# Patient Record
Sex: MALE | Race: OTHER | HISPANIC OR LATINO | URBAN - METROPOLITAN AREA
[De-identification: names, ages, dates, MRNs, and addresses within clinical notes are randomized per-mention and may not be internally consistent; named-entity substitution may affect disease eponyms.]

---

## 2018-07-13 ENCOUNTER — INPATIENT (INPATIENT)
Facility: HOSPITAL | Age: 43
LOS: 5 days | Discharge: HOME | End: 2018-07-19
Attending: INTERNAL MEDICINE | Admitting: INTERNAL MEDICINE
Payer: MEDICAID

## 2018-07-13 VITALS
SYSTOLIC BLOOD PRESSURE: 120 MMHG | TEMPERATURE: 98 F | DIASTOLIC BLOOD PRESSURE: 63 MMHG | HEART RATE: 97 BPM | OXYGEN SATURATION: 100 % | RESPIRATION RATE: 18 BRPM

## 2018-07-13 LAB
ALBUMIN SERPL ELPH-MCNC: 3.6 G/DL — SIGNIFICANT CHANGE UP (ref 3.5–5.2)
ALP SERPL-CCNC: 110 U/L — SIGNIFICANT CHANGE UP (ref 30–115)
ALT FLD-CCNC: 18 U/L — SIGNIFICANT CHANGE UP (ref 0–41)
AMMONIA BLD-MCNC: 100 UMOL/L — HIGH (ref 11–55)
ANION GAP SERPL CALC-SCNC: 16 MMOL/L — HIGH (ref 7–14)
APAP SERPL-MCNC: <5 UG/ML — LOW (ref 10–30)
APPEARANCE UR: CLEAR — SIGNIFICANT CHANGE UP
APTT BLD: 52.2 SEC — HIGH (ref 27–39.2)
AST SERPL-CCNC: 96 U/L — HIGH (ref 0–41)
BASE EXCESS BLDV CALC-SCNC: 0.5 MMOL/L — SIGNIFICANT CHANGE UP (ref -2–2)
BASOPHILS # BLD AUTO: 0.09 K/UL — SIGNIFICANT CHANGE UP (ref 0–0.2)
BASOPHILS NFR BLD AUTO: 1 % — SIGNIFICANT CHANGE UP (ref 0–1)
BILIRUB SERPL-MCNC: 1.6 MG/DL — HIGH (ref 0.2–1.2)
BILIRUB UR-MCNC: NEGATIVE — SIGNIFICANT CHANGE UP
BLD GP AB SCN SERPL QL: SIGNIFICANT CHANGE UP
BUN SERPL-MCNC: 3 MG/DL — LOW (ref 10–20)
CA-I SERPL-SCNC: 1.11 MMOL/L — LOW (ref 1.12–1.3)
CALCIUM SERPL-MCNC: 8.5 MG/DL — SIGNIFICANT CHANGE UP (ref 8.5–10.1)
CHLORIDE SERPL-SCNC: 100 MMOL/L — SIGNIFICANT CHANGE UP (ref 98–110)
CK MB CFR SERPL CALC: <1 NG/ML — SIGNIFICANT CHANGE UP (ref 0.6–6.3)
CO2 SERPL-SCNC: 24 MMOL/L — SIGNIFICANT CHANGE UP (ref 17–32)
COLOR SPEC: YELLOW — SIGNIFICANT CHANGE UP
CREAT SERPL-MCNC: 0.5 MG/DL — LOW (ref 0.7–1.5)
DIFF PNL FLD: NEGATIVE — SIGNIFICANT CHANGE UP
EOSINOPHIL # BLD AUTO: 0.08 K/UL — SIGNIFICANT CHANGE UP (ref 0–0.7)
EOSINOPHIL NFR BLD AUTO: 0.9 % — SIGNIFICANT CHANGE UP (ref 0–8)
ETHANOL SERPL-MCNC: 333 MG/DL — HIGH
GAS PNL BLDV: 144 MMOL/L — SIGNIFICANT CHANGE UP (ref 136–145)
GAS PNL BLDV: SIGNIFICANT CHANGE UP
GLUCOSE SERPL-MCNC: 112 MG/DL — HIGH (ref 70–99)
GLUCOSE UR QL: NEGATIVE MG/DL — SIGNIFICANT CHANGE UP
HCO3 BLDV-SCNC: 25 MMOL/L — SIGNIFICANT CHANGE UP (ref 22–29)
HCT VFR BLD CALC: 27.3 % — LOW (ref 42–52)
HCT VFR BLDA CALC: 25.4 % — LOW (ref 34–44)
HGB BLD CALC-MCNC: 8.3 G/DL — LOW (ref 14–18)
HGB BLD-MCNC: 8.6 G/DL — LOW (ref 14–18)
IMM GRANULOCYTES NFR BLD AUTO: 0.3 % — SIGNIFICANT CHANGE UP (ref 0.1–0.3)
INR BLD: 1.63 RATIO — HIGH (ref 0.65–1.3)
KETONES UR-MCNC: NEGATIVE — SIGNIFICANT CHANGE UP
LACTATE BLDV-MCNC: 1.8 MMOL/L — HIGH (ref 0.5–1.6)
LACTATE SERPL-SCNC: 1.7 MMOL/L — SIGNIFICANT CHANGE UP (ref 0.5–2.2)
LEUKOCYTE ESTERASE UR-ACNC: NEGATIVE — SIGNIFICANT CHANGE UP
LIDOCAIN IGE QN: 121 U/L — HIGH (ref 7–60)
LYMPHOCYTES # BLD AUTO: 1.96 K/UL — SIGNIFICANT CHANGE UP (ref 1.2–3.4)
LYMPHOCYTES # BLD AUTO: 22.1 % — SIGNIFICANT CHANGE UP (ref 20.5–51.1)
MAGNESIUM SERPL-MCNC: 2.1 MG/DL — SIGNIFICANT CHANGE UP (ref 1.8–2.4)
MCHC RBC-ENTMCNC: 25.7 PG — LOW (ref 27–31)
MCHC RBC-ENTMCNC: 31.5 G/DL — LOW (ref 32–37)
MCV RBC AUTO: 81.5 FL — SIGNIFICANT CHANGE UP (ref 80–94)
MONOCYTES # BLD AUTO: 0.91 K/UL — HIGH (ref 0.1–0.6)
MONOCYTES NFR BLD AUTO: 10.3 % — HIGH (ref 1.7–9.3)
NEUTROPHILS # BLD AUTO: 5.79 K/UL — SIGNIFICANT CHANGE UP (ref 1.4–6.5)
NEUTROPHILS NFR BLD AUTO: 65.4 % — SIGNIFICANT CHANGE UP (ref 42.2–75.2)
NITRITE UR-MCNC: NEGATIVE — SIGNIFICANT CHANGE UP
NRBC # BLD: 0 /100 WBCS — SIGNIFICANT CHANGE UP (ref 0–0)
PCO2 BLDV: 37 MMHG — LOW (ref 41–51)
PH BLDV: 7.43 — SIGNIFICANT CHANGE UP (ref 7.26–7.43)
PH UR: 7 — SIGNIFICANT CHANGE UP (ref 5–8)
PLATELET # BLD AUTO: 80 K/UL — LOW (ref 130–400)
PO2 BLDV: 65 MMHG — HIGH (ref 20–40)
POTASSIUM BLDV-SCNC: 3.5 MMOL/L — SIGNIFICANT CHANGE UP (ref 3.3–5.6)
POTASSIUM SERPL-MCNC: 3.6 MMOL/L — SIGNIFICANT CHANGE UP (ref 3.5–5)
POTASSIUM SERPL-SCNC: 3.6 MMOL/L — SIGNIFICANT CHANGE UP (ref 3.5–5)
PROT SERPL-MCNC: 9.2 G/DL — HIGH (ref 6–8)
PROT UR-MCNC: NEGATIVE MG/DL — SIGNIFICANT CHANGE UP
PROTHROM AB SERPL-ACNC: 17.5 SEC — HIGH (ref 9.95–12.87)
RBC # BLD: 3.35 M/UL — LOW (ref 4.7–6.1)
RBC # FLD: 16.4 % — HIGH (ref 11.5–14.5)
SALICYLATES SERPL-MCNC: <0.3 MG/DL — LOW (ref 4–30)
SAO2 % BLDV: 91 % — SIGNIFICANT CHANGE UP
SODIUM SERPL-SCNC: 140 MMOL/L — SIGNIFICANT CHANGE UP (ref 135–146)
SP GR SPEC: 1.01 — SIGNIFICANT CHANGE UP (ref 1.01–1.03)
TROPONIN T SERPL-MCNC: <0.01 NG/ML — SIGNIFICANT CHANGE UP
TYPE + AB SCN PNL BLD: SIGNIFICANT CHANGE UP
UROBILINOGEN FLD QL: 1 MG/DL (ref 0.2–0.2)
WBC # BLD: 8.86 K/UL — SIGNIFICANT CHANGE UP (ref 4.8–10.8)
WBC # FLD AUTO: 8.86 K/UL — SIGNIFICANT CHANGE UP (ref 4.8–10.8)

## 2018-07-13 RX ORDER — FOLIC ACID 0.8 MG
1 TABLET ORAL DAILY
Refills: 0 | Status: DISCONTINUED | OUTPATIENT
Start: 2018-07-13 | End: 2018-07-14

## 2018-07-13 RX ORDER — THIAMINE MONONITRATE (VIT B1) 100 MG
100 TABLET ORAL ONCE
Refills: 0 | Status: COMPLETED | OUTPATIENT
Start: 2018-07-13 | End: 2018-07-13

## 2018-07-13 RX ORDER — PHYTONADIONE (VIT K1) 5 MG
5 TABLET ORAL ONCE
Refills: 0 | Status: COMPLETED | OUTPATIENT
Start: 2018-07-13 | End: 2018-07-13

## 2018-07-13 RX ORDER — ASCORBIC ACID 60 MG
500 TABLET,CHEWABLE ORAL DAILY
Refills: 0 | Status: DISCONTINUED | OUTPATIENT
Start: 2018-07-13 | End: 2018-07-14

## 2018-07-13 RX ORDER — LACTULOSE 10 G/15ML
20 SOLUTION ORAL THREE TIMES A DAY
Refills: 0 | Status: DISCONTINUED | OUTPATIENT
Start: 2018-07-13 | End: 2018-07-14

## 2018-07-13 RX ORDER — SODIUM CHLORIDE 9 MG/ML
1000 INJECTION, SOLUTION INTRAVENOUS
Refills: 0 | Status: DISCONTINUED | OUTPATIENT
Start: 2018-07-13 | End: 2018-07-15

## 2018-07-13 RX ORDER — SODIUM CHLORIDE 9 MG/ML
1000 INJECTION, SOLUTION INTRAVENOUS ONCE
Refills: 0 | Status: COMPLETED | OUTPATIENT
Start: 2018-07-13 | End: 2018-07-13

## 2018-07-13 RX ORDER — THIAMINE MONONITRATE (VIT B1) 100 MG
100 TABLET ORAL DAILY
Refills: 0 | Status: DISCONTINUED | OUTPATIENT
Start: 2018-07-13 | End: 2018-07-19

## 2018-07-13 RX ADMIN — Medication 100 MILLIGRAM(S): at 18:01

## 2018-07-13 RX ADMIN — SODIUM CHLORIDE 1000 MILLILITER(S): 9 INJECTION, SOLUTION INTRAVENOUS at 17:09

## 2018-07-13 RX ADMIN — Medication 2 MILLIGRAM(S): at 22:37

## 2018-07-13 RX ADMIN — SODIUM CHLORIDE 75 MILLILITER(S): 9 INJECTION, SOLUTION INTRAVENOUS at 23:02

## 2018-07-13 NOTE — H&P ADULT - NSHPSOCIALHISTORY_GEN_ALL_CORE
Social History:    Occupation:   Lives with: ( X ) alone  (  ) children   (  ) spouse   (  ) parents  (  ) other    Substance Use (street drugs): ( X  ) never used  (  ) other:  Tobacco Usage:  (  X ) never smoked   (   ) former smoker   (   ) current smoker  (     ) pack year  (        ) last cigarette date  Alcohol Usage: as in HPI

## 2018-07-13 NOTE — H&P ADULT - FAMILY HISTORY
Father  Still living? Unknown  Family history of stroke, Age at diagnosis: 61-70     Mother  Still living? No  Family history of stroke, Age at diagnosis: 41-50

## 2018-07-13 NOTE — ED PROVIDER NOTE - OBJECTIVE STATEMENT
42 yo M pmh of alcohol abuse presents with few month history of blood in his urine, stool and coughing out blood as well as bleeding gums. Also reports intermittent dizziness, chest pain and shortness of breath. No syncopal episodes. Has not seen a doctor for the bleeding. Drinks 6 40 ounce beers a day, last drink was 15 minutes prior to arrival. no fall or head trauma.

## 2018-07-13 NOTE — ED PROVIDER NOTE - PHYSICAL EXAMINATION
CONSTITUTIONAL: Well-developed; well-nourished; in no acute distress.   SKIN: warm, dry  HEAD: Normocephalic; atraumatic.  EYES: PERRL, EOMI,  + juandice. pink conjunctiva  ENT: No nasal discharge; airway clear.  NECK: Supple; non tender.  CARD: S1, S2 normal;Regular rate and rhythm.   RESP: No wheezes, rales or rhonchi.  ABD: soft non distended, no rebound or guarding. no gross blood on rectal exam, no hemmroids, no fissures.   EXT: Normal ROM.    LYMPH: No acute cervical adenopathy.  NEURO: Alert, oriented, grossly unremarkable

## 2018-07-13 NOTE — ED ADULT NURSE NOTE - OBJECTIVE STATEMENT
As per cousin, pt c/o bloody stools x3 months. Pt with hx of ETOH abuse. Pt c/o generalized abdominal pain. Pt also c/o hematuria

## 2018-07-13 NOTE — H&P ADULT - HISTORY OF PRESENT ILLNESS
42 yo M w/ h/o alcohol abuse since 19 years of age, drinks 40 ounces beer about 3-5 times per day--> last drink at 12 noon today; no PCP check up for long time, no other known medical condition; presented from home with above chief complaints.    Pt c/o noticing hematuria, spontaneous gum bleeding, epistaxis, hematochezia that has been happening for about 2-3 months. Hematochezia is maroon colored blood that happens about 2 times a week, last episode today, the amount has been increasing recently. He didn't see any doctor for these complaints ever. For last few weeks he also has been having weakness, decreased po intake weight loss, intermittent SOB/chest pain.     ROS positive for increasing sleepiness at work, nausea, vomiting 1 episode today am-non-bloody, bilious, feeling intermittent fever  ROS negative for hemetemesis, constipation, abdominal distension, lower extremity swelling, syncope, palpitations    Patient never had endoscopy/colonoscopy, denies IV drug abuse/family h/o cancer; father was alcoholic too    In ED, VS stable on presentation. Found to have cirrhosis on CT A/P 42 yo M w/ h/o alcohol abuse since 19 years of age, drinks 40 ounces beer about 3-5 times per day--> last drink at 12 noon today; no PCP check up for long time, no other known medical condition; presented from home with above chief complaints.    Pt c/o noticing hematuria, spontaneous gum bleeding, epistaxis, hematochezia that has been happening for about 2-3 months. Hematochezia is maroon colored blood that happens about 2 times a week, last episode today, the amount has been increasing recently. He didn't see any doctor for these complaints ever. For last few weeks he also has been having weakness, decreased po intake weight loss, intermittent SOB/chest pain.     ROS positive for increasing sleepiness at work, nausea, vomiting 1 episode today am-non-bloody, bilious, feeling intermittent fever  ROS negative for hemetemesis, constipation, abdominal distension, lower extremity swelling, syncope, palpitations    Patient never had endoscopy/colonoscopy, denies IV drug abuse/family h/o cancer; father was alcoholic too    Attd: Pt drank yesterday. Gets shakes in am, never DTs or LOC or Sz. Did alcohol rehab for 28 days 2 yrs ago. Pt has neuropathy signs in hands and legs. No encephalopathy. Cooperative and pleasant and oriented.

## 2018-07-13 NOTE — ED PROVIDER NOTE - PROGRESS NOTE DETAILS
I personally evaluated the patient. I reviewed the Resident’s or Physician Assistant’s note (as assigned above), and agree with the findings and plan except as documented in my note.   44 Y/O M CHRONIC ALCOHOLISM (6 40 OUNCE BEERS DAILY), BROUGHT TO ED BY FAMILY WITH 3 MONTHS OF ABNORMAL BLEEDING. PT REPORTS 3 MONTHS OF INTERMITTENT HEMATURIA, BPR, EPISTAXIS, HEMOPTYSIS AND BLEEDING GUMS. PT WITH 2-3 WEEKS OF WEAKNESS, DIZZINESS, PALPITATIONS AND WORSENING FATIGUE. PT WITH H/O WITHDRAWAL, NO H/O WITHDRAWAL SEIZURES. LAST DRINK 1 HOUR PRIOR TO ARRIVAL IN THE ED. + ABD PAIN X SEVERAL MONTHS. ABD PAIN IS INTERMITTENT AND DIFFUSE. + INTERMITTENT VOMITING. NO FEVER, CHILLS. NO CP, SOB. VITALS NOTED. ALERT OX3 +AOB. OP NORMAL MMM. LUNGS CLEAR B/L. ABD- SOFT DISTENDED. + DIFFUSE TENDERNESS. NO REBOUND OR GUARDING. NO HSM. + ECCHYMOSES ABD WALL. NO PETECHIAE. NEURO EXAM NONFOCAL. PT SIGNED OUT TO DR. PATTON, FOLLOW UP CT SCAN, REASSESS AND DISPO. The patient was endorsed to me.  He is resting comfortable on a stretcher, listening to a soccer game.  CT scan was ordered and pending,  Anticipate admission when CT done.

## 2018-07-13 NOTE — H&P ADULT - NSHPPHYSICALEXAM_GEN_ALL_CORE
GENERAL: patient looks restless, increased activity  HEAD:  Atraumatic, Normocephalic  EYES: EOMI, PERRLA, conjunctiva and sclera clear  NECK: Supple  CHEST/LUNG: Clear to auscultation bilaterally; No wheeze  HEART: Regular rate and rhythm; No murmurs, rubs, or gallops  Chest: spider angioma present at some spots  ABDOMEN: Soft, tender in RUQ, LLQ, RLQ, Nondistended; Bowel sounds present  EXTREMITIES:  Flapping tremor absent; 2+ Peripheral Pulses, No clubbing, cyanosis, or edema  PSYCH: AAOx3  NEUROLOGY: non-focal  SKIN: No rashes or lesions

## 2018-07-13 NOTE — H&P ADULT - NSHPLABSRESULTS_GEN_ALL_CORE
8.6    8.86  )-----------( 80       ( 2018 15:15 )             27.3           140  |  100  |  3<L>  ----------------------------<  112<H>  3.6   |  24  |  0.5<L>    Ca    8.5      2018 15:15  Mg     2.1         TPro  9.2<H>  /  Alb  3.6  /  TBili  1.6<H>  /  DBili  x   /  AST  96<H>  /  ALT  18  /  AlkPhos  110                Urinalysis Basic - ( 2018 16:54 )    Color: Yellow / Appearance: Clear / S.010 / pH: x  Gluc: x / Ketone: Negative  / Bili: Negative / Urobili: 1.0 mg/dL   Blood: x / Protein: Negative mg/dL / Nitrite: Negative   Leuk Esterase: Negative / RBC: x / WBC x   Sq Epi: x / Non Sq Epi: x / Bacteria: x        PT/INR - ( 2018 15:15 )   PT: 17.50 sec;   INR: 1.63 ratio         PTT - ( 2018 15:15 )  PTT:52.2 sec    Lactate Trend   @ 15:15 Lactate:1.7       CARDIAC MARKERS ( 2018 16:00 )  x     / <0.01 ng/mL / x     / x     / <1.0 ng/mL  CARDIAC MARKERS ( 2018 15:15 )  x     / x     / 153 U/L / x     / x        < from: CT Abdomen and Pelvis w/ IV Cont (18 @ 18:38) >    1.  No acute intra-abdominal pathology.  2.  Cirrhotic liver containing multiple subcentimeter hypodensities too   small characterize. Nonemergent MRI of the abdomen is recommended for   further evaluation.  3.  Mild splenomegaly.    < end of copied text > 8.6    8.86  )-----------( 80       ( 2018 15:15 )             27.3     Hemoglobin: 8.3 g/dL ( @ 07:47)  Hemoglobin: 8.7 g/dL ( @ 00:15)  Hemoglobin: 8.6 g/dL ( @ 15:15)        140  |  100  |  3<L>  ----------------------------<  112<H>  3.6   |  24  |  0.5<L>    Ca    8.5      2018 15:15  Mg     2.1         TPro  9.2<H>  /  Alb  3.6  /  TBili  1.6<H>  /  DBili  x   /  AST  96<H>  /  ALT  18  /  AlkPhos  110                Urinalysis Basic - ( 2018 16:54 )    Color: Yellow / Appearance: Clear / S.010 / pH: x  Gluc: x / Ketone: Negative  / Bili: Negative / Urobili: 1.0 mg/dL   Blood: x / Protein: Negative mg/dL / Nitrite: Negative   Leuk Esterase: Negative / RBC: x / WBC x   Sq Epi: x / Non Sq Epi: x / Bacteria: x        PT/INR - ( 2018 15:15 )   PT: 17.50 sec;   INR: 1.63 ratio    PTT - ( 2018 15:15 )  PTT:52.2 sec    Lactate Trend   @ 15:15 Lactate:1.7       CARDIAC MARKERS ( 2018 16:00 )  x     / <0.01 ng/mL / x     / x     / <1.0 ng/mL  CARDIAC MARKERS ( 2018 15:15 )  x     / x     / 153 U/L / x     / x        < from: CT Abdomen and Pelvis w/ IV Cont (18 @ 18:38) >    1.  No acute intra-abdominal pathology.  2.  Cirrhotic liver containing multiple subcentimeter hypodensities too   small characterize. Nonemergent MRI of the abdomen is recommended for   further evaluation.  3.  Mild splenomegaly.    < end of copied text >

## 2018-07-13 NOTE — H&P ADULT - REASON FOR ADMISSION
rectal bleeding, gum bleeding, epistaxis, hematuria for 2-3 months with weakness, intermittent SOB/chest pain, decreased po intake

## 2018-07-13 NOTE — ED PROVIDER NOTE - CARE PLAN
Principal Discharge DX:	Alcoholic intoxication with complication  Secondary Diagnosis:	Anemia, unspecified type  Secondary Diagnosis:	Thrombocytopenia

## 2018-07-13 NOTE — H&P ADULT - ASSESSMENT
42 yo M w/ h/o alcohol abuse since 19 years of age, drinks 40 ounces beer about 3-5 times per day--> last drink at 12 noon today; no PCP check up for long time, no other known medical condition; presented from home hematochezia, episodic gum bleeding, epistaxis, hematuria for 2-3 months with weakness, intermittent SOB/chest pain, decreased po intake for few weeks.     In ED, VS stable on presentation  CIWA about 20 on my assessment  labs and imaging significant for U/A- no hematuria, HB-8.6, no baseline available, platelet-80, INR-1.6. Rodriguez-1.6, AST-96. lipase-121, CT A/P showed- cirrhosis  rectal exam by ED team- no gross blood +    A & P:    # Microcytic anemia of unknown duration likely 2/2 chronic blood loss from epistaxis, gum bleeding, hematuria, hematochezia  - patient has occassional SOB/chest pain at night time but no active symptoms for anemia at this point--> so will hold off on transfusion  - keep active type and screen, monitor CBC qshift  - transfuse if Hb drops further  - check iron studies    # epistaxis, gum bleeding hematuria maybe 2/2 thrombocytopenia/elevated INR 2/2 cirrhosis vs vitamin C deficiency 2/2 malnutrition from chronic alcohol use  - start oral vitamin c supplementation  - will try oral vitamin K supplementation to reverse INR  - monitor INR and platelet  - monitor CBC as above    # hematochezia likely related to hemorrhoids from portal hypertension/rectal varices  - unable to do rectal exam as pt is in hallway  - GI c/s   - monitor CBC as above    # Newly diagnosed Cirrhosis 2/2 alcohol use  - patient counselled in depth about quitting alcohol use  - check hepatitis B,C, AFP  - GI c/s for possible endoscopy  - start lactulose for elevated ammonia--> titrate to 2-3 bowel movement per day  - no e/o ascites or hepatic encephalopathy    # multiple small nodularities on liver seen on CT A/P  - will get MRI liver protocol to evaluate further--> no contraindication    # Alcohol withdrawal symptoms  - CIWA score right now is about 20  - start ativan protocol  - start IVF with thiamine, glucose and folate    # DVT Px- SCD in light of bleeding     GI Px- no indication     Diet- low fat diet, NPO after midnight for possible endoscopy tomorrow     Disposition plan- patient might agree for alcohol detox, have further discussion prior to discharge 42 yo M w/ h/o alcohol abuse since 19 years of age, drinks 40 ounces beer about 3-5 times per day--> last drink at 12 noon today; no PCP check up for long time, no other known medical condition; presented from home hematochezia, episodic gum bleeding, epistaxis, hematuria for 2-3 months with weakness, intermittent SOB/chest pain, decreased po intake for few weeks.     In ED, VS stable on presentation  CIWA about 20 on my assessment  labs and imaging significant for U/A- no hematuria, HB-8.6, no baseline available, platelet-80, INR-1.6. Rodriguez-1.6, AST-96. lipase-121, CT A/P showed- cirrhosis  rectal exam by ED team- no gross blood +    A & P:    # Microcytic anemia of unknown duration likely 2/2 chronic blood loss from epistaxis, gum bleeding, hematuria, hematochezia  - patient has occassional SOB/chest pain at night time but no active symptoms for anemia at this point--> so will hold off on transfusion  - keep active type and screen, monitor CBC qshift  - transfuse if Hb drops further  - check iron studies    # epistaxis, gum bleeding hematuria maybe 2/2 thrombocytopenia/elevated INR 2/2 cirrhosis vs vitamin C deficiency 2/2 malnutrition from chronic alcohol use  - start oral vitamin c supplementation  - will try oral vitamin K supplementation to reverse INR  - monitor INR and platelet  - monitor CBC as above    # hematochezia likely related to hemorrhoids from portal hypertension/rectal varices  - unable to do rectal exam as pt is in hallway  - GI c/s   - monitor CBC as above    # Newly diagnosed Cirrhosis 2/2 alcohol use  - patient counselled in depth about quitting alcohol use  - check hepatitis B,C, AFP  - GI c/s for possible endoscopy  - start lactulose for elevated ammonia--> titrate to 2-3 bowel movement per day  - no e/o ascites or hepatic encephalopathy    # multiple small nodularities on liver seen on CT A/P  - will get MRI liver protocol to evaluate further--> no contraindication    # Alcohol withdrawal symptoms  - CIWA score right now is about 20  - start ativan protocol  - start IVF- D5+1/2 ns with PO thiamine and folate  - monitor phosphorus, mg, calcium, potassium    # DVT Px- SCD in light of bleeding     GI Px- no indication     Diet- low fat diet, NPO after midnight for possible endoscopy tomorrow     Disposition plan- patient might agree for alcohol detox, have further discussion prior to discharge 42 yo M w/ h/o alcohol abuse since 19 years of age, drinks 40 ounces beer about 3-5 times per day--> last drink at 12 noon today; no PCP check up for long time, no other known medical condition; presented from home hematochezia, episodic gum bleeding, epistaxis, hematuria for 2-3 months with weakness, intermittent SOB/chest pain, decreased po intake for few weeks.     In ED, VS stable on presentation  CIWA about 20 on my assessment  labs and imaging significant for U/A- no hematuria, HB-8.6, no baseline available, platelet-80, INR-1.6. Rodriguez-1.6, AST-96. lipase-121, CT A/P showed- cirrhosis  rectal exam by ED team- no gross blood +    A & P:    # Microcytic anemia of unknown duration likely 2/2 chronic blood loss from epistaxis, gum bleeding, hematuria, hematochezia  - patient has occassional SOB/chest pain at night time but no active symptoms for anemia at this point--> so will hold off on transfusion  - keep active type and screen, monitor CBC qshift  - transfuse if Hb drops further  - check iron studies    # epistaxis, gum bleeding hematuria maybe 2/2 thrombocytopenia/elevated INR 2/2 cirrhosis vs vitamin C deficiency 2/2 malnutrition from chronic alcohol use  - start oral vitamin c supplementation  - will try oral vitamin K supplementation to reverse INR  - monitor INR and platelet  - monitor CBC as above    # hematochezia likely related to hemorrhoids from portal hypertension/rectal varices  - rectal exam by ED staff-no e/o gross blood  - GI c/s   - monitor CBC as above    # Newly diagnosed Cirrhosis 2/2 alcohol use  - MELD score-12  - patient counselled in depth about quitting alcohol use  - check hepatitis B,C, AFP  - GI c/s for possible endoscopy  - start lactulose for elevated ammonia--> titrate to 2-3 bowel movement per day  - no e/o ascites or hepatic encephalopathy    # multiple small nodularities on liver seen on CT A/P  - will get MRI liver protocol to evaluate further--> no contraindication    # Alcohol withdrawal symptoms  - CIWA score right now is about 20  - start ativan protocol--> pt has cirrhosis so monitor closely for side effects   - start IVF- D5+1/2 ns with PO thiamine and folate  - monitor phosphorus, mg, calcium, potassium    # DVT Px- SCD in light of bleeding     GI Px- no indication     Diet- low fat diet, NPO after midnight for possible endoscopy tomorrow     Disposition plan- patient might agree for alcohol detox, have further discussion prior to discharge 42 yo M w/ h/o alcohol abuse since 19 years of age, drinks 40 ounces beer about 3-5 times per day--> last drink at 12 noon today; no PCP check up for long time, no other known medical condition; presented from home hematochezia, episodic gum bleeding, epistaxis, hematuria for 2-3 months with weakness, intermittent SOB/chest pain, decreased po intake for few weeks.     In ED, VS stable on presentation  CIWA about 20 on my assessment  labs and imaging significant for U/A- no hematuria, HB-8.6, no baseline available, platelet-80, INR-1.6. Rodriguez-1.6, AST-96. lipase-121, CT A/P showed- cirrhosis  rectal exam by ED team- no gross blood    A & P:    # Microcytic anemia of unknown duration likely 2/2 chronic blood loss from epistaxis, gum bleeding, hematuria, hematochezia  T&S  keep hgb > 7  check iron studies, b12, fol, TSH, vit d 25-OH  daily cbc    # epistaxis, gum bleeding hematuria maybe 2/2 thrombocytopenia/elevated INR 2/2 cirrhosis vs vitamin deficiency 2/2 malnutrition from chronic alcohol use  MVI q24  oral vitamin K supplementation to reverse INR 2.5mg q24 x3  - monitor INR and platelet daily    # hematochezia likely related to hemorrhoids from portal hypertension/rectal varices  - rectal exam by ED staff-no gross blood  - GI c/s noted - scopes next wk  ok to feed for now    # Newly diagnosed Cirrhosis 2/2 alcohol use  - MELD score-12  - patient counselled in depth about quitting alcohol use  - check hepatitis A, B, C, AFP  - start lactulose for elevated ammonia--> titrate to 2 bowel movement per day Lactulose 10gm q12 for now  - no ascites or hepatic encephalopathy    # multiple small nodularities on liver seen on CT A/P  do MRI as outpt    # Alcohol withdrawal symptoms  - CIWA score right now is about 20  can do ativan 1mg po q4 prn  - start IVF - D5+1/2 ns 75/hr  oral vit b1  - monitor phosphorus, mg, calcium, potassium    # DVT Px- SCD in light of bleeding       # GI Px- protonix 40mg q24

## 2018-07-13 NOTE — ED ADULT TRIAGE NOTE - CHIEF COMPLAINT QUOTE
Pt states and family member confirms pt has been having bloody stools, bloody vomit, bloody noses & hematuria for "a while", last time was this morning, hx ETOH. Feeling weak

## 2018-07-13 NOTE — ED PROVIDER NOTE - NS ED ROS FT
Eyes:  No visual changes, eye pain or discharge.  ENMT:   no sore throat or runny nose, no difficulty swallowing. occasional bleeding gums.   Cardiac: + intermittent chest pain and palpitations, none currently.   Respiratory:  No cough or respiratory distress. + occasional hemoptysis over last few months.   GI:  No nausea, vomiting, diarrhea or abdominal pain. + occasional rectal bleeding. previous rectal abscess that was operating on.   :  No dysuria, frequency or burning. + hematuria.   MS:  No joint pain or back pain.  Neuro:  No headache or weakness. + dizziness.   Skin:  No skin rash.   Endocrine: No history of thyroid disease or diabetes.

## 2018-07-14 LAB
ALBUMIN SERPL ELPH-MCNC: 3.3 G/DL — LOW (ref 3.5–5.2)
ALP SERPL-CCNC: 101 U/L — SIGNIFICANT CHANGE UP (ref 30–115)
ALT FLD-CCNC: 17 U/L — SIGNIFICANT CHANGE UP (ref 0–41)
ANION GAP SERPL CALC-SCNC: 14 MMOL/L — SIGNIFICANT CHANGE UP (ref 7–14)
AST SERPL-CCNC: 91 U/L — HIGH (ref 0–41)
BILIRUB DIRECT SERPL-MCNC: 0.6 MG/DL — HIGH (ref 0–0.2)
BILIRUB INDIRECT FLD-MCNC: 1 MG/DL — SIGNIFICANT CHANGE UP (ref 0.2–1.2)
BILIRUB SERPL-MCNC: 1.6 MG/DL — HIGH (ref 0.2–1.2)
BUN SERPL-MCNC: 4 MG/DL — LOW (ref 10–20)
CALCIUM SERPL-MCNC: 8.3 MG/DL — LOW (ref 8.5–10.1)
CHLORIDE SERPL-SCNC: 103 MMOL/L — SIGNIFICANT CHANGE UP (ref 98–110)
CK MB CFR SERPL CALC: <1 NG/ML — SIGNIFICANT CHANGE UP (ref 0.6–6.3)
CK SERPL-CCNC: 134 U/L — SIGNIFICANT CHANGE UP (ref 0–225)
CO2 SERPL-SCNC: 24 MMOL/L — SIGNIFICANT CHANGE UP (ref 17–32)
CREAT SERPL-MCNC: 0.5 MG/DL — LOW (ref 0.7–1.5)
GLUCOSE SERPL-MCNC: 134 MG/DL — HIGH (ref 70–99)
HBV CORE AB SER-ACNC: SIGNIFICANT CHANGE UP
HBV SURFACE AB SER-ACNC: SIGNIFICANT CHANGE UP
HBV SURFACE AG SER-ACNC: SIGNIFICANT CHANGE UP
HCT VFR BLD CALC: 25.9 % — LOW (ref 42–52)
HCT VFR BLD CALC: 27.3 % — LOW (ref 42–52)
HCV AB S/CO SERPL IA: 0.17 S/CO — SIGNIFICANT CHANGE UP
HCV AB SERPL-IMP: SIGNIFICANT CHANGE UP
HGB BLD-MCNC: 8.3 G/DL — LOW (ref 14–18)
HGB BLD-MCNC: 8.7 G/DL — LOW (ref 14–18)
HIV 1+2 AB+HIV1 P24 AG SERPL QL IA: SIGNIFICANT CHANGE UP
INR BLD: 1.75 RATIO — HIGH (ref 0.65–1.3)
IRON SATN MFR SERPL: 32 UG/DL — LOW (ref 35–150)
MAGNESIUM SERPL-MCNC: 1.7 MG/DL — LOW (ref 1.8–2.4)
MCHC RBC-ENTMCNC: 26.2 PG — LOW (ref 27–31)
MCHC RBC-ENTMCNC: 26.2 PG — LOW (ref 27–31)
MCHC RBC-ENTMCNC: 31.9 G/DL — LOW (ref 32–37)
MCHC RBC-ENTMCNC: 32 G/DL — SIGNIFICANT CHANGE UP (ref 32–37)
MCV RBC AUTO: 81.7 FL — SIGNIFICANT CHANGE UP (ref 80–94)
MCV RBC AUTO: 82.2 FL — SIGNIFICANT CHANGE UP (ref 80–94)
NRBC # BLD: 0 /100 WBCS — SIGNIFICANT CHANGE UP (ref 0–0)
NRBC # BLD: 0 /100 WBCS — SIGNIFICANT CHANGE UP (ref 0–0)
PHOSPHATE SERPL-MCNC: 3 MG/DL — SIGNIFICANT CHANGE UP (ref 2.1–4.9)
PLATELET # BLD AUTO: 75 K/UL — LOW (ref 130–400)
PLATELET # BLD AUTO: 77 K/UL — LOW (ref 130–400)
POTASSIUM SERPL-MCNC: 3.5 MMOL/L — SIGNIFICANT CHANGE UP (ref 3.5–5)
POTASSIUM SERPL-SCNC: 3.5 MMOL/L — SIGNIFICANT CHANGE UP (ref 3.5–5)
PROT SERPL-MCNC: 8.7 G/DL — HIGH (ref 6–8)
PROTHROM AB SERPL-ACNC: 18.8 SEC — HIGH (ref 9.95–12.87)
RBC # BLD: 3.17 M/UL — LOW (ref 4.7–6.1)
RBC # BLD: 3.32 M/UL — LOW (ref 4.7–6.1)
RBC # FLD: 16.3 % — HIGH (ref 11.5–14.5)
RBC # FLD: 16.5 % — HIGH (ref 11.5–14.5)
SODIUM SERPL-SCNC: 141 MMOL/L — SIGNIFICANT CHANGE UP (ref 135–146)
TRANSFERRIN SERPL-MCNC: 279 MG/DL — SIGNIFICANT CHANGE UP (ref 200–360)
TROPONIN T SERPL-MCNC: <0.01 NG/ML — SIGNIFICANT CHANGE UP
WBC # BLD: 6.6 K/UL — SIGNIFICANT CHANGE UP (ref 4.8–10.8)
WBC # BLD: 6.98 K/UL — SIGNIFICANT CHANGE UP (ref 4.8–10.8)
WBC # FLD AUTO: 6.6 K/UL — SIGNIFICANT CHANGE UP (ref 4.8–10.8)
WBC # FLD AUTO: 6.98 K/UL — SIGNIFICANT CHANGE UP (ref 4.8–10.8)

## 2018-07-14 RX ORDER — LACTULOSE 10 G/15ML
10 SOLUTION ORAL
Refills: 0 | Status: DISCONTINUED | OUTPATIENT
Start: 2018-07-14 | End: 2018-07-19

## 2018-07-14 RX ORDER — MAGNESIUM SULFATE 500 MG/ML
2 VIAL (ML) INJECTION ONCE
Refills: 0 | Status: COMPLETED | OUTPATIENT
Start: 2018-07-14 | End: 2018-07-14

## 2018-07-14 RX ORDER — PHYTONADIONE (VIT K1) 5 MG
2.5 TABLET ORAL DAILY
Refills: 0 | Status: DISCONTINUED | OUTPATIENT
Start: 2018-07-14 | End: 2018-07-15

## 2018-07-14 RX ADMIN — Medication 1 TABLET(S): at 10:46

## 2018-07-14 RX ADMIN — Medication 2.5 MILLIGRAM(S): at 13:55

## 2018-07-14 RX ADMIN — Medication 50 GRAM(S): at 15:27

## 2018-07-14 RX ADMIN — Medication 1 MILLIGRAM(S): at 00:09

## 2018-07-14 RX ADMIN — LACTULOSE 20 GRAM(S): 10 SOLUTION ORAL at 07:05

## 2018-07-14 RX ADMIN — Medication 2 MILLIGRAM(S): at 09:58

## 2018-07-14 RX ADMIN — Medication 100 MILLIGRAM(S): at 00:10

## 2018-07-14 RX ADMIN — Medication 500 MILLIGRAM(S): at 11:20

## 2018-07-14 RX ADMIN — Medication 5 MILLIGRAM(S): at 00:10

## 2018-07-14 RX ADMIN — Medication 500 MILLIGRAM(S): at 00:09

## 2018-07-14 RX ADMIN — LACTULOSE 10 GRAM(S): 10 SOLUTION ORAL at 17:23

## 2018-07-14 RX ADMIN — Medication 1 MILLIGRAM(S): at 11:15

## 2018-07-14 RX ADMIN — Medication 1 MILLIGRAM(S): at 22:44

## 2018-07-14 RX ADMIN — LACTULOSE 20 GRAM(S): 10 SOLUTION ORAL at 00:11

## 2018-07-14 RX ADMIN — Medication 2 MILLIGRAM(S): at 02:56

## 2018-07-14 RX ADMIN — Medication 100 MILLIGRAM(S): at 11:20

## 2018-07-14 RX ADMIN — Medication 1 MILLIGRAM(S): at 11:20

## 2018-07-14 RX ADMIN — Medication 2 MILLIGRAM(S): at 07:04

## 2018-07-14 NOTE — CONSULT NOTE ADULT - ASSESSMENT
44 yo  male with:    1)EtOh excessive use  2)Normocytic anemia (likely multifactorial: Macro b12/folate def & ALBERTO)  3)Hematochezia (likely of LGIB etiology (hemorrhoids, IBD, rectal varices (unlikely), SRUS) cannot rule out non massive, non variceal- UGIB (Etoh gastropathy, PUD (H pylori), GAVE etc...)  4)ETOH excessive use  5)Likely (imaging) liver cirrhosis (compensated) with evidence of early pHTN (+) ?splenomegaly without ascites, evidence of variceal disease.  6)Thrombocytopenia doubt of pHTN (mild spleenomegaly?) more likely of EtOH bone suppression.  7)Hypodense liver lesions.  8)Elevated INR: Liver injury (no evidence of failure) vs Vit K def (more likely)  9)Transaminitis: Of ETOH excessive use +/- reversed ratio of profound cirrhosis (doubt the later)  10)MELD=12 MDF=28 no evidence of ETOH hepatitis    rec:  1)Anemia work up (iron indices, vit b12, folate, hemolysis panel, etc..)  2)Monitor CBC and transfuse to Hgb>7-8  3)monitor iP, Mg.  4)CIWA protocol or benzo prn  5)folate/mg/thiamine/MVI  6)Consider a trial of PO Vit k (10mg q8), daily INR and CMP.  7)If at no risk for DT/withdrawal Suggest EGD colon next week.  8)MRI of the liver (could be obtained OP)  9)Agree with viral Hep panel and vaccinate against a & b if non immune.  10)EtOH abstinence (discussed with the patient) 44 yo  male with:    1)EtOh excessive use  2)Normocytic anemia (likely multifactorial: Macro b12/folate def & ALBERTO)  3)Hematochezia (likely of LGIB etiology (hemorrhoids, IBD, rectal varices (unlikely), SRUS) cannot rule out non massive, non variceal- UGIB (Etoh gastropathy, PUD (H pylori), GAVE etc...)  4)ETOH excessive use  5)Likely (imaging) liver cirrhosis (compensated) with evidence of early pHTN (+) ?splenomegaly without ascites, evidence of variceal disease.  6)Thrombocytopenia doubt of pHTN (mild spleenomegaly?) more likely of EtOH bone suppression.  7)Hypodense liver lesions.  8)Elevated INR: Liver injury (no evidence of failure) vs Vit K def (more likely)  9)Transaminitis: Of ETOH excessive use +/- reversed ratio of profound cirrhosis (doubt the later)  10)MELD=12 MDF=28 no evidence of ETOH hepatitis    rec:  1)Anemia work up (iron indices, vit b12, folate, hemolysis panel, etc..)  2)Monitor CBC and transfuse to Hgb>7-8  3)monitor iP, Mg.  4)CIWA protocol or benzo prn  5)folate/mg/thiamine/MVI  6)Check stool for C diff  7)Consider a trial of PO Vit k (10mg q8), daily INR and CMP.  8)If at no risk for DT/withdrawal Suggest EGD colon next week.  9)MRI of the liver (could be obtained OP)  10)Agree with viral Hep panel and vaccinate against a & b if non immune.  11)EtOH abstinence (discussed with the patient) 42 yo  male with:    1)EtOh excessive use  2)Normocytic anemia (likely multifactorial: Macro b12/folate def & ALBERTO)  3)Hematochezia (likely of LGIB etiology (hemorrhoids, IBD, rectal varices (unlikely), SRUS) cannot rule out non massive, non variceal- UGIB (Etoh gastropathy, PUD (H pylori), GAVE etc...)  4)ETOH excessive use  5)Liver cirrhosis (compensated) with evidence of early pHTN (+) ?splenomegaly without ascites or evidence of variceal disease on imaging.  6)Thrombocytopenia/hypersplenism with superimposed EtOH induced marrow suppression.  7)Hypodense liver lesions, could be evaluated by an outpt MRI   8)Elevated INR: altered synthetic function in cirrhosis (no evidence of failure) +/- Vit K def: please replete with vit K and repeat INR.  9)Transaminitis: eversed ratio in cirrhosis +/_  ETOH excessive use  10)MELD=12 MDF=28 no evidence of ETOH hepatitis    rec:  1)Anemia work up (iron indices, vit b12, folate, hemolysis panel, etc..)  2)Monitor CBC and transfuse to Hgb>7-8  3)monitor iP, Mg.  4)CIWA protocol or benzo prn  5)folate/mg/thiamine/MVI  6)Check stool for C diff  7)Consider a trial of PO Vit k (10mg q8), daily INR and CMP.  8)If at no risk for DT/withdrawal   will plan for EGD/ colono next week.  9)MRI of the liver (could be obtained OP)  10)Agree with viral Hep panel and vaccinate against a & b if non immune, as well as obtain all CLD workup to r/o other underlying etiologies besides alcohol.  11)EtOH abstinence (discussed with the patient)    Will f/up carlos enrique

## 2018-07-14 NOTE — PROGRESS NOTE ADULT - ASSESSMENT
44 yo M w/ h/o alcohol abuse since 19 years of age, drinks 40 ounces beer about 3-5 times per day--> last drink at 12 noon today; no PCP check up for long time, no other known medical condition; presented from home hematochezia, episodic gum bleeding, epistaxis, hematuria for 2-3 months with weakness, intermittent SOB/chest pain, decreased po intake for few weeks.     In ED, VS stable on presentation  CIWA about 20  labs and imaging significant for U/A- no hematuria, HB-8.6, no baseline available, platelet-80, INR-1.6. Rodriguez-1.6, AST-96. lipase-121, CT A/P showed- cirrhosis  rectal exam by ED team- no gross blood    A & P:    # Microcytic anemia of unknown duration likely 2/2 chronic blood loss from epistaxis, gum bleeding, hematuria, hematochezia  T&S  keep hgb > 7  check iron studies, b12, fol, TSH, vit d 25-OH  daily cbc    # epistaxis, gum bleeding hematuria maybe 2/2 thrombocytopenia/elevated INR 2/2 cirrhosis vs vitamin deficiency 2/2 malnutrition from chronic alcohol use  MVI q24  oral vitamin K supplementation to reverse INR 2.5mg q24 x3  - monitor INR and platelet daily    # hematochezia likely related to hemorrhoids from portal hypertension/rectal varices  - rectal exam by ED staff-no gross blood  - GI c/s noted - scopes next wk  ok to feed for now    # Newly diagnosed Cirrhosis 2/2 alcohol use  - MELD score-12  - patient counselled in depth about quitting alcohol use  - check hepatitis A, B, C, AFP  - start lactulose for elevated ammonia--> titrate to 2 bowel movement per day Lactulose 10gm q12 for now  - no ascites or hepatic encephalopathy    # multiple small nodularities on liver seen on CT A/P  do MRI as outpt    # Alcohol withdrawal symptoms  - CIWA score right now is about 20  can do ativan 1mg po q4 prn  - start IVF - D5+1/2 ns 75/hr  oral vit b1  - monitor phosphorus, mg, calcium, potassium    # DVT Px- SCD in light of bleeding       # GI Px- protonix 40mg q24

## 2018-07-14 NOTE — PROGRESS NOTE ADULT - SUBJECTIVE AND OBJECTIVE BOX
SUBJECTIVE:    Patient is a 43y old Male who presents with a chief complaint of rectal bleeding, gum bleeding, epistaxis, hematuria for 2-3 months with weakness, intermittent SOB/chest pain, decreased po intake (2018 21:33)    Currently admitted to medicine with the primary diagnosis of Alcoholic intoxication with complication     Today is hospital day 1d. This morning he is resting comfortably in bed. Denies abd pain, nausea/vomiting blood. Last diarrhea 12 pm with no blood. Report urine orange. Denies tremors and bleeding. No overnight events.     PAST MEDICAL & SURGICAL HISTORY  ETOH abuse  No significant past surgical history    SOCIAL HISTORY:  Negative for smoking/alcohol/drug use.     ALLERGIES:  No Known Allergies    MEDICATIONS:  STANDING MEDICATIONS  dextrose 5% + sodium chloride 0.45%. 1000 milliLiter(s) IV Continuous <Continuous>  lactulose Syrup 10 Gram(s) Oral two times a day  multivitamin 1 Tablet(s) Oral daily  phytonadione   Solution 2.5 milliGRAM(s) Oral daily  thiamine 100 milliGRAM(s) Oral daily    PRN MEDICATIONS  LORazepam     Tablet 1 milliGRAM(s) Oral every 4 hours PRN    VITALS:   T(F): 97.8  HR: 106  BP: 127/70  RR: 18  SpO2: 98%    LABS:                        8.3    6.60  )-----------( 75       ( 2018 07:47 )             25.9     -14    141  |  103  |  4<L>  ----------------------------<  134<H>  3.5   |  24  |  0.5<L>    Ca    8.3<L>      2018 07:47  Phos  3.0     -14  Mg     1.7     -14    TPro  8.7<H>  /  Alb  3.3<L>  /  TBili  1.6<H>  /  DBili  0.6<H>  /  AST  91<H>  /  ALT  17  /  AlkPhos  101  07-14    PT/INR - ( 2018 07:47 )   PT: 18.80 sec;   INR: 1.75 ratio         PTT - ( 2018 15:15 )  PTT:52.2 sec  Urinalysis Basic - ( 2018 16:54 )    Color: Yellow / Appearance: Clear / S.010 / pH: x  Gluc: x / Ketone: Negative  / Bili: Negative / Urobili: 1.0 mg/dL   Blood: x / Protein: Negative mg/dL / Nitrite: Negative   Leuk Esterase: Negative / RBC: x / WBC x   Sq Epi: x / Non Sq Epi: x / Bacteria: x        Creatine Kinase, Serum: 134 U/L (18 @ 00:15)  Troponin T, Serum: <0.01 ng/mL (18 @ 00:15)  Troponin T, Serum: <0.01 ng/mL (18 @ 16:00)      CARDIAC MARKERS ( 2018 00:15 )  x     / <0.01 ng/mL / 134 U/L / x     / <1.0 ng/mL  CARDIAC MARKERS ( 2018 16:00 )  x     / <0.01 ng/mL / x     / x     / <1.0 ng/mL  CARDIAC MARKERS ( 2018 15:15 )  x     / x     / 153 U/L / x     / x          RADIOLOGY:    PHYSICAL EXAM:  GENERAL: NAD  	HEAD:  Atraumatic, Normocephalic  	EYES: EOMI, PERRLA, conjunctiva and sclera clear  	NECK: Supple  	CHEST/LUNG: Clear to auscultation bilaterally; No wheeze  	HEART: Regular rate and rhythm; No murmurs, rubs, or gallops  	Chest: spider angioma present at some spots  	ABDOMEN: Soft, mild tender in RUQ, LLQ, RLQ, Nondistended; Bowel sounds present  	EXTREMITIES:  Flapping tremor absent; 2+ Peripheral Pulses, No clubbing, cyanosis, or edema  	PSYCH: AAOx3  	NEUROLOGY: non-focal  SKIN: No rashes or lesions

## 2018-07-14 NOTE — CONSULT NOTE ADULT - SUBJECTIVE AND OBJECTIVE BOX
Gastroenterology Consultation    43y year old  Male with bright red blood per rectum.    Patient is a 43y old  Male who presents with a chief complaint of rectal bleeding, gum bleeding, epistaxis, hematuria for 2-3 months with weakness, intermittent SOB/chest pain, decreased po intake (13 Jul 2018 21:33)    HPI:  42 yo M w/ h/o alcohol abuse since 19 years of age, drinks 40 ounces beer about 3-5 times per day--> last drink at 12 noon today; no PCP check up for long time, no other known medical condition; presented from home with above chief complaints.    Pt c/o noticing hematuria, spontaneous gum bleeding, epistaxis, hematochezia that has been happening for about 2-3 months. Hematochezia is maroon colored blood that happens about 2 times a week, last episode today, the amount has been increasing recently. He didn't see any doctor for these complaints ever. For last few weeks he also has been having weakness, decreased po intake weight loss, intermittent SOB/chest pain.     ROS positive for increasing sleepiness at work, nausea, vomiting 1 episode today am-non-bloody, bilious, feeling intermittent fever  ROS negative for hemetemesis, constipation, abdominal distension, lower extremity swelling, syncope, palpitations    Patient never had endoscopy/colonoscopy, denies IV drug abuse/family h/o cancer; father was alcoholic too    In ED, VS stable on presentation. Found to have cirrhosis on CT A/P (13 Jul 2018 21:33)      GI Hx:     Previous colonoscopy(ies): None	on file  Previous EGD(s): None on file    Review of Systems: noncontributory other than listed in Admission H & P  Family Hx:  Social Hx:    PAST MEDICAL & SURGICAL HISTORY:  ETOH abuse  No significant past surgical history          Allergies: No Known Allergies    Medications:   ascorbic acid 500 milliGRAM(s) Oral daily  dextrose 5% + sodium chloride 0.45%. 1000 milliLiter(s) IV Continuous <Continuous>  folic acid 1 milliGRAM(s) Oral daily  lactulose Syrup 20 Gram(s) Oral three times a day  LORazepam     Tablet   Oral   LORazepam     Tablet 2 milliGRAM(s) Oral every 4 hours  LORazepam     Tablet 1.5 milliGRAM(s) Oral every 4 hours  thiamine 100 milliGRAM(s) Oral daily        Physical Examination:  T(C): 36.6 (07-14-18 @ 08:03), Max: 36.8 (07-13-18 @ 14:43)  HR: 112 (07-14-18 @ 08:03) (91 - 112)  BP: 139/76 (07-14-18 @ 08:03) (112/72 - 139/76)  RR: 18 (07-14-18 @ 08:03) (16 - 18)  SpO2: 98% (07-14-18 @ 08:03) (95% - 100%)    GA: NAD  HEENT: PERRLA  Heart: Regular  Lungs: CTA  Abdomen:  JANESSA:    Data:                        8.7    6.98  )-----------( 77       ( 14 Jul 2018 00:15 )             27.3     MCV 82.2 (07-14-18)  81.5 (07-13-18)    RDW 16.5 (07-14-18)  16.4 (07-13-18)    8.7  07-14-18 @ 00:15  8.6  07-13-18 @ 15:15      07-13    140  |  100  |  3<L>  ----------------------------<  112<H>  3.6   |  24  |  0.5<L>    Ca    8.5      13 Jul 2018 15:15  Mg     2.1     07-13    TPro  9.2<H>  /  Alb  3.6  /  TBili  1.6<H>  /  DBili  x   /  AST  96<H>  /  ALT  18  /  AlkPhos  110  07-13    Liver panel trend:  TBili 1.6   /   AST 96   /   ALT 18   /   AlkP 110   /   Tptn 9.2   /   Alb 3.6    /   DBili --      07-13    PT/INR - ( 13 Jul 2018 15:15 )   PT: 17.50 sec;   INR: 1.63 ratio    PTT - ( 13 Jul 2018 15:15 )  PTT:52.2 sec      < from: CT Abdomen and Pelvis w/ IV Cont (07.13.18 @ 18:38) >  1.  No acute intra-abdominal pathology.  2.  Cirrhotic liver containing multiple subcentimeter hypodensities too   small characterize. Nonemergent MRI of the abdomen is recommended for   further evaluation.  3.  Mild splenomegaly.    < end of copied text > Gastroenterology Consultation    43y year old  Male with bright red blood per rectum.    Patient is a 43y old  Male who presents with a chief complaint of rectal bleeding, gum bleeding, epistaxis, hematuria for 2-3 months with weakness, intermittent SOB/chest pain, decreased po intake (13 Jul 2018 21:33)    HPI:  42 yo M w/ h/o alcohol abuse since 19 years of age, drinks 40 ounces beer about 3-5 times per day--> last drink at 12 noon today; no PCP check up for long time, no other known medical condition; presented from home with above chief complaints.    Pt c/o noticing hematuria, spontaneous gum bleeding, epistaxis, hematochezia that has been happening for about 2-3 months. Hematochezia is maroon colored blood that happens about 2 times a week, last episode today, the amount has been increasing recently. He didn't see any doctor for these complaints ever. For last few weeks he also has been having weakness, decreased po intake weight loss, intermittent SOB/chest pain.     ROS positive for increasing sleepiness at work, nausea, vomiting 1 episode today am-non-bloody, bilious, feeling intermittent fever  ROS negative for hemetemesis, constipation, abdominal distension, lower extremity swelling, syncope, palpitations    Patient never had endoscopy/colonoscopy, denies IV drug abuse/family h/o cancer; father was alcoholic too    In ED, VS stable on presentation. Found to have cirrhosis on CT A/P (13 Jul 2018 21:33)      GI Hx: 42 yo  man presented to the ED for a diffuse pain SP ETOH consumption. patient had diffuse abdominal pain mostly pronounced on the LLQ with parasethias in the hands and feet, shaking which started in friday. he consumes up to 120oz/day daily. his last drink was on thursday.  Patient's medical hx is significant for a L buttock perianeal absces SP I&D at [facility] about 1 year ago.  Endorses ?rectal bleeding but attributes it to the abscess site as his stool is for the most part brown and well formed, with occasional "black" and "loose" BMs. usually goes once a day (bristol3-4) but currently he is having diarrhea described as 2 episodes of loose non bloody since this am.  Denies vomiting/hematemesis.  Reports 40 lbs weight loss over the last year (remains obese).  Denies any liver disease.  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     Previous colonoscopy(ies): None	on file  Previous EGD(s): None on file    Review of Systems: noncontributory other than listed in Admission H & P  Social Hx: See HPI    PAST MEDICAL & SURGICAL HISTORY:  ETOH abuse  No significant past surgical history    Allergies: No Known Allergies    Medications:   ascorbic acid 500 milliGRAM(s) Oral daily  dextrose 5% + sodium chloride 0.45%. 1000 milliLiter(s) IV Continuous <Continuous>  folic acid 1 milliGRAM(s) Oral daily  lactulose Syrup 20 Gram(s) Oral three times a day  LORazepam     Tablet   Oral   LORazepam     Tablet 2 milliGRAM(s) Oral every 4 hours  LORazepam     Tablet 1.5 milliGRAM(s) Oral every 4 hours  thiamine 100 milliGRAM(s) Oral daily        Physical Examination:  T(C): 36.6 (07-14-18 @ 08:03), Max: 36.8 (07-13-18 @ 14:43)  HR: 112 (07-14-18 @ 08:03) (91 - 112)  BP: 139/76 (07-14-18 @ 08:03) (112/72 - 139/76)  RR: 18 (07-14-18 @ 08:03) (16 - 18)  SpO2: 98% (07-14-18 @ 08:03) (95% - 100%)    GA: NAD  HEENT: PERRLA  Heart: Regular  Lungs: CTA  Abdomen:  JANESSA: Empty vault but there is evidence of blood- perirectal abscess on the L- pustular not draining.     Data:                        8.7    6.98  )-----------( 77       ( 14 Jul 2018 00:15 )             27.3     MCV 82.2 (07-14-18)  81.5 (07-13-18)    RDW 16.5 (07-14-18)  16.4 (07-13-18)    8.7  07-14-18 @ 00:15  8.6  07-13-18 @ 15:15      07-13    140  |  100  |  3<L>  ----------------------------<  112<H>  3.6   |  24  |  0.5<L>    Ca    8.5      13 Jul 2018 15:15  Mg     2.1     07-13    TPro  9.2<H>  /  Alb  3.6  /  TBili  1.6<H>  /  DBili  x   /  AST  96<H>  /  ALT  18  /  AlkPhos  110  07-13    Liver panel trend:  TBili 1.6   /   AST 96   /   ALT 18   /   AlkP 110   /   Tptn 9.2   /   Alb 3.6    /   DBili --      07-13    PT/INR - ( 13 Jul 2018 15:15 )   PT: 17.50 sec;   INR: 1.63 ratio    PTT - ( 13 Jul 2018 15:15 )  PTT:52.2 sec      < from: CT Abdomen and Pelvis w/ IV Cont (07.13.18 @ 18:38) >  1.  No acute intra-abdominal pathology.  2.  Cirrhotic liver containing multiple subcentimeter hypodensities too   small characterize. Nonemergent MRI of the abdomen is recommended for   further evaluation.  3.  Mild splenomegaly.    < end of copied text >

## 2018-07-15 LAB
ALBUMIN SERPL ELPH-MCNC: 3.2 G/DL — LOW (ref 3.5–5.2)
ALP SERPL-CCNC: 98 U/L — SIGNIFICANT CHANGE UP (ref 30–115)
ALT FLD-CCNC: 16 U/L — SIGNIFICANT CHANGE UP (ref 0–41)
ANION GAP SERPL CALC-SCNC: 17 MMOL/L — HIGH (ref 7–14)
APTT BLD: 49.4 SEC — HIGH (ref 27–39.2)
AST SERPL-CCNC: 88 U/L — HIGH (ref 0–41)
BILIRUB SERPL-MCNC: 3 MG/DL — HIGH (ref 0.2–1.2)
BUN SERPL-MCNC: 6 MG/DL — LOW (ref 10–20)
CALCIUM SERPL-MCNC: 8.4 MG/DL — LOW (ref 8.5–10.1)
CHLORIDE SERPL-SCNC: 98 MMOL/L — SIGNIFICANT CHANGE UP (ref 98–110)
CO2 SERPL-SCNC: 21 MMOL/L — SIGNIFICANT CHANGE UP (ref 17–32)
CREAT SERPL-MCNC: 0.6 MG/DL — LOW (ref 0.7–1.5)
FERRITIN SERPL-MCNC: 23 NG/ML — LOW (ref 30–400)
GLUCOSE SERPL-MCNC: 137 MG/DL — HIGH (ref 70–99)
HCT VFR BLD CALC: 27.3 % — LOW (ref 42–52)
HGB BLD-MCNC: 8.7 G/DL — LOW (ref 14–18)
INR BLD: 1.84 RATIO — HIGH (ref 0.65–1.3)
MAGNESIUM SERPL-MCNC: 1.9 MG/DL — SIGNIFICANT CHANGE UP (ref 1.8–2.4)
MCHC RBC-ENTMCNC: 26.2 PG — LOW (ref 27–31)
MCHC RBC-ENTMCNC: 31.9 G/DL — LOW (ref 32–37)
MCV RBC AUTO: 82.2 FL — SIGNIFICANT CHANGE UP (ref 80–94)
NRBC # BLD: 0 /100 WBCS — SIGNIFICANT CHANGE UP (ref 0–0)
PHOSPHATE SERPL-MCNC: 3.2 MG/DL — SIGNIFICANT CHANGE UP (ref 2.1–4.9)
PLATELET # BLD AUTO: 82 K/UL — LOW (ref 130–400)
POTASSIUM SERPL-MCNC: 3.4 MMOL/L — LOW (ref 3.5–5)
POTASSIUM SERPL-SCNC: 3.4 MMOL/L — LOW (ref 3.5–5)
PROT SERPL-MCNC: 9 G/DL — HIGH (ref 6–8)
PROTHROM AB SERPL-ACNC: 19.8 SEC — HIGH (ref 9.95–12.87)
RBC # BLD: 3.32 M/UL — LOW (ref 4.7–6.1)
RBC # FLD: 16.3 % — HIGH (ref 11.5–14.5)
SODIUM SERPL-SCNC: 136 MMOL/L — SIGNIFICANT CHANGE UP (ref 135–146)
WBC # BLD: 8.4 K/UL — SIGNIFICANT CHANGE UP (ref 4.8–10.8)
WBC # FLD AUTO: 8.4 K/UL — SIGNIFICANT CHANGE UP (ref 4.8–10.8)

## 2018-07-15 RX ORDER — PHYTONADIONE (VIT K1) 5 MG
5 TABLET ORAL DAILY
Refills: 0 | Status: DISCONTINUED | OUTPATIENT
Start: 2018-07-15 | End: 2018-07-16

## 2018-07-15 RX ADMIN — Medication 5 MILLIGRAM(S): at 11:41

## 2018-07-15 RX ADMIN — Medication 1 MILLIGRAM(S): at 10:58

## 2018-07-15 RX ADMIN — LACTULOSE 10 GRAM(S): 10 SOLUTION ORAL at 17:08

## 2018-07-15 RX ADMIN — LACTULOSE 10 GRAM(S): 10 SOLUTION ORAL at 05:38

## 2018-07-15 RX ADMIN — Medication 1 TABLET(S): at 11:41

## 2018-07-15 RX ADMIN — Medication 100 MILLIGRAM(S): at 11:41

## 2018-07-15 NOTE — PROGRESS NOTE ADULT - ASSESSMENT
# Microcytic anemia of unknown duration likely 2/2 chronic blood loss from epistaxis, gum bleeding, hematuria, hematochezia  T&S  keep hgb > 7; so far stable  check iron studies, b12, fol, TSH, vit d 25-OH  daily cbc    # epistaxis, gum bleeding, hematuria maybe 2/2 thrombocytopenia/elevated INR 2/2 cirrhosis vs vitamin deficiency 2/2 malnutrition from chronic alcohol use  MVI q24  oral vitamin K supplementation to reverse INR 5mg q24 x3  - monitor INR and platelet daily  hematuria is insignif so no uro consult  f/u w/ GI for plan    # hematochezia likely related to hemorrhoids from portal hypertension/rectal varices  - rectal exam by ED staff-no gross blood  - GI c/s noted - f/u for plan  ok to feed for now    # Newly diagnosed Cirrhosis 2/2 alcohol use   patient counselled about quitting alcohol use  - check hepatitis A  Hep B, C are neg  check AFP  - start lactulose for elevated ammonia--> titrate to 2 bowel movement per day Lactulose 10gm q12 for now  - no ascites or hepatic encephalopathy    # multiple small nodularities on liver seen on CT A/P  do MRI as outpt    # Alcohol withdrawal symptoms; Etoh was 333 on admit  can do ativan 1mg po q4 prn  oral vit b1  - monitor phosphorus, mg, calcium, potassium    # DVT Px- SCD in light of bleeding       # GI Px- protonix 40mg q24

## 2018-07-15 NOTE — PROGRESS NOTE ADULT - SUBJECTIVE AND OBJECTIVE BOX
DESIRE SWIFT  43y  Male  ***My note supercedes ALL resident notes that I sign.  My corrections for their notes are in my note.***    I can be reached directly on [id number]. My office number is [phone]. My personal cell number is [phone].    INTERVAL EVENTS: Pt doing better, he says. He reported hematuria, but none in urinal now. Pt is oriented. Rn says she saw no hematuria today and urine was slightly pink yest. Says w/drawal not bothering him.    T(F): 99.1 (07-15-18 @ 08:34), Max: 100 (18 @ 20:46)  HR: 103 (07-15-18 @ 08:34) (96 - 106)  BP: 128/70 (07-15-18 @ 08:34) (127/70 - 155/80)  RR: 18 (07-15-18 @ 08:34) (18 - 18)  SpO2: 98% (07-15-18 @ 00:17) (96% - 98%)    Physical Exam:  GENERAL: patient looks restless, increased activity, but he feels calm  EYES: EOMI, PERRLA, conjunctiva and sclera clear  NECK: Supple  CHEST/LUNG: Clear to auscultation bilaterally; No wheeze  HEART: Regular rhythm; mildly tachy (HR 98) No murmurs, rubs, or gallops  ABDOMEN: Soft, nontender, Nondistended; Bowel sounds present  EXTREMITIES:  Flapping tremor absent; No clubbing, cyanosis, or edema  NEUROLOGY: non-focal SKIN: No rashes or lesions 	    LABS:                        8.7     (    82.2   8.40  )-----------( ---------      82       ( 15 Jul 2018 09:33 )             27.3    (    16.3     Hemoglobin: 8.7 g/dL (07-15 @ 09:33)  Hemoglobin: 8.3 g/dL ( @ 07:47)  Hemoglobin: 8.7 g/dL ( @ 00:15)  Hemoglobin: 8.6 g/dL ( @ 15:15)    PT/INR - ( 2018 07:47 )   PT: 18.80 sec;   INR: 1.75 ratio    PTT - ( 2018 15:15 )  PTT:52.2 sec    CARDIAC MARKERS ( 2018 00:15 )  x     / <0.01 ng/mL / 134 U/L / x     / <1.0 ng/mL  CARDIAC MARKERS ( 2018 16:00 )  x     / <0.01 ng/mL / x     / x     / <1.0 ng/mL  CARDIAC MARKERS ( 2018 15:15 )  x     / x     / 153 U/L / x     / x        Urinalysis Basic - ( 2018 16:54 )    Color: Yellow / Appearance: Clear / S.010 / pH: x  Gluc: x / Ketone: Negative  / Bili: Negative / Urobili: 1.0 mg/dL   Blood: x / Protein: Negative mg/dL / Nitrite: Negative   Leuk Esterase: Negative / RBC: x / WBC x   Sq Epi: x / Non Sq Epi: x / Bacteria: x    RADIOLOGY & ADDITIONAL TESTS:      MEDICATIONS:    lactulose Syrup 10 Gram(s) Oral two times a day  LORazepam     Tablet 1 milliGRAM(s) Oral every 4 hours PRN  multivitamin 1 Tablet(s) Oral daily  phytonadione   Solution 5 milliGRAM(s) Oral daily  thiamine 100 milliGRAM(s) Oral daily

## 2018-07-16 LAB
AFP-TM SERPL-MCNC: 6 NG/ML — SIGNIFICANT CHANGE UP
ALBUMIN SERPL ELPH-MCNC: 3.2 G/DL — LOW (ref 3.5–5.2)
ALP SERPL-CCNC: 94 U/L — SIGNIFICANT CHANGE UP (ref 30–115)
ALT FLD-CCNC: 15 U/L — SIGNIFICANT CHANGE UP (ref 0–41)
ANION GAP SERPL CALC-SCNC: 16 MMOL/L — HIGH (ref 7–14)
APTT BLD: 49.2 SEC — HIGH (ref 27–39.2)
AST SERPL-CCNC: 83 U/L — HIGH (ref 0–41)
BILIRUB SERPL-MCNC: 2.5 MG/DL — HIGH (ref 0.2–1.2)
BUN SERPL-MCNC: 8 MG/DL — LOW (ref 10–20)
CALCIUM SERPL-MCNC: 8.7 MG/DL — SIGNIFICANT CHANGE UP (ref 8.5–10.1)
CHLORIDE SERPL-SCNC: 103 MMOL/L — SIGNIFICANT CHANGE UP (ref 98–110)
CO2 SERPL-SCNC: 21 MMOL/L — SIGNIFICANT CHANGE UP (ref 17–32)
CREAT SERPL-MCNC: 0.6 MG/DL — LOW (ref 0.7–1.5)
FOLATE SERPL-MCNC: 11.6 NG/ML — SIGNIFICANT CHANGE UP
GLUCOSE SERPL-MCNC: 98 MG/DL — SIGNIFICANT CHANGE UP (ref 70–99)
HAV IGG SER QL IA: REACTIVE
HAV IGM SER-ACNC: SIGNIFICANT CHANGE UP
HCT VFR BLD CALC: 29.4 % — LOW (ref 42–52)
HGB BLD-MCNC: 9.3 G/DL — LOW (ref 14–18)
INR BLD: 1.89 RATIO — HIGH (ref 0.65–1.3)
MAGNESIUM SERPL-MCNC: 1.8 MG/DL — SIGNIFICANT CHANGE UP (ref 1.8–2.4)
MCHC RBC-ENTMCNC: 26.1 PG — LOW (ref 27–31)
MCHC RBC-ENTMCNC: 31.6 G/DL — LOW (ref 32–37)
MCV RBC AUTO: 82.6 FL — SIGNIFICANT CHANGE UP (ref 80–94)
NRBC # BLD: 0 /100 WBCS — SIGNIFICANT CHANGE UP (ref 0–0)
PHOSPHATE SERPL-MCNC: 3.8 MG/DL — SIGNIFICANT CHANGE UP (ref 2.1–4.9)
PLATELET # BLD AUTO: 95 K/UL — LOW (ref 130–400)
POTASSIUM SERPL-MCNC: 3.7 MMOL/L — SIGNIFICANT CHANGE UP (ref 3.5–5)
POTASSIUM SERPL-SCNC: 3.7 MMOL/L — SIGNIFICANT CHANGE UP (ref 3.5–5)
PROT SERPL-MCNC: 8.8 G/DL — HIGH (ref 6–8)
PROTHROM AB SERPL-ACNC: 20.3 SEC — HIGH (ref 9.95–12.87)
RBC # BLD: 3.56 M/UL — LOW (ref 4.7–6.1)
RBC # FLD: 16.2 % — HIGH (ref 11.5–14.5)
SODIUM SERPL-SCNC: 140 MMOL/L — SIGNIFICANT CHANGE UP (ref 135–146)
TSH SERPL-MCNC: 5.44 UIU/ML — HIGH (ref 0.27–4.2)
VIT B12 SERPL-MCNC: 1410 PG/ML — HIGH (ref 232–1245)
WBC # BLD: 9.12 K/UL — SIGNIFICANT CHANGE UP (ref 4.8–10.8)
WBC # FLD AUTO: 9.12 K/UL — SIGNIFICANT CHANGE UP (ref 4.8–10.8)

## 2018-07-16 PROCEDURE — 99222 1ST HOSP IP/OBS MODERATE 55: CPT

## 2018-07-16 RX ORDER — FERROUS SULFATE 325(65) MG
325 TABLET ORAL DAILY
Refills: 0 | Status: DISCONTINUED | OUTPATIENT
Start: 2018-07-16 | End: 2018-07-16

## 2018-07-16 RX ORDER — PHYTONADIONE (VIT K1) 5 MG
10 TABLET ORAL EVERY 8 HOURS
Refills: 0 | Status: DISCONTINUED | OUTPATIENT
Start: 2018-07-16 | End: 2018-07-16

## 2018-07-16 RX ORDER — PHYTONADIONE (VIT K1) 5 MG
5 TABLET ORAL DAILY
Refills: 0 | Status: COMPLETED | OUTPATIENT
Start: 2018-07-16 | End: 2018-07-18

## 2018-07-16 RX ORDER — DOCUSATE SODIUM 100 MG
100 CAPSULE ORAL
Refills: 0 | Status: DISCONTINUED | OUTPATIENT
Start: 2018-07-16 | End: 2018-07-19

## 2018-07-16 RX ORDER — FOLIC ACID 0.8 MG
1 TABLET ORAL DAILY
Refills: 0 | Status: DISCONTINUED | OUTPATIENT
Start: 2018-07-16 | End: 2018-07-19

## 2018-07-16 RX ORDER — FERROUS SULFATE 325(65) MG
325 TABLET ORAL
Refills: 0 | Status: DISCONTINUED | OUTPATIENT
Start: 2018-07-16 | End: 2018-07-19

## 2018-07-16 RX ORDER — PANTOPRAZOLE SODIUM 20 MG/1
40 TABLET, DELAYED RELEASE ORAL
Refills: 0 | Status: DISCONTINUED | OUTPATIENT
Start: 2018-07-16 | End: 2018-07-19

## 2018-07-16 RX ADMIN — LACTULOSE 10 GRAM(S): 10 SOLUTION ORAL at 06:10

## 2018-07-16 RX ADMIN — Medication 10 MILLIGRAM(S): at 15:36

## 2018-07-16 RX ADMIN — Medication 100 MILLIGRAM(S): at 12:17

## 2018-07-16 RX ADMIN — Medication 1 TABLET(S): at 12:17

## 2018-07-16 RX ADMIN — Medication 325 MILLIGRAM(S): at 12:18

## 2018-07-16 RX ADMIN — Medication 325 MILLIGRAM(S): at 18:03

## 2018-07-16 RX ADMIN — Medication 1 MILLIGRAM(S): at 12:17

## 2018-07-16 NOTE — PROGRESS NOTE ADULT - ASSESSMENT
44 yo  man presented to the ED for a diffuse pain SP ETOH consumption. patient had diffuse abdominal pain mostly pronounced on the LLQ with parasethias in the hands and feet, shaking which started in friday. he consumes up to 120oz/day daily. his last drink was on thursday.  Patient's medical hx is significant for a L buttock perianeal absces SP I&D at [facility] about 1 year ago.  Patient denies any rectal bleed diarrhea vomiting or abdomina; pain today  Patient never had a CLD workup and he is now newly diagnosed with cirrhosis most likely secondary to alcohol but other causes should be ruled out  MELD score of 18 and DF 39 (according to yesterday  labs)  Patient doesnt have any signs of alcoholic hepatitis (No wbc, no fever, no abdominal pain)  Patient needs vaccine to hep B  hep a vaccine if not immune  Follow CLD workup  CBC stable and no evidence of active bleed  Follow Hb levels  Monitor electrolytes  CIWA protocol or benzo prn  folate/mg/thiamine/MVI  Consider a trial of PO Vit k (10mg q8), daily INR and CMP.  will plan for EGD/ colono this week if patient is not withdrawing   MRI of the liver still pending  EtOH abstinence (discussed with the patient) 42 yo  man presented to the ED for a diffuse pain SP ETOH consumption. patient had diffuse abdominal pain mostly pronounced on the LLQ with parasethias in the hands and feet, shaking which started in friday. he consumes up to 120oz/day daily. his last drink was on thursday.  Patient's medical hx is significant for a L buttock perianeal absces SP I&D at [facility] about 1 year ago.  Patient denies any rectal bleed diarrhea vomiting or abdomina; pain today  Patient never had a CLD workup and he is now newly diagnosed with cirrhosis most likely secondary to alcohol but other causes should be ruled out  MELD score of 18 and DF 39 (according to yesterday  labs)  Patient doesnt have any signs of alcoholic hepatitis (No wbc, no fever, no abdominal pain)  Patient needs vaccine to hep B  hep a vaccine if not immune  Follow CLD workup  CBC stable and no evidence of active bleed  Follow Hb levels  Monitor electrolytes  Please make sure to reverse INR by giving vitamin k   PLEASE FOLLOW INR DALAL AND REVERSE IT  Clear liquid diet on wednesday and NPO after midnight on wednesday  will plan EGD and colonoscopy on thursday  MRI liver with IV contrast and MRCP   EtOH abstinence (discussed with the patient  Will follow

## 2018-07-16 NOTE — PROGRESS NOTE ADULT - SUBJECTIVE AND OBJECTIVE BOX
DESIRE SWIFT  43y  Male  ***My note supercedes ALL resident notes that I sign.  My corrections for their notes are in my note.***    I can be reached directly on [employer] [id number]. My office number is [phone]. My personal cell number is [phone].    INTERVAL EVENTS: Pt doing much better. Much more calm and relaxed. Pt going to stop drinking. Pt willing to proceed w/ any testing, just wants to get better. Still w/ hematuria - urine was red, but no clots.    T(F): 98.9 (07-16-18 @ 14:52), Max: 99.5 (07-15-18 @ 17:34)  HR: 94 (07-16-18 @ 14:52) (94 - 101)  BP: 112/62 (07-16-18 @ 14:52) (112/62 - 133/75)  RR: 18 (07-16-18 @ 14:52) (18 - 18)  SpO2: 97% (07-15-18 @ 19:50) (97% - 97%)    Physical Exam:  GENERAL: patient looks restless, increased activity, but he feels calm  EYES: EOMI, PERRLA, conjunctiva and sclera clear  NECK: Supple  CHEST/LUNG: Clear to auscultation bilaterally; No wheeze  HEART: Regular rhythm; mildly tachy (HR 98) No murmurs, rubs, or gallops  ABDOMEN: Soft, nontender, Nondistended; Bowel sounds present  EXTREMITIES:  Flapping tremor absent; No clubbing, cyanosis, or edema  NEUROLOGY: non-focal SKIN: No rashes or lesions 	    LABS:                        9.3     (    82.6   9.12  )-----------( ---------      95       ( 16 Jul 2018 06:05 )             29.4    (    16.2     Hemoglobin: 9.3 g/dL (07-16 @ 06:05)  Hemoglobin: 8.7 g/dL (07-15 @ 09:33)  Hemoglobin: 8.3 g/dL (07-14 @ 07:47)  Hemoglobin: 8.7 g/dL (07-14 @ 00:15)  Hemoglobin: 8.6 g/dL (07-13 @ 15:15)    140   (   103   (   98      07-16-18 @ 06:05  ----------------------               3.7   (   21   (   8                             -----                        0.6  Ca  8.7   Mg  1.8    P   3.8     LFT  8.8  (  2.5  (  83       07-16-18 @ 06:05  -------------------------  3.2  (  94  (  15    PT/INR - ( 16 Jul 2018 06:05 )   PT: 20.30 sec;   INR: 1.89 ratio    PTT - ( 16 Jul 2018 06:05 )  PTT:49.2 sec    RADIOLOGY & ADDITIONAL TESTS:      MEDICATIONS:    docusate sodium 100 milliGRAM(s) Oral two times a day  ferrous    sulfate 325 milliGRAM(s) Oral two times a day  folic acid 1 milliGRAM(s) Oral daily  lactulose Syrup 10 Gram(s) Oral two times a day  LORazepam     Tablet 1 milliGRAM(s) Oral every 4 hours PRN  multivitamin 1 Tablet(s) Oral daily  phytonadione   Solution 10 milliGRAM(s) Oral every 8 hours  thiamine 100 milliGRAM(s) Oral daily DESIRE SWIFT  43y  Male  ***My note supercedes ALL resident notes that I sign.  My corrections for their notes are in my note.***    I can be reached directly on [email or web address] [phone]. My office number is [phone]. My personal cell number is [phone].    INTERVAL EVENTS: Pt doing much better. Much more calm and relaxed. Pt going to stop drinking. Pt willing to proceed w/ any testing, just wants to get better. Still w/ hematuria - urine was red, but no clots.    T(F): 98.9 (07-16-18 @ 14:52), Max: 99.5 (07-15-18 @ 17:34)  HR: 94 (07-16-18 @ 14:52) (94 - 101)  BP: 112/62 (07-16-18 @ 14:52) (112/62 - 133/75)  RR: 18 (07-16-18 @ 14:52) (18 - 18)  SpO2: 97% (07-15-18 @ 19:50) (97% - 97%)    Physical Exam:  GENERAL: patient looks restless, increased activity, but he feels calm  EYES: EOMI, PERRLA, conjunctiva and sclera clear  NECK: Supple  CHEST/LUNG: Clear to auscultation bilaterally; No wheeze  HEART: Regular rhythm; mildly tachy (HR 98) No murmurs, rubs, or gallops  ABDOMEN: Soft, nontender, Nondistended; Bowel sounds present  EXTREMITIES:  Flapping tremor absent; No clubbing, cyanosis, or edema  NEUROLOGY: non-focal SKIN: No rashes or lesions 	    LABS:                        9.3     (    82.6   9.12  )-----------( ---------      95       ( 16 Jul 2018 06:05 )             29.4    (    16.2     Hemoglobin: 9.3 g/dL (07-16 @ 06:05)  Hemoglobin: 8.7 g/dL (07-15 @ 09:33)  Hemoglobin: 8.3 g/dL (07-14 @ 07:47)  Hemoglobin: 8.7 g/dL (07-14 @ 00:15)  Hemoglobin: 8.6 g/dL (07-13 @ 15:15)    140   (   103   (   98      07-16-18 @ 06:05  ----------------------               3.7   (   21   (   8                             -----                        0.6  Ca  8.7   Mg  1.8    P   3.8     LFT  8.8  (  2.5  (  83       07-16-18 @ 06:05  -------------------------  3.2  (  94  (  15    T aneudy 2.5   AP 94  AST 83  ALT 15  07-16-18 @ 06:05  T aneudy 3.0   AP 98  AST 88  ALT 16  07-15-18 @ 09:33  T aneudy 1.6     AST 91  ALT 17  07-14-18 @ 07:47  T aneudy 1.6     AST 96  ALT 18  07-13-18 @ 15:15    PT/INR - ( 16 Jul 2018 06:05 )   PT: 20.30 sec;   INR: 1.89 ratio    PTT - ( 16 Jul 2018 06:05 )  PTT:49.2 sec    RADIOLOGY & ADDITIONAL TESTS:      MEDICATIONS:    docusate sodium 100 milliGRAM(s) Oral two times a day  ferrous    sulfate 325 milliGRAM(s) Oral two times a day  folic acid 1 milliGRAM(s) Oral daily  lactulose Syrup 10 Gram(s) Oral two times a day  LORazepam     Tablet 1 milliGRAM(s) Oral every 4 hours PRN  multivitamin 1 Tablet(s) Oral daily  phytonadione   Solution 10 milliGRAM(s) Oral every 8 hours  thiamine 100 milliGRAM(s) Oral daily

## 2018-07-16 NOTE — PROGRESS NOTE ADULT - ASSESSMENT
DESIRE SWIFT 43y Male  MRN#: [id number]   CODE STATUS: FULL    SUBJECTIVE  Patient is a 43y old Male who presents with a chief complaint of rectal bleeding, gum bleeding, epistaxis, hematuria for 2-3 months with weakness, intermittent SOB/chest pain, decreased po intake (13 Jul 2018 21:33)    Currently admitted to medicine with the primary diagnosis of Alcoholic intoxication with complication     Hospital course has been complicated by   Today is hospital day 3d, and this morning he is laying in bed comfortably and reports no overnight events.     Present Today:           Joseph Catheter ()No/ ()Yes?   Indication:             Central Line ()No/ ()Yes?   Indication:          IV Fluids ()No/ ()Yes? Type:  Rate:  Indication:    OBJECTIVE  PAST MEDICAL & SURGICAL HISTORY  ETOH abuse  No significant past surgical history    ALLERGIES:  No Known Allergies    MEDICATIONS:  STANDING MEDICATIONS  docusate sodium 100 milliGRAM(s) Oral two times a day  ferrous    sulfate 325 milliGRAM(s) Oral two times a day  folic acid 1 milliGRAM(s) Oral daily  lactulose Syrup 10 Gram(s) Oral two times a day  multivitamin 1 Tablet(s) Oral daily  phytonadione   Solution 10 milliGRAM(s) Oral every 8 hours  thiamine 100 milliGRAM(s) Oral daily    PRN MEDICATIONS  LORazepam     Tablet 1 milliGRAM(s) Oral every 4 hours PRN    VITAL SIGNS: Last 24 Hours  T(C): 37.2 (16 Jul 2018 14:52), Max: 37.2 (16 Jul 2018 14:52)  T(F): 98.9 (16 Jul 2018 14:52), Max: 98.9 (16 Jul 2018 14:52)  HR: 94 (16 Jul 2018 14:52) (94 - 95)  BP: 112/62 (16 Jul 2018 14:52) (112/62 - 124/78)  BP(mean): --  RR: 18 (16 Jul 2018 14:52) (18 - 18)  SpO2: --    LABS:                        9.3    9.12  )-----------( 95       ( 16 Jul 2018 06:05 )             29.4     07-16    140  |  103  |  8<L>  ----------------------------<  98  3.7   |  21  |  0.6<L>    Ca    8.7      16 Jul 2018 06:05  Phos  3.8     07-16  Mg     1.8     07-16    TPro  8.8<H>  /  Alb  3.2<L>  /  TBili  2.5<H>  /  DBili  x   /  AST  83<H>  /  ALT  15  /  AlkPhos  94  07-16    PT/INR - ( 16 Jul 2018 06:05 )   PT: 20.30 sec;   INR: 1.89 ratio         PTT - ( 16 Jul 2018 06:05 )  PTT:49.2 sec    RADIOLOGY:    PHYSICAL EXAM:    GENERAL: NAD, well-developed, AAOx3  HEENT:  Atraumatic, Normocephalic. EOMI, PERRLA, conjunctiva and sclera clear, No JVD  PULMONARY: Clear to auscultation bilaterally; No wheeze  CARDIOVASCULAR: Regular rate and rhythm; No murmurs, rubs, or gallops  GASTROINTESTINAL: Soft, Nontender, Nondistended; Bowel sounds present  MUSCULOSKELETAL:  2+ Peripheral Pulses, No clubbing, cyanosis, or edema  NEUROLOGY: non-focal  SKIN: No rashes or lesions    ADMISSION SUMMARY  HPI: 42 yo M w/ h/o alcohol abuse since 19 years of age, drinks 40 ounces beer about 3-5 times per day--> last drink at 12 noon today; no PCP check up for long time, no other known medical condition; presented from home with above chief complaints.    Pt c/o noticing hematuria, spontaneous gum bleeding, epistaxis, hematochezia that has been happening for about 2-3 months. Hematochezia is maroon colored blood that happens about 2 times a week, last episode today, the amount has been increasing recently. He didn't see any doctor for these complaints ever. For last few weeks he also has been having weakness, decreased po intake weight loss, intermittent SOB/chest pain.     ROS positive for increasing sleepiness at work, nausea, vomiting 1 episode today am-non-bloody, bilious, feeling intermittent fever  ROS negative for hemetemesis, constipation, abdominal distension, lower extremity swelling, syncope, palpitations    Patient never had endoscopy/colonoscopy, denies IV drug abuse/family h/o cancer; father was alcoholic too    Attd: Pt drank yesterday. Gets shakes in am, never DTs or LOC or Sz. Did alcohol rehab for 28 days 2 yrs ago. Pt has neuropathy signs in hands and legs. No encephalopathy. Cooperative and pleasant and oriented. (13 Jul 2018 21:33)      ASSESSMENT & PLAN    # Microcytic anemia of unknown duration likely 2/2 chronic blood loss from epistaxis, gum bleeding, hematuria, hematochezia vs iron deficiency per iron studies.    -T&S completed 07/16  -hgb stable-  > 7  -cont feso4 325 q12, mvi q24 and fol 1mg q24  - b12 Vitamin B12, Serum: 1410, fol 11.6, TSH -5.44 , vit d 25-OH pending  -f/u daily cbc    #epistaxis, gum bleeding, hematuria possibly 2/2 thrombocytopenia/elevated INR 2/2 cirrhosis vs vitamin deficiency 2/2 malnutrition from chronic alcohol use  -epistaxis: resolved  -follow up out patient dentist for gum bleeding  -f/u with uro for hematuria  -f/u inpt renal/bladder u/s  -inpt uro-send urine for cytology  -oupt uro f/u for cystoscopy and CT urogram  -f/u GI for GI bleeding  -clr liquids Wed; bowel prep Wed; NPO p MN Wed night  -EGD and C-scope planned for Thurs    # prolonged INR possibly 2/2 to chronic liver dz,  -vitamin K supplementation to try to reverse INR 5mg q24 x3-s/p   - monitor INR and platelet daily    # multiple small nodularities on liver seen on CT A/P  MRI w/ gado and MRCP ordered to be done inpt  AFP is 6    # Newly diagnosed Cirrhosis 2/2 alcohol use; poss touch of alcoholic hepatitis w/ bump in bili; LFTs stable (no steroids needed)  patient counselled about quitting alcohol use  check hepatitis A IGG Ab  Hep B, C are neg  will need outpt vaccination for at least Hep B and poss Hep A  start lactulose for elevated ammonia--> titrate to 2 bowel movement per day Lactulose 10gm q12 for now    # Alcohol withdrawal much better; Etoh was 333 on admit  can d/c ativan prn    # DVT Px- SCD in light of bleeding       # GI Px- protonix 40mg q24    Dispo: prob d/c after scopes on thurs or fri          Present today:  ( ) Congestive Heart Failure, Yes? ( )Acute / ( )Acute on Chronic / ( )Chronic  :  ( )Systolic / ( )Diastolic               Plan:  ( ) Complicated Pneumonia, Type?  ( )Parapneumonic effusion / ( )Abscess / ( ) Multilobar / ( )Other               Plan:  ( ) Morbid Obesity, Yes? BMI:               Plan:  ( ) Functional Quadriplegia               Plan:  ( ) Encephalopathy               Plan:    ( ) Discussion with patient and/or family regarding goals of care  ( ) Discussed Case and Plan with Medical Attending, Name:        # Planned Disposition: ________ DESIRE SWIFT 43y Male  MRN#: [id number]   CODE STATUS: FULL    SUBJECTIVE  Patient is a 43y old Male who presents with a chief complaint of rectal bleeding, gum bleeding, epistaxis, hematuria for 2-3 months with weakness, intermittent SOB/chest pain, decreased po intake (13 Jul 2018 21:33)    Currently admitted to medicine with the primary diagnosis of Alcoholic intoxication with complication, bleeding.     Hospital course has been uncomplicated.   Today is hospital day 3d, and this morning he is laying in bed comfortably and reports no overnight events.  He feels much better and states that he does not feel like he has symptoms of withdrawal at this time.  He states that he recently urinated and that it looks less red than it did previously.     Present Today:           Joseph Catheter (x)No/ ()Yes?   Indication:             Central Line (x)No/ ()Yes?   Indication:          IV Fluids (x)No/ ()Yes? Type:  Rate:  Indication:    OBJECTIVE  PAST MEDICAL & SURGICAL HISTORY  ETOH abuse  No significant past surgical history    ALLERGIES:  No Known Allergies    MEDICATIONS:  STANDING MEDICATIONS  docusate sodium 100 milliGRAM(s) Oral two times a day  ferrous    sulfate 325 milliGRAM(s) Oral two times a day  folic acid 1 milliGRAM(s) Oral daily  lactulose Syrup 10 Gram(s) Oral two times a day  multivitamin 1 Tablet(s) Oral daily  phytonadione   Solution 10 milliGRAM(s) Oral every 8 hours  thiamine 100 milliGRAM(s) Oral daily    PRN MEDICATIONS  LORazepam     Tablet 1 milliGRAM(s) Oral every 4 hours PRN    VITAL SIGNS: Last 24 Hours  T(C): 37.2 (16 Jul 2018 14:52), Max: 37.2 (16 Jul 2018 14:52)  T(F): 98.9 (16 Jul 2018 14:52), Max: 98.9 (16 Jul 2018 14:52)  HR: 94 (16 Jul 2018 14:52) (94 - 95)  BP: 112/62 (16 Jul 2018 14:52) (112/62 - 124/78)  BP(mean): --  RR: 18 (16 Jul 2018 14:52) (18 - 18)  SpO2: --    LABS:                        9.3    9.12  )-----------( 95       ( 16 Jul 2018 06:05 )             29.4     07-16    140  |  103  |  8<L>  ----------------------------<  98  3.7   |  21  |  0.6<L>    Ca    8.7      16 Jul 2018 06:05  Phos  3.8     07-16  Mg     1.8     07-16    TPro  8.8<H>  /  Alb  3.2<L>  /  TBili  2.5<H>  /  DBili  x   /  AST  83<H>  /  ALT  15  /  AlkPhos  94  07-16    PT/INR - ( 16 Jul 2018 06:05 )   PT: 20.30 sec;   INR: 1.89 ratio         PTT - ( 16 Jul 2018 06:05 )  PTT:49.2 sec    RADIOLOGY:  No new imaging.   PHYSICAL EXAM:    GENERAL: NAD, well-developed, AAOx3  HEENT:  Atraumatic, Normocephalic. EOMI, PERRLA, conjunctiva and sclera clear, No JVD  PULMONARY: Clear to auscultation bilaterally; No wheeze  CARDIOVASCULAR: Regular rate and rhythm; No murmurs, rubs, or gallops  GASTROINTESTINAL: Soft, Nontender, Nondistended; Bowel sounds present  MUSCULOSKELETAL:  2+ Peripheral Pulses, No clubbing, cyanosis, or edema  NEUROLOGY: non-focal, no asterixes or tremor.   SKIN: No rashes or lesions    ADMISSION SUMMARY  HPI: 42 yo M w/ h/o alcohol abuse since 19 years of age, drinks 40 ounces beer about 3-5 times per day--> last drink at 12 noon today; no PCP check up for long time, no other known medical condition; presented from home with above chief complaints.    Pt c/o noticing hematuria, spontaneous gum bleeding, epistaxis, hematochezia that has been happening for about 2-3 months. Hematochezia is maroon colored blood that happens about 2 times a week, last episode today, the amount has been increasing recently. He didn't see any doctor for these complaints ever. For last few weeks he also has been having weakness, decreased po intake weight loss, intermittent SOB/chest pain.     ROS positive for increasing sleepiness at work, nausea, vomiting 1 episode today am-non-bloody, bilious, feeling intermittent fever  ROS negative for hemetemesis, constipation, abdominal distension, lower extremity swelling, syncope, palpitations    Patient never had endoscopy/colonoscopy, denies IV drug abuse/family h/o cancer; father was alcoholic too    Attd: Pt drank yesterday. Gets shakes in am, never DTs or LOC or Sz. Did alcohol rehab for 28 days 2 yrs ago. Pt has neuropathy signs in hands and legs. No encephalopathy. Cooperative and pleasant and oriented. (13 Jul 2018 21:33)      ASSESSMENT & PLAN    # Microcytic anemia of unknown duration likely 2/2 chronic blood loss from epistaxis, gum bleeding, hematuria, hematochezia vs iron deficiency per iron studies.    -T&S completed 07/16  -hgb stable-  > 9.3 today  -cont feso4 325 q12, mvi q24 and fol 1mg q24  - b12 Vitamin B12, Serum: 1410, fol 11.6, TSH -5.44 , vit d 25-OH pending  -f/u daily cbc    #epistaxis, gum bleeding, hematuria possibly 2/2 thrombocytopenia/elevated INR 2/2 cirrhosis vs vitamin deficiency 2/2 malnutrition from chronic alcohol use  -epistaxis: resolved  -follow up out patient dentist for gum bleeding  -f/u with uro for hematuria  -f/u inpt renal/bladder u/s  -inpt uro-send urine for cytology  -oupt uro f/u for cystoscopy and CT urogram  -f/u GI for GI bleeding  -clr liquids Wed; bowel prep Wed; NPO p MN Wed night  -EGD and C-scope planned for Thurs    # prolonged INR possibly 2/2 to chronic liver dz,  -vitamin K supplementation to try to reverse INR 5mg q24 x3-s/p   - monitor INR and platelet daily    # multiple small nodularities on liver seen on CT A/P  -MRI w/ gado and MRCP ordered to be done inpt  -AFP is 6    # Newly diagnosed Cirrhosis 2/2 alcohol use; poss touch of alcoholic hepatitis w/ bump in bili; LFTs stable (no steroids needed)  -patient counselled about quitting alcohol use by attending physican  -Hepatitis A IGG Ab-positive  -Hep B, C are neg  -will need outpt vaccination for at least Hep B   -start lactulose for elevated ammonia--> titrate to 2 bowel movement per day Lactulose 10gm q12 for now -held today as patient had > two bowel movements    # Alcohol withdrawal much better; Etoh was 333 on admit  - d/c'd ativan prn    # DVT Px- SCDs       # GI Px- protonix 40mg q24    Dispo: D/C following EGD and Colonoscopy      Present today:  ( ) Congestive Heart Failure, Yes? ( )Acute / ( )Acute on Chronic / ( )Chronic  :  ( )Systolic / ( )Diastolic               Plan:  ( ) Complicated Pneumonia, Type?  ( )Parapneumonic effusion / ( )Abscess / ( ) Multilobar / ( )Other               Plan:  ( ) Morbid Obesity, Yes? BMI:               Plan:  ( ) Functional Quadriplegia               Plan:  ( ) Encephalopathy               Plan:    ( ) Discussion with patient and/or family regarding goals of care  ( ) Discussed Case and Plan with Medical Attending, Name:        # Planned Disposition: Home

## 2018-07-16 NOTE — CONSULT NOTE ADULT - ASSESSMENT
43y old Male c/o intermittent hematuria x1 year likely 2* chronic ETOH abuse/cirrhosis, elevated INR and thrombocytopenia -- r/o  pathology.     PLAN  1.	Send urine cytology, urine cx  2.	Likely will need cystoscopy and CT urogram as outpatient

## 2018-07-16 NOTE — PROGRESS NOTE ADULT - ASSESSMENT
# Microcytic anemia of unknown duration likely 2/2 chronic blood loss from epistaxis, gum bleeding, hematuria, hematochezia  T&S  keep hgb > 7; so far stable  iron studies show iron def: on feso4 325 q12, mvi q24 and fol 1mg q24  check b12, fol, TSH, vit d 25-OH  daily cbc    # epistaxis, gum bleeding, hematuria maybe 2/2 thrombocytopenia/elevated INR 2/2 cirrhosis vs vitamin deficiency 2/2 malnutrition from chronic alcohol use  epistaxis: resolved  gum bleeding: outpt dental eval  hematuria:  -inpt renal/bladder u/s  -inpt send urine for cytology  -oupt uro f/u for cystoscopy and CT urogram  GI bleeding:  -clr liquids Wed; bowel prep Wed; NPO p MN Wed night  -EGD and C-scope Thurs    # prolonged INR - prob from chr liver dz, might NOT be reversible  oral vitamin K supplementation to try to reverse INR 5mg q24 x3  - monitor INR and platelet daily    # multiple small nodularities on liver seen on CT A/P  MRI w/ gado and MRCP ordered to be done inpt  AFP is 6    # Newly diagnosed Cirrhosis 2/2 alcohol use  patient counselled about quitting alcohol use  check hepatitis A IGG Ab  Hep B, C are neg  will need outpt vaccination for at least Hep B and poss Hep A  start lactulose for elevated ammonia--> titrate to 2 bowel movement per day Lactulose 10gm q12 for now    # Alcohol withdrawal symptoms; Etoh was 333 on admit  can do ativan 1mg po q4 prn  oral vit b1  monitor phosphorus, mg, calcium, potassium    # DVT Px- SCD in light of bleeding       # GI Px- protonix 40mg q24 # Microcytic anemia of unknown duration likely 2/2 chronic blood loss from epistaxis, gum bleeding, hematuria, hematochezia  T&S  keep hgb > 7; so far stable  iron studies show iron def: on feso4 325 q12, mvi q24 and fol 1mg q24  check b12, fol, TSH, vit d 25-OH  daily cbc    # epistaxis, gum bleeding, hematuria maybe 2/2 thrombocytopenia/elevated INR 2/2 cirrhosis vs vitamin deficiency 2/2 malnutrition from chronic alcohol use  epistaxis: resolved  gum bleeding: outpt dental eval  hematuria: uro eval  -inpt renal/bladder u/s  -inpt send urine for cytology  -oupt uro f/u for cystoscopy and CT urogram  GI bleeding: Gi eval  -clr liquids Wed; bowel prep Wed; NPO p MN Wed night  -EGD and C-scope Thurs    # prolonged INR - prob from chr liver dz, might NOT be reversible  oral vitamin K supplementation to try to reverse INR 5mg q24 x3  - monitor INR and platelet daily    # multiple small nodularities on liver seen on CT A/P  MRI w/ gado and MRCP ordered to be done inpt  AFP is 6    # Newly diagnosed Cirrhosis 2/2 alcohol use; poss touch of alcoholic hepatitis w/ bump in bili; LFTs stable (no steroids needed)  patient counselled about quitting alcohol use  check hepatitis A IGG Ab  Hep B, C are neg  will need outpt vaccination for at least Hep B and poss Hep A  start lactulose for elevated ammonia--> titrate to 2 bowel movement per day Lactulose 10gm q12 for now    # Alcohol withdrawal much better; Etoh was 333 on admit  can d/c ativan prn    # DVT Px- SCD in light of bleeding       # GI Px- protonix 40mg q24    Dispo: prob d/c after scopes on thurs or fri

## 2018-07-16 NOTE — PROGRESS NOTE ADULT - SUBJECTIVE AND OBJECTIVE BOX
GI HPI Today:  Patient feels ok today. No complaints, he denies any abdominal pain, vomiting, diarrhea, hematochezia or melena.  Last drink was 3 days ago  Last RBPR episode was a week ago  Patient denies any hx of liver disease       Hospital course:  42 yo M w/ h/o alcohol abuse since 19 years of age, drinks 40 ounces beer about 3-5 times per day--> last drink at 12 noon today; no PCP check up for long time, no other known medical condition; presented from home with above chief complaints.    Pt c/o noticing hematuria, spontaneous gum bleeding, epistaxis, hematochezia that has been happening for about 2-3 months. Hematochezia is maroon colored blood that happens about 2 times a week, last episode today, the amount has been increasing recently. He didn't see any doctor for these complaints ever. For last few weeks he also has been having weakness, decreased po intake weight loss, intermittent SOB/chest pain.     ROS positive for increasing sleepiness at work, nausea, vomiting 1 episode today am-non-bloody, bilious, feeling intermittent fever  ROS negative for hemetemesis, constipation, abdominal distension, lower extremity swelling, syncope, palpitations    Patient never had endoscopy/colonoscopy, denies IV drug abuse/family h/o cancer; father was alcoholic too    Attd: Pt drank yesterday. Gets shakes in am, never DTs or LOC or Sz. Did alcohol rehab for 28 days 2 yrs ago. Pt has neuropathy signs in hands and legs. No encephalopathy. Cooperative and pleasant and oriented. (13 Jul 2018 21:33)      PAST MEDICAL & SURGICAL HISTORY  ETOH abuse  No significant past surgical history      ALLERGIES:  No Known Allergies      MEDICATIONS:  MEDICATIONS  (STANDING):  lactulose Syrup 10 Gram(s) Oral two times a day  multivitamin 1 Tablet(s) Oral daily  phytonadione   Solution 5 milliGRAM(s) Oral daily  thiamine 100 milliGRAM(s) Oral daily    MEDICATIONS  (PRN):  LORazepam     Tablet 1 milliGRAM(s) Oral every 4 hours PRN withdrawal symptoms      REVIEW OF SYSTEMS:    CONSTITUTIONAL: No weakness, fatigue, malaise, fevers or chills, no weight change, appetite change  Throat: No Dysphagia, No Odynophagia  RESPIRATORY: No SOB  CARDIOVASCULAR: No chest pain   Muscloskeletal: no joints pain  NEUROLOGICAL: No syncope or diziness  SKIN: No pruritis  Heme/Lymph: no LN enlargement         VITALS:   T(F): 97.9 (07-16 @ 04:35), Max: 100 (07-14 @ 20:46)  HR: 95 (07-16 @ 04:35) (91 - 112)  BP: 124/78 (07-16 @ 04:35) (112/72 - 155/80)  BP(mean): --  RR: 18 (07-16 @ 04:35) (16 - 18)  SpO2: 97% (07-15 @ 19:50) (95% - 100%)        PHYSICAL EXAM:  ANO*3  EYES: No scleral icterus   LUNG: Clear to auscultation bilaterally; No rales, rhonchi, wheezing, or rubs  HEART: RRR; No murmurs  ABDOMEN: Soft, +BS, no Abdominal Tenderness, No guarding, No Ge Sign   Rectal Exam: not performed     Blood Work :                        9.3    9.12  )-----------( 95       ( 16 Jul 2018 06:05 )             29.4     PT/INR - ( 15 Jul 2018 09:33 )  INR: 1.84          PTT - ( 15 Jul 2018 09:33 )  PTT:49.4<H>  07-15    136  |  98  |  6<L>  ----------------------------<  137<H>  3.4<L>   |  21  |  0.6<L>    Ca    8.4<L>      15 Jul 2018 09:33  Phos  3.2     07-15  Mg     1.9     07-15      CBC -  ( 16 Jul 2018 06:05 )  Hemoglobin : 9.3    CBC -  ( 15 Jul 2018 09:33 )  Hemoglobin : 8.7    CBC -  ( 14 Jul 2018 07:47 )  Hemoglobin : 8.3    CBC -  ( 14 Jul 2018 00:15 )  Hemoglobin : 8.7  < from: CT Abdomen and Pelvis w/ IV Cont (07.13.18 @ 18:38) >  HEPATOBILIARY: Peripheral nodularity of the liver contour, compatible   with cirrhosis. Heterogeneous attenuation of the liver parenchyma with   scattered subcentimeter hypodensities too small to characterize.   Cholelithiasis.    SPLEEN: Mildly enlarged.    PANCREAS: Unremarkable.    ADRENAL GLANDS: Unremarkable.    KIDNEYS: Symmetric pattern of renal enhancement. No hydronephrosis   bilaterally.    ABDOMINOPELVIC NODES: No lymphadenopathy.    PELVIC ORGANS: Unremarkable.    PERITONEUM/MESENTERY/BOWEL: No bowel obstruction. No ascites or   pneumoperitoneum. Colonic diverticulosis without diverticulitis. Normal   appendix.    BONES/SOFT TISSUES: Mild degenerative changes of the spine. Gynecomastia.      IMPRESSION:  1.  No acute intra-abdominal pathology.  2.  Cirrhotic liver containing multiple subcentimeter hypodensities too   small characterize. Nonemergent MRI of the abdomen is recommended for   further evaluation.  3.  Mild splenomegaly.      < end of copied text >    CBC -  ( 13 Jul 2018 15:15 )  Hemoglobin : 8.6      LIVER FUNCTIONS - ( 15 Jul 2018 09:33 )  Alb: 3.2 [3.5 - 5.2] / Pro: 9.0 [6.0 - 8.0] / ALK PHOS: 98 [30 - 115] / ALT: 16 [0 - 41] / AST: 88 [0 - 41] / GGT: x     LIVER FUNCTIONS - ( 14 Jul 2018 07:47 )  Alb: 3.3 [3.5 - 5.2] / Pro: 8.7 [6.0 - 8.0] / ALK PHOS: 101 [30 - 115] / ALT: 17 [0 - 41] / AST: 91 [0 - 41] / GGT: x     LIVER FUNCTIONS - ( 13 Jul 2018 15:15 )  Alb: 3.6 [3.5 - 5.2] / Pro: 9.2 [6.0 - 8.0] / ALK PHOS: 110 [30 - 115] / ALT: 18 [0 - 41] / AST: 96 [0 - 41] / GGT: x         RADIOLOGY:

## 2018-07-16 NOTE — CONSULT NOTE ADULT - SUBJECTIVE AND OBJECTIVE BOX
43y old Male with newly diagnosed cirrhosis 2/2 alcohol abuse states that he has had intermittent hematuria x1 year. Pt states that in AM, his urine is usually bright red in color, never passes clots. Denies abd pain, flank pain, urgency, frequency, dysuria, incomplete bladder emptying, nocturia.. , spontaneous gum bleeding, epistaxis, hematochezia for last 1 year. Denies tobacco use. Of note, pt says he frequently has epistaxis, bleeding from gums and bloody stools. Denies ASA or AC. PT/PTT/INR, ammonia, AST elevated in ED with platelets 90.    PAST MEDICAL & SURGICAL HISTORY:  ETOH abuse  No significant past surgical history    MEDS: docusate sodium 100 milliGRAM(s)  ferrous    sulfate 325 milliGRAM(s)  folic acid 1 milliGRAM(s)  lactulose Syrup 10 Gram(s)  LORazepam     Tablet 1 milliGRAM(s) PRN  multivitamin 1 Tablet(s)  phytonadione   Solution 10 milliGRAM(s)  thiamine 100 milliGRAM(s)    MEDICATIONS  (PRN):  LORazepam     Tablet 1 milliGRAM(s) Oral every 4 hours PRN withdrawal symptoms    ALLERGIES: No Known Allergies    VS: T(F): 97.9, Max: 99.5 (07-15 @ 14:54)  HR: 95 (95 - 101)  BP: 124/78 (116/57 - 135/71)  RR: 18  SpO2: 97% (97% - 98%)  GEN: Alert, awake, NAD  ABD/: soft, NT/ND, non palpable bladder, no suprapubic TTP, testes desc x2 no masses or tenderness, uncirc Male, foreskin easily retracts, no bleeding or discharge from meatus    LABS/IMAGIN.3    9.12  )-----------( 95       ( 2018 06:05 )             29.4     07-16    140  |  103  |  8<L>  ----------------------------<  98  3.7   |  21  |  0.6<L>    Ca    8.7      2018 06:05  Phos  3.8     07-16  Mg     1.8     07-16    TPro  8.8<H>  /  Alb  3.2<L>  /  TBili  2.5<H>  /  DBili  x   /  AST  83<H>  /  ALT  15  /  AlkPhos  94  07-16    PT/INR - ( 2018 06:05 )   PT: 20.30 sec;   INR: 1.89 ratio  PTT - ( 2018 06:05 )  PTT:49.2 sec    Prothrombin Time and INR, Plasma in AM (18 @ 15:15)    Prothrombin Time, Plasma: 17.50 sec    INR: 1.63           NO ANTICOAGULANT,NORMAL            0.65 -  1.30    Urinalysis (18 @ 16:54)    pH Urine: 7.0    Glucose Qualitative, Urine: Negative mg/dL    Blood, Urine: Negative    Color: Yellow    Urine Appearance: Clear    Bilirubin: Negative    Ketone - Urine: Negative    Specific Gravity: 1.010    Protein, Urine: Negative mg/dL    Urobilinogen: 1.0 mg/dL    Nitrite: Negative    Leukocyte Esterase Concentration: Negative    < from: CT Abdomen and Pelvis w/ IV Cont (18 @ 18:38) >  FINDINGS:  HEPATOBILIARY: Peripheral nodularity of the liver contour, compatible   with cirrhosis. Heterogeneous attenuation of the liver parenchyma with   scattered subcentimeter hypodensities too small to characterize.   Cholelithiasis.  KIDNEYS: Symmetric pattern of renal enhancement. No hydronephrosis   bilaterally.  ABDOMINOPELVIC NODES: No lymphadenopathy.  PELVIC ORGANS: Unremarkable.  PERITONEUM/MESENTERY/BOWEL: No bowel obstruction. No ascites or   pneumoperitoneum. Colonic diverticulosis without diverticulitis. Normal   appendix.  BONES/SOFT TISSUES: Mild degenerative changes of the spine. Gynecomastia.  IMPRESSION:  1.  No acute intra-abdominal pathology.  2.  Cirrhotic liver containing multiple subcentimeter hypodensities too   small characterize. Nonemergent MRI of the abdomen is recommended for   further evaluation.  3.  Mild splenomegaly. 43y old Male with newly diagnosed cirrhosis 2/2 alcohol abuse states that he has had intermittent hematuria x1 year. Pt states that in AM, his urine is usually bright red in color, never passes clots. Denies abd pain, flank pain, urgency, frequency, dysuria, incomplete bladder emptying. Denies tobacco use. Of note, pt says he frequently has epistaxis, bleeding from gums and bloody stools. Denies ASA or AC. PT/PTT/INR, ammonia, AST elevated in ED with platelets 90.    PAST MEDICAL & SURGICAL HISTORY:  ETOH abuse  No significant past surgical history    MEDS: docusate sodium 100 milliGRAM(s)  ferrous    sulfate 325 milliGRAM(s)  folic acid 1 milliGRAM(s)  lactulose Syrup 10 Gram(s)  LORazepam     Tablet 1 milliGRAM(s) PRN  multivitamin 1 Tablet(s)  phytonadione   Solution 10 milliGRAM(s)  thiamine 100 milliGRAM(s)    MEDICATIONS  (PRN):  LORazepam     Tablet 1 milliGRAM(s) Oral every 4 hours PRN withdrawal symptoms    ALLERGIES: No Known Allergies    VS: T(F): 97.9, Max: 99.5 (07-15 @ 14:54)  HR: 95 (95 - 101)  BP: 124/78 (116/57 - 135/71)  RR: 18  SpO2: 97% (97% - 98%)  GEN: Alert, awake, NAD  ABD/: soft, NT/ND, non palpable bladder, no suprapubic TTP, testes desc x2 no masses or tenderness, uncirc Male, foreskin easily retracts, no bleeding or discharge from meatus    LABS/IMAGIN.3    9.12  )-----------( 95       ( 2018 06:05 )             29.4     07-16    140  |  103  |  8<L>  ----------------------------<  98  3.7   |  21  |  0.6<L>    Ca    8.7      2018 06:05  Phos  3.8     07-16  Mg     1.8     -16    TPro  8.8<H>  /  Alb  3.2<L>  /  TBili  2.5<H>  /  DBili  x   /  AST  83<H>  /  ALT  15  /  AlkPhos  94  -16    PT/INR - ( 2018 06:05 )   PT: 20.30 sec;   INR: 1.89 ratio  PTT - ( 2018 06:05 )  PTT:49.2 sec    Prothrombin Time and INR, Plasma in AM (18 @ 15:15)    Prothrombin Time, Plasma: 17.50 sec    INR: 1.63           NO ANTICOAGULANT,NORMAL            0.65 -  1.30    Urinalysis (18 @ 16:54)    pH Urine: 7.0    Glucose Qualitative, Urine: Negative mg/dL    Blood, Urine: Negative    Color: Yellow    Urine Appearance: Clear    Bilirubin: Negative    Ketone - Urine: Negative    Specific Gravity: 1.010    Protein, Urine: Negative mg/dL    Urobilinogen: 1.0 mg/dL    Nitrite: Negative    Leukocyte Esterase Concentration: Negative    < from: CT Abdomen and Pelvis w/ IV Cont (18 @ 18:38) >  FINDINGS:  HEPATOBILIARY: Peripheral nodularity of the liver contour, compatible   with cirrhosis. Heterogeneous attenuation of the liver parenchyma with   scattered subcentimeter hypodensities too small to characterize.   Cholelithiasis.  KIDNEYS: Symmetric pattern of renal enhancement. No hydronephrosis   bilaterally.  ABDOMINOPELVIC NODES: No lymphadenopathy.  PELVIC ORGANS: Unremarkable.  PERITONEUM/MESENTERY/BOWEL: No bowel obstruction. No ascites or   pneumoperitoneum. Colonic diverticulosis without diverticulitis. Normal   appendix.  BONES/SOFT TISSUES: Mild degenerative changes of the spine. Gynecomastia.  IMPRESSION:  1.  No acute intra-abdominal pathology.  2.  Cirrhotic liver containing multiple subcentimeter hypodensities too   small characterize. Nonemergent MRI of the abdomen is recommended for   further evaluation.  3.  Mild splenomegaly.

## 2018-07-17 ENCOUNTER — RESULT REVIEW (OUTPATIENT)
Age: 43
End: 2018-07-17

## 2018-07-17 LAB
ANION GAP SERPL CALC-SCNC: 16 MMOL/L — HIGH (ref 7–14)
APPEARANCE UR: CLEAR — SIGNIFICANT CHANGE UP
BILIRUB UR-MCNC: ABNORMAL
BUN SERPL-MCNC: 13 MG/DL — SIGNIFICANT CHANGE UP (ref 10–20)
CALCIUM SERPL-MCNC: 9 MG/DL — SIGNIFICANT CHANGE UP (ref 8.5–10.1)
CHLORIDE SERPL-SCNC: 99 MMOL/L — SIGNIFICANT CHANGE UP (ref 98–110)
CO2 SERPL-SCNC: 20 MMOL/L — SIGNIFICANT CHANGE UP (ref 17–32)
COLOR SPEC: ABNORMAL
CREAT SERPL-MCNC: 0.6 MG/DL — LOW (ref 0.7–1.5)
DIFF PNL FLD: NEGATIVE — SIGNIFICANT CHANGE UP
GLUCOSE SERPL-MCNC: 122 MG/DL — HIGH (ref 70–99)
GLUCOSE UR QL: NEGATIVE — SIGNIFICANT CHANGE UP
HCT VFR BLD CALC: 29.6 % — LOW (ref 42–52)
HGB BLD-MCNC: 9.1 G/DL — LOW (ref 14–18)
INR BLD: 1.9 RATIO — HIGH (ref 0.65–1.3)
KETONES UR-MCNC: ABNORMAL
LEUKOCYTE ESTERASE UR-ACNC: ABNORMAL
MCHC RBC-ENTMCNC: 25.9 PG — LOW (ref 27–31)
MCHC RBC-ENTMCNC: 30.7 G/DL — LOW (ref 32–37)
MCV RBC AUTO: 84.3 FL — SIGNIFICANT CHANGE UP (ref 80–94)
NITRITE UR-MCNC: NEGATIVE — SIGNIFICANT CHANGE UP
NRBC # BLD: 0 /100 WBCS — SIGNIFICANT CHANGE UP (ref 0–0)
PH UR: 6.5 — SIGNIFICANT CHANGE UP (ref 5–8)
PLATELET # BLD AUTO: 115 K/UL — LOW (ref 130–400)
POTASSIUM SERPL-MCNC: 3.9 MMOL/L — SIGNIFICANT CHANGE UP (ref 3.5–5)
POTASSIUM SERPL-SCNC: 3.9 MMOL/L — SIGNIFICANT CHANGE UP (ref 3.5–5)
PROT UR-MCNC: 30
PROTHROM AB SERPL-ACNC: 20.4 SEC — HIGH (ref 9.95–12.87)
RBC # BLD: 3.51 M/UL — LOW (ref 4.7–6.1)
RBC # FLD: 17.2 % — HIGH (ref 11.5–14.5)
SODIUM SERPL-SCNC: 135 MMOL/L — SIGNIFICANT CHANGE UP (ref 135–146)
SP GR SPEC: >=1.03 — SIGNIFICANT CHANGE UP (ref 1.01–1.03)
UROBILINOGEN FLD QL: 1 (ref 0.2–0.2)
WBC # BLD: 9.03 K/UL — SIGNIFICANT CHANGE UP (ref 4.8–10.8)
WBC # FLD AUTO: 9.03 K/UL — SIGNIFICANT CHANGE UP (ref 4.8–10.8)
WBC UR QL: SIGNIFICANT CHANGE UP /HPF

## 2018-07-17 RX ORDER — PHYTONADIONE (VIT K1) 5 MG
10 TABLET ORAL ONCE
Refills: 0 | Status: COMPLETED | OUTPATIENT
Start: 2018-07-17 | End: 2018-07-17

## 2018-07-17 RX ADMIN — Medication 100 MILLIGRAM(S): at 12:48

## 2018-07-17 RX ADMIN — Medication 102 MILLIGRAM(S): at 16:39

## 2018-07-17 RX ADMIN — Medication 100 MILLIGRAM(S): at 17:33

## 2018-07-17 RX ADMIN — Medication 1 MILLIGRAM(S): at 12:46

## 2018-07-17 RX ADMIN — Medication 5 MILLIGRAM(S): at 12:47

## 2018-07-17 RX ADMIN — Medication 100 MILLIGRAM(S): at 06:28

## 2018-07-17 RX ADMIN — Medication 1 TABLET(S): at 12:46

## 2018-07-17 RX ADMIN — PANTOPRAZOLE SODIUM 40 MILLIGRAM(S): 20 TABLET, DELAYED RELEASE ORAL at 06:28

## 2018-07-17 RX ADMIN — Medication 325 MILLIGRAM(S): at 17:34

## 2018-07-17 RX ADMIN — LACTULOSE 10 GRAM(S): 10 SOLUTION ORAL at 06:28

## 2018-07-17 RX ADMIN — LACTULOSE 10 GRAM(S): 10 SOLUTION ORAL at 17:34

## 2018-07-17 RX ADMIN — Medication 325 MILLIGRAM(S): at 06:28

## 2018-07-17 NOTE — PROGRESS NOTE ADULT - ASSESSMENT
DESIRE SWIFT 43y Male  MRN#: [id number]   CODE STATUS:     SUBJECTIVE  Patient is a 43y old Male who presents with a chief complaint of rectal bleeding, gum bleeding, epistaxis, hematuria for 2-3 months with weakness, intermittent SOB/chest pain, decreased po intake (13 Jul 2018 21:33)    Currently admitted to medicine with the primary diagnosis of Alcoholic intoxication with complication, bleeding.     Hospital course has been uncomplicated.     Today is hospital day 4d, and this morning he is laying in bed comfortably and reports no overnight events.  He feels much better and states that he does not feel like he has any symptoms of withdrawal at this time.  He states that he recently urinated and that it looks less red than it did previously.  He was instructed about his bowel prep and EGD/Colonscopy for Thursday.       Present Today:           Joseph Catheter (x)No/ ()Yes?   Indication:             Central Line (x)No/ ()Yes?   Indication:          IV Fluids (x)No/ ()Yes? Type:  Rate:  Indication:    OBJECTIVE  PAST MEDICAL & SURGICAL HISTORY  ETOH abuse  No significant past surgical history    ALLERGIES:  No Known Allergies    MEDICATIONS:  STANDING MEDICATIONS  docusate sodium 100 milliGRAM(s) Oral two times a day  ferrous    sulfate 325 milliGRAM(s) Oral two times a day  folic acid 1 milliGRAM(s) Oral daily  lactulose Syrup 10 Gram(s) Oral two times a day  multivitamin 1 Tablet(s) Oral daily  pantoprazole    Tablet 40 milliGRAM(s) Oral before breakfast  phytonadione   Solution 5 milliGRAM(s) Oral daily  thiamine 100 milliGRAM(s) Oral daily    PRN MEDICATIONS      VITAL SIGNS: Last 24 Hours  T(C): 37.1 (17 Jul 2018 05:02), Max: 37.6 (16 Jul 2018 21:37)  T(F): 98.8 (17 Jul 2018 05:02), Max: 99.6 (16 Jul 2018 21:37)  HR: 82 (17 Jul 2018 05:02) (82 - 101)  BP: 103/58 (17 Jul 2018 05:02) (103/58 - 125/61)  BP(mean): --  RR: 17 (17 Jul 2018 05:02) (17 - 18)  SpO2: 95% (17 Jul 2018 08:16) (95% - 95%)    LABS:                        9.3    9.12  )-----------( 95       ( 16 Jul 2018 06:05 )             29.4     07-16    140  |  103  |  8<L>  ----------------------------<  98  3.7   |  21  |  0.6<L>    Ca    8.7      16 Jul 2018 06:05  Phos  3.8     07-16  Mg     1.8     07-16    TPro  8.8<H>  /  Alb  3.2<L>  /  TBili  2.5<H>  /  DBili  x   /  AST  83<H>  /  ALT  15  /  AlkPhos  94  07-16    PT/INR - ( 17 Jul 2018 08:27 )   PT: 20.40 sec;   INR: 1.90 ratio         PTT - ( 16 Jul 2018 06:05 )  PTT:49.2 sec  Urinalysis Basic - ( 17 Jul 2018 07:55 )    Color: Orange / Appearance: Clear / SG: >=1.030 / pH: x  Gluc: x / Ketone: Trace  / Bili: Small / Urobili: 1.0   Blood: x / Protein: 30 / Nitrite: Negative   Leuk Esterase: Small / RBC: x / WBC 1-2 /HPF   Sq Epi: x / Non Sq Epi: x / Bacteria: x    RADIOLOGY:  No new imaging    PHYSICAL EXAM:    GENERAL: NAD, well-developed, AAOx3  HEENT:  Atraumatic, Normocephalic. EOMI, PERRLA, conjunctiva and sclera clear, No JVD  PULMONARY: Clear to auscultation bilaterally; No wheeze  CARDIOVASCULAR: Regular rate and rhythm; No murmurs, rubs, or gallops  GASTROINTESTINAL: Soft, Nontender, Nondistended; Bowel sounds present  MUSCULOSKELETAL:  2+ Peripheral Pulses, No clubbing, cyanosis, or edema  NEUROLOGY: non-focal  SKIN: No rashes or lesions    ADMISSION SUMMARY  42 yo M w/ h/o alcohol abuse since 19 years of age, drinks 40 ounces beer about 3-5 times per day--> last drink at 12 noon today; no PCP check up for long time, no other known medical condition; presented from home with above chief complaints.    Pt c/o noticing hematuria, spontaneous gum bleeding, epistaxis, hematochezia that has been happening for about 2-3 months. Hematochezia is maroon colored blood that happens about 2 times a week, last episode today, the amount has been increasing recently. He didn't see any doctor for these complaints ever. For last few weeks he also has been having weakness, decreased po intake weight loss, intermittent SOB/chest pain.     ROS positive for increasing sleepiness at work, nausea, vomiting 1 episode today am-non-bloody, bilious, feeling intermittent fever  ROS negative for hematemesis constipation, abdominal distension, lower extremity swelling, syncope, palpitations    Patient never had endoscopy/colonoscopy, denies IV drug abuse/family h/o cancer; father was alcoholic too    Attd: Pt drank yesterday. Gets shakes in am, never DTs or LOC or Sz. Did alcohol rehab for 28 days 2 yrs ago. Pt has neuropathy signs in hands and legs. No encephalopathy. Cooperative and pleasant and oriented. (13 Jul 2018 21:33)      ASSESSMENT & PLAN    42 yo M w/ h/o alcohol abuse Pt c/o noticing hematuria, spontaneous gum bleeding, epistaxis, hematochezia that has been happening for about 2-3 months.    # Microcytic anemia of unknown duration likely 2/2 chronic blood loss from epistaxis, gum bleeding, hematuria, hematochezia vs iron deficiency per iron studies.    -T&S completed 07/16  -hgb stable-  > 9.12 today  -cont feso4 325 q12, mvi q24 and fol 1mg q24  - b12 Vitamin B12, Serum: 1410, fol 11.6, TSH -5.44 , vit d 25-OH pending  -f/u daily cbc    #epistaxis, gum bleeding, hematuria possibly 2/2 thrombocytopenia/elevated INR 2/2 cirrhosis vs vitamin deficiency 2/2 malnutrition from chronic alcohol use  -epistaxis: resolved  -follow up out patient dentist for gum bleeding  -f/u with uro for hematuria  -f/u inpt renal/bladder u/s  -inpt uro-send urine for cytology  -oupt uro f/u for cystoscopy and CT urogram  -f/u GI for GI bleeding  -clr liquids Wed; bowel prep Wed; NPO p MN Wed night  -EGD and C-scope planned for Thurs    # prolonged INR possibly 2/2 to chronic liver dz,  -vitamin K supplementation to try to reverse INR 5mg q24 x3-s/p   - monitor INR and platelet daily-1.9 today    # multiple small nodularities on liver seen on CT A/P  -f/u MRI w/ gado and MRCP    -AFP is 6    # Newly diagnosed Cirrhosis 2/2 alcohol use; poss touch of alcoholic hepatitis w/ bump in bili; LFTs stable (no steroids needed)  -patient counselled about quitting alcohol use by attending physician  -Hepatitis A IGG Ab-positive  -Hep B, C are neg  -will need outpt vaccination for at least Hep B   -lactulose for elevated ammonia--> titrate to 2 bowel movement per day Lactulose 10gm q12 for now -one BM today so far  # Alcohol withdrawal much better; Etoh was 333 on admit  - d/c'd ativan prn yesterday    # DVT Px- SCDs       # GI Px- protonix 40mg q24    Dispo: D/C following EGD and Colonoscopy    Present today:  ( ) Congestive Heart Failure, Yes? ( )Acute / ( )Acute on Chronic / ( )Chronic  :  ( )Systolic / ( )Diastolic               Plan:  ( ) Complicated Pneumonia, Type?  ( )Parapneumonic effusion / ( )Abscess / ( ) Multilobar / ( )Other               Plan:  ( ) Morbid Obesity, Yes? BMI:               Plan:  ( ) Functional Quadriplegia               Plan:  ( ) Encephalopathy               Plan:    ( ) Discussion with patient and/or family regarding goals of care  ( ) Discussed Case and Plan with Medical Attending, Name:        # Planned Disposition: ________ DESIRE SWIFT 43y Male  MRN#: [id number]   CODE STATUS:  FULL    SUBJECTIVE  Patient is a 43y old Male who presents with a chief complaint of rectal bleeding, gum bleeding, epistaxis, hematuria for 2-3 months with weakness, intermittent SOB/chest pain, decreased po intake (13 Jul 2018 21:33)    Currently admitted to medicine with the primary diagnosis of Alcoholic intoxication with complication, bleeding.     Hospital course has been uncomplicated.     Today is hospital day 4d, and this morning he is laying in bed comfortably and reports no overnight events.  He feels much better and states that he does not feel like he has any symptoms of withdrawal at this time.  He states that he recently urinated and that it looks less red than it did previously.  He was instructed about his bowel prep and EGD/Colonscopy for Thursday.       Present Today:           Joseph Catheter (x)No/ ()Yes?   Indication:             Central Line (x)No/ ()Yes?   Indication:          IV Fluids (x)No/ ()Yes? Type:  Rate:  Indication:    OBJECTIVE  PAST MEDICAL & SURGICAL HISTORY  ETOH abuse  No significant past surgical history    ALLERGIES:  No Known Allergies    MEDICATIONS:  STANDING MEDICATIONS  docusate sodium 100 milliGRAM(s) Oral two times a day  ferrous    sulfate 325 milliGRAM(s) Oral two times a day  folic acid 1 milliGRAM(s) Oral daily  lactulose Syrup 10 Gram(s) Oral two times a day  multivitamin 1 Tablet(s) Oral daily  pantoprazole    Tablet 40 milliGRAM(s) Oral before breakfast  phytonadione   Solution 5 milliGRAM(s) Oral daily  thiamine 100 milliGRAM(s) Oral daily    PRN MEDICATIONS    VITAL SIGNS: Last 24 Hours  T(C): 37.1 (17 Jul 2018 05:02), Max: 37.6 (16 Jul 2018 21:37)  T(F): 98.8 (17 Jul 2018 05:02), Max: 99.6 (16 Jul 2018 21:37)  HR: 82 (17 Jul 2018 05:02) (82 - 101)  BP: 103/58 (17 Jul 2018 05:02) (103/58 - 125/61)  BP(mean): --  RR: 17 (17 Jul 2018 05:02) (17 - 18)  SpO2: 95% (17 Jul 2018 08:16) (95% - 95%)    LABS:                        9.3    9.12  )-----------( 95       ( 16 Jul 2018 06:05 )             29.4     07-16    140  |  103  |  8<L>  ----------------------------<  98  3.7   |  21  |  0.6<L>    Ca    8.7      16 Jul 2018 06:05  Phos  3.8     07-16  Mg     1.8     07-16    TPro  8.8<H>  /  Alb  3.2<L>  /  TBili  2.5<H>  /  DBili  x   /  AST  83<H>  /  ALT  15  /  AlkPhos  94  07-16    PT/INR - ( 17 Jul 2018 08:27 )   PT: 20.40 sec;   INR: 1.90 ratio         PTT - ( 16 Jul 2018 06:05 )  PTT:49.2 sec  Urinalysis Basic - ( 17 Jul 2018 07:55 )    Color: Orange / Appearance: Clear / SG: >=1.030 / pH: x  Gluc: x / Ketone: Trace  / Bili: Small / Urobili: 1.0   Blood: x / Protein: 30 / Nitrite: Negative   Leuk Esterase: Small / RBC: x / WBC 1-2 /HPF   Sq Epi: x / Non Sq Epi: x / Bacteria: x    RADIOLOGY:    MR ABDOMEN IC      07/16/2018      IMPRESSION:  1.  Hepatomegaly and nodular hepatic contour suspicious for cirrhosis.     2.  Subcentimeter right hepatic lesion most likely representing   dysplastic nodule. However, evaluation is limited due to motion   degradation of several sequences. A repeat outpatient MRI can be obtained   as clinically warranted.    3.  Gallbladder sludge and cholelithiasis.    PHYSICAL EXAM:    GENERAL: NAD, well-developed, AAOx3  HEENT:  Atraumatic, Normocephalic. EOMI, PERRLA, conjunctiva and sclera clear, No JVD  PULMONARY: Clear to auscultation bilaterally; No wheeze  CARDIOVASCULAR: Regular rate and rhythm; No murmurs, rubs, or gallops  GASTROINTESTINAL: Soft, Nontender, Nondistended; Bowel sounds present  MUSCULOSKELETAL:  2+ Peripheral Pulses, No clubbing, cyanosis, or edema  NEUROLOGY: non-focal  SKIN: No rashes or lesions    ADMISSION SUMMARY  42 yo M w/ h/o alcohol abuse since 19 years of age, drinks 40 ounces beer about 3-5 times per day--> last drink at 12 noon today; no PCP check up for long time, no other known medical condition; presented from home with above chief complaints.    Pt c/o noticing hematuria, spontaneous gum bleeding, epistaxis, hematochezia that has been happening for about 2-3 months. Hematochezia is maroon colored blood that happens about 2 times a week, last episode today, the amount has been increasing recently. He didn't see any doctor for these complaints ever. For last few weeks he also has been having weakness, decreased po intake weight loss, intermittent SOB/chest pain.     ROS positive for increasing sleepiness at work, nausea, vomiting 1 episode today am-non-bloody, bilious, feeling intermittent fever  ROS negative for hematemesis constipation, abdominal distension, lower extremity swelling, syncope, palpitations    Patient never had endoscopy/colonoscopy, denies IV drug abuse/family h/o cancer; father was alcoholic too    Attd: Pt drank yesterday. Gets shakes in am, never DTs or LOC or Sz. Did alcohol rehab for 28 days 2 yrs ago. Pt has neuropathy signs in hands and legs. No encephalopathy. Cooperative and pleasant and oriented. (13 Jul 2018 21:33)      ASSESSMENT & PLAN    42 yo M w/ h/o alcohol abuse Pt c/o noticing hematuria, spontaneous gum bleeding, epistaxis, hematochezia that has been happening for about 2-3 months.    # Microcytic anemia of unknown duration likely 2/2 chronic blood loss from epistaxis, gum bleeding, hematuria, hematochezia vs iron deficiency per iron studies.    -T&S completed 07/16  -hgb stable-  > 9.12 today  -cont feso4 325 q12, mvi q24 and fol 1mg q24  - b12 Vitamin B12, Serum: 1410, fol 11.6, TSH -5.44 , vit d 25-OH pending  -f/u daily cbc    #epistaxis, gum bleeding, hematuria possibly 2/2 thrombocytopenia/elevated INR 2/2 cirrhosis vs vitamin deficiency 2/2 malnutrition from chronic alcohol use  -epistaxis: resolved  -follow up out patient dentist for gum bleeding  -f/u with uro for hematuria  -f/u inpt renal/bladder u/s  -inpt uro-send urine for cytology  -oupt uro f/u for cystoscopy and CT urogram  -f/u GI for GI bleeding   -clr liquids Wed; bowel prep Wed; NPO p MN Wed night  -EGD and C-scope planned for Thurs    #prolonged INR possibly 2/2 to chronic liver dz,  -vitamin K supplementation to try to reverse INR 5mg q24 x3-s/p   - monitor INR and platelet daily-1.9 today    # multiple small nodularities on liver seen on CT A/P  -MRI w/ gado and MRCP as above  -AFP is 6    # Newly diagnosed Cirrhosis 2/2 alcohol use; poss touch of alcoholic hepatitis w/ bump in bili; LFTs stable (no steroids needed)  -patient counselled about alcohol cessation  -Hepatitis A IGG Ab-positive  -Hep B, C are neg  -will need outpt vaccination for at least Hep B   -lactulose for elevated ammonia--> titrate to 2 bowel movement per day Lactulose 10gm q12 for now -one BM today so far  # Alcohol withdrawal much better; Etoh was 333 on admit  - d/c'd ativan prn yesterday    # DVT Px- SCDs       # GI Px- protonix 40mg q24    Dispo: D/C following EGD and Colonoscopy on Thursday    Present today:  ( ) Congestive Heart Failure, Yes? ( )Acute / ( )Acute on Chronic / ( )Chronic  :  ( )Systolic / ( )Diastolic               Plan:  ( ) Complicated Pneumonia, Type?  ( )Parapneumonic effusion / ( )Abscess / ( ) Multilobar / ( )Other               Plan:  ( ) Morbid Obesity, Yes? BMI:               Plan:  ( ) Functional Quadriplegia               Plan:  ( ) Encephalopathy               Plan:    (x ) Discussion with patient and/or family regarding goals of care  (x ) Discussed Case and Plan with Medical Attending, Name:  Dr. Patrick      # Planned Disposition: Home

## 2018-07-17 NOTE — PROGRESS NOTE ADULT - SUBJECTIVE AND OBJECTIVE BOX
JENIFFERERICGE  43y  Male  ***My note supercedes ALL resident notes that I sign.  My corrections for their notes are in my note.***    I can be reached directly on [employer] [id number]. My office number is [phone]. My personal cell number is [phone].    INTERVAL EVENTS: Pt looks fine. No complaints. Minor hematuria. No GI bleed.    T(F): 98.8 (07-17-18 @ 05:02), Max: 99.6 (07-16-18 @ 21:37)  HR: 82 (07-17-18 @ 05:02) (82 - 101)  BP: 103/58 (07-17-18 @ 05:02) (103/58 - 125/61)  RR: 17 (07-17-18 @ 05:02) (17 - 18)  SpO2: 95% (07-17-18 @ 08:16) (95% - 95%)    Physical Exam:  GENERAL: patient looks fine, very calm  EYES: EOMI, PERRLA, conjunctiva and sclera clear  NECK: Supple  CHEST/LUNG: Clear to auscultation bilaterally; No wheeze  HEART: Regular rhythm; No murmurs  ABDOMEN: Soft, nontender, Nondistended; Bowel sounds present  EXTREMITIES: No clubbing, cyanosis, or edema  NEUROLOGY: non-focal	    LABS:                        9.3     (    82.6   9.12  )-----------( ---------      95       ( 16 Jul 2018 06:05 )             29.4    (    16.2     PT/INR - ( 17 Jul 2018 08:27 )   PT: 20.40 sec;   INR: 1.90 ratio    PTT - ( 16 Jul 2018 06:05 )  PTT:49.2 sec    Urinalysis Basic - ( 17 Jul 2018 07:55 )    Color: Orange / Appearance: Clear / SG: >=1.030 / pH: x  Gluc: x / Ketone: Trace  / Bili: Small / Urobili: 1.0   Blood: x / Protein: 30 / Nitrite: Negative   Leuk Esterase: Small / RBC: x / WBC 1-2 /HPF   Sq Epi: x / Non Sq Epi: x / Bacteria: x    RADIOLOGY & ADDITIONAL TESTS:  < from: MR Abdomen w/ IV Cont (07.16.18 @ 21:49) >  IMPRESSION:  1.  Hepatomegaly and nodular hepatic contour suspicious for cirrhosis.     2.  Subcentimeter right hepatic lesion most likely representing   dysplastic nodule. However, evaluation is limited due to motion   degradation of several sequences. A repeat outpatient MRI can be obtained   as clinically warranted.    3.  Gallbladder sludge and cholelithiasis.    < end of copied text >      MEDICATIONS:    docusate sodium 100 milliGRAM(s) Oral two times a day  ferrous    sulfate 325 milliGRAM(s) Oral two times a day  folic acid 1 milliGRAM(s) Oral daily  lactulose Syrup 10 Gram(s) Oral two times a day  multivitamin 1 Tablet(s) Oral daily  pantoprazole    Tablet 40 milliGRAM(s) Oral before breakfast  phytonadione   Solution 5 milliGRAM(s) Oral daily  thiamine 100 milliGRAM(s) Oral daily

## 2018-07-17 NOTE — PROGRESS NOTE ADULT - SUBJECTIVE AND OBJECTIVE BOX
GI HPI Today:  Patient feels good today. Denies any rectal bleed, melena, hematemesis, abdominal pain or fever      Hospital course:  44 yo M w/ h/o alcohol abuse since 19 years of age, drinks 40 ounces beer about 3-5 times per day--> last drink at 12 noon today; no PCP check up for long time, no other known medical condition; presented from home with above chief complaints.    Pt c/o noticing hematuria, spontaneous gum bleeding, epistaxis, hematochezia that has been happening for about 2-3 months. Hematochezia is maroon colored blood that happens about 2 times a week, last episode today, the amount has been increasing recently. He didn't see any doctor for these complaints ever. For last few weeks he also has been having weakness, decreased po intake weight loss, intermittent SOB/chest pain.     ROS positive for increasing sleepiness at work, nausea, vomiting 1 episode today am-non-bloody, bilious, feeling intermittent fever  ROS negative for hemetemesis, constipation, abdominal distension, lower extremity swelling, syncope, palpitations    Patient never had endoscopy/colonoscopy, denies IV drug abuse/family h/o cancer; father was alcoholic too    Attd: Pt drank yesterday. Gets shakes in am, never DTs or LOC or Sz. Did alcohol rehab for 28 days 2 yrs ago. Pt has neuropathy signs in hands and legs. No encephalopathy. Cooperative and pleasant and oriented. (13 Jul 2018 21:33)      PAST MEDICAL & SURGICAL HISTORY  ETOH abuse  No significant past surgical history      ALLERGIES:  No Known Allergies      MEDICATIONS:  MEDICATIONS  (STANDING):  docusate sodium 100 milliGRAM(s) Oral two times a day  ferrous    sulfate 325 milliGRAM(s) Oral two times a day  folic acid 1 milliGRAM(s) Oral daily  lactulose Syrup 10 Gram(s) Oral two times a day  multivitamin 1 Tablet(s) Oral daily  pantoprazole    Tablet 40 milliGRAM(s) Oral before breakfast  phytonadione   Solution 5 milliGRAM(s) Oral daily  thiamine 100 milliGRAM(s) Oral daily    MEDICATIONS  (PRN):      REVIEW OF SYSTEMS:    CONSTITUTIONAL: No weakness, fatigue, malaise, fevers or chills,   Throat: No Dysphagia, No Odynophagia  RESPIRATORY: No SOB  CARDIOVASCULAR: No chest pain   Muscloskeletal: no joints pain  NEUROLOGICAL: No syncope or diziness  SKIN: No pruritis  Heme/Lymph: no LN enlargement         VITALS:   T(F): 98.8 (07-17 @ 05:02), Max: 100 (07-14 @ 20:46)  HR: 82 (07-17 @ 05:02) (82 - 112)  BP: 103/58 (07-17 @ 05:02) (103/58 - 155/80)  BP(mean): --  RR: 17 (07-17 @ 05:02) (17 - 18)  SpO2: 97% (07-15 @ 19:50) (96% - 98%)        PHYSICAL EXAM:  EYES: No scleral icterus   LUNG: Clear to auscultation bilaterally; No rales, rhonchi, wheezing, or rubs  HEART: RRR; No murmurs  ABDOMEN: Soft, +BS, no Abdominal Tenderness, No guarding, No Ge Sign   Rectal Exam: not performed     Blood Work :                        9.3    9.12  )-----------( 95       ( 16 Jul 2018 06:05 )             29.4     PT/INR - ( 16 Jul 2018 06:05 )  INR: 1.89          PTT - ( 16 Jul 2018 06:05 )  PTT:49.2<H>  07-16    140  |  103  |  8<L>  ----------------------------<  98  3.7   |  21  |  0.6<L>    Ca    8.7      16 Jul 2018 06:05  Phos  3.8     07-16  Mg     1.8     07-16      CBC -  ( 16 Jul 2018 06:05 )  Hemoglobin : 9.3    CBC -  ( 15 Jul 2018 09:33 )  Hemoglobin : 8.7    CBC -  ( 14 Jul 2018 07:47 )  Hemoglobin : 8.3    CBC -  ( 14 Jul 2018 00:15 )  Hemoglobin : 8.7    CBC -  ( 13 Jul 2018 15:15 )  Hemoglobin : 8.6      LIVER FUNCTIONS - ( 16 Jul 2018 06:05 )  Alb: 3.2 [3.5 - 5.2] / Pro: 8.8 [6.0 - 8.0] / ALK PHOS: 94 [30 - 115] / ALT: 15 [0 - 41] / AST: 83 [0 - 41] / GGT: x     LIVER FUNCTIONS - ( 15 Jul 2018 09:33 )  Alb: 3.2 [3.5 - 5.2] / Pro: 9.0 [6.0 - 8.0] / ALK PHOS: 98 [30 - 115] / ALT: 16 [0 - 41] / AST: 88 [0 - 41] / GGT: x     LIVER FUNCTIONS - ( 14 Jul 2018 07:47 )  Alb: 3.3 [3.5 - 5.2] / Pro: 8.7 [6.0 - 8.0] / ALK PHOS: 101 [30 - 115] / ALT: 17 [0 - 41] / AST: 91 [0 - 41] / GGT: x     LIVER FUNCTIONS - ( 13 Jul 2018 15:15 )  Alb: 3.6 [3.5 - 5.2] / Pro: 9.2 [6.0 - 8.0] / ALK PHOS: 110 [30 - 115] / ALT: 18 [0 - 41] / AST: 96 [0 - 41] / GGT: x         RADIOLOGY:    MRI liver/ MRCP  pending

## 2018-07-17 NOTE — PROGRESS NOTE ADULT - ASSESSMENT
# Microcytic anemia of unknown duration likely 2/2 chronic blood loss from epistaxis, gum bleeding, hematuria, hematochezia  T&S  keep hgb > 7; so far stable  iron studies show iron def: on feso4 325 q12, mvi q24 and fol 1mg q24  b12, fol - nl  TSH 5.4 - would repeat after 6 weeks to allow diet to improve, no tx for now  vit d 25-OH - f/u    # epistaxis, gum bleeding, hematuria maybe 2/2 thrombocytopenia/elevated INR 2/2 cirrhosis vs vitamin deficiency 2/2 malnutrition from chronic alcohol use  epistaxis: resolved  gum bleeding: outpt dental eval  hematuria: uro eval  surprisingly, u/a, again, neg for blood  -inpt renal/bladder u/s - await results  -inpt send urine for cytology - done  -oupt uro f/u for cystoscopy and CT urogram  GI bleeding: Gi eval  -clr liquids Wed; bowel prep Wed; NPO p MN Wed night  -EGD and C-scope Thurs    # prolonged INR - prob from chr liver dz, might NOT be reversible  oral vitamin K supplementation to try to reverse INR 5mg q24 x3  monitor INR daily    # multiple small nodularities on liver seen on CT A/P  MRI w/ gado noted - prob dysplastic nodule from cirrhosis  AFP is 6  no further inpt w/u for this; outpt f/u w/ Gi clinic  would recheck U/S and AFP in 6mo    # Newly diagnosed Cirrhosis 2/2 alcohol use; poss touch of alcoholic hepatitis w/ bump in bili; LFTs stable (no steroids needed)  hepatitis A IGG Ab +  Hep B, C are neg  will need outpt vaccination for Hep B   cont lactulose for elevated ammonia--> titrate to 2 bowel movement per day Lactulose 10gm q12 for now    # Alcohol withdrawal much better; Etoh was 333 on admit  off ativan    # DVT ppx - SCD in light of bleeding       # GI Px- protonix 40mg q24    Dispo: prob d/c after scopes on thurs or fri # Microcytic anemia of unknown duration likely 2/2 chronic blood loss from epistaxis, gum bleeding, hematuria, hematochezia  T&S  keep hgb > 7; so far stable  iron studies show iron def: on feso4 325 q12, mvi q24 and fol 1mg q24  b12, fol - nl  TSH 5.4 - would repeat after 6 weeks to allow diet to improve, no tx for now  vit d 25-OH - f/u    # epistaxis, gum bleeding, hematuria maybe 2/2 thrombocytopenia/elevated INR 2/2 cirrhosis vs vitamin deficiency 2/2 malnutrition from chronic alcohol use  epistaxis: resolved  gum bleeding: outpt dental eval  hematuria: uro eval  surprisingly, u/a, again, neg for blood; also, when I saw 1 urine today, there was no gross blood at all, it was darker yellow  -inpt renal/bladder u/s - await results  -inpt send urine for cytology - done  -oupt uro f/u for cystoscopy and CT urogram  GI bleeding: Gi eval  -clr liquids Wed; bowel prep Wed; NPO p MN Wed night  -EGD and C-scope Thurs    # prolonged INR - prob from chr liver dz, might NOT be reversible  oral vitamin K supplementation to try to reverse INR 5mg q24 x3  monitor INR daily    # multiple small nodularities on liver seen on CT A/P  MRI w/ gado noted - prob dysplastic nodule from cirrhosis  AFP is 6  no further inpt w/u for this; outpt f/u w/ Gi clinic  would recheck U/S and AFP in 6mo    # Newly diagnosed Cirrhosis 2/2 alcohol use; poss touch of alcoholic hepatitis w/ bump in bili; LFTs stable (no steroids needed)  hepatitis A IGG Ab +  Hep B, C are neg  will need outpt vaccination for Hep B   cont lactulose for elevated ammonia--> titrate to 2 bowel movement per day Lactulose 10gm q12 for now    # Alcohol withdrawal much better; Etoh was 333 on admit  off ativan    # DVT ppx - SCD in light of bleeding       # GI Px- protonix 40mg q24    Dispo: prob d/c after scopes on thurs or fri

## 2018-07-17 NOTE — PROGRESS NOTE ADULT - ASSESSMENT
42 yo  man presented to the ED for a diffuse pain SP ETOH consumption. patient had diffuse abdominal pain mostly pronounced on the LLQ with parasethias in the hands and feet, shaking which started in friday. he consumes up to 120oz/day daily. his last drink was on thursday.  Patient's medical hx is significant for a L buttock perianeal absces SP I&D at [facility] about 1 year ago.  Patient denies any rectal bleed diarrhea vomiting or abdomina; pain today  Patient never had a CLD workup and he is now newly diagnosed with cirrhosis most likely secondary to alcohol but other causes should be ruled out  Patient doesnt have any signs of alcoholic hepatitis (No wbc, no fever, no abdominal pain)  Patient needs vaccine to hep B  hep a immune  Follow CLD workup  CBC stable and no evidence of active bleed  Follow Hb levels  Monitor electrolytes  Please make sure to reverse INR by giving vitamin k   patient received vitamin K so PLEASE FOLLOW INR DALAL   Clear liquid diet on wednesday and NPO after midnight on wednesday  will plan EGD and colonoscopy on thursday  MRI liver with IV contrast and MRCP pending   Will follow

## 2018-07-18 LAB
24R-OH-CALCIDIOL SERPL-MCNC: 8 NG/ML — LOW (ref 30–80)
ANION GAP SERPL CALC-SCNC: 19 MMOL/L — HIGH (ref 7–14)
APTT BLD: 49.3 SEC — HIGH (ref 27–39.2)
BUN SERPL-MCNC: 13 MG/DL — SIGNIFICANT CHANGE UP (ref 10–20)
CALCIUM SERPL-MCNC: 8.8 MG/DL — SIGNIFICANT CHANGE UP (ref 8.5–10.1)
CHLORIDE SERPL-SCNC: 101 MMOL/L — SIGNIFICANT CHANGE UP (ref 98–110)
CO2 SERPL-SCNC: 17 MMOL/L — SIGNIFICANT CHANGE UP (ref 17–32)
CREAT SERPL-MCNC: 0.7 MG/DL — SIGNIFICANT CHANGE UP (ref 0.7–1.5)
CULTURE RESULTS: NO GROWTH — SIGNIFICANT CHANGE UP
GLUCOSE SERPL-MCNC: 110 MG/DL — HIGH (ref 70–99)
HCT VFR BLD CALC: 31.7 % — LOW (ref 42–52)
HGB BLD-MCNC: 9.9 G/DL — LOW (ref 14–18)
INR BLD: 1.58 RATIO — HIGH (ref 0.65–1.3)
IRON SATN MFR SERPL: 26 % — SIGNIFICANT CHANGE UP (ref 15–50)
IRON SATN MFR SERPL: 80 UG/DL — SIGNIFICANT CHANGE UP (ref 35–150)
MCHC RBC-ENTMCNC: 26.3 PG — LOW (ref 27–31)
MCHC RBC-ENTMCNC: 31.2 G/DL — LOW (ref 32–37)
MCV RBC AUTO: 84.1 FL — SIGNIFICANT CHANGE UP (ref 80–94)
NON-GYNECOLOGICAL CYTOLOGY STUDY: SIGNIFICANT CHANGE UP
NRBC # BLD: 0 /100 WBCS — SIGNIFICANT CHANGE UP (ref 0–0)
PLATELET # BLD AUTO: 151 K/UL — SIGNIFICANT CHANGE UP (ref 130–400)
POTASSIUM SERPL-MCNC: 3.8 MMOL/L — SIGNIFICANT CHANGE UP (ref 3.5–5)
POTASSIUM SERPL-SCNC: 3.8 MMOL/L — SIGNIFICANT CHANGE UP (ref 3.5–5)
PROTHROM AB SERPL-ACNC: 17 SEC — HIGH (ref 9.95–12.87)
RBC # BLD: 3.77 M/UL — LOW (ref 4.7–6.1)
RBC # FLD: 17.5 % — HIGH (ref 11.5–14.5)
SODIUM SERPL-SCNC: 137 MMOL/L — SIGNIFICANT CHANGE UP (ref 135–146)
SPECIMEN SOURCE: SIGNIFICANT CHANGE UP
TIBC SERPL-MCNC: 303 UG/DL — SIGNIFICANT CHANGE UP (ref 220–430)
TRANSFERRIN SERPL-MCNC: 247 MG/DL — SIGNIFICANT CHANGE UP (ref 200–360)
UIBC SERPL-MCNC: 223 UG/DL — SIGNIFICANT CHANGE UP (ref 110–370)
WBC # BLD: 10.72 K/UL — SIGNIFICANT CHANGE UP (ref 4.8–10.8)
WBC # FLD AUTO: 10.72 K/UL — SIGNIFICANT CHANGE UP (ref 4.8–10.8)

## 2018-07-18 RX ORDER — SOD SULF/SODIUM/NAHCO3/KCL/PEG
4000 SOLUTION, RECONSTITUTED, ORAL ORAL ONCE
Refills: 0 | Status: COMPLETED | OUTPATIENT
Start: 2018-07-18 | End: 2018-07-18

## 2018-07-18 RX ADMIN — Medication 100 MILLIGRAM(S): at 12:00

## 2018-07-18 RX ADMIN — PANTOPRAZOLE SODIUM 40 MILLIGRAM(S): 20 TABLET, DELAYED RELEASE ORAL at 06:17

## 2018-07-18 RX ADMIN — Medication 5 MILLIGRAM(S): at 15:15

## 2018-07-18 RX ADMIN — Medication 4000 MILLILITER(S): at 15:15

## 2018-07-18 RX ADMIN — Medication 100 MILLIGRAM(S): at 06:17

## 2018-07-18 RX ADMIN — Medication 20 MILLIGRAM(S): at 22:54

## 2018-07-18 RX ADMIN — Medication 325 MILLIGRAM(S): at 06:17

## 2018-07-18 RX ADMIN — Medication 325 MILLIGRAM(S): at 17:41

## 2018-07-18 RX ADMIN — Medication 1 MILLIGRAM(S): at 12:00

## 2018-07-18 RX ADMIN — LACTULOSE 10 GRAM(S): 10 SOLUTION ORAL at 06:17

## 2018-07-18 RX ADMIN — Medication 1 TABLET(S): at 12:00

## 2018-07-18 NOTE — PROGRESS NOTE ADULT - SUBJECTIVE AND OBJECTIVE BOX
HOSPITALIST ATTENDING NOTE    DESIRE SWIFT  43y Male  [id number]    INTERVAL HPI/OVERNIGHT EVENTS:no abd pain, fever, bleeding resolved - on clear liquid diet    T(C): 37.6 (07-18-18 @ 12:54), Max: 37.6 (07-18-18 @ 12:54)  HR: 98 (07-18-18 @ 12:54) (88 - 98)  BP: 127/75 (07-18-18 @ 12:54) (106/59 - 136/78)  RR: 18 (07-18-18 @ 12:54) (18 - 18)  SpO2: 96% (07-18-18 @ 08:40) (96% - 96%)  Wt(kg): --    07-17-18 @ 07:01  -  07-18-18 @ 07:00  --------------------------------------------------------  IN: 400 mL / OUT: 560 mL / NET: -160 mL        PHYSICAL EXAM:  GENERAL: NAD  HEAD:  Atraumatic, Normocephalic  EYES: EOMI, PERRLA, conjunctiva and sclera clear  ENMT: No tonsillar erythema, exudates, or enlargement  NECK: Supple, No JVD, Normal thyroid  NERVOUS SYSTEM:  Alert & Oriented X3, Good concentration; Non-focal- Motor Strength 5/5 B/L upper and lower extremities;  CHEST/LUNG: Clear to ascultation bilaterally; No rales, rhonchi, wheezing,   HEART: Regular rate and rhythm; No murmurs, rubs, or gallops  ABDOMEN: Soft, Nontender, Nondistended; Bowel sounds present  EXTREMITIES: No clubbing, cyanosis, or edema  LYMPH: No lymphadenopathy noted  SKIN: No rashes or lesions  PSYCH: No suicidal/homicial ideation/hallucinations    Consultant(s) Notes Reviewed:  [x ] YES  [ ] NO  Care Discussed with Consultants/Other Providers/ Housestaff [ x] YES  [ ] NO    LABS:                        9.9    10.72 )-----------( 151      ( 18 Jul 2018 08:39 )             31.7     07-18    137  |  101  |  13  ----------------------------<  110<H>  3.8   |  17  |  0.7    Ca    8.8      18 Jul 2018 08:39            RADIOLOGY & ADDITIONAL TESTS:    Imaging or report Personally Reviewed:  [ ] YES  [ ] NO    Case discussed with resident    Care discussed with pt/family      HEALTH ISSUES - PROBLEM Dx:

## 2018-07-18 NOTE — PROGRESS NOTE ADULT - ASSESSMENT
DESIRE SWIFT 43y Male  MRN#: [id number]   CODE STATUS: FULL    SUBJECTIVE  Patient is a 43y old Male who presents with a chief complaint of rectal bleeding, gum bleeding, epistaxis, hematuria for 2-3 months with weakness, intermittent SOB/chest pain, decreased po intake (13 Jul 2018 21:33).     Currently admitted to medicine with the primary diagnosis of Alcoholic intoxication with complication     Hospital course has been uncomplicated.    Today is hospital day 5d, and this morning he is laying in bed comfortably and reports no overnight events.   He denies any withdrawal symptoms and feels ready to go for rehabilitation.      Present Today:           Joseph Catheter (x)No/ ()Yes?   Indication:             Central Line (x)No/ ()Yes?   Indication:          IV Fluids (x)No/ ()Yes? Type:  Rate:  Indication:    OBJECTIVE  PAST MEDICAL & SURGICAL HISTORY  ETOH abuse  No significant past surgical history    ALLERGIES:  No Known Allergies    MEDICATIONS:  STANDING MEDICATIONS  bisacodyl 20 milliGRAM(s) Oral at bedtime  docusate sodium 100 milliGRAM(s) Oral two times a day  ferrous    sulfate 325 milliGRAM(s) Oral two times a day  folic acid 1 milliGRAM(s) Oral daily  lactulose Syrup 10 Gram(s) Oral two times a day  multivitamin 1 Tablet(s) Oral daily  pantoprazole    Tablet 40 milliGRAM(s) Oral before breakfast  thiamine 100 milliGRAM(s) Oral daily    PRN MEDICATIONS    VITAL SIGNS: Last 24 Hours  T(C): 37.6 (18 Jul 2018 12:54), Max: 37.6 (18 Jul 2018 12:54)  T(F): 99.6 (18 Jul 2018 12:54), Max: 99.6 (18 Jul 2018 12:54)  HR: 98 (18 Jul 2018 12:54) (88 - 98)  BP: 127/75 (18 Jul 2018 12:54) (106/59 - 136/78)  BP(mean): --  RR: 18 (18 Jul 2018 12:54) (18 - 18)  SpO2: 96% (18 Jul 2018 08:40) (96% - 96%)    LABS:                        9.9    10.72 )-----------( 151      ( 18 Jul 2018 08:39 )             31.7     07-18    137  |  101  |  13  ----------------------------<  110<H>  3.8   |  17  |  0.7    Ca    8.8      18 Jul 2018 08:39      PT/INR - ( 18 Jul 2018 08:39 )   PT: 17.00 sec;   INR: 1.58 ratio         PTT - ( 18 Jul 2018 08:39 )  PTT:49.3 sec  Urinalysis Basic - ( 17 Jul 2018 07:55 )    Color: Orange / Appearance: Clear / SG: >=1.030 / pH: x  Gluc: x / Ketone: Trace  / Bili: Small / Urobili: 1.0   Blood: x / Protein: 30 / Nitrite: Negative   Leuk Esterase: Small / RBC: x / WBC 1-2 /HPF   Sq Epi: x / Non Sq Epi: x / Bacteria: x    RADIOLOGY:    No new imaging.    PHYSICAL EXAM:    GENERAL: NAD, well-developed, AAOx3  HEENT:  Atraumatic, Normocephalic. EOMI, PERRLA, conjunctiva and sclera clear, No JVD  PULMONARY: Clear to auscultation bilaterally; No wheeze  CARDIOVASCULAR: Regular rate and rhythm; No murmurs, rubs, or gallops  GASTROINTESTINAL: Soft, Nontender, Nondistended; Bowel sounds present  MUSCULOSKELETAL:  2+ Peripheral Pulses, No clubbing, cyanosis, or edema  NEUROLOGY: non-focal  SKIN: No rashes or lesions    ADMISSION SUMMARY  HPI:  44 yo M w/ h/o alcohol abuse since 19 years of age, drinks 40 ounces beer about 3-5 times per day--> last drink at 12 noon today; no PCP check up for long time, no other known medical condition; presented from home with above chief complaints.    Pt c/o noticing hematuria, spontaneous gum bleeding, epistaxis, hematochezia that has been happening for about 2-3 months. Hematochezia is maroon colored blood that happens about 2 times a week, last episode today, the amount has been increasing recently. He didn't see any doctor for these complaints ever. For last few weeks he also has been having weakness, decreased po intake weight loss, intermittent SOB/chest pain.     ROS positive for increasing sleepiness at work, nausea, vomiting 1 episode today am-non-bloody, bilious, feeling intermittent fever  ROS negative for hemetemesis, constipation, abdominal distension, lower extremity swelling, syncope, palpitations    Patient never had endoscopy/colonoscopy, denies IV drug abuse/family h/o cancer; father was alcoholic too    Attd: Pt drank yesterday. Gets shakes in am, never DTs or LOC or Sz. Did alcohol rehab for 28 days 2 yrs ago. Pt has neuropathy signs in hands and legs. No encephalopathy. Cooperative and pleasant and oriented. (13 Jul 2018 21:33)      ASSESSMENT & PLAN    44 yo M w/ h/o alcohol abuse Pt c/o noticing hematuria, spontaneous gum bleeding, epistaxis, hematochezia that has been happening for about 2-3 months.    # Microcytic anemia of unknown duration likely 2/2 chronic blood loss from epistaxis, gum bleeding, hematuria, hematochezia vs iron deficiency per iron studies.    -T&S ordered  -hgb stable-  > 9.9 today  -cont feso4 325 q12, mvi q24 and fol 1mg q24  - b12 Vitamin B12, Serum: 1410, fol 11.6, TSH -5.44 , vit d 25-OH pending  -f/u daily cbc    #epistaxis, gum bleeding, hematuria possibly 2/2 thrombocytopenia/elevated INR 2/2 cirrhosis vs vitamin deficiency 2/2 malnutrition from chronic alcohol use  -epistaxis: resolved  -follow up out patient dentist for gum bleeding  -f/u with uro for hematuria  -f/u inpt renal/bladder u/s  -inpt uro-send urine for cytology  -oupt uro f/u for cystoscopy and CT urogram  -f/u GI for GI bleeding   -clr liquids today; bowel prep; NPO p MN tonight  -EGD and C-scope planned for tomorrow     #prolonged INR possibly 2/2 to chronic liver dz,  -vitamin K supplementation to try to reverse INR 5mg q24 x3-s/p   - monitor INR and platelet daily-1.5 today    # multiple small nodularities on liver seen on CT A/P  -MRI w/ gado noted - prob dysplastic nodule from cirrhosis  -AFP is 6  -no further inpt w/u for this; outpt f/u w/ Gi clinic  -would recheck U/S and AFP in 6mo for surveillance     # Newly diagnosed Cirrhosis 2/2 alcohol use; possibly alcoholic hepatitis  -patient counselled about alcohol cessation  -Hepatitis A IGG Ab-positive  -Hep B, C are neg  -will need outpt vaccination for at least Hep B   -lactulose for elevated ammonia--> titrate to 2 bowel movement per day Lactulose 10gm q12 for now -one BM today so far  # Alcohol withdrawal much better; Etoh was 333 on admit  -d/c'd Ativan    # DVT Px- SCDs       # GI Px- protonix 40mg q24    Dispo: D/C following EGD and Colonoscopy on Thursday    Present today:  ( ) Congestive Heart Failure, Yes? ( )Acute / ( )Acute on Chronic / ( )Chronic  :  ( )Systolic / ( )Diastolic               Plan:  ( ) Complicated Pneumonia, Type?  ( )Parapneumonic effusion / ( )Abscess / ( ) Multilobar / ( )Other               Plan:  ( ) Morbid Obesity, Yes? BMI:               Plan:  ( ) Functional Quadriplegia               Plan:  ( ) Encephalopathy               Plan:    ( ) Discussion with patient and/or family regarding goals of care  ( ) Discussed Case and Plan with Medical Attending, Name:      # Planned Disposition:  Home

## 2018-07-18 NOTE — PROGRESS NOTE ADULT - ASSESSMENT
# Microcytic anemia of unknown duration likely 2/2 chronic blood loss from epistaxis, gum bleeding, hematuria, hematochezia  T&S  hgb>9 stable  iron studies show iron def: on feso4 325 q12, mvi q24 and fol 1mg q24  vit d 25-OH - f/u    # epistaxis, gum bleeding, hematuria maybe 2/2 thrombocytopenia/elevated INR 2/2 cirrhosis vs vitamin deficiency 2/2 malnutrition from chronic alcohol use  thrombocytopenia resolved   epistaxis: resolved  gum bleeding: outpt dental eval resolved   hematuria: uro eval  -oupt uro f/u for cystoscopy and CT urogram  GI bleeding: Gi eval  -clr liquids Wed; bowel prep today; NPO p MN tonight  -EGD and C-scope tomorrow     # prolonged INR - prob from chr liver dz, might NOT be reversible  oral vitamin K supplementation to try to reverse INR 5mg q24 x3  monitor INR daily 1.5 today - acceptable for procedure     # multiple small nodularities on liver seen on CT A/P  MRI w/ gado noted - prob dysplastic nodule from cirrhosis  AFP is 6  no further inpt w/u for this; outpt f/u w/ Gi clinic  would recheck U/S and AFP in 6mo for surveillance     # Newly diagnosed Cirrhosis 2/2 alcohol use; poss touch of alcoholic hepatitis w/ bump in bili; LFTs stable (no steroids needed)  hepatitis A IGG Ab +  Hep B, C are neg  will need outpt vaccination for Hep B   cont lactulose for elevated ammonia--> titrate to 2 bowel movement per day Lactulose 10gm q12 for now    # Alcohol withdrawal much better; Etoh was 333 on admit  off ativan  pt interested in outpt etoh rehab     # DVT ppx - SCD in light of bleeding       # GI Px- protonix 40mg q24    Dispo: prob d/c after scopes on thurs or fri

## 2018-07-19 ENCOUNTER — RESULT REVIEW (OUTPATIENT)
Age: 43
End: 2018-07-19

## 2018-07-19 ENCOUNTER — TRANSCRIPTION ENCOUNTER (OUTPATIENT)
Age: 43
End: 2018-07-19

## 2018-07-19 VITALS
DIASTOLIC BLOOD PRESSURE: 69 MMHG | RESPIRATION RATE: 18 BRPM | SYSTOLIC BLOOD PRESSURE: 120 MMHG | TEMPERATURE: 99 F | HEART RATE: 74 BPM

## 2018-07-19 LAB
A1AT SERPL-MCNC: 180 MG/DL — SIGNIFICANT CHANGE UP (ref 90–200)
ANION GAP SERPL CALC-SCNC: 17 MMOL/L — HIGH (ref 7–14)
APTT BLD: 49.5 SEC — HIGH (ref 27–39.2)
APTT BLD: 49.7 SEC — HIGH (ref 27–39.2)
BLD GP AB SCN SERPL QL: SIGNIFICANT CHANGE UP
BUN SERPL-MCNC: 10 MG/DL — SIGNIFICANT CHANGE UP (ref 10–20)
CALCIUM SERPL-MCNC: 8.6 MG/DL — SIGNIFICANT CHANGE UP (ref 8.5–10.1)
CERULOPLASMIN SERPL-MCNC: 26 MG/DL — SIGNIFICANT CHANGE UP (ref 20–60)
CHLORIDE SERPL-SCNC: 97 MMOL/L — LOW (ref 98–110)
CO2 SERPL-SCNC: 21 MMOL/L — SIGNIFICANT CHANGE UP (ref 17–32)
CREAT SERPL-MCNC: 0.6 MG/DL — LOW (ref 0.7–1.5)
FERRITIN SERPL-MCNC: 36 NG/ML — SIGNIFICANT CHANGE UP (ref 30–400)
GLUCOSE SERPL-MCNC: 82 MG/DL — SIGNIFICANT CHANGE UP (ref 70–99)
HCT VFR BLD CALC: 29.5 % — LOW (ref 42–52)
HGB BLD-MCNC: 9.2 G/DL — LOW (ref 14–18)
INR BLD: 1.82 RATIO — HIGH (ref 0.65–1.3)
INR BLD: 1.82 RATIO — HIGH (ref 0.65–1.3)
MCHC RBC-ENTMCNC: 26.2 PG — LOW (ref 27–31)
MCHC RBC-ENTMCNC: 31.2 G/DL — LOW (ref 32–37)
MCV RBC AUTO: 84 FL — SIGNIFICANT CHANGE UP (ref 80–94)
MITOCHONDRIA AB SER-ACNC: SIGNIFICANT CHANGE UP
NRBC # BLD: 0 /100 WBCS — SIGNIFICANT CHANGE UP (ref 0–0)
PLATELET # BLD AUTO: 116 K/UL — LOW (ref 130–400)
POTASSIUM SERPL-MCNC: 3.1 MMOL/L — LOW (ref 3.5–5)
POTASSIUM SERPL-SCNC: 3.1 MMOL/L — LOW (ref 3.5–5)
PROTHROM AB SERPL-ACNC: 19.5 SEC — HIGH (ref 9.95–12.87)
PROTHROM AB SERPL-ACNC: 19.5 SEC — HIGH (ref 9.95–12.87)
RBC # BLD: 3.51 M/UL — LOW (ref 4.7–6.1)
RBC # FLD: 17.7 % — HIGH (ref 11.5–14.5)
SMOOTH MUSCLE AB SER-ACNC: SIGNIFICANT CHANGE UP
SODIUM SERPL-SCNC: 135 MMOL/L — SIGNIFICANT CHANGE UP (ref 135–146)
TYPE + AB SCN PNL BLD: SIGNIFICANT CHANGE UP
WBC # BLD: 8.3 K/UL — SIGNIFICANT CHANGE UP (ref 4.8–10.8)
WBC # FLD AUTO: 8.3 K/UL — SIGNIFICANT CHANGE UP (ref 4.8–10.8)

## 2018-07-19 RX ORDER — PANTOPRAZOLE SODIUM 20 MG/1
1 TABLET, DELAYED RELEASE ORAL
Qty: 30 | Refills: 0
Start: 2018-07-19 | End: 2018-08-17

## 2018-07-19 RX ORDER — FERROUS SULFATE 325(65) MG
1 TABLET ORAL
Qty: 30 | Refills: 0
Start: 2018-07-19 | End: 2018-08-17

## 2018-07-19 RX ORDER — THIAMINE MONONITRATE (VIT B1) 100 MG
1 TABLET ORAL
Qty: 0 | Refills: 0 | DISCHARGE
Start: 2018-07-19

## 2018-07-19 RX ORDER — CHOLECALCIFEROL (VITAMIN D3) 125 MCG
1 CAPSULE ORAL
Qty: 6 | Refills: 0
Start: 2018-07-19 | End: 2018-10-16

## 2018-07-19 RX ORDER — FOLIC ACID 0.8 MG
1 TABLET ORAL
Qty: 0 | Refills: 0 | DISCHARGE
Start: 2018-07-19

## 2018-07-19 RX ADMIN — Medication 1 TABLET(S): at 14:41

## 2018-07-19 RX ADMIN — Medication 325 MILLIGRAM(S): at 17:16

## 2018-07-19 RX ADMIN — Medication 100 MILLIGRAM(S): at 17:16

## 2018-07-19 RX ADMIN — Medication 100 MILLIGRAM(S): at 14:41

## 2018-07-19 RX ADMIN — Medication 1 MILLIGRAM(S): at 14:41

## 2018-07-19 NOTE — PROGRESS NOTE ADULT - PROVIDER SPECIALTY LIST ADULT
Gastroenterology
Gastroenterology
Hospitalist
Internal Medicine

## 2018-07-19 NOTE — PROGRESS NOTE ADULT - SUBJECTIVE AND OBJECTIVE BOX
JENIFFERERICGE  43y  Male  ***My note supercedes ALL resident notes that I sign.  My corrections for their notes are in my note.***    I can be reached directly on [address] [id number]. My office number is [phone]. My personal cell number is [phone].    INTERVAL EVENTS: Pt s/p scopes. Pt feels a little dizzy after anesthesia, but otherwise ok. Pt would like to go home this evening.    T(F): 98.7 (07-19-18 @ 15:04), Max: 99.3 (07-18-18 @ 21:33)  HR: 74 (07-19-18 @ 15:04) (70 - 88)  BP: 120/69 (07-19-18 @ 15:04) (80/44 - 144/77)  RR: 18 (07-19-18 @ 15:04) (17 - 22)  SpO2: 97% (07-19-18 @ 14:02) (96% - 98%)    Physical Exam:  GENERAL: patient looks fine, very calm  EYES: EOMI, PERRLA, conjunctiva and sclera clear  NECK: Supple  CHEST/LUNG: Clear to auscultation bilaterally; No wheeze  HEART: Regular rhythm; No murmurs  ABDOMEN: Soft, nontender, Nondistended; Bowel sounds present Buttocks: on left buttock near anus (about 1.5 cm from it) there looks to be an old pimple that is healing; there is a white tissue seen in it, however, no expressible pus or blood; there is no pain, no fecal material either. the entire lesion is about 0.5 cm in diamete   EXTREMITIES: No clubbing, cyanosis, or edema  NEUROLOGY: non-focal	    LABS:                        9.2     (    84.0   8.30  )-----------( ---------      116      ( 19 Jul 2018 07:29 )             29.5    (    17.7     135   (   97   (   82      07-19-18 @ 07:29  ----------------------               3.1   (   21   (   10                             -----                        0.6  Ca  8.6   Mg  --    P   --     PT/INR - ( 19 Jul 2018 07:29 )   PT: 19.50 sec;   INR: 1.82 ratio    PTT - ( 19 Jul 2018 07:29 )  PTT:49.7 sec    T aneudy 2.5   AP 94  AST 83  ALT 15  07-16-18 @ 06:05  T aneudy 3.0   AP 98  AST 88  ALT 16  07-15-18 @ 09:33    RADIOLOGY & ADDITIONAL TESTS:  EGD - no varices, no ulcers, + mild and non-bleeding gastritis and esophagitis    C-Scope was neg and no fistula seen in the rectum    MEDICATIONS:    docusate sodium 100 milliGRAM(s) Oral two times a day  ferrous    sulfate 325 milliGRAM(s) Oral two times a day  folic acid 1 milliGRAM(s) Oral daily  lactulose Syrup 10 Gram(s) Oral two times a day  multivitamin 1 Tablet(s) Oral daily  pantoprazole    Tablet 40 milliGRAM(s) Oral before breakfast  thiamine 100 milliGRAM(s) Oral daily

## 2018-07-19 NOTE — DISCHARGE NOTE ADULT - PROVIDER TOKENS
TOKINOCENCIO:'[id number]',TOKINOCENCIO:'[id number]' TOKEN:'[id number]',TOKEN:'[id number]',TOKEN:'[id number]'

## 2018-07-19 NOTE — PROGRESS NOTE ADULT - ASSESSMENT
# Microcytic anemia of unknown duration likely 2/2 chronic blood loss from epistaxis, gum bleeding, hematuria, hematochezia  hgb so far stable  iron studies show iron def: d/c on feso4 325 q24 and mvi q24   b12, fol - nl    # TSH 5.4 - would repeat after 6 weeks to allow diet to improve, no tx for now    # Vit D Def (lvl is 8)  Vit D 50,000 IU po q2 wks  recheck lvl in 3 mo    # epistaxis, gum bleeding, hematuria maybe 2/2 thrombocytopenia/elevated INR 2/2 cirrhosis vs vitamin deficiency 2/2 malnutrition from chronic alcohol use  epistaxis: resolved  gum bleeding: outpt dental eval  hematuria: uro eval  surprisingly, u/a neg for blood; also, when I saw urine, there was no gross blood at all, it was darker yellow  bladder u/s - neg  urine for cytology - neg  oupt uro f/u (i do not believe pt has anymore hematuria, so I do not believe any more w/u is needed either)  GI bleeding: Gi eval  scopes done - use Protonix 40mg po q24    # buttock lesion - ? old abscess or perianal fistula - no pain, no active infection  outpt f/u w/ rectal sx - Dr Sanchez    # prolonged INR - prob from chr liver dz, NOT be reversible    # multiple small nodularities on liver seen on CT A/P  MRI w/ gado noted - prob dysplastic nodule from cirrhosis  AFP is 6  no further inpt w/u for this; outpt f/u w/ Gi clinic  would recheck U/S and AFP in 6 mo    # Newly diagnosed Cirrhosis 2/2 alcohol use; w/ mild alcoholic hepatitis w/ bump in bili; LFTs stable (no steroids needed)  hepatitis A IGG Ab +  Hep B, C are neg  will need outpt vaccination for Hep B   d/c lactulose  outpt f/u in GI clinic    # Alcohol withdrawal much better; Etoh was 333 on admit  off ativan  can f/u at [facility] c[facility] for alcohol rehab    # DVT ppx - SCD and ambulate       # GI Px- protonix     Dispo: feed dinner and if not very dizzy and walk around, pt can (and should) go home today; f/u w/ IM also at [facility] - appt given

## 2018-07-19 NOTE — DISCHARGE NOTE ADULT - CARE PROVIDERS DIRECT ADDRESSES
,DirectAddress_Unknown,DirectAddress_Unknown ,DirectAddress_Unknown,DirectAddress_Unknown,[email or web address]

## 2018-07-19 NOTE — DISCHARGE NOTE ADULT - PLAN OF CARE
Find source of bleed and treat You were bleeding from your gums, nose and had blood in your stool and urine.    You had an EGD and Colonoscopy (A camera was placed inside your throat and rectum) to examine for any causes of bleeding.  There was inflammation in your esophagus and stomach but there was no sign of any bleeding.  Your colon exam was normal. Hemoglobin above 7 You have chronic anemia (low red blood cells that carry oxygen) as a result of chronic bleeding.  You also had an elevated PT, which measures how likely you are to bleed or form clots. Your levels were elevated

## 2018-07-19 NOTE — DISCHARGE NOTE ADULT - PATIENT PORTAL LINK FT
You can access the [facility] Portal, offered by [facility], by registering with the following website: [email or web address]

## 2018-07-19 NOTE — DISCHARGE NOTE ADULT - ADDITIONAL INSTRUCTIONS
Please follow up with your PMD Dr. Patrick on Thursday, July 26th at 8:30 AM.  If you need to change this appointment please call [phone] Please follow up with your PMD on Thursday, July 26th at 8:30 AM at the clinic at [facility].   If you need to change this appointment please call [phone]  Please follow up with your GI doctor, Dr. Huerta in 1 week.   Please follow up Dr. Sanchez, a colorectal surgeon for the skin lesion that was found near your rectum during colonoscopy.    Please attend meetings at the [facility] for alcohol cessation and counseling.  You will need a vaccination for Hepatitis B, so please be sure to mention this to your primary care provider.   Please follow up out patient dentist for gum bleeding.

## 2018-07-19 NOTE — PROGRESS NOTE ADULT - ASSESSMENT
DESIRE SWIFT 43y Male  MRN#: [id number]   CODE STATUS:     SUBJECTIVE  Patient is a 43y old Male who presents with a chief complaint of rectal bleeding, gum bleeding, epistaxis, hematuria for 2-3 months with weakness, intermittent SOB/chest pain, decreased po intake (13 Jul 2018 21:33)    Currently admitted to medicine with the primary diagnosis of Alcoholic intoxication with complication     Hospital course has been uncomplicated.    Today is hospital day 6d, and this morning he is laying in bed comfortably and reports no overnight events.   He denies any nausea, vomiting or abdominal pain and is ready for his EGD and Colonoscopy today.        Present Today:           Joseph Catheter (x)No/ ()Yes?   Indication:             Central Line (x)No/ ()Yes?   Indication:          IV Fluids (x)No/ ()Yes? Type:  Rate:  Indication:    OBJECTIVE  PAST MEDICAL & SURGICAL HISTORY  ETOH abuse  No significant past surgical history    ALLERGIES:  No Known Allergies    MEDICATIONS:  STANDING MEDICATIONS  docusate sodium 100 milliGRAM(s) Oral two times a day  ferrous    sulfate 325 milliGRAM(s) Oral two times a day  folic acid 1 milliGRAM(s) Oral daily  lactulose Syrup 10 Gram(s) Oral two times a day  multivitamin 1 Tablet(s) Oral daily  pantoprazole    Tablet 40 milliGRAM(s) Oral before breakfast  thiamine 100 milliGRAM(s) Oral daily    PRN MEDICATIONS    VITAL SIGNS: Last 24 Hours  T(C): 37.1 (19 Jul 2018 05:10), Max: 37.6 (18 Jul 2018 12:54)  T(F): 98.7 (19 Jul 2018 05:10), Max: 99.6 (18 Jul 2018 12:54)  HR: 82 (19 Jul 2018 05:10) (82 - 98)  BP: 101/56 (19 Jul 2018 05:10) (101/56 - 129/69)  BP(mean): --  RR: 17 (19 Jul 2018 05:10) (17 - 18)  SpO2: 96% (19 Jul 2018 04:52) (96% - 98%)    LABS:                        9.9    10.72 )-----------( 151      ( 18 Jul 2018 08:39 )             31.7     07-18    137  |  101  |  13  ----------------------------<  110<H>  3.8   |  17  |  0.7    Ca    8.8      18 Jul 2018 08:39      PT/INR - ( 19 Jul 2018 00:46 )   PT: 19.50 sec;   INR: 1.82 ratio         PTT - ( 19 Jul 2018 00:46 )  PTT:49.5 sec      Culture - Urine (collected 16 Jul 2018 20:48)  Source: .Urine Clean Catch (Midstream)  Final Report (18 Jul 2018 22:32):    No growth      RADIOLOGY:    US URINARY BLADDER     07/18/2018      IMPRESSION:    No urinary retention.      PHYSICAL EXAM:    GENERAL: NAD, well-developed, AAOx3  HEENT:  Atraumatic, Normocephalic. EOMI, PERRLA, conjunctiva and sclera clear, No JVD  PULMONARY: Clear to auscultation bilaterally; No wheeze  CARDIOVASCULAR: Regular rate and rhythm; No murmurs, rubs, or gallops  GASTROINTESTINAL: Soft, Nontender, Nondistended; Bowel sounds present  MUSCULOSKELETAL:  2+ Peripheral Pulses, No clubbing, cyanosis, or edema  NEUROLOGY: non-focal  SKIN: No rashes or lesions    ADMISSION SUMMARY  HPI:  44 yo M w/ h/o alcohol abuse since 19 years of age, drinks 40 ounces beer about 3-5 times per day--> last drink at 12 noon today; no PCP check up for long time, no other known medical condition; presented from home with above chief complaints.    Pt c/o noticing hematuria, spontaneous gum bleeding, epistaxis, hematochezia that has been happening for about 2-3 months. Hematochezia is maroon colored blood that happens about 2 times a week, last episode today, the amount has been increasing recently. He didn't see any doctor for these complaints ever. For last few weeks he also has been having weakness, decreased po intake weight loss, intermittent SOB/chest pain.     ROS positive for increasing sleepiness at work, nausea, vomiting 1 episode today am-non-bloody, bilious, feeling intermittent fever  ROS negative for hematemesis constipation, abdominal distension, lower extremity swelling, syncope, palpitations    Patient never had endoscopy/colonoscopy, denies IV drug abuse/family h/o cancer; father was alcoholic too    Attn: Pt drank yesterday. Gets shakes in am, never DTs or LOC or Sz. Did alcohol rehab for 28 days 2 yrs ago. Pt has neuropathy signs in hands and legs. No encephalopathy. Cooperative and pleasant and oriented. (13 Jul 2018 21:33)      ASSESSMENT & PLAN    44 yo M w/ h/o alcohol abuse Pt c/o noticing hematuria, spontaneous gum bleeding, epistaxis, hematochezia that has been happening for about 2-3 months.    # Microcytic anemia of unknown duration likely 2/2 chronic blood loss from epistaxis, gum bleeding, hematuria, hematochezia vs iron deficiency per iron studies.    -T&S ordered for today  -hgb stable-  > 9.9 07/18  -cont feso4 325 q12, mvi q24 and fol 1mg q24  - b12 Vitamin B12, Serum: 1410, fol 11.6, TSH -5.44 , vit d 25-OH pending  -f/u daily cbc    #epistaxis, gum bleeding, hematuria possibly 2/2 thrombocytopenia/elevated INR 2/2 cirrhosis vs vitamin deficiency 2/2 malnutrition from chronic alcohol use  -epistaxis: resolved  -follow up out patient dentist for gum bleeding  -f/u with uro for hematuria  -renal/bladder u/s negative  -urine for cytology-negative  -oupt uro f/u for cystoscopy and CT urogram  -EGD and C-scope today following infusion of 2 units of FFP.      #prolonged INR possibly 2/2 to chronic liver dz,  - monitor INR -1.9 today, receiving 2 units FFP    # multiple small nodularities on liver seen on CT A/P  -MRI w/ gado noted - prob dysplastic nodule from cirrhosis  -AFP is 6  -no further inpt w/u for this; outpt f/u w/ Gi clinic  -would recheck U/S and AFP in 6mo for surveillance     # Newly diagnosed Cirrhosis 2/2 alcohol use; possibly alcoholic hepatitis  -patient counselled about alcohol cessation  -Hepatitis A IGG Ab-POS  -Hep B, C are neg  -will need outpt vaccination for Hep B   -lactulose for elevated ammonia--> titrate to 2 bowel movement per day   # Alcohol withdrawal much better; Etoh was 333 on admit  -d/c'd Ativan    # DVT Px- SCDs       # GI Px- protonix 40mg q24    Dispo: D/C following EGD and Colonoscopy on Thursday      Present today:    ( ) Congestive Heart Failure, Yes? ( )Acute / ( )Acute on Chronic / ( )Chronic  :  ( )Systolic / ( )Diastolic               Plan:  ( ) Complicated Pneumonia, Type?  ( )Parapneumonic effusion / ( )Abscess / ( ) Multilobar / ( )Other               Plan:  ( ) Morbid Obesity, Yes? BMI:               Plan:  ( ) Functional Quadriplegia               Plan:  ( ) Encephalopathy               Plan:    (x ) Discussion with patient and/or family regarding goals of care  (x ) Discussed Case and Plan with Medical Attending, Name:  Dr. Patrick      # Planned Disposition: Home

## 2018-07-19 NOTE — DISCHARGE NOTE ADULT - CARE PLAN
Principal Discharge DX:	Rectal bleeding  Secondary Diagnosis:	ETOH abuse Principal Discharge DX:	GI bleed  Goal:	Find source of bleed and treat  Assessment and plan of treatment:	You were bleeding from your gums, nose and had blood in your stool and urine.    You had an EGD and Colonoscopy (A camera was placed inside your throat and rectum) to examine for any causes of bleeding.  There was inflammation in your esophagus and stomach but there was no sign of any bleeding.  Your colon exam was normal.  Secondary Diagnosis:	Anemia  Goal:	Hemoglobin above 7  Assessment and plan of treatment:	You have chronic anemia (low red blood cells that carry oxygen) as a result of chronic bleeding.  You also had an elevated PT, which measures how likely you are to bleed or form clots. Your levels were elevated

## 2018-07-19 NOTE — DISCHARGE NOTE ADULT - REASON FOR ADMISSION
rectal bleeding, gum bleeding, epistaxis, hematuria for 2-3 months with weakness, intermittent SOB/chest pain, decreased po intake Mucosal bleeding for 2-3 months with weakness, intermittent SOB/chest pain, decreased po intake

## 2018-07-19 NOTE — DISCHARGE NOTE ADULT - MEDICATION SUMMARY - MEDICATIONS TO TAKE
I will START or STAY ON the medications listed below when I get home from the hospital:    FeroSul 325 mg (65 mg elemental iron) oral tablet  -- 1 tab(s) by mouth once a day   -- Indication: For Iron deficiency    pantoprazole 40 mg oral delayed release tablet  -- 1 tab(s) by mouth once a day (before a meal)  -- Indication: For Gastritis    Multiple Vitamins oral tablet  -- 1 tab(s) by mouth once a day  -- Indication: For Vitamin deficiency I will START or STAY ON the medications listed below when I get home from the hospital:    FeroSul 325 mg (65 mg elemental iron) oral tablet  -- 1 tab(s) by mouth once a day   -- Indication: For Iron deficiency    pantoprazole 40 mg oral delayed release tablet  -- 1 tab(s) by mouth once a day (before a meal)  -- Indication: For Gastritis    Multiple Vitamins oral tablet  -- 1 tab(s) by mouth once a day  -- Indication: For Nutrient deficiency     Vitamin D3 50,000 intl units oral capsule  -- 1 cap(s) by mouth every 2 weeks   -- It is very important that you take or use this exactly as directed.  Do not skip doses or discontinue unless directed by your doctor.    -- Indication: For Vitamin D Deficiency

## 2018-07-19 NOTE — DISCHARGE NOTE ADULT - HOSPITAL COURSE
42 yo M w/ h/o alcohol abuse since 19 years of age, drinks 40 ounces beer about 3-5 times per day--> last drink at 12 noon on 07/13; no PCP check up for long time, no other known medical condition; presented from home. Pt c/o noticing hematuria, spontaneous gum bleeding, epistaxis, hematochezia that has been happening for about 2-3 months. Hematochezia is maroon colored blood that happens about 2 times a week, last episode today, the amount has been increasing recently. He didn't see any doctor for these complaints ever. For last few weeks he also had been having weakness, decreased po intake weight loss, intermittent SOB/chest pain.   A CT A/P was completed which found multiple small nodularities on liver.  This warranted further study with MRCP. A Hepatitis panel was negative. A CLD was sent and can be followed up outpatient.  The MRCP revealed  Hepatomegaly and nodular hepatic contour suspicious for cirrhosis. Subcentimeter right hepatic lesion most likely representing dysplastic nodule. Gallbladder sludge and cholelithiasis. An EGD and Colonoscopy was ordered by GI and completed, which found gastritis and duodenitis, as well as a small nodule near the anus that will be assessed in an outpatient setting.  The patient also was found to have microcytic anemia as well as an elevated INR which was treated repeatedly with Vitamin K unsuccessfully and eventually with FFPs X 2.

## 2018-07-19 NOTE — DISCHARGE NOTE ADULT - CARE PROVIDER_API CALL
Wolf Sanchez), ColonRectal Surgery  [address] [place] [address]  [address]rd [place] [address]  Phone: [phone]  Fax: [phone]    Maurilio Patrick), Internal Medicine  [address] [place] [id number]  Phone: [phone]  Fax: [phone] Wolf Sanchez), ColonRectal Surgery  [address]  [address]rd [place] [address]  Phone: [phone]  Fax: [phone]    Maurilio Patrick), Internal Medicine  [address] [place] [id number]  Phone: [phone]  Fax: [phone]    Gagan Huerta), Gastroenterology; Internal Medicine  [address] [place] [address]  Phone: [phone]  Fax: [phone]

## 2018-07-20 LAB — SURGICAL PATHOLOGY STUDY: SIGNIFICANT CHANGE UP

## 2018-07-25 DIAGNOSIS — F10.239 ALCOHOL DEPENDENCE WITH WITHDRAWAL, UNSPECIFIED: ICD-10-CM
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
## 2018-07-25 DIAGNOSIS — F10.229 ALCOHOL DEPENDENCE WITH INTOXICATION, UNSPECIFIED: ICD-10-CM

## 2018-07-25 DIAGNOSIS — K60.3 ANAL FISTULA: ICD-10-CM

## 2018-07-25 DIAGNOSIS — K70.30 ALCOHOLIC CIRRHOSIS OF LIVER WITHOUT ASCITES: ICD-10-CM

## 2018-07-25 DIAGNOSIS — K29.70 GASTRITIS, UNSPECIFIED, WITHOUT BLEEDING: ICD-10-CM

## 2018-07-25 DIAGNOSIS — K64.9 UNSPECIFIED HEMORRHOIDS: ICD-10-CM

## 2018-07-25 DIAGNOSIS — K20.9 ESOPHAGITIS, UNSPECIFIED: ICD-10-CM

## 2018-07-25 DIAGNOSIS — G62.9 POLYNEUROPATHY, UNSPECIFIED: ICD-10-CM

## 2018-07-25 DIAGNOSIS — D69.6 THROMBOCYTOPENIA, UNSPECIFIED: ICD-10-CM

## 2018-07-25 DIAGNOSIS — K80.20 CALCULUS OF GALLBLADDER WITHOUT CHOLECYSTITIS WITHOUT OBSTRUCTION: ICD-10-CM

## 2018-07-25 DIAGNOSIS — R31.9 HEMATURIA, UNSPECIFIED: ICD-10-CM

## 2018-07-25 DIAGNOSIS — K57.90 DIVERTICULOSIS OF INTESTINE, PART UNSPECIFIED, WITHOUT PERFORATION OR ABSCESS WITHOUT BLEEDING: ICD-10-CM

## 2018-07-25 DIAGNOSIS — E46 UNSPECIFIED PROTEIN-CALORIE MALNUTRITION: ICD-10-CM

## 2018-07-25 DIAGNOSIS — K76.6 PORTAL HYPERTENSION: ICD-10-CM

## 2018-07-25 DIAGNOSIS — R04.0 EPISTAXIS: ICD-10-CM

## 2018-07-25 DIAGNOSIS — R63.4 ABNORMAL WEIGHT LOSS: ICD-10-CM

## 2018-07-25 DIAGNOSIS — K06.8 OTHER SPECIFIED DISORDERS OF GINGIVA AND EDENTULOUS ALVEOLAR RIDGE: ICD-10-CM

## 2018-07-25 DIAGNOSIS — K70.10 ALCOHOLIC HEPATITIS WITHOUT ASCITES: ICD-10-CM

## 2018-07-25 LAB
ANA PAT FLD IF-IMP: ABNORMAL
ANA TITR SER: ABNORMAL

## 2018-10-10 ENCOUNTER — INPATIENT (INPATIENT)
Facility: HOSPITAL | Age: 43
LOS: 0 days | Discharge: ANOTHER HOSPITAL | End: 2018-10-11
Attending: HOSPITALIST | Admitting: HOSPITALIST

## 2018-10-10 VITALS
TEMPERATURE: 99 F | SYSTOLIC BLOOD PRESSURE: 151 MMHG | WEIGHT: 190.04 LBS | DIASTOLIC BLOOD PRESSURE: 87 MMHG | HEART RATE: 121 BPM | RESPIRATION RATE: 18 BRPM | HEIGHT: 69 IN | OXYGEN SATURATION: 99 %

## 2018-10-10 DIAGNOSIS — Z98.890 OTHER SPECIFIED POSTPROCEDURAL STATES: Chronic | ICD-10-CM

## 2018-10-10 LAB
ALBUMIN SERPL ELPH-MCNC: 3.3 G/DL — LOW (ref 3.5–5.2)
ALP SERPL-CCNC: 109 U/L — SIGNIFICANT CHANGE UP (ref 30–115)
ALT FLD-CCNC: 22 U/L — SIGNIFICANT CHANGE UP (ref 0–41)
ANION GAP SERPL CALC-SCNC: 16 MMOL/L — HIGH (ref 7–14)
APAP SERPL-MCNC: <5 UG/ML — LOW (ref 10–30)
AST SERPL-CCNC: 77 U/L — HIGH (ref 0–41)
BASOPHILS # BLD AUTO: 0.03 K/UL — SIGNIFICANT CHANGE UP (ref 0–0.2)
BASOPHILS NFR BLD AUTO: 0.6 % — SIGNIFICANT CHANGE UP (ref 0–1)
BILIRUB DIRECT SERPL-MCNC: 0.8 MG/DL — HIGH (ref 0–0.2)
BILIRUB INDIRECT FLD-MCNC: 1.8 MG/DL — HIGH (ref 0.2–1.2)
BILIRUB SERPL-MCNC: 2.6 MG/DL — HIGH (ref 0.2–1.2)
BUN SERPL-MCNC: 4 MG/DL — LOW (ref 10–20)
CALCIUM SERPL-MCNC: 8.2 MG/DL — LOW (ref 8.5–10.1)
CHLORIDE SERPL-SCNC: 102 MMOL/L — SIGNIFICANT CHANGE UP (ref 98–110)
CHOLEST SERPL-MCNC: 167 MG/DL — SIGNIFICANT CHANGE UP (ref 100–200)
CO2 SERPL-SCNC: 22 MMOL/L — SIGNIFICANT CHANGE UP (ref 17–32)
CREAT SERPL-MCNC: 0.5 MG/DL — LOW (ref 0.7–1.5)
EOSINOPHIL # BLD AUTO: 0.01 K/UL — SIGNIFICANT CHANGE UP (ref 0–0.7)
EOSINOPHIL NFR BLD AUTO: 0.2 % — SIGNIFICANT CHANGE UP (ref 0–8)
ETHANOL SERPL-MCNC: 41 MG/DL — HIGH
GLUCOSE SERPL-MCNC: 107 MG/DL — HIGH (ref 70–99)
HCT VFR BLD CALC: 26.5 % — LOW (ref 42–52)
HDLC SERPL-MCNC: 49 MG/DL — SIGNIFICANT CHANGE UP
HGB BLD-MCNC: 8.5 G/DL — LOW (ref 14–18)
IMM GRANULOCYTES NFR BLD AUTO: 0.8 % — HIGH (ref 0.1–0.3)
LIPID PNL WITH DIRECT LDL SERPL: 104 MG/DL — SIGNIFICANT CHANGE UP (ref 4–129)
LYMPHOCYTES # BLD AUTO: 1.09 K/UL — LOW (ref 1.2–3.4)
LYMPHOCYTES # BLD AUTO: 20.6 % — SIGNIFICANT CHANGE UP (ref 20.5–51.1)
MCHC RBC-ENTMCNC: 28.5 PG — SIGNIFICANT CHANGE UP (ref 27–31)
MCHC RBC-ENTMCNC: 32.1 G/DL — SIGNIFICANT CHANGE UP (ref 32–37)
MCV RBC AUTO: 88.9 FL — SIGNIFICANT CHANGE UP (ref 80–94)
MONOCYTES # BLD AUTO: 0.57 K/UL — SIGNIFICANT CHANGE UP (ref 0.1–0.6)
MONOCYTES NFR BLD AUTO: 10.8 % — HIGH (ref 1.7–9.3)
NEUTROPHILS # BLD AUTO: 3.55 K/UL — SIGNIFICANT CHANGE UP (ref 1.4–6.5)
NEUTROPHILS NFR BLD AUTO: 67 % — SIGNIFICANT CHANGE UP (ref 42.2–75.2)
NRBC # BLD: 0 /100 WBCS — SIGNIFICANT CHANGE UP (ref 0–0)
PLATELET # BLD AUTO: 39 K/UL — LOW (ref 130–400)
POTASSIUM SERPL-MCNC: 3.4 MMOL/L — LOW (ref 3.5–5)
POTASSIUM SERPL-SCNC: 3.4 MMOL/L — LOW (ref 3.5–5)
PROT SERPL-MCNC: 8.9 G/DL — HIGH (ref 6–8)
RBC # BLD: 2.98 M/UL — LOW (ref 4.7–6.1)
RBC # FLD: 13.8 % — SIGNIFICANT CHANGE UP (ref 11.5–14.5)
SALICYLATES SERPL-MCNC: <0.3 MG/DL — LOW (ref 4–30)
SODIUM SERPL-SCNC: 140 MMOL/L — SIGNIFICANT CHANGE UP (ref 135–146)
TOTAL CHOLESTEROL/HDL RATIO MEASUREMENT: 3.4 RATIO — LOW (ref 4–5.5)
TRIGL SERPL-MCNC: 116 MG/DL — SIGNIFICANT CHANGE UP (ref 10–149)
WBC # BLD: 5.29 K/UL — SIGNIFICANT CHANGE UP (ref 4.8–10.8)
WBC # FLD AUTO: 5.29 K/UL — SIGNIFICANT CHANGE UP (ref 4.8–10.8)

## 2018-10-10 RX ORDER — INFLUENZA VIRUS VACCINE 15; 15; 15; 15 UG/.5ML; UG/.5ML; UG/.5ML; UG/.5ML
0.5 SUSPENSION INTRAMUSCULAR ONCE
Refills: 0 | Status: COMPLETED | OUTPATIENT
Start: 2018-10-10 | End: 2018-10-11

## 2018-10-10 RX ORDER — MECLIZINE HCL 12.5 MG
25 TABLET ORAL ONCE
Refills: 0 | Status: COMPLETED | OUTPATIENT
Start: 2018-10-10 | End: 2018-10-10

## 2018-10-10 RX ORDER — MAGNESIUM HYDROXIDE 400 MG/1
30 TABLET, CHEWABLE ORAL ONCE
Refills: 0 | Status: DISCONTINUED | OUTPATIENT
Start: 2018-10-10 | End: 2018-10-10

## 2018-10-10 RX ORDER — HYDROXYZINE HCL 10 MG
100 TABLET ORAL AT BEDTIME
Refills: 0 | Status: DISCONTINUED | OUTPATIENT
Start: 2018-10-10 | End: 2018-10-11

## 2018-10-10 RX ORDER — HYDROXYZINE HCL 10 MG
50 TABLET ORAL EVERY 6 HOURS
Refills: 0 | Status: DISCONTINUED | OUTPATIENT
Start: 2018-10-10 | End: 2018-10-11

## 2018-10-10 RX ORDER — MULTIVIT-MIN/FERROUS GLUCONATE 9 MG/15 ML
1 LIQUID (ML) ORAL DAILY
Refills: 0 | Status: DISCONTINUED | OUTPATIENT
Start: 2018-10-10 | End: 2018-10-11

## 2018-10-10 RX ORDER — FOLIC ACID 0.8 MG
1 TABLET ORAL DAILY
Refills: 0 | Status: DISCONTINUED | OUTPATIENT
Start: 2018-10-10 | End: 2018-10-11

## 2018-10-10 RX ORDER — THIAMINE MONONITRATE (VIT B1) 100 MG
100 TABLET ORAL DAILY
Refills: 0 | Status: DISCONTINUED | OUTPATIENT
Start: 2018-10-10 | End: 2018-10-11

## 2018-10-10 RX ORDER — GUAIFENESIN/DEXTROMETHORPHAN 600MG-30MG
5 TABLET, EXTENDED RELEASE 12 HR ORAL EVERY 4 HOURS
Refills: 0 | Status: DISCONTINUED | OUTPATIENT
Start: 2018-10-10 | End: 2018-10-11

## 2018-10-10 RX ORDER — PANTOPRAZOLE SODIUM 20 MG/1
40 TABLET, DELAYED RELEASE ORAL
Refills: 0 | Status: DISCONTINUED | OUTPATIENT
Start: 2018-10-10 | End: 2018-10-11

## 2018-10-10 RX ORDER — SODIUM CHLORIDE 9 MG/ML
500 INJECTION INTRAMUSCULAR; INTRAVENOUS; SUBCUTANEOUS ONCE
Refills: 0 | Status: COMPLETED | OUTPATIENT
Start: 2018-10-10 | End: 2018-10-10

## 2018-10-10 RX ORDER — FERROUS SULFATE 325(65) MG
325 TABLET ORAL DAILY
Refills: 0 | Status: DISCONTINUED | OUTPATIENT
Start: 2018-10-10 | End: 2018-10-11

## 2018-10-10 RX ORDER — POTASSIUM CHLORIDE 20 MEQ
40 PACKET (EA) ORAL ONCE
Refills: 0 | Status: COMPLETED | OUTPATIENT
Start: 2018-10-10 | End: 2018-10-10

## 2018-10-10 RX ORDER — IBUPROFEN 200 MG
600 TABLET ORAL EVERY 6 HOURS
Refills: 0 | Status: DISCONTINUED | OUTPATIENT
Start: 2018-10-10 | End: 2018-10-10

## 2018-10-10 RX ORDER — ONDANSETRON 8 MG/1
4 TABLET, FILM COATED ORAL ONCE
Refills: 0 | Status: COMPLETED | OUTPATIENT
Start: 2018-10-10 | End: 2018-10-10

## 2018-10-10 RX ADMIN — Medication 25 MILLIGRAM(S): at 15:02

## 2018-10-10 RX ADMIN — PANTOPRAZOLE SODIUM 40 MILLIGRAM(S): 20 TABLET, DELAYED RELEASE ORAL at 19:23

## 2018-10-10 RX ADMIN — ONDANSETRON 4 MILLIGRAM(S): 8 TABLET, FILM COATED ORAL at 15:02

## 2018-10-10 RX ADMIN — SODIUM CHLORIDE 1000 MILLILITER(S): 9 INJECTION INTRAMUSCULAR; INTRAVENOUS; SUBCUTANEOUS at 15:02

## 2018-10-10 RX ADMIN — Medication 50 MILLIGRAM(S): at 16:55

## 2018-10-10 RX ADMIN — Medication 100 MILLIGRAM(S): at 19:23

## 2018-10-10 RX ADMIN — Medication 40 MILLIEQUIVALENT(S): at 19:23

## 2018-10-10 RX ADMIN — Medication 2 MILLIGRAM(S): at 19:22

## 2018-10-10 RX ADMIN — Medication 2 MILLIGRAM(S): at 23:52

## 2018-10-10 NOTE — H&P ADULT - NSHPSOCIALHISTORY_GEN_ALL_CORE
Lives at home with Family.  Alcohol dependence: drinks 7-8 (40oz)beer/day x 45yrs.  No illicit drug use or Tobacco use.

## 2018-10-10 NOTE — ED PROVIDER NOTE - OBJECTIVE STATEMENT
Patient is a 42 yo m who presents for evaluation for detox he states he drinks at least a 8 drinks per day last 1 day prior to arrival, he is not homicidal or suicidal he denies any chest pain or shortness of breath.  He is not suicidal or homicidal at this time. He denies any other co-ingestion

## 2018-10-10 NOTE — H&P ADULT - NSHPREVIEWOFSYSTEMS_GEN_ALL_CORE
DESIRE SWIFT  43y  Male    Patient is a 43y old  Male who presents with a chief complaint of detox (10 Oct 2018 17:10)      INTERVAL HPI/OVERNIGHT EVENTS:      REVIEW OF SYSTEMS:  CONSTITUTIONAL: No fever, weight loss, or fatigue  EYES: No eye pain, visual disturbances, or discharge  ENMT:  No difficulty hearing, tinnitus, vertigo; No sinus or throat pain  NECK: No pain or stiffness  BREASTS: No pain, masses, or nipple discharge  RESPIRATORY: No cough, wheezing, chills or hemoptysis; No shortness of breath  CARDIOVASCULAR: No chest pain, palpitations, dizziness, or leg swelling  GASTROINTESTINAL: No abdominal or epigastric pain. No nausea, vomiting, or hematemesis; No diarrhea or constipation. No melena or hematochezia.  GENITOURINARY: No dysuria, frequency, hematuria, or incontinence  NEUROLOGICAL: No headaches, memory loss, loss of strength, numbness, or tremors  SKIN: No itching, burning, rashes, or lesions   LYMPH NODES: No enlarged glands  ENDOCRINE: No heat or cold intolerance; No hair loss  MUSCULOSKELETAL: No joint pain or swelling; No muscle, back, or extremity pain  PSYCHIATRIC: No depression, anxiety, mood swings, or difficulty sleeping  HEME/LYMPH: No easy bruising, or bleeding gums  ALLERGY AND IMMUNOLOGIC: No hives or eczema CONSTITUTIONAL: No fever, weight loss, or fatigue  EYES: No eye pain, visual disturbances, or discharge  ENMT:  No difficulty hearing, tinnitus, vertigo; No sinus or throat pain, recurrent nose bleeds.  NECK: No pain or stiffness  BREASTS: No pain, masses, or nipple discharge  RESPIRATORY: No cough, wheezing, chills or hemoptysis; No shortness of breath  CARDIOVASCULAR: No chest pain, palpitations, dizziness, or leg swelling  GASTROINTESTINAL: No abdominal or epigastric pain. No nausea, vomiting, or hematemesis; No diarrhea or constipation. No melena or hematochezia.  GENITOURINARY: No dysuria, frequency, hematuria, or incontinence  NEUROLOGICAL: No headaches, memory loss, loss of strength, numbness, + tremors  SKIN: No itching, burning, rashes, or lesions   LYMPH NODES: No enlarged glands  ENDOCRINE: No heat or cold intolerance; No hair loss  MUSCULOSKELETAL: No joint pain or swelling; No muscle, back, or extremity pain  PSYCHIATRIC: No depression, anxiety, mood swings, or difficulty sleeping  HEME/LYMPH: No easy bruising, or bleeding gums  ALLERGY AND IMMUNOLOGIC: No hives or eczema

## 2018-10-10 NOTE — H&P ADULT - NSHPLABSRESULTS_GEN_ALL_CORE
8.5    5.29  )-----------( 39       ( 10 Oct 2018 15:15 )             26.5       10-10    140  |  102  |  4<L>  ----------------------------<  107<H>  3.4<L>   |  22  |  0.5<L>    Ca    8.2<L>      10 Oct 2018 15:15    TPro  8.9<H>  /  Alb  3.3<L>  /  TBili  2.6<H>  /  DBili  0.8<H>  /  AST  77<H>  /  ALT  22  /  AlkPhos  109  10-10                          Lactate Trend 8.5    5.29  )-----------( 39       ( 10 Oct 2018 15:15 )             26.5       10-10    140  |  102  |  4<L>  ----------------------------<  107<H>  3.4<L>   |  22  |  0.5<L>    Ca    8.2<L>      10 Oct 2018 15:15    TPro  8.9<H>  /  Alb  3.3<L>  /  TBili  2.6<H>  /  DBili  0.8<H>  /  AST  77<H>  /  ALT  22  /  AlkPhos  109  10-10          Acetaminophen Level, Serum: <5.0 ug/mL (10.10.18 @ 15:15)  Salicylate Level, Serum: <0.3 mg/dL (10.10.18 @ 15:15)  Alcohol, Blood: 41 mg/dL (10.10.18 @ 15:15)

## 2018-10-10 NOTE — ED ADULT TRIAGE NOTE - CHIEF COMPLAINT QUOTE
Patient requesting detox from alcohol. Last drink 2pm yesterday, denies drug use. Patient sent from intake for fast heartrate, bleeding from nose and vomiting everyday for the past 30 days. Denies SI/HI.

## 2018-10-10 NOTE — H&P ADULT - ASSESSMENT
DX; Alcohol dependence        Nose bleed  A/P;admit to med-surg  labs/ecg/c-xray  ativan protocol  detox consult  monitor vss  monitor pt Assessment and Plan:    1. Alcohol dependence:  Continue Ativan protocol  Detox consulted      2. Epistaxis:  Due to Thrombocytopenia/elevated INR. Resolved without nasal packing or cauterization.  Continue monitoring.      3. Alcoholic Liver cirrhosis: with coagulopathy.  Hepatitis A IGG Ab + but Hep B, C are neg  Will need outpatient vaccination for Hep B.  Follow up with GI out patient.        4. Multiple small nodularities on liver seen on CT A/P:  MRI w/ gadolinium: probably dysplastic nodule from cirrhosis  AFP was 6 but patient failed to follow up with GI outpatient.   Repeat U/S and AFP in 6 months recommended.      4. Normocytic anemia:   Hgb stable. Prior work up showed ALBERTO from ? GI bleed vs. Epistaxis.   B12 and folate wnl.         DVT prophylaxis: SCD and encourage ambulation  GI Prophylaxis: Protonix Patient is s 43y old male with history of Alcohol dependence (drinks 7-8 (40oz)beer/day x 45yrs. Last use yesterday), Liver cirrhosis with thrombocytopenia and Anemia admitted from central intake with epistaxis. He had presented there requesting Detox but was noticed to have epistaxis thus was sent to the ED for further evaluation. In the ED no intervention was necessary as it resolved spontaneously but he was noted to be in Alcohol withdrawal thus admitted to medication for stabilization prior to transfer the Detox. Patient mentions having recurrent nosebleeds with no reprecipitating factors. he had no other complaints.    Assessment and Plan:    1. Alcohol dependence:  Continue Ativan protocol  Detox consulted. Thiamine and Folate.      2. Epistaxis:  Due to Thrombocytopenia/elevated INR. Resolved without nasal packing or cauterization.  Continue monitoring.      3. Alcoholic Liver cirrhosis: with coagulopathy.  Prior Hepatitis work up: Hepatitis A IGG Ab + but Hep B, C are neg  Will need outpatient vaccination for Hep B.  Follow up with GI out patient for EGD and monitoring.        4. Multiple small nodularities on liver seen on CT A/P:  MRI w/ gadolinium: probably dysplastic nodule from cirrhosis  Alpha Fetoprotein - Tumor Marker 6 (07.15.18 @ 09:33)  Patient failed to follow up with GI outpatient.   Repeat U/S and AFP in 6 months recommended.      4. Normocytic anemia:   Hgb stable. Prior work up showed ALBERTO from ? GI bleed vs. Epistaxis.   Started on Iron supplementation  Follow up repeat work up.         DVT prophylaxis: SCD and encourage ambulation  GI Prophylaxis: Protonix

## 2018-10-10 NOTE — H&P ADULT - PMH
ETOH abuse    Nosebleed Anemia    ETOH abuse    Liver cirrhosis, alcoholic    Nosebleed    Thrombocytopenia

## 2018-10-10 NOTE — ED PROVIDER NOTE - ATTENDING CONTRIBUTION TO CARE
I was present for and supervised the key and critical aspects of the procedures performed during the care of the patient.  Patient is a 44 yo m who presents for evaluation for detox he states he drinks at least a 8 drinks per day last 1 day prior to arrival, he is not homicidal or suicidal he denies any chest pain or shortness of breath.  He is not suicidal or homicidal at this time. He denies any other co-ingestion.  Patient is tremulous it is mild no shaking at this time.  On physical exam he is nc/at perrla eomi oropharynx clear cta b/l,. rrr s1s2 noted abd-soft nt ndbs+ext from he is able to ambulate well given the patient is tremulous we treated with IV benzo, IV fluids I will admit for further management at this time

## 2018-10-10 NOTE — ED PROVIDER NOTE - MEDICAL DECISION MAKING DETAILS
I was present for and supervised the key and critical aspects of the procedures performed during the care of the patient.  Patient is a 42 yo m who presents for evaluation for detox he states he drinks at least a 8 drinks per day last 1 day prior to arrival, he is not homicidal or suicidal he denies any chest pain or shortness of breath.  He is not suicidal or homicidal at this time. He denies any other co-ingestion.  Patient is tremulous it is mild no shaking at this time.  On physical exam he is nc/at perrla eomi oropharynx clear cta b/l,. rrr s1s2 noted abd-soft nt ndbs+ext from he is able to ambulate well given the patient is tremulous we treated with IV benzo, IV fluids I will admit for further management at this time

## 2018-10-10 NOTE — ED ADULT NURSE NOTE - NSIMPLEMENTINTERV_GEN_ALL_ED
Implemented All Universal Safety Interventions:  [place] to call system. Call bell, personal items and telephone within reach. Instruct patient to call for assistance. Room bathroom lighting operational. Non-slip footwear when patient is off stretcher. Physically safe environment: no spills, clutter or unnecessary equipment. Stretcher in lowest position, wheels locked, appropriate side rails in place.

## 2018-10-10 NOTE — H&P ADULT - HISTORY OF PRESENT ILLNESS
43y old male pmhx below presents to the ED requesting detox from alcohol. pt complains of recurrent nosebleeds and actively bleeding earlier today now stopped. pt admits to current and continous use of alocohol, but denies drug abuse.  hx of alcohol dependence - 7-8 (40oz)beer/day x45yrs.. last use today. sober for 2months since last detox.   pt admits to tremors, paraesthesias, decrease oral intake, but denies chest pain, SOB, N/V, fever/chills, blackouts or seizures. Patient is s 43y old male with history of Alcohol dependence (drinks 7-8 (40oz)beer/day x 45yrs. Last use yesterday), Liver cirrhosis with thrombocytopenia and Anemia admitted from central intake with epistaxis. He had presented there requesting Detox but was noticed to have epistaxis thus was sent to the ED for further evaluation. In the ED no intervention was necessary as it resolved spontaneously but he was noted to be in Alcohol withdrawal thus admitted to medication for stabilization prior to transfer the Detox. Patient mentions having recurrent nosebleeds with no reprecipitating factors. he had no other complaints.

## 2018-10-10 NOTE — H&P ADULT - NSHPPHYSICALEXAM_GEN_ALL_CORE
PHYSICAL EXAM:  GENERAL: NAD, well-developed, well nourished, looks stated age  HEAD:  Atraumatic, Normocephalic  EYES: EOMI, PERRLA, conjunctiva and sclera clear  NECK: Supple, No JVD  ENMT:+nose bleed  CHEST/LUNG: Clear to auscultation bilaterally  HEART: S1,S2 Regular rate and rhythm  ABDOMEN: Soft, +tenderness RUQ, nondistended, Bowel sounds present and normoactive  EXTREMITIES:  2+ peripheral pulses bilaterally and symmetrically, no clubbing, cyanosis, or edema, B/L LEGS brown discoloration  PSYCH: AAOx3, normal mood and affect  NEUROLOGY: non-focal, muscle strength 5/5 all extremities  SKIN: No rashes or lesions VITALS:  T(F): 100.4 (10-10-18 @ 21:27), Max: 100.4 (10-10-18 @ 21:27)  HR: 107 (10-10-18 @ 21:27) (99 - 121)  BP: 120/76 (10-10-18 @ 21:27) (120/76 - 151/87)  RR: 16 (10-10-18 @ 21:27) (16 - 18)  SpO2: 97% (10-10-18 @ 19:42) (97% - 99%)      PHYSICAL EXAM:  GENERAL: NAD, well-developed, well nourished, looks stated age  HEAD:  Atraumatic, Normocephalic  EYES: EOMI, PERRLA, conjunctiva and mildly scleral icterus.   NECK: Supple, No JVD  ENMT:+nose bleed  CHEST/LUNG: Clear to auscultation bilaterally  HEART: S1,S2 Regular rate and rhythm  ABDOMEN: Soft, + tenderness RUQ, nondistended, Bowel sounds present and normoactive  EXTREMITIES:  2+ peripheral pulses bilaterally and symmetrically, no clubbing, cyanosis, or edema, B/L LEGS brown discoloration  PSYCH: AAOx3, normal mood and affect  NEUROLOGY: non-focal, muscle strength 5/5 all extremities  SKIN: No rashes or lesions VITALS:  T(F): 100.4 (10-10-18 @ 21:27), Max: 100.4 (10-10-18 @ 21:27)  HR: 107 (10-10-18 @ 21:27) (99 - 121)  BP: 120/76 (10-10-18 @ 21:27) (120/76 - 151/87)  RR: 16 (10-10-18 @ 21:27) (16 - 18)  SpO2: 97% (10-10-18 @ 19:42) (97% - 99%)      PHYSICAL EXAM:  GENERAL: NAD, looks stated age  HEAD:  Atraumatic, Normocephalic  EYES: EOMI, PERRLA, conjunctiva and mildly scleral icterus.   NECK: Supple, No JVD  ENMT: Dry blood in nares.   CHEST/LUNG: Clear to auscultation bilaterally  HEART: S1,S2 Regular rate and rhythm  ABDOMEN: Soft, + tenderness RUQ, mildly distended, Bowel sounds present and normoactive  EXTREMITIES:  2+ peripheral pulses bilaterally and symmetrically, no clubbing, cyanosis, or edema.  PSYCH: AAOx3, normal mood and affect  NEUROLOGY: non-focal, muscle strength 5/5 all extremities  SKIN: No rashes or lesions

## 2018-10-11 ENCOUNTER — INPATIENT (INPATIENT)
Facility: HOSPITAL | Age: 43
LOS: 1 days | Discharge: LEFT BEFORE DISCHARGE | End: 2018-10-13
Attending: INTERNAL MEDICINE | Admitting: INTERNAL MEDICINE

## 2018-10-11 ENCOUNTER — TRANSCRIPTION ENCOUNTER (OUTPATIENT)
Age: 43
End: 2018-10-11

## 2018-10-11 VITALS
WEIGHT: 192.02 LBS | DIASTOLIC BLOOD PRESSURE: 80 MMHG | RESPIRATION RATE: 16 BRPM | HEIGHT: 68 IN | TEMPERATURE: 99 F | SYSTOLIC BLOOD PRESSURE: 144 MMHG | HEART RATE: 101 BPM

## 2018-10-11 VITALS
RESPIRATION RATE: 16 BRPM | SYSTOLIC BLOOD PRESSURE: 126 MMHG | DIASTOLIC BLOOD PRESSURE: 81 MMHG | HEART RATE: 102 BPM | TEMPERATURE: 99 F

## 2018-10-11 DIAGNOSIS — Z98.890 OTHER SPECIFIED POSTPROCEDURAL STATES: Chronic | ICD-10-CM

## 2018-10-11 LAB
ALBUMIN SERPL ELPH-MCNC: 3 G/DL — LOW (ref 3.5–5.2)
ALP SERPL-CCNC: 91 U/L — SIGNIFICANT CHANGE UP (ref 30–115)
ALT FLD-CCNC: 17 U/L — SIGNIFICANT CHANGE UP (ref 0–41)
ANION GAP SERPL CALC-SCNC: 15 MMOL/L — HIGH (ref 7–14)
AST SERPL-CCNC: 59 U/L — HIGH (ref 0–41)
BASOPHILS # BLD AUTO: 0.03 K/UL — SIGNIFICANT CHANGE UP (ref 0–0.2)
BASOPHILS NFR BLD AUTO: 0.7 % — SIGNIFICANT CHANGE UP (ref 0–1)
BILIRUB SERPL-MCNC: 3.4 MG/DL — HIGH (ref 0.2–1.2)
BUN SERPL-MCNC: 5 MG/DL — LOW (ref 10–20)
CALCIUM SERPL-MCNC: 8.1 MG/DL — LOW (ref 8.5–10.1)
CHLORIDE SERPL-SCNC: 101 MMOL/L — SIGNIFICANT CHANGE UP (ref 98–110)
CO2 SERPL-SCNC: 21 MMOL/L — SIGNIFICANT CHANGE UP (ref 17–32)
CREAT SERPL-MCNC: 0.5 MG/DL — LOW (ref 0.7–1.5)
EOSINOPHIL # BLD AUTO: 0.05 K/UL — SIGNIFICANT CHANGE UP (ref 0–0.7)
EOSINOPHIL NFR BLD AUTO: 1.2 % — SIGNIFICANT CHANGE UP (ref 0–8)
GLUCOSE SERPL-MCNC: 83 MG/DL — SIGNIFICANT CHANGE UP (ref 70–99)
HCT VFR BLD CALC: 25 % — LOW (ref 42–52)
HGB BLD-MCNC: 8 G/DL — LOW (ref 14–18)
IMM GRANULOCYTES NFR BLD AUTO: 0.2 % — SIGNIFICANT CHANGE UP (ref 0.1–0.3)
LYMPHOCYTES # BLD AUTO: 1.36 K/UL — SIGNIFICANT CHANGE UP (ref 1.2–3.4)
LYMPHOCYTES # BLD AUTO: 33.4 % — SIGNIFICANT CHANGE UP (ref 20.5–51.1)
MAGNESIUM SERPL-MCNC: 1.5 MG/DL — LOW (ref 1.8–2.4)
MCHC RBC-ENTMCNC: 28.7 PG — SIGNIFICANT CHANGE UP (ref 27–31)
MCHC RBC-ENTMCNC: 32 G/DL — SIGNIFICANT CHANGE UP (ref 32–37)
MCV RBC AUTO: 89.6 FL — SIGNIFICANT CHANGE UP (ref 80–94)
MONOCYTES # BLD AUTO: 0.57 K/UL — SIGNIFICANT CHANGE UP (ref 0.1–0.6)
MONOCYTES NFR BLD AUTO: 14 % — HIGH (ref 1.7–9.3)
NEUTROPHILS # BLD AUTO: 2.05 K/UL — SIGNIFICANT CHANGE UP (ref 1.4–6.5)
NEUTROPHILS NFR BLD AUTO: 50.5 % — SIGNIFICANT CHANGE UP (ref 42.2–75.2)
NRBC # BLD: 0 /100 WBCS — SIGNIFICANT CHANGE UP (ref 0–0)
PLATELET # BLD AUTO: 37 K/UL — LOW (ref 130–400)
POTASSIUM SERPL-MCNC: 3.6 MMOL/L — SIGNIFICANT CHANGE UP (ref 3.5–5)
POTASSIUM SERPL-SCNC: 3.6 MMOL/L — SIGNIFICANT CHANGE UP (ref 3.5–5)
PROT SERPL-MCNC: 8.3 G/DL — HIGH (ref 6–8)
RBC # BLD: 2.79 M/UL — LOW (ref 4.7–6.1)
RBC # FLD: 13.6 % — SIGNIFICANT CHANGE UP (ref 11.5–14.5)
SODIUM SERPL-SCNC: 137 MMOL/L — SIGNIFICANT CHANGE UP (ref 135–146)
WBC # BLD: 4.07 K/UL — LOW (ref 4.8–10.8)
WBC # FLD AUTO: 4.07 K/UL — LOW (ref 4.8–10.8)

## 2018-10-11 RX ORDER — ACETAMINOPHEN 500 MG
650 TABLET ORAL EVERY 4 HOURS
Refills: 0 | Status: DISCONTINUED | OUTPATIENT
Start: 2018-10-11 | End: 2018-10-13

## 2018-10-11 RX ORDER — FOLIC ACID 0.8 MG
1 TABLET ORAL DAILY
Refills: 0 | Status: DISCONTINUED | OUTPATIENT
Start: 2018-10-11 | End: 2018-10-13

## 2018-10-11 RX ORDER — THIAMINE MONONITRATE (VIT B1) 100 MG
1 TABLET ORAL
Qty: 0 | Refills: 0 | DISCHARGE
Start: 2018-10-11

## 2018-10-11 RX ORDER — MAGNESIUM OXIDE 400 MG ORAL TABLET 241.3 MG
400 TABLET ORAL
Refills: 0 | Status: COMPLETED | OUTPATIENT
Start: 2018-10-11 | End: 2018-10-13

## 2018-10-11 RX ORDER — FOLIC ACID 0.8 MG
1 TABLET ORAL
Qty: 0 | Refills: 0 | DISCHARGE
Start: 2018-10-11

## 2018-10-11 RX ORDER — IBUPROFEN 200 MG
400 TABLET ORAL EVERY 6 HOURS
Refills: 0 | Status: DISCONTINUED | OUTPATIENT
Start: 2018-10-11 | End: 2018-10-13

## 2018-10-11 RX ORDER — MAGNESIUM HYDROXIDE 400 MG/1
30 TABLET, CHEWABLE ORAL ONCE
Refills: 0 | Status: DISCONTINUED | OUTPATIENT
Start: 2018-10-11 | End: 2018-10-13

## 2018-10-11 RX ORDER — MAGNESIUM SULFATE 500 MG/ML
2 VIAL (ML) INJECTION ONCE
Refills: 0 | Status: COMPLETED | OUTPATIENT
Start: 2018-10-11 | End: 2018-10-11

## 2018-10-11 RX ORDER — PSEUDOEPHEDRINE HCL 30 MG
60 TABLET ORAL EVERY 6 HOURS
Refills: 0 | Status: DISCONTINUED | OUTPATIENT
Start: 2018-10-11 | End: 2018-10-13

## 2018-10-11 RX ORDER — THIAMINE MONONITRATE (VIT B1) 100 MG
100 TABLET ORAL DAILY
Refills: 0 | Status: DISCONTINUED | OUTPATIENT
Start: 2018-10-11 | End: 2018-10-13

## 2018-10-11 RX ORDER — SODIUM CHLORIDE 9 MG/ML
1000 INJECTION INTRAMUSCULAR; INTRAVENOUS; SUBCUTANEOUS
Refills: 0 | Status: COMPLETED | OUTPATIENT
Start: 2018-10-11 | End: 2018-10-11

## 2018-10-11 RX ORDER — HYDROXYZINE HCL 10 MG
100 TABLET ORAL AT BEDTIME
Refills: 0 | Status: DISCONTINUED | OUTPATIENT
Start: 2018-10-11 | End: 2018-10-13

## 2018-10-11 RX ORDER — POTASSIUM CHLORIDE 20 MEQ
40 PACKET (EA) ORAL EVERY 4 HOURS
Refills: 0 | Status: COMPLETED | OUTPATIENT
Start: 2018-10-11 | End: 2018-10-11

## 2018-10-11 RX ORDER — HYDROXYZINE HCL 10 MG
50 TABLET ORAL EVERY 6 HOURS
Refills: 0 | Status: DISCONTINUED | OUTPATIENT
Start: 2018-10-11 | End: 2018-10-13

## 2018-10-11 RX ORDER — GUAIFENESIN/DEXTROMETHORPHAN 600MG-30MG
5 TABLET, EXTENDED RELEASE 12 HR ORAL EVERY 4 HOURS
Refills: 0 | Status: DISCONTINUED | OUTPATIENT
Start: 2018-10-11 | End: 2018-10-13

## 2018-10-11 RX ORDER — MULTIVIT-MIN/FERROUS GLUCONATE 9 MG/15 ML
1 LIQUID (ML) ORAL DAILY
Refills: 0 | Status: DISCONTINUED | OUTPATIENT
Start: 2018-10-11 | End: 2018-10-13

## 2018-10-11 RX ORDER — FERROUS SULFATE 325(65) MG
325 TABLET ORAL DAILY
Refills: 0 | Status: DISCONTINUED | OUTPATIENT
Start: 2018-10-11 | End: 2018-10-13

## 2018-10-11 RX ORDER — METHOCARBAMOL 500 MG/1
500 TABLET, FILM COATED ORAL EVERY 6 HOURS
Refills: 0 | Status: DISCONTINUED | OUTPATIENT
Start: 2018-10-11 | End: 2018-10-13

## 2018-10-11 RX ADMIN — INFLUENZA VIRUS VACCINE 0.5 MILLILITER(S): 15; 15; 15; 15 SUSPENSION INTRAMUSCULAR at 11:20

## 2018-10-11 RX ADMIN — Medication 40 MILLIEQUIVALENT(S): at 14:42

## 2018-10-11 RX ADMIN — Medication 1 MILLIGRAM(S): at 11:22

## 2018-10-11 RX ADMIN — Medication 100 MILLIGRAM(S): at 11:22

## 2018-10-11 RX ADMIN — Medication 1 TABLET(S): at 11:19

## 2018-10-11 RX ADMIN — SODIUM CHLORIDE 100 MILLILITER(S): 9 INJECTION INTRAMUSCULAR; INTRAVENOUS; SUBCUTANEOUS at 14:42

## 2018-10-11 RX ADMIN — Medication 50 GRAM(S): at 14:42

## 2018-10-11 RX ADMIN — Medication 2 MILLIGRAM(S): at 05:14

## 2018-10-11 RX ADMIN — Medication 40 MILLIEQUIVALENT(S): at 16:38

## 2018-10-11 RX ADMIN — Medication 1.5 MILLIGRAM(S): at 21:16

## 2018-10-11 RX ADMIN — SODIUM CHLORIDE 100 MILLILITER(S): 9 INJECTION INTRAMUSCULAR; INTRAVENOUS; SUBCUTANEOUS at 11:18

## 2018-10-11 RX ADMIN — PANTOPRAZOLE SODIUM 40 MILLIGRAM(S): 20 TABLET, DELAYED RELEASE ORAL at 06:17

## 2018-10-11 RX ADMIN — Medication 325 MILLIGRAM(S): at 11:19

## 2018-10-11 RX ADMIN — Medication 1.5 MILLIGRAM(S): at 16:39

## 2018-10-11 RX ADMIN — Medication 2 MILLIGRAM(S): at 11:18

## 2018-10-11 NOTE — H&P ADULT - NSHPREVIEWOFSYSTEMS_GEN_ALL_CORE
CONSTITUTIONAL: No fever, weight loss, or fatigue  EYES: No eye pain, visual disturbances, or discharge  ENMT:  No difficulty hearing, tinnitus, vertigo; No sinus or throat pain, recurrent nose bleeds.  NECK: No pain or stiffness  BREASTS: No pain, masses, or nipple discharge  RESPIRATORY: No cough, wheezing, chills or hemoptysis; No shortness of breath  CARDIOVASCULAR: No chest pain, palpitations, dizziness, or leg swelling  GASTROINTESTINAL: No abdominal or epigastric pain. No nausea, vomiting, or hematemesis; No diarrhea or constipation. No melena or hematochezia.  GENITOURINARY: No dysuria, frequency, hematuria, or incontinence  NEUROLOGICAL: No headaches, memory loss, loss of strength, numbness, + tremors  SKIN: No itching, burning, rashes, or lesions   LYMPH NODES: No enlarged glands  ENDOCRINE: No heat or cold intolerance; No hair loss  MUSCULOSKELETAL: No joint pain or swelling; No muscle, back, or extremity pain  PSYCHIATRIC: No depression, anxiety, mood swings, or difficulty sleeping  HEME/LYMPH: No easy bruising, or bleeding gums  ALLERGY AND IMMUNOLOGIC: No hives or eczema

## 2018-10-11 NOTE — DISCHARGE NOTE ADULT - CARE PLAN
Principal Discharge DX:	Alcohol withdrawal syndrome without complication  Goal:	transfer to detox  Assessment and plan of treatment:	transfer to detox

## 2018-10-11 NOTE — H&P ADULT - NSHPPHYSICALEXAM_GEN_ALL_CORE
Vital Signs Last 24 Hrs  T(C): 37.2 (11 Oct 2018 14:10), Max: 38 (10 Oct 2018 21:27)  T(F): 99 (11 Oct 2018 14:10), Max: 100.4 (10 Oct 2018 21:27)  HR: 102 (11 Oct 2018 14:10) (99 - 107)  BP: 126/81 (11 Oct 2018 14:10) (120/76 - 135/95)  BP(mean): --  RR: 16 (11 Oct 2018 14:10) (16 - 18)  SpO2: 97% (10 Oct 2018 19:42) (97% - 97%)    GENERAL: NAD, looks stated age  HEAD:  Atraumatic, Normocephalic  EYES: EOMI, PERRLA, conjunctiva and mildly scleral icterus.   NECK: Supple, No JVD  ENMT: Dry blood in nares.   CHEST/LUNG: Clear to auscultation bilaterally  HEART: S1,S2 Regular rate and rhythm  ABDOMEN: Soft, + tenderness RUQ, mildly distended, Bowel sounds present and normoactive  EXTREMITIES:  2+ peripheral pulses bilaterally and symmetrically, no clubbing, cyanosis, or edema.  PSYCH: AAOx3, normal mood and affect  NEUROLOGY: non-focal, muscle strength 5/5 all extremities  SKIN: No rashes or lesions

## 2018-10-11 NOTE — H&P ADULT - NSHPLABSRESULTS_GEN_ALL_CORE
10-11    137  |  101  |  5<L>  ----------------------------<  83  3.6   |  21  |  0.5<L>    Ca    8.1<L>      11 Oct 2018 06:24  Mg     1.5     10-11    TPro  8.3<H>  /  Alb  3.0<L>  /  TBili  3.4<H>  /  DBili  x   /  AST  59<H>  /  ALT  17  /  AlkPhos  91  10-11                            8.0    4.07  )-----------( 37       ( 11 Oct 2018 06:24 )             25.0

## 2018-10-11 NOTE — H&P ADULT - ATTENDING COMMENTS
Patient interviewed and examined.    Chart reviewed.    PA's H&P noted and modified, as appropriate.    Case discussed on team rounds    Following is my summary of the case.    Admitted for detox: from ____ED, ___Intake, __x__Med/Surg Floor    Alcohol__x__   Opioid_____  Benzo___ Other_____    Substance amount, duration of use, last usage, and prior attempts at detox or rehabs, are outlined above in the H&P and discussed with patient.    Associated withdrawal symptoms presents.  Comorbid conditions noted. Chronic and Stable.    Past Medical Hx, Psych Hx, family Hx, Social Hx from H&P reviewed and NO changes.    Old medical record and medication Hx. Reviewed    Following items reviewed and addressed:  1. labs  2. EKG  3. Imaging from PACs module    Examination: no change from PA's exam.    Place on following protocol  _____Medically Managed  __X__Medically Supervised    Ciwa_____Librium taper____Ativan taper_x__Methadone taper___ Phenobarb taper____ Suboxone Induction____MMTP____    Narcan Kit Offered    Psych Consult __X__N/A  ___Ordered    Physical Therapy  ___X N/A   ___  Ordered    Aftercare disposition to be addressed by counselors.    Estimated length of stay 3-5 days.

## 2018-10-11 NOTE — H&P ADULT - ASSESSMENT
DETOX ADMISSION FOR ETOH DEPENDENCY     -was on ativan protocol , will switch to librium ciwa   - detox prn   - monitor vitals DETOX ADMISSION FOR ETOH DEPENDENCY   Pancytopenia     -was on ativan protocol , will switch to librium ciwa   - detox prn   - monitor vitals

## 2018-10-11 NOTE — DISCHARGE NOTE ADULT - PROVIDER TOKENS
FREE:[LAST:[[facility]],FIRST:[[facility]],PHONE:[phone]],FAX:[(   )    -]]

## 2018-10-11 NOTE — CONSULT NOTE ADULT - SUBJECTIVE AND OBJECTIVE BOX
DETOX CONSULT    Pt inititially went to intake for detox admission but developed epistaxis so sent to ED  was in w/d so placed in medicine  nosebleeds stopped on own.  h/o liver cirrhosis, low platelets, anemia    Pt interviewed, examined and EMR chart reviewed.  Hx of ETOH abuse, has been drinking for _____ years/months  variable periods of sobriety in the past.  Has been in detox before _____yes,   _____No    SOCIAL HISTORY: ETOH    REVIEW OF SYSTEMS:    Constitutional: No fever, weight loss or fatigue  ENT:  No difficulty hearing, tinnitus, vertigo; No sinus or throat pain  Neck: No pain or stiffness  Respiratory: No cough, wheezing, chills or hemoptysis  Cardiovascular: No chest pain, palpitations, shortness of breath, dizziness or leg swelling  Gastrointestinal: No abdominal or epigastric pain. No nausea, vomiting or hematemesis; No diarrhea or constipation. No melena or hematochezia.  Neurological: No headaches, memory loss, loss of strength, numbness or tremors  Musculoskeletal: No joint pain or swelling; No muscle, back or extremity pain  Psychiatric: No depression, anxiety, mood swings or difficulty sleeping    MEDICATIONS  (STANDING):  ferrous    sulfate 325 milliGRAM(s) Oral daily  folic acid 1 milliGRAM(s) Oral daily  influenza   Vaccine 0.5 milliLiter(s) IntraMuscular once  LORazepam     Tablet   Oral   LORazepam     Tablet 2 milliGRAM(s) Oral every 6 hours  LORazepam     Tablet 1.5 milliGRAM(s) Oral every 6 hours  multivitamin 1 Tablet(s) Oral daily  multivitamin/minerals 1 Tablet(s) Oral daily  pantoprazole    Tablet 40 milliGRAM(s) Oral before breakfast  sodium chloride 0.9%. 1000 milliLiter(s) (100 mL/Hr) IV Continuous <Continuous>  thiamine 100 milliGRAM(s) Oral daily    MEDICATIONS  (PRN):  aluminum hydroxide/magnesium hydroxide/simethicone Suspension 30 milliLiter(s) Oral every 6 hours PRN Heartburn  bismuth subsalicylate Liquid 30 milliLiter(s) Oral every 6 hours PRN Diarrhea  guaiFENesin/dextromethorphan  Syrup 5 milliLiter(s) Oral every 4 hours PRN Cough  hydrOXYzine hydrochloride 50 milliGRAM(s) Oral every 6 hours PRN Anxiety  hydrOXYzine hydrochloride 100 milliGRAM(s) Oral at bedtime PRN insomnia  trimethobenzamide 300 milliGRAM(s) Oral every 6 hours PRN Nausea and/or Vomiting      Vital Signs Last 24 Hrs  T(C): 37.4 (11 Oct 2018 06:07), Max: 38 (10 Oct 2018 21:27)  T(F): 99.4 (11 Oct 2018 06:07), Max: 100.4 (10 Oct 2018 21:27)  HR: 104 (11 Oct 2018 06:07) (99 - 121)  BP: 125/63 (11 Oct 2018 06:07) (120/76 - 151/87)  BP(mean): --  RR: 16 (10 Oct 2018 21:27) (16 - 18)  SpO2: 97% (10 Oct 2018 19:42) (97% - 99%)    PHYSICAL EXAM:    Constitutional: NAD, well-groomed, well-developed  HEENT: PERRLA, EOMI, Normal Hearing, MMM  Neck: No LAD, No JVD  Back: Normal spine flexure, No CVA tenderness  Respiratory: CTAB/L  Cardiovascular: S1 and S2, RRR, no M/G/R  Gastrointestinal: BS+, soft, NT/ND  Extremities: No peripheral edema  Vascular: 2+ peripheral pulses  Neurological: A/O x 3, no focal deficits    LABS:                        8.0    4.07  )-----------( 37       ( 11 Oct 2018 06:24 )             25.0     10-11    137  |  101  |  5<L>  ----------------------------<  83  3.6   |  21  |  0.5<L>    Ca    8.1<L>      11 Oct 2018 06:24  Mg     1.5     10-11    TPro  8.3<H>  /  Alb  3.0<L>  /  TBili  3.4<H>  /  DBili  x   /  AST  59<H>  /  ALT  17  /  AlkPhos  91  10-11        Drug Screen Urine:  Alcohol Level  Alcohol, Blood: 41 mg/dL (10-10-18 @ 15:15)        RADIOLOGY & ADDITIONAL STUDIES:  reviewed in PACS

## 2018-10-11 NOTE — CONSULT NOTE ADULT - ASSESSMENT
ETOH dependency          Counselling done  continue ativan taper protocol  thiamine supplements  Interested in inpt detox.  May transfer to detox unit CDU, if cleared medically and if beds available in CDU

## 2018-10-11 NOTE — DISCHARGE NOTE ADULT - HOSPITAL COURSE
43y old male with history of Alcohol dependence (drinks 7-8 (40oz)beer/day x 45yrs. Last drink 2 days ago), Liver cirrhosis with thrombocytopenia and Anemia admitted from central intake with epistaxis. He had presented there requesting Detox but was noticed to have epistaxis thus was sent to the ED for further evaluation. In the ED no intervention was necessary as it resolved spontaneously but he was noted to be in Alcohol withdrawal thus admitted to medicine for stabilization prior to transfer the Detox. Patient mentions having recurrent nosebleeds with no reprecipitating factors. He had no other complaints. During the admission pt was started on IVF, thiamine, folate and Ativan protocol. Labs showed thrombocytopenia 37. Hgb Stable >7, hypomagnesemia and hypokalemia. LFTs mild elevated with elevated INR.  Pt was seen and examined at day of transfer. Pt tachycardic on vitals 2/2 to withdrawal , no hallucinations or tremors.  Pt was started on maintenance IVF.  Addiction MD saw pt during visit and cleared for Detox transfer once medically cleared. No repeat epistaxis during inpt. Pt currently tolerating PO, no nausea or vomiting  Pt medically cleared for Detox.     Assessment and Plan:    1. Alcohol dependence:-Detox protocol, thiamine folate MV      2. Epistaxis:- no further bleeding. Monitor,  Due to Thrombocytopenia/elevated INR. Resolved without nasal packing or cauterization.      3. Alcoholic Liver cirrhosis: with coagulopathy.  Prior Hepatitis work up: Hepatitis A IGG Ab + but Hep B, C are neg  Will need outpatient vaccination for Hep B.  Follow up with GI out patient for EGD and monitoring.    4. Multiple small nodularities on liver seen on CT A/P:  MRI w/ gadolinium: probably dysplastic nodule from cirrhosis  Alpha Fetoprotein - Tumor Marker 6 (07.15.18 @ 09:33)  Patient failed to follow up with GI outpatient.   Repeat U/S and AFP in 6 months recommended.    5. Normocytic anemia:   Hgb stable. Started on Iron supplementation  Follow up repeat work up as outpt.    6. Thrombocytopenia- 2/2 to liver cirrhosis and etoh- repeat CBC in am for monitoring continued resolution.     7. Sinus tachycardia- tachycardic on exam- likely 2/2 etoh withdrawl-No sob or chest pain, low wells criteria, no signs of DVT- given IVF- continue to encourage PO- monitor.

## 2018-10-11 NOTE — DISCHARGE NOTE ADULT - MEDICATION SUMMARY - MEDICATIONS TO TAKE
I will START or STAY ON the medications listed below when I get home from the hospital:    FeroSul 325 mg (65 mg elemental iron) oral tablet  -- 1 tab(s) by mouth once a day   -- Indication: For Anemia    pantoprazole 40 mg oral delayed release tablet  -- 1 tab(s) by mouth once a day (before a meal)  -- Indication: For gerd    Multiple Vitamins oral tablet  -- 1 tab(s) by mouth once a day  -- Indication: For Alcohol withdrawal    folic acid 1 mg oral tablet  -- 1 tab(s) by mouth once a day  -- Indication: For Alcohol withdrawal    thiamine 100 mg oral tablet  -- 1 tab(s) by mouth once a day  -- Indication: For Alcohol withdrawal    Vitamin D3 50,000 intl units oral capsule  -- 1 cap(s) by mouth every 2 weeks   -- It is very important that you take or use this exactly as directed.  Do not skip doses or discontinue unless directed by your doctor.    -- Indication: For Low vit d home med

## 2018-10-11 NOTE — DISCHARGE NOTE ADULT - PATIENT PORTAL LINK FT
You can access the [facility], offered by [facility], by registering with the following website: [email or web address]

## 2018-10-11 NOTE — PROGRESS NOTE ADULT - SUBJECTIVE AND OBJECTIVE BOX
Pt seen and examined at bedside. No cp or sob. No hallucinations or tremors    PAST MEDICAL & SURGICAL HISTORY:  Liver cirrhosis, alcoholic  Anemia  Thrombocytopenia  Nosebleed  ETOH abuse  History of incision and drainage: Gluteal abscess      VITAL SIGNS (Last 24 hrs):  T(C): 37.2 (10-11-18 @ 14:10), Max: 38 (10-10-18 @ 21:27)  HR: 102 (10-11-18 @ 14:10) (99 - 110)  BP: 126/81 (10-11-18 @ 14:10) (120/76 - 150/78)  RR: 16 (10-11-18 @ 14:10) (16 - 18)  SpO2: 97% (10-10-18 @ 19:42) (97% - 98%)  Wt(kg): --  Daily Height in cm: 170.18 (10 Oct 2018 21:27)    Daily Weight in k.7 (10 Oct 2018 21:27)    I&O's Summary      PHYSICAL EXAM:  GENERAL: NAD, deshevelied  HEAD:  Atraumatic, Normocephalic  EYES: EOMI, PERRLA, conjunctiva and sclera clear  NECK: Supple, No JVD  CHEST/LUNG: Clear to auscultation bilaterally; No wheeze  HEART: tachycardic; No murmurs, rubs, or gallops  ABDOMEN: Soft, Nontender,  mild distended; Bowel sounds present  EXTREMITIES:  2+ Peripheral Pulses, No clubbing, cyanosis, or edema  PSYCH: AAOx3  NEUROLOGY: non-focal  SKIN: No rashes or lesions    Labs Reviewed  Spoke to patient in regards to abnormal labs.    CBC Full  -  ( 11 Oct 2018 06:24 )  WBC Count : 4.07 K/uL  Hemoglobin : 8.0 g/dL  Hematocrit : 25.0 %  Platelet Count - Automated : 37 K/uL  Mean Cell Volume : 89.6 fL  Mean Cell Hemoglobin : 28.7 pg  Mean Cell Hemoglobin Concentration : 32.0 g/dL  Auto Neutrophil # : 2.05 K/uL  Auto Lymphocyte # : 1.36 K/uL  Auto Monocyte # : 0.57 K/uL  Auto Eosinophil # : 0.05 K/uL  Auto Basophil # : 0.03 K/uL  Auto Neutrophil % : 50.5 %  Auto Lymphocyte % : 33.4 %  Auto Monocyte % : 14.0 %  Auto Eosinophil % : 1.2 %  Auto Basophil % : 0.7 %    BMP:    10-11 @ 06:24    Blood Urea Nitrogen - 5  Calcium - 8.1  Carbond Dioxide - 21  Chloride - 101  Creatinine - 0.5  Glucose - 83  Potassium - 3.6  Sodium - 137      Hemoglobin A1c -     Urine Culture:    MEDICATIONS  (STANDING):  ferrous    sulfate 325 milliGRAM(s) Oral daily  folic acid 1 milliGRAM(s) Oral daily  LORazepam     Tablet   Oral   LORazepam     Tablet 1.5 milliGRAM(s) Oral every 6 hours  multivitamin 1 Tablet(s) Oral daily  multivitamin/minerals 1 Tablet(s) Oral daily  pantoprazole    Tablet 40 milliGRAM(s) Oral before breakfast  potassium chloride    Tablet ER 40 milliEquivalent(s) Oral every 4 hours  thiamine 100 milliGRAM(s) Oral daily    MEDICATIONS  (PRN):  aluminum hydroxide/magnesium hydroxide/simethicone Suspension 30 milliLiter(s) Oral every 6 hours PRN Heartburn  bismuth subsalicylate Liquid 30 milliLiter(s) Oral every 6 hours PRN Diarrhea  guaiFENesin/dextromethorphan  Syrup 5 milliLiter(s) Oral every 4 hours PRN Cough  hydrOXYzine hydrochloride 50 milliGRAM(s) Oral every 6 hours PRN Anxiety  hydrOXYzine hydrochloride 100 milliGRAM(s) Oral at bedtime PRN insomnia  trimethobenzamide 300 milliGRAM(s) Oral every 6 hours PRN Nausea and/or Vomiting

## 2018-10-12 LAB
FOLATE SERPL-MCNC: 5.2 NG/ML — SIGNIFICANT CHANGE UP
HAV IGM SER-ACNC: SIGNIFICANT CHANGE UP
HBV CORE IGM SER-ACNC: SIGNIFICANT CHANGE UP
HBV SURFACE AG SER-ACNC: SIGNIFICANT CHANGE UP
HCV AB S/CO SERPL IA: 0.15 S/CO — SIGNIFICANT CHANGE UP
HCV AB SERPL-IMP: SIGNIFICANT CHANGE UP
TSH SERPL-MCNC: 3.12 UIU/ML — SIGNIFICANT CHANGE UP (ref 0.27–4.2)
VIT B12 SERPL-MCNC: 1374 PG/ML — HIGH (ref 232–1245)

## 2018-10-12 RX ORDER — TUBERCULIN PURIFIED PROTEIN DERIVATIVE 5 [IU]/.1ML
5 INJECTION, SOLUTION INTRADERMAL ONCE
Refills: 0 | Status: COMPLETED | OUTPATIENT
Start: 2018-10-12 | End: 2018-10-12

## 2018-10-12 RX ADMIN — Medication 1 MILLIGRAM(S): at 21:03

## 2018-10-12 RX ADMIN — Medication 1.5 MILLIGRAM(S): at 09:23

## 2018-10-12 RX ADMIN — TUBERCULIN PURIFIED PROTEIN DERIVATIVE 5 UNIT(S): 5 INJECTION, SOLUTION INTRADERMAL at 10:29

## 2018-10-12 RX ADMIN — Medication 1.5 MILLIGRAM(S): at 01:36

## 2018-10-12 RX ADMIN — Medication 1 MILLIGRAM(S): at 18:20

## 2018-10-12 RX ADMIN — Medication 100 MILLIGRAM(S): at 09:23

## 2018-10-12 RX ADMIN — Medication 1.5 MILLIGRAM(S): at 05:58

## 2018-10-12 RX ADMIN — Medication 325 MILLIGRAM(S): at 09:24

## 2018-10-12 RX ADMIN — MAGNESIUM OXIDE 400 MG ORAL TABLET 400 MILLIGRAM(S): 241.3 TABLET ORAL at 09:26

## 2018-10-12 RX ADMIN — Medication 1 MILLIGRAM(S): at 09:24

## 2018-10-12 RX ADMIN — MAGNESIUM OXIDE 400 MG ORAL TABLET 400 MILLIGRAM(S): 241.3 TABLET ORAL at 18:20

## 2018-10-12 RX ADMIN — MAGNESIUM OXIDE 400 MG ORAL TABLET 400 MILLIGRAM(S): 241.3 TABLET ORAL at 13:05

## 2018-10-12 RX ADMIN — Medication 1 MILLIGRAM(S): at 13:05

## 2018-10-12 RX ADMIN — Medication 1 TABLET(S): at 09:24

## 2018-10-12 NOTE — CHART NOTE - NSCHARTNOTEFT_GEN_A_CORE
Subsequent Inpatient Encounter                                       Detox Unit    DESIRE SWIFT   43y   Male      Chief Complaint:    Follow up for Alcohol  Dependency    HPI:     I reviewed previous notes. No Change, except if noted below.             Detail:_    ROS:   I reviewed with patient.  No changes from previous notes except if noted below.             Detail: _    PFSH I reviewed with patient. No changes from previous notes except if noted below.             Detail_    Medication reconciliation performed.    MEDICATIONS  (STANDING):  ferrous    sulfate 325 milliGRAM(s) Oral daily  folic acid 1 milliGRAM(s) Oral daily  LORazepam     Tablet 1 milliGRAM(s) Oral every 4 hours  magnesium oxide 400 milliGRAM(s) Oral three times a day with meals  multivitamin/minerals 1 Tablet(s) Oral daily  PPD  5 Tuberculin Unit(s) Injectable 5 Unit(s) IntraDermal once  thiamine 100 milliGRAM(s) Oral daily      MEDICATIONS  (PRN):  acetaminophen   Tablet .. 650 milliGRAM(s) Oral every 4 hours PRN Temp greater or equal to 38C (100.4F), Moderate Pain (4 - 6)  aluminum hydroxide/magnesium hydroxide/simethicone Suspension 30 milliLiter(s) Oral every 6 hours PRN Heartburn  bismuth subsalicylate Liquid 30 milliLiter(s) Oral every 6 hours PRN Diarrhea  cloNIDine 0.1 milliGRAM(s) Oral every 8 hours PRN Blood Pressure GREATER THAN 140/90 mmHG  guaiFENesin/dextromethorphan  Syrup 5 milliLiter(s) Oral every 4 hours PRN Cough  hydrOXYzine hydrochloride 50 milliGRAM(s) Oral every 6 hours PRN Anxiety  hydrOXYzine hydrochloride 100 milliGRAM(s) Oral at bedtime PRN insomnia  ibuprofen  Tablet. 400 milliGRAM(s) Oral every 6 hours PRN Mild Pain (1 - 3)  magnesium hydroxide Suspension 30 milliLiter(s) Oral once PRN Constipation  methocarbamol 500 milliGRAM(s) Oral every 6 hours PRN muscle pain  pseudoephedrine 60 milliGRAM(s) Oral every 6 hours PRN Rhinitis  trimethobenzamide 300 milliGRAM(s) Oral every 6 hours PRN Nausea and/or Vomiting  trimethobenzamide Injectable 200 milliGRAM(s) IntraMuscular every 6 hours PRN Nausea and/or Vomiting      T(C): 37.1 (10-12-18 @ 06:00), Max: 37.2 (10-11-18 @ 14:10)  HR: 94 (10-12-18 @ 06:00) (94 - 102)  BP: 121/71 (10-12-18 @ 06:00) (121/71 - 144/80)  RR: 16 (10-12-18 @ 06:00) (16 - 16)  SpO2: --    PHYSICAL EXAM:      Constitutional: NAD, A&O x3    Eyes: PERRLA, no conjuctivitis    Neck: no lymphadenopathy    Respiratory: +air entry, no rales, no rhonchi, no wheezes    Cardiovascular: +S1 and S2, regular rate and rhythm    Gastrointestinal: +BS, soft, non-tender, not distended    Extremities:  no edema, no calf tenderness    Skin: no rashes, normal turgor                            8.0    4.07  )-----------( 37       ( 11 Oct 2018 06:24 )             25.0   10-11    137  |  101  |  5<L>  ----------------------------<  83  3.6   |  21  |  0.5<L>    Ca    8.1<L>      11 Oct 2018 06:24  Mg     1.5     10-11    TPro  8.3<H>  /  Alb  3.0<L>  /  TBili  3.4<H>  /  DBili  x   /  AST  59<H>  /  ALT  17  /  AlkPhos  91  10-11    Magnesium, Serum: 1.5 mg/dL (10-11-18 @ 06:24)  Hepatitis B Surface Antigen: Nonreact (10-10-18 @ 15:15)  Hepatitis C Virus S/CO Ratio: 0.15 S/CO (10-10-18 @ 15:15)    Hepatitis C Virus Interpretation: Nonreact (10-10-18 @ 15:15)      Impression and Plan:    Primary Diagnosis:  Alcohol Dependency                                Medication: ATivan Protocol        Continue Detox Protocols. Use of PRNS as needed for withdrawal and comfort.    Adjustments to protocols: None     Labs/ Tests reviewed.    Tests ordered: PPD Placement    Likely Disposition: ___Home       ___Rehab       ___Outpatient Program    ___Self Help     _____Other    Estimated Length of stay:____2 days

## 2018-10-13 VITALS
HEART RATE: 98 BPM | DIASTOLIC BLOOD PRESSURE: 81 MMHG | RESPIRATION RATE: 16 BRPM | SYSTOLIC BLOOD PRESSURE: 131 MMHG | TEMPERATURE: 98 F

## 2018-10-13 RX ADMIN — MAGNESIUM OXIDE 400 MG ORAL TABLET 400 MILLIGRAM(S): 241.3 TABLET ORAL at 08:32

## 2018-10-13 RX ADMIN — Medication 1 MILLIGRAM(S): at 08:33

## 2018-10-13 RX ADMIN — Medication 0.5 MILLIGRAM(S): at 09:32

## 2018-10-13 RX ADMIN — Medication 325 MILLIGRAM(S): at 08:33

## 2018-10-13 RX ADMIN — MAGNESIUM OXIDE 400 MG ORAL TABLET 400 MILLIGRAM(S): 241.3 TABLET ORAL at 11:03

## 2018-10-13 RX ADMIN — Medication 0.5 MILLIGRAM(S): at 13:09

## 2018-10-13 RX ADMIN — Medication 0.5 MILLIGRAM(S): at 02:34

## 2018-10-13 RX ADMIN — Medication 1 TABLET(S): at 08:33

## 2018-10-13 RX ADMIN — Medication 0.5 MILLIGRAM(S): at 05:13

## 2018-10-13 RX ADMIN — Medication 100 MILLIGRAM(S): at 08:32

## 2018-10-13 RX ADMIN — MAGNESIUM OXIDE 400 MG ORAL TABLET 400 MILLIGRAM(S): 241.3 TABLET ORAL at 15:43

## 2018-10-13 NOTE — CHART NOTE - NSCHARTNOTEFT_GEN_A_CORE
Subsequent Inpatient Encounter                                       Detox Unit    DESIRE SWIFT   43y   Male      Chief Complaint:    Follow up for Alcohol  Dependency    HPI:     I reviewed previous notes. No Change, except if noted below.             Detail:_    ROS:   I reviewed with patient.  No changes from previous notes except if noted below.             Detail: _    PFSH I reviewed with patient. No changes from previous notes except if noted below.             Detail_    Medication reconciliation performed.    MEDICATIONS  (STANDING):  ferrous    sulfate 325 milliGRAM(s) Oral daily  folic acid 1 milliGRAM(s) Oral daily  LORazepam     Tablet 0.5 milliGRAM(s) Oral every 4 hours  magnesium oxide 400 milliGRAM(s) Oral three times a day with meals  multivitamin/minerals 1 Tablet(s) Oral daily  thiamine 100 milliGRAM(s) Oral daily      MEDICATIONS  (PRN):  acetaminophen   Tablet .. 650 milliGRAM(s) Oral every 4 hours PRN Temp greater or equal to 38C (100.4F), Moderate Pain (4 - 6)  aluminum hydroxide/magnesium hydroxide/simethicone Suspension 30 milliLiter(s) Oral every 6 hours PRN Heartburn  bismuth subsalicylate Liquid 30 milliLiter(s) Oral every 6 hours PRN Diarrhea  cloNIDine 0.1 milliGRAM(s) Oral every 8 hours PRN Blood Pressure GREATER THAN 140/90 mmHG  guaiFENesin/dextromethorphan  Syrup 5 milliLiter(s) Oral every 4 hours PRN Cough  hydrOXYzine hydrochloride 50 milliGRAM(s) Oral every 6 hours PRN Anxiety  hydrOXYzine hydrochloride 100 milliGRAM(s) Oral at bedtime PRN insomnia  ibuprofen  Tablet. 400 milliGRAM(s) Oral every 6 hours PRN Mild Pain (1 - 3)  magnesium hydroxide Suspension 30 milliLiter(s) Oral once PRN Constipation  methocarbamol 500 milliGRAM(s) Oral every 6 hours PRN muscle pain  pseudoephedrine 60 milliGRAM(s) Oral every 6 hours PRN Rhinitis  trimethobenzamide 300 milliGRAM(s) Oral every 6 hours PRN Nausea and/or Vomiting  trimethobenzamide Injectable 200 milliGRAM(s) IntraMuscular every 6 hours PRN Nausea and/or Vomiting      T(C): 37.3 (10-13-18 @ 06:00), Max: 37.4 (10-13-18 @ 00:00)  HR: 94 (10-13-18 @ 06:00) (94 - 102)  BP: 124/76 (10-13-18 @ 06:00) (102/56 - 131/67)  RR: 16 (10-13-18 @ 06:00) (16 - 16)  SpO2: --    PHYSICAL EXAM:      Constitutional: NAD, A&O x3    Eyes: PERRLA, no conjuctivitis    Neck: no lymphadenopathy    Respiratory: +air entry, no rales, no rhonchi, no wheezes    Cardiovascular: +S1 and S2, regular rate and rhythm    Gastrointestinal: +BS, soft, non-tender, not distended    Extremities:  no edema, no calf tenderness    Skin: no rashes, normal turgor    Magnesium, Serum: 1.5 mg/dL (10-11-18 @ 06:24)  Hepatitis B Surface Antigen: Nonreact (10-10-18 @ 15:15)  Hepatitis C Virus S/CO Ratio: 0.15 S/CO (10-10-18 @ 15:15)    Hepatitis C Virus Interpretation: Nonreact (10-10-18 @ 15:15)      Impression and Plan:    Primary Diagnosis:  Alcohol Dependency                                Medication: Ativan Protocol    Secondary Diagnosis:     Hypomagnesmia                              Medication: Magnesium PO        Continue Detox Protocols. Use of PRNS as needed for withdrawal and comfort.    Adjustments to protocols: Intent to be discharged in am if medically stable.     Labs/ Tests reviewed.    Tests ordered: None    Likely Disposition: ___Home       ___Rehab       ___Outpatient Program    ___Self Help     _____Other    Estimated Length of stay:____1 day

## 2018-10-13 NOTE — CHART NOTE - NSCHARTNOTEFT_GEN_A_CORE
Allergies:  No Known Allergies      Diet: Regular    Activity: As Tolerated    Follow up with    1. PMD in 2 weeks        Follow up for abnormal labs/tests    1.    Extra Instructions:      Flu Vaccine given  Yes_____         No______      Diagnosis:  Chemical Dependency   Maintain sobriety  refrain from all use      Patient Signature___________________________________________  Date_________________      Nurse Signature_____________________________________________Date_________________

## 2018-10-14 NOTE — CHART NOTE - NSCHARTNOTEFT_GEN_A_CORE
The patient was admitted to the inpt detox unit CDU, for   ETOH_x___ Opioid___  Benzo____Polysubstance _____ Dependency.    Pt was admitted from ED____, Intake____, Med/surg Floor__x_____.    Details are present in the preceding History & Physical section and follow up chart notes.  patient was evaluated on daily detox team  rounds.  Withdrawal symptoms and signs were reviewed on a daily basis, and the protocols were adjusted accordingly.    Labs and imaging results were reviewed and discussed with the patient.    All questions from the patient were addressed.  The patient was seen by the Chemical dependency counselors, and different options for after care were discussed.  The patient attended groups, meetings and 1:1 sessions with the counselors.  Narcane Kit was offered and instructions given prior to discharge.    Psychiatry consultation reviewed______, N/A__x____    Physical therapy evaluation reviewed_____, N/A_x___    Pt was given copies of labs and imaging reports, if applicable.    Prescriptions if needed, were sent through [facility] system to the pharmacy amnd are noted in the discharge instruction sheet.    After care was arranged by counselors and pt was discharged to:    Home___, Outpt. Program___, Rehab ___, Long term____ [facility] ____ IPP____ SNF____, AMA_x__, Admin Discharge____    Principal Diagnosis: Alcohol Dependency__x__ Opioid Dependency___ Benzo Dependency____ Polysubstance Dependency____

## 2018-10-15 ENCOUNTER — EMERGENCY (EMERGENCY)
Facility: HOSPITAL | Age: 43
LOS: 0 days | Discharge: HOME | End: 2018-10-15
Attending: EMERGENCY MEDICINE | Admitting: EMERGENCY MEDICINE

## 2018-10-15 VITALS
DIASTOLIC BLOOD PRESSURE: 59 MMHG | HEIGHT: 67 IN | WEIGHT: 190.04 LBS | OXYGEN SATURATION: 98 % | SYSTOLIC BLOOD PRESSURE: 111 MMHG | HEART RATE: 105 BPM | RESPIRATION RATE: 18 BRPM | TEMPERATURE: 98 F

## 2018-10-15 DIAGNOSIS — F10.10 ALCOHOL ABUSE, UNCOMPLICATED: ICD-10-CM

## 2018-10-15 DIAGNOSIS — Z98.890 OTHER SPECIFIED POSTPROCEDURAL STATES: Chronic | ICD-10-CM

## 2018-10-15 NOTE — ED ADULT NURSE NOTE - NSIMPLEMENTINTERV_GEN_ALL_ED
Implemented All Universal Safety Interventions:  [place] to call system. Call bell, personal items and telephone within reach. Instruct patient to call for assistance. Room bathroom lighting operational. Non-slip footwear when patient is off stretcher. Physically safe environment: no spills, clutter or unnecessary equipment. Stretcher in lowest position, wheels locked, appropriate side rails in place.

## 2018-10-15 NOTE — ED PROVIDER NOTE - NS ED ROS FT
Constitutional:  no fevers, no chills, no malaise  Eyes:  No visual changes  ENMT: No neck pain or stiffness, no nasal congestion, no ear pain, no throat pain  Cardiac:  No chest pain  Respiratory:  No cough or sob  GI:  No nausea, vomiting, diarrhea or abdominal pain.  :  No dysuria, frequency or burning.  MS:  No back pain, no joint pain.  Neuro:  No headache, no dizziness, no change in mental status  Skin:  No skin rash  Except as documented in the HPI,  all other systems are negative

## 2018-10-15 NOTE — ED PROVIDER NOTE - OBJECTIVE STATEMENT
patient [facility] detox, originally admited for etoh abuse, now comebacks requesting detox again but he drank. he was referred here for sobriety, he has no complaints otherwise.

## 2018-10-15 NOTE — ED PROVIDER NOTE - PHYSICAL EXAMINATION
CONSTITUTIONAL: Well-developed; well-nourished; in no acute distress, nontoxic appearing  SKIN: skin exam is warm and dry,  HEAD: Normocephalic; atraumatic.  EYES: PERRL, 3 mm bilateral, no nystagmus, EOM intact; conjunctiva and sclera clear.  ENT: MMM, no nasal congestion  NECK: Supple; non tender.  ROM intact.  CARD: S1, S2 normal, no murmur  RESP: No wheezes, rales or rhonchi. Good air movement bilaterally  ABD: soft; non-distended; non-tender. No Rebound, No guarding  EXT: Normal ROM. No cyanosis or edema. Dp Pulses intact.   NEURO: awake, alert, following commands, oriented, grossly unremarkable. No Focal deficits. GCS 15.   PSYCH: Cooperative, appropriate.

## 2018-10-18 DIAGNOSIS — F10.230 ALCOHOL DEPENDENCE WITH WITHDRAWAL, UNCOMPLICATED: ICD-10-CM

## 2018-10-18 DIAGNOSIS — D68.9 COAGULATION DEFECT, UNSPECIFIED: ICD-10-CM

## 2018-10-18 DIAGNOSIS — E87.6 HYPOKALEMIA: ICD-10-CM

## 2018-10-18 DIAGNOSIS — F17.200 NICOTINE DEPENDENCE, UNSPECIFIED, UNCOMPLICATED: ICD-10-CM
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
## 2018-10-18 DIAGNOSIS — D69.6 THROMBOCYTOPENIA, UNSPECIFIED: ICD-10-CM

## 2018-10-18 DIAGNOSIS — R00.0 TACHYCARDIA, UNSPECIFIED: ICD-10-CM

## 2018-10-18 DIAGNOSIS — R04.0 EPISTAXIS: ICD-10-CM

## 2018-10-18 DIAGNOSIS — E83.42 HYPOMAGNESEMIA: ICD-10-CM

## 2018-10-18 DIAGNOSIS — K70.30 ALCOHOLIC CIRRHOSIS OF LIVER WITHOUT ASCITES: ICD-10-CM

## 2018-10-19 DIAGNOSIS — E83.42 HYPOMAGNESEMIA: ICD-10-CM

## 2018-10-19 DIAGNOSIS — F10.20 ALCOHOL DEPENDENCE, UNCOMPLICATED: ICD-10-CM
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
## 2019-02-21 ENCOUNTER — INPATIENT (INPATIENT)
Facility: HOSPITAL | Age: 44
LOS: 0 days | Discharge: HOME | End: 2019-02-22
Attending: INTERNAL MEDICINE | Admitting: INTERNAL MEDICINE

## 2019-02-21 VITALS
RESPIRATION RATE: 18 BRPM | TEMPERATURE: 99 F | DIASTOLIC BLOOD PRESSURE: 81 MMHG | OXYGEN SATURATION: 99 % | HEART RATE: 110 BPM | SYSTOLIC BLOOD PRESSURE: 134 MMHG

## 2019-02-21 DIAGNOSIS — Z98.890 OTHER SPECIFIED POSTPROCEDURAL STATES: Chronic | ICD-10-CM

## 2019-02-21 LAB
ALBUMIN SERPL ELPH-MCNC: 3.4 G/DL — LOW (ref 3.5–5.2)
ALP SERPL-CCNC: 137 U/L — HIGH (ref 30–115)
ALT FLD-CCNC: 32 U/L — SIGNIFICANT CHANGE UP (ref 0–41)
ANION GAP SERPL CALC-SCNC: 17 MMOL/L — HIGH (ref 7–14)
APTT BLD: 50.8 SEC — HIGH (ref 27–39.2)
AST SERPL-CCNC: 74 U/L — HIGH (ref 0–41)
BASOPHILS # BLD AUTO: 0.03 K/UL — SIGNIFICANT CHANGE UP (ref 0–0.2)
BASOPHILS # BLD AUTO: 0.04 K/UL — SIGNIFICANT CHANGE UP (ref 0–0.2)
BASOPHILS NFR BLD AUTO: 0.5 % — SIGNIFICANT CHANGE UP (ref 0–1)
BASOPHILS NFR BLD AUTO: 0.5 % — SIGNIFICANT CHANGE UP (ref 0–1)
BILIRUB DIRECT SERPL-MCNC: 0.8 MG/DL — HIGH (ref 0–0.2)
BILIRUB INDIRECT FLD-MCNC: 2 MG/DL — HIGH (ref 0.2–1.2)
BILIRUB SERPL-MCNC: 2.8 MG/DL — HIGH (ref 0.2–1.2)
BLD GP AB SCN SERPL QL: SIGNIFICANT CHANGE UP
BUN SERPL-MCNC: 4 MG/DL — LOW (ref 10–20)
CALCIUM SERPL-MCNC: 8.2 MG/DL — LOW (ref 8.5–10.1)
CHLORIDE SERPL-SCNC: 101 MMOL/L — SIGNIFICANT CHANGE UP (ref 98–110)
CO2 SERPL-SCNC: 25 MMOL/L — SIGNIFICANT CHANGE UP (ref 17–32)
CREAT SERPL-MCNC: 0.5 MG/DL — LOW (ref 0.7–1.5)
EOSINOPHIL # BLD AUTO: 0.02 K/UL — SIGNIFICANT CHANGE UP (ref 0–0.7)
EOSINOPHIL # BLD AUTO: 0.06 K/UL — SIGNIFICANT CHANGE UP (ref 0–0.7)
EOSINOPHIL NFR BLD AUTO: 0.3 % — SIGNIFICANT CHANGE UP (ref 0–8)
EOSINOPHIL NFR BLD AUTO: 1 % — SIGNIFICANT CHANGE UP (ref 0–8)
GLUCOSE SERPL-MCNC: 118 MG/DL — HIGH (ref 70–99)
HCT VFR BLD CALC: 30.9 % — LOW (ref 42–52)
HCT VFR BLD CALC: 32.9 % — LOW (ref 42–52)
HGB BLD-MCNC: 10.9 G/DL — LOW (ref 14–18)
HGB BLD-MCNC: 11.6 G/DL — LOW (ref 14–18)
IMM GRANULOCYTES NFR BLD AUTO: 0.2 % — SIGNIFICANT CHANGE UP (ref 0.1–0.3)
IMM GRANULOCYTES NFR BLD AUTO: 0.5 % — HIGH (ref 0.1–0.3)
INR BLD: 1.63 RATIO — HIGH (ref 0.65–1.3)
LACTATE SERPL-SCNC: 1.9 MMOL/L — SIGNIFICANT CHANGE UP (ref 0.5–2.2)
LIDOCAIN IGE QN: 71 U/L — HIGH (ref 7–60)
LYMPHOCYTES # BLD AUTO: 1.58 K/UL — SIGNIFICANT CHANGE UP (ref 1.2–3.4)
LYMPHOCYTES # BLD AUTO: 1.59 K/UL — SIGNIFICANT CHANGE UP (ref 1.2–3.4)
LYMPHOCYTES # BLD AUTO: 20.8 % — SIGNIFICANT CHANGE UP (ref 20.5–51.1)
LYMPHOCYTES # BLD AUTO: 27.5 % — SIGNIFICANT CHANGE UP (ref 20.5–51.1)
MCHC RBC-ENTMCNC: 31.4 PG — HIGH (ref 27–31)
MCHC RBC-ENTMCNC: 31.9 PG — HIGH (ref 27–31)
MCHC RBC-ENTMCNC: 35.3 G/DL — SIGNIFICANT CHANGE UP (ref 32–37)
MCHC RBC-ENTMCNC: 35.3 G/DL — SIGNIFICANT CHANGE UP (ref 32–37)
MCV RBC AUTO: 89.2 FL — SIGNIFICANT CHANGE UP (ref 80–94)
MCV RBC AUTO: 90.4 FL — SIGNIFICANT CHANGE UP (ref 80–94)
MONOCYTES # BLD AUTO: 0.48 K/UL — SIGNIFICANT CHANGE UP (ref 0.1–0.6)
MONOCYTES # BLD AUTO: 0.66 K/UL — HIGH (ref 0.1–0.6)
MONOCYTES NFR BLD AUTO: 8.3 % — SIGNIFICANT CHANGE UP (ref 1.7–9.3)
MONOCYTES NFR BLD AUTO: 8.6 % — SIGNIFICANT CHANGE UP (ref 1.7–9.3)
NEUTROPHILS # BLD AUTO: 3.59 K/UL — SIGNIFICANT CHANGE UP (ref 1.4–6.5)
NEUTROPHILS # BLD AUTO: 5.3 K/UL — SIGNIFICANT CHANGE UP (ref 1.4–6.5)
NEUTROPHILS NFR BLD AUTO: 62.5 % — SIGNIFICANT CHANGE UP (ref 42.2–75.2)
NEUTROPHILS NFR BLD AUTO: 69.3 % — SIGNIFICANT CHANGE UP (ref 42.2–75.2)
NRBC # BLD: 0 /100 WBCS — SIGNIFICANT CHANGE UP (ref 0–0)
NRBC # BLD: 0 /100 WBCS — SIGNIFICANT CHANGE UP (ref 0–0)
PLATELET # BLD AUTO: 59 K/UL — LOW (ref 130–400)
PLATELET # BLD AUTO: 69 K/UL — LOW (ref 130–400)
POTASSIUM SERPL-MCNC: 3.6 MMOL/L — SIGNIFICANT CHANGE UP (ref 3.5–5)
POTASSIUM SERPL-SCNC: 3.6 MMOL/L — SIGNIFICANT CHANGE UP (ref 3.5–5)
PROT SERPL-MCNC: 8.1 G/DL — HIGH (ref 6–8)
PROTHROM AB SERPL-ACNC: 18.6 SEC — HIGH (ref 9.95–12.87)
RBC # BLD: 3.42 M/UL — LOW (ref 4.7–6.1)
RBC # BLD: 3.69 M/UL — LOW (ref 4.7–6.1)
RBC # FLD: 14.6 % — HIGH (ref 11.5–14.5)
RBC # FLD: 14.6 % — HIGH (ref 11.5–14.5)
SODIUM SERPL-SCNC: 143 MMOL/L — SIGNIFICANT CHANGE UP (ref 135–146)
TYPE + AB SCN PNL BLD: SIGNIFICANT CHANGE UP
WBC # BLD: 5.75 K/UL — SIGNIFICANT CHANGE UP (ref 4.8–10.8)
WBC # BLD: 7.65 K/UL — SIGNIFICANT CHANGE UP (ref 4.8–10.8)
WBC # FLD AUTO: 5.75 K/UL — SIGNIFICANT CHANGE UP (ref 4.8–10.8)
WBC # FLD AUTO: 7.65 K/UL — SIGNIFICANT CHANGE UP (ref 4.8–10.8)

## 2019-02-21 RX ORDER — CHLORHEXIDINE GLUCONATE 213 G/1000ML
1 SOLUTION TOPICAL
Refills: 0 | Status: DISCONTINUED | OUTPATIENT
Start: 2019-02-21 | End: 2019-02-22

## 2019-02-21 RX ORDER — SODIUM CHLORIDE 9 MG/ML
1000 INJECTION, SOLUTION INTRAVENOUS ONCE
Refills: 0 | Status: COMPLETED | OUTPATIENT
Start: 2019-02-21 | End: 2019-02-21

## 2019-02-21 RX ORDER — THIAMINE MONONITRATE (VIT B1) 100 MG
100 TABLET ORAL DAILY
Refills: 0 | Status: DISCONTINUED | OUTPATIENT
Start: 2019-02-21 | End: 2019-02-22

## 2019-02-21 RX ORDER — SODIUM CHLORIDE 9 MG/ML
3 INJECTION INTRAMUSCULAR; INTRAVENOUS; SUBCUTANEOUS ONCE
Refills: 0 | Status: COMPLETED | OUTPATIENT
Start: 2019-02-21 | End: 2019-02-21

## 2019-02-21 RX ORDER — PANTOPRAZOLE SODIUM 20 MG/1
40 TABLET, DELAYED RELEASE ORAL
Refills: 0 | Status: DISCONTINUED | OUTPATIENT
Start: 2019-02-21 | End: 2019-02-22

## 2019-02-21 RX ORDER — FOLIC ACID 0.8 MG
1 TABLET ORAL DAILY
Refills: 0 | Status: DISCONTINUED | OUTPATIENT
Start: 2019-02-21 | End: 2019-02-22

## 2019-02-21 RX ADMIN — SODIUM CHLORIDE 2000 MILLILITER(S): 9 INJECTION, SOLUTION INTRAVENOUS at 13:55

## 2019-02-21 RX ADMIN — SODIUM CHLORIDE 3 MILLILITER(S): 9 INJECTION INTRAMUSCULAR; INTRAVENOUS; SUBCUTANEOUS at 13:55

## 2019-02-21 RX ADMIN — Medication 25 MILLIGRAM(S): at 23:26

## 2019-02-21 RX ADMIN — Medication 1 MILLIGRAM(S): at 15:53

## 2019-02-21 NOTE — ED PROVIDER NOTE - OBJECTIVE STATEMENT
44 yo m pmhx sig for ETOH abuse, cirrhosis, lower gi bleed pw 3 d of BRBPR, nose bleeds and hematuria as per pt looked cranberry colored. The nose bleed is occasional and stopped with pressure. Pt reports BRBPR that is present with bowel movements. Reports that pt had a similar episode of lower GI bleed with nose bleed 6 mo ago, led to pt stopping drinking. Recently pr restarted ETOH consumption. Of note pt has a h/o ETOH withdrawal.    I have reviewed available current nursing and previous documentation of past medical, surgical, family, and/or social history.

## 2019-02-21 NOTE — H&P ADULT - ASSESSMENT
43 year old man with PMHx EtOH abuse and cirrhosis, hx GI bleed presents with nosebleed, hematuria, and rectal bleeding x3 days.     Hepati 43 year old man with PMHx EtOH abuse and cirrhosis, hx GI bleed presents with nosebleed, hematuria, and rectal bleeding x3 days.     BRBPR likely 2/2 hemorrhoidal bleed diverticular bleed   -admit to medicine  -monitor CBC q8h  -maintain active T&S.   -c/w protonix 40mg  -follow INR  -clear liquid diet per GI  -f/u GI    History of EtOH abuse  -pt denied current alcohol usage but is tachycardic, anxious and restless.  -c/w librium protocol  -start thiamine and folate    Alcoholic cirrhosis  -no current signs of decompensation   -EGD(2018): esophagitis without varicies  -follow INR  -CT abd w IV contrast liver protocol: hepatomegaly with cirrhosis  -f/u LFTs  -GI f/u     Subcentimeter right hepatic lesion on MRI in 2018   -f/u AFP  -GI f/u    DVT ppx  CHG 4% daily and PRN  Clear liquid diet  OOBTC  FULL CODE 43 year old man with PMHx EtOH abuse and cirrhosis, hx GI bleed presents with nosebleed, hematuria, and rectal bleeding x3 days.     BRBPR likely 2/2 hemorrhoidal bleed diverticular bleed   -admit to medicine  -monitor CBC q8h  -maintain active T&S.   -c/w protonix 40mg  -follow INR  -clear liquid diet per GI  -f/u GI    History of EtOH abuse  -pt denied current alcohol usage but is tachycardic, anxious and restless.  -c/w librium protocol per GI recs  -start thiamine and folate    Alcoholic cirrhosis  -no current signs of decompensation   -EGD(2018): esophagitis without varicies  -follow INR  -CT abd w IV contrast liver protocol: hepatomegaly with cirrhosis  -f/u LFTs  -GI f/u     Subcentimeter right hepatic lesion on MRI in 2018   -f/u AFP  -GI f/u    DVT ppx  CHG 4% daily and PRN  Clear liquid diet  OOBTC  FULL CODE 43 year old man with PMHx EtOH abuse and cirrhosis, hx GI bleed presents with nosebleed, hematuria, and rectal bleeding x3 days.     BRBPR likely 2/2 hemorrhoidal bleed diverticular bleed   -admit to medicine  -monitor CBC q8h  -maintain active T&S.   -c/w protonix 40mg  -follow INR  -clear liquid diet per GI  -f/u GI    History of EtOH abuse  -pt denied current alcohol usage but is tachycardic, anxious and restless.  -c/w librium protocol per GI recs  -start thiamine and folate    Alcoholic cirrhosis  -no current signs of decompensation   -EGD(2018): esophagitis without varicies  -follow INR  -CT abd w IV contrast liver protocol: hepatomegaly with cirrhosis  -f/u LFTs  -GI f/u     Subcentimeter right hepatic lesion on MRI in 2018   -f/u AFP  -GI f/u    Dispo  -from home  -anticipated d/c to home when medically stable    DVT ppx  CHG 4% daily and PRN  Clear liquid diet  OOBTC  FULL CODE

## 2019-02-21 NOTE — H&P ADULT - NSHPREVIEWOFSYSTEMS_GEN_ALL_CORE
CONSTITUTIONAL: No fever, weight loss, or fatigue  EYES: No eye pain, visual disturbances, or discharge  ENMT:  No difficulty hearing, tinnitus, vertigo; No sinus or throat pain  NECK: No pain or stiffness  RESPIRATORY: No cough, wheezing, chills or hemoptysis; (+) shortness of breath  CARDIOVASCULAR: No chest pain, palpitations, dizziness, or leg swelling  GASTROINTESTINAL: (+)abdominal pain. No nausea, vomiting, or hematemesis; No diarrhea or constipation. (+) melena and hematochezia.  GENITOURINARY: No dysuria, frequency, hematuria, or incontinence  NEUROLOGICAL: No headaches, memory loss, loss of strength, numbness, or tremors  SKIN: No itching, burning, rashes, or lesions   LYMPH NODES: No enlarged glands  ENDOCRINE: No heat or cold intolerance; No hair loss  MUSCULOSKELETAL: No joint pain or swelling; No muscle, back, or extremity pain  PSYCHIATRIC: No depression, anxiety, mood swings, or difficulty sleeping  HEME/LYMPH: No easy bruising, or bleeding gums  ALLERY AND IMMUNOLOGIC: No hives or eczema

## 2019-02-21 NOTE — ED PROVIDER NOTE - NS ED ROS FT
Review of Systems    Constitutional: (-) fever  Eyes/ENT: (-) change in vision, (-) sore throat, (-) ear pain  Cardiovascular: (-) chest pain, (-) palpitation   Respiratory: (-) cough, (-) shortness of breath  Gastrointestinal: (-) abdominal pain (-) vomiting, (-) diarrhea  Musculoskeletal: (-) neck pain, (-) back pain, (-) joint pain  Integumentary: (-) rash, (-) edema  Neurological: (-) headache, (-) altered mental status  Heme/Lymph: (-) easy bruising (-) easy bleeding  Allergic/Immunologic: (-) pruritus

## 2019-02-21 NOTE — CONSULT NOTE ADULT - ASSESSMENT
A 43 y old man with Pmhx of alcoholic cirrhosis , active drinker, Hx of LGIB presented for RBPR. Patient had 3 episodes of RBPR with, last one 3 houds ago, mixed with brown stools, loose in consistency, small to moderate amount, associated with mild llq pain, non radiating. Denies any hematemesis, melena, fever, chills or use of NSAIDS.   No family hx of colon cancer or IBD       # RBPR most likely hemorrhoidal bleed  vs diverticular bleed   No Drop in Hg   No signs of upper GI bleed   Follow Hg q8 h   Keep type and screen active   2 IV 18 G  Protonix 40 po QD  Check INR   Clear liquid diet     # Alcohol use/ Tachycardia and restless    Rule out withdrawal   Librium protocol   MVI   Thiamine     # Alcoholic cirrhosis / Thrombocytopenia/ No signs of decompensation   Last EGD in 2018 no varices   Check INR   Alcohol cessation   Get CT abdomen with contrast liver protocol   Follow Liver enzymes  IV hydration   Needs outpatient EGD for variceal screening in 1 year ( Last one in 7/2018)     # Hx of Subcentimeter right hepatic lesion on MRI 2018   Please follow CT abdomen with contrast with liver protocol   Check lukasz fetoprotein   needs follow up with liver clinic for routine US abdomen and lukasz fetoprotein every 6 month A 43 y old man with Pmhx of alcoholic cirrhosis , active drinker, Hx of LGIB presented for RBPR. Patient had 3 episodes of RBPR with, last one 3 hours ago, mixed with brown stools, loose in consistency, small to moderate amount, associated with mild llq pain, non radiating. Denies any hematemesis, melena, fever, chills or use of NSAIDS.   No family hx of colon cancer or IBD       # RBPR most likely hemorrhoidal bleed  vs unlikely diverticular bleed   No Drop in Hg   No signs of upper GI bleed   Follow Hg q8 h   Keep type and screen active   2 IV 18 G  Protonix 40 po QD  Check INR   Clear liquid diet     # Alcohol use/ Tachycardia and restless    Rule out withdrawal   Librium protocol   MVI   Thiamine     # Alcoholic cirrhosis / Thrombocytopenia/ No signs of decompensation   Last EGD in 2018 no varices   Check INR   Alcohol cessation   Get CT abdomen with contrast liver protocol   Follow Liver enzymes  IV hydration   Needs outpatient EGD for variceal screening in 1 year ( Last one in 7/2018)     # Hx of Subcentimeter right hepatic lesion on MRI 2018   Please follow CT abdomen with contrast with liver protocol   Check lukasz fetoprotein   needs follow up with liver clinic for routine US abdomen and lukasz fetoprotein every 6 month

## 2019-02-21 NOTE — ED PROVIDER NOTE - CLINICAL SUMMARY MEDICAL DECISION MAKING FREE TEXT BOX
Pt with BRBPR and epistaxis, h/o cirrhosis, GI consulted, no intervention currently and recommends no transfusion for now.  ct abd is neg for acute pathology, will admit for rpt h/h and GI eval

## 2019-02-21 NOTE — CONSULT NOTE ADULT - SUBJECTIVE AND OBJECTIVE BOX
GI HPI:    A 43 y old man with Pmhx of alcoholic cirrhosis , active drinker, Hx of LGIB presented for RBPR. Patient had 3 episodes of RBPR with, last one 3 houds ago, mixed with brown stools, loose in consistency, small to moderate amount, associated with mild llq pain, non radiating. Denies any hematemesis, melena, fever, chills or use of NSAIDS.   No family hx of colon cancer or IBD       Previous EGD: 7/2018 gastritis and esophagitis , no varices     Previous colonoscopy: 7/ 2018 mild diverticulosis, perianal fistula , hemorrhoids        PAST MEDICAL & SURGICAL HISTORY  Liver cirrhosis, alcoholic  Anemia  Thrombocytopenia  Nosebleed  ETOH abuse  History of incision and drainage: Gluteal abscess        SOCIAL HISTORY:  smoker: non smoker  Alcohol: +  alcoholic  Drug: Denies use of drugs      FAMILY HISTORY:  FAMILY HISTORY:  Family history of stroke (Father, Mother)      ALLERGIES:  No Known Allergies      MEDICATIONS:  MEDICATIONS  (STANDING):  LORazepam   Injectable 1 milliGRAM(s) IV Push Once    MEDICATIONS  (PRN):      HOME MEDICATIONS:  Home Medications:      ROS:     REVIEW OF SYSTEMS  General:  No fevers  Eyes:  No reported pain   ENT:  No sore throat   NECK: No stiffness   CV:  No chest pain   Resp:  No shortness of breath  GI:  See HPI  :  No dysuria  Muscle:  + weakness  Neuro:  No tingling  Endocrine:  No polyuria  Heme:  No ecchymosis          VITALS:   T(F): 98.7 (02-21 @ 12:25), Max: 98.7 (02-21 @ 12:25)  HR: 110 (02-21 @ 12:25) (110 - 110)  BP: 134/81 (02-21 @ 12:25) (134/81 - 134/81)  BP(mean): --  RR: 18 (02-21 @ 12:25) (18 - 18)  SpO2: 99% (02-21 @ 12:25) (99% - 99%)    I&O's Summary      PHYSICAL EXAM:  GENERAL:  Appears in no distress  HEENT:  Conjunctivae  anicteric  CHEST:  Full & symmetric excursion  HEART:  N S1, S2  ABDOMEN:  Soft, mild diffuse tender, non-distended, no masses   EXTEREMITIES:  no edema  SKIN:  No rash  NEURO:  Alert        LABS:                        11.6   5.75  )-----------( 69       ( 21 Feb 2019 12:54 )             32.9       LIVER FUNCTIONS - ( 21 Feb 2019 12:54 )  Alb: 3.4 g/dL / Pro: 8.1 g/dL / ALK PHOS: 137 U/L / ALT: 32 U/L / AST: 74 U/L / GGT: x           02-21    143  |  101  |  4<L>  ----------------------------<  118<H>  3.6   |  25  |  0.5<L>    Ca    8.2<L>      21 Feb 2019 12:54                    RADIOLOGY: GI HPI:    A 43 y old man with Pmhx of alcoholic cirrhosis , active drinker, Hx of LGIB presented for RBPR. Patient had 3 episodes of RBPR with, last one 3 hours ago, mixed with brown stools, loose in consistency, small to moderate amount, associated with mild llq pain, non radiating. Denies any hematemesis, melena, fever, chills or use of NSAIDS.   No family hx of colon cancer or IBD       Previous EGD: 7/2018 gastritis and esophagitis , no varices     Previous colonoscopy: 7/ 2018 mild diverticulosis, perianal fistula , hemorrhoids        PAST MEDICAL & SURGICAL HISTORY  Liver cirrhosis, alcoholic  Anemia  Thrombocytopenia  Nosebleed  ETOH abuse  History of incision and drainage: Gluteal abscess        SOCIAL HISTORY:  smoker: non smoker  Alcohol: +  alcoholic  Drug: Denies use of drugs      FAMILY HISTORY:  FAMILY HISTORY:  Family history of stroke (Father, Mother)      ALLERGIES:  No Known Allergies      MEDICATIONS:  MEDICATIONS  (STANDING):  LORazepam   Injectable 1 milliGRAM(s) IV Push Once    MEDICATIONS  (PRN):      HOME MEDICATIONS:  Home Medications:      ROS:     REVIEW OF SYSTEMS  General:  No fevers  Eyes:  No reported pain   ENT:  No sore throat   NECK: No stiffness   CV:  No chest pain   Resp:  No shortness of breath  GI:  See HPI  :  No dysuria  Muscle:  + weakness  Neuro:  No tingling  Endocrine:  No polyuria  Heme:  No ecchymosis          VITALS:   T(F): 98.7 (02-21 @ 12:25), Max: 98.7 (02-21 @ 12:25)  HR: 110 (02-21 @ 12:25) (110 - 110)  BP: 134/81 (02-21 @ 12:25) (134/81 - 134/81)  BP(mean): --  RR: 18 (02-21 @ 12:25) (18 - 18)  SpO2: 99% (02-21 @ 12:25) (99% - 99%)    I&O's Summary      PHYSICAL EXAM:  GENERAL:  Appears in no distress  HEENT:  Conjunctivae  anicteric  CHEST:  Full & symmetric excursion  HEART:  N S1, S2  ABDOMEN:  Soft, mild diffuse tender, non-distended, no masses   EXTEREMITIES:  no edema  SKIN:  No rash  NEURO:  Alert        LABS:                        11.6   5.75  )-----------( 69       ( 21 Feb 2019 12:54 )             32.9       LIVER FUNCTIONS - ( 21 Feb 2019 12:54 )  Alb: 3.4 g/dL / Pro: 8.1 g/dL / ALK PHOS: 137 U/L / ALT: 32 U/L / AST: 74 U/L / GGT: x           02-21    143  |  101  |  4<L>  ----------------------------<  118<H>  3.6   |  25  |  0.5<L>    Ca    8.2<L>      21 Feb 2019 12:54                    RADIOLOGY:

## 2019-02-21 NOTE — H&P ADULT - ATTENDING COMMENTS
I have seen and evaluated and examined the patient today. I agree with the resident's documentation set forth.  Please also refer to my progress note dated today for my physical exam findings, and today's assessment and plan.

## 2019-02-21 NOTE — H&P ADULT - HISTORY OF PRESENT ILLNESS
43 year old man with PMHx EtOH abuse and cirrhosis, hx GI bleed presents with nosebleed, hematuria, and rectal bleeding x3 days. He admits to mild SOB x2 days, chronic abdominal pain but no chest pain. He has been admitted before for a GI bleed and under went an EGD(7/2018) which showed gastritis and esophagitis , no varices as well as a colonoscopy which showed mild diverticulosis, perianal fistula , hemorrhoids He denied current alcohol use however he does feel anxious. He also has a history of a subcentimeter right hepatic lesion found on MRI 2018.    In ED, bp 134/81  RR 18 97% on RA T 98.7F. He stated that his nosebleed resolved at the time of my exam. He was evaluated by GI who recommended CT abd w contrast liver protocol which showed hepatomegaly with cirrhosis and no acute bowl findings.

## 2019-02-21 NOTE — ED PROVIDER NOTE - PROGRESS NOTE DETAILS
GI fellow aware will see pt Attending Note: I personally evaluated the patient. I reviewed the Physician Assistant’s note (as assigned above), and agree with the findings and plan except as documented in my note.   42 y/o M PMHX EtOH abuse and cirrhosis, and GI bleed presents with complaint of nosebleed, hematuria, and rectal bleeding x3 days. Pt admits mild SOB x2 days, no CP. Pt also admits chronic abdominal pain. No n/v. Pt denies recent alcohol use. Pt was admitted previously for GI bleed and seen by GI. Pt currently admits feels slightly anxious. Nosebleed has since resolved after coming in to ED. Hematuria is described as “cranberry juice” with no clots. PE: NAD. NCAT. No active epistaxis, no septal hematoma. PERRL, EOMI. MMM. RRR. CTAB. Abdomen mildly tender RLQ. Rectal exam as per GEOVANNY Goldstein shows BRBPR. IMP: Pt with HX of cirrhosis and GI bleed now with BRBPR and mild abdominal pain and resolved nosebleed. Will consult GI, labs, and CT abdomen/pelvis. Will likely be an admission for further GI evaluation and management. Patient to be admitted to an inpatient floor. Case discussed with and care endorsed to medical admitting resident. Admitting physician notified.

## 2019-02-21 NOTE — H&P ADULT - NSHPPHYSICALEXAM_GEN_ALL_CORE
GENERAL: NAD, well-groomed, well-developed  HEAD:  NCAT  EYES: EOMI, PERRLA, conjunctiva clear  ENMT: No tonsillar erythema, exudates, or enlargement; Moist mucous membranes, Good dentition, No lesions  NECK: Supple, No JVD, Normal thyroid  NERVOUS SYSTEM: AAOX2-3  CHEST/LUNG: CTA b/l no w/r/r  HEART: +s1s2 RRR no m/g/r  ABDOMEN: soft ND (+) bs, (+) RUQ TTP  EXTREMITIES:  2+ Peripheral Pulses, No c/c/e  LYMPH: No lymphadenopathy noted  SKIN: No rashes or lesions

## 2019-02-21 NOTE — ED PROVIDER NOTE - PHYSICAL EXAMINATION
Physical Exam    Vital Signs: I have reviewed the initial vital signs.  Constitutional: well-nourished, appears stated age, no acute distress  Eyes: PERRLA, and symmetrical lids.  ENT: Neck supple with no adenopathy, moist MM.  Cardiovascular: regular rate, regular rhythm, well-perfused extremities  Respiratory: unlabored respiratory effort, clear to auscultation bilaterally  Gastrointestinal: soft, +RUQ TTP, no gaurding, no rebound ttp.  Rectal: +BRBPR on JANESSA, good rectal tones, no anal fissures, no masses  Musculoskeletal: supple neck, no lower extremity edema  Integumentary: warm, dry, no rash  Neurologic: awake, alert, extremities’ motor and sensory functions grossly intact

## 2019-02-21 NOTE — H&P ADULT - NSHPLABSRESULTS_GEN_ALL_CORE
11.6   5.75  )-----------( 69       ( 21 Feb 2019 12:54 )             32.9                                   02-21    143  |  101  |  4<L>  ----------------------------<  118<H>  3.6   |  25  |  0.5<L>    Ca    8.2<L>      21 Feb 2019 12:54    TPro  8.1<H>  /  Alb  3.4<L>  /  TBili  2.8<H>  /  DBili  0.8<H>  /  AST  74<H>  /  ALT  32  /  AlkPhos  137<H>  02-21    LIVER FUNCTIONS - ( 21 Feb 2019 12:54 )  Alb: 3.4 g/dL / Pro: 8.1 g/dL / ALK PHOS: 137 U/L / ALT: 32 U/L / AST: 74 U/L / GGT: x         PT/INR - ( 21 Feb 2019 16:25 )   PT: 18.60 sec;   INR: 1.63 ratio      PTT - ( 21 Feb 2019 16:25 )  PTT:50.8 sec    Lactate, Blood: 1.9 mmol/L (02-21-19 @ 12:54)  Lactate, Blood: 1.9 mmol/L (02-21-19 @ 12:54)    Imaging:  < from: CT Abdomen and Pelvis w/ IV Cont (02.21.19 @ 16:42) >    IMPRESSION:   1.  No acute bowel findings.  2.  Hepatomegaly with findings of cirrhosis.    < end of copied text >    ECG: sinus tachycardia with RBBB. . f/u official read

## 2019-02-22 ENCOUNTER — TRANSCRIPTION ENCOUNTER (OUTPATIENT)
Age: 44
End: 2019-02-22

## 2019-02-22 VITALS
HEART RATE: 99 BPM | RESPIRATION RATE: 18 BRPM | TEMPERATURE: 100 F | DIASTOLIC BLOOD PRESSURE: 55 MMHG | SYSTOLIC BLOOD PRESSURE: 116 MMHG | OXYGEN SATURATION: 100 %

## 2019-02-22 LAB
ALBUMIN SERPL ELPH-MCNC: 3 G/DL — LOW (ref 3.5–5.2)
ALP SERPL-CCNC: 123 U/L — HIGH (ref 30–115)
ALT FLD-CCNC: 29 U/L — SIGNIFICANT CHANGE UP (ref 0–41)
ANION GAP SERPL CALC-SCNC: 12 MMOL/L — SIGNIFICANT CHANGE UP (ref 7–14)
APTT BLD: 44.3 SEC — HIGH (ref 27–39.2)
AST SERPL-CCNC: 60 U/L — HIGH (ref 0–41)
BASOPHILS # BLD AUTO: 0.03 K/UL — SIGNIFICANT CHANGE UP (ref 0–0.2)
BASOPHILS # BLD AUTO: 0.03 K/UL — SIGNIFICANT CHANGE UP (ref 0–0.2)
BASOPHILS NFR BLD AUTO: 0.4 % — SIGNIFICANT CHANGE UP (ref 0–1)
BASOPHILS NFR BLD AUTO: 0.5 % — SIGNIFICANT CHANGE UP (ref 0–1)
BILIRUB SERPL-MCNC: 3.5 MG/DL — HIGH (ref 0.2–1.2)
BUN SERPL-MCNC: 7 MG/DL — LOW (ref 10–20)
CALCIUM SERPL-MCNC: 8.3 MG/DL — LOW (ref 8.5–10.1)
CHLORIDE SERPL-SCNC: 103 MMOL/L — SIGNIFICANT CHANGE UP (ref 98–110)
CO2 SERPL-SCNC: 24 MMOL/L — SIGNIFICANT CHANGE UP (ref 17–32)
CREAT SERPL-MCNC: <0.5 MG/DL — LOW (ref 0.7–1.5)
EOSINOPHIL # BLD AUTO: 0.03 K/UL — SIGNIFICANT CHANGE UP (ref 0–0.7)
EOSINOPHIL # BLD AUTO: 0.04 K/UL — SIGNIFICANT CHANGE UP (ref 0–0.7)
EOSINOPHIL NFR BLD AUTO: 0.4 % — SIGNIFICANT CHANGE UP (ref 0–8)
EOSINOPHIL NFR BLD AUTO: 0.6 % — SIGNIFICANT CHANGE UP (ref 0–8)
GLUCOSE SERPL-MCNC: 95 MG/DL — SIGNIFICANT CHANGE UP (ref 70–99)
HCT VFR BLD CALC: 31.3 % — LOW (ref 42–52)
HCT VFR BLD CALC: 31.6 % — LOW (ref 42–52)
HGB BLD-MCNC: 10.8 G/DL — LOW (ref 14–18)
HGB BLD-MCNC: 11.1 G/DL — LOW (ref 14–18)
IMM GRANULOCYTES NFR BLD AUTO: 0.3 % — SIGNIFICANT CHANGE UP (ref 0.1–0.3)
IMM GRANULOCYTES NFR BLD AUTO: 0.3 % — SIGNIFICANT CHANGE UP (ref 0.1–0.3)
INR BLD: 1.72 RATIO — HIGH (ref 0.65–1.3)
LYMPHOCYTES # BLD AUTO: 1.59 K/UL — SIGNIFICANT CHANGE UP (ref 1.2–3.4)
LYMPHOCYTES # BLD AUTO: 1.75 K/UL — SIGNIFICANT CHANGE UP (ref 1.2–3.4)
LYMPHOCYTES # BLD AUTO: 24 % — SIGNIFICANT CHANGE UP (ref 20.5–51.1)
LYMPHOCYTES # BLD AUTO: 25 % — SIGNIFICANT CHANGE UP (ref 20.5–51.1)
MAGNESIUM SERPL-MCNC: 1.7 MG/DL — LOW (ref 1.8–2.4)
MCHC RBC-ENTMCNC: 31.5 PG — HIGH (ref 27–31)
MCHC RBC-ENTMCNC: 31.9 PG — HIGH (ref 27–31)
MCHC RBC-ENTMCNC: 34.5 G/DL — SIGNIFICANT CHANGE UP (ref 32–37)
MCHC RBC-ENTMCNC: 35.1 G/DL — SIGNIFICANT CHANGE UP (ref 32–37)
MCV RBC AUTO: 90.8 FL — SIGNIFICANT CHANGE UP (ref 80–94)
MCV RBC AUTO: 91.3 FL — SIGNIFICANT CHANGE UP (ref 80–94)
MONOCYTES # BLD AUTO: 0.66 K/UL — HIGH (ref 0.1–0.6)
MONOCYTES # BLD AUTO: 0.7 K/UL — HIGH (ref 0.1–0.6)
MONOCYTES NFR BLD AUTO: 10.4 % — HIGH (ref 1.7–9.3)
MONOCYTES NFR BLD AUTO: 9.6 % — HIGH (ref 1.7–9.3)
NEUTROPHILS # BLD AUTO: 4.02 K/UL — SIGNIFICANT CHANGE UP (ref 1.4–6.5)
NEUTROPHILS # BLD AUTO: 4.77 K/UL — SIGNIFICANT CHANGE UP (ref 1.4–6.5)
NEUTROPHILS NFR BLD AUTO: 63.2 % — SIGNIFICANT CHANGE UP (ref 42.2–75.2)
NEUTROPHILS NFR BLD AUTO: 65.3 % — SIGNIFICANT CHANGE UP (ref 42.2–75.2)
NRBC # BLD: 0 /100 WBCS — SIGNIFICANT CHANGE UP (ref 0–0)
NRBC # BLD: 0 /100 WBCS — SIGNIFICANT CHANGE UP (ref 0–0)
PLATELET # BLD AUTO: 53 K/UL — LOW (ref 130–400)
PLATELET # BLD AUTO: 58 K/UL — LOW (ref 130–400)
POTASSIUM SERPL-MCNC: 3.8 MMOL/L — SIGNIFICANT CHANGE UP (ref 3.5–5)
POTASSIUM SERPL-SCNC: 3.8 MMOL/L — SIGNIFICANT CHANGE UP (ref 3.5–5)
PROT SERPL-MCNC: 7.3 G/DL — SIGNIFICANT CHANGE UP (ref 6–8)
PROTHROM AB SERPL-ACNC: 19.7 SEC — HIGH (ref 9.95–12.87)
RBC # BLD: 3.43 M/UL — LOW (ref 4.7–6.1)
RBC # BLD: 3.48 M/UL — LOW (ref 4.7–6.1)
RBC # FLD: 14.3 % — SIGNIFICANT CHANGE UP (ref 11.5–14.5)
RBC # FLD: 14.6 % — HIGH (ref 11.5–14.5)
SODIUM SERPL-SCNC: 139 MMOL/L — SIGNIFICANT CHANGE UP (ref 135–146)
WBC # BLD: 6.36 K/UL — SIGNIFICANT CHANGE UP (ref 4.8–10.8)
WBC # BLD: 7.3 K/UL — SIGNIFICANT CHANGE UP (ref 4.8–10.8)
WBC # FLD AUTO: 6.36 K/UL — SIGNIFICANT CHANGE UP (ref 4.8–10.8)
WBC # FLD AUTO: 7.3 K/UL — SIGNIFICANT CHANGE UP (ref 4.8–10.8)

## 2019-02-22 RX ORDER — FOLIC ACID 0.8 MG
1 TABLET ORAL
Qty: 30 | Refills: 0
Start: 2019-02-22 | End: 2019-03-23

## 2019-02-22 RX ORDER — THIAMINE MONONITRATE (VIT B1) 100 MG
1 TABLET ORAL
Qty: 30 | Refills: 0
Start: 2019-02-22 | End: 2019-03-23

## 2019-02-22 RX ORDER — MAGNESIUM SULFATE 500 MG/ML
2 VIAL (ML) INJECTION ONCE
Refills: 0 | Status: COMPLETED | OUTPATIENT
Start: 2019-02-22 | End: 2019-02-22

## 2019-02-22 RX ADMIN — Medication 100 MILLIGRAM(S): at 11:22

## 2019-02-22 RX ADMIN — Medication 1 MILLIGRAM(S): at 11:22

## 2019-02-22 RX ADMIN — Medication 25 MILLIGRAM(S): at 06:10

## 2019-02-22 RX ADMIN — PANTOPRAZOLE SODIUM 40 MILLIGRAM(S): 20 TABLET, DELAYED RELEASE ORAL at 06:13

## 2019-02-22 RX ADMIN — Medication 50 GRAM(S): at 15:37

## 2019-02-22 NOTE — DISCHARGE NOTE ADULT - HOSPITAL COURSE
43 year old man with PMHx EtOH abuse and cirrhosis, hx GI bleed presents with  rectal bleeding x3 days. In ED, bp 134/81  RR 18 97% on RA T 98.7F. CT shows no acute pathology, but hepatomegaly with findings of cirrhosis. Pt was seen by Gastroenterology and recommended observing patient Hgb. Throughout admission Hgb never dropped. Recta bleeding resolved, likely was due to hemorrhoidal bleed. Gastroenterology recommended outpatient EGD in 1 year and RUQ US in 6 month for follow up on known liver lesion.

## 2019-02-22 NOTE — DISCHARGE NOTE ADULT - PATIENT PORTAL LINK FT
You can access the [facility] Patient Portal, offered by [facility], by registering with the following website: [email or web address]

## 2019-02-22 NOTE — DISCHARGE NOTE ADULT - CARE PROVIDER_API CALL
Heladio Del Valle)  Gastroenterology; Internal Medicine  [address] [place] [address]  Phone: [phone]  Fax: [phone]  Follow Up Time:     Ginger Brown)  Internal Medicine  [address] [facility] [facility] [address]  [place] [address]  Phone: [phone]  Fax: [phone]  Follow Up Time:

## 2019-02-22 NOTE — PROGRESS NOTE ADULT - ASSESSMENT
43 M w ETOH abuse and cirrhosis, anemia, thrombocytopenia hx LGIB who presents back # nosebleed, hematuria, and rectal bleeding x3 days.     # BRBPR likely 2/2 hemorrhoidal bleed vs diverticular bleed   -monitor CBC   -c/w protonix 40mg  GI consult has seen, indicates to advance diet and if no further bleeding today can DC home, f/u GI clinic     # ETOH abuse  #Suspect thiamine deficiency  cont CIWA protocol & monitor for s/s of withdrawal    cont thiamine supplement, MVI  pt had normal folate level 10/2018    # Alcoholic cirrhosis  -no current signs of decompensation   -EGD(2018): esophagitis without varices   -CT abd w IV contrast liver protocol: hepatomegaly with cirrhosis       # R liver lesion  Subcentimeter right hepatic lesion on MRI in 2018   CT showed also shows tiny R hepatic lesion   - AFP  -GI f/u OP        #Progress Note Handoff    Pending (specify): none    Family discussion: not nec    Disposition: Home_x__/SNF___/Other________/Unknown at this time________    Consider dc home later today if no further bleeding and no worsening s/s of withdrawal

## 2019-02-22 NOTE — PROGRESS NOTE ADULT - SUBJECTIVE AND OBJECTIVE BOX
MEDICATIONS  (STANDING):  chlordiazePOXIDE   Oral   chlordiazePOXIDE 25 milliGRAM(s) Oral every 4 hours  chlordiazePOXIDE 20 milliGRAM(s) Oral every 4 hours  chlorhexidine 4% Liquid 1 Application(s) Topical <User Schedule>  folic acid 1 milliGRAM(s) Oral daily  magnesium sulfate  IVPB 2 Gram(s) IV Intermittent once  pantoprazole    Tablet 40 milliGRAM(s) Oral before breakfast  thiamine 100 milliGRAM(s) Oral daily      OVERNIGHT EVENTS:  No acute overnight events    SUBJECTIVE:   Patient without any recurrent rectal bleeding this AM.  also states that he has nosebleeding, primarily from his L nare.        *********************** VITALS ******************************************    Vital Signs Last 24 Hrs  T(C): 37.7 (22 Feb 2019 07:47), Max: 37.7 (22 Feb 2019 07:47)  T(F): 99.9 (22 Feb 2019 07:47), Max: 99.9 (22 Feb 2019 07:47)  HR: 99 (22 Feb 2019 07:47) (98 - 110)  BP: 116/55 (22 Feb 2019 07:47) (116/55 - 137/79)  BP(mean): --  RR: 18 (22 Feb 2019 07:47) (18 - 18)  SpO2: 100% (22 Feb 2019 07:47) (100% - 100%)      ******************************** PHYSICAL EXAM:**************************************************  GENERAL:  NAD, well developed - well nourished    PSYCH: no agitation, mood and affect normal    HEENT:  Sclerae anicteric, EOMI, L nare w crusted blood remnants at external opening    NERVOUS SYSTEM:  Alert & Oriented X3     PULMONARY:  Bilateral breath sounds clear to ausculation, no tachypnea    CARDIOVASCULAR:  RRR, no m/r/g, S1 S2 audible and normal    ADOMEN: Soft, NT, ND     EXTREMITIES:  warm, no edema    LYMPH: No lymphadenopathy noted    SKIN: No rashes or lesions, no erythema                              10.8   6.36  )-----------( 53       ( 22 Feb 2019 07:50 )             31.3     02-22    139  |  103  |  7<L>  ----------------------------<  95  3.8   |  24  |  <0.5<L>    Ca    8.3<L>      22 Feb 2019 07:50  Mg     1.7     02-22    TPro  7.3  /  Alb  3.0<L>  /  TBili  3.5<H>  /  DBili  x   /  AST  60<H>  /  ALT  29  /  AlkPhos  123<H>  02-22

## 2019-02-22 NOTE — DISCHARGE NOTE ADULT - MEDICATION SUMMARY - MEDICATIONS TO TAKE
I will START or STAY ON the medications listed below when I get home from the hospital:    Protonix 40 mg oral delayed release tablet  -- 1 tab(s) by mouth 2 times a day  -- Indication: For GERD    folic acid 1 mg oral tablet  -- 1 tab(s) by mouth once a day  -- Indication: For Vitamins    thiamine 100 mg oral tablet  -- 1 tab(s) by mouth once a day  -- Indication: For Vitamins

## 2019-02-22 NOTE — DISCHARGE NOTE ADULT - CARE PLAN
Principal Discharge DX:	GI bleed  Goal:	Stable  Assessment and plan of treatment:	- Pt has bright red blood per rectum likely secondary to hemorrhoidal bleed  - Please follow up with GI outpatient and PMD  Secondary Diagnosis:	Liver cirrhosis, alcoholic  Goal:	Stable  Assessment and plan of treatment:	- Alcohol cessation, Folate and thiamine  - Needs outpatient EGD fir Variceal screening in 1 year  Secondary Diagnosis:	Liver lesion  Goal:	Needs Eval  Assessment and plan of treatment:	- Right hepatic lobe lesion  - needs US abd in 6 month

## 2019-02-22 NOTE — DISCHARGE NOTE ADULT - PLAN OF CARE
Stable - Pt has bright red blood per rectum likely secondary to hemorrhoidal bleed  - Please follow up with GI outpatient and PMD - Alcohol cessation, Folate and thiamine  - Needs outpatient EGD fir Variceal screening in 1 year Needs Eval - Right hepatic lobe lesion  - needs US abd in 6 month

## 2019-02-22 NOTE — DISCHARGE NOTE ADULT - CARE PROVIDERS DIRECT ADDRESSES
[email or web address]

## 2019-02-22 NOTE — PROGRESS NOTE ADULT - SUBJECTIVE AND OBJECTIVE BOX
We are following the patient for LGIB      GI HPI Today:  Patient denies any BM since yesterday   No vomiting   No Hematochezia or melena     PAST MEDICAL & SURGICAL HISTORY  Liver cirrhosis, alcoholic  Anemia  Thrombocytopenia  Nosebleed  ETOH abuse  History of incision and drainage: Gluteal abscess      ALLERGIES:  No Known Allergies      MEDICATIONS:  MEDICATIONS  (STANDING):  chlordiazePOXIDE   Oral   chlordiazePOXIDE 25 milliGRAM(s) Oral every 4 hours  chlordiazePOXIDE 20 milliGRAM(s) Oral every 4 hours  chlorhexidine 4% Liquid 1 Application(s) Topical <User Schedule>  folic acid 1 milliGRAM(s) Oral daily  pantoprazole    Tablet 40 milliGRAM(s) Oral before breakfast  thiamine 100 milliGRAM(s) Oral daily    MEDICATIONS  (PRN):      REVIEW OF SYSTEMS  General:  No fevers  Eyes:  No reported pain   ENT:  No sore throat   NECK: No stiffness   CV:  No chest pain   Resp:  No shortness of breath  GI:  See HPI  :  No dysuria  Muscle:  No weakness  Neuro:  No tingling  Endocrine:  No polyuria  Heme:  No ecchymosis        VITALS:   T(F): 98.2 (02-22 @ 06:08), Max: 99.5 (02-21 @ 15:24)  HR: 98 (02-22 @ 06:08) (98 - 110)  BP: 129/69 (02-22 @ 06:08) (119/64 - 137/79)  BP(mean): --  RR: 18 (02-22 @ 06:08) (18 - 18)  SpO2: 100% (02-22 @ 06:08) (99% - 100%)        PHYSICAL EXAM:  GENERAL:  Appears in no distress  HEENT:  Conjunctivae Anicteric   CHEST:  Full & symmetric excursion  HEART:  NS1, S2,   ABDOMEN:  Soft, mild diffuse tender, non-distended, normoactive bowel sounds,  no masses   EXTREMITIES  no edema  SKIN:  No rash  NEURO:  Alert         Blood Work :                        11.1   7.30  )-----------( 58       ( 22 Feb 2019 00:28 )             31.6     PT/INR - ( 21 Feb 2019 16:25 )  INR: 1.63          PTT - ( 21 Feb 2019 16:25 )  PTT:50.8<H>  02-21    143  |  101  |  4<L>  ----------------------------<  118<H>  3.6   |  25  |  0.5<L>    Ca    8.2<L>      21 Feb 2019 12:54      CBC -  ( 22 Feb 2019 00:28 )  Hemoglobin : 11.1    CBC -  ( 21 Feb 2019 21:27 )  Hemoglobin : 10.9    CBC -  ( 21 Feb 2019 12:54 )  Hemoglobin : 11.6      LIVER FUNCTIONS - ( 21 Feb 2019 12:54 )  Alb: 3.4 [3.5 - 5.2] / Pro: 8.1 [6.0 - 8.0] / ALK PHOS: 137 [30 - 115] / ALT: 32 [0 - 41] / AST: 74 [0 - 41] / GGT: x         RADIOLOGY:  < from: CT Abdomen and Pelvis w/ IV Cont (02.21.19 @ 16:42) >    EXAM:  CT ABDOMEN AND PELVIS IC            PROCEDURE DATE:  02/21/2019            INTERPRETATION:  CLINICAL STATEMENT: Abdominal pain      TECHNIQUE: Contiguous axial CT images were obtained from the lower chest   to the pubic symphysis following administration of intravenous contrast.    Oral contrast was not administered.  Reformatted images in the coronal   and sagittal planes were acquired.    COMPARISON CT: 7/13/2018    OTHER STUDIES USED FOR CORRELATION: None.       FINDINGS:    LOWER CHEST: Bibasilar atelectasis.    HEPATOBILIARY: Hepatomegaly, 24 cm in craniocaudal direction. Lobulated   configuration, cirrhosis with left hepatic lobe enlargement.   Hypoattenuating right hepatic lesion, image #95 the series 4, too small   to characterize. No evidence of biliary dilation. Cholelithiasis..    SPLEEN: Spleen measures 13.5 cm    PANCREAS: Unremarkable.    ADRENAL GLANDS: Unremarkable.    KIDNEYS: Symmetrical enhancement without any obstructive findings.    ABDOMINOPELVIC NODES: Unremarkable.    PELVIC ORGANS: No acute findings.    PERITONEUM/MESENTERY/BOWEL: Scattered colonic diverticulosis. No evidence   of from bowel obstruction. No acute infiltrative changes are seen.     BONES/SOFT TISSUES: No acute destructive osseous lesions. Mild   degenerative changes of spine.    OTHERS: Splenorenal varices.    IMPRESSION:     1.  No acute bowel findings.  2.  Hepatomegaly with findings of cirrhosis.                < end of copied text >

## 2019-02-22 NOTE — PROGRESS NOTE ADULT - ASSESSMENT
A 43 y old man with Pmhx of alcoholic cirrhosis , active drinker, Hx of LGIB presented for RBPR. Patient had 3 episodes of RBPR with, last one 3 hours ago, mixed with brown stools, loose in consistency, small to moderate amount, associated with mild llq pain, non radiating. Denies any hematemesis, melena, fever, chills or use of NSAIDS.   No family hx of colon cancer or IBD       # RBPR most likely hemorrhoidal bleed  vs unlikely diverticular bleed   No Drop in Hg , No Bloody BM since yesterday   No signs of upper GI bleed   Keep type and screen active   Protonix 40 po QD  Advance diet today   If no bleeding today can be discharged home and follow as outpatient      # Alcohol use/ Tachycardia and restless    Rule out withdrawal   Librium protocol   MVI   Thiamine     # Alcoholic cirrhosis / Thrombocytopenia/ No signs of decompensation   Last EGD in 2018 no varices   Alcohol cessation   Follow Liver enzymes  IV hydration   Needs outpatient EGD for variceal screening in 1 year ( Last one in 7/2018)     # Hx of Subcentimeter right hepatic lesion on MRI 2018   CT showed a small right hepatic lesion   Check lukasz fetoprotein   needs follow up with liver clinic for routine US abdomen and lukasz fetoprotein every 6 month A 43 y old man with Pmhx of alcoholic cirrhosis , active drinker, Hx of LGIB presented for RBPR. Patient had 3 episodes of RBPR with, last one 3 hours ago, mixed with brown stools, loose in consistency, small to moderate amount, associated with mild llq pain, non radiating. Denies any hematemesis, melena, fever, chills or use of NSAIDS.   No family hx of colon cancer or IBD       # RBPR most likely hemorrhoidal bleed  vs unlikely diverticular bleed   No Drop in Hg , No Bloody BM since yesterday   No signs of upper GI bleed   Keep type and screen active   Protonix 40 po QD  Advance diet today   If no bleeding today can be discharged home and follows as outpatient  at [facility]    # Alcohol use/ Tachycardia and restless    Rule out withdrawal   Librium protocol   MVI   Thiamine     # Alcoholic cirrhosis / Thrombocytopenia/ No signs of decompensation   Last EGD in 2018 no varices   Alcohol cessation   Follow Liver enzymes  IV hydration   Needs outpatient EGD for variceal screening in 1 year ( Last one in 7/2018)     # Hx of Subcentimeter right hepatic lesion on MRI 2018   CT showed a small right hepatic lesion   Check lukasz fetoprotein   needs follow up with liver clinic for routine US abdomen and lukasz fetoprotein every 6 month

## 2019-02-23 LAB
AFP-TM SERPL-MCNC: 8.1 NG/ML — SIGNIFICANT CHANGE UP
VIT B12 SERPL-MCNC: 995 PG/ML — SIGNIFICANT CHANGE UP (ref 232–1245)

## 2019-02-27 DIAGNOSIS — K64.9 UNSPECIFIED HEMORRHOIDS: ICD-10-CM

## 2019-02-27 DIAGNOSIS — K92.2 GASTROINTESTINAL HEMORRHAGE, UNSPECIFIED: ICD-10-CM

## 2019-02-27 DIAGNOSIS — R16.0 HEPATOMEGALY, NOT ELSEWHERE CLASSIFIED: ICD-10-CM

## 2019-02-27 DIAGNOSIS — D64.9 ANEMIA, UNSPECIFIED: ICD-10-CM

## 2019-02-27 DIAGNOSIS — E51.9 THIAMINE DEFICIENCY, UNSPECIFIED: ICD-10-CM

## 2019-02-27 DIAGNOSIS — F10.10 ALCOHOL ABUSE, UNCOMPLICATED: ICD-10-CM

## 2019-02-27 DIAGNOSIS — R04.0 EPISTAXIS: ICD-10-CM

## 2019-02-27 DIAGNOSIS — K70.30 ALCOHOLIC CIRRHOSIS OF LIVER WITHOUT ASCITES: ICD-10-CM

## 2019-02-27 DIAGNOSIS — D69.6 THROMBOCYTOPENIA, UNSPECIFIED: ICD-10-CM

## 2019-07-24 ENCOUNTER — EMERGENCY (EMERGENCY)
Facility: HOSPITAL | Age: 44
LOS: 0 days | Discharge: HOME | End: 2019-07-25
Attending: EMERGENCY MEDICINE
Payer: MEDICAID

## 2019-07-24 VITALS
HEART RATE: 93 BPM | SYSTOLIC BLOOD PRESSURE: 112 MMHG | OXYGEN SATURATION: 95 % | RESPIRATION RATE: 18 BRPM | TEMPERATURE: 99 F | DIASTOLIC BLOOD PRESSURE: 55 MMHG

## 2019-07-24 DIAGNOSIS — R07.9 CHEST PAIN, UNSPECIFIED: ICD-10-CM

## 2019-07-24 DIAGNOSIS — R07.89 OTHER CHEST PAIN: ICD-10-CM

## 2019-07-24 DIAGNOSIS — R10.11 RIGHT UPPER QUADRANT PAIN: ICD-10-CM

## 2019-07-24 DIAGNOSIS — Z98.890 OTHER SPECIFIED POSTPROCEDURAL STATES: Chronic | ICD-10-CM

## 2019-07-24 DIAGNOSIS — K70.30 ALCOHOLIC CIRRHOSIS OF LIVER WITHOUT ASCITES: ICD-10-CM

## 2019-07-24 DIAGNOSIS — F10.10 ALCOHOL ABUSE, UNCOMPLICATED: ICD-10-CM

## 2019-07-24 LAB
ALBUMIN SERPL ELPH-MCNC: 3.1 G/DL — LOW (ref 3.5–5.2)
ALP SERPL-CCNC: 112 U/L — SIGNIFICANT CHANGE UP (ref 30–115)
ALT FLD-CCNC: 24 U/L — SIGNIFICANT CHANGE UP (ref 0–41)
ANION GAP SERPL CALC-SCNC: 12 MMOL/L — SIGNIFICANT CHANGE UP (ref 7–14)
AST SERPL-CCNC: 85 U/L — HIGH (ref 0–41)
BASOPHILS # BLD AUTO: 0.11 K/UL — SIGNIFICANT CHANGE UP (ref 0–0.2)
BASOPHILS NFR BLD AUTO: 1.5 % — HIGH (ref 0–1)
BILIRUB DIRECT SERPL-MCNC: 2.2 MG/DL — HIGH (ref 0–0.2)
BILIRUB INDIRECT FLD-MCNC: 2.2 MG/DL — HIGH (ref 0.2–1.2)
BILIRUB SERPL-MCNC: 4.4 MG/DL — HIGH (ref 0.2–1.2)
BUN SERPL-MCNC: 4 MG/DL — LOW (ref 10–20)
CALCIUM SERPL-MCNC: 8.4 MG/DL — LOW (ref 8.5–10.1)
CHLORIDE SERPL-SCNC: 107 MMOL/L — SIGNIFICANT CHANGE UP (ref 98–110)
CO2 SERPL-SCNC: 20 MMOL/L — SIGNIFICANT CHANGE UP (ref 17–32)
CREAT SERPL-MCNC: 0.5 MG/DL — LOW (ref 0.7–1.5)
EOSINOPHIL # BLD AUTO: 0.07 K/UL — SIGNIFICANT CHANGE UP (ref 0–0.7)
EOSINOPHIL NFR BLD AUTO: 1 % — SIGNIFICANT CHANGE UP (ref 0–8)
GLUCOSE SERPL-MCNC: 104 MG/DL — HIGH (ref 70–99)
HCT VFR BLD CALC: 25.3 % — LOW (ref 42–52)
HGB BLD-MCNC: 8.2 G/DL — LOW (ref 14–18)
IMM GRANULOCYTES NFR BLD AUTO: 0.4 % — HIGH (ref 0.1–0.3)
LACTATE SERPL-SCNC: 1.6 MMOL/L — SIGNIFICANT CHANGE UP (ref 0.5–2.2)
LIDOCAIN IGE QN: 125 U/L — HIGH (ref 7–60)
LYMPHOCYTES # BLD AUTO: 1.18 K/UL — LOW (ref 1.2–3.4)
LYMPHOCYTES # BLD AUTO: 16.1 % — LOW (ref 20.5–51.1)
MCHC RBC-ENTMCNC: 29.6 PG — SIGNIFICANT CHANGE UP (ref 27–31)
MCHC RBC-ENTMCNC: 32.4 G/DL — SIGNIFICANT CHANGE UP (ref 32–37)
MCV RBC AUTO: 91.3 FL — SIGNIFICANT CHANGE UP (ref 80–94)
MONOCYTES # BLD AUTO: 1.21 K/UL — HIGH (ref 0.1–0.6)
MONOCYTES NFR BLD AUTO: 16.5 % — HIGH (ref 1.7–9.3)
NEUTROPHILS # BLD AUTO: 4.75 K/UL — SIGNIFICANT CHANGE UP (ref 1.4–6.5)
NEUTROPHILS NFR BLD AUTO: 64.5 % — SIGNIFICANT CHANGE UP (ref 42.2–75.2)
NRBC # BLD: 0 /100 WBCS — SIGNIFICANT CHANGE UP (ref 0–0)
PLATELET # BLD AUTO: 112 K/UL — LOW (ref 130–400)
POTASSIUM SERPL-MCNC: 3.7 MMOL/L — SIGNIFICANT CHANGE UP (ref 3.5–5)
POTASSIUM SERPL-SCNC: 3.7 MMOL/L — SIGNIFICANT CHANGE UP (ref 3.5–5)
PROT SERPL-MCNC: 8.4 G/DL — HIGH (ref 6–8)
RBC # BLD: 2.77 M/UL — LOW (ref 4.7–6.1)
RBC # FLD: 19.4 % — HIGH (ref 11.5–14.5)
SODIUM SERPL-SCNC: 139 MMOL/L — SIGNIFICANT CHANGE UP (ref 135–146)
TROPONIN T SERPL-MCNC: <0.01 NG/ML — SIGNIFICANT CHANGE UP
WBC # BLD: 7.35 K/UL — SIGNIFICANT CHANGE UP (ref 4.8–10.8)
WBC # FLD AUTO: 7.35 K/UL — SIGNIFICANT CHANGE UP (ref 4.8–10.8)

## 2019-07-24 PROCEDURE — 93010 ELECTROCARDIOGRAM REPORT: CPT

## 2019-07-24 PROCEDURE — 99285 EMERGENCY DEPT VISIT HI MDM: CPT

## 2019-07-24 PROCEDURE — 73030 X-RAY EXAM OF SHOULDER: CPT | Mod: 26,LT

## 2019-07-24 PROCEDURE — 71046 X-RAY EXAM CHEST 2 VIEWS: CPT | Mod: 26

## 2019-07-24 PROCEDURE — 99283 EMERGENCY DEPT VISIT LOW MDM: CPT

## 2019-07-24 RX ORDER — SODIUM CHLORIDE 9 MG/ML
3 INJECTION INTRAMUSCULAR; INTRAVENOUS; SUBCUTANEOUS EVERY 8 HOURS
Refills: 0 | Status: DISCONTINUED | OUTPATIENT
Start: 2019-07-24 | End: 2019-07-25

## 2019-07-24 RX ADMIN — SODIUM CHLORIDE 3 MILLILITER(S): 9 INJECTION INTRAMUSCULAR; INTRAVENOUS; SUBCUTANEOUS at 23:37

## 2019-07-24 NOTE — ED ADULT NURSE NOTE - NSIMPLEMENTINTERV_GEN_ALL_ED
Implemented All Universal Safety Interventions:  [place] to call system. Call bell, personal items and telephone within reach. Instruct patient to call for assistance. Room bathroom lighting operational. Non-slip footwear when patient is off stretcher. Physically safe environment: no spills, clutter or unnecessary equipment. Stretcher in lowest position, wheels locked, appropriate side rails in place.
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
## 2019-07-24 NOTE — ED PROVIDER NOTE - CLINICAL SUMMARY MEDICAL DECISION MAKING FREE TEXT BOX
Patient presented with generalized pain and alcohol intoxication. labs, ekg, ct done. Found to have liver cirrhosis and cholecystitis. Patient seen by surgery and states that likely not cholecystitis. Patient discharged with pmd and surgery follow up. Return precautions discussed.
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
## 2019-07-24 NOTE — ED PROVIDER NOTE - CARE PLAN
Principal Discharge DX:	Liver cirrhosis, alcoholic  Secondary Diagnosis:	ETOH abuse  Secondary Diagnosis:	Abdominal pain

## 2019-07-24 NOTE — ED PROVIDER NOTE - NSFOLLOWUPCLINICS_GEN_ALL_ED_FT
[facility]  Detox Mgmt  [address] [place] [address]  Phone: [phone]  Fax:   Follow Up Time: Routine [facility]  Detox Mgmt  [address] [place] [address]  Phone: [phone]  Fax:   Follow Up Time: Routine    [facility]  Surgery  [address] [address]  Phone: [phone]  Fax:   Follow Up Time: 4-6 Days

## 2019-07-24 NOTE — ED ADULT NURSE NOTE - OBJECTIVE STATEMENT
Pt presents c/o chest pain , midsternal chest pain that radiates to the left arm that started this morning .Pt AO x4 , reports 7/10 chest pain , denies headache , denies dizziness , denies palpitations , denies nausea , no vomiting .Hx alcohol abuse , admits last drink was 0300 this morning , admits drinking 3 bottles o9f beer every day , no tremors noted , not diaphoretic , no seizure episode , darin , no restlessness noted Pt presents c/o chest pain , midsternal chest pain that radiates to the left arm that started this morning .Pt AO x4 , reports 7/10 chest pain , denies headache , denies dizziness , denies palpitations , denies nausea , no vomiting .Hx alcohol abuse , admits last drink was 0300 this morning , admits drinking 3 bottles o9f beer every day , no tremors noted , not diaphoretic , no seizure episode , no restlessness noted , noted left upper arm bruising from home , denies any fall episode this time , no labored breathing , no cough noted , no SOB , calm , no hallucination , no anxiousness noted , no restlessness noted , denies skin itchiness

## 2019-07-25 VITALS — OXYGEN SATURATION: 98 % | TEMPERATURE: 98 F | RESPIRATION RATE: 18 BRPM

## 2019-07-25 PROCEDURE — 74177 CT ABD & PELVIS W/CONTRAST: CPT | Mod: 26

## 2019-07-25 PROCEDURE — 71260 CT THORAX DX C+: CPT | Mod: 26

## 2019-07-25 NOTE — PROGRESS NOTE ADULT - SUBJECTIVE AND OBJECTIVE BOX
GENERAL SURGERY PROGRESS NOTE     DESIRE SWITF  [address] [place] [address] day :1d    Surgical Attending: Sergei  Overnight events:    T(F): 98.4 (07-25-19 @ 07:57), Max: 99.4 (07-25-19 @ 07:31)  HR: 91 (07-25-19 @ 07:31) (87 - 93)  BP: 119/67 (07-25-19 @ 07:31) (112/55 - 119/85)  ABP: --  ABP(mean): --  RR: 18 (07-25-19 @ 07:57) (18 - 19)  SpO2: 98% (07-25-19 @ 07:57) (95% - 98%)      DIET/FLUIDS: sodium chloride 0.9% lock flush 3 milliLiter(s) IV Push every 8 hours    NG: PHYSICAL EXAM:  GENERAL: NAD, well-appearing  CHEST/LUNG: Clear to auscultation bilaterally  HEART: Regular rate and rhythm  ABDOMEN: Soft, Nontender, Nondistended; ecchymosis over the RUQ  EXTREMITIES:  ecchymosis over bilateral LEs      LABS  Labs:  CAPILLARY BLOOD GLUCOSE      POCT Blood Glucose.: 103 mg/dL (25 Jul 2019 07:47)                          8.2    7.35  )-----------( 112      ( 24 Jul 2019 18:59 )             25.3       Auto Neutrophil %: 64.5 % (07-24-19 @ 18:59)  Auto Immature Granulocyte %: 0.4 % (07-24-19 @ 18:59)    07-24    139  |  107  |  4<L>  ----------------------------<  104<H>  3.7   |  20  |  0.5<L>      Calcium, Total Serum: 8.4 mg/dL (07-24-19 @ 18:59)      LFTs:             8.4  | 4.4  | 85       ------------------[112     ( 24 Jul 2019 18:59 )  3.1  | 2.2  | 24          Lipase:125    Amylase:x         Lactate, Blood: 1.6 mmol/L (07-24-19 @ 18:59)      Coags:    CARDIAC MARKERS ( 24 Jul 2019 18:59 )  x     / <0.01 ng/mL / x     / x     / x              RADIOLOGY & ADDITIONAL TESTS:  < from: CT Chest w/ IV Cont (07.25.19 @ 03:01) >      IMPRESSION:     Gallbladder is dilated, with internal stones and trace pericholecystic   fluid as well as mild wall thickening, suggestive of acute cholecystitis   in the appropriate clinical setting.    Bibasilar atelectasis/consolidation, possibly secondary to poor   respiratory effort/"splinting"    Main pulmonary artery dilated to 3.5 cm, which may be seen in the setting   of pulmonary arterial hypertension and coronary atherosclerosis    Bilateral gynecomastia.    < end of copied text >

## 2019-07-25 NOTE — PROGRESS NOTE ADULT - ASSESSMENT
A/P:  45 y/o male with PMHx of ETOH abuse, liver cirrhosis presents to ED c/o of left sided chest pain for 1 day. reports subjective fever and chills. CTAP remarkable for dilated gallbladder, with internal stones and trace pericholecystic fluid as well as mild wall thickening, suggestive of acute cholecystitis. He has chronically abnormal LFTs    Plan:   No surgical intervention needed at this time   CTAP finding likely due to liver cirrhosis  Abnormal LFTs are chronic  f/u RUQ US

## 2019-07-25 NOTE — ED ADULT NURSE REASSESSMENT NOTE - NS ED NURSE REASSESS COMMENT FT1
Denies any chest pain this time , denies any abdominal pain , denies nausea , no vomiting noted , AO x 4 , no labored breathing , no tremors , not diaphoretic , no seizure episode , no seizures , darin , no agittaion noted , no restlessness noted, noted left upper arm bruising upon ER admission
pt a&ox4, denies CP/SOB. states his last drink was 7/24 at 4AM, IV's intact. fall risk bracelet on pt, pt refusing MA. pt educated on fall risk precautions, CB in place. IK=562.

## 2019-07-25 NOTE — CONSULT NOTE ADULT - ASSESSMENT
43 y/o male with PMHx of ETOH abuse, liver cirrhosis presents to ED c/o of left sided chest pain for 1 day. reports subjective fever and chills. CTAP remarkable for dilated gallbladder, with internal stones and trace pericholecystic fluid as well as mild wall thickening, suggestive of acute cholecystitis. He has chronically abnormal LFTs    Plan:   No surgical intervention needed at this time   CTAP finding likely due to liver cirrhosis  Abnormal LFTs are chronic 43 y/o male with PMHx of ETOH abuse, liver cirrhosis presents to ED c/o of left sided chest pain for 1 day. reports subjective fever and chills. CTAP remarkable for dilated gallbladder, with internal stones and trace pericholecystic fluid as well as mild wall thickening, suggestive of acute cholecystitis. He has chronically abnormal LFTs    Plan:   No surgical intervention needed at this time   CTAP finding likely due to liver cirrhosis  Abnormal LFTs are chronic    Senior Resident Note:  - Above Note has been Reviewed and Edited

## 2019-07-25 NOTE — CONSULT NOTE ADULT - ATTENDING COMMENTS
Patient seen and examined with surgery team on rounds and discussed management plans. No surgical intervention doubt acute cholecystitis, + gall stones.

## 2019-08-02 ENCOUNTER — EMERGENCY (EMERGENCY)
Facility: HOSPITAL | Age: 44
LOS: 0 days | Discharge: HOME | End: 2019-08-03
Attending: EMERGENCY MEDICINE | Admitting: EMERGENCY MEDICINE
Payer: MEDICAID

## 2019-08-02 VITALS
RESPIRATION RATE: 18 BRPM | SYSTOLIC BLOOD PRESSURE: 146 MMHG | HEART RATE: 96 BPM | TEMPERATURE: 98 F | DIASTOLIC BLOOD PRESSURE: 78 MMHG | OXYGEN SATURATION: 97 %

## 2019-08-02 DIAGNOSIS — L03.115 CELLULITIS OF RIGHT LOWER LIMB: ICD-10-CM

## 2019-08-02 DIAGNOSIS — M79.669 PAIN IN UNSPECIFIED LOWER LEG: ICD-10-CM

## 2019-08-02 DIAGNOSIS — Z98.890 OTHER SPECIFIED POSTPROCEDURAL STATES: Chronic | ICD-10-CM

## 2019-08-02 PROCEDURE — 99283 EMERGENCY DEPT VISIT LOW MDM: CPT

## 2019-08-02 NOTE — ED ADULT NURSE NOTE - OBJECTIVE STATEMENT
Pt presents c/o aneudy lower leg pain , swelling , redness/discoloration x 2 weeks .Pt reports right lower leg pain mild redness/discoloration and warm to touch , pt reports 8/10 aneudy lower leg pain  x 2 weeks intermittent , pt denies any numbness , denies any tingling sensation , + CMS , ambulatory , moves all extremities , + aneudy pedal pulses .Pt AO x 4 ,no SOB , denies chest pain , denies abdominal pain , denies neck pain , denies back pain , denies headache , denies dizziness

## 2019-08-02 NOTE — ED ADULT NURSE NOTE - NS ED NOTE ABUSE RESPONSE YN
Yes Routine  care  Anticipatory guidance  Support breastfeeding  CCHD PTD  When d/c to f/u with PMD in 1-2 days Routine  care  Anticipatory guidance  Encourage BF  Tc bili at 36 hrs  OAE, CCHD, [place] screen PTD

## 2019-08-03 VITALS
RESPIRATION RATE: 18 BRPM | HEART RATE: 82 BPM | SYSTOLIC BLOOD PRESSURE: 138 MMHG | TEMPERATURE: 98 F | OXYGEN SATURATION: 99 %

## 2019-08-03 PROCEDURE — 73590 X-RAY EXAM OF LOWER LEG: CPT | Mod: 26,RT

## 2019-08-03 RX ADMIN — Medication 450 MILLIGRAM(S): at 01:21
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
## 2019-08-03 NOTE — ED PROVIDER NOTE - NSFOLLOWUPCLINICS_GEN_ALL_ED_FT
[facility]  Medicine  [address] [place], [place]   Phone: [phone]  Fax:   Follow Up Time: 7-10 Days

## 2019-08-03 NOTE — ED PROVIDER NOTE - PHYSICAL EXAMINATION
CONSTITUTIONAL: Well-developed; well-nourished; in no acute distress, nontoxic appearing  SKIN: + warmth to touch on the right anterior shin, no palpable fluctuance, distal pulses intact  HEAD: Normocephalic; atraumatic.  EYES: PERRL, 3 mm bilateral, no nystagmus, EOM intact; conjunctiva and sclera clear.  ENT: MMM, no nasal congestion  NECK: Supple; non tender.+ full passive ROM in all directions. No JVD  CARD: S1, S2 normal, no murmur  RESP: No wheezes, rales or rhonchi. Good air movement bilaterally  ABD: soft; non-distended; non-tender. No Rebound, No guarding  EXT: Normal ROM.    NEURO: awake, alert, following commands, oriented, grossly unremarkable. No Focal deficits. GCS 15.   PSYCH: Cooperative, appropriate.

## 2019-08-03 NOTE — ED PROVIDER NOTE - NS ED ROS FT
Review of Systems    Constitutional: (-) fever  Heent: No complaints  Cardiovascular: (-) chest pain, (-) syncope  Respiratory: (-) cough, (-) shortness of breath  Gastrointestinal: (-) vomiting, (-) diarrhea, (-) abdominal pain  Musculoskeletal: (-) neck pain, (-) back pain, (-) joint pain  Integumentary: As per HPI  Neurological: (-) headache, (-) altered mental status

## 2019-09-16 ENCOUNTER — APPOINTMENT (OUTPATIENT)
Dept: INTERNAL MEDICINE | Facility: CLINIC | Age: 44
End: 2019-09-16

## 2019-11-30 ENCOUNTER — INPATIENT (INPATIENT)
Facility: HOSPITAL | Age: 44
LOS: 12 days | Discharge: HOME | End: 2019-12-13
Attending: STUDENT IN AN ORGANIZED HEALTH CARE EDUCATION/TRAINING PROGRAM | Admitting: STUDENT IN AN ORGANIZED HEALTH CARE EDUCATION/TRAINING PROGRAM
Payer: MEDICAID

## 2019-11-30 VITALS
DIASTOLIC BLOOD PRESSURE: 73 MMHG | TEMPERATURE: 99 F | SYSTOLIC BLOOD PRESSURE: 129 MMHG | RESPIRATION RATE: 22 BRPM | HEART RATE: 105 BPM | OXYGEN SATURATION: 99 %

## 2019-11-30 DIAGNOSIS — Z98.890 OTHER SPECIFIED POSTPROCEDURAL STATES: Chronic | ICD-10-CM

## 2019-11-30 LAB
ALBUMIN SERPL ELPH-MCNC: 2.3 G/DL — LOW (ref 3.5–5.2)
ALP SERPL-CCNC: 226 U/L — HIGH (ref 30–115)
ALT FLD-CCNC: 69 U/L — HIGH (ref 0–41)
ANION GAP SERPL CALC-SCNC: 12 MMOL/L — SIGNIFICANT CHANGE UP (ref 7–14)
ANISOCYTOSIS BLD QL: SLIGHT — SIGNIFICANT CHANGE UP
APTT BLD: 44.6 SEC — HIGH (ref 27–39.2)
APTT BLD: 45.9 SEC — HIGH (ref 27–39.2)
AST SERPL-CCNC: 186 U/L — HIGH (ref 0–41)
BASOPHILS # BLD AUTO: 0 K/UL — SIGNIFICANT CHANGE UP (ref 0–0.2)
BASOPHILS # BLD AUTO: 0.04 K/UL — SIGNIFICANT CHANGE UP (ref 0–0.2)
BASOPHILS NFR BLD AUTO: 0 % — SIGNIFICANT CHANGE UP (ref 0–1)
BASOPHILS NFR BLD AUTO: 0.2 % — SIGNIFICANT CHANGE UP (ref 0–1)
BILIRUB SERPL-MCNC: 13.4 MG/DL — HIGH (ref 0.2–1.2)
BUN SERPL-MCNC: 11 MG/DL — SIGNIFICANT CHANGE UP (ref 10–20)
CALCIUM SERPL-MCNC: 7.7 MG/DL — LOW (ref 8.5–10.1)
CHLORIDE SERPL-SCNC: 96 MMOL/L — LOW (ref 98–110)
CO2 SERPL-SCNC: 19 MMOL/L — SIGNIFICANT CHANGE UP (ref 17–32)
CREAT SERPL-MCNC: 0.7 MG/DL — SIGNIFICANT CHANGE UP (ref 0.7–1.5)
EOSINOPHIL # BLD AUTO: 0.17 K/UL — SIGNIFICANT CHANGE UP (ref 0–0.7)
EOSINOPHIL # BLD AUTO: 0.2 K/UL — SIGNIFICANT CHANGE UP (ref 0–0.7)
EOSINOPHIL NFR BLD AUTO: 0.9 % — SIGNIFICANT CHANGE UP (ref 0–8)
EOSINOPHIL NFR BLD AUTO: 1.2 % — SIGNIFICANT CHANGE UP (ref 0–8)
ETHANOL SERPL-MCNC: <10 MG/DL — SIGNIFICANT CHANGE UP
GLUCOSE SERPL-MCNC: 90 MG/DL — SIGNIFICANT CHANGE UP (ref 70–99)
HCT VFR BLD CALC: 26.7 % — LOW (ref 42–52)
HCT VFR BLD CALC: 28 % — LOW (ref 42–52)
HGB BLD-MCNC: 8.8 G/DL — LOW (ref 14–18)
HGB BLD-MCNC: 9.1 G/DL — LOW (ref 14–18)
HYPOCHROMIA BLD QL: SLIGHT — SIGNIFICANT CHANGE UP
IMM GRANULOCYTES NFR BLD AUTO: 0.8 % — HIGH (ref 0.1–0.3)
INR BLD: 3.06 RATIO — HIGH (ref 0.65–1.3)
INR BLD: 3.15 RATIO — HIGH (ref 0.65–1.3)
LACTATE SERPL-SCNC: 1.6 MMOL/L — SIGNIFICANT CHANGE UP (ref 0.7–2)
LIDOCAIN IGE QN: 102 U/L — HIGH (ref 7–60)
LYMPHOCYTES # BLD AUTO: 0.64 K/UL — LOW (ref 1.2–3.4)
LYMPHOCYTES # BLD AUTO: 1.53 K/UL — SIGNIFICANT CHANGE UP (ref 1.2–3.4)
LYMPHOCYTES # BLD AUTO: 3.5 % — LOW (ref 20.5–51.1)
LYMPHOCYTES # BLD AUTO: 9.5 % — LOW (ref 20.5–51.1)
MACROCYTES BLD QL: SLIGHT — SIGNIFICANT CHANGE UP
MAGNESIUM SERPL-MCNC: 1.9 MG/DL — SIGNIFICANT CHANGE UP (ref 1.8–2.4)
MANUAL SMEAR VERIFICATION: SIGNIFICANT CHANGE UP
MCHC RBC-ENTMCNC: 29.3 PG — SIGNIFICANT CHANGE UP (ref 27–31)
MCHC RBC-ENTMCNC: 29.7 PG — SIGNIFICANT CHANGE UP (ref 27–31)
MCHC RBC-ENTMCNC: 32.5 G/DL — SIGNIFICANT CHANGE UP (ref 32–37)
MCHC RBC-ENTMCNC: 33 G/DL — SIGNIFICANT CHANGE UP (ref 32–37)
MCV RBC AUTO: 90 FL — SIGNIFICANT CHANGE UP (ref 80–94)
MCV RBC AUTO: 90.2 FL — SIGNIFICANT CHANGE UP (ref 80–94)
MONOCYTES # BLD AUTO: 1.27 K/UL — HIGH (ref 0.1–0.6)
MONOCYTES # BLD AUTO: 1.44 K/UL — HIGH (ref 0.1–0.6)
MONOCYTES NFR BLD AUTO: 6.9 % — SIGNIFICANT CHANGE UP (ref 1.7–9.3)
MONOCYTES NFR BLD AUTO: 9 % — SIGNIFICANT CHANGE UP (ref 1.7–9.3)
NEUTROPHILS # BLD AUTO: 12.73 K/UL — HIGH (ref 1.4–6.5)
NEUTROPHILS # BLD AUTO: 16.29 K/UL — HIGH (ref 1.4–6.5)
NEUTROPHILS NFR BLD AUTO: 79.3 % — HIGH (ref 42.2–75.2)
NEUTROPHILS NFR BLD AUTO: 88.7 % — HIGH (ref 42.2–75.2)
NRBC # BLD: 0 /100 WBCS — SIGNIFICANT CHANGE UP (ref 0–0)
PLAT MORPH BLD: NORMAL — SIGNIFICANT CHANGE UP
PLATELET # BLD AUTO: 124 K/UL — LOW (ref 130–400)
PLATELET # BLD AUTO: 127 K/UL — LOW (ref 130–400)
POIKILOCYTOSIS BLD QL AUTO: SLIGHT — SIGNIFICANT CHANGE UP
POTASSIUM SERPL-MCNC: 5.8 MMOL/L — HIGH (ref 3.5–5)
POTASSIUM SERPL-SCNC: 5.8 MMOL/L — HIGH (ref 3.5–5)
PROT SERPL-MCNC: 7.5 G/DL — SIGNIFICANT CHANGE UP (ref 6–8)
PROTHROM AB SERPL-ACNC: 34.8 SEC — HIGH (ref 9.95–12.87)
PROTHROM AB SERPL-ACNC: 35.8 SEC — HIGH (ref 9.95–12.87)
RBC # BLD: 2.96 M/UL — LOW (ref 4.7–6.1)
RBC # BLD: 3.11 M/UL — LOW (ref 4.7–6.1)
RBC # FLD: 20.9 % — HIGH (ref 11.5–14.5)
RBC # FLD: 20.9 % — HIGH (ref 11.5–14.5)
RBC BLD AUTO: ABNORMAL
SODIUM SERPL-SCNC: 127 MMOL/L — LOW (ref 135–146)
TROPONIN T SERPL-MCNC: <0.01 NG/ML — SIGNIFICANT CHANGE UP
WBC # BLD: 16.07 K/UL — HIGH (ref 4.8–10.8)
WBC # BLD: 18.36 K/UL — HIGH (ref 4.8–10.8)
WBC # FLD AUTO: 16.07 K/UL — HIGH (ref 4.8–10.8)
WBC # FLD AUTO: 18.36 K/UL — HIGH (ref 4.8–10.8)

## 2019-11-30 PROCEDURE — 71045 X-RAY EXAM CHEST 1 VIEW: CPT | Mod: 26

## 2019-11-30 PROCEDURE — 93010 ELECTROCARDIOGRAM REPORT: CPT

## 2019-11-30 PROCEDURE — 99285 EMERGENCY DEPT VISIT HI MDM: CPT

## 2019-11-30 RX ADMIN — Medication 50 MILLIGRAM(S): at 22:18

## 2019-11-30 NOTE — H&P ADULT - NSHPREVIEWOFSYSTEMS_GEN_ALL_CORE
CONSTITUTIONAL: +weakness, +fevers or chills + weight loss from 239 to 176 over 2 months  EYES/ENT: icteric sclera;  No vertigo or throat pain   NECK: No pain or stiffness  RESPIRATORY: No cough, wheezing, hemoptysis; +shortness of breath  CARDIOVASCULAR: No chest pain or palpitations  GASTROINTESTINAL: see HPI  GENITOURINARY: No dysuria, frequency or hematuria  NEUROLOGICAL: No numbness or weakness  SKIN: +itching, + jaundice no rashes

## 2019-11-30 NOTE — H&P ADULT - NSHPPHYSICALEXAM_GEN_ALL_CORE
Vital Signs Last 24 Hrs  T(C): 36.8 (30 Nov 2019 23:51), Max: 37.1 (30 Nov 2019 15:43)  T(F): 98.2 (30 Nov 2019 23:51), Max: 98.8 (30 Nov 2019 21:53)  HR: 80 (30 Nov 2019 23:51) (80 - 105)  BP: 106/60 (30 Nov 2019 23:51) (106/60 - 129/73)  RR: 20 (30 Nov 2019 23:51) (20 - 22)  SpO2: 100% (30 Nov 2019 23:51) (99% - 100%)    GENERAL: mild respiratory distress  HEAD:  Atraumatic, Normocephalic  EYES: icteric sclera  NECK: Supple, No JVD  CHEST/LUNG: Clear to auscultation bilaterally; No wheeze;    HEART: Regular rate and rhythm; No murmurs;   ABDOMEN: distended; Bowel sounds present; No guarding, tense, dull to percussion, mild tenderness  EXTREMITIES: +3 pitting edema  PSYCH: AAOx3  NEUROLOGY: non-focal, no asterixis  SKIN: No rashes or lesions

## 2019-11-30 NOTE — H&P ADULT - ASSESSMENT
a 44 year old man with history of alcoholic cirrhosis presented for shortness of breath of 4 days duration, associated with increase abdominal girth and lower extremities edema.    *Acute alcoholic hepatitis  -in chronically cirrhotic patient  -MELD-Na=33, Maddrey taylc=199.9, CPS=11, score C  -alcohol abstinence  -Ascites  	*s/p diagnostic tap, alb, protein, cell count, culture, gram stain and cytology were sent  	*check official US  	*if no SBP, start prednisone 40mg, and consider therapeutic para?  	*Salt restriction  -No encephalopathy  -had EGD in [facility], denies varices?  -the patient has coagulopathy and thrombocytopenia, will start vitamin K trial (less likely to be beneficial)   -check US and alpha feto protein   -will need EGD report to check for varices    *Anemia and blood per rectum in the setting of coagulopathy  -check iron stores, folate and b12  -had colono at [facility], try to get the results  -keep active type and screen    *Leukocytosis  -UA  -ascitic cell count  -blood culture were sent    *Alcohol abuse  -last drink 2 days ago, level is low  -WA protocol  -monitor electrolytes  -DC telemetry in am if no episodes of withdrawal    *Suspected thiamine deficiency, will replete  *Suspected folate deficiency, will replete     *DVT ppx: SCD for now, check lower ext duplex  *Full code

## 2019-11-30 NOTE — H&P ADULT - NSHPLABSRESULTS_GEN_ALL_CORE
Data:                         8.8    16.07 )-----------( 124      ( 30 Nov 2019 23:20 )             26.7     Hgb Trend:  8.8  11-30-19 @ 23:20  9.1  11-30-19 @ 17:00      11-30    130<L>  |  95<L>  |  10  ----------------------------<  84  4.6   |  18  |  0.5<L>    Ca    7.6<L>      30 Nov 2019 23:20  Phos  3.3     11-30  Mg     1.9     11-30    TPro  6.8  /  Alb  2.0<L>  /  TBili  12.6<H>  /  DBili  8.3<H>  /  AST  120<H>  /  ALT  61<H>  /  AlkPhos  216<H>  11-30    Liver panel trend:  TBili 12.6   /      /   ALT 61   /   AlkP 216   /   Tptn 6.8   /   Alb 2.0    /   DBili 8.3      11-30  TBili 13.4   /      /   ALT 69   /   AlkP 226   /   Tptn 7.5   /   Alb 2.3    /   DBili --      11-30      PT/INR - ( 30 Nov 2019 23:20 )   PT: 34.80 sec;   INR: 3.06 ratio      PTT - ( 30 Nov 2019 23:20 )  PTT:45.9 sec    Radiology:   CXR: elevated right diaphragm, no infiltrates    EKG sinus, RAD? narrow Q wave in III

## 2019-11-30 NOTE — ED PROVIDER NOTE - OBJECTIVE STATEMENT
44y M pmh as listed presents for eval of sob. Pt states he was diagnosed with cirrhosis 1yr ago and has not seeked tx in over a yr, starting 1wk ago his abdomen began to extended putting pressure on his chest making him sob, aggravated by lying supine, no relieving factors. Associated pruritis, abd pain, b/l LE swelling. Denies fever, ha, cp, weakness, numbness, n/v, bleeding

## 2019-11-30 NOTE — ED PROVIDER NOTE - PHYSICAL EXAMINATION
CONST: nad  EYES: Icteric sclerae. PERRL, EOMI  ENT: No nasal discharge. Oropharynx normal appearing, no erythema or exudates. No abscess or swelling. Uvula midline.   NECK: Non-tender, no meningeal signs. normal ROM. supple   CARD: S1 S2; No jvd  RESP: Equal BS B/L, No wheezes, rhonchi or rales. No distress  GI: Distended tense abdomen, (+) fluid wave. no cva tenderness. normal BS  MS: B/L LE edema. Normal ROM in all extremities. pulses 1 +.   SKIN: Superficial self inflicted abrasion from itching  NEURO: A&Ox3, No focal deficits. Strength 5/5 with no sensory deficits. Finger to nose intact. Negative pronator drift

## 2019-11-30 NOTE — ED PROVIDER NOTE - NS ED ROS FT
Constitutional: (-) fever  Eyes/ENT: (-) blurry vision, (-) epistaxis  Cardiovascular: (-) chest pain, (-) syncope  Respiratory: (+) shortness of breath  Gastrointestinal: (+) distension, (-) vomiting, (-) diarrhea  Musculoskeletal: (-) neck pain, (-) back pain, (-) joint pain  Integumentary: (+) edema  Neurological: (-) headache, (-) altered mental status  Allergic/Immunologic: (+) pruritus

## 2019-11-30 NOTE — H&P ADULT - NSICDXFAMILYHX_GEN_ALL_CORE_FT
FAMILY HISTORY:  Father  Still living? Unknown  Family history of stroke, Age at diagnosis: 61-70    Mother  Still living? No  Family history of stroke, Age at diagnosis: 41-50

## 2019-11-30 NOTE — ED ADULT NURSE NOTE - CHIEF COMPLAINT QUOTE
BIBA from home with shortness of breath, weakness, yellow sclera  and abdominal distention  with edema to B/L LE. pt. dx with liver Cirrhosis / pt. also states has not moved his bowels

## 2019-11-30 NOTE — ED ADULT TRIAGE NOTE - CHIEF COMPLAINT QUOTE
BIBA from home with shortness of breath, weakness, yellow sclera  and abdominal distention pt. dx with liver Cirrhosis / pt. also states has not moved his bowels BIBA from home with shortness of breath, weakness, yellow sclera  and abdominal distention  with edema to B/L LE. pt. dx with liver Cirrhosis / pt. also states has not moved his bowels

## 2019-11-30 NOTE — ED PROVIDER NOTE - ATTENDING CONTRIBUTION TO CARE
43 yo male h/o ETOH abuse, last drink reportedly 1 month ago, liver cirrhosis due to alcohol, thrombocytopenia, anemia c/o SOB especially in the supine positions for a few weeks, now his legs and abdomen became swollen as well.  Patient is not compliant with follow up, recently  [place] he [place] for 2 days for DTs ( November 5-6).  Denies any fever, chills, cough, CP, abdominal pain, N/V , no change in urination, no change in appetite.  Middle aged male appears chronically ill, but in NAD, PERRL, dry oral mucosa, no acute respiratory distress, in the semi supine position, equal air entry, RRR, distal pulses intact, abdomen soft, rotund and non-tender to palpation, jaundice and skin stigmata of liver disease, + pitting leg edema of both legs, no calf ttp, awake and alert x3, no gross neuro deficit.

## 2019-11-30 NOTE — H&P ADULT - HISTORY OF PRESENT ILLNESS
a 44 year old man with history of alcoholic cirrhosis presented for shortness of breath of 4 days duration, associated with increase abdominal girth and lower extremities edema. he also reported orthopnea, abdominal pain (generalized dull, non radiating), generalized itching and jaundice. he endorsed subjective fever and chills. no cough, no chest pain  the patient last drink was 30% of a bottle of Michell with a friend and before that it was 2 months ago. he also has fresh blood per rectum, minimal amount, a 44 year old man with history of alcoholic cirrhosis presented for shortness of breath of 4 days duration, associated with increase abdominal girth and lower extremities edema. he also reported orthopnea, abdominal pain (generalized dull, non radiating), generalized itching and jaundice. he endorsed subjective fever and chills. no cough, no chest pain  the patient last drink was 30% of a bottle of Michell with a friend and before that it was 2 months ago. he also has fresh blood per rectum, minimal amount, chronic.  the patient lives with his aunt, has no enough social support ( from wife and children), he has been in rehab program and was sober for a while then started drinking again. he used to follow at [facility] for hepatology.

## 2019-11-30 NOTE — H&P ADULT - ATTENDING COMMENTS
a 44 year old man with history of alcoholic cirrhosis presented for shortness of breath of 4 days duration, associated with increase abdominal girth and lower extremities edema. he also reported orthopnea, abdominal pain (generalized dull, non radiating), generalized itching and jaundice. he endorsed subjective fever and chills. no cough, no chest pain  the patient last drink was 30% of a bottle of Michell with a friend and before that it was 2 months ago. he also has fresh blood per rectum, minimal amount, chronic.  the patient lives with his aunt, has no enough social support ( from wife and children), he has been in rehab program and was sober for a while then started drinking again. he used to follow at [facility] for hepatology.     REVIEW OF SYSTEMS: see cc/HPI  CONSTITUTIONAL: No weakness, fevers or chills  EYES/ENT: No visual changes;  No vertigo or throat pain   NECK: No pain or stiffness  RESPIRATORY: No cough, wheezing, hemoptysis; No shortness of breath  CARDIOVASCULAR: No chest pain or palpitations  GASTROINTESTINAL: No abdominal or epigastric pain. No nausea, vomiting, or hematemesis; No diarrhea or constipation. No melena or hematochezia.  GENITOURINARY: No dysuria, frequency or hematuria  NEUROLOGICAL: No numbness or weakness  SKIN: No itching, rashes    Physical Exam:  General: WN/WD NAD  Neurology: A&Ox3, nonfocal, follows commands  Eyes: PERRLA/ EOMI  ENT/Neck: Neck supple, trachea midline, No JVD  Respiratory: B/L decreased breath sounds at bases, mild tachypnea,  No wheezing, rales, rhonchi  CV: Normal rate regular rhythm, S1S2, no murmurs, rubs or gallops  Abdominal: Soft, distended and mild to mod diff tenderness, +BS,   Extremities: No edema, + peripheral pulses  Skin: No Rashes, Hematoma, Ecchymosis  Incisions: n/a  Tubes: n/a a 44 year old man with history of alcoholic cirrhosis presented for shortness of breath of 4 days duration, associated with increase abdominal girth and lower extremities edema. he also reported orthopnea, abdominal pain (generalized dull, non radiating), generalized itching and jaundice. he endorsed subjective fever and chills. no cough, no chest pain  the patient last drink was 30% of a bottle of Michell with a friend and before that it was 2 months ago. he also has fresh blood per rectum, minimal amount, chronic.  the patient lives with his aunt, has no enough social support ( from wife and children), he has been in rehab program and was sober for a while then started drinking again. he used to follow at [facility] for hepatology.     REVIEW OF SYSTEMS: see cc/HPI  CONSTITUTIONAL: No weakness, fevers or chills  EYES/ENT: No visual changes;  No vertigo or throat pain   NECK: No pain or stiffness  RESPIRATORY: No cough, wheezing, hemoptysis; No shortness of breath  CARDIOVASCULAR: No chest pain or palpitations  GASTROINTESTINAL: No abdominal or epigastric pain. No nausea, vomiting, or hematemesis; No diarrhea or constipation. No melena or hematochezia.  GENITOURINARY: No dysuria, frequency or hematuria  NEUROLOGICAL: No numbness or weakness  SKIN: No itching, rashes    Physical Exam:  General: WN/WD NAD  Neurology: A&Ox3, nonfocal, follows commands  Eyes: PERRLA/ EOMI  ENT/Neck: Neck supple, trachea midline, No JVD  Respiratory: B/L decreased breath sounds at bases, mild tachypnea,  No wheezing, rales, rhonchi  CV: Normal rate regular rhythm, S1S2, no murmurs, rubs or gallops  Abdominal: Soft, distended and mild to mod diff tenderness, +BS,   Extremities: No edema, + peripheral pulses  Skin: No Rashes, Hematoma, Ecchymosis  Incisions: n/a  Tubes: n/a    A/P  Acute alcoholic hepatitis / cirrhosis w/ MELD -Na=33   Abdominal pain r/o SBP  -check diagnostic tap r/o SBP  -check U/S and alpha feto potein level   -lasix and spironolactone  - agree w/ pred / therapeutic tap / Albumin if no SBP  -GI eval   -serial CMP and CBC     Coagulopathy 2/2 cirrhosis and Anemia of chronic disease   -check iron studies, B12 and Folate   -Vit K supplementation and recheck INR    Alcohol abuse - recent relapse  -CIWA and ativan PRN     suspected vitamin def   -supplement     DVT prophylaxis

## 2019-12-01 LAB
ALBUMIN SERPL ELPH-MCNC: 1.9 G/DL — LOW (ref 3.5–5.2)
ALBUMIN SERPL ELPH-MCNC: 2 G/DL — LOW (ref 3.5–5.2)
ALP SERPL-CCNC: 194 U/L — HIGH (ref 30–115)
ALP SERPL-CCNC: 216 U/L — HIGH (ref 30–115)
ALT FLD-CCNC: 61 U/L — HIGH (ref 0–41)
ALT FLD-CCNC: 64 U/L — HIGH (ref 0–41)
ANION GAP SERPL CALC-SCNC: 12 MMOL/L — SIGNIFICANT CHANGE UP (ref 7–14)
ANION GAP SERPL CALC-SCNC: 17 MMOL/L — HIGH (ref 7–14)
APPEARANCE UR: ABNORMAL
APTT BLD: 47.4 SEC — HIGH (ref 27–39.2)
AST SERPL-CCNC: 120 U/L — HIGH (ref 0–41)
AST SERPL-CCNC: 126 U/L — HIGH (ref 0–41)
B PERT IGG+IGM PNL SER: CLEAR — SIGNIFICANT CHANGE UP
BASOPHILS # BLD AUTO: 0.05 K/UL — SIGNIFICANT CHANGE UP (ref 0–0.2)
BASOPHILS NFR BLD AUTO: 0.3 % — SIGNIFICANT CHANGE UP (ref 0–1)
BILIRUB DIRECT SERPL-MCNC: 10.2 MG/DL — HIGH (ref 0–0.2)
BILIRUB DIRECT SERPL-MCNC: 8.3 MG/DL — HIGH (ref 0–0.2)
BILIRUB INDIRECT FLD-MCNC: 4.3 MG/DL — HIGH (ref 0.2–1.2)
BILIRUB INDIRECT FLD-MCNC: 4.3 MG/DL — HIGH (ref 0.2–1.2)
BILIRUB SERPL-MCNC: 12.6 MG/DL — HIGH (ref 0.2–1.2)
BILIRUB SERPL-MCNC: 14.5 MG/DL — HIGH (ref 0.2–1.2)
BILIRUB UR-MCNC: ABNORMAL
BUN SERPL-MCNC: 10 MG/DL — SIGNIFICANT CHANGE UP (ref 10–20)
BUN SERPL-MCNC: 10 MG/DL — SIGNIFICANT CHANGE UP (ref 10–20)
CALCIUM SERPL-MCNC: 7.6 MG/DL — LOW (ref 8.5–10.1)
CALCIUM SERPL-MCNC: 7.9 MG/DL — LOW (ref 8.5–10.1)
CHLORIDE SERPL-SCNC: 95 MMOL/L — LOW (ref 98–110)
CHLORIDE SERPL-SCNC: 96 MMOL/L — LOW (ref 98–110)
CO2 SERPL-SCNC: 18 MMOL/L — SIGNIFICANT CHANGE UP (ref 17–32)
CO2 SERPL-SCNC: 19 MMOL/L — SIGNIFICANT CHANGE UP (ref 17–32)
COLOR FLD: YELLOW — SIGNIFICANT CHANGE UP
COLOR SPEC: ABNORMAL
CREAT SERPL-MCNC: 0.5 MG/DL — LOW (ref 0.7–1.5)
CREAT SERPL-MCNC: 0.6 MG/DL — LOW (ref 0.7–1.5)
DIFF PNL FLD: NEGATIVE — SIGNIFICANT CHANGE UP
EOSINOPHIL # BLD AUTO: 0.14 K/UL — SIGNIFICANT CHANGE UP (ref 0–0.7)
EOSINOPHIL NFR BLD AUTO: 0.9 % — SIGNIFICANT CHANGE UP (ref 0–8)
FLUID INTAKE SUBSTANCE CLASS: SIGNIFICANT CHANGE UP
FLUID SEGMENTED GRANULOCYTES: SIGNIFICANT CHANGE UP %
GLUCOSE SERPL-MCNC: 128 MG/DL — HIGH (ref 70–99)
GLUCOSE SERPL-MCNC: 84 MG/DL — SIGNIFICANT CHANGE UP (ref 70–99)
GLUCOSE UR QL: NEGATIVE — SIGNIFICANT CHANGE UP
GRAM STN FLD: SIGNIFICANT CHANGE UP
HCT VFR BLD CALC: 27.4 % — LOW (ref 42–52)
HGB BLD-MCNC: 8.9 G/DL — LOW (ref 14–18)
IMM GRANULOCYTES NFR BLD AUTO: 0.9 % — HIGH (ref 0.1–0.3)
INR BLD: 3.06 RATIO — HIGH (ref 0.65–1.3)
IRON SATN MFR SERPL: 22 % — SIGNIFICANT CHANGE UP (ref 15–50)
IRON SATN MFR SERPL: 33 UG/DL — LOW (ref 35–150)
KETONES UR-MCNC: NEGATIVE — SIGNIFICANT CHANGE UP
LDH SERPL L TO P-CCNC: 269 U/L — HIGH (ref 50–242)
LEUKOCYTE ESTERASE UR-ACNC: NEGATIVE — SIGNIFICANT CHANGE UP
LYMPHOCYTES # BLD AUTO: 1.41 K/UL — SIGNIFICANT CHANGE UP (ref 1.2–3.4)
LYMPHOCYTES # BLD AUTO: 8.8 % — LOW (ref 20.5–51.1)
LYMPHOCYTES # FLD: SIGNIFICANT CHANGE UP
MAGNESIUM SERPL-MCNC: 1.9 MG/DL — SIGNIFICANT CHANGE UP (ref 1.8–2.4)
MAGNESIUM SERPL-MCNC: 1.9 MG/DL — SIGNIFICANT CHANGE UP (ref 1.8–2.4)
MCHC RBC-ENTMCNC: 29.6 PG — SIGNIFICANT CHANGE UP (ref 27–31)
MCHC RBC-ENTMCNC: 32.5 G/DL — SIGNIFICANT CHANGE UP (ref 32–37)
MCV RBC AUTO: 91 FL — SIGNIFICANT CHANGE UP (ref 80–94)
MONOCYTES # BLD AUTO: 0.99 K/UL — HIGH (ref 0.1–0.6)
MONOCYTES NFR BLD AUTO: 6.2 % — SIGNIFICANT CHANGE UP (ref 1.7–9.3)
MONOS+MACROS # FLD: SIGNIFICANT CHANGE UP %
NEUTROPHILS # BLD AUTO: 13.32 K/UL — HIGH (ref 1.4–6.5)
NEUTROPHILS NFR BLD AUTO: 82.9 % — HIGH (ref 42.2–75.2)
NITRITE UR-MCNC: NEGATIVE — SIGNIFICANT CHANGE UP
NRBC # BLD: 0 /100 WBCS — SIGNIFICANT CHANGE UP (ref 0–0)
PH UR: 6.5 — SIGNIFICANT CHANGE UP (ref 5–8)
PHOSPHATE SERPL-MCNC: 3.3 MG/DL — SIGNIFICANT CHANGE UP (ref 2.1–4.9)
PLATELET # BLD AUTO: 120 K/UL — LOW (ref 130–400)
POTASSIUM SERPL-MCNC: 4.4 MMOL/L — SIGNIFICANT CHANGE UP (ref 3.5–5)
POTASSIUM SERPL-MCNC: 4.6 MMOL/L — SIGNIFICANT CHANGE UP (ref 3.5–5)
POTASSIUM SERPL-SCNC: 4.4 MMOL/L — SIGNIFICANT CHANGE UP (ref 3.5–5)
POTASSIUM SERPL-SCNC: 4.6 MMOL/L — SIGNIFICANT CHANGE UP (ref 3.5–5)
PROT SERPL-MCNC: 6.8 G/DL — SIGNIFICANT CHANGE UP (ref 6–8)
PROT SERPL-MCNC: 6.8 G/DL — SIGNIFICANT CHANGE UP (ref 6–8)
PROT UR-MCNC: SIGNIFICANT CHANGE UP
PROTHROM AB SERPL-ACNC: 34.8 SEC — HIGH (ref 9.95–12.87)
RBC # BLD: 3.01 M/UL — LOW (ref 4.7–6.1)
RBC # FLD: 20.7 % — HIGH (ref 11.5–14.5)
RCV VOL RI: 0 /UL — SIGNIFICANT CHANGE UP (ref 0–0)
SODIUM SERPL-SCNC: 127 MMOL/L — LOW (ref 135–146)
SODIUM SERPL-SCNC: 130 MMOL/L — LOW (ref 135–146)
SP GR SPEC: 1.02 — SIGNIFICANT CHANGE UP (ref 1.01–1.02)
SPECIMEN SOURCE: SIGNIFICANT CHANGE UP
TIBC SERPL-MCNC: 147 UG/DL — LOW (ref 220–430)
TOTAL NUCLEATED CELL COUNT, BODY FLUID: 82 /UL — SIGNIFICANT CHANGE UP
TUBE TYPE: SIGNIFICANT CHANGE UP
UIBC SERPL-MCNC: 114 UG/DL — SIGNIFICANT CHANGE UP (ref 110–370)
UROBILINOGEN FLD QL: ABNORMAL
WBC # BLD: 16.06 K/UL — HIGH (ref 4.8–10.8)
WBC # FLD AUTO: 16.06 K/UL — HIGH (ref 4.8–10.8)

## 2019-12-01 PROCEDURE — 93010 ELECTROCARDIOGRAM REPORT: CPT

## 2019-12-01 PROCEDURE — 76700 US EXAM ABDOM COMPLETE: CPT | Mod: 26

## 2019-12-01 PROCEDURE — 93970 EXTREMITY STUDY: CPT | Mod: 26

## 2019-12-01 PROCEDURE — 99252 IP/OBS CONSLTJ NEW/EST SF 35: CPT | Mod: GC

## 2019-12-01 PROCEDURE — 99223 1ST HOSP IP/OBS HIGH 75: CPT

## 2019-12-01 RX ORDER — CHLORHEXIDINE GLUCONATE 213 G/1000ML
1 SOLUTION TOPICAL DAILY
Refills: 0 | Status: DISCONTINUED | OUTPATIENT
Start: 2019-12-01 | End: 2019-12-13

## 2019-12-01 RX ORDER — PHYTONADIONE (VIT K1) 5 MG
5 TABLET ORAL ONCE
Refills: 0 | Status: COMPLETED | OUTPATIENT
Start: 2019-12-01 | End: 2019-12-01

## 2019-12-01 RX ORDER — SPIRONOLACTONE 25 MG/1
100 TABLET, FILM COATED ORAL DAILY
Refills: 0 | Status: DISCONTINUED | OUTPATIENT
Start: 2019-12-01 | End: 2019-12-04

## 2019-12-01 RX ORDER — FOLIC ACID 0.8 MG
1 TABLET ORAL DAILY
Refills: 0 | Status: DISCONTINUED | OUTPATIENT
Start: 2019-12-01 | End: 2019-12-13

## 2019-12-01 RX ORDER — FUROSEMIDE 40 MG
40 TABLET ORAL ONCE
Refills: 0 | Status: COMPLETED | OUTPATIENT
Start: 2019-12-01 | End: 2019-12-01

## 2019-12-01 RX ORDER — INFLUENZA VIRUS VACCINE 15; 15; 15; 15 UG/.5ML; UG/.5ML; UG/.5ML; UG/.5ML
0.5 SUSPENSION INTRAMUSCULAR ONCE
Refills: 0 | Status: COMPLETED | OUTPATIENT
Start: 2019-12-01 | End: 2019-12-13

## 2019-12-01 RX ORDER — THIAMINE MONONITRATE (VIT B1) 100 MG
100 TABLET ORAL DAILY
Refills: 0 | Status: DISCONTINUED | OUTPATIENT
Start: 2019-12-01 | End: 2019-12-13

## 2019-12-01 RX ORDER — PHYTONADIONE (VIT K1) 5 MG
5 TABLET ORAL DAILY
Refills: 0 | Status: DISCONTINUED | OUTPATIENT
Start: 2019-12-01 | End: 2019-12-01

## 2019-12-01 RX ORDER — LIDOCAINE HCL 20 MG/ML
5 VIAL (ML) INJECTION ONCE
Refills: 0 | Status: COMPLETED | OUTPATIENT
Start: 2019-12-01 | End: 2019-12-01

## 2019-12-01 RX ADMIN — Medication 40 MILLIGRAM(S): at 11:36

## 2019-12-01 RX ADMIN — Medication 1 TABLET(S): at 11:35

## 2019-12-01 RX ADMIN — SPIRONOLACTONE 100 MILLIGRAM(S): 25 TABLET, FILM COATED ORAL at 11:35

## 2019-12-01 RX ADMIN — Medication 100 MILLIGRAM(S): at 11:35

## 2019-12-01 RX ADMIN — Medication 5 MILLIGRAM(S): at 11:35

## 2019-12-01 RX ADMIN — Medication 1 MILLIGRAM(S): at 11:35

## 2019-12-01 NOTE — CONSULT NOTE ADULT - SUBJECTIVE AND OBJECTIVE BOX
GI HPI:  44 year old man with history of alcoholic cirrhosis presented for shortness of breath of 4 days duration, associated with increase abdominal girth and lower extremities edema. he also reported orthopnea, abdominal pain (generalized dull, non radiating), generalized itching and jaundice. he denies fever and chills  the patient last drink was 3 days ago  and had binge drinking 3 weeks ago. . he also has fresh blood per rectum, minimal amount, chronic since 1.5 year   the patient lives with his aunt, has no enough social support ( from wife and children), he has been in rehab program and was sober for a while then started drinking again. he used to follow at [facility] for hepatology, next appoitment in january as per pt     Previous EGD:7/2018 no varices, esophagitis and gastritis     Previous colonoscopy: 7/2018 Hemorrhoids, anal fistula and Mild diverticulosis     PAST MEDICAL & SURGICAL HISTORY  Liver cirrhosis, alcoholic  Anemia  Thrombocytopenia  ETOH abuse  History of incision and drainage: Gluteal abscess        SOCIAL HISTORY:  smoker: non smoker  Alcohol: +++ alcoholic last drink 3 days ago   Drug: Denies use of drugs   FAMILY HISTORY:  FAMILY HISTORY:  Family history of stroke (Father, Mother)      ALLERGIES:  No Known Allergies      MEDICATIONS:  MEDICATIONS  (STANDING):  chlorhexidine 4% Liquid 1 Application(s) Topical daily  folic acid 1 milliGRAM(s) Oral daily  multivitamin 1 Tablet(s) Oral daily  phytonadione   Solution 5 milliGRAM(s) Oral once  thiamine 100 milliGRAM(s) Oral daily    MEDICATIONS  (PRN):  chlordiazePOXIDE 50 milliGRAM(s) Oral every 1 hour PRN Symptom-triggered: each CIWA -Ar score 8 or GREATER      HOME MEDICATIONS:  Home Medications:      ROS:     REVIEW OF SYSTEMS  General:  No fevers  Eyes:  No reported pain   ENT:  No sore throat   NECK: No stiffness   CV:  No chest pain   Resp:  ++++shortness of breath  GI:  See HPI  :  No dysuria  Muscle:  ++weakness  Neuro:  No tingling  Endocrine:  No polyuria  Heme:  No ecchymosis          VITALS:   T(F): 97.2 (12-01 @ 07:48), Max: 98.8 (11-30 @ 21:53)  HR: 96 (12-01 @ 07:48) (80 - 105)  BP: 104/56 (12-01 @ 07:48) (104/56 - 129/73)  BP(mean): --  RR: 18 (12-01 @ 07:48) (18 - 22)  SpO2: 96% (12-01 @ 07:48) (96% - 100%)    I&O's Summary      PHYSICAL EXAM:  GENERAL:  Appears in no distress  HEENT:  Conjunctivae  icteric  CHEST:  Full & symmetric excursion  HEART:  N S1, S2  ABDOMEN:  Soft, diffuse tenderness , +++++distended  EXTEREMITIES:  no  edema  SKIN:  No rash  NEURO:  Alert, No asterixis         LABS:                        8.8    16.07 )-----------( 124      ( 30 Nov 2019 23:20 )             26.7     PT/INR - ( 30 Nov 2019 23:20 )  INR: 3.06          PTT - ( 30 Nov 2019 23:20 )  PTT:45.9<H>  LIVER FUNCTIONS - ( 30 Nov 2019 23:20 )  Alb: 2.0 g/dL / Pro: 6.8 g/dL / ALK PHOS: 216 U/L / ALT: 61 U/L / AST: 120 U/L / GGT: x           11-30    130<L>  |  95<L>  |  10  ----------------------------<  84  4.6   |  18  |  0.5<L>    Ca    7.6<L>      30 Nov 2019 23:20  Phos  3.3     11-30  Mg     1.9     11-30      CARDIAC MARKERS ( 30 Nov 2019 17:00 )  x     / <0.01 ng/mL / x     / x     / x                    RADIOLOGY:  < from: US Abdomen Complete (12.01.19 @ 02:06) >    EXAM:  US ABDOMEN COMPLETE            PROCEDURE DATE:  12/01/2019            INTERPRETATION:  CLINICAL STATEMENT: Liver cirrhosis..    COMPARISON: CT abdomen and pelvis July 25, 2019, MR abdomen July 16, 2018..    PROCEDURE: Ultrasound of the abdomen was performed.    FINDINGS:    LIVER: Cirrhotic liver, enlarged measuring 20.6 cm in length with   increased parenchymal echogenicity.  Limited examination sonographic   evaluation did not identify focal hepatic mass.     GALLBLADDER/BILIARY TREE:  Cholelithiasis. Nonspecific gallbladder wall   edema and 0.43 cm, may be related to underlying liver disease.  Negative   sonographic Ge's sign.  No biliary ductal dilation. Common bile duct   measures 7 mm, which is mildly dilated.     PANCREAS:  Obscured by overlying bowel gas    SPLEEN:  Enlarged, 14.3 cm in length    KIDNEYS:  Right kidney measures 13.6 cm and left kidney measures 13.2  cm   in length.  No hydronephrosis, stone or solid mass.    AORTA/IVC:  Obscured by overlying bowel gas    ASCITES:  Moderate ascites.    IMPRESSION:    Liver cirrhosis. Limited examination sonographic evaluation did not   identify focal hepatic mass. Consider outpatient MRI abdomen with and   without IV contrast if tumor suspected.      Cholelithiasis. Nonspecific gallbladder wall edema and 0.43 cm, may be   related to underlying liver disease.    Mildly dilated common bile duct at 0.7 cm.    Splenomegaly.    Moderate ascites.                  BOAZ CHILDS M.D., ATTENDING RADIOLOGIST  This document has been electronically signed. Dec  1 2019  2:13AM                < end of copied text >

## 2019-12-01 NOTE — PROGRESS NOTE ADULT - SUBJECTIVE AND OBJECTIVE BOX
DESIRE SWIFT 44y Male  MRN#: [id number]     SUBJECTIVE  Patient is a 44y old Male who presents with a chief complaint of Shortness of breath (01 Dec 2019 10:23)  Currently admitted to medicine with the primary diagnosis of Alcoholic cirrhosis of liver with ascites    OBJECTIVE  PAST MEDICAL & SURGICAL HISTORY  Liver cirrhosis, alcoholic  Anemia  Thrombocytopenia  ETOH abuse  History of incision and drainage: Gluteal abscess    ALLERGIES:  No Known Allergies    MEDICATIONS:  STANDING MEDICATIONS  chlorhexidine 4% Liquid 1 Application(s) Topical daily  folic acid 1 milliGRAM(s) Oral daily  influenza   Vaccine 0.5 milliLiter(s) IntraMuscular once  multivitamin 1 Tablet(s) Oral daily  spironolactone 100 milliGRAM(s) Oral daily  thiamine 100 milliGRAM(s) Oral daily    PRN MEDICATIONS  chlordiazePOXIDE 50 milliGRAM(s) Oral every 1 hour PRN      VITAL SIGNS: Last 24 Hours  T(C): 36.2 (01 Dec 2019 15:46), Max: 37.1 (2019 21:53)  T(F): 97.1 (01 Dec 2019 15:46), Max: 98.8 (2019 21:53)  HR: 104 (01 Dec 2019 15:46) (80 - 104)  BP: 104/55 (01 Dec 2019 15:46) (104/55 - 129/64)  BP(mean): --  RR: 18 (01 Dec 2019 15:46) (18 - 20)  SpO2: 98% (01 Dec 2019 15:46) (96% - 100%)    LABS:                        8.9    16.06 )-----------( 120      ( 01 Dec 2019 11:18 )             27.4     12    127<L>  |  96<L>  |  10  ----------------------------<  128<H>  4.4   |  19  |  0.6<L>    Ca    7.9<L>      01 Dec 2019 11:18  Phos  3.3       Mg     1.9         TPro  6.8  /  Alb  1.9<L>  /  TBili  14.5<H>  /  DBili  10.2<H>  /  AST  126<H>  /  ALT  64<H>  /  AlkPhos  194<H>      PT/INR - ( 01 Dec 2019 11:18 )   PT: 34.80 sec;   INR: 3.06 ratio         PTT - ( 01 Dec 2019 11:18 )  PTT:47.4 sec  Urinalysis Basic - ( 2019 18:15 )    Color: Yuli / Appearance: Slightly Turbid / S.017 / pH: x  Gluc: x / Ketone: Negative  / Bili: Large / Urobili: 6 mg/dL   Blood: x / Protein: Trace / Nitrite: Negative   Leuk Esterase: Negative / RBC: 3 /HPF / WBC 0 /HPF   Sq Epi: x / Non Sq Epi: 0 /HPF / Bacteria: Negative    Culture - Body Fluid with Gram Stain (collected 2019 11:36)  Source: .Body Fluid Abdominal Fluid  Gram Stain (01 Dec 2019 11:12):    polymorphonuclear leukocytes per low power field    No organisms seen per oil power field    by cytocentrifuge      CARDIAC MARKERS ( 2019 17:00 )  x     / <0.01 ng/mL / x     / x     / x          PHYSICAL EXAM:  	GENERAL: mild respiratory distress  	HEAD:  Atraumatic, Normocephalic  	EYES: icteric sclera  	NECK: Supple, No JVD  	CHEST/LUNG: Clear to auscultation bilaterally; No wheeze;    	HEART: Regular rate and rhythm; No murmurs;   	ABDOMEN: distended; Bowel sounds present; No guarding, tense, dull to percussion, mild tenderness  	EXTREMITIES: +3 pitting edema  	PSYCH: AAOx3  	NEUROLOGY: non-focal, no asterixis  SKIN: No rashes or lesions

## 2019-12-01 NOTE — PROGRESS NOTE ADULT - ASSESSMENT
44 year old man with history of alcoholic cirrhosis presented for shortness of breath of 4 days duration, associated with increase abdominal girth and lower extremities edema.    *Acute alcoholic hepatitis  -in chronically cirrhotic patient  -MELD-Na=33, Maddrey vmxcn=391.9, CPS=11, score C  -alcohol abstinence  -Diagnostic tap was done yesterday overnight,  was negative for SBP  -Therapeutic tap done, 4L of fluids removed  -Monitor the vital signs and any signs of bleeding  -No encephalopathy  -Vit K was given  -US showed moderate ascites  -will need EGD report to check for varices  -GI follow up    *Anemia and blood per rectum in the setting of coagulopathy  -Follow up CBC  -keep active type and screen    *Leukocytosis  -F/U blood culture   -UA negative, noSBP on tap    *Alcohol abuse  -last drink 2 days ago, level is low  -CIWA protocol  -monitor electrolytes      *DVT ppx: SCD for now, follow up duplex  *Full code

## 2019-12-01 NOTE — CONSULT NOTE ADULT - SUBJECTIVE AND OBJECTIVE BOX
44 year old male with PMHx of alcoholic cirrhosis, anemia, thrombocytopenia and history of gluteal abscess presented to the ED for shortness of breath x 4 days associated with increase in abdominal girth and lower extremity edema. Patient states his last drink was recently after a two month hiatus of alcohol. Surgery was consulted for left gluteal pain patient states began approximately 1 day ago. Patient reports a history of multiple gluteal abscess that required incision and drainage in the past. States it has gotten worse since admission to hospital due to increased time laying down on the area.     PAST MEDICAL & SURGICAL HISTORY:  Liver cirrhosis, alcoholic  Anemia  Thrombocytopenia  ETOH abuse  History of incision and drainage: Gluteal abscess    MEDICATIONS  (STANDING):  chlorhexidine 4% Liquid 1 Application(s) Topical daily  folic acid 1 milliGRAM(s) Oral daily  influenza   Vaccine 0.5 milliLiter(s) IntraMuscular once  multivitamin 1 Tablet(s) Oral daily  spironolactone 100 milliGRAM(s) Oral daily  thiamine 100 milliGRAM(s) Oral daily    MEDICATIONS  (PRN):  chlordiazePOXIDE 50 milliGRAM(s) Oral every 1 hour PRN Symptom-triggered: each CIWA -Ar score 8 or GREATER      Allergies: No Known Allergies    Intolerances        REVIEW OF SYSTEMS    [x] A ten-point review of systems was otherwise negative except as noted.  [ ] Due to altered mental status/intubation, subjective information were not able to be obtained from the patient. History was obtained, to the extent possible, from review of the chart and collateral sources of information.      Vital Signs Last 24 Hrs  T(C): 36.2 (01 Dec 2019 15:46), Max: 36.8 (2019 23:51)  T(F): 97.1 (01 Dec 2019 15:46), Max: 98.2 (2019 23:51)  HR: 104 (01 Dec 2019 15:46) (80 - 104)  BP: 104/55 (01 Dec 2019 15:46) (104/55 - 129/64)  BP(mean): --  RR: 18 (01 Dec 2019 15:46) (18 - 20)  SpO2: 98% (01 Dec 2019 15:46) (96% - 100%)    PHYSICAL EXAM:  GENERAL: NAD, well-appearing  CHEST/LUNG: Clear to auscultation bilaterally  HEART: Regular rate and rhythm  ABDOMEN: Soft, Nontender, Nondistended;   EXTREMITIES:  No clubbing, cyanosis, or edema  RECTAL: multiple scars from previous I&Ds, mild induration of left gluteal region overlying previous scar that is TTP, no overlying erythema or fluctuance      LABS:  CAPILLARY BLOOD GLUCOSE                              8.9    16.06 )-----------( 120      ( 01 Dec 2019 11:18 )             27.4       Auto Neutrophil %: 82.9 % (19 @ 11:18)  Auto Immature Granulocyte %: 0.9 % (19 @ 11:18)        127<L>  |  96<L>  |  10  ----------------------------<  128<H>  4.4   |  19  |  0.6<L>      Calcium, Total Serum: 7.9 mg/dL (19 @ 11:18)      LFTs:             6.8  | 14.5 | 126      ------------------[194     ( 01 Dec 2019 11:18 )  1.9  | 10.2 | 64          Lipase:x      Amylase:x         Lactate, Blood: 1.6 mmol/L (19 @ 17:00)      Coags:     34.80  ----< 3.06    ( 01 Dec 2019 11:18 )     47.4        CARDIAC MARKERS ( 2019 17:00 )  x     / <0.01 ng/mL / x     / x     / x          Urinalysis Basic - ( 2019 18:15 )    Color: Yuli / Appearance: Slightly Turbid / S.017 / pH: x  Gluc: x / Ketone: Negative  / Bili: Large / Urobili: 6 mg/dL   Blood: x / Protein: Trace / Nitrite: Negative   Leuk Esterase: Negative / RBC: 3 /HPF / WBC 0 /HPF   Sq Epi: x / Non Sq Epi: 0 /HPF / Bacteria: Negative    Culture - Body Fluid with Gram Stain (collected 2019 11:36)  Source: .Body Fluid Abdominal Fluid  Gram Stain (01 Dec 2019 11:12):    polymorphonuclear leukocytes per low power field    No organisms seen per oil power field    by cytocentrifuge          RADIOLOGY & ADDITIONAL STUDIES:  < from: Xray Chest 1 View AP/PA (19 @ 18:02) >  Impression:    Low lung volume.   Bibasilar opacities/effusions.       < from: US Abdomen Complete (19 @ 02:06) >  IMPRESSION:    Liver cirrhosis. Limited examination sonographic evaluation did not   identify focal hepatic mass. Consider outpatient MRI abdomen with and   without IV contrast if tumor suspected.      Cholelithiasis. Nonspecific gallbladder wall edema and 0.43 cm, may be   related to underlying liver disease.    Mildly dilated common bile duct at 0.7 cm.    Splenomegaly.    Moderate ascites.  < end of copied text > 44 year old male with PMHx of alcoholic cirrhosis, anemia, thrombocytopenia and history of gluteal abscess presented to the ED for shortness of breath x 4 days associated with increase in abdominal girth and lower extremity edema. Patient states his last drink was recently after a two month hiatus of alcohol. Surgery was consulted for perineal pain, patient states began approximately 1 day ago. Patient reports a history of multiple gluteal abscess that required incision and drainage in the past. States it has gotten worse since admission to hospital due to increased time laying down on the area.     PAST MEDICAL & SURGICAL HISTORY:  Liver cirrhosis, alcoholic  Anemia  Thrombocytopenia  ETOH abuse  History of incision and drainage: Gluteal abscess    MEDICATIONS  (STANDING):  chlorhexidine 4% Liquid 1 Application(s) Topical daily  folic acid 1 milliGRAM(s) Oral daily  influenza   Vaccine 0.5 milliLiter(s) IntraMuscular once  multivitamin 1 Tablet(s) Oral daily  spironolactone 100 milliGRAM(s) Oral daily  thiamine 100 milliGRAM(s) Oral daily    MEDICATIONS  (PRN):  chlordiazePOXIDE 50 milliGRAM(s) Oral every 1 hour PRN Symptom-triggered: each CIWA -Ar score 8 or GREATER      Allergies: No Known Allergies    Intolerances        REVIEW OF SYSTEMS    [x] A ten-point review of systems was otherwise negative except as noted.  [ ] Due to altered mental status/intubation, subjective information were not able to be obtained from the patient. History was obtained, to the extent possible, from review of the chart and collateral sources of information.      Vital Signs Last 24 Hrs  T(C): 36.2 (01 Dec 2019 15:46), Max: 36.8 (2019 23:51)  T(F): 97.1 (01 Dec 2019 15:46), Max: 98.2 (2019 23:51)  HR: 104 (01 Dec 2019 15:46) (80 - 104)  BP: 104/55 (01 Dec 2019 15:46) (104/55 - 129/64)  BP(mean): --  RR: 18 (01 Dec 2019 15:46) (18 - 20)  SpO2: 98% (01 Dec 2019 15:46) (96% - 100%)    PHYSICAL EXAM:  GENERAL: NAD, well-appearing  CHEST/LUNG: Clear to auscultation bilaterally  HEART: Regular rate and rhythm  ABDOMEN: Soft, Nontender, Nondistended;   EXTREMITIES:  No clubbing, cyanosis, or edema  RECTAL: multiple scars from previous I&Ds, mild induration of perineal region near previous scar that is TTP, no overlying erythema or fluctuance      LABS:  CAPILLARY BLOOD GLUCOSE                              8.9    16.06 )-----------( 120      ( 01 Dec 2019 11:18 )             27.4       Auto Neutrophil %: 82.9 % (19 @ 11:18)  Auto Immature Granulocyte %: 0.9 % (19 @ 11:18)        127<L>  |  96<L>  |  10  ----------------------------<  128<H>  4.4   |  19  |  0.6<L>      Calcium, Total Serum: 7.9 mg/dL (19 @ 11:18)      LFTs:             6.8  | 14.5 | 126      ------------------[194     ( 01 Dec 2019 11:18 )  1.9  | 10.2 | 64          Lipase:x      Amylase:x         Lactate, Blood: 1.6 mmol/L (19 @ 17:00)      Coags:     34.80  ----< 3.06    ( 01 Dec 2019 11:18 )     47.4        CARDIAC MARKERS ( 2019 17:00 )  x     / <0.01 ng/mL / x     / x     / x          Urinalysis Basic - ( 2019 18:15 )    Color: Yuli / Appearance: Slightly Turbid / S.017 / pH: x  Gluc: x / Ketone: Negative  / Bili: Large / Urobili: 6 mg/dL   Blood: x / Protein: Trace / Nitrite: Negative   Leuk Esterase: Negative / RBC: 3 /HPF / WBC 0 /HPF   Sq Epi: x / Non Sq Epi: 0 /HPF / Bacteria: Negative    Culture - Body Fluid with Gram Stain (collected 2019 11:36)  Source: .Body Fluid Abdominal Fluid  Gram Stain (01 Dec 2019 11:12):    polymorphonuclear leukocytes per low power field    No organisms seen per oil power field    by cytocentrifuge          RADIOLOGY & ADDITIONAL STUDIES:  < from: Xray Chest 1 View AP/PA (19 @ 18:02) >  Impression:    Low lung volume.   Bibasilar opacities/effusions.       < from: US Abdomen Complete (19 @ 02:06) >  IMPRESSION:    Liver cirrhosis. Limited examination sonographic evaluation did not   identify focal hepatic mass. Consider outpatient MRI abdomen with and   without IV contrast if tumor suspected.      Cholelithiasis. Nonspecific gallbladder wall edema and 0.43 cm, may be   related to underlying liver disease.    Mildly dilated common bile duct at 0.7 cm.    Splenomegaly.    Moderate ascites.  < end of copied text >

## 2019-12-01 NOTE — CONSULT NOTE ADULT - ASSESSMENT
44 year old male presents to the ED with shortness of breath, s/p paracentesis complaining of left gluteal pain for the past 1 day. Patient has extensive history of multiple gluteal abscess s/p I&D. On exam, left gluteal region is TTP with induration, without overlying erythema or fluctuance. Labs revealed INR of 3.    Plan:  - Begin cipro/flagyl  - Correct coagulopathy  - Adequate pain control  - Will reassess when coagulopathy resolves for possible I&D 44 year old male presents to the ED with shortness of breath, s/p paracentesis complaining of perineal pain for the past 1 day. Patient has extensive history of multiple gluteal abscess s/p I&D. On exam, perineal/gluteal region is TTP with induration, without overlying erythema or fluctuance. Labs revealed INR of 3.    Plan:  - Begin cipro/flagyl  - Correct coagulopathy  - Adequate pain control  - Will reassess when coagulopathy resolves for possible I&D 44 year old male presents to the ED with shortness of breath, s/p paracentesis complaining of perineal pain for the past 1 day. Patient has extensive history of multiple gluteal abscess s/p I&D. On exam, perineal/gluteal region is TTP with induration, without overlying erythema or fluctuance. Labs revealed INR of 3.    Plan:  - Begin cipro/flagyl  - Correct coagulopathy  - Adequate pain control  - Will reassess when coagulopathy resolves for possible I&D    Senior Resident Note  45yo male with decompensated cirrohosis here for paracenteisis with perineal pain and discomprdt   small area of fluctuance 2 cm. in perineum beneath old i and d scar , minimal induration and erythema  wcc 16 however could be multifactorial  inr 3  meld 33, child c  continue abx and warm packs  correct coagulopathy  will then reassess need for i and d  Pt seen and examined  Above note has been reviewed and edited  Plan d/w patient and Dr Linwood Guo

## 2019-12-01 NOTE — CONSULT NOTE ADULT - ASSESSMENT
44 year old man with history of alcoholic cirrhosis presented for shortness of breath of 4 days duration, associated with increase abdominal girth and lower extremities edema.      # Severe Alcoholic Hepatitis   DF: 117 on 12/1   MELD: 32 on 12/1  No SBP on Para   Pt afebrile and no signs of sepsis   Rec:   IV hydration   Start prednisolone 40 mg po once daily if no contraindication   Thiamine and folate  Daily liver enzymes and INR   Avoid hepatotoxic drugs  Thiamine and folate       # Decompensated alcoholic cirrhosis   MELD 32   Large volume ascites  No SBP   No varices on last EGD on 7/2018   Rec:   Large volume therapeutic para with albumin administration (8g/ liter removed)   Follow BMP daily   Low sodium diet   Needs to have MRI liver as outpatient for HCC and Check Christopher fetoprotein   Needs EGD on 7/2020 for variceal screening     # Mild rectal bleed secondary to hemorrhoids   Had COlonoscopy in 2018 that showed Hemorrhoids and Diverticulosis   Hemodynamically stable   No signs of active GI bleed  Rec:  Follow Hg q12h   If Hg stable can have Colonoscopy as outpatient    If Hg keeps dropping and signs of active Bleed will do inpatient colonoscopy once stable

## 2019-12-01 NOTE — PROVIDER CONTACT NOTE (MEDICATION) - SITUATION
Pt. ordered for Vitamin K PO dose from overnight, pt.'s INR 3.06 from last night's labs. Discussed with Dr. Arciniega whether to hold medication or give medication. Give medication as per Dr. Arciniega.

## 2019-12-02 LAB
ALBUMIN FLD-MCNC: <0.2 G/DL — SIGNIFICANT CHANGE UP
ALBUMIN SERPL ELPH-MCNC: 1.8 G/DL — LOW (ref 3.5–5.2)
ALP SERPL-CCNC: 175 U/L — HIGH (ref 30–115)
ALT FLD-CCNC: 61 U/L — HIGH (ref 0–41)
ANION GAP SERPL CALC-SCNC: 12 MMOL/L — SIGNIFICANT CHANGE UP (ref 7–14)
AST SERPL-CCNC: 123 U/L — HIGH (ref 0–41)
BASOPHILS # BLD AUTO: 0.05 K/UL — SIGNIFICANT CHANGE UP (ref 0–0.2)
BASOPHILS NFR BLD AUTO: 0.3 % — SIGNIFICANT CHANGE UP (ref 0–1)
BILIRUB SERPL-MCNC: 13.7 MG/DL — HIGH (ref 0.2–1.2)
BUN SERPL-MCNC: 11 MG/DL — SIGNIFICANT CHANGE UP (ref 10–20)
CALCIUM SERPL-MCNC: 7.4 MG/DL — LOW (ref 8.5–10.1)
CHLORIDE SERPL-SCNC: 94 MMOL/L — LOW (ref 98–110)
CO2 SERPL-SCNC: 19 MMOL/L — SIGNIFICANT CHANGE UP (ref 17–32)
CREAT SERPL-MCNC: 0.6 MG/DL — LOW (ref 0.7–1.5)
EOSINOPHIL # BLD AUTO: 0.15 K/UL — SIGNIFICANT CHANGE UP (ref 0–0.7)
EOSINOPHIL NFR BLD AUTO: 0.9 % — SIGNIFICANT CHANGE UP (ref 0–8)
FERRITIN SERPL-MCNC: 52 NG/ML — SIGNIFICANT CHANGE UP (ref 30–400)
FOLATE SERPL-MCNC: 7.1 NG/ML — SIGNIFICANT CHANGE UP
GLUCOSE SERPL-MCNC: 168 MG/DL — HIGH (ref 70–99)
HCT VFR BLD CALC: 27 % — LOW (ref 42–52)
HCT VFR BLD CALC: 27.6 % — LOW (ref 42–52)
HGB BLD-MCNC: 8.9 G/DL — LOW (ref 14–18)
HGB BLD-MCNC: 9 G/DL — LOW (ref 14–18)
IMM GRANULOCYTES NFR BLD AUTO: 0.7 % — HIGH (ref 0.1–0.3)
INR BLD: 3.09 RATIO — HIGH (ref 0.65–1.3)
INR BLD: 3.25 RATIO — HIGH (ref 0.65–1.3)
LYMPHOCYTES # BLD AUTO: 1.39 K/UL — SIGNIFICANT CHANGE UP (ref 1.2–3.4)
LYMPHOCYTES # BLD AUTO: 8.5 % — LOW (ref 20.5–51.1)
MCHC RBC-ENTMCNC: 29.6 PG — SIGNIFICANT CHANGE UP (ref 27–31)
MCHC RBC-ENTMCNC: 30.2 PG — SIGNIFICANT CHANGE UP (ref 27–31)
MCHC RBC-ENTMCNC: 32.2 G/DL — SIGNIFICANT CHANGE UP (ref 32–37)
MCHC RBC-ENTMCNC: 33.3 G/DL — SIGNIFICANT CHANGE UP (ref 32–37)
MCV RBC AUTO: 90.6 FL — SIGNIFICANT CHANGE UP (ref 80–94)
MCV RBC AUTO: 91.7 FL — SIGNIFICANT CHANGE UP (ref 80–94)
MONOCYTES # BLD AUTO: 1.57 K/UL — HIGH (ref 0.1–0.6)
MONOCYTES NFR BLD AUTO: 9.6 % — HIGH (ref 1.7–9.3)
NEUTROPHILS # BLD AUTO: 13.02 K/UL — HIGH (ref 1.4–6.5)
NEUTROPHILS NFR BLD AUTO: 80 % — HIGH (ref 42.2–75.2)
NRBC # BLD: 0 /100 WBCS — SIGNIFICANT CHANGE UP (ref 0–0)
NRBC # BLD: 0 /100 WBCS — SIGNIFICANT CHANGE UP (ref 0–0)
PLATELET # BLD AUTO: 107 K/UL — LOW (ref 130–400)
PLATELET # BLD AUTO: 116 K/UL — LOW (ref 130–400)
POTASSIUM SERPL-MCNC: 4.1 MMOL/L — SIGNIFICANT CHANGE UP (ref 3.5–5)
POTASSIUM SERPL-SCNC: 4.1 MMOL/L — SIGNIFICANT CHANGE UP (ref 3.5–5)
PROT FLD-MCNC: <1 G/DL — SIGNIFICANT CHANGE UP
PROT SERPL-MCNC: 6.6 G/DL — SIGNIFICANT CHANGE UP (ref 6–8)
PROTHROM AB SERPL-ACNC: 35.1 SEC — HIGH (ref 9.95–12.87)
PROTHROM AB SERPL-ACNC: 36.9 SEC — HIGH (ref 9.95–12.87)
RBC # BLD: 2.98 M/UL — LOW (ref 4.7–6.1)
RBC # BLD: 3.01 M/UL — LOW (ref 4.7–6.1)
RBC # FLD: 21.2 % — HIGH (ref 11.5–14.5)
RBC # FLD: 21.6 % — HIGH (ref 11.5–14.5)
SODIUM SERPL-SCNC: 125 MMOL/L — LOW (ref 135–146)
VIT B12 SERPL-MCNC: >2000 PG/ML — HIGH (ref 232–1245)
WBC # BLD: 14.58 K/UL — HIGH (ref 4.8–10.8)
WBC # BLD: 16.29 K/UL — HIGH (ref 4.8–10.8)
WBC # FLD AUTO: 14.58 K/UL — HIGH (ref 4.8–10.8)
WBC # FLD AUTO: 16.29 K/UL — HIGH (ref 4.8–10.8)

## 2019-12-02 PROCEDURE — 74177 CT ABD & PELVIS W/CONTRAST: CPT | Mod: 26

## 2019-12-02 PROCEDURE — 99233 SBSQ HOSP IP/OBS HIGH 50: CPT

## 2019-12-02 RX ORDER — METRONIDAZOLE 500 MG
500 TABLET ORAL EVERY 8 HOURS
Refills: 0 | Status: DISCONTINUED | OUTPATIENT
Start: 2019-12-02 | End: 2019-12-10

## 2019-12-02 RX ORDER — METRONIDAZOLE 500 MG
TABLET ORAL
Refills: 0 | Status: DISCONTINUED | OUTPATIENT
Start: 2019-12-02 | End: 2019-12-10

## 2019-12-02 RX ORDER — IOHEXOL 300 MG/ML
30 INJECTION, SOLUTION INTRAVENOUS ONCE
Refills: 0 | Status: COMPLETED | OUTPATIENT
Start: 2019-12-02 | End: 2019-12-02

## 2019-12-02 RX ORDER — CEFTRIAXONE 500 MG/1
INJECTION, POWDER, FOR SOLUTION INTRAMUSCULAR; INTRAVENOUS
Refills: 0 | Status: DISCONTINUED | OUTPATIENT
Start: 2019-12-02 | End: 2019-12-02

## 2019-12-02 RX ORDER — SODIUM CHLORIDE 9 MG/ML
1000 INJECTION INTRAMUSCULAR; INTRAVENOUS; SUBCUTANEOUS
Refills: 0 | Status: DISCONTINUED | OUTPATIENT
Start: 2019-12-02 | End: 2019-12-04

## 2019-12-02 RX ORDER — METRONIDAZOLE 500 MG
500 TABLET ORAL ONCE
Refills: 0 | Status: COMPLETED | OUTPATIENT
Start: 2019-12-02 | End: 2019-12-02

## 2019-12-02 RX ORDER — CIPROFLOXACIN LACTATE 400MG/40ML
400 VIAL (ML) INTRAVENOUS EVERY 12 HOURS
Refills: 0 | Status: DISCONTINUED | OUTPATIENT
Start: 2019-12-02 | End: 2019-12-03

## 2019-12-02 RX ORDER — PREDNISOLONE 5 MG
40 TABLET ORAL DAILY
Refills: 0 | Status: DISCONTINUED | OUTPATIENT
Start: 2019-12-02 | End: 2019-12-03

## 2019-12-02 RX ORDER — CEFTRIAXONE 500 MG/1
1000 INJECTION, POWDER, FOR SOLUTION INTRAMUSCULAR; INTRAVENOUS ONCE
Refills: 0 | Status: COMPLETED | OUTPATIENT
Start: 2019-12-02 | End: 2019-12-02

## 2019-12-02 RX ORDER — PHYTONADIONE (VIT K1) 5 MG
5 TABLET ORAL ONCE
Refills: 0 | Status: COMPLETED | OUTPATIENT
Start: 2019-12-02 | End: 2019-12-02

## 2019-12-02 RX ORDER — PHYTONADIONE (VIT K1) 5 MG
5 TABLET ORAL ONCE
Refills: 0 | Status: DISCONTINUED | OUTPATIENT
Start: 2019-12-02 | End: 2019-12-02

## 2019-12-02 RX ORDER — PHYTONADIONE (VIT K1) 5 MG
10 TABLET ORAL ONCE
Refills: 0 | Status: COMPLETED | OUTPATIENT
Start: 2019-12-02 | End: 2019-12-02

## 2019-12-02 RX ADMIN — Medication 1 TABLET(S): at 11:05

## 2019-12-02 RX ADMIN — Medication 200 MILLIGRAM(S): at 17:08

## 2019-12-02 RX ADMIN — SODIUM CHLORIDE 50 MILLILITER(S): 9 INJECTION INTRAMUSCULAR; INTRAVENOUS; SUBCUTANEOUS at 16:41

## 2019-12-02 RX ADMIN — IOHEXOL 30 MILLILITER(S): 300 INJECTION, SOLUTION INTRAVENOUS at 10:35

## 2019-12-02 RX ADMIN — Medication 10 MILLIGRAM(S): at 15:33

## 2019-12-02 RX ADMIN — SODIUM CHLORIDE 50 MILLILITER(S): 9 INJECTION INTRAMUSCULAR; INTRAVENOUS; SUBCUTANEOUS at 21:17

## 2019-12-02 RX ADMIN — Medication 100 MILLIGRAM(S): at 15:26

## 2019-12-02 RX ADMIN — CEFTRIAXONE 100 MILLIGRAM(S): 500 INJECTION, POWDER, FOR SOLUTION INTRAMUSCULAR; INTRAVENOUS at 04:20

## 2019-12-02 RX ADMIN — Medication 40 MILLIGRAM(S): at 15:24

## 2019-12-02 RX ADMIN — CHLORHEXIDINE GLUCONATE 1 APPLICATION(S): 213 SOLUTION TOPICAL at 11:07

## 2019-12-02 RX ADMIN — Medication 100 MILLIGRAM(S): at 21:16

## 2019-12-02 RX ADMIN — SPIRONOLACTONE 100 MILLIGRAM(S): 25 TABLET, FILM COATED ORAL at 05:08

## 2019-12-02 RX ADMIN — Medication 100 MILLIGRAM(S): at 05:07

## 2019-12-02 RX ADMIN — Medication 1 MILLIGRAM(S): at 11:05

## 2019-12-02 RX ADMIN — Medication 100 MILLIGRAM(S): at 11:05

## 2019-12-02 NOTE — SBIRT NOTE ADULT - NSSBIRTALCPASSREFTXDET_GEN_A_CORE
LMSW reviewed results of assessment with Pt, provided support and psychoeducation regarding impact of health on overall health and functioning. Pt had indicated that current consumption had resulted in medical complications, and that he was aware of need for engagement in treatment. LMSW provided Pt with referrals for treatment providers.

## 2019-12-02 NOTE — PROGRESS NOTE ADULT - ATTENDING COMMENTS
Assessment:  44y Male patient admitted S/P Perineal/Gluteal abscess , with the above physical exam, labs, and imaging findings.    Plan:  -Pain control as needed  -Hemodynamic monitoring as per routine  -Abx, Cipro & Flagyl  -warm compresses  -INR of 3, correct coagulapathy then will reassess for surgical intervention

## 2019-12-02 NOTE — PROGRESS NOTE ADULT - SUBJECTIVE AND OBJECTIVE BOX
SUBJECTIVE:    Patient is a 44y old Male who presents with a chief complaint of Shortness of breath (02 Dec 2019 10:09)    Currently admitted to medicine with the primary diagnosis of Alcoholic cirrhosis of liver with ascites     Today is hospital day 2d. This morning he is resting comfortably in bed and reports no new issues or overnight events.   Patient complaining of abdominal fullness and mild SOB.    PAST MEDICAL & SURGICAL HISTORY  Liver cirrhosis, alcoholic  Anemia  Thrombocytopenia  ETOH abuse  History of incision and drainage: Gluteal abscess    SOCIAL HISTORY:  Negative for smoking/alcohol/drug use.     ALLERGIES:  No Known Allergies    MEDICATIONS:  STANDING MEDICATIONS  cefTRIAXone   IVPB      chlorhexidine 4% Liquid 1 Application(s) Topical daily  folic acid 1 milliGRAM(s) Oral daily  influenza   Vaccine 0.5 milliLiter(s) IntraMuscular once  metroNIDAZOLE  IVPB      metroNIDAZOLE  IVPB 500 milliGRAM(s) IV Intermittent every 8 hours  multivitamin 1 Tablet(s) Oral daily  spironolactone 100 milliGRAM(s) Oral daily  thiamine 100 milliGRAM(s) Oral daily    PRN MEDICATIONS  chlordiazePOXIDE 50 milliGRAM(s) Oral every 1 hour PRN    VITALS:   T(F): 98.9  HR: 98  BP: 115/60  RR: 18  SpO2: 97%    LABS:                        9.0    14.58 )-----------( 107      ( 02 Dec 2019 09:05 )             27.0     12-    125<L>  |  94<L>  |  11  ----------------------------<  168<H>  4.1   |  19  |  0.6<L>    Ca    7.4<L>      02 Dec 2019 09:05  Phos  3.3     11-30  Mg     1.9     12    TPro  6.6  /  Alb  1.8<L>  /  TBili  13.7<H>  /  DBili  x   /  AST  123<H>  /  ALT  61<H>  /  AlkPhos  175<H>  12    PT/INR - ( 02 Dec 2019 09:05 )   PT: 35.10 sec;   INR: 3.09 ratio         PTT - ( 01 Dec 2019 11:18 )  PTT:47.4 sec  Urinalysis Basic - ( 2019 18:15 )    Color: Yuli / Appearance: Slightly Turbid / S.017 / pH: x  Gluc: x / Ketone: Negative  / Bili: Large / Urobili: 6 mg/dL   Blood: x / Protein: Trace / Nitrite: Negative   Leuk Esterase: Negative / RBC: 3 /HPF / WBC 0 /HPF   Sq Epi: x / Non Sq Epi: 0 /HPF / Bacteria: Negative            Culture - Body Fluid with Gram Stain (collected 2019 11:36)  Source: .Body Fluid Abdominal Fluid  Gram Stain (01 Dec 2019 11:12):    polymorphonuclear leukocytes per low power field    No organisms seen per oil power field    by cytocentrifuge  Preliminary Report (02 Dec 2019 07:24):    No growth    Culture - Blood (collected 2019 10:37)  Source: .Blood Blood  Preliminary Report (02 Dec 2019 07:01):    No growth to date.      CARDIAC MARKERS ( 2019 17:00 )  x     / <0.01 ng/mL / x     / x     / x        PHYSICAL EXAM:  GEN: Mild respiratory distress, NC/AT, jaundiced, yellow sclera  LUNGS: Clear to auscultation bilaterally   HEART: S1/S2 present. RRR.   ABD: Soft, non-tender, distended.  SKIN:  jaundiced, yellow sclera, perianal abscess on the left buttock  NEURO: AAOX3, moves all extremities, no focal deficits    Intravenous access: Peripheral access  NG tube: None  Joseph Catheter: None

## 2019-12-02 NOTE — CONSULT NOTE ADULT - CONSULT REQUESTED BY NAME
1940 - Bedside and Verbal shift change report given to JENA Bolton RN (oncoming nurse) by PJ Garcia RN (offgoing nurse). Report included the following information SBAR, Procedure Summary, Intake/Output, MAR and Recent Results. 2350 - Verbal shift change report given to HUMAIRA Henry RN (oncoming nurse) by JENA Bolton RN (offgoing nurse). Report included the following information SBAR, Procedure Summary, Intake/Output, MAR and Recent Results. Xin Benavidez

## 2019-12-02 NOTE — PROGRESS NOTE ADULT - ASSESSMENT
Assessment:  44y Male patient admitted S/P Perineal/Gluteal abscess , with the above physical exam, labs, and imaging findings.    Plan:  -Pain control as needed  -Hemodynamic monitoring as per routine  -Abx, Cipro & Flagyl  -warm compresses  -INR of 3, correct coagulapathy then will reassess for surgical intervention    Date/Time: 12-02-19 @ 05:15

## 2019-12-02 NOTE — CONSULT NOTE ADULT - SUBJECTIVE AND OBJECTIVE BOX
JENIFFER DESIRE  44y, Male  Allergy: No Known Allergies      CHIEF COMPLAINT: Shortness of breath (02 Dec 2019 08:22)      HPI:  45y/o male with a past medical history significant for ETOH abuse and alcoholic liver cirrhosis presents to ED complaining of SOB for the past 4 days with associated increased abdominal girth and bilateral LE edema. Pt reports orthopnea, generalized abdominal pain, generalized itching and jaundice. Pt reports recently drinking 30% of a Michell bottle with a friend 2 days ago. Pt also reports small amounts of chronic fresh blood per rectum for the past year and a half. Recent colonoscopy in 2018 showing hemorrhoids and diverticulosis. Pt states he had a perianal abscess 3-4 years ago where he had a drainage done and a repeat I&D few months after. Pt reports current abscess is at the same location.  Denies any trauma or scratching of the area. Denies any fever, chills, nausea, vomiting or diarrhea at this time. ID consulted for evaluation of perianal abscess.     Infectious Diseases History:  Old Micro:    FAMILY HISTORY:  Family history of stroke (Father, Mother)    PAST MEDICAL & SURGICAL HISTORY:  Liver cirrhosis, alcoholic  Anemia  Thrombocytopenia  ETOH abuse  History of incision and drainage: Gluteal abscess      SOCIAL HISTORY    Substance Use (  ) never used  (  ) IVDU (  ) Other:  Tobacco Usage:  (   ) never smoked   (   ) former smoker   (   ) current smoker   Alcohol Usage: (   ) social  (   ) daily use (   ) denies   Alcohol Abuse  Sexual History:       ROS  General: Denies rigors, nightsweats  HEENT: Denies headache, rhinorrhea, sore throat, eye pain  CV: Denies CP, palpitations  PULM: Denies wheezing, hemoptysis, admits to SOB  GI: Denies hematemesis, hematochezia, melena  : Denies discharge, hematuria  MSK: Denies arthralgias, myalgias, admits to LE edema  SKIN: Denies rash, lesions, admits to itching, jaundice  NEURO: Denies paresthesias, weakness  PSYCH: Denies depression, anxiety    VITALS:  T(F): 98.9, Max: 99.2 (19 @ 01:22)  HR: 98  BP: 115/60  RR: 18Vital Signs Last 24 Hrs  T(C): 37.2 (02 Dec 2019 03:41), Max: 37.3 (02 Dec 2019 01:22)  T(F): 98.9 (02 Dec 2019 03:41), Max: 99.2 (02 Dec 2019 01:22)  HR: 98 (02 Dec 2019 05:19) (96 - 104)  BP: 115/60 (02 Dec 2019 05:19) (97/53 - 129/64)  BP(mean): --  RR: 18 (02 Dec 2019 05:19) (18 - 18)  SpO2: 97% (02 Dec 2019 03:50) (95% - 98%)    PHYSICAL EXAM:  Gen: NAD, resting in bed  HEENT: Normocephalic, atraumatic  Neck: supple, no lymphadenopathy  CV: Regular rate & regular rhythm  Lungs: Mild resp distress, use of accessory muscles, RR 18 @ SpO2 97%  Abdomen: Distended abdomen  Ext: b/l LE edema  Neuro: non focal, awake  Skin: jaundiced, yellow sclera, perianal abscess on the left lower cheek, tender to palpation, no drainage at site. Incision scar from a 2nd site noted on the left upper check along the midline.     TESTS & MEASUREMENTS:                        9.0    14.58 )-----------( 107      ( 02 Dec 2019 09:05 )             27.0     12    125<L>  |  94<L>  |  11  ----------------------------<  168<H>  4.1   |  19  |  0.6<L>    Ca    7.4<L>      02 Dec 2019 09:05  Phos  3.3     11-30  Mg     1.9         TPro  6.6  /  Alb  1.8<L>  /  TBili  13.7<H>  /  DBili  x   /  AST  123<H>  /  ALT  61<H>  /  AlkPhos  175<H>      eGFR if Non African American: 121 mL/min/1.73M2 (19 @ 09:05)  eGFR if African American: 141 mL/min/1.73M2 (19 @ 09:05)  eGFR if Non African American: 121 mL/min/1.73M2 (19 @ 11:18)  eGFR if African American: 141 mL/min/1.73M2 (19 @ 11:18)    LIVER FUNCTIONS - ( 02 Dec 2019 09:05 )  Alb: 1.8 g/dL / Pro: 6.6 g/dL / ALK PHOS: 175 U/L / ALT: 61 U/L / AST: 123 U/L / GGT: x           Urinalysis Basic - ( 2019 18:15 )    Color: Yuli / Appearance: Slightly Turbid / S.017 / pH: x  Gluc: x / Ketone: Negative  / Bili: Large / Urobili: 6 mg/dL   Blood: x / Protein: Trace / Nitrite: Negative   Leuk Esterase: Negative / RBC: 3 /HPF / WBC 0 /HPF   Sq Epi: x / Non Sq Epi: 0 /HPF / Bacteria: Negative        Culture - Body Fluid with Gram Stain (collected 19 @ 11:36)  Source: .Body Fluid Abdominal Fluid  Gram Stain (19 @ 11:12):    polymorphonuclear leukocytes per low power field    No organisms seen per oil power field    by cytocentrifuge  Preliminary Report (19 @ 07:24):    No growth    Culture - Blood (collected 19 @ 10:37)  Source: .Blood Blood  Preliminary Report (19 @ 07:01):    No growth to date.        Lactate, Blood: 1.6 mmol/L (19 @ 17:00)      INFECTIOUS DISEASES TESTING      RADIOLOGY & ADDITIONAL TESTS:  I have personally reviewed the last Chest xray    CXR  Xray Chest 1 View AP/PA:   EXAM:  XR CHEST FRONTAL 1V          PROCEDURE DATE:  2019          INTERPRETATION:  Clinical History / Reason for exam: Shortness of breath    Comparison : Chest radiograph 2019    Technique/Positioning: Frontal view of the chest    Findings:    Cardiac/mediastinum/hilum: Stable.    Lung parenchyma/Pleura: Low lung volume. Bibasilar opacities/effusions.   No pneumothorax.    Skeleton/soft tissues: Stable    Impression:    Low lung volume.   Bibasilar opacities/effusions.       CHRIS CERON M.D., ATTENDING RADIOLOGIST  This document has been electronically signed. Dec  1 2019  3:27PM      (19 @ 18:02)        Ultrasound      EXAM: US ABDOMEN COMPLETE   PROCEDURE DATE: 2019       INTERPRETATION: CLINICAL STATEMENT: Liver cirrhosis..     COMPARISON: CT abdomen and pelvis 2019, MR abdomen 2018..     PROCEDURE: Ultrasound of the abdomen was performed.     FINDINGS:     LIVER: Cirrhotic liver, enlarged measuring 20.6 cm in length with increased   parenchymal echogenicity. Limited examination sonographic evaluation did   not identify focal hepatic mass.     GALLBLADDER/BILIARY TREE: Cholelithiasis. Nonspecific gallbladder wall   edema and 0.43 cm, may be related to underlying liver disease. Negative   sonographic Ge's sign. No biliary ductal dilation. Common bile duct   measures 7 mm, which is mildly dilated.     PANCREAS: Obscured by overlying bowel gas     SPLEEN: Enlarged, 14.3 cm in length     KIDNEYS: Right kidney measures 13.6 cm and left kidney measures 13.2 cm in   length. No hydronephrosis, stone or solid mass.     AORTA/IVC: Obscured by overlying bowel gas     ASCITES: Moderate ascites.     IMPRESSION:     Liver cirrhosis. Limited examination sonographic evaluation did not identify   focal hepatic mass. Consider outpatient MRI abdomen with and without IV   contrast if tumor suspected.     Cholelithiasis. Nonspecific gallbladder wall edema and 0.43 cm, may be   related to underlying liver disease.     Mildly dilated common bile duct at 0.7 cm.     Splenomegaly.     Moderate ascites.         OBAZ CHILDS M.D., ATTENDING RADIOLOGIST   This document has been electronically signed. Dec 1 2019 2:13AM        CARDIOLOGY TESTING  12 Lead ECG:   Ventricular Rate 92 BPM    Atrial Rate 92 BPM    P-R Interval 160 ms    QRS Duration 98 ms    Q-T Interval 388 ms    QTC Calculation(Bezet) 479 ms    P Axis 51 degrees    R Axis 30 degrees    T Axis 51 degrees    Diagnosis Line Normal sinus rhythm  Normal ECG    Confirmed by Claude Llamas ([id number]) on 2019 11:51:31 AM (19 @ 01:05)  12 Lead ECG:   Ventricular Rate 89 BPM    Atrial Rate 89 BPM    P-R Interval 112 ms    QRS Duration 96 ms    Q-T Interval 392 ms    QTC Calculation(Bezet) 476 ms    P Axis 108 degrees    R Axis 97 degrees    T Axis 27 degrees    Diagnosis Line *** Suspect arm lead reversal, interpretation assumes no reversal  Normal sinus rhythm  Rightward axis  Nonspecific ST abnormality  Abnormal ECG    Confirmed by Claude Llamas ([id number]) on 2019 11:51:29 AM (19 @ 17:41)      All available historical records have been reviewed    MEDICATIONS  cefTRIAXone   IVPB   chlorhexidine 4% Liquid 1  folic acid 1  influenza   Vaccine 0.5  iohexol 300 mG (iodine)/mL Oral Solution 30  metroNIDAZOLE  IVPB   metroNIDAZOLE  IVPB 500  multivitamin 1  spironolactone 100  thiamine 100      ANTIBIOTICS:  cefTRIAXone   IVPB      metroNIDAZOLE  IVPB      metroNIDAZOLE  IVPB 500 milliGRAM(s) IV Intermittent every 8 hours      All available historical data has been reviewed JENIFFER DESIRE  44y, Male  Allergy: No Known Allergies      CHIEF COMPLAINT: Shortness of breath (02 Dec 2019 08:22)      HPI:  45y/o male with a past medical history significant for ETOH abuse and alcoholic liver cirrhosis presents to ED complaining of SOB for the past 4 days with associated increased abdominal girth and bilateral LE edema. Pt reports orthopnea, generalized abdominal pain, generalized itching and jaundice. Pt reports recently drinking 30% of a Michell bottle with a friend 2 days ago. Pt also reports small amounts of chronic fresh blood per rectum for the past year and a half. Recent colonoscopy in 2018 showing hemorrhoids and diverticulosis. Pt states he had a perianal abscess 3-4 years ago where he had a drainage done and a repeat I&D few months after. Pt reports current abscess is at the same location.  Denies any trauma or scratching of the area. Denies any fever, chills, nausea, vomiting or diarrhea at this time. ID consulted for evaluation of perianal abscess.     Infectious Diseases History:  Old Micro:    FAMILY HISTORY:  Family history of stroke (Father, Mother)    PAST MEDICAL & SURGICAL HISTORY:  Liver cirrhosis, alcoholic  Anemia  Thrombocytopenia  ETOH abuse  History of incision and drainage: Gluteal abscess      SOCIAL HISTORY    Substance Use (  ) never used  (  ) IVDU (  ) Other:  Tobacco Usage:  (   ) never smoked   (   ) former smoker   (   ) current smoker   Alcohol Usage: (   ) social  (   ) daily use (   ) denies   Alcohol Abuse  Sexual History:       ROS  General: Denies rigors, nightsweats  HEENT: Denies headache, rhinorrhea, sore throat, eye pain  CV: Denies CP, palpitations  PULM: Denies wheezing, hemoptysis, admits to SOB  GI: Denies hematemesis, hematochezia, melena  : Denies discharge, hematuria  MSK: Denies arthralgias, myalgias, admits to LE edema  SKIN: Denies rash, lesions, admits to itching, jaundice  NEURO: Denies paresthesias, weakness  PSYCH: Denies depression, anxiety    VITALS:  T(F): 98.9, Max: 99.2 (19 @ 01:22)  HR: 98  BP: 115/60  RR: 18Vital Signs Last 24 Hrs  T(C): 37.2 (02 Dec 2019 03:41), Max: 37.3 (02 Dec 2019 01:22)  T(F): 98.9 (02 Dec 2019 03:41), Max: 99.2 (02 Dec 2019 01:22)  HR: 98 (02 Dec 2019 05:19) (96 - 104)  BP: 115/60 (02 Dec 2019 05:19) (97/53 - 129/64)  BP(mean): --  RR: 18 (02 Dec 2019 05:19) (18 - 18)  SpO2: 97% (02 Dec 2019 03:50) (95% - 98%)    PHYSICAL EXAM:  Gen: NAD, resting in bed  HEENT: Normocephalic, atraumatic  Neck: supple, no lymphadenopathy  CV: Regular rate & regular rhythm  Lungs: Mild resp distress, use of accessory muscles, RR 18 @ SpO2 97%  Abdomen: Distended abdomen  Ext: b/l LE edema  Neuro: non focal, awake  Skin: jaundiced, spider angioma around the chest area, localized perianal abscess on the left lower cheek, tender to palpation, no drainage no erythema. 2nd incision scar also noted slightly above.  TESTS & MEASUREMENTS:                        9.0    14.58 )-----------( 107      ( 02 Dec 2019 09:05 )             27.0     12    125<L>  |  94<L>  |  11  ----------------------------<  168<H>  4.1   |  19  |  0.6<L>    Ca    7.4<L>      02 Dec 2019 09:05  Phos  3.3     11-30  Mg     1.9         TPro  6.6  /  Alb  1.8<L>  /  TBili  13.7<H>  /  DBili  x   /  AST  123<H>  /  ALT  61<H>  /  AlkPhos  175<H>      eGFR if Non African American: 121 mL/min/1.73M2 (19 @ 09:05)  eGFR if African American: 141 mL/min/1.73M2 (19 @ 09:05)  eGFR if Non African American: 121 mL/min/1.73M2 (19 @ 11:18)  eGFR if African American: 141 mL/min/1.73M2 (19 @ 11:18)    LIVER FUNCTIONS - ( 02 Dec 2019 09:05 )  Alb: 1.8 g/dL / Pro: 6.6 g/dL / ALK PHOS: 175 U/L / ALT: 61 U/L / AST: 123 U/L / GGT: x           Urinalysis Basic - ( 2019 18:15 )    Color: Yuli / Appearance: Slightly Turbid / S.017 / pH: x  Gluc: x / Ketone: Negative  / Bili: Large / Urobili: 6 mg/dL   Blood: x / Protein: Trace / Nitrite: Negative   Leuk Esterase: Negative / RBC: 3 /HPF / WBC 0 /HPF   Sq Epi: x / Non Sq Epi: 0 /HPF / Bacteria: Negative        Culture - Body Fluid with Gram Stain (collected 19 @ 11:36)  Source: .Body Fluid Abdominal Fluid  Gram Stain (19 @ 11:12):    polymorphonuclear leukocytes per low power field    No organisms seen per oil power field    by cytocentrifuge  Preliminary Report (19 @ 07:24):    No growth    Culture - Blood (collected 19 @ 10:37)  Source: .Blood Blood  Preliminary Report (19 @ 07:01):    No growth to date.        Lactate, Blood: 1.6 mmol/L (19 @ 17:00)      INFECTIOUS DISEASES TESTING      RADIOLOGY & ADDITIONAL TESTS:  I have personally reviewed the last Chest xray    CXR  Xray Chest 1 View AP/PA:   EXAM:  XR CHEST FRONTAL 1V          PROCEDURE DATE:  2019          INTERPRETATION:  Clinical History / Reason for exam: Shortness of breath    Comparison : Chest radiograph 2019    Technique/Positioning: Frontal view of the chest    Findings:    Cardiac/mediastinum/hilum: Stable.    Lung parenchyma/Pleura: Low lung volume. Bibasilar opacities/effusions.   No pneumothorax.    Skeleton/soft tissues: Stable    Impression:    Low lung volume.   Bibasilar opacities/effusions.       CHRIS CERON M.D., ATTENDING RADIOLOGIST  This document has been electronically signed. Dec  1 2019  3:27PM      (19 @ 18:02)        Ultrasound      EXAM: US ABDOMEN COMPLETE   PROCEDURE DATE: 2019       INTERPRETATION: CLINICAL STATEMENT: Liver cirrhosis..     COMPARISON: CT abdomen and pelvis 2019, MR abdomen 2018..     PROCEDURE: Ultrasound of the abdomen was performed.     FINDINGS:     LIVER: Cirrhotic liver, enlarged measuring 20.6 cm in length with increased   parenchymal echogenicity. Limited examination sonographic evaluation did   not identify focal hepatic mass.     GALLBLADDER/BILIARY TREE: Cholelithiasis. Nonspecific gallbladder wall   edema and 0.43 cm, may be related to underlying liver disease. Negative   sonographic Ge's sign. No biliary ductal dilation. Common bile duct   measures 7 mm, which is mildly dilated.     PANCREAS: Obscured by overlying bowel gas     SPLEEN: Enlarged, 14.3 cm in length     KIDNEYS: Right kidney measures 13.6 cm and left kidney measures 13.2 cm in   length. No hydronephrosis, stone or solid mass.     AORTA/IVC: Obscured by overlying bowel gas     ASCITES: Moderate ascites.     IMPRESSION:     Liver cirrhosis. Limited examination sonographic evaluation did not identify   focal hepatic mass. Consider outpatient MRI abdomen with and without IV   contrast if tumor suspected.     Cholelithiasis. Nonspecific gallbladder wall edema and 0.43 cm, may be   related to underlying liver disease.     Mildly dilated common bile duct at 0.7 cm.     Splenomegaly.     Moderate ascites.         BOAZ CHILDS M.D., ATTENDING RADIOLOGIST   This document has been electronically signed. Dec 1 2019 2:13AM        CARDIOLOGY TESTING  12 Lead ECG:   Ventricular Rate 92 BPM    Atrial Rate 92 BPM    P-R Interval 160 ms    QRS Duration 98 ms    Q-T Interval 388 ms    QTC Calculation(Bezet) 479 ms    P Axis 51 degrees    R Axis 30 degrees    T Axis 51 degrees    Diagnosis Line Normal sinus rhythm  Normal ECG    Confirmed by Claude Llamas ([id number]) on 2019 11:51:31 AM (19 @ 01:05)  12 Lead ECG:   Ventricular Rate 89 BPM    Atrial Rate 89 BPM    P-R Interval 112 ms    QRS Duration 96 ms    Q-T Interval 392 ms    QTC Calculation(Bezet) 476 ms    P Axis 108 degrees    R Axis 97 degrees    T Axis 27 degrees    Diagnosis Line *** Suspect arm lead reversal, interpretation assumes no reversal  Normal sinus rhythm  Rightward axis  Nonspecific ST abnormality  Abnormal ECG    Confirmed by Claude Llamas ([id number]) on 2019 11:51:29 AM (19 @ 17:41)      All available historical records have been reviewed    MEDICATIONS  cefTRIAXone   IVPB   chlorhexidine 4% Liquid 1  folic acid 1  influenza   Vaccine 0.5  iohexol 300 mG (iodine)/mL Oral Solution 30  metroNIDAZOLE  IVPB   metroNIDAZOLE  IVPB 500  multivitamin 1  spironolactone 100  thiamine 100      ANTIBIOTICS:  cefTRIAXone   IVPB      metroNIDAZOLE  IVPB      metroNIDAZOLE  IVPB 500 milliGRAM(s) IV Intermittent every 8 hours      All available historical data has been reviewed JENIFFER DESIRE  44y, Male  Allergy: No Known Allergies      CHIEF COMPLAINT: Shortness of breath (02 Dec 2019 08:22)      HPI:  45y/o male with a past medical history significant for ETOH abuse and alcoholic liver cirrhosis presents to ED complaining of SOB for the past 4 days with associated increased abdominal girth and bilateral LE edema. Pt reports orthopnea, generalized abdominal pain, generalized itching and jaundice. Pt reports recently drinking 30% of a Michell bottle with a friend 2 days ago. Pt also reports small amounts of chronic fresh blood per rectum for the past year and a half. Recent colonoscopy in 2018 showing hemorrhoids and diverticulosis. Pt states he had a perianal abscess 3-4 years ago where he had a drainage done and a repeat I&D few months after. Pt reports current abscess is at the same location.  Denies any trauma or scratching of the area. Denies any fever, chills, nausea, vomiting or diarrhea at this time. ID consulted for evaluation of perianal abscess.     Infectious Diseases History:  Old Micro: NA    FAMILY HISTORY:  Family history of stroke (Father, Mother)    PAST MEDICAL & SURGICAL HISTORY:  Liver cirrhosis, alcoholic  Anemia  Thrombocytopenia  ETOH abuse  History of incision and drainage: Gluteal abscess      SOCIAL HISTORY    Substance Use (  x) never used  (  ) IVDU (  ) Other:  Tobacco Usage:  (   x) never smoked   (   ) former smoker   (   ) current smoker   Alcohol Usage: (   ) social  (   x) daily use (   ) denies   Alcohol Abuse  Sexual History:       ROS  General: Denies rigors, nightsweats  HEENT: Denies headache, rhinorrhea, sore throat, eye pain  CV: Denies CP, palpitations  PULM: Denies wheezing, hemoptysis, admits to SOB  GI: Denies hematemesis, hematochezia, melena  : Denies discharge, hematuria  MSK: Denies arthralgias, myalgias, admits to LE edema  SKIN: Denies rash, lesions, admits to itching, jaundice  NEURO: Denies paresthesias, weakness  PSYCH: Denies depression, anxiety    VITALS:  T(F): 98.9, Max: 99.2 (19 @ 01:22)  HR: 98  BP: 115/60  RR: 18Vital Signs Last 24 Hrs  T(C): 37.2 (02 Dec 2019 03:41), Max: 37.3 (02 Dec 2019 01:22)  T(F): 98.9 (02 Dec 2019 03:41), Max: 99.2 (02 Dec 2019 01:22)  HR: 98 (02 Dec 2019 05:19) (96 - 104)  BP: 115/60 (02 Dec 2019 05:19) (97/53 - 129/64)  BP(mean): --  RR: 18 (02 Dec 2019 05:19) (18 - 18)  SpO2: 97% (02 Dec 2019 03:50) (95% - 98%)    PHYSICAL EXAM:  Gen: NAD, resting in bed  HEENT: Normocephalic, atraumatic  Neck: supple, no lymphadenopathy  CV: Regular rate & regular rhythm  Lungs: Mild resp distress, use of accessory muscles, RR 18 @ SpO2 97%  Abdomen: Distended abdomen  Ext: b/l LE edema  Neuro: non focal, awake  Skin: jaundiced, spider angioma around the chest area, localized perianal abscess on the left lower cheek, tender to palpation, +fluctuance, no drainage no erythema. 2nd incision scar also noted slightly above.    TESTS & MEASUREMENTS:                        9.0    14.58 )-----------( 107      ( 02 Dec 2019 09:05 )             27.0     12    125<L>  |  94<L>  |  11  ----------------------------<  168<H>  4.1   |  19  |  0.6<L>    Ca    7.4<L>      02 Dec 2019 09:05  Phos  3.3     1130  Mg     1.9         TPro  6.6  /  Alb  1.8<L>  /  TBili  13.7<H>  /  DBili  x   /  AST  123<H>  /  ALT  61<H>  /  AlkPhos  175<H>      eGFR if Non African American: 121 mL/min/1.73M2 (19 @ 09:05)  eGFR if African American: 141 mL/min/1.73M2 (19 @ 09:05)  eGFR if Non African American: 121 mL/min/1.73M2 (19 @ 11:18)  eGFR if African American: 141 mL/min/1.73M2 (19 @ 11:18)    LIVER FUNCTIONS - ( 02 Dec 2019 09:05 )  Alb: 1.8 g/dL / Pro: 6.6 g/dL / ALK PHOS: 175 U/L / ALT: 61 U/L / AST: 123 U/L / GGT: x           Urinalysis Basic - ( 2019 18:15 )    Color: Yuli / Appearance: Slightly Turbid / S.017 / pH: x  Gluc: x / Ketone: Negative  / Bili: Large / Urobili: 6 mg/dL   Blood: x / Protein: Trace / Nitrite: Negative   Leuk Esterase: Negative / RBC: 3 /HPF / WBC 0 /HPF   Sq Epi: x / Non Sq Epi: 0 /HPF / Bacteria: Negative        Culture - Body Fluid with Gram Stain (collected 19 @ 11:36)  Source: .Body Fluid Abdominal Fluid  Gram Stain (19 @ 11:12):    polymorphonuclear leukocytes per low power field    No organisms seen per oil power field    by cytocentrifuge  Preliminary Report (19 @ 07:24):    No growth    Culture - Blood (collected 19 @ 10:37)  Source: .Blood Blood  Preliminary Report (19 @ 07:01):    No growth to date.        Lactate, Blood: 1.6 mmol/L (19 @ 17:00)      INFECTIOUS DISEASES TESTING      RADIOLOGY & ADDITIONAL TESTS:  I have personally reviewed the last Chest xray    CXR  Xray Chest 1 View AP/PA:   EXAM:  XR CHEST FRONTAL 1V          PROCEDURE DATE:  2019          INTERPRETATION:  Clinical History / Reason for exam: Shortness of breath    Comparison : Chest radiograph 2019    Technique/Positioning: Frontal view of the chest    Findings:    Cardiac/mediastinum/hilum: Stable.    Lung parenchyma/Pleura: Low lung volume. Bibasilar opacities/effusions.   No pneumothorax.    Skeleton/soft tissues: Stable    Impression:    Low lung volume.   Bibasilar opacities/effusions.       CHRIS CERON M.D., ATTENDING RADIOLOGIST  This document has been electronically signed. Dec  1 2019  3:27PM      (19 @ 18:02)        Ultrasound      EXAM: US ABDOMEN COMPLETE   PROCEDURE DATE: 2019       INTERPRETATION: CLINICAL STATEMENT: Liver cirrhosis..     COMPARISON: CT abdomen and pelvis 2019, MR abdomen 2018..     PROCEDURE: Ultrasound of the abdomen was performed.     FINDINGS:     LIVER: Cirrhotic liver, enlarged measuring 20.6 cm in length with increased   parenchymal echogenicity. Limited examination sonographic evaluation did   not identify focal hepatic mass.     GALLBLADDER/BILIARY TREE: Cholelithiasis. Nonspecific gallbladder wall   edema and 0.43 cm, may be related to underlying liver disease. Negative   sonographic Ge's sign. No biliary ductal dilation. Common bile duct   measures 7 mm, which is mildly dilated.     PANCREAS: Obscured by overlying bowel gas     SPLEEN: Enlarged, 14.3 cm in length     KIDNEYS: Right kidney measures 13.6 cm and left kidney measures 13.2 cm in   length. No hydronephrosis, stone or solid mass.     AORTA/IVC: Obscured by overlying bowel gas     ASCITES: Moderate ascites.     IMPRESSION:     Liver cirrhosis. Limited examination sonographic evaluation did not identify   focal hepatic mass. Consider outpatient MRI abdomen with and without IV   contrast if tumor suspected.     Cholelithiasis. Nonspecific gallbladder wall edema and 0.43 cm, may be   related to underlying liver disease.     Mildly dilated common bile duct at 0.7 cm.     Splenomegaly.     Moderate ascites.         BOAZ CHILDS M.D., ATTENDING RADIOLOGIST   This document has been electronically signed. Dec 1 2019 2:13AM        CARDIOLOGY TESTING  12 Lead ECG:   Ventricular Rate 92 BPM    Atrial Rate 92 BPM    P-R Interval 160 ms    QRS Duration 98 ms    Q-T Interval 388 ms    QTC Calculation(Bezet) 479 ms    P Axis 51 degrees    R Axis 30 degrees    T Axis 51 degrees    Diagnosis Line Normal sinus rhythm  Normal ECG    Confirmed by Claude Llamas ([id number]) on 2019 11:51:31 AM (19 @ 01:05)  12 Lead ECG:   Ventricular Rate 89 BPM    Atrial Rate 89 BPM    P-R Interval 112 ms    QRS Duration 96 ms    Q-T Interval 392 ms    QTC Calculation(Bezet) 476 ms    P Axis 108 degrees    R Axis 97 degrees    T Axis 27 degrees    Diagnosis Line *** Suspect arm lead reversal, interpretation assumes no reversal  Normal sinus rhythm  Rightward axis  Nonspecific ST abnormality  Abnormal ECG    Confirmed by Claude Llamas ([id number]) on 2019 11:51:29 AM (19 @ 17:41)      All available historical records have been reviewed    MEDICATIONS  cefTRIAXone   IVPB   chlorhexidine 4% Liquid 1  folic acid 1  influenza   Vaccine 0.5  iohexol 300 mG (iodine)/mL Oral Solution 30  metroNIDAZOLE  IVPB   metroNIDAZOLE  IVPB 500  multivitamin 1  spironolactone 100  thiamine 100      ANTIBIOTICS:  cefTRIAXone   IVPB      metroNIDAZOLE  IVPB      metroNIDAZOLE  IVPB 500 milliGRAM(s) IV Intermittent every 8 hours      All available historical data has been reviewed

## 2019-12-02 NOTE — PROGRESS NOTE ADULT - ASSESSMENT
44 year old man with history of alcoholic cirrhosis presented for shortness of breath of 4 days duration, associated with increase abdominal girth and lower extremities edema.      # Severe Alcoholic Hepatitis   DF: 117 on 12/1   MELD: 32 on 12/1  No SBP on Para   Pt afebrile and no signs of sepsis   Rec:   IV hydration   Start prednisolone 40 mg po once daily  Will calculate Lille score 1 week from start prednisolone   Continue Thiamine and folate  Daily liver enzymes and INR   Avoid hepatotoxic drugs        # Decompensated alcoholic cirrhosis   MELD 32 on 12/1    Large volume ascites  No SBP   No varices on last EGD on 7/2018   Rec:   Large volume therapeutic para with albumin administration (8g/ liter removed)   Follow BMP daily   Low sodium diet   Needs to have MRI liver as outpatient for HCC and Check Christopher fetoprotein   Needs EGD on 7/2020 for variceal screening     # Mild rectal bleed secondary to hemorrhoids   Had COlonoscopy in 2018 that showed Hemorrhoids and Diverticulosis   Hemodynamically stable   No signs of active GI bleed  Hg stable   Rec:  Follow Hg q24 h   If Hg stable can have Colonoscopy as outpatient    If Hg keeps dropping and signs of active Bleed will do inpatient colonoscopy once stable 44 year old man with history of alcoholic cirrhosis presented for shortness of breath of 4 days duration, associated with increase abdominal girth and lower extremities edema.      # Severe Alcoholic Hepatitis   DF: 117 on 12/1   MELD: 32 on 12/1  No SBP on Para   Pt afebrile and no signs of sepsis   Rec:   IV hydration   Start prednisolone 40 mg po once daily  Will calculate Lille score 1 week from start prednisolone   Continue Thiamine and folate  Daily liver enzymes and INR   Avoid hepatotoxic drugs  Please get MRCP for mildly dilated CBD on US abdomen and get MRI liver with contrast to rule out HCC       # Decompensated alcoholic cirrhosis   MELD 32 on 12/1    Large volume ascites  No SBP   No varices on last EGD on 7/2018   Rec:   Large volume therapeutic para with albumin administration (8g/ liter removed)   Follow BMP daily   Low sodium diet   Needs to have MRI liver as outpatient for HCC and Check Christopher fetoprotein   Needs EGD on 7/2020 for variceal screening     # Mild rectal bleed secondary to hemorrhoids   Had Colonoscopy in 2018 that showed Hemorrhoids and Diverticulosis   Hemodynamically stable   No signs of active GI bleed  Hg stable   Rec:  Follow Hg q24 h   If Hg stable can have Colonoscopy as outpatient    If Hg keeps dropping and signs of active Bleed will do inpatient colonoscopy once stable 44 year old man with history of alcoholic cirrhosis presented for shortness of breath of 4 days duration, associated with increase abdominal girth and lower extremities edema.      # Severe Alcoholic Hepatitis   DF: 117 on 12/1   MELD: 32 on 12/1  No SBP on Para   Pt afebrile and no signs of sepsis   Rec:   IV hydration   Will hold on prednisolone for now given pt have a perianal abscess discovered on exam  Continue Thiamine and folate  Daily liver enzymes and INR   Avoid hepatotoxic drugs  Please get MRCP for mildly dilated CBD on US abdomen and get MRI liver with contrast to rule out HCC       # Decompensated alcoholic cirrhosis   MELD 32 on 12/1    Large volume ascites  No SBP   No varices on last EGD on 7/2018   Rec:   Large volume therapeutic para with albumin administration (8g/ liter removed)   Follow BMP daily   Low sodium diet   Needs to have MRI liver as outpatient for HCC and Check Christopher fetoprotein   Needs EGD on 7/2020 for variceal screening     # Mild rectal bleed secondary to hemorrhoids   Had Colonoscopy in 2018 that showed Hemorrhoids and Diverticulosis   Hemodynamically stable   No signs of active GI bleed  Hg stable   Rec:  Follow Hg q24 h   If Hg stable can have Colonoscopy as outpatient    If Hg keeps dropping and signs of active Bleed will do inpatient colonoscopy once stable

## 2019-12-02 NOTE — PROGRESS NOTE ADULT - SUBJECTIVE AND OBJECTIVE BOX
We are following the patient for Alcoholic hepatitis     GI HPI Today:  Pt feels good. No complaints   No vomiting   No Rectal bleed   PAST MEDICAL & SURGICAL HISTORY  Liver cirrhosis, alcoholic  Anemia  Thrombocytopenia  ETOH abuse  History of incision and drainage: Gluteal abscess      ALLERGIES:  No Known Allergies      MEDICATIONS:  MEDICATIONS  (STANDING):  cefTRIAXone   IVPB      chlorhexidine 4% Liquid 1 Application(s) Topical daily  folic acid 1 milliGRAM(s) Oral daily  influenza   Vaccine 0.5 milliLiter(s) IntraMuscular once  metroNIDAZOLE  IVPB      metroNIDAZOLE  IVPB 500 milliGRAM(s) IV Intermittent every 8 hours  multivitamin 1 Tablet(s) Oral daily  spironolactone 100 milliGRAM(s) Oral daily  thiamine 100 milliGRAM(s) Oral daily    MEDICATIONS  (PRN):  chlordiazePOXIDE 50 milliGRAM(s) Oral every 1 hour PRN Symptom-triggered: each CIWA -Ar score 8 or GREATER      REVIEW OF SYSTEMS  General:  No fevers  Eyes:  No reported pain   ENT:  No sore throat   NECK: No stiffness   CV:  No chest pain   Resp:  No shortness of breath  GI:  See HPI  :  No dysuria  Muscle:  ++  weakness  Neuro:  No tingling  Endocrine:  No polyuria  Heme:  No ecchymosis        VITALS:   T(F): 98.9 (12-02 @ 03:41), Max: 99.2 (12-02 @ 01:22)  HR: 98 (12-02 @ 05:19) (80 - 105)  BP: 115/60 (12-02 @ 05:19) (97/53 - 129/73)  BP(mean): --  RR: 18 (12-02 @ 05:19) (18 - 22)  SpO2: 97% (12-02 @ 03:50) (95% - 100%)        PHYSICAL EXAM:  GENERAL:  Appears in no distress  HEENT:  Conjunctivae icteric   CHEST:  Full & symmetric excursion  HEART:  NS1, S2,   ABDOMEN:  Soft, non-tender, +++ distended  EXTEREMITIES:  no edema  NEURO:  Alert         Blood Work :                        8.9    16.06 )-----------( 120      ( 01 Dec 2019 11:18 )             27.4     PT/INR - ( 01 Dec 2019 11:18 )  INR: 3.06          PTT - ( 01 Dec 2019 11:18 )  PTT:47.4<H>  12-01    127<L>  |  96<L>  |  10  ----------------------------<  128<H>  4.4   |  19  |  0.6<L>    Ca    7.9<L>      01 Dec 2019 11:18  Phos  3.3     11-30  Mg     1.9     12-01      CBC -  ( 01 Dec 2019 11:18 )  Hemoglobin : 8.9    CBC -  ( 30 Nov 2019 23:20 )  Hemoglobin : 8.8    CBC -  ( 30 Nov 2019 17:00 )  Hemoglobin : 9.1      LIVER FUNCTIONS - ( 01 Dec 2019 11:18 )  Alb: 1.9 [3.5 - 5.2] / Pro: 6.8 [6.0 - 8.0] / ALK PHOS: 194 [30 - 115] / ALT: 64 [0 - 41] / AST: 126 [0 - 41] / GGT: x     LIVER FUNCTIONS - ( 30 Nov 2019 23:20 )  Alb: 2.0 [3.5 - 5.2] / Pro: 6.8 [6.0 - 8.0] / ALK PHOS: 216 [30 - 115] / ALT: 61 [0 - 41] / AST: 120 [0 - 41] / GGT: x     LIVER FUNCTIONS - ( 30 Nov 2019 17:00 )  Alb: 2.3 [3.5 - 5.2] / Pro: 7.5 [6.0 - 8.0] / ALK PHOS: 226 [30 - 115] / ALT: 69 [0 - 41] / AST: 186 [0 - 41] / GGT: x

## 2019-12-02 NOTE — PROGRESS NOTE ADULT - SUBJECTIVE AND OBJECTIVE BOX
Progress Note: Surgery  Patient: DESIRE SWIFT , 44y (1975)Male   MRN: [id number]  Location: [address]  Visit: 19 Inpatient  Date: 19 @ 05:15    Procedure/Diagnosis: Perineal/Gluteal abscess  Events over 24h: No acute event overnight.    Vitals: T(F): 98.9 (19 @ 03:41), Max: 99.2 (19 @ 01:22)  HR: 96 (19 @ 03:41)  BP: 126/58 (19 @ 03:41) (97/53 - 129/64)  RR: 18 (19 @ 03:41)  SpO2: 97% (19 @ 03:50)      Diet: Diet, DASH/TLC:   Sodium & Cholesterol Restricted (19 @ 00:31)    IV Fluid: folic acid 1 milliGRAM(s) Oral daily  multivitamin 1 Tablet(s) Oral daily  thiamine 100 milliGRAM(s) Oral daily      In:   19 @ 07:01  -  19 @ 05:15  --------------------------------------------------------  IN: 220 mL      Out:   19 @ 07:01  -  19 @ 05:15  --------------------------------------------------------  OUT:  Total OUT: 0 mL        Net:   19 @ 07:01  -  19 @ 05:15  --------------------------------------------------------  NET: 220 mL        Physical Examination:  General Appearance: NAD, alert and cooperative  HEENT: NCAT, WNL  Heart: S1 and S2. No murmurs. Rhythm is regular.  Lungs: Clear to auscultation BL without rales, rhonchi, wheezing, crackles or diminished breath sounds.  Abdomen: Positive bowel sounds. Soft, nondistended, non tender    Medications: [Standing]  cefTRIAXone   IVPB      chlorhexidine 4% Liquid 1 Application(s) Topical daily  folic acid 1 milliGRAM(s) Oral daily  influenza   Vaccine 0.5 milliLiter(s) IntraMuscular once  metroNIDAZOLE  IVPB      metroNIDAZOLE  IVPB 500 milliGRAM(s) IV Intermittent every 8 hours  multivitamin 1 Tablet(s) Oral daily  spironolactone 100 milliGRAM(s) Oral daily  thiamine 100 milliGRAM(s) Oral daily    Medications:[PRN]  chlordiazePOXIDE 50 milliGRAM(s) Oral every 1 hour PRN    Labs:                        8.9    16.06 )-----------( 120      ( 01 Dec 2019 11:18 )             27.4   12    127<L>  |  96<L>  |  10  ----------------------------<  128<H>  4.4   |  19  |  0.6<L>    Ca    7.9<L>      01 Dec 2019 11:18  Phos  3.3     11-30  Mg     1.9     12    TPro  6.8  /  Alb  1.9<L>  /  TBili  14.5<H>  /  DBili  10.2<H>  /  AST  126<H>  /  ALT  64<H>  /  AlkPhos  194<H>    LIVER FUNCTIONS - ( 01 Dec 2019 11:18 )  Alb: 1.9 g/dL / Pro: 6.8 g/dL / ALK PHOS: 194 U/L / ALT: 64 U/L / AST: 126 U/L / GGT: x         PT/INR - ( 01 Dec 2019 11:18 )   PT: 34.80 sec;   INR: 3.06 ratio         PTT - ( 01 Dec 2019 11:18 )  PTT:47.4 secCARDIAC MARKERS ( 2019 17:00 )  x     / <0.01 ng/mL / x     / x     / x          Micro/Urine:  Urinalysis Basic - ( 2019 18:15 )    Color: Yuli / Appearance: Slightly Turbid / S.017 / pH: x  Gluc: x / Ketone: Negative  / Bili: Large / Urobili: 6 mg/dL   Blood: x / Protein: Trace / Nitrite: Negative   Leuk Esterase: Negative / RBC: 3 /HPF / WBC 0 /HPF   Sq Epi: x / Non Sq Epi: 0 /HPF / Bacteria: Negative      Imaging:  None/24h

## 2019-12-02 NOTE — PROGRESS NOTE ADULT - ASSESSMENT
44 year old man with history of alcoholic cirrhosis presented for shortness of breath of 4 days duration, associated with increase abdominal girth and lower extremities edema.    # Acute decompensation of alcoholic hepatitis in chronically cirrhotic patient:  - MELD-Na=33, Maddrey qjmbf=017.9, CPS=11, score C  - Alcohol abstinence recommended  - S/p Therapeutic tap done, 4L of fluids removed So far, pleural fluid negative for SBP, f/up albumin and total protein in fluid  -Monitor the vital signs and any signs of bleeding  -No encephalopathy  - S/p 5 mg vitamin K PO, will give 1 dose of 5 mg stat and repeat INR at 20:00. IF INR still >2, will give another 5 mg of vitamin K. F/up daily INR in am  - US showed moderate ascites  - Ca 7.4 , albumin 1.8 , corrected calcium 9.2  - Start on NS at 50 cc/hr ( GI recommends IV hydration)  - F/up GI recs. Start on prednisolone 40 mg PO qd today and calculate Lille score 1 week from now    # Anemia and blood per rectum in the setting of coagulopathy:  - Hb stable around 8-9  - Follow up CBC at 16:00  - keep active type and screen  - Transfuse if <7 or <8 and symptomatic    # Leukocytosis in the setting of perineal/gluteal abscess - from 18K to 14K with neutrophil predominance:  - Blood culture NGTD , peritoneal fluid culture NGTD  -UA negative  - CLinically, patient with gluteal abscess on the left buttock, F/up CT abd/pelv with IV and oral contrast to further characterize the abscess that is deep and check for fistulas.   - C/w ciprofloxacin 400 mg IV q12h and metronidazole 500 mg q8h IV per surgery recs\  - F/up ID recs    # Alcohol abuse  -last drink 3 days ago  -CIWA protocol PRN  - Na 125, monitor daily ( chronic hyponatremia)    # DVT ppx: SCD for now, VA duplex negative for DVT  # GI ppx: None  # Diet: DASH diet  # Activity: ambulate as tolerated  # Dispo: From Home  #Code status: Full code 44 year old man with history of alcoholic cirrhosis presented for shortness of breath of 4 days duration, associated with increase abdominal girth and lower extremities edema.    # Acute decompensation of alcoholic hepatitis in chronically cirrhotic patient:  - MELD-Na=33, Maddrey bkqml=912.9, CPS=11, score C  - Alcohol abstinence recommended  - S/p Therapeutic tap done, 4L of fluids removed So far, pleural fluid negative for SBP, f/up albumin and total protein in fluid  -Monitor the vital signs and any signs of bleeding  -No encephalopathy  - S/p 5 mg vitamin K PO, will give 1 dose of 10 mg stat and repeat INR at 20:00. IF INR still >2, will give another 5 mg of vitamin K. F/up daily INR in am  - US showed moderate ascites  - Ca 7.4 , albumin 1.8 , corrected calcium 9.2  - Start on NS at 50 cc/hr ( GI recommends IV hydration)  - F/up GI recs. Start on prednisolone 40 mg PO qd solution today and calculate Lille score 1 week from now    # Anemia and blood per rectum in the setting of coagulopathy:  - Hb stable around 8-9  - Follow up CBC at 16:00  - keep active type and screen  - Transfuse if <7 or <8 and symptomatic    # Leukocytosis in the setting of perineal/gluteal abscess - from 18K to 14K with neutrophil predominance:  - Blood culture NGTD , peritoneal fluid culture NGTD  -UA negative  - CLinically, patient with gluteal abscess on the left buttock, F/up CT abd/pelv with IV and oral contrast to further characterize the abscess that is deep and check for fistulas.   - C/w ciprofloxacin 400 mg IV q12h and metronidazole 500 mg q8h IV per surgery recs\  - F/up ID recs    # Alcohol abuse  -last drink 3 days ago  -CIWA protocol PRN  - Na 125, monitor daily ( chronic hyponatremia)    # DVT ppx: SCD for now, VA duplex negative for DVT  # GI ppx: None  # Diet: DASH diet  # Activity: ambulate as tolerated  # Dispo: From Home  #Code status: Full code 44 year old man with history of alcoholic cirrhosis presented for shortness of breath of 4 days duration, associated with increase abdominal girth and lower extremities edema.    # Acute decompensation of alcoholic hepatitis in chronically cirrhotic patient:  - MELD-Na=33, Maddrey uhvzl=671.9, CPS=11, score C  - Alcohol abstinence recommended  - S/p Therapeutic tap done, 4L of fluids removed So far, pleural fluid negative for SBP, f/up albumin and total protein in fluid  -Monitor the vital signs and any signs of bleeding  -No encephalopathy  - S/p 5 mg vitamin K PO, will give 1 dose of 10 mg stat and repeat INR at 20:00. IF INR still >2, will give another 5 mg of vitamin K. F/up daily INR in am  - US showed moderate ascites  - Ca 7.4 , albumin 1.8 , corrected calcium 9.2  - Start on NS at 50 cc/hr ( GI recommends IV hydration)  - F/up GI recs. Start on prednisolone 40 mg PO qd solution today and calculate Lille score 1 week from now    # Anemia and blood per rectum in the setting of coagulopathy:  - Hb stable around 8-9  - Follow up CBC at 16:00  - keep active type and screen  - Transfuse if <7 or <8 and symptomatic    # Leukocytosis in the setting of perineal/gluteal abscess - from 18K to 14K with neutrophil predominance:  - Blood culture NGTD , peritoneal fluid culture NGTD  -UA negative  - CLinically, patient with gluteal abscess on the left buttock, F/up CT abd/pelv with IV and oral contrast to further characterize the abscess that is deep and check for fistulas.   - C/w ciprofloxacin 400 mg IV q12h and metronidazole 500 mg q8h IV per surgery recs\  - F/up ID recs    # Alcohol abuse  -last drink 3 days ago  -CIWA protocol PRN  - Na 125, monitor daily ( chronic hyponatremia)    # DVT ppx: SCD for now, VA duplex negative for DVT  # GI ppx: None  # Diet: DASH diet  # Activity: ambulate as tolerated  # Dispo: From Home  #Code status: Full code    Attending Attestation    Pt has been seen and examined. Case and Plan discussed at rounds and agree with above documentation as reviewed. Case d/w GI Team and Plan being carried out.    Acute Issues:  1. Acute ETOH Hepatitis - Hold Prednisolone due to active infection / GI aware   2. Chronic Liver Disease from ETOH Cirrhosis  3. Hyperbilirubinemia and Dialated CBD suspicious for Obstructive Biliary Disease r/o Mass and other pathology - MRCP w/ and w/o contrast.   4. Coagulopathy - 2/2 Liver Dz - Vit K 10mg po    5. Perianal Abscess with Leukocytosis - Rule out Sepsis - Cipro 400mg IV q12h and Flagyl 500mg IV q8h, Blood cxs and Surgery Consult and CT A/P  6. Anemia with Coagulopathy - GI following, Monitor H/H  7. Watch for ETOH withdrawal - Monitor CIWA - Librium Protocol if need, FA/Thiamine supplementation / Watch electrolytes 44 year old man with history of alcoholic cirrhosis presented for shortness of breath of 4 days duration, associated with increase abdominal girth and lower extremities edema.    # Acute decompensation of alcoholic hepatitis in chronically cirrhotic patient:  - MELD-Na=33, Maddrey qxhhc=973.9, CPS=11, score C  - Alcohol abstinence recommended  - S/p Therapeutic tap done, 4L of fluids removed So far, pleural fluid negative for SBP, f/up albumin and total protein in fluid  -Monitor the vital signs and any signs of bleeding  -No encephalopathy  - S/p 5 mg vitamin K PO, will give 1 dose of 10 mg stat and repeat INR at 20:00. IF INR still >2, will give another 5 mg of vitamin K. F/up daily INR in am  - US showed moderate ascites  - Ca 7.4 , albumin 1.8 , corrected calcium 9.2  - Start on NS at 50 cc/hr ( GI recommends IV hydration)  - F/up GI recs. Start on prednisolone 40 mg PO qd solution today and calculate Lille score 1 week from now    # Anemia and blood per rectum in the setting of coagulopathy:  - Hb stable around 8-9  - Follow up CBC at 16:00  - keep active type and screen  - Transfuse if <7 or <8 and symptomatic    # Leukocytosis in the setting of perineal/gluteal abscess - from 18K to 14K with neutrophil predominance:  - Blood culture NGTD , peritoneal fluid culture NGTD  -UA negative  - CLinically, patient with gluteal abscess on the left buttock, F/up CT abd/pelv with IV and oral contrast to further characterize the abscess that is deep and check for fistulas.   - C/w ciprofloxacin 400 mg IV q12h and metronidazole 500 mg q8h IV per surgery recs\  - F/up ID recs    # Alcohol abuse  -last drink 3 days ago  -CIWA protocol PRN  - Na 125, monitor daily ( chronic hyponatremia)    # DVT ppx: SCD for now, VA duplex negative for DVT  # GI ppx: None  # Diet: DASH diet  # Activity: ambulate as tolerated  # Dispo: From Home  #Code status: Full code    Attending Attestation    Pt has been seen and examined. Case and Plan discussed at rounds and agree with above documentation as reviewed. Case d/w GI Team and Plan being carried out.    Acute Issues:  1. Acute ETOH Hepatitis - Hold Prednisolone due to active infection / GI aware   2. Chronic Liver Disease from ETOH Cirrhosis  3. Hyperbilirubinemia and Dialated CBD suspicious for Obstructive Biliary Disease r/o Mass and other pathology - MRCP w/ and w/o contrast.   4. Coagulopathy - 2/2 Liver Dz - Vit K 10mg po    5. Perianal Abscess with Leukocytosis - Rule out Sepsis - Cipro 400mg IV q12h and Flagyl 500mg IV q8h, Blood cxs and Surgery Consult and CT A/P  6. Anemia with Coagulopathy - GI following, Monitor H/H  7. Watch for ETOH withdrawal - Monitor CIWA - Librium Protocol if need, FA/Thiamine supplementation / Watch electrolytes   8. SBP Ruled out   9. Dyspnea - Respiratory Failure 2/2 Large Abdominal Ascities 2/2 Large Volume Parasythesis 4L out   10. Bilateral Pleural Effusion - Oxygen as needed keep sats 95% - Check BNP and TTE

## 2019-12-02 NOTE — CONSULT NOTE ADULT - ASSESSMENT
ASSESSMENT  45yo male with a PMH of chronic alcoholic liver cirrhosis presented for SOB for the past 4 days, associated with increased abdominal girth and LE edema. Fresh blood per rectum noted with hx of hemorrhoids and diverticulosis. Pt with perianal abscess discovered with admission.      IMPRESSION  # Perianal Abscess   - Hemodynamically stable, afebrile   - Leukocytosis, WBC = 14.58  - Lactate 1.6  - Blood culture neg 1x  - CXR showing low lung volume and bibasilar opacities/effusions  - US abdomen showing liver cirrhosis, cholelithiasis, nonspecific gallbladder wall edema and 0.43cm, mildly dilated CBD at 0.7cm, splenomegaly and moderate ascites      RECOMMENDATIONS  - f/u pending cultures  - f/u CT abd      This is a pended note. All final recommendations to follow pending discussion with ID Attending ASSESSMENT  43yo male with a PMH of chronic alcoholic liver cirrhosis presented for SOB for the past 4 days, associated with increased abdominal girth and LE edema. Fresh blood per rectum noted with hx of hemorrhoids and diverticulosis. Pt with perianal abscess discovered with admission.      IMPRESSION  # Perianal Abscess   - Hemodynamically stable, afebrile   - Leukocytosis, WBC = 14.58  - Blood culture neg 1x  - CXR showing low lung volume and bibasilar opacities/effusions  - US abdomen showing liver cirrhosis, cholelithiasis, nonspecific gallbladder wall edema and 0.43cm, mildly dilated CBD at 0.7cm, splenomegaly and moderate ascites      RECOMMENDATIONS  - Continue Ceftriaxone and Metronidazole  - Start Vanco 1.5g IV q24h  - Consult surgery for I&D      This is a pended note. All final recommendations to follow pending discussion with ID Attending ASSESSMENT  45yo male with a PMH of chronic alcoholic liver cirrhosis presented for SOB for the past 4 days, associated with increased abdominal girth and LE edema. Fresh blood per rectum noted with hx of hemorrhoids and diverticulosis. Pt with perianal abscess discovered with admission.      IMPRESSION  # Perianal Abscess   - Hemodynamically stable, afebrile   - Leukocytosis, WBC = 14.58  - Blood culture neg 1x  - CXR showing low lung volume and bibasilar opacities/effusions  - US abdomen showing liver cirrhosis, cholelithiasis, nonspecific gallbladder wall edema and 0.43cm, mildly dilated CBD at 0.7cm, splenomegaly and moderate ascites      RECOMMENDATIONS  - Continue Ceftriaxone and Metronidazole  - Start Vanco 1.5g IV q24h  - Nasal MRSA  - Consult surgery for I&D      This is a pended note. All final recommendations to follow pending discussion with ID Attending ASSESSMENT  45yo [place] male with a PMH of chronic alcoholic liver cirrhosis presented for SOB for the past 4 days, associated with increased abdominal girth and LE edema. Fresh blood per rectum noted with hx of hemorrhoids and diverticulosis. Pt with perianal abscess discovered with admission.      IMPRESSION  # Perianal Abscess   - Hemodynamically stable, afebrile . SIRS on admission, sepsis ruled out  - Leukocytosis, WBC = 14.58  - Blood culture neg 1x  - CXR showing low lung volume and bibasilar opacities/effusions  - US abdomen showing liver cirrhosis, cholelithiasis, nonspecific gallbladder wall edema and 0.43cm, mildly dilated CBD at 0.7cm, splenomegaly and moderate ascites    #No SBP on paracentesis  #ETOH hepatitis, cirrhosis MELD >40  #Hyponatremia      RECOMMENDATIONS  - Continue Ceftriaxone and Metronidazole IV  - DOSE Vanco 1.5g IV q24h x1  - Nasal MRSA PCR  - Consult surgery for I&D

## 2019-12-03 LAB
AFP-TM SERPL-MCNC: 2.2 NG/ML — SIGNIFICANT CHANGE UP
ALBUMIN SERPL ELPH-MCNC: 1.8 G/DL — LOW (ref 3.5–5.2)
ALP SERPL-CCNC: 199 U/L — HIGH (ref 30–115)
ALT FLD-CCNC: 60 U/L — HIGH (ref 0–41)
ANION GAP SERPL CALC-SCNC: 9 MMOL/L — SIGNIFICANT CHANGE UP (ref 7–14)
AST SERPL-CCNC: 108 U/L — HIGH (ref 0–41)
BASOPHILS # BLD AUTO: 0.04 K/UL — SIGNIFICANT CHANGE UP (ref 0–0.2)
BASOPHILS NFR BLD AUTO: 0.2 % — SIGNIFICANT CHANGE UP (ref 0–1)
BILIRUB DIRECT SERPL-MCNC: 8.5 MG/DL — HIGH (ref 0–0.2)
BILIRUB INDIRECT FLD-MCNC: 4.2 MG/DL — HIGH (ref 0.2–1.2)
BILIRUB SERPL-MCNC: 12.7 MG/DL — HIGH (ref 0.2–1.2)
BLD GP AB SCN SERPL QL: SIGNIFICANT CHANGE UP
BUN SERPL-MCNC: 13 MG/DL — SIGNIFICANT CHANGE UP (ref 10–20)
CALCIUM SERPL-MCNC: 7.3 MG/DL — LOW (ref 8.5–10.1)
CHLORIDE SERPL-SCNC: 96 MMOL/L — LOW (ref 98–110)
CO2 SERPL-SCNC: 21 MMOL/L — SIGNIFICANT CHANGE UP (ref 17–32)
CREAT SERPL-MCNC: 0.6 MG/DL — LOW (ref 0.7–1.5)
CULTURE RESULTS: SIGNIFICANT CHANGE UP
EOSINOPHIL # BLD AUTO: 0.02 K/UL — SIGNIFICANT CHANGE UP (ref 0–0.7)
EOSINOPHIL NFR BLD AUTO: 0.1 % — SIGNIFICANT CHANGE UP (ref 0–8)
GLUCOSE SERPL-MCNC: 106 MG/DL — HIGH (ref 70–99)
HCT VFR BLD CALC: 27.6 % — LOW (ref 42–52)
HGB BLD-MCNC: 9 G/DL — LOW (ref 14–18)
IMM GRANULOCYTES NFR BLD AUTO: 1 % — HIGH (ref 0.1–0.3)
INR BLD: 3.18 RATIO — HIGH (ref 0.65–1.3)
LYMPHOCYTES # BLD AUTO: 1.17 K/UL — LOW (ref 1.2–3.4)
LYMPHOCYTES # BLD AUTO: 6.6 % — LOW (ref 20.5–51.1)
MCHC RBC-ENTMCNC: 30.1 PG — SIGNIFICANT CHANGE UP (ref 27–31)
MCHC RBC-ENTMCNC: 32.6 G/DL — SIGNIFICANT CHANGE UP (ref 32–37)
MCV RBC AUTO: 92.3 FL — SIGNIFICANT CHANGE UP (ref 80–94)
MONOCYTES # BLD AUTO: 1.36 K/UL — HIGH (ref 0.1–0.6)
MONOCYTES NFR BLD AUTO: 7.7 % — SIGNIFICANT CHANGE UP (ref 1.7–9.3)
MRSA PCR RESULT.: NEGATIVE — SIGNIFICANT CHANGE UP
NEUTROPHILS # BLD AUTO: 14.87 K/UL — HIGH (ref 1.4–6.5)
NEUTROPHILS NFR BLD AUTO: 84.4 % — HIGH (ref 42.2–75.2)
NRBC # BLD: 0 /100 WBCS — SIGNIFICANT CHANGE UP (ref 0–0)
PLATELET # BLD AUTO: 107 K/UL — LOW (ref 130–400)
POTASSIUM SERPL-MCNC: 4.6 MMOL/L — SIGNIFICANT CHANGE UP (ref 3.5–5)
POTASSIUM SERPL-SCNC: 4.6 MMOL/L — SIGNIFICANT CHANGE UP (ref 3.5–5)
PROT SERPL-MCNC: 6.7 G/DL — SIGNIFICANT CHANGE UP (ref 6–8)
PROTHROM AB SERPL-ACNC: 36.2 SEC — HIGH (ref 9.95–12.87)
RBC # BLD: 2.99 M/UL — LOW (ref 4.7–6.1)
RBC # FLD: 21.2 % — HIGH (ref 11.5–14.5)
SODIUM SERPL-SCNC: 126 MMOL/L — LOW (ref 135–146)
SPECIMEN SOURCE: SIGNIFICANT CHANGE UP
WBC # BLD: 17.63 K/UL — HIGH (ref 4.8–10.8)
WBC # FLD AUTO: 17.63 K/UL — HIGH (ref 4.8–10.8)

## 2019-12-03 PROCEDURE — 99233 SBSQ HOSP IP/OBS HIGH 50: CPT

## 2019-12-03 PROCEDURE — 99231 SBSQ HOSP IP/OBS SF/LOW 25: CPT

## 2019-12-03 RX ORDER — CEFTRIAXONE 500 MG/1
1000 INJECTION, POWDER, FOR SOLUTION INTRAMUSCULAR; INTRAVENOUS EVERY 24 HOURS
Refills: 0 | Status: COMPLETED | OUTPATIENT
Start: 2019-12-03 | End: 2019-12-09

## 2019-12-03 RX ORDER — VANCOMYCIN HCL 1 G
1500 VIAL (EA) INTRAVENOUS ONCE
Refills: 0 | Status: COMPLETED | OUTPATIENT
Start: 2019-12-03 | End: 2019-12-03

## 2019-12-03 RX ORDER — VANCOMYCIN HCL 1 G
VIAL (EA) INTRAVENOUS
Refills: 0 | Status: DISCONTINUED | OUTPATIENT
Start: 2019-12-03 | End: 2019-12-03

## 2019-12-03 RX ADMIN — Medication 300 MILLIGRAM(S): at 11:33

## 2019-12-03 RX ADMIN — Medication 100 MILLIGRAM(S): at 11:37

## 2019-12-03 RX ADMIN — Medication 100 MILLIGRAM(S): at 05:59

## 2019-12-03 RX ADMIN — CEFTRIAXONE 100 MILLIGRAM(S): 500 INJECTION, POWDER, FOR SOLUTION INTRAMUSCULAR; INTRAVENOUS at 17:39

## 2019-12-03 RX ADMIN — Medication 1 TABLET(S): at 11:37

## 2019-12-03 RX ADMIN — Medication 100 MILLIGRAM(S): at 13:47

## 2019-12-03 RX ADMIN — SPIRONOLACTONE 100 MILLIGRAM(S): 25 TABLET, FILM COATED ORAL at 05:57

## 2019-12-03 RX ADMIN — SODIUM CHLORIDE 50 MILLILITER(S): 9 INJECTION INTRAMUSCULAR; INTRAVENOUS; SUBCUTANEOUS at 05:57

## 2019-12-03 RX ADMIN — Medication 200 MILLIGRAM(S): at 05:24

## 2019-12-03 RX ADMIN — Medication 1 MILLIGRAM(S): at 11:37

## 2019-12-03 RX ADMIN — Medication 101 MILLIGRAM(S): at 00:01

## 2019-12-03 RX ADMIN — Medication 100 MILLIGRAM(S): at 21:34

## 2019-12-03 NOTE — PROGRESS NOTE ADULT - ASSESSMENT
44 year old man with history of alcoholic cirrhosis presented for shortness of breath of 4 days duration, associated with increase abdominal girth and lower extremities edema.      # Severe Alcoholic Hepatitis   DF: 117 on 12/1   MELD: 32 on 12/1  No SBP on Para   Pt afebrile and no signs of sepsis  On IV abx for perianal abscess   Surgery following: no intervention for now until INR corrected    Rec:   Vitamin K 10 po once daily and daily INR   IV hydration   Will hold on prednisolone for now given pt have a perianal abscess discovered on exam  Continue Thiamine and folate  Daily liver enzymes and INR   Avoid hepatotoxic drugs  Follow up MRCP results     # Decompensated alcoholic cirrhosis   MELD 32 on 12/1    Large volume ascites SP paracentesis with 4 L removal on 12/1   No SBP   No varices on last EGD on 7/2018   Rec:   Follow BMP daily   Low sodium diet   Follow up  MRI liver rule out HCC   Needs EGD on 7/2020 for variceal screening     # Mild rectal bleed secondary to hemorrhoids   Had Colonoscopy in 2018 that showed Hemorrhoids and Diverticulosis   Hemodynamically stable   No signs of active GI bleed  Hg stable   Rec:  Follow Hg q24 h   can have Colonoscopy as outpatient    If Hg keeps dropping and signs of active Bleed will do inpatient colonoscopy once stable

## 2019-12-03 NOTE — PROGRESS NOTE ADULT - ASSESSMENT
44 year old man with history of alcoholic cirrhosis presented for shortness of breath of 4 days duration, associated with increase abdominal girth and lower extremities edema.    # Acute decompensation of alcoholic hepatitis in chronically cirrhotic patient:  - MELD-Na=33, Maddrey fsawi=396.9, CPS=11, score C  - Alcohol abstinence recommended  - S/p Therapeutic tap done, 4L of fluids removed So far, pleural fluid negative for SBP, albumin and total protein in fluid noted.  -Monitor the vital signs and any signs of bleeding  - Patient is s/p a total of 15 mg of vitamin K and INR is still 3.1  - C/w NS at 50 cc/hr ( GI recommends IV hydration)  - Hold on prednisolone PO for now per GI recs given perianal abscess found on physical exam  - Patient went to MRI this morning but he was oozing blood from his perianal laceration and MRI tech canceled the procedure. Will have to do it again    # Anemia and blood per rectum in the setting of coagulopathy:  - Hb stable around 8-9  - Follow up CBC at 16:00  - keep active type and screen  - Transfuse if <7 or <8 and symptomatic    # Leukocytosis in the setting of perineal/gluteal abscess - from 18K to 14K with neutrophil predominance:  - Blood culture NGTD , peritoneal fluid culture NGTD  -UA negative  - CLinically, patient with gluteal abscess on the left buttock, F/up CT abd/pelv with IV and oral contrast to further characterize the abscess that is deep and check for fistulas.   - C/w ciprofloxacin 400 mg IV q12h and metronidazole 500 mg q8h IV per surgery recs\  - F/up ID recs    # Alcohol abuse  -last drink 3 days ago  -CIWA protocol PRN  - Na 125, monitor daily ( chronic hyponatremia)    # DVT ppx: SCD for now, VA duplex negative for DVT  # GI ppx: None  # Diet: DASH diet  # Activity: ambulate as tolerated  # Dispo: From Home  #Code status: Full code 44 year old man with history of alcoholic cirrhosis presented for shortness of breath of 4 days duration, associated with increase abdominal girth and lower extremities edema.    # Acute decompensation of alcoholic hepatitis in chronically cirrhotic patient:  - MELD-Na=33, Maddrey wkpvf=426.9, CPS=11, score C  - Alcohol abstinence recommended  - S/p Therapeutic tap done, 4L of fluids removed So far, pleural fluid negative for SBP, albumin and total protein in fluid noted.  -Monitor the vital signs and any signs of bleeding  - Patient is s/p a total of 15 mg of vitamin K and INR is still 3.1  - C/w NS at 50 cc/hr ( GI recommends IV hydration)  - Hold on prednisolone PO for now per GI recs given perianal abscess found on physical exam  - Patient went to MRI this morning but he was oozing blood from his perianal laceration and MRI tech canceled the procedure. Will have to do it again    # Anemia and blood per rectum in the setting of coagulopathy:  - Hb stable around 8-9  - Will do CBC q24h  - keep active type and screen  - Transfuse if <7 or <8 and symptomatic    # Leukocytosis in the setting of perineal/gluteal abscess - from 18K to 14K with neutrophil predominance:  - Blood culture NGTD , peritoneal fluid culture NGTD  -UA negative  - CT abd/pelv with IV and oral contrast did not show any fluid collection in the pernieal area.  - C/w ceftriaxone 1 g q24h and metronidazole 500 mg q8h IV per ID recs    # Alcohol abuse  - last drink 4 days ago  -WA protocol PRN  - Na 126, monitor daily ( chronic hyponatremia)    # DVT ppx: SCD for now, VA duplex negative for DVT  # GI ppx: None  # Diet: DASH diet  # Activity: ambulate as tolerated  # Dispo: From Home  # Code status: Full code 44 year old man with history of alcoholic cirrhosis presented for shortness of breath of 4 days duration, associated with increase abdominal girth and lower extremities edema.    # Acute decompensation of alcoholic hepatitis in chronically cirrhotic patient:  - MELD-Na=33, Maddrey xwvxk=160.9, CPS=11, score C  - Alcohol abstinence recommended  - S/p Therapeutic tap done, 4L of fluids removed So far, pleural fluid negative for SBP, albumin and total protein in fluid noted.  -Monitor the vital signs and any signs of bleeding  - Patient is s/p a total of 15 mg of vitamin K and INR is still 3.1  - C/w NS at 50 cc/hr ( GI recommends IV hydration)  - Hold on prednisolone PO for now per GI recs given perianal abscess found on physical exam  - Patient went to MRI this morning but he was oozing blood from his perianal laceration and MRI tech canceled the procedure. Will have to do it again    # Anemia and blood per rectum in the setting of coagulopathy:  - Hb stable around 8-9  - Will do CBC q24h  - keep active type and screen  - Transfuse if <7 or <8 and symptomatic    # Leukocytosis in the setting of perineal/gluteal abscess - from 18K to 14K with neutrophil predominance:  - Blood culture NGTD , peritoneal fluid culture NGTD  -UA negative  - CT abd/pelv with IV and oral contrast did not show any fluid collection in the pernieal area.  - C/w ceftriaxone 1 g q24h and metronidazole 500 mg q8h IV per ID recs    # Alcohol abuse  - last drink 4 days ago  -[facility] protocol PRN  - Na 126, monitor daily ( chronic hyponatremia)    # DVT ppx: SCD for now, VA duplex negative for DVT  # GI ppx: None  # Diet: DASH diet  # Activity: ambulate as tolerated  # Dispo: From Home  # Code status: Full code    Attending Attestation    Pt has been seen and examined. Case and Plan discussed at rounds and agree with Resident Note as corrected above.  Overall pt doing better and should be transferred to Medical Floor when bed available.  Per surgery no need for I&D pt's collection of infection opened up and expressed exudate today am.   CT with IV and PO contrast has been unrevealing  MRCP post-poned per Technologist due to bleeding in the scanner. Try tomorrow.  Nursing Wound Care Evaluation - Dressing changes for ascitic fluid and perianal abscess TID/PRN.  Will ask surgery to send cultures of wound if able to express.  Agree with ID: Ceftriaxone / Flagyl / Vancomycin / f/u NAIRES MRSA if negative d/c Vanco.   c/w all other care per orders and monitor CMP/CBC every other day unless acute indication for daily.   Hold Methylprednisolone due to acute infection   Acute Alcoholic Hepatitis - c/w supportive care  Coagulopathy - Vit K 10mg qd   Anemia likely LGIB - Monitor CBC / INR / if re-bleeds call GI and transfuse for Hg below 7. Will need colonoscopy/EGD in office if remains stable.     Activity: Get PT evaluation     Dispo: Acute / Transfer to Medical Floor

## 2019-12-03 NOTE — PROGRESS NOTE ADULT - SUBJECTIVE AND OBJECTIVE BOX
SUBJECTIVE:    Patient is a 44y old Male who presents with a chief complaint of Shortness of breath (03 Dec 2019 08:40)    Currently admitted to medicine with the primary diagnosis of Alcoholic cirrhosis of liver with ascites     Today is hospital day 3d. This morning he is resting comfortably in bed and reports no new issues or overnight events.     PAST MEDICAL & SURGICAL HISTORY  Liver cirrhosis, alcoholic  Anemia  Thrombocytopenia  ETOH abuse  History of incision and drainage: Gluteal abscess    SOCIAL HISTORY:  Negative for smoking/alcohol/drug use.     ALLERGIES:  No Known Allergies    MEDICATIONS:  STANDING MEDICATIONS  chlorhexidine 4% Liquid 1 Application(s) Topical daily  folic acid 1 milliGRAM(s) Oral daily  influenza   Vaccine 0.5 milliLiter(s) IntraMuscular once  metroNIDAZOLE  IVPB      metroNIDAZOLE  IVPB 500 milliGRAM(s) IV Intermittent every 8 hours  multivitamin 1 Tablet(s) Oral daily  sodium chloride 0.9%. 1000 milliLiter(s) IV Continuous <Continuous>  spironolactone 100 milliGRAM(s) Oral daily  thiamine 100 milliGRAM(s) Oral daily    PRN MEDICATIONS  chlordiazePOXIDE 50 milliGRAM(s) Oral every 1 hour PRN    VITALS:   T(F): 97.1  HR: 88  BP: 113/55  RR: 17  SpO2: 95%    LABS:                        9.0    17.63 )-----------( 107      ( 03 Dec 2019 06:42 )             27.6     12-03    126<L>  |  96<L>  |  13  ----------------------------<  106<H>  4.6   |  21  |  0.6<L>    Ca    7.3<L>      03 Dec 2019 06:42    TPro  6.7  /  Alb  1.8<L>  /  TBili  12.7<H>  /  DBili  8.5<H>  /  AST  108<H>  /  ALT  60<H>  /  AlkPhos  199<H>  12-03    PT/INR - ( 03 Dec 2019 06:42 )   PT: 36.20 sec;   INR: 3.18 ratio                   Culture - Urine (collected 01 Dec 2019 19:00)  Source: .Urine Clean Catch (Midstream)  Final Report (03 Dec 2019 10:34):    50,000 - 99,000 CFU/mL Coag Negative Staphylococcus    Susceptibilites not performed.    <10,000 CFU/ml Normal Urogenital aaliyah present    PHYSICAL EXAM:  GEN: not in distress NC/AT, jaundiced, yellow sclera  LUNGS: Clear to auscultation bilaterally, no wheezing   HEART: S1/S2 present. RRR. No murmurs  ABD: Soft, non-tender, distended. Oozing from the site of the paracentesis. No caput medusa  SKIN:  jaundiced, yellow sclera, opening in the left buttock cheek of 2 cm ( opened spontaneously)  NEURO: AAOX3, moves all extremities, no focal deficits    Intravenous access: Peripheral access  NG tube: None  Joseph Catheter: None SUBJECTIVE:    Patient is a 44y old Male who presents with a chief complaint of Shortness of breath (03 Dec 2019 08:40)    Currently admitted to medicine with the primary diagnosis of Alcoholic cirrhosis of liver with ascites     Today is hospital day 3d. This morning he is resting comfortably in bed and reports no new issues or overnight events.   He is complaining of 2/10 abdominal pain.    PAST MEDICAL & SURGICAL HISTORY  Liver cirrhosis, alcoholic  Anemia  Thrombocytopenia  ETOH abuse  History of incision and drainage: Gluteal abscess    SOCIAL HISTORY:  Negative for smoking/alcohol/drug use.     ALLERGIES:  No Known Allergies    MEDICATIONS:  STANDING MEDICATIONS  chlorhexidine 4% Liquid 1 Application(s) Topical daily  folic acid 1 milliGRAM(s) Oral daily  influenza   Vaccine 0.5 milliLiter(s) IntraMuscular once  metroNIDAZOLE  IVPB      metroNIDAZOLE  IVPB 500 milliGRAM(s) IV Intermittent every 8 hours  multivitamin 1 Tablet(s) Oral daily  sodium chloride 0.9%. 1000 milliLiter(s) IV Continuous <Continuous>  spironolactone 100 milliGRAM(s) Oral daily  thiamine 100 milliGRAM(s) Oral daily    PRN MEDICATIONS  chlordiazePOXIDE 50 milliGRAM(s) Oral every 1 hour PRN    VITALS:   T(F): 97.1  HR: 88  BP: 113/55  RR: 17  SpO2: 95%    LABS:                        9.0    17.63 )-----------( 107      ( 03 Dec 2019 06:42 )             27.6     12-03    126<L>  |  96<L>  |  13  ----------------------------<  106<H>  4.6   |  21  |  0.6<L>    Ca    7.3<L>      03 Dec 2019 06:42    TPro  6.7  /  Alb  1.8<L>  /  TBili  12.7<H>  /  DBili  8.5<H>  /  AST  108<H>  /  ALT  60<H>  /  AlkPhos  199<H>  12-03    PT/INR - ( 03 Dec 2019 06:42 )   PT: 36.20 sec;   INR: 3.18 ratio                   Culture - Urine (collected 01 Dec 2019 19:00)  Source: .Urine Clean Catch (Midstream)  Final Report (03 Dec 2019 10:34):    50,000 - 99,000 CFU/mL Coag Negative Staphylococcus    Susceptibilites not performed.    <10,000 CFU/ml Normal Urogenital aaliyah present    PHYSICAL EXAM:  GEN: not in distress NC/AT, jaundiced, yellow sclera  LUNGS: Clear to auscultation bilaterally, no wheezing   HEART: S1/S2 present. RRR. No murmurs  ABD: Soft, non-tender, distended. Oozing from the site of the paracentesis. No caput medusa  SKIN:  jaundiced, yellow sclera, opening in the left buttock cheek of 2 cm ( opened spontaneously)  NEURO: AAOX3, moves all extremities, no focal deficits    Intravenous access: Peripheral access  NG tube: None  Joseph Catheter: None SUBJECTIVE:    Patient is a 44y old Male who presents with a chief complaint of Shortness of breath (03 Dec 2019 08:40)    Currently admitted to medicine with the primary diagnosis of Alcoholic cirrhosis of liver with ascites     Today is hospital day 3d. This morning he is resting comfortably in bed and reports no new issues or overnight events.   He is complaining of 2/10 abdominal pain.    PAST MEDICAL & SURGICAL HISTORY  Liver cirrhosis, alcoholic  Anemia  Thrombocytopenia  ETOH abuse  History of incision and drainage: Gluteal abscess    SOCIAL HISTORY:  Negative for smoking/alcohol/drug use.     ALLERGIES:  No Known Allergies    MEDICATIONS:  STANDING MEDICATIONS  chlorhexidine 4% Liquid 1 Application(s) Topical daily  folic acid 1 milliGRAM(s) Oral daily  influenza   Vaccine 0.5 milliLiter(s) IntraMuscular once  metroNIDAZOLE  IVPB      metroNIDAZOLE  IVPB 500 milliGRAM(s) IV Intermittent every 8 hours  multivitamin 1 Tablet(s) Oral daily  sodium chloride 0.9%. 1000 milliLiter(s) IV Continuous <Continuous>  spironolactone 100 milliGRAM(s) Oral daily  thiamine 100 milliGRAM(s) Oral daily    PRN MEDICATIONS  chlordiazePOXIDE 50 milliGRAM(s) Oral every 1 hour PRN    VITALS:   T(F): 97.1  HR: 88  BP: 113/55  RR: 17  SpO2: 95%    LABS:                        9.0    17.63 )-----------( 107      ( 03 Dec 2019 06:42 )             27.6     12-03    126<L>  |  96<L>  |  13  ----------------------------<  106<H>  4.6   |  21  |  0.6<L>    Ca    7.3<L>      03 Dec 2019 06:42    TPro  6.7  /  Alb  1.8<L>  /  TBili  12.7<H>  /  DBili  8.5<H>  /  AST  108<H>  /  ALT  60<H>  /  AlkPhos  199<H>  12-03    PT/INR - ( 03 Dec 2019 06:42 )   PT: 36.20 sec;   INR: 3.18 ratio      Culture - Urine (collected 01 Dec 2019 19:00)  Source: .Urine Clean Catch (Midstream)  Final Report (03 Dec 2019 10:34):    50,000 - 99,000 CFU/mL Coag Negative Staphylococcus    Susceptibilites not performed.    <10,000 CFU/ml Normal Urogenital aaliyah present    PHYSICAL EXAM:  GEN: not in distress NC/AT, jaundiced, yellow sclera  LUNGS: Clear to auscultation bilaterally, no wheezing   HEART: S1/S2 present. RRR. No murmurs  ABD: Soft, non-tender, distended. Oozing from the site of the paracentesis. No caput medusa  SKIN:  jaundiced, yellow sclera, opening in the left buttock cheek of 2 cm ( opened spontaneously)  NEURO: AAOX3, moves all extremities, no focal deficits    Intravenous access: Peripheral access  NG tube: None  Joseph Catheter: None

## 2019-12-03 NOTE — PROGRESS NOTE ADULT - ASSESSMENT
Assessment:  44y Male patient admitted S/P Perineal pain, gluteal abscess , with the above physical exam, labs, and imaging findings.    Plan:  -Pain control as needed  -Hemodynamic monitoring as per routine  -CT shows no perianal fluid collection  -Abx, Cipro & Flagyl  -warm compresses  -INR of 3, correct coagulapathy then will reassess for surgical intervention .     Date/Time: 12-03-19 @ 02:24

## 2019-12-03 NOTE — PROGRESS NOTE ADULT - SUBJECTIVE AND OBJECTIVE BOX
Progress Note: Surgery  Patient: DEISRE SWIFT , 44y (1975)Male   MRN: [id number]  Location: [address]  Visit: 11-30-19 Inpatient  Date: 12-03-19 @ 02:24    Procedure/Diagnosis: Perineal pain, gluteal abscess  Events over 24h: No acute event overnight. No new complaint.     Vitals: T(F): 97 (12-02-19 @ 21:25), Max: 98.9 (12-02-19 @ 03:41)  HR: 98 (12-02-19 @ 21:25)  BP: 142/72 (12-02-19 @ 21:25) (115/60 - 142/72)  RR: 18 (12-02-19 @ 21:25)  SpO2: 95% (12-02-19 @ 19:58)      Diet: Diet, DASH/TLC:   Sodium & Cholesterol Restricted (12-01-19 @ 00:31)    IV Fluid: folic acid 1 milliGRAM(s) Oral daily  multivitamin 1 Tablet(s) Oral daily  sodium chloride 0.9%. 1000 milliLiter(s) (50 mL/Hr) IV Continuous <Continuous>  thiamine 100 milliGRAM(s) Oral daily      In:   12-01-19 @ 07:01  -  12-02-19 @ 07:00  --------------------------------------------------------  IN: 220 mL    12-02-19 @ 07:01  -  12-03-19 @ 02:24  --------------------------------------------------------  IN: 210 mL      Out:   12-01-19 @ 07:01  -  12-02-19 @ 07:00  --------------------------------------------------------  OUT:  Total OUT: 0 mL      12-02-19 @ 07:01  -  12-03-19 @ 02:24  --------------------------------------------------------  OUT:    Voided: 1050 mL  Total OUT: 1050 mL        Net:   12-01-19 @ 07:01  -  12-02-19 @ 07:00  --------------------------------------------------------  NET: 220 mL    12-02-19 @ 07:01  -  12-03-19 @ 02:24  --------------------------------------------------------  NET: -840 mL        Physical Examination:  General Appearance: NAD, alert and cooperative  HEENT: NCAT, WNL  Heart: S1 and S2. No murmurs. Rhythm is regular.  Lungs: Clear to auscultation BL without rales, rhonchi, wheezing, crackles or diminished breath sounds.  Abdomen: Positive bowel sounds. Soft, nondistended, non tender  Medications: [Standing]  chlorhexidine 4% Liquid 1 Application(s) Topical daily  ciprofloxacin   IVPB 400 milliGRAM(s) IV Intermittent every 12 hours  folic acid 1 milliGRAM(s) Oral daily  influenza   Vaccine 0.5 milliLiter(s) IntraMuscular once  metroNIDAZOLE  IVPB      metroNIDAZOLE  IVPB 500 milliGRAM(s) IV Intermittent every 8 hours  multivitamin 1 Tablet(s) Oral daily  prednisoLONE    3 mG/mL Solution (ORAPRED) 40 milliGRAM(s) Oral daily  sodium chloride 0.9%. 1000 milliLiter(s) (50 mL/Hr) IV Continuous <Continuous>  spironolactone 100 milliGRAM(s) Oral daily  thiamine 100 milliGRAM(s) Oral daily    Medications:[PRN]  chlordiazePOXIDE 50 milliGRAM(s) Oral every 1 hour PRN    Labs:                        8.9    16.29 )-----------( 116      ( 02 Dec 2019 16:27 )             27.6   12-02    125<L>  |  94<L>  |  11  ----------------------------<  168<H>  4.1   |  19  |  0.6<L>    Ca    7.4<L>      02 Dec 2019 09:05  Mg     1.9     12-01    TPro  6.6  /  Alb  1.8<L>  /  TBili  13.7<H>  /  DBili  x   /  AST  123<H>  /  ALT  61<H>  /  AlkPhos  175<H>  12-02  LIVER FUNCTIONS - ( 02 Dec 2019 09:05 )  Alb: 1.8 g/dL / Pro: 6.6 g/dL / ALK PHOS: 175 U/L / ALT: 61 U/L / AST: 123 U/L / GGT: x         PT/INR - ( 02 Dec 2019 20:15 )   PT: 36.90 sec;   INR: 3.25 ratio         PTT - ( 01 Dec 2019 11:18 )  PTT:47.4 sec    Micro/Urine:    Imaging:  < from: CT Abdomen and Pelvis w/ Oral Cont and w/ IV Cont (12.02.19 @ 13:36) >  IMPRESSION:     1. Since July 25, 2019, no definite evidence of perianal fluid collection.    2. New moderate to large fine abdominopelvic ascites.    3. New small right and trace left pleural effusions, with bibasilar   atelectasis.      < end of copied text >

## 2019-12-03 NOTE — PROGRESS NOTE ADULT - ATTENDING COMMENTS
44y Male patient admitted S/P Perineal pain, gluteal abscess , with the above physical exam, labs, and imaging findings.    Plan:  -Pain control as needed  spontaneously opened. no further intervention needed

## 2019-12-04 LAB
BASOPHILS # BLD AUTO: 0.02 K/UL — SIGNIFICANT CHANGE UP (ref 0–0.2)
BASOPHILS NFR BLD AUTO: 0.1 % — SIGNIFICANT CHANGE UP (ref 0–1)
EOSINOPHIL # BLD AUTO: 0.24 K/UL — SIGNIFICANT CHANGE UP (ref 0–0.7)
EOSINOPHIL NFR BLD AUTO: 1.4 % — SIGNIFICANT CHANGE UP (ref 0–8)
HCT VFR BLD CALC: 26.8 % — LOW (ref 42–52)
HGB BLD-MCNC: 8.9 G/DL — LOW (ref 14–18)
IMM GRANULOCYTES NFR BLD AUTO: 0.8 % — HIGH (ref 0.1–0.3)
LYMPHOCYTES # BLD AUTO: 1.2 K/UL — SIGNIFICANT CHANGE UP (ref 1.2–3.4)
LYMPHOCYTES # BLD AUTO: 7.2 % — LOW (ref 20.5–51.1)
MCHC RBC-ENTMCNC: 30.5 PG — SIGNIFICANT CHANGE UP (ref 27–31)
MCHC RBC-ENTMCNC: 33.2 G/DL — SIGNIFICANT CHANGE UP (ref 32–37)
MCV RBC AUTO: 91.8 FL — SIGNIFICANT CHANGE UP (ref 80–94)
MONOCYTES # BLD AUTO: 1.71 K/UL — HIGH (ref 0.1–0.6)
MONOCYTES NFR BLD AUTO: 10.2 % — HIGH (ref 1.7–9.3)
NEUTROPHILS # BLD AUTO: 13.41 K/UL — HIGH (ref 1.4–6.5)
NEUTROPHILS NFR BLD AUTO: 80.3 % — HIGH (ref 42.2–75.2)
NRBC # BLD: 0 /100 WBCS — SIGNIFICANT CHANGE UP (ref 0–0)
PLATELET # BLD AUTO: 107 K/UL — LOW (ref 130–400)
RBC # BLD: 2.92 M/UL — LOW (ref 4.7–6.1)
RBC # FLD: 21.9 % — HIGH (ref 11.5–14.5)
WBC # BLD: 16.71 K/UL — HIGH (ref 4.8–10.8)
WBC # FLD AUTO: 16.71 K/UL — HIGH (ref 4.8–10.8)

## 2019-12-04 PROCEDURE — 99233 SBSQ HOSP IP/OBS HIGH 50: CPT

## 2019-12-04 PROCEDURE — 99497 ADVNCD CARE PLAN 30 MIN: CPT | Mod: 25

## 2019-12-04 RX ORDER — PROTHROMBIN COMPLEX CONCENTRATE (HUMAN) 25.5; 16.5; 24; 22; 22; 26 [IU]/ML; [IU]/ML; [IU]/ML; [IU]/ML; [IU]/ML; [IU]/ML
2500 POWDER, FOR SOLUTION INTRAVENOUS ONCE
Refills: 0 | Status: DISCONTINUED | OUTPATIENT
Start: 2019-12-04 | End: 2019-12-04

## 2019-12-04 RX ORDER — PROTHROMBIN COMPLEX CONCENTRATE (HUMAN) 25.5; 16.5; 24; 22; 22; 26 [IU]/ML; [IU]/ML; [IU]/ML; [IU]/ML; [IU]/ML; [IU]/ML
1500 POWDER, FOR SOLUTION INTRAVENOUS ONCE
Refills: 0 | Status: COMPLETED | OUTPATIENT
Start: 2019-12-04 | End: 2019-12-04

## 2019-12-04 RX ORDER — SPIRONOLACTONE 25 MG/1
200 TABLET, FILM COATED ORAL DAILY
Refills: 0 | Status: DISCONTINUED | OUTPATIENT
Start: 2019-12-04 | End: 2019-12-07

## 2019-12-04 RX ORDER — PHYTONADIONE (VIT K1) 5 MG
5 TABLET ORAL ONCE
Refills: 0 | Status: COMPLETED | OUTPATIENT
Start: 2019-12-04 | End: 2019-12-04

## 2019-12-04 RX ORDER — FUROSEMIDE 40 MG
80 TABLET ORAL DAILY
Refills: 0 | Status: DISCONTINUED | OUTPATIENT
Start: 2019-12-04 | End: 2019-12-09

## 2019-12-04 RX ADMIN — Medication 1 MILLIGRAM(S): at 12:19

## 2019-12-04 RX ADMIN — CHLORHEXIDINE GLUCONATE 1 APPLICATION(S): 213 SOLUTION TOPICAL at 12:18

## 2019-12-04 RX ADMIN — SPIRONOLACTONE 100 MILLIGRAM(S): 25 TABLET, FILM COATED ORAL at 05:39

## 2019-12-04 RX ADMIN — PROTHROMBIN COMPLEX CONCENTRATE (HUMAN) 400 INTERNATIONAL UNIT(S): 25.5; 16.5; 24; 22; 22; 26 POWDER, FOR SOLUTION INTRAVENOUS at 14:57

## 2019-12-04 RX ADMIN — Medication 100 MILLIGRAM(S): at 05:39

## 2019-12-04 RX ADMIN — Medication 101 MILLIGRAM(S): at 14:57

## 2019-12-04 RX ADMIN — CEFTRIAXONE 100 MILLIGRAM(S): 500 INJECTION, POWDER, FOR SOLUTION INTRAMUSCULAR; INTRAVENOUS at 16:40

## 2019-12-04 RX ADMIN — Medication 1 TABLET(S): at 12:19

## 2019-12-04 RX ADMIN — Medication 100 MILLIGRAM(S): at 12:19

## 2019-12-04 RX ADMIN — Medication 100 MILLIGRAM(S): at 13:32

## 2019-12-04 RX ADMIN — Medication 100 MILLIGRAM(S): at 22:30

## 2019-12-04 RX ADMIN — Medication 80 MILLIGRAM(S): at 14:57

## 2019-12-04 NOTE — PROGRESS NOTE ADULT - ASSESSMENT
ASSESSMENT  45yo [place] male with a PMH of chronic alcoholic liver cirrhosis presented for SOB for the past 4 days, associated with increased abdominal girth and LE edema. Fresh blood per rectum noted with hx of hemorrhoids and diverticulosis. Pt with perianal abscess discovered with admission.      IMPRESSION  # Perianal Abscess   - Hemodynamically stable, afebrile . SIRS on admission, sepsis ruled out  - Blood culture neg 1x  - MRSA PCR neg  #CXR showing low lung volume and bibasilar opacities/effusions  #ETOH hepatitis, cirrhosis MELD >40  - US abdomen showing liver cirrhosis, cholelithiasis, nonspecific gallbladder wall edema and 0.43cm, mildly dilated CBD at 0.7cm, splenomegaly and moderate ascites    - No SBP on paracentesi  #Hyponatremia      RECOMMENDATIONS  - Continue Ceftriaxone 1g q24h IV and Metronidazole IV 12/2-  - Surgery consulted, no plan for I&D given coagulopathy

## 2019-12-04 NOTE — PROGRESS NOTE ADULT - ASSESSMENT
44 year old man with history of alcoholic cirrhosis presented for shortness of breath of 4 days duration, associated with increase abdominal girth and lower extremities edema.    # Acute decompensation of alcoholic hepatitis in chronically cirrhotic patient:  - MELD-Na=33, Maddrey vpszz=111.9, CPS=11, score C  - Alcohol abstinence recommended  - S/p Therapeutic tap done, 4L of fluids removed So far, pleural fluid negative for SBP, albumin and total protein in fluid noted. WILL NEED A REPEAT PARACENTESIS with albumin to relieve ORTHOPNEA and complete MRCP  - Monitor the vital signs and any signs of bleeding  - Patient is s/p a total of 15 mg of vitamin K and INR is still 3.1.   - D/c fluids  - Hold on prednisolone PO for now per GI recs given perianal abscess found on physical exam  - Repeat MRCP after paracentesis today    # Anemia and blood per rectum in the setting of coagulopathy:  - Hb stable around 8-9  - Will do CBC q24h  - keep active type and screen  - Transfuse if <7 or <8 and symptomatic    # Leukocytosis in the setting of perineal/gluteal abscess - from 18K to 14K with neutrophil predominance:  - Blood culture NGTD , peritoneal fluid culture NGTD  - UA negative  - CT abd/pelv with IV and oral contrast did not show any fluid collection in the pernieal area.  - C/w ceftriaxone 1 g q24h and metronidazole 500 mg q8h IV per ID recs    # Alcohol abuse  - last drink 1 week ago  -WA protocol PRN, not needing chlordiazepoxide   - Na 126, monitor daily ( chronic hyponatremia)    # DVT ppx: SCD for now, VA duplex negative for DVT  # GI ppx: None  # Diet: DASH diet  # Activity: ambulate as tolerated, will get PT/rehab  # Dispo: From Home  # Code status: Full code

## 2019-12-04 NOTE — PROGRESS NOTE ADULT - ATTENDING COMMENTS
I saw and examined the patient independently. I agree with above history, physical exam and plan of care which I have reviewed and edited where appropriate with following additions.     patient reports doing ok / thinks abdominal distension less than before hospitalization but getting worse again since last abdominal tap. commits to not drinking any more ETOH if he can survive from current condition.     acute decompensation of alcoholic cirrhosis / hepatitis with ascites and coagulopathy:   GI follow up appreciated.   patient had paracentesis on admission/ with increasing abdominal girth and associated dyspnea/ Gi recommends another large volume therapeutic  abdominal paracentesis today with albumin infusion to avoid hypotension.   MRCp after paracentesis to r/o HCC   INR elevated to >3 / fu repeat INR today.   trend INR and liver enzymes daily.   increase aldactone dose to 200mg and add lasix as suggested by GI.   monitor I&O's.   start vitamin K 10mg as per GI if INR still elevated with today's labs.   monitor bmp/ renal function daily    chronic hyponatremia likely hypervolemic with cirrhosis:    trend daily   c/w diuretics.     acute on chronic disease anemia with mild rectal bleed possibly due to draining perianal abscess with hemorrhoids:   c/w current ABX.   H/H stable range 8-9  monitor H/H daily   transfuse if Hb <8.   surgery evaluated- no intervention for now.   GI following - if Hb stable with no active bleeding- colonoscopy as OP .     dvt ppx with SCD's.     goals of care discussed with patient- wants full medical management and is full code.    Progress note Handoff:   Pending: clinical improvement/paracentesis /MRCP    Discussion: plan of care with  patient /  satisfied- all questions answered.  Disposition: unknown at this time/ needs  eval before discharge               suspected folate and thiamine deficiency:   c/w oral supplements

## 2019-12-04 NOTE — PROGRESS NOTE ADULT - SUBJECTIVE AND OBJECTIVE BOX
JENIFFERDESIRE  44y, Male  Allergy: No Known Allergies      CHIEF COMPLAINT: Shortness of breath (04 Dec 2019 14:19)      INTERVAL EVENTS/HPI  - No acute events overnight  - T(F): , Max: 98.3 (12-03-19 @ 21:21)  - Denies any worsening symptoms  - Tolerating medication  - WBC Count: 16.71 (12-04-19 @ 06:00)  WBC Count: 17.63 (12-03-19 @ 06:42)  - Creatinine, Serum: 0.6 (12-03-19 @ 06:42)       ROS  General: Denies rigors, nightsweats  HEENT: Denies headache, rhinorrhea, sore throat, eye pain  CV: Denies CP, palpitations  PULM: Denies wheezing, hemoptysis  GI: Denies hematemesis, hematochezia, melena  : Denies discharge, hematuria  MSK: Denies arthralgias, myalgias  SKIN: Denies rash, lesions  NEURO: Denies paresthesias, weakness  PSYCH: Denies depression, anxiety    VITALS:  T(F): 97.8, Max: 98.3 (12-03-19 @ 21:21)  HR: 89  BP: 117/60  RR: 18Vital Signs Last 24 Hrs  T(C): 36.6 (04 Dec 2019 13:47), Max: 36.8 (03 Dec 2019 21:21)  T(F): 97.8 (04 Dec 2019 13:47), Max: 98.3 (03 Dec 2019 21:21)  HR: 89 (04 Dec 2019 13:47) (89 - 95)  BP: 117/60 (04 Dec 2019 13:47) (112/57 - 125/60)  BP(mean): --  RR: 18 (04 Dec 2019 13:47) (18 - 19)  SpO2: 96% (03 Dec 2019 19:50) (96% - 96%)    PHYSICAL EXAM:  Gen: NAD, resting in bed  HEENT: Normocephalic, atraumatic  Neck: supple, no lymphadenopathy  CV: Regular rate & regular rhythm  Lungs: Mild resp distress, use of accessory muscles, RR 18 @ SpO2 97%  Abdomen: Distended abdomen  Ext: b/l LE edema  Neuro: non focal, awake  Skin: jaundiced, spider angioma around the chest area, localized perianal abscess on the left gluteus, tender to palpation, +fluctuance, no drainage no erythema. 2nd incision scar also noted slightly above.      FH: Non-contributory  Social Hx: Non-contributory    TESTS & MEASUREMENTS:                        8.9    16.71 )-----------( 107      ( 04 Dec 2019 06:00 )             26.8     12-03    126<L>  |  96<L>  |  13  ----------------------------<  106<H>  4.6   |  21  |  0.6<L>    Ca    7.3<L>      03 Dec 2019 06:42    TPro  6.7  /  Alb  1.8<L>  /  TBili  12.7<H>  /  DBili  8.5<H>  /  AST  108<H>  /  ALT  60<H>  /  AlkPhos  199<H>  12-03      LIVER FUNCTIONS - ( 03 Dec 2019 06:42 )  Alb: 1.8 g/dL / Pro: 6.7 g/dL / ALK PHOS: 199 U/L / ALT: 60 U/L / AST: 108 U/L / GGT: x               Culture - Urine (collected 12-01-19 @ 19:00)  Source: .Urine Clean Catch (Midstream)  Final Report (12-03-19 @ 10:34):    50,000 - 99,000 CFU/mL Coag Negative Staphylococcus    Susceptibilites not performed.    <10,000 CFU/ml Normal Urogenital aaliyah present    Culture - Body Fluid with Gram Stain (collected 11-30-19 @ 11:36)  Source: .Body Fluid Abdominal Fluid  Gram Stain (12-01-19 @ 11:12):    polymorphonuclear leukocytes per low power field    No organisms seen per oil power field    by cytocentrifuge  Preliminary Report (12-02-19 @ 07:24):    No growth    Culture - Blood (collected 11-30-19 @ 10:37)  Source: .Blood Blood  Preliminary Report (12-02-19 @ 07:01):    No growth to date.        Lactate, Blood: 1.6 mmol/L (11-30-19 @ 17:00)      INFECTIOUS DISEASES TESTING  MRSA PCR Result.: Negative (12-03-19 @ 07:48)      RADIOLOGY & ADDITIONAL TESTS:  I have personally reviewed the last Chest xray  CXR      CT  CT Abdomen and Pelvis w/ Oral Cont and w/ IV Cont:   EXAM:  CT ABDOMEN AND PELVIS OC IC            PROCEDURE DATE:  12/02/2019            INTERPRETATION:  CLINICAL STATEMENT: Perianal abscess.      TECHNIQUE: Contiguous axial CT images were obtained from the lower chest   to the pubic symphysis following administration of 100cc Optiray 320   intravenous contrast.  Reformatted images in the coronal and sagittal   planes were acquired.    COMPARISON CT: July 25, 2019.      FINDINGS:    LOWER CHEST: New small right and trace left pleural effusions, with   bibasilar atelectasis.     HEPATOBILIARY: Nodular liver contour, suggestive of underlying cirrhosis.   Cholelithiasis.    SPLEEN: Unremarkable.    PANCREAS: Unremarkable.    ADRENAL GLANDS: Unremarkable.    KIDNEYS: Unremarkable.    ABDOMINOPELVIC NODES: New moderate to large volume abdominopelvic ascites.    PELVIC ORGANS: Unremarkable.    PERITONEUM/MESENTERY/BOWEL: Scattered colonic diverticulosis. No evidence   of bowel obstruction. No free intraperitoneal air.     BONES/SOFT TISSUES: No evidence of perianal fluid collection.   Degenerative changes of the spine. No acute osseous abnormality.    OTHER: Overall unchanged perigastric and perisplenic varices, with   splenorenal shunt. Scattered atherosclerotic vascular calcification.      IMPRESSION:     1. Since July 25, 2019, no definite evidence of perianal fluid collection.    2. New moderate to large fine abdominopelvic ascites.    3. New small right and trace left pleural effusions, with bibasilar   atelectasis.                    SANGEETA WHITAKER M.D., ATTENDING RADIOLOGIST  This document has been electronically signed. Dec  2 2019  2:45PM             (12-02-19 @ 13:36)      CARDIOLOGY TESTING  12 Lead ECG:   Ventricular Rate 92 BPM    Atrial Rate 92 BPM    P-R Interval 160 ms    QRS Duration 98 ms    Q-T Interval 388 ms    QTC Calculation(Bezet) 479 ms    P Axis 51 degrees    R Axis 30 degrees    T Axis 51 degrees    Diagnosis Line Normal sinus rhythm  Normal ECG    Confirmed by Claude Llamas ([id number]) on 12/1/2019 11:51:31 AM (12-01-19 @ 01:05)  12 Lead ECG:   Ventricular Rate 89 BPM    Atrial Rate 89 BPM    P-R Interval 112 ms    QRS Duration 96 ms    Q-T Interval 392 ms    QTC Calculation(Bezet) 476 ms    P Axis 108 degrees    R Axis 97 degrees    T Axis 27 degrees    Diagnosis Line *** Suspect arm lead reversal, interpretation assumes no reversal  Normal sinus rhythm  Rightward axis  Nonspecific ST abnormality  Abnormal ECG    Confirmed by Claude Llamas ([id number]) on 12/1/2019 11:51:29 AM (11-30-19 @ 17:41)      MEDICATIONS  cefTRIAXone   IVPB 1000  chlorhexidine 4% Liquid 1  folic acid 1  furosemide    Tablet 80  influenza   Vaccine 0.5  metroNIDAZOLE  IVPB   metroNIDAZOLE  IVPB 500  multivitamin 1  spironolactone 200  thiamine 100      ANTIBIOTICS:  cefTRIAXone   IVPB 1000 milliGRAM(s) IV Intermittent every 24 hours  metroNIDAZOLE  IVPB      metroNIDAZOLE  IVPB 500 milliGRAM(s) IV Intermittent every 8 hours      All available historical records have been reviewed

## 2019-12-04 NOTE — PROGRESS NOTE ADULT - SUBJECTIVE AND OBJECTIVE BOX
SUBJECTIVE:    Patient is a 44y old Male who presents with a chief complaint of Shortness of breath (04 Dec 2019 08:15)    Currently admitted to medicine with the primary diagnosis of Alcoholic cirrhosis of liver with ascites     Today is hospital day 4d. This morning he is resting comfortably in bed and reports no new issues or overnight events.   Patient failed to have MRCP yesterday because he was dyspneic when lying down and needed oxygen, will attempt repeat today after paracentesis    PAST MEDICAL & SURGICAL HISTORY  Liver cirrhosis, alcoholic  Anemia  Thrombocytopenia  ETOH abuse  History of incision and drainage: Gluteal abscess    SOCIAL HISTORY:  Negative for smoking/alcohol/drug use.     ALLERGIES:  No Known Allergies    MEDICATIONS:  STANDING MEDICATIONS  cefTRIAXone   IVPB 1000 milliGRAM(s) IV Intermittent every 24 hours  chlorhexidine 4% Liquid 1 Application(s) Topical daily  folic acid 1 milliGRAM(s) Oral daily  influenza   Vaccine 0.5 milliLiter(s) IntraMuscular once  metroNIDAZOLE  IVPB      metroNIDAZOLE  IVPB 500 milliGRAM(s) IV Intermittent every 8 hours  multivitamin 1 Tablet(s) Oral daily  spironolactone 100 milliGRAM(s) Oral daily  thiamine 100 milliGRAM(s) Oral daily    PRN MEDICATIONS  chlordiazePOXIDE 50 milliGRAM(s) Oral every 1 hour PRN    VITALS:   T(F): 97.7  HR: 95  BP: 112/57  RR: 19  SpO2: 96%    LABS:                        8.9    16.71 )-----------( 107      ( 04 Dec 2019 06:00 )             26.8     12-03    126<L>  |  96<L>  |  13  ----------------------------<  106<H>  4.6   |  21  |  0.6<L>    Ca    7.3<L>      03 Dec 2019 06:42    TPro  6.7  /  Alb  1.8<L>  /  TBili  12.7<H>  /  DBili  8.5<H>  /  AST  108<H>  /  ALT  60<H>  /  AlkPhos  199<H>  12-03    PT/INR - ( 03 Dec 2019 06:42 )   PT: 36.20 sec;   INR: 3.18 ratio                   Culture - Urine (collected 01 Dec 2019 19:00)  Source: .Urine Clean Catch (Midstream)  Final Report (03 Dec 2019 10:34):    50,000 - 99,000 CFU/mL Coag Negative Staphylococcus    Susceptibilites not performed.    <10,000 CFU/ml Normal Urogenital aaliyah present    PHYSICAL EXAM:  GEN: not in distress NC/AT, jaundiced, yellow sclera  LUNGS: Clear to auscultation bilaterally, no wheezing   HEART: S1/S2 present. RRR. No murmurs  ABD: Soft, non-tender, distended. Oozing from the site of the paracentesis. No caput medusa  SKIN:  jaundiced, yellow sclera, opening in the left buttock cheek of 2 cm ( opened spontaneously)  NEURO: AAOX3, moves all extremities, no focal deficits    Intravenous access: Peripheral access  NG tube: None  Joseph Catheter: None

## 2019-12-04 NOTE — PROGRESS NOTE ADULT - ASSESSMENT
44 year old man with history of alcoholic cirrhosis presented for shortness of breath of 4 days duration, associated with increase abdominal girth and lower extremities edema.      # Severe Alcoholic Hepatitis   DF: 117 on 12/1   MELD: 32 on 12/1  No SBP on Para   Pt afebrile and no signs of sepsis  On IV abx for perianal abscess   Rec:   Vitamin K 10 po once daily and daily INR   IV hydration   Will hold on prednisolone for now given pt have a perianal abscess discovered on exam  Continue Thiamine and folate  Daily liver enzymes and INR   Avoid hepatotoxic drugs  Please do large volume paracentesis with albumin infusion before sending pt for  MRI   GET MRI with contrast + MRCP after para done  Please increase aldactone to 200 and add lasix 80 daily    Follow BMP and creatinine daily     # Decompensated alcoholic cirrhosis   MELD 32 on 12/1    Large volume ascites SP paracentesis with 4 L removal on 12/1   No SBP   No varices on last EGD on 7/2018   Rec:   Needs repeat Large volume Para with albumin infusion   Follow BMP daily   Low sodium diet   Follow up  MRI liver rule out HCC   Needs EGD on 7/2020 for variceal screening     # Mild rectal bleed secondary to hemorrhoids   Had Colonoscopy in 2018 that showed Hemorrhoids and Diverticulosis   Hemodynamically stable   No signs of active GI bleed  Hg stable   Rec:  Follow Hg q24 h   can have Colonoscopy as outpatient    If Hg keeps dropping and signs of active Bleed will do inpatient colonoscopy once stable

## 2019-12-04 NOTE — CONSULT NOTE ADULT - SUBJECTIVE AND OBJECTIVE BOX
HPI:  a 44 year old man with history of alcoholic cirrhosis presented for shortness of breath of 4 days duration, associated with increase abdominal girth and lower extremities edema. he also reported orthopnea, abdominal pain (generalized dull, non radiating), generalized itching and jaundice. he endorsed subjective fever and chills. no cough, no chest pain  the patient last drink was 30% of a bottle of Michell with a friend and before that it was 2 months ago. he also has fresh blood per rectum, minimal amount, chronic.  the patient lives with his aunt, has no enough social support ( from wife and children), he has been in rehab program and was sober for a while then started drinking again. he used to follow at [facility] for hepatology. (30 Nov 2019 22:27)      PAST MEDICAL & SURGICAL HISTORY:  Liver cirrhosis, alcoholic  Anemia  Thrombocytopenia  ETOH abuse  History of incision and drainage: Gluteal abscess      Hospital Course:    TODAY'S SUBJECTIVE & REVIEW OF SYMPTOMS:     Constitutional WNL   Cardio WNL   Resp WNL   GI abdominal distension  Heme WNL  Endo WNL  Skin WNL  MSK Weakness  Neuro WNL  Cognitive WNL  Psych WNL      MEDICATIONS  (STANDING):  cefTRIAXone   IVPB 1000 milliGRAM(s) IV Intermittent every 24 hours  chlorhexidine 4% Liquid 1 Application(s) Topical daily  folic acid 1 milliGRAM(s) Oral daily  furosemide    Tablet 80 milliGRAM(s) Oral daily  influenza   Vaccine 0.5 milliLiter(s) IntraMuscular once  metroNIDAZOLE  IVPB      metroNIDAZOLE  IVPB 500 milliGRAM(s) IV Intermittent every 8 hours  multivitamin 1 Tablet(s) Oral daily  phytonadione  IVPB 5 milliGRAM(s) IV Intermittent once  prothrombin complex concentrate IVPB (KCENTRA) 1500 International Unit(s) IV Intermittent once  spironolactone 200 milliGRAM(s) Oral daily  thiamine 100 milliGRAM(s) Oral daily    MEDICATIONS  (PRN):  chlordiazePOXIDE 50 milliGRAM(s) Oral every 1 hour PRN Symptom-triggered: each CIWA -Ar score 8 or GREATER      FAMILY HISTORY:  Family history of stroke (Father, Mother)      Allergies    No Known Allergies    Intolerances        SOCIAL HISTORY:    [  ] Etoh  [  ] Smoking  [  ] Substance abuse     Home Environment:  [x  ] Home Alone  [  ] Lives with Family  [  ] Home Health Aid    Dwelling:  [ x ] Apartment  [  ] Private House  [  ] Adult Home  [  ] Skilled Nursing Facility      [  ] Short Term  [  ] Long Term  [ x ] Stairs       Elevator [  ]    FUNCTIONAL STATUS PTA: (Check all that apply)  Ambulation: [ x  ]Independent    [  ] Dependent     [  ] Non-Ambulatory  Assistive Device: [  ] SA Cane  [  ]  Q Cane  [  ] Walker  [  ]  Wheelchair  ADL : [ x ] Independent  [  ]  Dependent       Vital Signs Last 24 Hrs  T(C): 36.5 (04 Dec 2019 05:38), Max: 36.8 (03 Dec 2019 21:21)  T(F): 97.7 (04 Dec 2019 05:38), Max: 98.3 (03 Dec 2019 21:21)  HR: 95 (04 Dec 2019 05:38) (95 - 95)  BP: 112/57 (04 Dec 2019 05:38) (112/57 - 125/60)  BP(mean): --  RR: 19 (04 Dec 2019 05:38) (18 - 19)  SpO2: 96% (03 Dec 2019 19:50) (96% - 96%)      PHYSICAL EXAM: Alert & awake  GENERAL: NAD  HEAD:  Atraumatic, Normocephalic  CHEST/LUNG: Clear   HEART: S1S2+  ABDOMEN: distended  EXTREMITIES:  no calf tenderness    NERVOUS SYSTEM:  Cranial Nerves 2-12 intact [  ] Abnormal  [  ]  ROM: WFL all extremities [x  ]  Abnormal [  ]  Motor Strength: WFL all extremities  [x  ]  Abnormal [  ]  Sensation: intact to light touch [ x ] Abnormal [  ]  Reflexes: Symmetric [  ]  Abnormal [  ]    FUNCTIONAL STATUS:  Bed Mobility: Independent [  ]  Supervision [  ]  Needs Assistance [x  ]  N/A [  ]  Transfers: Independent [  ]  Supervision [  ]  Needs Assistance [x  ]  N/A [  ]   Ambulation: Independent [  ]  Supervision [  ]  Needs Assistance [  ]  N/A [  ]  ADL: Independent [  ] Requires Assistance [  ] N/A [  ]      LABS:                        8.9    16.71 )-----------( 107      ( 04 Dec 2019 06:00 )             26.8     12-03    126<L>  |  96<L>  |  13  ----------------------------<  106<H>  4.6   |  21  |  0.6<L>    Ca    7.3<L>      03 Dec 2019 06:42    TPro  6.7  /  Alb  1.8<L>  /  TBili  12.7<H>  /  DBili  8.5<H>  /  AST  108<H>  /  ALT  60<H>  /  AlkPhos  199<H>  12-03    PT/INR - ( 03 Dec 2019 06:42 )   PT: 36.20 sec;   INR: 3.18 ratio               RADIOLOGY & ADDITIONAL STUDIES:    Assesment:

## 2019-12-04 NOTE — PROGRESS NOTE ADULT - SUBJECTIVE AND OBJECTIVE BOX
We are following the patient for Alcoholic hepatitis     GI HPI Today:  Pt denies any complaints. Could not do MRI yesterday because could not lay flat because of SOB   Also his abscess opened yesterday   No fever or asterixis     PAST MEDICAL & SURGICAL HISTORY  Liver cirrhosis, alcoholic  Anemia  Thrombocytopenia  ETOH abuse  History of incision and drainage: Gluteal abscess      ALLERGIES:  No Known Allergies      MEDICATIONS:  MEDICATIONS  (STANDING):  cefTRIAXone   IVPB 1000 milliGRAM(s) IV Intermittent every 24 hours  chlorhexidine 4% Liquid 1 Application(s) Topical daily  folic acid 1 milliGRAM(s) Oral daily  influenza   Vaccine 0.5 milliLiter(s) IntraMuscular once  metroNIDAZOLE  IVPB      metroNIDAZOLE  IVPB 500 milliGRAM(s) IV Intermittent every 8 hours  multivitamin 1 Tablet(s) Oral daily  sodium chloride 0.9%. 1000 milliLiter(s) (50 mL/Hr) IV Continuous <Continuous>  spironolactone 100 milliGRAM(s) Oral daily  thiamine 100 milliGRAM(s) Oral daily    MEDICATIONS  (PRN):  chlordiazePOXIDE 50 milliGRAM(s) Oral every 1 hour PRN Symptom-triggered: each CIWA -Ar score 8 or GREATER      REVIEW OF SYSTEMS  General:  No fevers  Eyes:  No reported pain   ENT:  No sore throat   NECK: No stiffness   CV:  No chest pain   Resp:  No shortness of breath  GI:  See HPI  :  No dysuria  Muscle:  ++ weakness  Neuro:  No tingling  Endocrine:  No polyuria  Heme:  No ecchymosis        VITALS:   T(F): 97.7 (12-04 @ 05:38), Max: 99.2 (12-02 @ 01:22)  HR: 95 (12-04 @ 05:38) (88 - 104)  BP: 112/57 (12-04 @ 05:38) (97/53 - 142/72)  BP(mean): --  RR: 19 (12-04 @ 05:38) (17 - 19)  SpO2: 96% (12-03 @ 19:50) (95% - 98%)        PHYSICAL EXAM:  GENERAL:  Appears in no distress  HEENT:  Conjunctivae icteric   CHEST:  Full & symmetric excursion  HEART:  NS1, S2,   ABDOMEN:  Soft, non-tender, +++ distende  EXTEREMITIES:  no edema  SKIN:  No rash  NEURO:  Alert         Blood Work :                        8.9    16.71 )-----------( 107      ( 04 Dec 2019 06:00 )             26.8     PT/INR - ( 03 Dec 2019 06:42 )  INR: 3.18          PTT - ( 01 Dec 2019 11:18 )  PTT:47.4<H>  12-03    126<L>  |  96<L>  |  13  ----------------------------<  106<H>  4.6   |  21  |  0.6<L>    Ca    7.3<L>      03 Dec 2019 06:42  Phos  3.3     11-30  Mg     1.9     12-01      CBC -  ( 04 Dec 2019 06:00 )  Hemoglobin : 8.9    CBC -  ( 03 Dec 2019 06:42 )  Hemoglobin : 9.0    CBC -  ( 02 Dec 2019 16:27 )  Hemoglobin : 8.9    CBC -  ( 02 Dec 2019 09:05 )  Hemoglobin : 9.0    CBC -  ( 01 Dec 2019 11:18 )  Hemoglobin : 8.9    CBC -  ( 30 Nov 2019 23:20 )  Hemoglobin : 8.8    CBC -  ( 30 Nov 2019 17:00 )  Hemoglobin : 9.1      LIVER FUNCTIONS - ( 03 Dec 2019 06:42 )  Alb: 1.8 [3.5 - 5.2] / Pro: 6.7 [6.0 - 8.0] / ALK PHOS: 199 [30 - 115] / ALT: 60 [0 - 41] / AST: 108 [0 - 41] / GGT: x     LIVER FUNCTIONS - ( 02 Dec 2019 09:05 )  Alb: 1.8 [3.5 - 5.2] / Pro: 6.6 [6.0 - 8.0] / ALK PHOS: 175 [30 - 115] / ALT: 61 [0 - 41] / AST: 123 [0 - 41] / GGT: x     LIVER FUNCTIONS - ( 01 Dec 2019 11:18 )  Alb: 1.9 [3.5 - 5.2] / Pro: 6.8 [6.0 - 8.0] / ALK PHOS: 194 [30 - 115] / ALT: 64 [0 - 41] / AST: 126 [0 - 41] / GGT: x     LIVER FUNCTIONS - ( 30 Nov 2019 23:20 )  Alb: 2.0 [3.5 - 5.2] / Pro: 6.8 [6.0 - 8.0] / ALK PHOS: 216 [30 - 115] / ALT: 61 [0 - 41] / AST: 120 [0 - 41] / GGT: x     LIVER FUNCTIONS - ( 30 Nov 2019 17:00 )  Alb: 2.3 [3.5 - 5.2] / Pro: 7.5 [6.0 - 8.0] / ALK PHOS: 226 [30 - 115] / ALT: 69 [0 - 41] / AST: 186 [0 - 41] / GGT: x

## 2019-12-04 NOTE — GOALS OF CARE CONVERSATION - ADVANCED CARE PLANNING - CONVERSATION DETAILS
I discussed with patient his current medical condition, prognosis and management. patient wants full medical management.   discussed goals of care with patient, Wants to be FULL code.

## 2019-12-04 NOTE — CONSULT NOTE ADULT - ASSESSMENT

## 2019-12-05 LAB
ALBUMIN SERPL ELPH-MCNC: 1.8 G/DL — LOW (ref 3.5–5.2)
ALP SERPL-CCNC: 199 U/L — HIGH (ref 30–115)
ALT FLD-CCNC: 65 U/L — HIGH (ref 0–41)
ANION GAP SERPL CALC-SCNC: 12 MMOL/L — SIGNIFICANT CHANGE UP (ref 7–14)
AST SERPL-CCNC: 127 U/L — HIGH (ref 0–41)
B PERT IGG+IGM PNL SER: CLEAR — SIGNIFICANT CHANGE UP
BASOPHILS # BLD AUTO: 0.02 K/UL — SIGNIFICANT CHANGE UP (ref 0–0.2)
BASOPHILS NFR BLD AUTO: 0.1 % — SIGNIFICANT CHANGE UP (ref 0–1)
BILIRUB SERPL-MCNC: 12.3 MG/DL — HIGH (ref 0.2–1.2)
BUN SERPL-MCNC: 14 MG/DL — SIGNIFICANT CHANGE UP (ref 10–20)
CALCIUM SERPL-MCNC: 7.4 MG/DL — LOW (ref 8.5–10.1)
CHLORIDE SERPL-SCNC: 95 MMOL/L — LOW (ref 98–110)
CO2 SERPL-SCNC: 20 MMOL/L — SIGNIFICANT CHANGE UP (ref 17–32)
COLOR FLD: YELLOW — SIGNIFICANT CHANGE UP
CREAT SERPL-MCNC: 0.6 MG/DL — LOW (ref 0.7–1.5)
EOSINOPHIL # BLD AUTO: 0.24 K/UL — SIGNIFICANT CHANGE UP (ref 0–0.7)
EOSINOPHIL NFR BLD AUTO: 1.7 % — SIGNIFICANT CHANGE UP (ref 0–8)
FLUID INTAKE SUBSTANCE CLASS: SIGNIFICANT CHANGE UP
FLUID SEGMENTED GRANULOCYTES: SIGNIFICANT CHANGE UP %
GLUCOSE SERPL-MCNC: 88 MG/DL — SIGNIFICANT CHANGE UP (ref 70–99)
HCT VFR BLD CALC: 29.3 % — LOW (ref 42–52)
HGB BLD-MCNC: 9.7 G/DL — LOW (ref 14–18)
IMM GRANULOCYTES NFR BLD AUTO: 0.6 % — HIGH (ref 0.1–0.3)
INR BLD: 2.79 RATIO — HIGH (ref 0.65–1.3)
LYMPHOCYTES # BLD AUTO: 1.25 K/UL — SIGNIFICANT CHANGE UP (ref 1.2–3.4)
LYMPHOCYTES # BLD AUTO: 9 % — LOW (ref 20.5–51.1)
LYMPHOCYTES # FLD: SIGNIFICANT CHANGE UP
MCHC RBC-ENTMCNC: 30.9 PG — SIGNIFICANT CHANGE UP (ref 27–31)
MCHC RBC-ENTMCNC: 33.1 G/DL — SIGNIFICANT CHANGE UP (ref 32–37)
MCV RBC AUTO: 93.3 FL — SIGNIFICANT CHANGE UP (ref 80–94)
MESOTHL CELL # FLD: SIGNIFICANT CHANGE UP %
MONOCYTES # BLD AUTO: 1.38 K/UL — HIGH (ref 0.1–0.6)
MONOCYTES NFR BLD AUTO: 9.9 % — HIGH (ref 1.7–9.3)
MONOS+MACROS # FLD: SIGNIFICANT CHANGE UP %
NEUTROPHILS # BLD AUTO: 10.95 K/UL — HIGH (ref 1.4–6.5)
NEUTROPHILS NFR BLD AUTO: 78.7 % — HIGH (ref 42.2–75.2)
NRBC # BLD: 0 /100 WBCS — SIGNIFICANT CHANGE UP (ref 0–0)
PLATELET # BLD AUTO: 114 K/UL — LOW (ref 130–400)
POTASSIUM SERPL-MCNC: 4.2 MMOL/L — SIGNIFICANT CHANGE UP (ref 3.5–5)
POTASSIUM SERPL-SCNC: 4.2 MMOL/L — SIGNIFICANT CHANGE UP (ref 3.5–5)
PROT SERPL-MCNC: 6.9 G/DL — SIGNIFICANT CHANGE UP (ref 6–8)
PROTHROM AB SERPL-ACNC: 31.8 SEC — HIGH (ref 9.95–12.87)
RBC # BLD: 3.14 M/UL — LOW (ref 4.7–6.1)
RBC # FLD: 22 % — HIGH (ref 11.5–14.5)
RCV VOL RI: 0 /UL — SIGNIFICANT CHANGE UP (ref 0–0)
SODIUM SERPL-SCNC: 127 MMOL/L — LOW (ref 135–146)
TOTAL NUCLEATED CELL COUNT, BODY FLUID: 27 /UL — SIGNIFICANT CHANGE UP
TUBE TYPE: SIGNIFICANT CHANGE UP
WBC # BLD: 13.93 K/UL — HIGH (ref 4.8–10.8)
WBC # FLD AUTO: 13.93 K/UL — HIGH (ref 4.8–10.8)

## 2019-12-05 PROCEDURE — 99233 SBSQ HOSP IP/OBS HIGH 50: CPT

## 2019-12-05 RX ADMIN — Medication 100 MILLIGRAM(S): at 21:14

## 2019-12-05 RX ADMIN — CEFTRIAXONE 100 MILLIGRAM(S): 500 INJECTION, POWDER, FOR SOLUTION INTRAMUSCULAR; INTRAVENOUS at 18:04

## 2019-12-05 RX ADMIN — Medication 1 TABLET(S): at 12:40

## 2019-12-05 RX ADMIN — Medication 1 MILLIGRAM(S): at 12:40

## 2019-12-05 RX ADMIN — Medication 100 MILLIGRAM(S): at 13:27

## 2019-12-05 RX ADMIN — Medication 100 MILLIGRAM(S): at 12:40

## 2019-12-05 RX ADMIN — Medication 100 MILLIGRAM(S): at 06:15

## 2019-12-05 RX ADMIN — Medication 80 MILLIGRAM(S): at 06:15

## 2019-12-05 RX ADMIN — SPIRONOLACTONE 200 MILLIGRAM(S): 25 TABLET, FILM COATED ORAL at 06:16

## 2019-12-05 NOTE — PROGRESS NOTE ADULT - ASSESSMENT
44 year old man with history of alcoholic cirrhosis presented for shortness of breath of 4 days duration, associated with increase abdominal girth and lower extremities edema.    # Acute decompensation of alcoholic hepatitis in chronically cirrhotic patient:  - MELD-Na=33, Maddrey zsfwn=156.9, CPS=11, score C  - Alcohol abstinence recommended  - S/p Therapeutic tap done, 4L of fluids removed So far, pleural fluid negative for SBP, albumin and total protein in fluid noted. WILL NEED A REPEAT PARACENTESIS with albumin to relieve ORTHOPNEA and complete MRCP TODAY  - Monitor the vital signs and any signs of bleeding  - Patient is s/p a total of 15 mg of vitamin K, 5 mg vitamin K IV and Kcentra, INR 2.7, INR IS NOT IMPORTANT PARAMETER TO CONSIDER FOR PARACENTESIS IN LIVER CIRRHOSIS, WILL PROCEED WITH PARA TODAY  - Hold on prednisolone PO for now per GI recs given perianal abscess found on physical exam  - Repeat MRCP after paracentesis today    # Anemia and blood per rectum in the setting of coagulopathy:  - Hb stable around 8-9  - Will do CBC q24h  - keep active type and screen  - Transfuse if <7 or <8 and symptomatic    # Leukocytosis in the setting of perineal/gluteal abscess - from 18K to 14K with neutrophil predominance:  - Blood culture NGTD , peritoneal fluid culture NGTD  - UA negative  - CT abd/pelv with IV and oral contrast did not show any fluid collection in the pernieal area. Surgery won't do any further interventions  - C/w ceftriaxone 1 g q24h and metronidazole 500 mg q8h IV per ID recs    # Alcohol abuse  - last drink 1 week ago  -CIWA protocol PRN, not needing chlordiazepoxide   - Na 126, monitor daily ( chronic hyponatremia)  - Educated on alcohol absitnence    # DVT ppx: SCD for now, VA duplex negative for DVT  # GI ppx: None  # Diet: DASH diet  # Activity: ambulate as tolerated, pending PT  # Dispo: From Home  # Code status: Full code

## 2019-12-05 NOTE — PROCEDURE NOTE - NSPROCDETAILS_GEN_ALL_CORE
location identified, sterile technique used, catheter introduced, fluid drained/ultrasound utilization
location identified, sterile technique used, catheter introduced, fluid drained
location identified, sterile technique used, catheter introduced, fluid drained

## 2019-12-05 NOTE — PROGRESS NOTE ADULT - SUBJECTIVE AND OBJECTIVE BOX
We are following the patient for Alcoholic hepatitis     GI HPI Today:  Pt feels better today. No complaints except feeling Bloated   No vomiting   No bloody stools     PAST MEDICAL & SURGICAL HISTORY  Liver cirrhosis, alcoholic  Anemia  Thrombocytopenia  ETOH abuse  History of incision and drainage: Gluteal abscess      ALLERGIES:  No Known Allergies      MEDICATIONS:  MEDICATIONS  (STANDING):  cefTRIAXone   IVPB 1000 milliGRAM(s) IV Intermittent every 24 hours  chlorhexidine 4% Liquid 1 Application(s) Topical daily  folic acid 1 milliGRAM(s) Oral daily  furosemide    Tablet 80 milliGRAM(s) Oral daily  influenza   Vaccine 0.5 milliLiter(s) IntraMuscular once  metroNIDAZOLE  IVPB      metroNIDAZOLE  IVPB 500 milliGRAM(s) IV Intermittent every 8 hours  multivitamin 1 Tablet(s) Oral daily  spironolactone 200 milliGRAM(s) Oral daily  thiamine 100 milliGRAM(s) Oral daily    MEDICATIONS  (PRN):  chlordiazePOXIDE 50 milliGRAM(s) Oral every 1 hour PRN Symptom-triggered: each CIWA -Ar score 8 or GREATER      REVIEW OF SYSTEMS  General:  No fevers  Eyes:  No reported pain   ENT:  No sore throat   NECK: No stiffness   CV:  No chest pain   Resp:  No shortness of breath  GI:  See HPI  :  No dysuria  Muscle:  +++ weakness  Neuro:  No tingling  Endocrine:  No polyuria  Heme:  No ecchymosis        VITALS:   T(F): 97.6 (12-05 @ 07:52), Max: 98.9 (12-02 @ 03:41)  HR: 88 (12-05 @ 07:52) (87 - 98)  BP: 114/59 (12-05 @ 07:52) (96/54 - 142/72)  BP(mean): --  RR: 18 (12-05 @ 07:52) (17 - 19)  SpO2: 96% (12-04 @ 19:56) (94% - 97%)        PHYSICAL EXAM:  GENERAL:  Appears in no distress  HEENT:  Conjunctivae icteric   CHEST:  Full & symmetric excursion  HEART:  NS1, S2,   ABDOMEN:  Soft, non-tender, +++ distended  SKIN:  ++ rash  NEURO:  Alert         Blood Work :                        9.7    13.93 )-----------( 114      ( 05 Dec 2019 06:56 )             29.3     PT/INR - ( 05 Dec 2019 06:56 )  INR: 2.79          PTT - ( 01 Dec 2019 11:18 )  PTT:47.4<H>  12-05    127<L>  |  95<L>  |  14  ----------------------------<  88  4.2   |  20  |  0.6<L>    Ca    7.4<L>      05 Dec 2019 06:56  Mg     1.9     12-01      CBC -  ( 05 Dec 2019 06:56 )  Hemoglobin : 9.7    CBC -  ( 04 Dec 2019 06:00 )  Hemoglobin : 8.9    CBC -  ( 03 Dec 2019 06:42 )  Hemoglobin : 9.0    CBC -  ( 02 Dec 2019 16:27 )  Hemoglobin : 8.9    CBC -  ( 02 Dec 2019 09:05 )  Hemoglobin : 9.0    CBC -  ( 01 Dec 2019 11:18 )  Hemoglobin : 8.9      LIVER FUNCTIONS - ( 05 Dec 2019 06:56 )  Alb: 1.8 [3.5 - 5.2] / Pro: 6.9 [6.0 - 8.0] / ALK PHOS: 199 [30 - 115] / ALT: 65 [0 - 41] / AST: 127 [0 - 41] / GGT: x     LIVER FUNCTIONS - ( 03 Dec 2019 06:42 )  Alb: 1.8 [3.5 - 5.2] / Pro: 6.7 [6.0 - 8.0] / ALK PHOS: 199 [30 - 115] / ALT: 60 [0 - 41] / AST: 108 [0 - 41] / GGT: x     LIVER FUNCTIONS - ( 02 Dec 2019 09:05 )  Alb: 1.8 [3.5 - 5.2] / Pro: 6.6 [6.0 - 8.0] / ALK PHOS: 175 [30 - 115] / ALT: 61 [0 - 41] / AST: 123 [0 - 41] / GGT: x     LIVER FUNCTIONS - ( 01 Dec 2019 11:18 )  Alb: 1.9 [3.5 - 5.2] / Pro: 6.8 [6.0 - 8.0] / ALK PHOS: 194 [30 - 115] / ALT: 64 [0 - 41] / AST: 126 [0 - 41] / GGT: x     LIVER FUNCTIONS - ( 30 Nov 2019 23:20 )  Alb: 2.0 [3.5 - 5.2] / Pro: 6.8 [6.0 - 8.0] / ALK PHOS: 216 [30 - 115] / ALT: 61 [0 - 41] / AST: 120 [0 - 41] / GGT: x     LIVER FUNCTIONS - ( 30 Nov 2019 17:00 )  Alb: 2.3 [3.5 - 5.2] / Pro: 7.5 [6.0 - 8.0] / ALK PHOS: 226 [30 - 115] / ALT: 69 [0 - 41] / AST: 186 [0 - 41] / GGT: x

## 2019-12-05 NOTE — PROGRESS NOTE ADULT - SUBJECTIVE AND OBJECTIVE BOX
SUBJECTIVE:    Patient is a 44y old Male who presents with a chief complaint of Shortness of breath (05 Dec 2019 08:19)    Currently admitted to medicine with the primary diagnosis of Alcoholic cirrhosis of liver with ascites     Today is hospital day 5d. This morning he is resting comfortably in bed and reports no new issues or overnight events.   He feels better today    PAST MEDICAL & SURGICAL HISTORY  Liver cirrhosis, alcoholic  Anemia  Thrombocytopenia  ETOH abuse  History of incision and drainage: Gluteal abscess    SOCIAL HISTORY:  Negative for smoking/alcohol/drug use.     ALLERGIES:  No Known Allergies    MEDICATIONS:  STANDING MEDICATIONS  cefTRIAXone   IVPB 1000 milliGRAM(s) IV Intermittent every 24 hours  chlorhexidine 4% Liquid 1 Application(s) Topical daily  folic acid 1 milliGRAM(s) Oral daily  furosemide    Tablet 80 milliGRAM(s) Oral daily  influenza   Vaccine 0.5 milliLiter(s) IntraMuscular once  metroNIDAZOLE  IVPB      metroNIDAZOLE  IVPB 500 milliGRAM(s) IV Intermittent every 8 hours  multivitamin 1 Tablet(s) Oral daily  spironolactone 200 milliGRAM(s) Oral daily  thiamine 100 milliGRAM(s) Oral daily    PRN MEDICATIONS  chlordiazePOXIDE 50 milliGRAM(s) Oral every 1 hour PRN    VITALS:   T(F): 97.6  HR: 88  BP: 114/59  RR: 18  SpO2: 96%    LABS:                        9.7    13.93 )-----------( 114      ( 05 Dec 2019 06:56 )             29.3     12-05    127<L>  |  95<L>  |  14  ----------------------------<  88  4.2   |  20  |  0.6<L>    Ca    7.4<L>      05 Dec 2019 06:56    TPro  6.9  /  Alb  1.8<L>  /  TBili  12.3<H>  /  DBili  x   /  AST  127<H>  /  ALT  65<H>  /  AlkPhos  199<H>  12-05    PT/INR - ( 05 Dec 2019 06:56 )   PT: 31.80 sec;   INR: 2.79 ratio          PHYSICAL EXAM:  GEN: not in distress NC/AT, jaundiced, yellow sclera  LUNGS: Clear to auscultation bilaterally, no wheezing   HEART: S1/S2 present. RRR. No murmurs  ABD: Soft, non-tender, distended. Oozing from the site of the paracentesis. No caput medusa  SKIN:  jaundiced, yellow sclera, opening in the left buttock cheek of 2 cm ( opened spontaneously)  NEURO: AAOX3, moves all extremities, no focal deficits    Intravenous access: Peripheral access  NG tube: None  Joseph Catheter: None

## 2019-12-05 NOTE — PROGRESS NOTE ADULT - ATTENDING COMMENTS
Likely alcoholic hepatitis. Bilis improving. LVP today with albumin replacement as needed. Increased diuretics yesterday with Cr stable today.

## 2019-12-05 NOTE — PROGRESS NOTE ADULT - ASSESSMENT
44 year old man with history of alcoholic cirrhosis presented for shortness of breath of 4 days duration, associated with increase abdominal girth and lower extremities edema.      # Severe Alcoholic Hepatitis   DF: 117 on 12/1   MELD: 32 on 12/1  No SBP on Para   Pt afebrile and no signs of sepsis  On IV abx for perianal abscess   Rec:   Vitamin K 10 po once daily and daily INR   IV hydration   Will hold on prednisolone for now given pt have a perianal abscess discovered on exam  Continue Thiamine and folate  Daily liver enzymes and INR   Avoid hepatotoxic drugs  Please do large volume paracentesis with albumin infusion before sending pt for  MRI   GET MRI with contrast + MRCP after para done  Aldactone to 200 and add Lasix 80 daily    Follow BMP and creatinine daily     # Decompensated alcoholic cirrhosis   MELD 32 on 12/1    Large volume ascites SP paracentesis with 4 L removal on 12/1   No SBP   No varices on last EGD on 7/2018   Rec:   Needs repeat Large volume Para with albumin infusion   Follow BMP daily   Low sodium diet   Follow up  MRI liver rule out HCC   Needs EGD on 7/2020 for variceal screening     # Mild rectal bleed secondary to hemorrhoids   Had Colonoscopy in 2018 that showed Hemorrhoids and Diverticulosis   Hemodynamically stable   No signs of active GI bleed  Hg stable   Rec:  Follow Hg q24 h   can have Colonoscopy as outpatient    If Hg keeps dropping and signs of active Bleed will do inpatient colonoscopy once stable 44 year old man with history of alcoholic cirrhosis presented for shortness of breath of 4 days duration, associated with increase abdominal girth and lower extremities edema.      # Severe Alcoholic Hepatitis   DF: 117 on 12/1   MELD: 32 on 12/1  No SBP on Para   Pt afebrile and no signs of sepsis  On IV abx for perianal abscess   Liver enzymes trending down   Rec:   Vitamin K 10 po once daily and daily INR   IV hydration   Will hold on prednisolone for now given pt have a perianal abscess discovered on exam  Continue Thiamine and folate  Daily liver enzymes and INR   Avoid hepatotoxic drugs  Please do large volume paracentesis with albumin infusion before sending pt for  MRI   GET MRI with contrast + MRCP after para done  Aldactone to 200 and add Lasix 80 daily    Follow BMP and creatinine daily     # Decompensated alcoholic cirrhosis   MELD 32 on 12/1    Large volume ascites SP paracentesis with 4 L removal on 12/1   No SBP   No varices on last EGD on 7/2018   Rec:   Needs repeat Large volume Para with albumin infusion   Follow BMP daily   Low sodium diet   Follow up  MRI liver rule out HCC   Needs EGD on 7/2020 for variceal screening     # Mild rectal bleed secondary to hemorrhoids   Had Colonoscopy in 2018 that showed Hemorrhoids and Diverticulosis   Hemodynamically stable   No signs of active GI bleed  Hg stable   Rec:  Follow Hg q24 h   can have Colonoscopy as outpatient    If Hg keeps dropping and signs of active Bleed will do inpatient colonoscopy once stable 44 year old man with history of alcoholic cirrhosis presented for shortness of breath of 4 days duration, associated with increase abdominal girth and lower extremities edema.      # Severe Alcoholic Hepatitis   DF: 117 on 12/1   MELD: 32 on 12/1  No SBP on Para   Pt afebrile and no signs of sepsis  On IV abx for perianal abscess   Liver enzymes trending down     Rec:   Vitamin K 10 po once daily and daily INR   IV hydration   Will hold on prednisolone for now given pt have a perianal abscess discovered on exam  Continue Thiamine and folate  Daily liver enzymes and INR   Avoid hepatotoxic drugs  Please do large volume paracentesis with albumin infusion before sending pt for  MRI   GET MRI with contrast + MRCP after para done  Aldactone to 200 and add Lasix 80 daily    Follow BMP and creatinine daily     # Decompensated alcoholic cirrhosis   MELD 32 on 12/1    Large volume ascites SP paracentesis with 4 L removal on 12/1   No SBP   No varices on last EGD on 7/2018   Rec:   Needs repeat Large volume Para with albumin infusion   Follow BMP daily   Low sodium diet   Follow up  MRI liver rule out HCC   Needs EGD on 7/2020 for variceal screening     # Mild rectal bleed secondary to hemorrhoids   Had Colonoscopy in 2018 that showed Hemorrhoids and Diverticulosis   Hemodynamically stable   No signs of active GI bleed  Hg stable   Rec:  Follow Hg q24 h   can have Colonoscopy as outpatient    If Hg keeps dropping and signs of active Bleed will do inpatient colonoscopy once stable

## 2019-12-05 NOTE — PROGRESS NOTE ADULT - ATTENDING COMMENTS
I saw and examined the patient independently. I agree with above history, physical exam and plan of care which I have reviewed and edited where appropriate with following additions.     patient sleepy in the morning but easily arousable/ Patient reports doing ok/reports has been having BM daily.     acute decompensation of alcoholic cirrhosis / hepatitis with ascites and coagulopathy:   LFT's elevated but stable /INR improved today sp vitamin K and Kcentra to lower INR for paracentesis as patient had persistent oozing of blood  from last paracentesis site with concurrent anemia.  repeat large volume abdominal paracentesis followed by albumin infusion today.   MRCp after paracentesis to r/o HCC   due for EGD for variceal screening in 7/2020 as per Gi note.   trend INR and liver enzymes daily.   c/w aldactone 200mg and lasix 80mg daily.   monitor I&O's. in negative balance.  monitor bmp/ renal function daily- stable    chronic hyponatremia likely hypervolemic with cirrhosis:    Na level stable/>125  trend daily   c/w diuretics.     acute on chronic disease anemia with mild rectal bleed possibly due to draining perianal abscess with hemorrhoids:   H/H stable range 8-9  monitor H/H daily   transfuse if Hb <8.   GI following - if Hb stable with no active bleeding- colonoscopy as OP .   surgery evaluated- no intervention for now for rectal abscess due to coagulopathy and it drained spontaneously.  c/w current ABX for rectal abscess.  BCX neg x 1.  ID following.     suspected folate and thiamine deficiency:   c/w oral supplements .    dvt ppx with SCD's.     FULL code.    Progress note Handoff:   Pending: clinical improvement of decompensated cirrhosis/paracentesis /MRCP    Discussion: plan of care with  patient /  satisfied- all questions answered.  Disposition: unknown at this time/ needs  eval before discharge

## 2019-12-06 LAB
ALBUMIN FLD-MCNC: <0.2 G/DL — SIGNIFICANT CHANGE UP
ALBUMIN SERPL ELPH-MCNC: 1.9 G/DL — LOW (ref 3.5–5.2)
ALP SERPL-CCNC: 195 U/L — HIGH (ref 30–115)
ALT FLD-CCNC: 64 U/L — HIGH (ref 0–41)
ANION GAP SERPL CALC-SCNC: 11 MMOL/L — SIGNIFICANT CHANGE UP (ref 7–14)
AST SERPL-CCNC: 130 U/L — HIGH (ref 0–41)
BASOPHILS # BLD AUTO: 0.03 K/UL — SIGNIFICANT CHANGE UP (ref 0–0.2)
BASOPHILS NFR BLD AUTO: 0.2 % — SIGNIFICANT CHANGE UP (ref 0–1)
BILIRUB SERPL-MCNC: 12.4 MG/DL — HIGH (ref 0.2–1.2)
BLD GP AB SCN SERPL QL: SIGNIFICANT CHANGE UP
BUN SERPL-MCNC: 16 MG/DL — SIGNIFICANT CHANGE UP (ref 10–20)
CALCIUM SERPL-MCNC: 7.4 MG/DL — LOW (ref 8.5–10.1)
CHLORIDE SERPL-SCNC: 93 MMOL/L — LOW (ref 98–110)
CO2 SERPL-SCNC: 20 MMOL/L — SIGNIFICANT CHANGE UP (ref 17–32)
CREAT SERPL-MCNC: 0.7 MG/DL — SIGNIFICANT CHANGE UP (ref 0.7–1.5)
CULTURE RESULTS: NO GROWTH — SIGNIFICANT CHANGE UP
CULTURE RESULTS: SIGNIFICANT CHANGE UP
EOSINOPHIL # BLD AUTO: 0.15 K/UL — SIGNIFICANT CHANGE UP (ref 0–0.7)
EOSINOPHIL NFR BLD AUTO: 1 % — SIGNIFICANT CHANGE UP (ref 0–8)
GLUCOSE SERPL-MCNC: 85 MG/DL — SIGNIFICANT CHANGE UP (ref 70–99)
GRAM STN FLD: SIGNIFICANT CHANGE UP
HCT VFR BLD CALC: 29.8 % — LOW (ref 42–52)
HGB BLD-MCNC: 9.7 G/DL — LOW (ref 14–18)
IMM GRANULOCYTES NFR BLD AUTO: 0.7 % — HIGH (ref 0.1–0.3)
INR BLD: 3.28 RATIO — HIGH (ref 0.65–1.3)
LYMPHOCYTES # BLD AUTO: 1.46 K/UL — SIGNIFICANT CHANGE UP (ref 1.2–3.4)
LYMPHOCYTES # BLD AUTO: 9.9 % — LOW (ref 20.5–51.1)
MCHC RBC-ENTMCNC: 30.2 PG — SIGNIFICANT CHANGE UP (ref 27–31)
MCHC RBC-ENTMCNC: 32.6 G/DL — SIGNIFICANT CHANGE UP (ref 32–37)
MCV RBC AUTO: 92.8 FL — SIGNIFICANT CHANGE UP (ref 80–94)
MONOCYTES # BLD AUTO: 1.26 K/UL — HIGH (ref 0.1–0.6)
MONOCYTES NFR BLD AUTO: 8.6 % — SIGNIFICANT CHANGE UP (ref 1.7–9.3)
NEUTROPHILS # BLD AUTO: 11.69 K/UL — HIGH (ref 1.4–6.5)
NEUTROPHILS NFR BLD AUTO: 79.6 % — HIGH (ref 42.2–75.2)
NRBC # BLD: 0 /100 WBCS — SIGNIFICANT CHANGE UP (ref 0–0)
PLATELET # BLD AUTO: 105 K/UL — LOW (ref 130–400)
POTASSIUM SERPL-MCNC: 4.5 MMOL/L — SIGNIFICANT CHANGE UP (ref 3.5–5)
POTASSIUM SERPL-SCNC: 4.5 MMOL/L — SIGNIFICANT CHANGE UP (ref 3.5–5)
PROT FLD-MCNC: <1 G/DL — SIGNIFICANT CHANGE UP
PROT SERPL-MCNC: 6.7 G/DL — SIGNIFICANT CHANGE UP (ref 6–8)
PROTHROM AB SERPL-ACNC: 37.3 SEC — HIGH (ref 9.95–12.87)
RBC # BLD: 3.21 M/UL — LOW (ref 4.7–6.1)
RBC # FLD: 22.1 % — HIGH (ref 11.5–14.5)
SODIUM SERPL-SCNC: 124 MMOL/L — LOW (ref 135–146)
SPECIMEN SOURCE: SIGNIFICANT CHANGE UP
WBC # BLD: 14.69 K/UL — HIGH (ref 4.8–10.8)
WBC # FLD AUTO: 14.69 K/UL — HIGH (ref 4.8–10.8)

## 2019-12-06 PROCEDURE — 74182 MRI ABDOMEN W/CONTRAST: CPT | Mod: 26

## 2019-12-06 PROCEDURE — 99233 SBSQ HOSP IP/OBS HIGH 50: CPT

## 2019-12-06 RX ORDER — POLYETHYLENE GLYCOL 3350 17 G/17G
17 POWDER, FOR SOLUTION ORAL EVERY 12 HOURS
Refills: 0 | Status: DISCONTINUED | OUTPATIENT
Start: 2019-12-06 | End: 2019-12-13

## 2019-12-06 RX ORDER — PHYTONADIONE (VIT K1) 5 MG
10 TABLET ORAL DAILY
Refills: 0 | Status: COMPLETED | OUTPATIENT
Start: 2019-12-06 | End: 2019-12-08

## 2019-12-06 RX ORDER — LACTULOSE 10 G/15ML
20 SOLUTION ORAL DAILY
Refills: 0 | Status: DISCONTINUED | OUTPATIENT
Start: 2019-12-06 | End: 2019-12-06

## 2019-12-06 RX ADMIN — LACTULOSE 20 GRAM(S): 10 SOLUTION ORAL at 13:34

## 2019-12-06 RX ADMIN — SPIRONOLACTONE 200 MILLIGRAM(S): 25 TABLET, FILM COATED ORAL at 06:11

## 2019-12-06 RX ADMIN — Medication 100 MILLIGRAM(S): at 13:33

## 2019-12-06 RX ADMIN — Medication 100 MILLIGRAM(S): at 11:33

## 2019-12-06 RX ADMIN — CEFTRIAXONE 100 MILLIGRAM(S): 500 INJECTION, POWDER, FOR SOLUTION INTRAMUSCULAR; INTRAVENOUS at 17:41

## 2019-12-06 RX ADMIN — Medication 80 MILLIGRAM(S): at 06:11

## 2019-12-06 RX ADMIN — CHLORHEXIDINE GLUCONATE 1 APPLICATION(S): 213 SOLUTION TOPICAL at 11:33

## 2019-12-06 RX ADMIN — POLYETHYLENE GLYCOL 3350 17 GRAM(S): 17 POWDER, FOR SOLUTION ORAL at 17:42

## 2019-12-06 RX ADMIN — Medication 100 MILLIGRAM(S): at 06:11

## 2019-12-06 RX ADMIN — Medication 1 MILLIGRAM(S): at 11:33

## 2019-12-06 RX ADMIN — Medication 100 MILLIGRAM(S): at 21:56

## 2019-12-06 RX ADMIN — Medication 10 MILLIGRAM(S): at 18:24

## 2019-12-06 RX ADMIN — Medication 1 TABLET(S): at 11:33

## 2019-12-06 NOTE — PROGRESS NOTE ADULT - SUBJECTIVE AND OBJECTIVE BOX
JENIFFERDESIRE  44y, Male  Allergy: No Known Allergies      CHIEF COMPLAINT: Shortness of breath (06 Dec 2019 07:56)      INTERVAL EVENTS/HPI  - No acute events overnight  - T(F): , Max: 98.3 (12-05-19 @ 17:04)  - Denies any worsening symptoms  - Tolerating medication  - WBC Count: 14.69 (12-06-19 @ 05:30)  WBC Count: 13.93 (12-05-19 @ 06:56)  - Creatinine, Serum: 0.7 (12-06-19 @ 05:30)  Creatinine, Serum: 0.6 (12-05-19 @ 06:56)       ROS  General: Denies rigors, nightsweats  HEENT: Denies headache, rhinorrhea, sore throat, eye pain  CV: Denies CP, palpitations  PULM: Denies wheezing, hemoptysis  GI: Denies hematemesis, hematochezia, melena  : Denies discharge, hematuria  MSK: Denies arthralgias, myalgias  SKIN: Denies rash, lesions  NEURO: Denies paresthesias, weakness  PSYCH: Denies depression, anxiety    VITALS:  T(F): 97.9, Max: 98.3 (12-05-19 @ 17:04)  HR: 104  BP: 107/51  RR: 20Vital Signs Last 24 Hrs  T(C): 36.6 (06 Dec 2019 04:00), Max: 36.8 (05 Dec 2019 17:04)  T(F): 97.9 (06 Dec 2019 04:00), Max: 98.3 (05 Dec 2019 17:04)  HR: 104 (06 Dec 2019 10:00) (84 - 104)  BP: 107/51 (06 Dec 2019 10:00) (103/50 - 121/63)  BP(mean): --  RR: 20 (06 Dec 2019 10:00) (18 - 20)  SpO2: 98% (06 Dec 2019 09:59) (95% - 98%)    PHYSICAL EXAM:  Gen: NAD, resting in bed  HEENT: Normocephalic, atraumatic  Neck: supple, no lymphadenopathy  CV: Regular rate & regular rhythm  Lungs: Mild resp distress, use of accessory muscles, RR 18 @ SpO2 97%  Abdomen: Distended abdomen  Ext: b/l LE edema  Neuro: non focal, awake  Skin: jaundiced, spider angioma around the chest area, localized perianal abscess on the left gluteus, tender to palpation, +fluctuance, no drainage no erythema. 2nd incision scar also noted slightly above.      FH: Non-contributory  Social Hx: Non-contributory    TESTS & MEASUREMENTS:                        9.7    14.69 )-----------( 105      ( 06 Dec 2019 05:30 )             29.8     12-06    124<L>  |  93<L>  |  16  ----------------------------<  85  4.5   |  20  |  0.7    Ca    7.4<L>      06 Dec 2019 05:30    TPro  6.7  /  Alb  1.9<L>  /  TBili  12.4<H>  /  DBili  x   /  AST  130<H>  /  ALT  64<H>  /  AlkPhos  195<H>  12-06    eGFR if Non African American: 114 mL/min/1.73M2 (12-06-19 @ 05:30)  eGFR if : 132 mL/min/1.73M2 (12-06-19 @ 05:30)    LIVER FUNCTIONS - ( 06 Dec 2019 05:30 )  Alb: 1.9 g/dL / Pro: 6.7 g/dL / ALK PHOS: 195 U/L / ALT: 64 U/L / AST: 130 U/L / GGT: x               Culture - Body Fluid with Gram Stain (collected 12-05-19 @ 16:24)  Source: .Body Fluid Abdominal Fluid  Gram Stain (12-06-19 @ 02:23):    No polymorphonuclear cells seen    No organisms seen    by cytocentrifuge    Culture - Urine (collected 12-01-19 @ 19:00)  Source: .Urine Clean Catch (Midstream)  Final Report (12-03-19 @ 10:34):    50,000 - 99,000 CFU/mL Coag Negative Staphylococcus    Susceptibilites not performed.    <10,000 CFU/ml Normal Urogenital aaliyah present    Culture - Body Fluid with Gram Stain (collected 11-30-19 @ 11:36)  Source: .Body Fluid Abdominal Fluid  Gram Stain (12-01-19 @ 11:12):    polymorphonuclear leukocytes per low power field    No organisms seen per oil power field    by cytocentrifuge  Final Report (12-06-19 @ 09:08):    No growth    Culture - Blood (collected 11-30-19 @ 10:37)  Source: .Blood Blood  Final Report (12-06-19 @ 07:00):    No growth at 5 days.            INFECTIOUS DISEASES TESTING  MRSA PCR Result.: Negative (12-03-19 @ 07:48)      RADIOLOGY & ADDITIONAL TESTS:  I have personally reviewed the last Chest xray  CXR      CT      CARDIOLOGY TESTING  12 Lead ECG:   Ventricular Rate 92 BPM    Atrial Rate 92 BPM    P-R Interval 160 ms    QRS Duration 98 ms    Q-T Interval 388 ms    QTC Calculation(Bezet) 479 ms    P Axis 51 degrees    R Axis 30 degrees    T Axis 51 degrees    Diagnosis Line Normal sinus rhythm  Normal ECG    Confirmed by Claude Llamas ([id number]) on 12/1/2019 11:51:31 AM (12-01-19 @ 01:05)  12 Lead ECG:   Ventricular Rate 89 BPM    Atrial Rate 89 BPM    P-R Interval 112 ms    QRS Duration 96 ms    Q-T Interval 392 ms    QTC Calculation(Bezet) 476 ms    P Axis 108 degrees    R Axis 97 degrees    T Axis 27 degrees    Diagnosis Line *** Suspect arm lead reversal, interpretation assumes no reversal  Normal sinus rhythm  Rightward axis  Nonspecific ST abnormality  Abnormal ECG    Confirmed by Claude Llamas [id number]) on 12/1/2019 11:51:29 AM (11-30-19 @ 17:41)      MEDICATIONS  cefTRIAXone   IVPB 1000  chlorhexidine 4% Liquid 1  folic acid 1  furosemide    Tablet 80  influenza   Vaccine 0.5  lactulose Syrup 20  metroNIDAZOLE  IVPB   metroNIDAZOLE  IVPB 500  multivitamin 1  spironolactone 200  thiamine 100      ANTIBIOTICS:  cefTRIAXone   IVPB 1000 milliGRAM(s) IV Intermittent every 24 hours  metroNIDAZOLE  IVPB      metroNIDAZOLE  IVPB 500 milliGRAM(s) IV Intermittent every 8 hours      All available historical records have been reviewed

## 2019-12-06 NOTE — PROGRESS NOTE ADULT - ASSESSMENT
ASSESSMENT  43yo Stateless male with a PMH of chronic alcoholic liver cirrhosis presented for SOB for the past 4 days, associated with increased abdominal girth and LE edema. Fresh blood per rectum noted with hx of hemorrhoids and diverticulosis. Pt with perianal abscess discovered with admission.      IMPRESSION  # Perianal Abscess     Hemodynamically stable, afebrile . SIRS on admission, sepsis ruled out    Blood culture neg 1x    MRSA PCR neg  #CXR showing low lung volume and bibasilar opacities/effusions  #ETOH hepatitis, cirrhosis MELD >40    US abdomen showing liver cirrhosis, cholelithiasis, nonspecific gallbladder wall edema and 0.43cm, mildly dilated CBD at 0.7cm, splenomegaly and moderate ascites      No SBP on paracentesis 11/30, 12/5  #Hyponatremia      RECOMMENDATIONS  - Continue Ceftriaxone 1g q24h IV and Metronidazole IV 12/2-, plan for 10 days (if d/c prior augmentin PO)  - Surgery consulted, no plan for I&D given coagulopathy

## 2019-12-06 NOTE — PROGRESS NOTE ADULT - ASSESSMENT
44 year old man with history of alcoholic cirrhosis presented for shortness of breath of 4 days duration, associated with increase abdominal girth and lower extremities edema.    # Acute decompensation of alcoholic hepatitis in chronically cirrhotic patient:  - MELD-Na=33, Maddrey moxvb=225.9, CPS=11, score C  - Alcohol abstinence recommended  - S/p Therapeutic tap done, 4L of fluids removed So far, pleural fluid negative for SBP, albumin and total protein in fluid noted.  - S/p Therapeutic tap on 12/5/19, 4.5 L of serous fluid taken, pending culture, no albumin given. He is oozing from the previous paracentesis point of entry.  - Scheduled for MRCP and MRI liver w/IV contrast today  - Hold on prednisolone PO for now per GI recs given perianal abscess found on physical exam.  - C/w vitamin K 10 mg qd for 3 days  - Daily INR and CBC  - Repeat MRCP after paracentesis today    # Anemia and blood per rectum in the setting of coagulopathy:  - Hb stable around 8-9  - Will do CBC q24h  - keep active type and screen  - Transfuse if <7 or <8 and symptomatic    # Leukocytosis in the setting of perineal/gluteal abscess - from 18K to 14K with neutrophil predominance:  - Blood culture NGTD , peritoneal fluid culture NGTD  - UA negative  - CT abd/pelv with IV and oral contrast did not show any fluid collection in the pernieal area. Surgery won't do any further interventions  - C/w ceftriaxone 1 g q24h and metronidazole 500 mg q8h IV per ID recs UNTIL 12/12    # Alcohol abuse  - last drink 1 week ago  -WA protocol PRN, not needing chlordiazepoxide   - Na 126, monitor daily ( chronic hyponatremia)  - Educated on alcohol abstinence    # DVT ppx: SCD for now, VA duplex negative for DVT  # GI ppx: None  # Diet: DASH diet  # Activity: ambulate as tolerated, pending PT  # Dispo: From Home  # Code status: Full code

## 2019-12-06 NOTE — PROGRESS NOTE ADULT - SUBJECTIVE AND OBJECTIVE BOX
SUBJECTIVE:    Patient is a 44y old Male who presents with a chief complaint of Shortness of breath (06 Dec 2019 12:18)    Currently admitted to medicine with the primary diagnosis of Alcoholic cirrhosis of liver with ascites     Today is hospital day 6d. This morning he is resting comfortably in bed and reports no new issues or overnight events.   He is s/p paracentesis yesterday, 4.5 L fluids removed, no drop of BP, no albumin given.    PAST MEDICAL & SURGICAL HISTORY  Liver cirrhosis, alcoholic  Anemia  Thrombocytopenia  ETOH abuse  History of incision and drainage: Gluteal abscess    SOCIAL HISTORY:  Negative for smoking/alcohol/drug use.     ALLERGIES:  No Known Allergies    MEDICATIONS:  STANDING MEDICATIONS  cefTRIAXone   IVPB 1000 milliGRAM(s) IV Intermittent every 24 hours  chlorhexidine 4% Liquid 1 Application(s) Topical daily  folic acid 1 milliGRAM(s) Oral daily  furosemide    Tablet 80 milliGRAM(s) Oral daily  influenza   Vaccine 0.5 milliLiter(s) IntraMuscular once  lactulose Syrup 20 Gram(s) Oral daily  metroNIDAZOLE  IVPB      metroNIDAZOLE  IVPB 500 milliGRAM(s) IV Intermittent every 8 hours  multivitamin 1 Tablet(s) Oral daily  spironolactone 200 milliGRAM(s) Oral daily  thiamine 100 milliGRAM(s) Oral daily    PRN MEDICATIONS  chlordiazePOXIDE 50 milliGRAM(s) Oral every 1 hour PRN    VITALS:   T(F): 97.9  HR: 104  BP: 107/51  RR: 20  SpO2: 98%    LABS:                        9.7    14.69 )-----------( 105      ( 06 Dec 2019 05:30 )             29.8     12-06    124<L>  |  93<L>  |  16  ----------------------------<  85  4.5   |  20  |  0.7    Ca    7.4<L>      06 Dec 2019 05:30    TPro  6.7  /  Alb  1.9<L>  /  TBili  12.4<H>  /  DBili  x   /  AST  130<H>  /  ALT  64<H>  /  AlkPhos  195<H>  12-06    PT/INR - ( 06 Dec 2019 05:30 )   PT: 37.30 sec;   INR: 3.28 ratio                   Culture - Body Fluid with Gram Stain (collected 05 Dec 2019 16:24)  Source: .Body Fluid Abdominal Fluid  Gram Stain (06 Dec 2019 02:23):    No polymorphonuclear cells seen    No organisms seen    by cytocentrifuge    PHYSICAL EXAM:  GEN: not in distress NC/AT, jaundiced, yellow sclera  LUNGS: Clear to auscultation bilaterally, no wheezing   HEART: S1/S2 present. RRR. No murmurs  ABD: Soft, non-tender, distended. Oozing from the site of the old paracentesis. No caput medusa  SKIN:  jaundiced, yellow sclera, opening in the left buttock cheek of 2 cm ( opened spontaneously)  NEURO: AAOX3, moves all extremities, no focal deficits    Intravenous access: Peripheral access  NG tube: None  Joseph Catheter: None

## 2019-12-06 NOTE — PROGRESS NOTE ADULT - SUBJECTIVE AND OBJECTIVE BOX
We are following the patient for Alcoholic hepatitis     GI HPI Today:  Pt denies any complaints except for constipation . No fever , no abdominal pain.   Had abdominal Para yesterday with 4.5 L removed     PAST MEDICAL & SURGICAL HISTORY  Liver cirrhosis, alcoholic  Anemia  Thrombocytopenia  ETOH abuse  History of incision and drainage: Gluteal abscess      ALLERGIES:  No Known Allergies      MEDICATIONS:  MEDICATIONS  (STANDING):  cefTRIAXone   IVPB 1000 milliGRAM(s) IV Intermittent every 24 hours  chlorhexidine 4% Liquid 1 Application(s) Topical daily  folic acid 1 milliGRAM(s) Oral daily  furosemide    Tablet 80 milliGRAM(s) Oral daily  influenza   Vaccine 0.5 milliLiter(s) IntraMuscular once  metroNIDAZOLE  IVPB      metroNIDAZOLE  IVPB 500 milliGRAM(s) IV Intermittent every 8 hours  multivitamin 1 Tablet(s) Oral daily  spironolactone 200 milliGRAM(s) Oral daily  thiamine 100 milliGRAM(s) Oral daily    MEDICATIONS  (PRN):  chlordiazePOXIDE 50 milliGRAM(s) Oral every 1 hour PRN Symptom-triggered: each CIWA -Ar score 8 or GREATER      REVIEW OF SYSTEMS  General:  No fevers  Eyes:  No reported pain   ENT:  No sore throat   NECK: No stiffness   CV:  No chest pain   Resp:  No shortness of breath  GI:  See HPI  :  No dysuria  Muscle:  ++  weakness  Neuro:  No tingling  Endocrine:  No polyuria  Heme:  No ecchymosis        VITALS:   T(F): 97.9 (12-06 @ 04:00), Max: 98.3 (12-03 @ 21:21)  HR: 87 (12-06 @ 04:00) (84 - 95)  BP: 103/50 (12-06 @ 04:00) (96/54 - 131/59)  BP(mean): --  RR: 18 (12-06 @ 04:00) (17 - 19)  SpO2: 95% (12-06 @ 07:53) (94% - 96%)        PHYSICAL EXAM:  GENERAL:  Appears in no distress  HEENT:  Conjunctivae icteric   CHEST:  Full & symmetric excursion  HEART:  NS1, S2,   ABDOMEN:  Soft, non-tender, +++ distended  EXTEREMITIES:  no edema  SKIN:  ++ abdomen  rash  NEURO:  Alert         Blood Work :                        9.7    14.69 )-----------( 105      ( 06 Dec 2019 05:30 )             29.8     PT/INR - ( 06 Dec 2019 05:30 )  INR: 3.28            12-06    124<L>  |  93<L>  |  16  ----------------------------<  85  4.5   |  20  |  0.7    Ca    7.4<L>      06 Dec 2019 05:30      CBC -  ( 06 Dec 2019 05:30 )  Hemoglobin : 9.7    CBC -  ( 05 Dec 2019 06:56 )  Hemoglobin : 9.7    CBC -  ( 04 Dec 2019 06:00 )  Hemoglobin : 8.9    CBC -  ( 03 Dec 2019 06:42 )  Hemoglobin : 9.0    CBC -  ( 02 Dec 2019 16:27 )  Hemoglobin : 8.9    CBC -  ( 02 Dec 2019 09:05 )  Hemoglobin : 9.0      LIVER FUNCTIONS - ( 06 Dec 2019 05:30 )  Alb: 1.9 [3.5 - 5.2] / Pro: 6.7 [6.0 - 8.0] / ALK PHOS: 195 [30 - 115] / ALT: 64 [0 - 41] / AST: 130 [0 - 41] / GGT: x     LIVER FUNCTIONS - ( 05 Dec 2019 06:56 )  Alb: 1.8 [3.5 - 5.2] / Pro: 6.9 [6.0 - 8.0] / ALK PHOS: 199 [30 - 115] / ALT: 65 [0 - 41] / AST: 127 [0 - 41] / GGT: x     LIVER FUNCTIONS - ( 03 Dec 2019 06:42 )  Alb: 1.8 [3.5 - 5.2] / Pro: 6.7 [6.0 - 8.0] / ALK PHOS: 199 [30 - 115] / ALT: 60 [0 - 41] / AST: 108 [0 - 41] / GGT: x     LIVER FUNCTIONS - ( 02 Dec 2019 09:05 )  Alb: 1.8 [3.5 - 5.2] / Pro: 6.6 [6.0 - 8.0] / ALK PHOS: 175 [30 - 115] / ALT: 61 [0 - 41] / AST: 123 [0 - 41] / GGT: x     LIVER FUNCTIONS - ( 01 Dec 2019 11:18 )  Alb: 1.9 [3.5 - 5.2] / Pro: 6.8 [6.0 - 8.0] / ALK PHOS: 194 [30 - 115] / ALT: 64 [0 - 41] / AST: 126 [0 - 41] / GGT: x     LIVER FUNCTIONS - ( 30 Nov 2019 23:20 )  Alb: 2.0 [3.5 - 5.2] / Pro: 6.8 [6.0 - 8.0] / ALK PHOS: 216 [30 - 115] / ALT: 61 [0 - 41] / AST: 120 [0 - 41] / GGT: x     LIVER FUNCTIONS - ( 30 Nov 2019 17:00 )  Alb: 2.3 [3.5 - 5.2] / Pro: 7.5 [6.0 - 8.0] / ALK PHOS: 226 [30 - 115] / ALT: 69 [0 - 41] / AST: 186 [0 - 41] / GGT: x

## 2019-12-06 NOTE — PROGRESS NOTE ADULT - ATTENDING COMMENTS
Pt with decompensated cirrhosis, likely EtOh hepatitis, r/o biliary obstruction given slightly dilated CBD on US. Increased diuretics and s/p LVPs as needed with stable Cr    Rec   MRCP  PO Vitamin K 10 mg x 3 days   Daily INR and LFTs

## 2019-12-06 NOTE — PROGRESS NOTE ADULT - ASSESSMENT
44 year old man with history of alcoholic cirrhosis presented for shortness of breath of 4 days duration, associated with increase abdominal girth and lower extremities edema.      # Severe Alcoholic Hepatitis   DF: 117 on 12/1   MELD: 32 on 12/1  No SBP on Para   Pt afebrile and no signs of sepsis  On IV abx for perianal abscess   Liver enzymes and INR  trending up today but no signs of encephalopathy   Rec:   Continue Vitamin K 10 po once daily and daily INR   IV hydration   Will hold on prednisolone for now given pt have a perianal abscess discovered on exam  Continue Thiamine and folate  Daily liver enzymes and INR   Avoid hepatotoxic drugs  GET MRI with contrast + MRCP after para done  Continue Aldactone to 200 and add Lasix 80 daily    Follow BMP and creatinine daily     # Decompensated alcoholic cirrhosis   MELD 32 on 12/1    Large volume ascites SP paracentesis with 4 L removal on 12/1 and another 4.5 L on 12/5   No SBP   No varices on last EGD on 7/2018   Rec:   Follow BMP daily   Low sodium diet   Follow up  MRI liver rule out HCC   Needs EGD on 7/2020 for variceal screening     # Mild rectal bleed secondary to hemorrhoids   Had Colonoscopy in 2018 that showed Hemorrhoids and Diverticulosis   Hemodynamically stable   No signs of active GI bleed  Hg stable   Rec:  Follow Hg q24 h   can have Colonoscopy as outpatient    If Hg keeps dropping and signs of active Bleed will do inpatient colonoscopy once stable 44 year old man with history of alcoholic cirrhosis presented for shortness of breath of 4 days duration, associated with increase abdominal girth and lower extremities edema.      # Severe Alcoholic Hepatitis   DF: 117 on 12/1   MELD: 32 on 12/1  No SBP on Para   Pt afebrile and no signs of sepsis  On IV abx for perianal abscess   Liver enzymes and INR  trending up today but no signs of encephalopathy   Rec:   Continue Vitamin K 10 po once daily and daily INR   IV hydration   Will hold on prednisolone for now given pt have a perianal abscess discovered on exam  Continue Thiamine and folate  Daily liver enzymes and INR   Avoid hepatotoxic drugs  GET MRI with contrast + MRCP after para done  Continue Aldactone to 200 and add Lasix 80 daily    Follow BMP and creatinine daily       # Decompensated alcoholic cirrhosis   MELD 32 on 12/1    Large volume ascites SP paracentesis with 4 L removal on 12/1 and another 4.5 L on 12/5   No SBP   No varices on last EGD on 7/2018   Rec:   Follow BMP daily   Low sodium diet   Follow up  MRI liver rule out HCC   Needs EGD on 7/2020 for variceal screening     # Mild rectal bleed secondary to hemorrhoids   Had Colonoscopy in 2018 that showed Hemorrhoids and Diverticulosis   Hemodynamically stable   No signs of active GI bleed  Hg stable   Rec:  Follow Hg q24 h   can have Colonoscopy as outpatient    If Hg keeps dropping and signs of active Bleed will do inpatient colonoscopy once stable     # Constipation   Please consider Miralax twice daily 44 year old man with history of alcoholic cirrhosis presented for shortness of breath of 4 days duration, associated with increase abdominal girth and lower extremities edema.      # Elevated bilis - likely 2/2 EtOH hepatitis, r/o biliary obstruction given slightly dilated CBD seen on US  DF: 117 on 12/1   MELD: 32 on 12/1  No SBP on Para   Pt afebrile and no signs of sepsis  On IV abx for perianal abscess   Liver enzymes and INR  trending up today but no signs of encephalopathy   Rec:   Continue Vitamin K 10 po once daily x 3 days and daily INR   IV hydration   Will hold on prednisolone for now given pt have a perianal abscess discovered on exam  Continue Thiamine and folate  Daily liver enzymes and INR   Avoid hepatotoxic drugs  GET MRI with contrast + MRCP after para done  Continue Aldactone to 200 and add Lasix 80 daily    Follow BMP and creatinine daily       # Decompensated alcoholic cirrhosis   MELD 32 on 12/1    Large volume ascites SP paracentesis with 4 L removal on 12/1 and another 4.5 L on 12/5   No SBP   No varices on last EGD on 7/2018   Rec:   Follow BMP daily   Low sodium diet   Follow up  MRI liver rule out HCC   Needs EGD on 7/2020 for variceal screening     # Mild rectal bleed secondary to hemorrhoids   Had Colonoscopy in 2018 that showed Hemorrhoids and Diverticulosis   Hemodynamically stable   No signs of active GI bleed  Hg stable   Rec:  Follow Hg q24 h   can have Colonoscopy as outpatient    If Hg keeps dropping and signs of active Bleed will do inpatient colonoscopy once stable     # Constipation   Please consider Miralax twice daily

## 2019-12-06 NOTE — PHYSICAL THERAPY INITIAL EVALUATION ADULT - PERTINENT HX OF CURRENT PROBLEM, REHAB EVAL
a 44 year old man with history of alcoholic cirrhosis presented for shortness of breath of 4 days duration, associated with increase abdominal girth and lower extremities edema.

## 2019-12-06 NOTE — PROGRESS NOTE ADULT - ATTENDING COMMENTS
I saw and examined the patient independently. I agree with above history, physical exam and plan of care which I have reviewed and edited where appropriate with following additions.     patient reports breathing much better after removal of abdominal fluid yesterday/ c/o constipation and had mild bleeding per rectum because his stool was so hard.     acute decompensation of alcoholic cirrhosis / hepatitis with ascites and coagulopathy:   LFT's elevated but stable   INR still >3, order po vitamin K 10mg x 3 doses as per GI.   trend INR and liver enzymes daily.   repeat large volume abdominal paracentesis with removal of 4.5L of fluid on 12/5/19.   MRCp scheduled for today.   due for EGD for variceal screening in 7/2020 as per Gi note.   c/w aldactone 200mg and lasix 80mg daily.   monitor I&O's. in negative balance.  monitor bmp/ renal function daily- stable    chronic hyponatremia likely hypervolemic with cirrhosis:    Na level still low >120.  trend daily   c/w diuretics.     acute on chronic disease anemia with mild rectal bleed possibly due to draining perianal abscess with hemorrhoids:   H/H stable range 8-9  monitor H/H daily   transfuse if Hb <8.   GI following - if Hb stable with no active bleeding- colonoscopy as OP .   surgery evaluated- no intervention for now for rectal abscess due to coagulopathy and it drained spontaneously.  ID fu appreciated - c/w current ABX for rectal abscess for total of 10 days(if dced prior to completion - to be dced on po augmentin)    suspected folate and thiamine deficiency:   c/w oral supplements .    dvt ppx with SCD's.     FULL code.    Progress note Handoff:   Pending: clinical improvement of decompensated cirrhosis/ MRCP   Discussion: plan of care with  patient /  satisfied- all questions answered.  Disposition: unknown at this time/ needs  eval before discharge .

## 2019-12-07 LAB
ALBUMIN SERPL ELPH-MCNC: 1.7 G/DL — LOW (ref 3.5–5.2)
ALP SERPL-CCNC: 216 U/L — HIGH (ref 30–115)
ALT FLD-CCNC: 65 U/L — HIGH (ref 0–41)
ANION GAP SERPL CALC-SCNC: 12 MMOL/L — SIGNIFICANT CHANGE UP (ref 7–14)
AST SERPL-CCNC: 122 U/L — HIGH (ref 0–41)
BASOPHILS # BLD AUTO: 0.04 K/UL — SIGNIFICANT CHANGE UP (ref 0–0.2)
BASOPHILS # BLD AUTO: 0.05 K/UL — SIGNIFICANT CHANGE UP (ref 0–0.2)
BASOPHILS NFR BLD AUTO: 0.2 % — SIGNIFICANT CHANGE UP (ref 0–1)
BASOPHILS NFR BLD AUTO: 0.3 % — SIGNIFICANT CHANGE UP (ref 0–1)
BILIRUB DIRECT SERPL-MCNC: 7.2 MG/DL — HIGH (ref 0–0.2)
BILIRUB INDIRECT FLD-MCNC: 4 MG/DL — HIGH (ref 0.2–1.2)
BILIRUB SERPL-MCNC: 11.2 MG/DL — HIGH (ref 0.2–1.2)
BUN SERPL-MCNC: 16 MG/DL — SIGNIFICANT CHANGE UP (ref 10–20)
CALCIUM SERPL-MCNC: 7.1 MG/DL — LOW (ref 8.5–10.1)
CHLORIDE SERPL-SCNC: 93 MMOL/L — LOW (ref 98–110)
CO2 SERPL-SCNC: 18 MMOL/L — SIGNIFICANT CHANGE UP (ref 17–32)
CREAT SERPL-MCNC: 0.7 MG/DL — SIGNIFICANT CHANGE UP (ref 0.7–1.5)
EOSINOPHIL # BLD AUTO: 0.12 K/UL — SIGNIFICANT CHANGE UP (ref 0–0.7)
EOSINOPHIL # BLD AUTO: 0.24 K/UL — SIGNIFICANT CHANGE UP (ref 0–0.7)
EOSINOPHIL NFR BLD AUTO: 0.6 % — SIGNIFICANT CHANGE UP (ref 0–8)
EOSINOPHIL NFR BLD AUTO: 1.4 % — SIGNIFICANT CHANGE UP (ref 0–8)
GLUCOSE SERPL-MCNC: 100 MG/DL — HIGH (ref 70–99)
HCT VFR BLD CALC: 27 % — LOW (ref 42–52)
HCT VFR BLD CALC: 27.6 % — LOW (ref 42–52)
HGB BLD-MCNC: 9 G/DL — LOW (ref 14–18)
HGB BLD-MCNC: 9.1 G/DL — LOW (ref 14–18)
IMM GRANULOCYTES NFR BLD AUTO: 1 % — HIGH (ref 0.1–0.3)
IMM GRANULOCYTES NFR BLD AUTO: 1.3 % — HIGH (ref 0.1–0.3)
INR BLD: 3.28 RATIO — HIGH (ref 0.65–1.3)
LYMPHOCYTES # BLD AUTO: 1.36 K/UL — SIGNIFICANT CHANGE UP (ref 1.2–3.4)
LYMPHOCYTES # BLD AUTO: 1.62 K/UL — SIGNIFICANT CHANGE UP (ref 1.2–3.4)
LYMPHOCYTES # BLD AUTO: 7.3 % — LOW (ref 20.5–51.1)
LYMPHOCYTES # BLD AUTO: 9.8 % — LOW (ref 20.5–51.1)
MCHC RBC-ENTMCNC: 30.2 PG — SIGNIFICANT CHANGE UP (ref 27–31)
MCHC RBC-ENTMCNC: 30.7 PG — SIGNIFICANT CHANGE UP (ref 27–31)
MCHC RBC-ENTMCNC: 33 G/DL — SIGNIFICANT CHANGE UP (ref 32–37)
MCHC RBC-ENTMCNC: 33.3 G/DL — SIGNIFICANT CHANGE UP (ref 32–37)
MCV RBC AUTO: 91.7 FL — SIGNIFICANT CHANGE UP (ref 80–94)
MCV RBC AUTO: 92.2 FL — SIGNIFICANT CHANGE UP (ref 80–94)
MONOCYTES # BLD AUTO: 1.51 K/UL — HIGH (ref 0.1–0.6)
MONOCYTES # BLD AUTO: 1.86 K/UL — HIGH (ref 0.1–0.6)
MONOCYTES NFR BLD AUTO: 9.1 % — SIGNIFICANT CHANGE UP (ref 1.7–9.3)
MONOCYTES NFR BLD AUTO: 9.9 % — HIGH (ref 1.7–9.3)
NEUTROPHILS # BLD AUTO: 13 K/UL — HIGH (ref 1.4–6.5)
NEUTROPHILS # BLD AUTO: 15.12 K/UL — HIGH (ref 1.4–6.5)
NEUTROPHILS NFR BLD AUTO: 78.4 % — HIGH (ref 42.2–75.2)
NEUTROPHILS NFR BLD AUTO: 80.7 % — HIGH (ref 42.2–75.2)
NRBC # BLD: 0 /100 WBCS — SIGNIFICANT CHANGE UP (ref 0–0)
NRBC # BLD: 0 /100 WBCS — SIGNIFICANT CHANGE UP (ref 0–0)
PLATELET # BLD AUTO: 103 K/UL — LOW (ref 130–400)
PLATELET # BLD AUTO: 107 K/UL — LOW (ref 130–400)
POTASSIUM SERPL-MCNC: 4.5 MMOL/L — SIGNIFICANT CHANGE UP (ref 3.5–5)
POTASSIUM SERPL-SCNC: 4.5 MMOL/L — SIGNIFICANT CHANGE UP (ref 3.5–5)
PROT SERPL-MCNC: 6.4 G/DL — SIGNIFICANT CHANGE UP (ref 6–8)
PROTHROM AB SERPL-ACNC: 37.3 SEC — HIGH (ref 9.95–12.87)
RBC # BLD: 2.93 M/UL — LOW (ref 4.7–6.1)
RBC # BLD: 3.01 M/UL — LOW (ref 4.7–6.1)
RBC # FLD: 21.5 % — HIGH (ref 11.5–14.5)
RBC # FLD: 22 % — HIGH (ref 11.5–14.5)
SODIUM SERPL-SCNC: 123 MMOL/L — LOW (ref 135–146)
WBC # BLD: 16.58 K/UL — HIGH (ref 4.8–10.8)
WBC # BLD: 18.75 K/UL — HIGH (ref 4.8–10.8)
WBC # FLD AUTO: 16.58 K/UL — HIGH (ref 4.8–10.8)
WBC # FLD AUTO: 18.75 K/UL — HIGH (ref 4.8–10.8)

## 2019-12-07 PROCEDURE — 99233 SBSQ HOSP IP/OBS HIGH 50: CPT

## 2019-12-07 RX ORDER — SPIRONOLACTONE 25 MG/1
100 TABLET, FILM COATED ORAL DAILY
Refills: 0 | Status: DISCONTINUED | OUTPATIENT
Start: 2019-12-07 | End: 2019-12-09

## 2019-12-07 RX ADMIN — CHLORHEXIDINE GLUCONATE 1 APPLICATION(S): 213 SOLUTION TOPICAL at 14:15

## 2019-12-07 RX ADMIN — Medication 100 MILLIGRAM(S): at 21:10

## 2019-12-07 RX ADMIN — SPIRONOLACTONE 200 MILLIGRAM(S): 25 TABLET, FILM COATED ORAL at 06:15

## 2019-12-07 RX ADMIN — POLYETHYLENE GLYCOL 3350 17 GRAM(S): 17 POWDER, FOR SOLUTION ORAL at 17:11

## 2019-12-07 RX ADMIN — CEFTRIAXONE 100 MILLIGRAM(S): 500 INJECTION, POWDER, FOR SOLUTION INTRAMUSCULAR; INTRAVENOUS at 17:45

## 2019-12-07 RX ADMIN — Medication 100 MILLIGRAM(S): at 13:48

## 2019-12-07 RX ADMIN — Medication 100 MILLIGRAM(S): at 06:15

## 2019-12-07 RX ADMIN — Medication 80 MILLIGRAM(S): at 06:15

## 2019-12-07 RX ADMIN — Medication 1 MILLIGRAM(S): at 11:15

## 2019-12-07 RX ADMIN — Medication 1 TABLET(S): at 11:11

## 2019-12-07 RX ADMIN — POLYETHYLENE GLYCOL 3350 17 GRAM(S): 17 POWDER, FOR SOLUTION ORAL at 06:15

## 2019-12-07 RX ADMIN — Medication 10 MILLIGRAM(S): at 13:47

## 2019-12-07 RX ADMIN — Medication 100 MILLIGRAM(S): at 11:11

## 2019-12-07 NOTE — PROGRESS NOTE ADULT - SUBJECTIVE AND OBJECTIVE BOX
[employer] 44y Male  MRN#: [id number]       SUBJECTIVE  44y old Male who presents with a chief complaint of Shortness of breath (06 Dec 2019 12:18) No over night events. The dressing on abdomen are soaking with fluid after paracentesis.     OBJECTIVE  PAST MEDICAL & SURGICAL HISTORY  Liver cirrhosis, alcoholic  Anemia  Thrombocytopenia  ETOH abuse  History of incision and drainage: Gluteal abscess    ALLERGIES:  No Known Allergies    MEDICATIONS:  STANDING MEDICATIONS  cefTRIAXone   IVPB 1000 milliGRAM(s) IV Intermittent every 24 hours  chlorhexidine 4% Liquid 1 Application(s) Topical daily  folic acid 1 milliGRAM(s) Oral daily  furosemide    Tablet 80 milliGRAM(s) Oral daily  influenza   Vaccine 0.5 milliLiter(s) IntraMuscular once  metroNIDAZOLE  IVPB      metroNIDAZOLE  IVPB 500 milliGRAM(s) IV Intermittent every 8 hours  multivitamin 1 Tablet(s) Oral daily  phytonadione   Solution 10 milliGRAM(s) Oral daily  polyethylene glycol 3350 17 Gram(s) Oral every 12 hours  spironolactone 200 milliGRAM(s) Oral daily  thiamine 100 milliGRAM(s) Oral daily    PRN MEDICATIONS  chlordiazePOXIDE 50 milliGRAM(s) Oral every 1 hour PRN      VITAL SIGNS: Last 24 Hours  T(C): 36.7 (07 Dec 2019 04:08), Max: 36.7 (06 Dec 2019 17:00)  T(F): 98 (07 Dec 2019 04:08), Max: 98 (06 Dec 2019 17:00)  HR: 84 (07 Dec 2019 04:08) (84 - 100)  BP: 110/52 (07 Dec 2019 04:08) (108/59 - 133/66)  BP(mean): --  RR: 18 (07 Dec 2019 04:08) (18 - 18)  SpO2: 96% (07 Dec 2019 07:55) (96% - 96%)    LABS:                        9.1    16.58 )-----------( 107      ( 07 Dec 2019 06:56 )             27.6     12-07    123<L>  |  93<L>  |  16  ----------------------------<  100<H>  4.5   |  18  |  0.7    Ca    7.1<L>      07 Dec 2019 06:56    TPro  6.4  /  Alb  1.7<L>  /  TBili  11.2<H>  /  DBili  7.2<H>  /  AST  122<H>  /  ALT  65<H>  /  AlkPhos  216<H>  12-07    PT/INR - ( 07 Dec 2019 06:56 )   PT: 37.30 sec;   INR: 3.28 ratio                   Culture - Body Fluid with Gram Stain (collected 05 Dec 2019 16:24)  Source: .Body Fluid Abdominal Fluid  Gram Stain (06 Dec 2019 02:23):    No polymorphonuclear cells seen    No organisms seen    by cytocentrifuge  Preliminary Report (06 Dec 2019 17:07):    No growth    PHYSICAL EXAM:  GENERAL: NAD, well-developed, AAOx3, icteric  HEENT:  Atraumatic, Normocephalic.  PULMONARY: Clear to auscultation bilaterally  CARDIOVASCULAR: Regular rate and rhythm  GASTROINTESTINAL: Soft, Nontender, Oozing.   MUSCULOSKELETAL:  2+ Peripheral Pulses, No edema  NEUROLOGY: non-focal  SKIN: No rashes or lesions      ADMISSION SUMMARY  Patient is a 44y old Male who presents with a chief complaint of Shortness of breath (06 Dec 2019 12:21)    ASSESSMENT & PLAN    # Acute decompensation of alcoholic hepatitis in chronically cirrhotic patient:  - Alcohol abstinence recommended  - S/p Therapeutic tap.  - Follow up with MRCP results.   - C/w vitamin K 10 mg qd for 3 days  - Daily INR and CBC    # Anemia and blood per rectum in the setting of coagulopathy:  - Hb stable around 8-9  - Will do CBC q24h  - keep active type and screen  - Transfuse if <7 or <8 and symptomatic    # Leukocytosis in the setting of perineal/gluteal abscess  - Blood culture NGTD , peritoneal fluid culture NGTD  - UA negative  - CT abd/pelv with IV and oral contrast did not show any fluid collection in the pernieal area. Surgery won't do any further interventions  - C/w ceftriaxone 1 g q24h and metronidazole 500 mg q8h IV per ID recs UNTIL 12/12    # Alcohol abuse  - last drink 1 week ago  -CIWA protocol PRN, not needing chlordiazepoxide   -chronic hyponatremia  - Educated on alcohol abstinence    # DVT ppx  - SCD for now

## 2019-12-07 NOTE — PROGRESS NOTE ADULT - ATTENDING COMMENTS
I saw and examined the patient independently. I agree with above history, physical exam and plan of care which I have reviewed and edited where appropriate with following additions.    patient reports getting better slowly/ constipation getting better with lactulose. abdominal distension better than before after the repeat tap.     acute decompensation of alcoholic cirrhosis / hepatitis with ascites and coagulopathy:   LFT's elevated but stable   INR still >3, c/w po vitamin K 10mg x 3 doses as per GI.   trend INR and liver enzymes daily.   repeat large volume abdominal paracentesis with removal of 4.5L of fluid on 12/5/19.   MRCP done- fu results.   due for EGD for variceal screening in 7/2020 as per Gi note.   c/w aldactone 200mg and lasix 80mg daily.   monitor I&O's. in negative balance.  monitor bmp/ renal function daily- stable    chronic hyponatremia likely hypervolemic with cirrhosis:    Na level gradually dropping now, 123 today/ monitor daily  c/w lasix 80 mg daily/ will decrease aldactone to 100mg daily and monitor response on Na level.    acute on chronic disease anemia with mild rectal bleed possibly due to draining perianal abscess with hemorrhoids:   H/H stable range 8-9  monitor H/H daily   transfuse if Hb <8.   GI following - if Hb stable with no active bleeding- colonoscopy as OP .   surgery evaluated- no intervention for now for rectal abscess due to coagulopathy and it drained spontaneously.  ID fu appreciated - c/w current ABX for rectal abscess for total of 10 days(if dced prior to completion - to be dced on po augmentin)    suspected folate and thiamine deficiency:   c/w oral supplements .    dvt ppx with SCD's.   OOb to chair/ reportedly has been ambulating to bathroom.     FULL code.    Progress note Handoff:   Pending: clinical improvement of decompensated cirrhosis/stabilization of Na level/ MRCP results/ Gi clearance prior to discharge  Discussion: plan of care with  patient /  satisfied- all questions answered.  Disposition: unknown at this time/ needs  eval before discharge .

## 2019-12-08 LAB
ALBUMIN SERPL ELPH-MCNC: 1.8 G/DL — LOW (ref 3.5–5.2)
ALP SERPL-CCNC: 189 U/L — HIGH (ref 30–115)
ALT FLD-CCNC: 68 U/L — HIGH (ref 0–41)
ANION GAP SERPL CALC-SCNC: 10 MMOL/L — SIGNIFICANT CHANGE UP (ref 7–14)
ANION GAP SERPL CALC-SCNC: 9 MMOL/L — SIGNIFICANT CHANGE UP (ref 7–14)
AST SERPL-CCNC: 133 U/L — HIGH (ref 0–41)
BASOPHILS # BLD AUTO: 0.02 K/UL — SIGNIFICANT CHANGE UP (ref 0–0.2)
BASOPHILS NFR BLD AUTO: 0.1 % — SIGNIFICANT CHANGE UP (ref 0–1)
BILIRUB DIRECT SERPL-MCNC: 7.4 MG/DL — HIGH (ref 0–0.2)
BILIRUB INDIRECT FLD-MCNC: 4 MG/DL — HIGH (ref 0.2–1.2)
BILIRUB SERPL-MCNC: 11.4 MG/DL — HIGH (ref 0.2–1.2)
BUN SERPL-MCNC: 17 MG/DL — SIGNIFICANT CHANGE UP (ref 10–20)
BUN SERPL-MCNC: 18 MG/DL — SIGNIFICANT CHANGE UP (ref 10–20)
CALCIUM SERPL-MCNC: 7.2 MG/DL — LOW (ref 8.5–10.1)
CALCIUM SERPL-MCNC: 7.2 MG/DL — LOW (ref 8.5–10.1)
CHLORIDE SERPL-SCNC: 91 MMOL/L — LOW (ref 98–110)
CHLORIDE SERPL-SCNC: 93 MMOL/L — LOW (ref 98–110)
CO2 SERPL-SCNC: 19 MMOL/L — SIGNIFICANT CHANGE UP (ref 17–32)
CO2 SERPL-SCNC: 19 MMOL/L — SIGNIFICANT CHANGE UP (ref 17–32)
CREAT SERPL-MCNC: 0.6 MG/DL — LOW (ref 0.7–1.5)
CREAT SERPL-MCNC: 0.6 MG/DL — LOW (ref 0.7–1.5)
EOSINOPHIL # BLD AUTO: 0.11 K/UL — SIGNIFICANT CHANGE UP (ref 0–0.7)
EOSINOPHIL NFR BLD AUTO: 0.7 % — SIGNIFICANT CHANGE UP (ref 0–8)
GLUCOSE SERPL-MCNC: 89 MG/DL — SIGNIFICANT CHANGE UP (ref 70–99)
GLUCOSE SERPL-MCNC: 98 MG/DL — SIGNIFICANT CHANGE UP (ref 70–99)
HCT VFR BLD CALC: 26.8 % — LOW (ref 42–52)
HGB BLD-MCNC: 9 G/DL — LOW (ref 14–18)
IMM GRANULOCYTES NFR BLD AUTO: 1.1 % — HIGH (ref 0.1–0.3)
INR BLD: 3.14 RATIO — HIGH (ref 0.65–1.3)
LYMPHOCYTES # BLD AUTO: 1.64 K/UL — SIGNIFICANT CHANGE UP (ref 1.2–3.4)
LYMPHOCYTES # BLD AUTO: 10.4 % — LOW (ref 20.5–51.1)
MCHC RBC-ENTMCNC: 30.5 PG — SIGNIFICANT CHANGE UP (ref 27–31)
MCHC RBC-ENTMCNC: 33.6 G/DL — SIGNIFICANT CHANGE UP (ref 32–37)
MCV RBC AUTO: 90.8 FL — SIGNIFICANT CHANGE UP (ref 80–94)
MONOCYTES # BLD AUTO: 1.54 K/UL — HIGH (ref 0.1–0.6)
MONOCYTES NFR BLD AUTO: 9.7 % — HIGH (ref 1.7–9.3)
NEUTROPHILS # BLD AUTO: 12.35 K/UL — HIGH (ref 1.4–6.5)
NEUTROPHILS NFR BLD AUTO: 78 % — HIGH (ref 42.2–75.2)
NRBC # BLD: 0 /100 WBCS — SIGNIFICANT CHANGE UP (ref 0–0)
PLATELET # BLD AUTO: 110 K/UL — LOW (ref 130–400)
POTASSIUM SERPL-MCNC: 5 MMOL/L — SIGNIFICANT CHANGE UP (ref 3.5–5)
POTASSIUM SERPL-MCNC: 5.4 MMOL/L — HIGH (ref 3.5–5)
POTASSIUM SERPL-SCNC: 5 MMOL/L — SIGNIFICANT CHANGE UP (ref 3.5–5)
POTASSIUM SERPL-SCNC: 5.4 MMOL/L — HIGH (ref 3.5–5)
PROT SERPL-MCNC: 6.7 G/DL — SIGNIFICANT CHANGE UP (ref 6–8)
PROTHROM AB SERPL-ACNC: 35.7 SEC — HIGH (ref 9.95–12.87)
RBC # BLD: 2.95 M/UL — LOW (ref 4.7–6.1)
RBC # FLD: 21.8 % — HIGH (ref 11.5–14.5)
SODIUM SERPL-SCNC: 120 MMOL/L — LOW (ref 135–146)
SODIUM SERPL-SCNC: 121 MMOL/L — LOW (ref 135–146)
WBC # BLD: 15.83 K/UL — HIGH (ref 4.8–10.8)
WBC # FLD AUTO: 15.83 K/UL — HIGH (ref 4.8–10.8)

## 2019-12-08 PROCEDURE — 99233 SBSQ HOSP IP/OBS HIGH 50: CPT

## 2019-12-08 PROCEDURE — 99223 1ST HOSP IP/OBS HIGH 75: CPT

## 2019-12-08 RX ADMIN — Medication 100 MILLIGRAM(S): at 05:42

## 2019-12-08 RX ADMIN — Medication 100 MILLIGRAM(S): at 13:50

## 2019-12-08 RX ADMIN — Medication 1 MILLIGRAM(S): at 11:22

## 2019-12-08 RX ADMIN — Medication 100 MILLIGRAM(S): at 21:40

## 2019-12-08 RX ADMIN — CHLORHEXIDINE GLUCONATE 1 APPLICATION(S): 213 SOLUTION TOPICAL at 11:22

## 2019-12-08 RX ADMIN — Medication 10 MILLIGRAM(S): at 12:11

## 2019-12-08 RX ADMIN — POLYETHYLENE GLYCOL 3350 17 GRAM(S): 17 POWDER, FOR SOLUTION ORAL at 17:10

## 2019-12-08 RX ADMIN — Medication 1 TABLET(S): at 11:22

## 2019-12-08 RX ADMIN — POLYETHYLENE GLYCOL 3350 17 GRAM(S): 17 POWDER, FOR SOLUTION ORAL at 05:43

## 2019-12-08 RX ADMIN — Medication 100 MILLIGRAM(S): at 11:22

## 2019-12-08 RX ADMIN — CEFTRIAXONE 100 MILLIGRAM(S): 500 INJECTION, POWDER, FOR SOLUTION INTRAMUSCULAR; INTRAVENOUS at 17:10

## 2019-12-08 NOTE — PROGRESS NOTE ADULT - SUBJECTIVE AND OBJECTIVE BOX
Gastroenterology progress note:     Patient is a 44y old  Male who presents with a chief complaint of Shortness of breath (08 Dec 2019 12:23)       Admitted on: 11-30-19    We are following the patient for alcoholic hepatitis and liver cirrhois     Interval History: Patient is tolerating low sodium diet. Abdominal distension is better, no abdominal pain or vomiting. he still has 1-2 tsp of blood with brown stools, 2-3 times a day.    Patient's medical problems are stable   Prior records reviewed (Y/N): Y  History obtained from someone other than patient (Y/N): N      PAST MEDICAL & SURGICAL HISTORY:  Liver cirrhosis, alcoholic  Anemia  Thrombocytopenia  ETOH abuse  History of incision and drainage: Gluteal abscess      MEDICATIONS  (STANDING):  cefTRIAXone   IVPB 1000 milliGRAM(s) IV Intermittent every 24 hours  chlorhexidine 4% Liquid 1 Application(s) Topical daily  folic acid 1 milliGRAM(s) Oral daily  furosemide    Tablet 80 milliGRAM(s) Oral daily  influenza   Vaccine 0.5 milliLiter(s) IntraMuscular once  metroNIDAZOLE  IVPB      metroNIDAZOLE  IVPB 500 milliGRAM(s) IV Intermittent every 8 hours  multivitamin 1 Tablet(s) Oral daily  polyethylene glycol 3350 17 Gram(s) Oral every 12 hours  spironolactone 100 milliGRAM(s) Oral daily  thiamine 100 milliGRAM(s) Oral daily    MEDICATIONS  (PRN):      Allergies  No Known Allergies      Review of Systems:   Cardiovascular:  No Chest Pain, No Palpitations  Respiratory:  No Cough, No Dyspnea  Gastrointestinal:  As described in HPI    Physical Examination:  T(C): 36.4 (12-08-19 @ 05:28), Max: 36.7 (12-07-19 @ 20:00)  HR: 78 (12-08-19 @ 05:28) (78 - 97)  BP: 98/52 (12-08-19 @ 05:28) (98/52 - 120/66)  RR: 18 (12-08-19 @ 05:28) (18 - 18)  SpO2: 96% (12-08-19 @ 07:20) (96% - 98%)      12-07-19 @ 07:01  -  12-08-19 @ 07:00  --------------------------------------------------------  IN: 1260 mL / OUT: 725 mL / NET: 535 mL    12-08-19 @ 07:01  -  12-08-19 @ 13:04  --------------------------------------------------------  IN: 120 mL / OUT: 0 mL / NET: 120 mL      PHYSICAL EXAM:  GENERAL:  Appears in no distress  HEENT:  Conjunctivae icteric   CHEST:  Full & symmetric excursion  HEART:  NS1, S2,   ABDOMEN:  Soft, non-tender, ++ distended  EXTEREMITIES:  no edema  SKIN:  ++ abdomen  rash  NEURO:  AAO X 3, No asterixis.          Data: (reviewed by attending)                        9.0    15.83 )-----------( 110      ( 08 Dec 2019 07:59 )             26.8     Hgb trend:  9.0  12-08-19 @ 07:59  9.0  12-07-19 @ 20:45  9.1  12-07-19 @ 06:56  9.7  12-06-19 @ 05:30        12-08    121<L>  |  93<L>  |  17  ----------------------------<  89  5.0   |  19  |  0.6<L>    Ca    7.2<L>      08 Dec 2019 07:59    TPro  6.7  /  Alb  1.8<L>  /  TBili  11.4<H>  /  DBili  7.4<H>  /  AST  133<H>  /  ALT  68<H>  /  AlkPhos  189<H>  12-08    Liver panel trend:  TBili 11.4   /      /   ALT 68   /   AlkP 189   /   Tptn 6.7   /   Alb 1.8    /   DBili 7.4      12-08  TBili 11.2   /      /   ALT 65   /   AlkP 216   /   Tptn 6.4   /   Alb 1.7    /   DBili 7.2      12-07  TBili 12.4   /      /   ALT 64   /   AlkP 195   /   Tptn 6.7   /   Alb 1.9    /   DBili --      12-06  TBili 12.3   /      /   ALT 65   /   AlkP 199   /   Tptn 6.9   /   Alb 1.8    /   DBili --      12-05  TBili 12.7   /      /   ALT 60   /   AlkP 199   /   Tptn 6.7   /   Alb 1.8    /   DBili 8.5      12-03  TBili 13.7   /      /   ALT 61   /   AlkP 175   /   Tptn 6.6   /   Alb 1.8    /   DBili --      12-02  TBili 14.5   /      /   ALT 64   /   AlkP 194   /   Tptn 6.8   /   Alb 1.9    /   DBili 10.2      12-01  TBili 12.6   /      /   ALT 61   /   AlkP 216   /   Tptn 6.8   /   Alb 2.0    /   DBili 8.3      11-30  TBili 13.4   /      /   ALT 69   /   AlkP 226   /   Tptn 7.5   /   Alb 2.3    /   DBili --      11-30      PT/INR - ( 08 Dec 2019 07:59 )   PT: 35.70 sec;   INR: 3.14 ratio             Culture - Body Fluid with Gram Stain (collected 05 Dec 2019 16:24)  Source: .Body Fluid Abdominal Fluid  Gram Stain (06 Dec 2019 02:23):    No polymorphonuclear cells seen    No organisms seen    by cytocentrifuge  Preliminary Report (06 Dec 2019 17:07):    No growth         Radiology: (reviewed by attending)

## 2019-12-08 NOTE — PROGRESS NOTE ADULT - ATTENDING COMMENTS
44 year old man with history of alcoholic cirrhosis presented for shortness of breath of 4 days duration, associated with increase abdominal girth and lower extremities edema here with alcoholic hepatitis. Plans as outlined above

## 2019-12-08 NOTE — PROGRESS NOTE ADULT - SUBJECTIVE AND OBJECTIVE BOX
Progress Note:  Provider Speciality                            Hospitalist      DESIRE SWIFT [id number] 44y Male     CHIEF PRESENTING COMPLAINT:  Patient is a 44y old  Male who presents with a chief complaint of Shortness of breath (07 Dec 2019 10:57)        SUBJECTIVE:  Patient was seen and examined at bedside.   Reports doing ok /little more anxious today/ abdominal girth increasing again today/ had bleeding with BM today.   No significant overnight events reported.     HISTORY OF PRESENTING ILLNESS:  HPI:  a 44 year old man with history of alcoholic cirrhosis presented for shortness of breath of 4 days duration, associated with increase abdominal girth and lower extremities edema. he also reported orthopnea, abdominal pain (generalized dull, non radiating), generalized itching and jaundice. he endorsed subjective fever and chills. no cough, no chest pain  the patient last drink was 30% of a bottle of Michell with a friend and before that it was 2 months ago. he also has fresh blood per rectum, minimal amount, chronic.  the patient lives with his aunt, has no enough social support ( from wife and children), he has been in rehab program and was sober for a while then started drinking again. he used to follow at [facility] for hepatology. (30 Nov 2019 22:27)        REVIEW OF SYSTEMS:    At least 10 systems were reviewed in ROS. All systems reviewed  are within normal limits except for the complaints as described in Subjective.    PAST MEDICAL & SURGICAL HISTORY:  PAST MEDICAL & SURGICAL HISTORY:  Liver cirrhosis, alcoholic  Anemia  Thrombocytopenia  ETOH abuse  History of incision and drainage: Gluteal abscess          VITAL SIGNS:  Vital Signs Last 24 Hrs  T(C): 36.4 (08 Dec 2019 05:28), Max: 36.7 (07 Dec 2019 20:00)  T(F): 97.6 (08 Dec 2019 05:28), Max: 98.1 (07 Dec 2019 20:00)  HR: 78 (08 Dec 2019 05:28) (78 - 97)  BP: 98/52 (08 Dec 2019 05:28) (98/52 - 120/66)  BP(mean): --  RR: 18 (08 Dec 2019 05:28) (18 - 18)  SpO2: 96% (08 Dec 2019 07:20) (96% - 98%)          PHYSICAL EXAMINATION:    General: AAO, Not in acute distress  HEENT:   EOMI, scleral icterus noted,  atraumatic  Heart: S1+S2 audible, no murmur  Lungs: bilateral  fair air entry, no wheezing, no crepitations.  Abdomen: Soft, non-tender, distended , R sided abdominal paracentesis site with clean dressing.  CNS: AAO  , no focal deficits.  Extremities:  LE edema - improving.           CONSULTS:  Consultant(s) Notes Reviewed by me.   Care Discussed with Consultants/Other Providers where required.        MEDICATIONS:  MEDICATIONS  (STANDING):  cefTRIAXone   IVPB 1000 milliGRAM(s) IV Intermittent every 24 hours  chlorhexidine 4% Liquid 1 Application(s) Topical daily  folic acid 1 milliGRAM(s) Oral daily  furosemide    Tablet 80 milliGRAM(s) Oral daily  influenza   Vaccine 0.5 milliLiter(s) IntraMuscular once  metroNIDAZOLE  IVPB      metroNIDAZOLE  IVPB 500 milliGRAM(s) IV Intermittent every 8 hours  multivitamin 1 Tablet(s) Oral daily  polyethylene glycol 3350 17 Gram(s) Oral every 12 hours  spironolactone 100 milliGRAM(s) Oral daily  thiamine 100 milliGRAM(s) Oral daily    MEDICATIONS  (PRN):      [facility] & O's:                        9.0    15.83 )-----------( 110      ( 08 Dec 2019 07:59 )             26.8     12-08    121<L>  |  93<L>  |  17  ----------------------------<  89  5.0   |  19  |  0.6<L>    Ca    7.2<L>      08 Dec 2019 07:59    TPro  6.7  /  Alb  1.8<L>  /  TBili  11.4<H>  /  DBili  7.4<H>  /  AST  133<H>  /  ALT  68<H>  /  AlkPhos  189<H>  12-08    PT/INR - ( 08 Dec 2019 07:59 )   PT: 35.70 sec;   INR: 3.14 ratio             CAPILLARY BLOOD GLUCOSE                    12-07-19 @ 07:01  -  12-08-19 @ 07:00  --------------------------------------------------------  IN: 1260 mL / OUT: 725 mL / NET: 535 mL    12-08-19 @ 07:01  -  12-08-19 @ 12:24  --------------------------------------------------------  IN: 120 mL / OUT: 0 mL / NET: 120 mL              ASSESSMENT:  Patient is a 44y old Male who presents with a chief complaint of Shortness of breath (06 Dec 2019 )    acute decompensation of alcoholic cirrhosis / hepatitis with ascites and coagulopathy:   LFT's elevated but stable   INR still >3, c/w po vitamin K 10mg x 3 doses as per GI.   trend INR and liver enzymes daily.   repeat large volume abdominal paracentesis with removal of 4.5L of fluid on 12/5/19.   MRCP done- fu results.   due for EGD for variceal screening in 7/2020 as per Gi note.   c/w aldactone 200mg and lasix 80mg daily.   monitor I&O's. in negative balance.  monitor bmp/ renal function daily- stable    chronic hyponatremia likely hypervolemic with cirrhosis:    Na level gradually dropping now, 123 today/ monitor daily  c/w lasix 80 mg daily/ will decrease aldactone to 100mg daily and monitor response on Na level.    acute on chronic disease anemia with mild rectal bleed possibly due to draining perianal abscess with hemorrhoids:   H/H stable range 8-9  monitor H/H daily   transfuse if Hb <8.   GI following - if Hb stable with no active bleeding- colonoscopy as OP .   surgery evaluated- no intervention for now for rectal abscess due to coagulopathy and it drained spontaneously.  ID fu appreciated - c/w current ABX for rectal abscess for total of 10 days(if dced prior to completion - to be dced on po augmentin)    suspected folate and thiamine deficiency:   c/w oral supplements .    dvt ppx with SCD's.   OOb to chair/ reportedly has been ambulating to bathroom.     FULL code.    Progress note Handoff:   Pending: clinical improvement of decompensated cirrhosis/stabilization of Na level/ MRCP results/ Gi clearance prior to discharge  Discussion: plan of care with  patient /  satisfied- all questions answered.  Disposition: unknown at this time/ needs  eval before discharge . Progress Note:  Provider Speciality                            Hospitalist      DESIRE SWIFT [id number] 44y Male     CHIEF PRESENTING COMPLAINT:  Patient is a 44y old  Male who presents with a chief complaint of Shortness of breath (07 Dec 2019 10:57)        SUBJECTIVE:  Patient was seen and examined at bedside.   Reports doing ok /little more anxious today/ abdominal girth increasing again today/ had bleeding with BM today.   No significant overnight events reported.     HISTORY OF PRESENTING ILLNESS:  HPI:  a 44 year old man with history of alcoholic cirrhosis presented for shortness of breath of 4 days duration, associated with increase abdominal girth and lower extremities edema. he also reported orthopnea, abdominal pain (generalized dull, non radiating), generalized itching and jaundice. he endorsed subjective fever and chills. no cough, no chest pain  the patient last drink was 30% of a bottle of Mic[place] with a friend and before that it was 2 months ago. he also has fresh blood per rectum, minimal amount, chronic.  the patient lives with his aunt, has no enough social support ( from wife and children), he has been in rehab program and was sober for a while then started drinking again. he used to follow at [facility] for hepatology. (30 Nov 2019 22:27)        REVIEW OF SYSTEMS:    At least 10 systems were reviewed in ROS. All systems reviewed  are within normal limits except for the complaints as described in Subjective.    PAST MEDICAL & SURGICAL HISTORY:  PAST MEDICAL & SURGICAL HISTORY:  Liver cirrhosis, alcoholic  Anemia  Thrombocytopenia  ETOH abuse  History of incision and drainage: Gluteal abscess          VITAL SIGNS:  Vital Signs Last 24 Hrs  T(C): 36.4 (08 Dec 2019 05:28), Max: 36.7 (07 Dec 2019 20:00)  T(F): 97.6 (08 Dec 2019 05:28), Max: 98.1 (07 Dec 2019 20:00)  HR: 78 (08 Dec 2019 05:28) (78 - 97)  BP: 98/52 (08 Dec 2019 05:28) (98/52 - 120/66)  BP(mean): --  RR: 18 (08 Dec 2019 05:28) (18 - 18)  SpO2: 96% (08 Dec 2019 07:20) (96% - 98%)          PHYSICAL EXAMINATION:    General: AAO, Not in acute distress  HEENT:   EOMI, scleral icterus noted,  atraumatic  Heart: S1+S2 audible, no murmur  Lungs: bilateral  fair air entry, no wheezing, no crepitations.  Abdomen: Soft, non-tender, distended , R sided abdominal paracentesis site with clean dressing.  CNS: AAO  , no focal deficits.  Extremities:  LE edema - improving.           CONSULTS:  Consultant(s) Notes Reviewed by me.   Care Discussed with Consultants/Other Providers where required.        MEDICATIONS:  MEDICATIONS  (STANDING):  cefTRIAXone   IVPB 1000 milliGRAM(s) IV Intermittent every 24 hours  chlorhexidine 4% Liquid 1 Application(s) Topical daily  folic acid 1 milliGRAM(s) Oral daily  furosemide    Tablet 80 milliGRAM(s) Oral daily  influenza   Vaccine 0.5 milliLiter(s) IntraMuscular once  metroNIDAZOLE  IVPB      metroNIDAZOLE  IVPB 500 milliGRAM(s) IV Intermittent every 8 hours  multivitamin 1 Tablet(s) Oral daily  polyethylene glycol 3350 17 Gram(s) Oral every 12 hours  spironolactone 100 milliGRAM(s) Oral daily  thiamine 100 milliGRAM(s) Oral daily    MEDICATIONS  (PRN):      [facility]/I & O's:                        9.0    15.83 )-----------( 110      ( 08 Dec 2019 07:59 )             26.8     12-08    121<L>  |  93<L>  |  17  ----------------------------<  89  5.0   |  19  |  0.6<L>    Ca    7.2<L>      08 Dec 2019 07:59    TPro  6.7  /  Alb  1.8<L>  /  TBili  11.4<H>  /  DBili  7.4<H>  /  AST  133<H>  /  ALT  68<H>  /  AlkPhos  189<H>  12-08    PT/INR - ( 08 Dec 2019 07:59 )   PT: 35.70 sec;   INR: 3.14 ratio             CAPILLARY BLOOD GLUCOSE                    12-07-19 @ 07:01  -  12-08-19 @ 07:00  --------------------------------------------------------  IN: 1260 mL / OUT: 725 mL / NET: 535 mL    12-08-19 @ 07:01  -  12-08-19 @ 12:24  --------------------------------------------------------  IN: 120 mL / OUT: 0 mL / NET: 120 mL              ASSESSMENT:  Patient is a 44y old Male who presents with a chief complaint of Shortness of breath (06 Dec 2019 )    acute decompensation of alcoholic cirrhosis / hepatitis with ascites and coagulopathy:   LFT's elevated but stable   INR still >3, sp vitamin K .   trend INR and liver enzymes daily.   repeat large volume abdominal paracentesis with removal of 4.5L of fluid on 12/5/19.   MRCP done- fu results.   due for EGD for variceal screening in 7/2020 as per Gi note.   c/w aldactone 100mg(decreased dose due to worsening hyponatremia) , c/w  lasix 80mg daily.   monitor I&O's. in negative balance.  monitor bmp/ renal function daily- stable    chronic hyponatremia likely hypervolemic with cirrhosis:    Na level gradually dropping 121 today, repeat one in the afternoon.   c/w lasix 80 mg daily/ cw decreased dose of aldactone 100mg daily .  monitor q12 hr if persistently dropping.   nephrology consult.     acute on chronic disease anemia with mild rectal bleed possibly due to draining perianal abscess with hemorrhoids:   H/H stable range 8-9  monitor H/H daily   transfuse if Hb <8.   GI following - if Hb stable with no active bleeding- colonoscopy as OP .   surgery evaluated- no intervention for now for rectal abscess due to coagulopathy and it drained spontaneously.  ID evalauted - c/w current ABX for rectal abscess for total of 10 days(if dced prior to completion - to be dced on po augmentin)    suspected folate and thiamine deficiency:   c/w oral supplements .    dvt ppx with SCD's.   OOb to chair/ reportedly has been ambulating to bathroom.     FULL code.    Progress note Handoff:   Pending: clinical improvement of decompensated cirrhosis/stabilization of Na level/ MRCP results/ Gi clearance prior to discharge  Discussion: plan of care with  patient /  satisfied- all questions answered.  Disposition: unknown at this time/ needs  eval before discharge .

## 2019-12-08 NOTE — PROGRESS NOTE ADULT - ASSESSMENT
44 year old man with history of alcoholic cirrhosis presented for shortness of breath of 4 days duration, associated with increase abdominal girth and lower extremities edema.      # Elevated liver enzymes - likely 2/2 EtOH hepatitis, r/o biliary obstruction given slightly dilated CBD seen on US  DF: 123 on admission and now is 120  MELD: 32 on admission and now is 29  On Cipro and Flagyl for perianal abscess   No prednisolone as having rectal abscess      Rec:   Continue Thiamine and folate  Daily liver enzymes and INR   Avoid hepatotoxic drugs  f/u MRI with contrast + MRCP after para done  Alcohol cessation and rehab.  Patient is not a candidate for liver transplant as actively drinking (also no social support)    Decompensated alcoholic liver cirrhosis: Complicated with ascites, perigastric and miles splenic varices, with concern for dysplastic nodule on previous imaging  MELD 29  No SBP on Para, s/p therapeutic tap with 4.5L.    Rec:  c/w aldactone 200mg daily and lasix 80mg daily  Low sodium diet  BMP and CR daily   Monitor BP , keep MAP above 60.  Please f/u with MRI liver protocol for liver lesion, AFP normal.      # Mild rectal bleed secondary to hemorrhoids   Had Colonoscopy in 2018 that showed Hemorrhoids and Diverticulosis   Hemodynamically stable   Hg stable     Rec:  Follow Hg q24 h   can have Colonoscopy as outpatient if stable  Miralax BID.      All over very poor prognosis. 44 year old man with history of alcoholic cirrhosis presented for shortness of breath of 4 days duration, associated with increase abdominal girth and lower extremities edema here with alcoholic hepatitis      # Elevated liver enzymes - likely 2/2 EtOH hepatitis, r/o biliary obstruction given slightly dilated CBD seen on US  DF: 123 on admission and now is 120  MELD: 32 on admission and now is 29  On Cipro and Flagyl for perianal abscess   No prednisolone as having rectal abscess      Rec:   Continue Thiamine and folate  Daily liver enzymes and INR   Avoid hepatotoxic drugs  f/u MRI with contrast + MRCP after para done  Alcohol cessation and rehab.  Patient is not a candidate for liver transplant as actively drinking (also no social support)    Decompensated alcoholic liver cirrhosis: Complicated with ascites, perigastric and miles splenic varices, with concern for dysplastic nodule on previous imaging  MELD 29  No SBP on Para, s/p therapeutic tap with 4.5L.    Rec:  c/w aldactone 200mg daily and lasix 80mg daily  Low sodium diet  BMP and CR daily   Monitor BP , keep MAP above 60.  Please f/u with MRI liver protocol for liver lesion, AFP normal.      # Mild rectal bleed secondary to hemorrhoids   Had Colonoscopy in 2018 that showed Hemorrhoids and Diverticulosis   Hemodynamically stable   Hg stable     Rec:  Follow Hg q24 h   can have Colonoscopy as outpatient if stable  Miralax BID.      All over very poor prognosis.

## 2019-12-09 LAB
ALBUMIN SERPL ELPH-MCNC: 1.9 G/DL — LOW (ref 3.5–5.2)
ALP SERPL-CCNC: 193 U/L — HIGH (ref 30–115)
ALT FLD-CCNC: 76 U/L — HIGH (ref 0–41)
ANION GAP SERPL CALC-SCNC: 12 MMOL/L — SIGNIFICANT CHANGE UP (ref 7–14)
AST SERPL-CCNC: 149 U/L — HIGH (ref 0–41)
BASOPHILS # BLD AUTO: 0.03 K/UL — SIGNIFICANT CHANGE UP (ref 0–0.2)
BASOPHILS NFR BLD AUTO: 0.2 % — SIGNIFICANT CHANGE UP (ref 0–1)
BILIRUB DIRECT SERPL-MCNC: 8.2 MG/DL — HIGH (ref 0–0.2)
BILIRUB INDIRECT FLD-MCNC: 5.4 MG/DL — HIGH (ref 0.2–1.2)
BILIRUB SERPL-MCNC: 13.6 MG/DL — HIGH (ref 0.2–1.2)
BUN SERPL-MCNC: 18 MG/DL — SIGNIFICANT CHANGE UP (ref 10–20)
CALCIUM SERPL-MCNC: 7.6 MG/DL — LOW (ref 8.5–10.1)
CHLORIDE SERPL-SCNC: 92 MMOL/L — LOW (ref 98–110)
CO2 SERPL-SCNC: 18 MMOL/L — SIGNIFICANT CHANGE UP (ref 17–32)
CREAT SERPL-MCNC: 0.7 MG/DL — SIGNIFICANT CHANGE UP (ref 0.7–1.5)
EOSINOPHIL # BLD AUTO: 0.13 K/UL — SIGNIFICANT CHANGE UP (ref 0–0.7)
EOSINOPHIL NFR BLD AUTO: 0.8 % — SIGNIFICANT CHANGE UP (ref 0–8)
GLUCOSE SERPL-MCNC: 98 MG/DL — SIGNIFICANT CHANGE UP (ref 70–99)
HCT VFR BLD CALC: 27.8 % — LOW (ref 42–52)
HGB BLD-MCNC: 9.5 G/DL — LOW (ref 14–18)
IMM GRANULOCYTES NFR BLD AUTO: 1.2 % — HIGH (ref 0.1–0.3)
LYMPHOCYTES # BLD AUTO: 1.45 K/UL — SIGNIFICANT CHANGE UP (ref 1.2–3.4)
LYMPHOCYTES # BLD AUTO: 8.9 % — LOW (ref 20.5–51.1)
MAGNESIUM SERPL-MCNC: 2.1 MG/DL — SIGNIFICANT CHANGE UP (ref 1.8–2.4)
MCHC RBC-ENTMCNC: 31 PG — SIGNIFICANT CHANGE UP (ref 27–31)
MCHC RBC-ENTMCNC: 34.2 G/DL — SIGNIFICANT CHANGE UP (ref 32–37)
MCV RBC AUTO: 90.8 FL — SIGNIFICANT CHANGE UP (ref 80–94)
MONOCYTES # BLD AUTO: 1.39 K/UL — HIGH (ref 0.1–0.6)
MONOCYTES NFR BLD AUTO: 8.5 % — SIGNIFICANT CHANGE UP (ref 1.7–9.3)
NEUTROPHILS # BLD AUTO: 13.12 K/UL — HIGH (ref 1.4–6.5)
NEUTROPHILS NFR BLD AUTO: 80.4 % — HIGH (ref 42.2–75.2)
NRBC # BLD: 0 /100 WBCS — SIGNIFICANT CHANGE UP (ref 0–0)
PLATELET # BLD AUTO: 119 K/UL — LOW (ref 130–400)
POTASSIUM SERPL-MCNC: 5.1 MMOL/L — HIGH (ref 3.5–5)
POTASSIUM SERPL-SCNC: 5.1 MMOL/L — HIGH (ref 3.5–5)
PROT SERPL-MCNC: 7.3 G/DL — SIGNIFICANT CHANGE UP (ref 6–8)
RBC # BLD: 3.06 M/UL — LOW (ref 4.7–6.1)
RBC # FLD: 21.9 % — HIGH (ref 11.5–14.5)
SODIUM SERPL-SCNC: 122 MMOL/L — LOW (ref 135–146)
WBC # BLD: 16.31 K/UL — HIGH (ref 4.8–10.8)
WBC # FLD AUTO: 16.31 K/UL — HIGH (ref 4.8–10.8)

## 2019-12-09 PROCEDURE — 99233 SBSQ HOSP IP/OBS HIGH 50: CPT

## 2019-12-09 RX ORDER — SPIRONOLACTONE 25 MG/1
100 TABLET, FILM COATED ORAL DAILY
Refills: 0 | Status: DISCONTINUED | OUTPATIENT
Start: 2019-12-09 | End: 2019-12-10

## 2019-12-09 RX ORDER — FUROSEMIDE 40 MG
40 TABLET ORAL DAILY
Refills: 0 | Status: DISCONTINUED | OUTPATIENT
Start: 2019-12-09 | End: 2019-12-13

## 2019-12-09 RX ADMIN — Medication 100 MILLIGRAM(S): at 21:19

## 2019-12-09 RX ADMIN — CHLORHEXIDINE GLUCONATE 1 APPLICATION(S): 213 SOLUTION TOPICAL at 11:51

## 2019-12-09 RX ADMIN — POLYETHYLENE GLYCOL 3350 17 GRAM(S): 17 POWDER, FOR SOLUTION ORAL at 17:33

## 2019-12-09 RX ADMIN — Medication 100 MILLIGRAM(S): at 13:11

## 2019-12-09 RX ADMIN — Medication 100 MILLIGRAM(S): at 11:51

## 2019-12-09 RX ADMIN — CEFTRIAXONE 100 MILLIGRAM(S): 500 INJECTION, POWDER, FOR SOLUTION INTRAMUSCULAR; INTRAVENOUS at 17:33

## 2019-12-09 RX ADMIN — Medication 1 MILLIGRAM(S): at 11:51

## 2019-12-09 RX ADMIN — Medication 1 TABLET(S): at 11:51

## 2019-12-09 RX ADMIN — Medication 100 MILLIGRAM(S): at 05:45

## 2019-12-09 RX ADMIN — Medication 80 MILLIGRAM(S): at 05:45

## 2019-12-09 RX ADMIN — SPIRONOLACTONE 100 MILLIGRAM(S): 25 TABLET, FILM COATED ORAL at 05:45

## 2019-12-09 NOTE — CONSULT NOTE ADULT - ASSESSMENT
43 yo M  PMH: Alcoholic Cirrhosis   cc: presented to hospital for the evaluation of shortness of breath x 4 days associated with increase abdominal girth and lower extremities edema.   Hospital Course: Patient with increased ascites s/p paracentesis with unremarkable fluid analysis not predictive of SBP and negative fluid, blood, and urine cultures. CXR showed b/l opacities/effusions confirmed to be effusions on CT scan. Hospital course complicated by the discovery of gluteal / perirectal abscess on examination. ID evaluated - patient started on IV antibiotics ( Ceftriaxone and Metronidazole x 10 days, last day 12/16/2019) with plan for PO Augmentin if discharged prior to 10 day end date. Surgery also evaluated patient, no intervention as spontaneous drainage has occurred     ASSESSMENT:  - Alcoholic Liver Cirrhosis with subsequent development of Hyponatremia due to cirrhosis ( poor prognosis) - MELD 32  - Coagulopathy - Hyper/Hypo coagulable state due to Liver cirrhosis - elevated INR  - Hyperkalemia likely secondary to Spironolactone  - Gluteal / Perirectal Abscess discovered on exam with spontaneous drainage    PLAN:   - Strict I's/O's  - Fluid restriction < 1 L per day  - Do not restrict sodium in diet  - Will discuss alteration in dosage of Spironolactone and Furosemide with attending vs discontinuation for now   - Will also discuss with attending role for possible albumin administration  - Creatinine has remained stable during admission. However with Sodium at 122 fear of impending hepatorenal syndrome - please continue to monitor CMP daily.   - Continue daily INR - currently begin administered Vitamin K? Plan for any procedures?   - Continue Antibiotics for perirectal abscess    --- ATTENDING EVALUATION AND RECOMMENDATIONS PENDING 43 yo M  PMH: Alcoholic Cirrhosis   cc: presented to hospital for the evaluation of shortness of breath x 4 days associated with increase abdominal girth and lower extremities edema.   Hospital Course: Patient with increased ascites s/p paracentesis with unremarkable fluid analysis not predictive of SBP and negative fluid, blood, and urine cultures. CXR showed b/l opacities/effusions confirmed to be effusions on CT scan. Hospital course complicated by the discovery of gluteal / perirectal abscess on examination. ID evaluated - patient started on IV antibiotics ( Ceftriaxone and Metronidazole x 10 days, last day 12/16/2019) with plan for PO Augmentin if discharged prior to 10 day end date. Surgery also evaluated patient, no intervention as spontaneous drainage has occurred.    ASSESSMENT:  - Alcoholic Liver Cirrhosis with subsequent development of Hyponatremia due to cirrhosis ( poor prognosis) - MELD 32  - Coagulopathy - Hyper/Hypo coagulable state due to Liver cirrhosis - elevated INR  - Hyperkalemia likely secondary to Spironolactone  - Gluteal / Perirectal Abscess discovered on exam with spontaneous drainage    PLAN:   - Strict I's/O's  - Fluid restriction < 1 L per day  - Do not restrict sodium in diet  - Will discuss alteration in dosage of Spironolactone and Furosemide with attending vs discontinuation for now   - Will also discuss with attending role for possible albumin administration  - Creatinine has remained stable during admission. However with Sodium at 122 fear of impending hepatorenal syndrome - please continue to monitor CMP daily.   - Continue daily INR - currently begin administered Vitamin K? Plan for any procedures?   - Continue Antibiotics for perirectal abscess  - Patient seems to have poor social [place] and compliance would suggest having PMD and GI follow up appointments made and placed in discharge paperwork for patient prior to discharge.     --- ATTENDING EVALUATION AND RECOMMENDATIONS PENDING     - Thank you for the courtesy of this consult, we will continue to follow with you. 43 yo M  PMH: Alcoholic Cirrhosis   cc: presented to hospital for the evaluation of shortness of breath x 4 days associated with increase abdominal girth and lower extremities edema.   Hospital Course: Patient with increased ascites s/p paracentesis with unremarkable fluid analysis not predictive of SBP and negative fluid, blood, and urine cultures. CXR showed b/l opacities/effusions confirmed to be effusions on CT scan. Hospital course complicated by the discovery of gluteal / perirectal abscess on examination. ID evaluated - patient started on IV antibiotics ( Ceftriaxone and Metronidazole x 10 days, last day 12/16/2019) with plan for PO Augmentin if discharged prior to 10 day end date. Surgery also evaluated patient, no intervention as spontaneous drainage has occurred.    ASSESSMENT:  - Alcoholic Liver Cirrhosis with subsequent development of Hyponatremia due to cirrhosis ( poor prognosis) - MELD 32  - Coagulopathy - Hyper/Hypo coagulable state due to Liver cirrhosis - elevated INR  - Hyperkalemia likely secondary to Spironolactone  - Gluteal / Perirectal Abscess discovered on exam with spontaneous drainage    PLAN:   - Strict I's/O's  - Fluid restriction < 1 L per day  - Restrict sodium in diet  - Will discuss alteration in dosage of Spironolactone and Furosemide with attending vs discontinuation for now   - Will also discuss with attending role for possible albumin administration  - Creatinine has remained stable during admission. However with Sodium at 122 fear of impending hepatorenal syndrome - please continue to monitor CMP daily.   - Continue daily INR - currently begin administered Vitamin K? Plan for any procedures?   - Continue Antibiotics for perirectal abscess  - Patient seems to have poor social [place] and compliance would suggest having PMD and GI follow up appointments made and placed in discharge paperwork for patient prior to discharge.     --- ATTENDING EVALUATION AND RECOMMENDATIONS PENDING     - Thank you for the courtesy of this consult, we will continue to follow with you. 45 yo M  PMH: Alcoholic Cirrhosis   cc: presented to hospital for the evaluation of shortness of breath x 4 days associated with increase abdominal girth and lower extremities edema.   Hospital Course: Patient with increased ascites s/p paracentesis with unremarkable fluid analysis not predictive of SBP and negative fluid, blood, and urine cultures. CXR showed b/l opacities/effusions confirmed to be effusions on CT scan. Hospital course complicated by the discovery of gluteal / perirectal abscess on examination. ID evaluated - patient started on IV antibiotics ( Ceftriaxone and Metronidazole x 10 days, last day 12/16/2019) with plan for PO Augmentin if discharged prior to 10 day end date. Surgery also evaluated patient, no intervention as spontaneous drainage has occurred.    ASSESSMENT:  - Alcoholic Liver Cirrhosis with subsequent development of Hyponatremia due to cirrhosis ( poor prognosis) - MELD 32  - Coagulopathy - Hyper/Hypo coagulable state due to Liver cirrhosis - elevated INR  - Hyperkalemia likely secondary to Spironolactone  - Gluteal / Perirectal Abscess discovered on exam with spontaneous drainage    PLAN:   - Strict I's/O's  - Fluid restriction < 1 L per day  - Do not restrict sodium in diet  - Diuretics as follows: Furosemide 40mg PO Q24H and Spironolactone 50mg PO Q24H   - Creatinine has remained stable during admission - please continue to monitor CMP daily.   - Continue Antibiotics for perirectal abscess  - Patient seems to have poor social [place] and compliance would suggest having PMD and GI follow up appointments made and placed in discharge paperwork for patient prior to discharge.     --- ATTENDING RECOMMENDATIONS PENDING     - Thank you for the courtesy of this consult, we will continue to follow with you.

## 2019-12-09 NOTE — PROGRESS NOTE ADULT - ASSESSMENT
44 year old man with history of alcoholic cirrhosis presented for shortness of breath of 4 days duration, associated with increase abdominal girth and lower extremities edema.      # Elevated bilis - likely 2/2 EtOH hepatitis, but need to rule out CBD  obstruction given slightly dilated CBD seen on US  DF: 117 on 12/1   MELD: 32 on 12/1  No SBP on Para   Pt afebrile and no signs of sepsis  On IV abx for perianal abscess   Rec:   Continue Vitamin K 10 po once daily and daily INR   IV hydration   Will hold on prednisolone for now given pt have a perianal abscess discovered on exam  Continue Thiamine and folate  Daily liver enzymes and INR   Avoid hepatotoxic drugs  D/C lasix and spironolactone given severe hyponatremia and hyperkalemia   Follow BMP and creatinine daily   Follow up MRCP results     # Decompensated alcoholic cirrhosis   MELD 32 on 12/1    Large volume ascites SP paracentesis with 4 L removal on 12/1 and another 4.5 L on 12/5   No SBP   No varices on last EGD on 7/2018   Rec:   Follow BMP daily   Low sodium diet and water restriction given hyponatremia   Follow up  MRI liver rule out HCC   Needs EGD on 7/2020 for variceal screening     # Mild rectal bleed secondary to hemorrhoids   Had Colonoscopy in 2018 that showed Hemorrhoids and Diverticulosis   Hemodynamically stable   No signs of active GI bleed  Hg stable   Rec:  Follow Hg q24 h   can have Colonoscopy as outpatient    If Hg keeps dropping and signs of active Bleed will do inpatient colonoscopy once stable     # US abdomen could not rule out HCC   Rec:  Follow up MRI/ MRCP results (done results pending)  Follow up lukasz fetoprotein and US abdomen q6 month     # Alcohol abuse   Pt still drinking alcohol  Rec:   Needs detox and rehab       # Constipation   Miralax twice daily 44 year old man with history of alcoholic cirrhosis presented for shortness of breath of 4 days duration, associated with increase abdominal girth and lower extremities edema.      # Elevated bilis - likely 2/2 EtOH hepatitis with superimposed liver cirrhosis   DF: 117 on 12/1   MELD: 32 on 12/1  No SBP on Para   Pt afebrile and no signs of sepsis  On IV abx for perianal abscess   Rec:   Continue Vitamin K 10 mg po once daily and daily INR   IV hydration   Will hold on prednisolone for now given pt have a perianal abscess discovered on exam  Continue Thiamine and folate  Daily liver enzymes and INR   Avoid hepatotoxic drugs  MRI abdomen did  not show CBD dilation     # Decompensated alcoholic liver cirrhosis   MELD 32 on 12/1    Large volume ascites SP paracentesis with 4 L removal on 12/1 and another 4.5 L on 12/5   No SBP   No varices on last EGD on 7/2018   Rec:   D/C lasix and spironolactone given severe hyponatremia and hyperkalemia   Follow BMP and creatinine daily   Low sodium diet and water restriction given hyponatremia   Needs EGD on 7/2020 for variceal screening     # Mild rectal bleed secondary to hemorrhoids   Had Colonoscopy in 2018 that showed Hemorrhoids and Diverticulosis   Hemodynamically stable   No signs of active GI bleed  Hg stable   Rec:  Follow Hg q24 h   can have Colonoscopy as outpatient    If Hg keeps dropping and signs of active Bleed will do inpatient colonoscopy once stable     # US abdomen could not rule out HCC   MRI with contrast was negative for HCC   Rec:   Follow up lukasz fetoprotein and US abdomen q6 month     # Alcohol abuse   Pt still drinking alcohol  Rec:   Needs detox and rehab       # Constipation   Miralax twice daily 44 year old man with history of alcoholic cirrhosis presented for shortness of breath of 4 days duration, associated with increase abdominal girth and lower extremities edema.      # Elevated bilis - likely 2/2 EtOH hepatitis with superimposed liver cirrhosis   DF: 117 on 12/1   MELD: 32 on 12/1  No SBP on Para   Pt afebrile and no signs of sepsis  On IV abx for perianal abscess   Rec:   Continue Vitamin K 10 mg po once daily and daily INR   IV hydration   Will hold on prednisolone for now given pt have a perianal abscess discovered on exam  Continue Thiamine and folate  Daily liver enzymes and INR   Avoid hepatotoxic drugs  MRI abdomen did  not show CBD dilation     # Decompensated alcoholic liver cirrhosis   MELD 32 on 12/1    Large volume ascites SP paracentesis with 4 L removal on 12/1 and another 4.5 L on 12/5   No SBP   No varices on last EGD on 7/2018   Rec:   D/C lasix and spironolactone given severe hyponatremia and hyperkalemia   Follow BMP and creatinine daily   Low sodium diet and water restriction given hyponatremia   Needs EGD for variceal screening as outpatient     # Mild rectal bleed secondary to hemorrhoids   Had Colonoscopy in 2018 that showed Hemorrhoids and Diverticulosis   Hemodynamically stable   No signs of active GI bleed  Hg stable   Rec:  Follow Hg q24 h   can have Colonoscopy as outpatient    If Hg keeps dropping and signs of active Bleed will do inpatient colonoscopy once stable     # US abdomen could not rule out HCC   MRI with contrast was negative for HCC   Rec:   Follow up lukasz fetoprotein and US abdomen q6 month     # Alcohol abuse   Pt still drinking alcohol  Rec:   Needs detox and rehab       # Constipation   Miralax twice daily 44 year old man with history of alcoholic cirrhosis presented for shortness of breath of 4 days duration, associated with increase abdominal girth and lower extremities edema.      # Elevated bilis - likely 2/2 EtOH hepatitis with superimposed liver cirrhosis   DF: 117 on 12/1   MELD: 32 on 12/1  No SBP on Para   Pt afebrile and no signs of sepsis  On IV abx for perianal abscess   Rec:   Continue Vitamin K 10 mg po once daily and daily INR   IV hydration   Will hold on prednisolone for now given pt have a perianal abscess discovered on exam  Continue Thiamine and folate  Daily liver enzymes and INR   Avoid hepatotoxic drugs  MRI abdomen did  not show CBD dilation     # Decompensated alcoholic liver cirrhosis   MELD 32 on 12/1    Large volume ascites SP paracentesis with 4 L removal on 12/1 and another 4.5 L on 12/5   No SBP   No varices on last EGD on 7/2018   Rec:   D/C lasix and spironolactone given severe hyponatremia and hyperkalemia   Follow BMP and creatinine daily   Low sodium diet and water restriction given hyponatremia   IR evaluation for tips   Needs EGD for variceal screening as outpatient     # Mild rectal bleed secondary to hemorrhoids   Had Colonoscopy in 2018 that showed Hemorrhoids and Diverticulosis   Hemodynamically stable   No signs of active GI bleed  Hg stable   Rec:  Follow Hg q24 h   can have Colonoscopy as outpatient    If Hg keeps dropping and signs of active Bleed will do inpatient colonoscopy once stable     # US abdomen could not rule out HCC   MRI with contrast was negative for HCC   Rec:   Follow up lukasz fetoprotein and US abdomen q6 month     # Alcohol abuse   Pt still drinking alcohol  Rec:   Needs detox and rehab       # Constipation   Miralax twice daily 44 year old man with history of alcoholic cirrhosis presented for shortness of breath of 4 days duration, associated with increase abdominal girth and lower extremities edema.      # Elevated bili - likely 2/2 EtOH hepatitis with underlying liver cirrhosis   DF: 117 on 12/1   MELD: 32 on 12/1  No SBP on Para   Pt afebrile and no signs of sepsis  On IV abx for perianal abscess   Rec:   Continue Vitamin K 10 mg po once daily and daily INR   IV hydration   Will hold on prednisolone for now given pt have a perianal abscess discovered on exam  Continue Thiamine and folate  Daily liver enzymes and INR   Avoid hepatotoxic drugs  MRI abdomen did  not show CBD dilation     # Decompensated alcoholic liver cirrhosis   MELD 32 on 12/1    Large volume ascites SP paracentesis with 4 L removal on 12/1 and another 4.5 L on 12/5   No SBP   No varices on last EGD on 7/2018   Rec:   D/C lasix and spironolactone given severe hyponatremia and hyperkalemia   Follow BMP and creatinine daily   Low sodium diet and water restriction given hyponatremia   IR evaluation for tips   Needs EGD for variceal screening as outpatient     # Mild rectal bleed secondary to hemorrhoids   Had Colonoscopy in 2018 that showed Hemorrhoids and Diverticulosis   Hemodynamically stable   No signs of active GI bleed  Hg stable   Rec:  Follow Hg q24 h   can have Colonoscopy as outpatient    If Hg keeps dropping and signs of active Bleed will do inpatient colonoscopy once stable     # US abdomen could not rule out HCC   MRI with contrast was negative for HCC   Rec:   Follow up lukasz fetoprotein and US abdomen q6 months     # Alcohol abuse   Pt still drinking alcohol  Rec:   Needs detox and rehab       # Constipation   Miralax twice daily

## 2019-12-09 NOTE — PROGRESS NOTE ADULT - ATTENDING COMMENTS
I saw and examined the patient independently. I agree with above history, physical exam and plan of care which I have reviewed and edited where appropriate with following additions.     patient reports doing ok/ no new complaints today/ has been eating and drinking ok.     acute decompensation of alcoholic cirrhosis / hepatitis with ascites and coagulopathy:   LFT's still elevated but stable now.  had  large volume abdominal paracentesis times 2 this admission so far.  MRCP abdomen no evidence of HCC  due for EGD for variceal screening in 7/2020 as per Gi note.   dec diuretics doses lasix to 40mg daily and aldactone to 50mg daily as suggested by nephro to prevent worsening of hyponatremia.  monitor I&O's. in negative balance.  monitor bmp/ renal function daily- stable    chronic hyponatremia likely hypervolemic with cirrhosis:    Na level improving again slowly, 122 today.  dec diuretics doses lasix to 40mg daily and aldactone to 50mg daily as suggested by nephro to prevent worsening of hyponatremia.  nephrology consult appreciated.     acute on chronic disease anemia with mild rectal bleed possibly due to draining perianal abscess with hemorrhoids:   H/H remains stable range 8-9  GI following - if Hb stable with no active bleeding- colonoscopy as OP .   rectal abscess drained spontaneously.  ID evalauted - to complete total of 10 days ABX    suspected folate and thiamine deficiency:   c/w oral supplements .    dvt ppx with SCD's.   OOb to chair/ reportedly has been ambulating to bathroom.     SW informed for eval. will need to set up appointments , [facility] / Gi as Op.     walker script signed for discharge.    FULL code.    Progress note Handoff:   Pending: stabilization of Na levels/ anticipate for tmrw.  Discussion: plan of care with  patient /  satisfied- all questions answered.  Disposition: home/ to return to Aunt's house.

## 2019-12-09 NOTE — PROGRESS NOTE ADULT - SUBJECTIVE AND OBJECTIVE BOX
We are following the patient for Acoholic cirrhosis/ Hepatitis     GI HPI Today:  Pt feels good today. No complaints  hemodynamically stable   No events overnight   No fevers   PAST MEDICAL & SURGICAL HISTORY  Liver cirrhosis, alcoholic  Anemia  Thrombocytopenia  ETOH abuse  History of incision and drainage: Gluteal abscess      ALLERGIES:  No Known Allergies      MEDICATIONS:  MEDICATIONS  (STANDING):  cefTRIAXone   IVPB 1000 milliGRAM(s) IV Intermittent every 24 hours  chlorhexidine 4% Liquid 1 Application(s) Topical daily  folic acid 1 milliGRAM(s) Oral daily  furosemide    Tablet 80 milliGRAM(s) Oral daily  influenza   Vaccine 0.5 milliLiter(s) IntraMuscular once  metroNIDAZOLE  IVPB      metroNIDAZOLE  IVPB 500 milliGRAM(s) IV Intermittent every 8 hours  multivitamin 1 Tablet(s) Oral daily  polyethylene glycol 3350 17 Gram(s) Oral every 12 hours  spironolactone 100 milliGRAM(s) Oral daily  thiamine 100 milliGRAM(s) Oral daily    MEDICATIONS  (PRN):      REVIEW OF SYSTEMS  General:  No fevers  Eyes:  No reported pain   ENT:  No sore throat   NECK: No stiffness   CV:  No chest pain   Resp:  No shortness of breath  GI:  See HPI  :  No dysuria  Muscle:  +++ weakness  Neuro:  No tingling  Endocrine:  No polyuria  Heme:  No ecchymosis        VITALS:   T(F): 97.6 (12-09 @ 05:30), Max: 98.4 (12-08 @ 14:03)  HR: 96 (12-09 @ 05:30) (78 - 104)  BP: 121/71 (12-09 @ 05:35) (98/52 - 133/66)  BP(mean): --  RR: 19 (12-09 @ 05:30) (18 - 20)  SpO2: 95% (12-09 @ 05:35) (95% - 98%)        PHYSICAL EXAM:  GENERAL:  Appears in no distress  HEENT:  Conjunctivae Anicteric   CHEST:  Full & symmetric excursion  HEART:  NS1, S2,   ABDOMEN:  Soft, non-tender, +++ distended  EXTEREMITIES:  no edema  SKIN:  No rash  NEURO:  Alert, no asterixis          Blood Work :                        9.5    16.31 )-----------( 119      ( 09 Dec 2019 05:41 )             27.8     PT/INR - ( 08 Dec 2019 07:59 )  INR: 3.14            12-09    122<L>  |  92<L>  |  18  ----------------------------<  98  5.1<H>   |  18  |  0.7    Ca    7.6<L>      09 Dec 2019 05:41  Mg     2.1     12-09      CBC -  ( 09 Dec 2019 05:41 )  Hemoglobin : 9.5    CBC -  ( 08 Dec 2019 07:59 )  Hemoglobin : 9.0    CBC -  ( 07 Dec 2019 20:45 )  Hemoglobin : 9.0    CBC -  ( 07 Dec 2019 06:56 )  Hemoglobin : 9.1    CBC -  ( 06 Dec 2019 05:30 )  Hemoglobin : 9.7      LIVER FUNCTIONS - ( 09 Dec 2019 05:41 )  Alb: 1.9 [3.5 - 5.2] / Pro: 7.3 [6.0 - 8.0] / ALK PHOS: 193 [30 - 115] / ALT: 76 [0 - 41] / AST: 149 [0 - 41] / GGT: x     LIVER FUNCTIONS - ( 08 Dec 2019 07:59 )  Alb: 1.8 [3.5 - 5.2] / Pro: 6.7 [6.0 - 8.0] / ALK PHOS: 189 [30 - 115] / ALT: 68 [0 - 41] / AST: 133 [0 - 41] / GGT: x     LIVER FUNCTIONS - ( 07 Dec 2019 06:56 )  Alb: 1.7 [3.5 - 5.2] / Pro: 6.4 [6.0 - 8.0] / ALK PHOS: 216 [30 - 115] / ALT: 65 [0 - 41] / AST: 122 [0 - 41] / GGT: x     LIVER FUNCTIONS - ( 06 Dec 2019 05:30 )  Alb: 1.9 [3.5 - 5.2] / Pro: 6.7 [6.0 - 8.0] / ALK PHOS: 195 [30 - 115] / ALT: 64 [0 - 41] / AST: 130 [0 - 41] / GGT: x     LIVER FUNCTIONS - ( 05 Dec 2019 06:56 )  Alb: 1.8 [3.5 - 5.2] / Pro: 6.9 [6.0 - 8.0] / ALK PHOS: 199 [30 - 115] / ALT: 65 [0 - 41] / AST: 127 [0 - 41] / GGT: x     LIVER FUNCTIONS - ( 03 Dec 2019 06:42 )  Alb: 1.8 [3.5 - 5.2] / Pro: 6.7 [6.0 - 8.0] / ALK PHOS: 199 [30 - 115] / ALT: 60 [0 - 41] / AST: 108 [0 - 41] / GGT: x     LIVER FUNCTIONS - ( 02 Dec 2019 09:05 )  Alb: 1.8 [3.5 - 5.2] / Pro: 6.6 [6.0 - 8.0] / ALK PHOS: 175 [30 - 115] / ALT: 61 [0 - 41] / AST: 123 [0 - 41] / GGT: x

## 2019-12-09 NOTE — CONSULT NOTE ADULT - ATTENDING COMMENTS
Patient seen and evaluated by me on DOS. Labs and available imaging reviewed in detail. I agree with the above. Decompensated ETOH Cirrhosis. I agree with the above. GI service to follow
45yo male with decompensated cirrohosis here for paracenteisis with perineal pain and discomfort   small area of fluctuance 2 cm. in perineum beneath old i and d scar , minimal induration and erythema  wcc 16 however could be multifactorial  inr 3  meld 33, child c  continue abx and warm packs  correct coagulopathy  will then reassess need for i and d  Pt seen and examined
I have personally examined the patient and reviewed the documentation above.  Corrections and edits were made wherever needed.
I was Physically Present for the key portions of the evaluation   I agree with the above History  , Physical examination Assessment and plan   I have Reviewed , Modified or appended where appropriate.  Please check A and P as above   1- Hyponatremia / Liver cirrhosis sp paracentesis x2 with total 8l  on exam hypovolemic  would decrease lasix and aldactone dose by half / no IVF yet  follow BMP  No need for 3% sodium   check urine lytes   Avoid LVP

## 2019-12-09 NOTE — PROGRESS NOTE ADULT - ASSESSMENT
44 year old man with history of alcoholic cirrhosis presented for shortness of breath of 4 days duration, associated with increase abdominal girth and lower extremities edema.    # Acute decompensation of alcoholic hepatitis in chronically cirrhotic patient:  - Alcohol abstinence recommended  - S/p Therapeutic tap x2 , no SBP, culture of peritoneal fluid is negative.  - MRI abdomen: Hepatic cirrhosis with sequela of portal hypertension including   perisplenic varices and moderate volume abdominopelvic ascites. Otherwise non diagnostic exam because of patient's motion  - C/w vitamin K 10 mg qd   - Daily INR, BMP and CBC    # Anemia and blood per rectum in the setting of coagulopathy:  - Hb stable around 8-9  - keep active type and screen  - Transfuse if <7 or <8 and symptomatic    # Leukocytosis in the setting of perineal/gluteal abscess:  - Blood culture NGTD , peritoneal fluid culture NGTD  - UA negative  - CT abd/pelv with IV and oral contrast did not show any fluid collection in the pernieal area. Surgery won't do any further interventions  - C/w ceftriaxone 1 g q24h and metronidazole 500 mg q8h IV per ID recs UNTIL 12/12. Will switch to PO flagyl tomorrow.    # Alcohol abuse  - last drink 1 week ago  -CIWA protocol PRN, not needing chlordiazepoxide   -chronic hyponatremia  - Educated on alcohol abstinence    # DVT ppx  - SCD for now  # Dispo: PER GI, will discuss regarding when the patient can be discharged. He will need a walker on discharge and a [facility] appointment. Also needs to follow up closely with GI and stop drinking alcohol  # FULL code

## 2019-12-09 NOTE — PROGRESS NOTE ADULT - SUBJECTIVE AND OBJECTIVE BOX
SUBJECTIVE:    Patient is a 44y old Male who presents with a chief complaint of Shortness of breath (09 Dec 2019 09:27)    Currently admitted to medicine with the primary diagnosis of Alcoholic cirrhosis of liver with ascites     Today is hospital day 9d. This morning he is resting comfortably in bed and reports no new issues or overnight events.     PAST MEDICAL & SURGICAL HISTORY  Liver cirrhosis, alcoholic  Anemia  Thrombocytopenia  ETOH abuse  History of incision and drainage: Gluteal abscess    SOCIAL HISTORY:  Negative for smoking/alcohol/drug use.     ALLERGIES:  No Known Allergies    MEDICATIONS:  STANDING MEDICATIONS  cefTRIAXone   IVPB 1000 milliGRAM(s) IV Intermittent every 24 hours  chlorhexidine 4% Liquid 1 Application(s) Topical daily  folic acid 1 milliGRAM(s) Oral daily  influenza   Vaccine 0.5 milliLiter(s) IntraMuscular once  metroNIDAZOLE  IVPB      metroNIDAZOLE  IVPB 500 milliGRAM(s) IV Intermittent every 8 hours  multivitamin 1 Tablet(s) Oral daily  polyethylene glycol 3350 17 Gram(s) Oral every 12 hours  thiamine 100 milliGRAM(s) Oral daily    PRN MEDICATIONS    VITALS:   T(F): 97.2  HR: 87  BP: 120/68  RR: 18  SpO2: 95%    LABS:                        9.5    16.31 )-----------( 119      ( 09 Dec 2019 05:41 )             27.8     12-09    122<L>  |  92<L>  |  18  ----------------------------<  98  5.1<H>   |  18  |  0.7    Ca    7.6<L>      09 Dec 2019 05:41  Mg     2.1     12-09    TPro  7.3  /  Alb  1.9<L>  /  TBili  13.6<H>  /  DBili  8.2<H>  /  AST  149<H>  /  ALT  76<H>  /  AlkPhos  193<H>  12-09    PT/INR - ( 08 Dec 2019 07:59 )   PT: 35.70 sec;   INR: 3.14 ratio      PHYSICAL EXAM:  GEN: No acute distress  LUNGS: Clear to auscultation bilaterally   HEART: S1/S2 present. RRR.   ABD: Soft, non-tender, non-distended. Bowel sounds present  EXT: NC/NC/NE/2+PP/RAO/Skin Intact.   NEURO: AAOX3    Intravenous access:   NG tube:   Joseph Catheter: SUBJECTIVE:    Patient is a 44y old Male who presents with a chief complaint of Shortness of breath (09 Dec 2019 09:27)    Currently admitted to medicine with the primary diagnosis of Alcoholic cirrhosis of liver with ascites     Today is hospital day 9d. This morning he is resting comfortably in bed and reports no new issues or overnight events.   Today, patient's mood is better, he was smiling. Yesterday, he was upset and sad when he knew that his liver is shutting down and that he might die in 6 months.    PAST MEDICAL & SURGICAL HISTORY  Liver cirrhosis, alcoholic  Anemia  Thrombocytopenia  ETOH abuse  History of incision and drainage: Gluteal abscess    SOCIAL HISTORY:  Negative for smoking/alcohol/drug use.     ALLERGIES:  No Known Allergies    MEDICATIONS:  STANDING MEDICATIONS  cefTRIAXone   IVPB 1000 milliGRAM(s) IV Intermittent every 24 hours  chlorhexidine 4% Liquid 1 Application(s) Topical daily  folic acid 1 milliGRAM(s) Oral daily  influenza   Vaccine 0.5 milliLiter(s) IntraMuscular once  metroNIDAZOLE  IVPB      metroNIDAZOLE  IVPB 500 milliGRAM(s) IV Intermittent every 8 hours  multivitamin 1 Tablet(s) Oral daily  polyethylene glycol 3350 17 Gram(s) Oral every 12 hours  thiamine 100 milliGRAM(s) Oral daily    PRN MEDICATIONS    VITALS:   T(F): 97.2  HR: 87  BP: 120/68  RR: 18  SpO2: 95%    LABS:                        9.5    16.31 )-----------( 119      ( 09 Dec 2019 05:41 )             27.8     12-09    122<L>  |  92<L>  |  18  ----------------------------<  98  5.1<H>   |  18  |  0.7    Ca    7.6<L>      09 Dec 2019 05:41  Mg     2.1     12-09    TPro  7.3  /  Alb  1.9<L>  /  TBili  13.6<H>  /  DBili  8.2<H>  /  AST  149<H>  /  ALT  76<H>  /  AlkPhos  193<H>  12-09    PT/INR - ( 08 Dec 2019 07:59 )   PT: 35.70 sec;   INR: 3.14 ratio      PHYSICAL EXAM:  GEN: not in distress NC/AT, jaundiced, yellow sclera  LUNGS: Clear to auscultation bilaterally, no wheezing   HEART: S1/S2 present. RRR. No murmurs  ABD: Soft, non-tender, distended. Oozing from the site of the old paracentesis. No caput medusa  SKIN:  jaundiced, yellow sclera, opening in the left buttock cheek of 2 cm ( opened spontaneously)  NEURO: AAOX3, moves all extremities, no focal deficits    Intravenous access: Peripheral access  NG tube: None  Jsoeph Catheter: None

## 2019-12-09 NOTE — CONSULT NOTE ADULT - SUBJECTIVE AND OBJECTIVE BOX
NEPHROLOGY CONSULTATION NOTE    HPI:      PAST MEDICAL & SURGICAL HISTORY:  Liver cirrhosis, alcoholic  Anemia  Thrombocytopenia  ETOH abuse  History of incision and drainage: Gluteal abscess    Allergies: No Known Allergies    Home Medications Reviewed  Hospital Medications:   MEDICATIONS  (STANDING):  cefTRIAXone   IVPB 1000 milliGRAM(s) IV Intermittent every 24 hours  chlorhexidine 4% Liquid 1 Application(s) Topical daily  folic acid 1 milliGRAM(s) Oral daily  furosemide    Tablet 80 milliGRAM(s) Oral daily  influenza   Vaccine 0.5 milliLiter(s) IntraMuscular once  metroNIDAZOLE  IVPB      metroNIDAZOLE  IVPB 500 milliGRAM(s) IV Intermittent every 8 hours  multivitamin 1 Tablet(s) Oral daily  polyethylene glycol 3350 17 Gram(s) Oral every 12 hours  spironolactone 100 milliGRAM(s) Oral daily  thiamine 100 milliGRAM(s) Oral daily    SOCIAL HISTORY:  Denies ETOH, Smoking    FAMILY HISTORY:  Family history of stroke (Father, Mother)    REVIEW OF SYSTEMS:  CONSTITUTIONAL: No weakness, fevers or chills  EYES/ENT: No visual changes;  No vertigo or throat pain   NECK: No pain or stiffness  RESPIRATORY: No cough, wheezing, hemoptysis; No shortness of breath  CARDIOVASCULAR: No chest pain or palpitations.  GASTROINTESTINAL: No abdominal or epigastric pain. No nausea, vomiting, or hematemesis; No diarrhea or constipation. No melena or hematochezia.  GENITOURINARY: No dysuria, frequency, foamy urine, urinary urgency, incontinence or hematuria  NEUROLOGICAL: No numbness or weakness  SKIN: No itching, burning, rashes, or lesions   VASCULAR: No bilateral lower extremity edema.   All other review of systems is negative unless indicated above.    VITALS:  T(F): 97.6 (12-09-19 @ 05:30), Max: 98.4 (12-08-19 @ 14:03)  HR: 96 (12-09-19 @ 05:30)  BP: 121/71 (12-09-19 @ 05:35)  RR: 19 (12-09-19 @ 05:30)  SpO2: 95% (12-09-19 @ 05:35)    12-08 @ 07:01  -  12-09 @ 07:00  --------------------------------------------------------  IN: 240 mL / OUT: 1150 mL / NET: -910 mL    I&O's Detail  08 Dec 2019 07:01  -  09 Dec 2019 07:00  --------------------------------------------------------  IN:    Oral Fluid: 240 mL  Total IN: 240 mL    OUT:    Voided: 1150 mL  Total OUT: 1150 mL    Total NET: -910 mL    PHYSICAL EXAM:  Constitutional: NAD  HEENT: anicteric sclera, oropharynx clear, MMM  Neck: No JVD  Respiratory: CTAB, no wheezes, rales or rhonchi  Cardiovascular: S1, S2, RRR  Gastrointestinal: BS+, soft, NT/ND  Extremities: No cyanosis or clubbing. No peripheral edema  Neurological: A/O x 3, no focal deficits  Psychiatric: Normal mood, normal affect  : No CVA tenderness. No wisdom.   Skin: No rashes  Vascular Access:    LABS:  12-09    122<L>  |  92<L>  |  18  ----------------------------<  98  5.1<H>   |  18  |  0.7    Ca    7.6<L>      09 Dec 2019 05:41 --- Corrected Caclium is 9.28  Mg     2.1     12-09    TPro  7.3  /  Alb  1.9<L>  /  TBili  13.6<H>  /  DBili  8.2<H>  /  AST  149<H>  /  ALT  76<H>  /  AlkPhos  193<H>  12-09    Creatinine Trend: 0.7 <--, 0.6 <--, 0.6 <--, 0.7 <--, 0.7 <--, 0.6 <--, 0.6 <--, 0.6 <--, 0.6 <--, 0.5 <--, 0.7 <--                        9.5    16.31 )-----------( 119      ( 09 Dec 2019 05:41 )             27.8     URINE STUDIES:  Urinalysis (11.30.19 @ 18:15)    Glucose Qualitative, Urine: Negative    Blood, Urine: Negative    pH Urine: 6.5    Color: Yuli    Urine Appearance: Slightly Turbid    Bilirubin: Large    Ketone - Urine: Negative    Specific Gravity: 1.017    Protein, Urine: Trace    Urobilinogen: 6 mg/dL    Nitrite: Negative    Leukocyte Esterase Concentration: Negative  Urine Microscopic-Add On (NC) (11.30.19 @ 18:15)    Bacteria: Negative    Epithelial Cells: 0 /HPF    Red Blood Cell - Urine: 3 /HPF    White Blood Cell - Urine: 0 /HPF    Hyaline Casts: 1 /LPF    CULTURES:  # Culture - Urine (07.16.18 @ 20:48)    Specimen Source: .Urine Clean Catch (Midstream)    Culture Results:   No growth  # Culture - Body Fluid with Gram Stain (12.05.19 @ 16:24)    Gram Stain:   No polymorphonuclear cells seen  No organisms seen  by cytocentrifuge    Specimen Source: .Body Fluid Abdominal Fluid    Culture Results:   No growth  # Culture - Blood (11.30.19 @ 10:37)    Specimen Source: .Blood Blood    Culture Results:   No growth at 5 days.    RADIOLOGY & ADDITIONAL STUDIES:    # < from: CT Abdomen and Pelvis w/ Oral Cont and w/ IV Cont (12.02.19 @ 13:36) >  IMPRESSION:   1. Since July 25, 2019, no definite evidence of perianal fluid collection.  2. New moderate to large fine abdominopelvic ascites.  3. New small right and trace left pleural effusions, with bibasilar   atelectasis.  < end of copied text >    # < from: US Abdomen Complete (12.01.19 @ 02:06) >  IMPRESSION:  Liver cirrhosis. Limited examination sonographic evaluation did not   identify focal hepatic mass. Consider outpatient MRI abdomen with and   without IV contrast if tumor suspected.    Cholelithiasis. Nonspecific gallbladder wall edema and 0.43 cm, may be   related to underlying liver disease.  Mildly dilated common bile duct at 0.7 cm.  Splenomegaly.  Moderate ascites.  < end of copied text >    # < from: [facility] Duplex Lower Ext Vein Scan, Bilat (12.01.19 @ 08:25) >  Impression:  No evidence of deep venous thrombosis or superficial thrombophlebitis in   the bilateral lower extremities.  < end of copied text >    # < from: Xray Chest 1 View AP/PA (11.30.19 @ 18:02) >  Impression:    Low lung volume.   Bibasilar opacities/effusions.   < end of copied text > NEPHROLOGY CONSULTATION NOTE    HPI:  43 yo M  PMH: Alcoholic Cirrhosis  cc: presented to hospital for the evaluation of shortness of breath x 4 days associated with increase abdominal girth and lower extremities edema. Patient notes he has been diagnosed with cirrhosis as an outpatient and was due to follow up with a hepatologist but was unable to. He states he has never been started on diuretics and also notes that he takes no outpatient medications. Patient notes that since arrival sob has disappeared and LE edema has resolved He endorses no perirectal pain or on going drainage at this time. Patient notes he has also never been referred to a nephrologist in the past.   Hospital Course: Patient with increased ascites s/p paracentesis with unremarkable fluid analysis not predictive of SBP and negative fluid, blood, and urine cultures. CXR showed b/l opacities/effusions confirmed to be effusions on CT scan. Hospital course complicated by the discovery of gluteal / perirectal abscess on examination. ID evaluated - patient started on IV antibiotics ( Ceftriaxone and Metronidazole x 10 days, last day 12/16/2019) with plan for PO Augmentin if discharged prior to 10 day end date. Surgery also evaluated patient, no intervention as spontaneous drainage has occurred.    PAST MEDICAL & SURGICAL HISTORY:  Liver cirrhosis, alcoholic  Anemia  Thrombocytopenia  ETOH abuse  History of incision and drainage: Gluteal abscess    Allergies: No Known Allergies    Home Medications Reviewed - None as per patient    Hospital Medications:   MEDICATIONS  (STANDING):  cefTRIAXone   IVPB 1000 milliGRAM(s) IV Intermittent every 24 hours  chlorhexidine 4% Liquid 1 Application(s) Topical daily  folic acid 1 milliGRAM(s) Oral daily  furosemide    Tablet 80 milliGRAM(s) Oral daily  influenza   Vaccine 0.5 milliLiter(s) IntraMuscular once  metroNIDAZOLE  IVPB      metroNIDAZOLE  IVPB 500 milliGRAM(s) IV Intermittent every 8 hours  multivitamin 1 Tablet(s) Oral daily  polyethylene glycol 3350 17 Gram(s) Oral every 12 hours  spironolactone 100 milliGRAM(s) Oral daily  thiamine 100 milliGRAM(s) Oral daily    SOCIAL HISTORY:  No smoking or drug usage  Alcohol usage was last 1.5 months ago as per patient    FAMILY HISTORY:  Family history of stroke (Father, Mother)    REVIEW OF SYSTEMS:  CONSTITUTIONAL: No weakness, fevers or chills. Positive fatigue  EYES/ENT: No visual changes;  No vertigo  NECK: No pain or stiffness  RESPIRATORY: No shortness of breath  CARDIOVASCULAR: No chest pain or palpitations.  GASTROINTESTINAL: No abdominal or epigastric pain. No nausea, vomiting, or hematemesis; No diarrhea or constipation.  GENITOURINARY: No dysuria, frequency, foamy urine, urinary urgency, incontinence or hematuria  NEUROLOGICAL: No numbness or weakness  SKIN: Positive itching, rash, and jaudice  VASCULAR: No bilateral lower extremity edema.   All other review of systems is negative unless indicated above.    VITALS:  T(F): 97.6 (12-09-19 @ 05:30), Max: 98.4 (12-08-19 @ 14:03)  HR: 96 (12-09-19 @ 05:30)  BP: 121/71 (12-09-19 @ 05:35)  RR: 19 (12-09-19 @ 05:30)  SpO2: 95% (12-09-19 @ 05:35)    12-08 @ 07:01  -  12-09 @ 07:00  --------------------------------------------------------  IN: 240 mL / OUT: 1150 mL / NET: -910 mL    I&O's Detail  08 Dec 2019 07:01  -  09 Dec 2019 07:00  --------------------------------------------------------  IN:    Oral Fluid: 240 mL  Total IN: 240 mL    OUT:    Voided: 1150 mL  Total OUT: 1150 mL    Total NET: -910 mL    PHYSICAL EXAM:  Constitutional: NAD  HEENT: Oropharynx clear, MMM  Respiratory: CTAB, no wheezes, rales or rhonchi  Cardiovascular: S1, S2, RRR  Gastrointestinal: BS+, soft, NT, distended abdomen, jaundiced skin, red dot like rash on surface of abdomen and chest - not on upper or lower extremities  Extremities: No peripheral edema  Neurological: A/O x 3  Psychiatric: Normal mood  : No wisdom.   Skin: As per GI section  Vascular Access: None    LABS:  12-09    122<L>  |  92<L>  |  18  ----------------------------<  98  5.1<H>   |  18  |  0.7    Ca    7.6<L>      09 Dec 2019 05:41 --- Corrected Caclium is 9.28  Mg     2.1     12-09    TPro  7.3  /  Alb  1.9<L>  /  TBili  13.6<H>  /  DBili  8.2<H>  /  AST  149<H>  /  ALT  76<H>  /  AlkPhos  193<H>  12-09    Creatinine Trend: 0.7 <--, 0.6 <--, 0.6 <--, 0.7 <--, 0.7 <--, 0.6 <--, 0.6 <--, 0.6 <--, 0.6 <--, 0.5 <--, 0.7 <--                        9.5    16.31 )-----------( 119      ( 09 Dec 2019 05:41 )             27.8     URINE STUDIES:  Urinalysis (11.30.19 @ 18:15)    Glucose Qualitative, Urine: Negative    Blood, Urine: Negative    pH Urine: 6.5    Color: Yuli    Urine Appearance: Slightly Turbid    Bilirubin: Large    Ketone - Urine: Negative    Specific Gravity: 1.017    Protein, Urine: Trace    Urobilinogen: 6 mg/dL    Nitrite: Negative    Leukocyte Esterase Concentration: Negative  Urine Microscopic-Add On (NC) (11.30.19 @ 18:15)    Bacteria: Negative    Epithelial Cells: 0 /HPF    Red Blood Cell - Urine: 3 /HPF    White Blood Cell - Urine: 0 /HPF    Hyaline Casts: 1 /LPF    CULTURES:  # Culture - Urine (07.16.18 @ 20:48)    Specimen Source: .Urine Clean Catch (Midstream)    Culture Results:   No growth  # Culture - Body Fluid with Gram Stain (12.05.19 @ 16:24)    Gram Stain:   No polymorphonuclear cells seen  No organisms seen  by cytocentrifuge    Specimen Source: .Body Fluid Abdominal Fluid    Culture Results:   No growth  # Culture - Blood (11.30.19 @ 10:37)    Specimen Source: .Blood Blood    Culture Results:   No growth at 5 days.    RADIOLOGY & ADDITIONAL STUDIES:    # < from: CT Abdomen and Pelvis w/ Oral Cont and w/ IV Cont (12.02.19 @ 13:36) >  IMPRESSION:   1. Since July 25, 2019, no definite evidence of perianal fluid collection.  2. New moderate to large fine abdominopelvic ascites.  3. New small right and trace left pleural effusions, with bibasilar   atelectasis.  < end of copied text >    # < from: US Abdomen Complete (12.01.19 @ 02:06) >  IMPRESSION:  Liver cirrhosis. Limited examination sonographic evaluation did not   identify focal hepatic mass. Consider outpatient MRI abdomen with and   without IV contrast if tumor suspected.    Cholelithiasis. Nonspecific gallbladder wall edema and 0.43 cm, may be   related to underlying liver disease.  Mildly dilated common bile duct at 0.7 cm.  Splenomegaly.  Moderate ascites.  < end of copied text >    # < from: [facility] Duplex Lower Ext Vein Scan, Bilat (12.01.19 @ 08:25) >  Impression:  No evidence of deep venous thrombosis or superficial thrombophlebitis in   the bilateral lower extremities.  < end of copied text >    # < from: Xray Chest 1 View AP/PA (11.30.19 @ 18:02) >  Impression:    Low lung volume.   Bibasilar opacities/effusions.   < end of copied text > NEPHROLOGY CONSULTATION NOTE    HPI:  43 yo M  PMH: Alcoholic Cirrhosis  cc: presented to hospital for the evaluation of shortness of breath x 4 days associated with increase abdominal girth and lower extremities edema. Patient notes he has been diagnosed with cirrhosis as an outpatient and was due to follow up with a hepatologist but was unable to. He states he has never been started on diuretics and also notes that he takes no outpatient medications. Patient notes that since arrival sob has disappeared and LE edema has resolved He endorses no perirectal pain or on going drainage at this time. Patient notes he has also never been referred to a nephrologist in the past.   Hospital Course: Patient with increased ascites s/p paracentesis with unremarkable fluid analysis not predictive of SBP and negative fluid, blood, and urine cultures. CXR showed b/l opacities/effusions confirmed to be effusions on CT scan. Hospital course complicated by the discovery of gluteal / perirectal abscess on examination. ID evaluated - patient started on IV antibiotics ( Ceftriaxone and Metronidazole x 10 days, last day 12/16/2019) with plan for PO Augmentin if discharged prior to 10 day end date. Surgery also evaluated patient, no intervention as spontaneous drainage has occurred.    PAST MEDICAL & SURGICAL HISTORY:  Liver cirrhosis, alcoholic  Anemia  Thrombocytopenia  ETOH abuse  History of incision and drainage: Gluteal abscess    Allergies: No Known Allergies    Home Medications Reviewed - None as per patient    Hospital Medications:   MEDICATIONS  (STANDING):  cefTRIAXone   IVPB 1000 milliGRAM(s) IV Intermittent every 24 hours  chlorhexidine 4% Liquid 1 Application(s) Topical daily  folic acid 1 milliGRAM(s) Oral daily  furosemide    Tablet 80 milliGRAM(s) Oral daily  influenza   Vaccine 0.5 milliLiter(s) IntraMuscular once  metroNIDAZOLE  IVPB      metroNIDAZOLE  IVPB 500 milliGRAM(s) IV Intermittent every 8 hours  multivitamin 1 Tablet(s) Oral daily  polyethylene glycol 3350 17 Gram(s) Oral every 12 hours  spironolactone 100 milliGRAM(s) Oral daily  thiamine 100 milliGRAM(s) Oral daily    SOCIAL HISTORY:  No smoking or drug usage  Alcohol usage was last 1.5 months ago as per patient    FAMILY HISTORY:  Family history of stroke (Father, Mother)    REVIEW OF SYSTEMS:  CONSTITUTIONAL: No weakness, fevers or chills. Positive fatigue  EYES/ENT: No visual changes;  No vertigo  NECK: No pain or stiffness  RESPIRATORY: No shortness of breath  CARDIOVASCULAR: No chest pain or palpitations.  GASTROINTESTINAL: No abdominal or epigastric pain. No nausea, vomiting, or hematemesis; No diarrhea or constipation.  GENITOURINARY: No dysuria, frequency, foamy urine, urinary urgency, incontinence or hematuria  NEUROLOGICAL: No numbness or weakness  SKIN: Positive itching, rash, and jaudice  VASCULAR: No bilateral lower extremity edema.   All other review of systems is negative unless indicated above.    VITALS:  T(F): 97.6 (12-09-19 @ 05:30), Max: 98.4 (12-08-19 @ 14:03)  HR: 96 (12-09-19 @ 05:30)  BP: 121/71 (12-09-19 @ 05:35)  RR: 19 (12-09-19 @ 05:30)  SpO2: 95% (12-09-19 @ 05:35)    12-08 @ 07:01  -  12-09 @ 07:00  --------------------------------------------------------  IN: 240 mL / OUT: 1150 mL / NET: -910 mL    I&O's Detail  08 Dec 2019 07:01  -  09 Dec 2019 07:00  --------------------------------------------------------  IN:    Oral Fluid: 240 mL  Total IN: 240 mL    OUT:    Voided: 1150 mL  Total OUT: 1150 mL    Total NET: -910 mL    PHYSICAL EXAM:  Constitutional: NAD  HEENT: Oropharynx clear, MMM  Respiratory: CTAB, no wheezes, rales or rhonchi  Cardiovascular: S1, S2, RRR  Gastrointestinal: BS+, soft, NT, distended abdomen, jaundiced skin, red dot like rash on surface of abdomen and chest - not on upper or lower extremities  Extremities: No peripheral edema  Neurological: A/O x 3  Psychiatric: Normal mood  : No wisdom.   Skin: As per GI section  Vascular Access: None for HD     LABS:  12-09    122<L>  |  92<L>  |  18  ----------------------------<  98  5.1<H>   |  18  |  0.7    Ca    7.6<L>      09 Dec 2019 05:41 --- Corrected Caclium is 9.28  Mg     2.1     12-09    TPro  7.3  /  Alb  1.9<L>  /  TBili  13.6<H>  /  DBili  8.2<H>  /  AST  149<H>  /  ALT  76<H>  /  AlkPhos  193<H>  12-09    Creatinine Trend: 0.7 <--, 0.6 <--, 0.6 <--, 0.7 <--, 0.7 <--, 0.6 <--, 0.6 <--, 0.6 <--, 0.6 <--, 0.5 <--, 0.7 <--                        9.5    16.31 )-----------( 119      ( 09 Dec 2019 05:41 )             27.8     URINE STUDIES:  Urinalysis (11.30.19 @ 18:15)    Glucose Qualitative, Urine: Negative    Blood, Urine: Negative    pH Urine: 6.5    Color: Yuli    Urine Appearance: Slightly Turbid    Bilirubin: Large    Ketone - Urine: Negative    Specific Gravity: 1.017    Protein, Urine: Trace    Urobilinogen: 6 mg/dL    Nitrite: Negative    Leukocyte Esterase Concentration: Negative  Urine Microscopic-Add On (NC) (11.30.19 @ 18:15)    Bacteria: Negative    Epithelial Cells: 0 /HPF    Red Blood Cell - Urine: 3 /HPF    White Blood Cell - Urine: 0 /HPF    Hyaline Casts: 1 /LPF    CULTURES:  # Culture - Urine (07.16.18 @ 20:48)    Specimen Source: .Urine Clean Catch (Midstream)    Culture Results:   No growth  # Culture - Body Fluid with Gram Stain (12.05.19 @ 16:24)    Gram Stain:   No polymorphonuclear cells seen  No organisms seen  by cytocentrifuge    Specimen Source: .Body Fluid Abdominal Fluid    Culture Results:   No growth  # Culture - Blood (11.30.19 @ 10:37)    Specimen Source: .Blood Blood    Culture Results:   No growth at 5 days.    RADIOLOGY & ADDITIONAL STUDIES:    # < from: CT Abdomen and Pelvis w/ Oral Cont and w/ IV Cont (12.02.19 @ 13:36) >  IMPRESSION:   1. Since July 25, 2019, no definite evidence of perianal fluid collection.  2. New moderate to large fine abdominopelvic ascites.  3. New small right and trace left pleural effusions, with bibasilar   atelectasis.  < end of copied text >    # < from: US Abdomen Complete (12.01.19 @ 02:06) >  IMPRESSION:  Liver cirrhosis. Limited examination sonographic evaluation did not   identify focal hepatic mass. Consider outpatient MRI abdomen with and   without IV contrast if tumor suspected.    Cholelithiasis. Nonspecific gallbladder wall edema and 0.43 cm, may be   related to underlying liver disease.  Mildly dilated common bile duct at 0.7 cm.  Splenomegaly.  Moderate ascites.  < end of copied text >    # < from: [facility] Duplex Lower Ext Vein Scan, Bilat (12.01.19 @ 08:25) >  Impression:  No evidence of deep venous thrombosis or superficial thrombophlebitis in   the bilateral lower extremities.  < end of copied text >    # < from: Xray Chest 1 View AP/PA (11.30.19 @ 18:02) >  Impression:    Low lung volume.   Bibasilar opacities/effusions.   < end of copied text > NEPHROLOGY CONSULTATION NOTE    HPI:  45 yo M  PMH: Alcoholic Cirrhosis  cc: presented to hospital for the evaluation of shortness of breath x 4 days associated with increase abdominal girth and lower extremities edema. Patient notes he has been diagnosed with cirrhosis as an outpatient and was due to follow up with a hepatologist but was unable to. He states he has never been started on diuretics and also notes that he takes no outpatient medications. Patient notes that since arrival sob has disappeared and LE edema has resolved He endorses no perirectal pain or on going drainage at this time. Patient notes he has also never been referred to a nephrologist in the past.   Hospital Course: Patient with increased ascites s/p paracentesis with unremarkable fluid analysis not predictive of SBP and negative fluid, blood, and urine cultures. CXR showed b/l opacities/effusions confirmed to be effusions on CT scan. Hospital course complicated by the discovery of gluteal / perirectal abscess on examination. ID evaluated - patient started on IV antibiotics ( Ceftriaxone and Metronidazole x 10 days, last day 12/16/2019) with plan for PO Augmentin if discharged prior to 10 day end date. Surgery also evaluated patient, no intervention as spontaneous drainage has occurred.    PAST MEDICAL & SURGICAL HISTORY:  Liver cirrhosis, alcoholic  Anemia  Thrombocytopenia  ETOH abuse  History of incision and drainage: Gluteal abscess    Allergies: No Known Allergies    Home Medications Reviewed - None as per patient    Hospital Medications:   MEDICATIONS  (STANDING):  cefTRIAXone   IVPB 1000 milliGRAM(s) IV Intermittent every 24 hours  chlorhexidine 4% Liquid 1 Application(s) Topical daily  folic acid 1 milliGRAM(s) Oral daily  furosemide    Tablet 80 milliGRAM(s) Oral daily  influenza   Vaccine 0.5 milliLiter(s) IntraMuscular once  metroNIDAZOLE  IVPB      metroNIDAZOLE  IVPB 500 milliGRAM(s) IV Intermittent every 8 hours  multivitamin 1 Tablet(s) Oral daily  polyethylene glycol 3350 17 Gram(s) Oral every 12 hours  spironolactone 100 milliGRAM(s) Oral daily  thiamine 100 milliGRAM(s) Oral daily    SOCIAL HISTORY:  No smoking or drug usage  Alcohol usage was last 1.5 months ago as per patient    FAMILY HISTORY:  Family history of stroke (Father, Mother)    REVIEW OF SYSTEMS:  CONSTITUTIONAL: No weakness, fevers or chills. Positive fatigue  EYES/ENT: No visual changes;  No vertigo  NECK: No pain or stiffness  RESPIRATORY: No shortness of breath  CARDIOVASCULAR: No chest pain or palpitations.  GASTROINTESTINAL: No abdominal or epigastric pain. No nausea, vomiting, or hematemesis; No diarrhea or constipation.  GENITOURINARY: No dysuria, frequency, foamy urine, urinary urgency, incontinence or hematuria  NEUROLOGICAL: No numbness or weakness  SKIN: Positive itching, rash, and jaudice  VASCULAR: No bilateral lower extremity edema.   All other review of systems is negative unless indicated above.    VITALS:  T(F): 97.6 (12-09-19 @ 05:30), Max: 98.4 (12-08-19 @ 14:03)  HR: 96 (12-09-19 @ 05:30)  BP: 121/71 (12-09-19 @ 05:35)  RR: 19 (12-09-19 @ 05:30)  SpO2: 95% (12-09-19 @ 05:35)    12-08 @ 07:01  -  12-09 @ 07:00  --------------------------------------------------------  IN: 240 mL / OUT: 1150 mL / NET: -910 mL    I&O's Detail  08 Dec 2019 07:01  -  09 Dec 2019 07:00  --------------------------------------------------------  IN:    Oral Fluid: 240 mL  Total IN: 240 mL    OUT:    Voided: 1150 mL  Total OUT: 1150 mL    Total NET: -910 mL    PHYSICAL EXAM:  Constitutional: NAD  HEENT: Oropharynx clear, MMM  Respiratory: CTAB, no wheezes, rales or rhonchi  Cardiovascular: S1, S2, RRR  Gastrointestinal: BS+, soft, NT, distended abdomen, jaundiced skin, red dot like rash on surface of abdomen and chest - not on upper or lower extremities  Extremities: No peripheral edema  Neurological: A/O x 3  Psychiatric: Normal mood  : No wisdom.   Skin: As per GI section  Vascular Access: None for HD     LABS:  12-09    122<L>  |  92<L>  |  18  ----------------------------<  98  5.1<H>   |  18  |  0.7    SODIUM TREND:    Sodium 122 [12-09 @ 05:41]  Sodium 120 [12-08 @ 20:13]  Sodium 121 [12-08 @ 07:59]  Sodium 123 [12-07 @ 06:56]  Sodium 124 [12-06 @ 05:30]  Sodium 127 [12-05 @ 06:56]  Sodium 126 [12-03 @ 06:42]  Sodium 125 [12-02 @ 09:05]  Sodium 127 [12-01 @ 11:18]  Sodium 130 [11-30 @ 23:20]    Ca    7.6<L>      09 Dec 2019 05:41 --- Corrected Caclium is 9.28  Mg     2.1     12-09    TPro  7.3  /  Alb  1.9<L>  /  TBili  13.6<H>  /  DBili  8.2<H>  /  AST  149<H>  /  ALT  76<H>  /  AlkPhos  193<H>  12-09    Creatinine Trend: 0.7 <--, 0.6 <--, 0.6 <--, 0.7 <--, 0.7 <--, 0.6 <--, 0.6 <--, 0.6 <--, 0.6 <--, 0.5 <--, 0.7 <--                        9.5    16.31 )-----------( 119      ( 09 Dec 2019 05:41 )             27.8     URINE STUDIES:  Urinalysis (11.30.19 @ 18:15)    Glucose Qualitative, Urine: Negative    Blood, Urine: Negative    pH Urine: 6.5    Color: Yuli    Urine Appearance: Slightly Turbid    Bilirubin: Large    Ketone - Urine: Negative    Specific Gravity: 1.017    Protein, Urine: Trace    Urobilinogen: 6 mg/dL    Nitrite: Negative    Leukocyte Esterase Concentration: Negative  Urine Microscopic-Add On (NC) (11.30.19 @ 18:15)    Bacteria: Negative    Epithelial Cells: 0 /HPF    Red Blood Cell - Urine: 3 /HPF    White Blood Cell - Urine: 0 /HPF    Hyaline Casts: 1 /LPF    CULTURES:  # Culture - Urine (07.16.18 @ 20:48)    Specimen Source: .Urine Clean Catch (Midstream)    Culture Results:   No growth  # Culture - Body Fluid with Gram Stain (12.05.19 @ 16:24)    Gram Stain:   No polymorphonuclear cells seen  No organisms seen  by cytocentrifuge    Specimen Source: .Body Fluid Abdominal Fluid    Culture Results:   No growth  # Culture - Blood (11.30.19 @ 10:37)    Specimen Source: .Blood Blood    Culture Results:   No growth at 5 days.    RADIOLOGY & ADDITIONAL STUDIES:    # < from: CT Abdomen and Pelvis w/ Oral Cont and w/ IV Cont (12.02.19 @ 13:36) >  IMPRESSION:   1. Since July 25, 2019, no definite evidence of perianal fluid collection.  2. New moderate to large fine abdominopelvic ascites.  3. New small right and trace left pleural effusions, with bibasilar   atelectasis.  < end of copied text >    # < from: US Abdomen Complete (12.01.19 @ 02:06) >  IMPRESSION:  Liver cirrhosis. Limited examination sonographic evaluation did not   identify focal hepatic mass. Consider outpatient MRI abdomen with and   without IV contrast if tumor suspected.    Cholelithiasis. Nonspecific gallbladder wall edema and 0.43 cm, may be   related to underlying liver disease.  Mildly dilated common bile duct at 0.7 cm.  Splenomegaly.  Moderate ascites.  < end of copied text >    # < from: [facility] Duplex Lower Ext Vein Scan, Bilat (12.01.19 @ 08:25) >  Impression:  No evidence of deep venous thrombosis or superficial thrombophlebitis in   the bilateral lower extremities.  < end of copied text >    # < from: Xray Chest 1 View AP/PA (11.30.19 @ 18:02) >  Impression:    Low lung volume.   Bibasilar opacities/effusions.   < end of copied text >

## 2019-12-10 LAB
ANION GAP SERPL CALC-SCNC: 11 MMOL/L — SIGNIFICANT CHANGE UP (ref 7–14)
BASOPHILS # BLD AUTO: 0.03 K/UL — SIGNIFICANT CHANGE UP (ref 0–0.2)
BASOPHILS NFR BLD AUTO: 0.2 % — SIGNIFICANT CHANGE UP (ref 0–1)
BLD GP AB SCN SERPL QL: SIGNIFICANT CHANGE UP
BUN SERPL-MCNC: 17 MG/DL — SIGNIFICANT CHANGE UP (ref 10–20)
CALCIUM SERPL-MCNC: 7.4 MG/DL — LOW (ref 8.5–10.1)
CHLORIDE SERPL-SCNC: 90 MMOL/L — LOW (ref 98–110)
CO2 SERPL-SCNC: 19 MMOL/L — SIGNIFICANT CHANGE UP (ref 17–32)
CREAT SERPL-MCNC: 0.7 MG/DL — SIGNIFICANT CHANGE UP (ref 0.7–1.5)
CULTURE RESULTS: NO GROWTH — SIGNIFICANT CHANGE UP
EOSINOPHIL # BLD AUTO: 0.09 K/UL — SIGNIFICANT CHANGE UP (ref 0–0.7)
EOSINOPHIL NFR BLD AUTO: 0.6 % — SIGNIFICANT CHANGE UP (ref 0–8)
GLUCOSE SERPL-MCNC: 82 MG/DL — SIGNIFICANT CHANGE UP (ref 70–99)
HCT VFR BLD CALC: 26.5 % — LOW (ref 42–52)
HGB BLD-MCNC: 9 G/DL — LOW (ref 14–18)
IMM GRANULOCYTES NFR BLD AUTO: 1.1 % — HIGH (ref 0.1–0.3)
INR BLD: 3.34 RATIO — HIGH (ref 0.65–1.3)
LYMPHOCYTES # BLD AUTO: 1.26 K/UL — SIGNIFICANT CHANGE UP (ref 1.2–3.4)
LYMPHOCYTES # BLD AUTO: 8.3 % — LOW (ref 20.5–51.1)
MCHC RBC-ENTMCNC: 31.3 PG — HIGH (ref 27–31)
MCHC RBC-ENTMCNC: 34 G/DL — SIGNIFICANT CHANGE UP (ref 32–37)
MCV RBC AUTO: 92 FL — SIGNIFICANT CHANGE UP (ref 80–94)
MONOCYTES # BLD AUTO: 1.32 K/UL — HIGH (ref 0.1–0.6)
MONOCYTES NFR BLD AUTO: 8.7 % — SIGNIFICANT CHANGE UP (ref 1.7–9.3)
NEUTROPHILS # BLD AUTO: 12.32 K/UL — HIGH (ref 1.4–6.5)
NEUTROPHILS NFR BLD AUTO: 81.1 % — HIGH (ref 42.2–75.2)
NRBC # BLD: 0 /100 WBCS — SIGNIFICANT CHANGE UP (ref 0–0)
PLATELET # BLD AUTO: 116 K/UL — LOW (ref 130–400)
POTASSIUM SERPL-MCNC: 4.9 MMOL/L — SIGNIFICANT CHANGE UP (ref 3.5–5)
POTASSIUM SERPL-SCNC: 4.9 MMOL/L — SIGNIFICANT CHANGE UP (ref 3.5–5)
PROTHROM AB SERPL-ACNC: 38 SEC — HIGH (ref 9.95–12.87)
RBC # BLD: 2.88 M/UL — LOW (ref 4.7–6.1)
RBC # FLD: 21.7 % — HIGH (ref 11.5–14.5)
SODIUM SERPL-SCNC: 120 MMOL/L — LOW (ref 135–146)
SPECIMEN SOURCE: SIGNIFICANT CHANGE UP
WBC # BLD: 15.18 K/UL — HIGH (ref 4.8–10.8)
WBC # FLD AUTO: 15.18 K/UL — HIGH (ref 4.8–10.8)

## 2019-12-10 PROCEDURE — 99233 SBSQ HOSP IP/OBS HIGH 50: CPT

## 2019-12-10 RX ORDER — METRONIDAZOLE 500 MG
500 TABLET ORAL EVERY 8 HOURS
Refills: 0 | Status: DISCONTINUED | OUTPATIENT
Start: 2019-12-10 | End: 2019-12-12

## 2019-12-10 RX ORDER — TOLVAPTAN 15 MG/1
15 TABLET ORAL DAILY
Refills: 0 | Status: DISCONTINUED | OUTPATIENT
Start: 2019-12-10 | End: 2019-12-11

## 2019-12-10 RX ORDER — SPIRONOLACTONE 25 MG/1
50 TABLET, FILM COATED ORAL DAILY
Refills: 0 | Status: DISCONTINUED | OUTPATIENT
Start: 2019-12-10 | End: 2019-12-13

## 2019-12-10 RX ADMIN — POLYETHYLENE GLYCOL 3350 17 GRAM(S): 17 POWDER, FOR SOLUTION ORAL at 05:44

## 2019-12-10 RX ADMIN — Medication 1 TABLET(S): at 12:00

## 2019-12-10 RX ADMIN — TOLVAPTAN 15 MILLIGRAM(S): 15 TABLET ORAL at 17:47

## 2019-12-10 RX ADMIN — POLYETHYLENE GLYCOL 3350 17 GRAM(S): 17 POWDER, FOR SOLUTION ORAL at 17:46

## 2019-12-10 RX ADMIN — Medication 100 MILLIGRAM(S): at 12:00

## 2019-12-10 RX ADMIN — Medication 500 MILLIGRAM(S): at 21:52

## 2019-12-10 RX ADMIN — Medication 1 MILLIGRAM(S): at 12:00

## 2019-12-10 RX ADMIN — Medication 100 MILLIGRAM(S): at 05:46

## 2019-12-10 RX ADMIN — CHLORHEXIDINE GLUCONATE 1 APPLICATION(S): 213 SOLUTION TOPICAL at 12:00

## 2019-12-10 RX ADMIN — SPIRONOLACTONE 100 MILLIGRAM(S): 25 TABLET, FILM COATED ORAL at 05:45

## 2019-12-10 RX ADMIN — Medication 40 MILLIGRAM(S): at 05:45

## 2019-12-10 RX ADMIN — Medication 100 MILLIGRAM(S): at 13:28

## 2019-12-10 NOTE — CONSULT NOTE ADULT - SUBJECTIVE AND OBJECTIVE BOX
INTERVENTIONAL RADIOLOGY CONSULT:     Procedure Requested: TIPS    HPI:  a 44 year old man with history of alcoholic cirrhosis presented for shortness of breath of 4 days duration, associated with increase abdominal girth and lower extremities edema. he also reported orthopnea, abdominal pain (generalized dull, non radiating), generalized itching and jaundice. he endorsed subjective fever and chills. no cough, no chest pain  the patient last drink was 30% of a bottle of Michell with a friend and before that it was 2 months ago. he also has fresh blood per rectum, minimal amount, chronic.  the patient lives with his aunt, has no enough social support ( from wife and children), he has been in rehab program and was sober for a while then started drinking again. he used to follow at [facility] for hepatology. (30 Nov 2019 22:27)      PAST MEDICAL & SURGICAL HISTORY:  Liver cirrhosis, alcoholic  Anemia  Thrombocytopenia  ETOH abuse  History of incision and drainage: Gluteal abscess      MEDICATIONS  (STANDING):  chlorhexidine 4% Liquid 1 Application(s) Topical daily  folic acid 1 milliGRAM(s) Oral daily  furosemide    Tablet 40 milliGRAM(s) Oral daily  influenza   Vaccine 0.5 milliLiter(s) IntraMuscular once  metroNIDAZOLE  IVPB      metroNIDAZOLE  IVPB 500 milliGRAM(s) IV Intermittent every 8 hours  multivitamin 1 Tablet(s) Oral daily  polyethylene glycol 3350 17 Gram(s) Oral every 12 hours  spironolactone 100 milliGRAM(s) Oral daily  thiamine 100 milliGRAM(s) Oral daily    MEDICATIONS  (PRN):      Allergies    No Known Allergies    Intolerances        Social History:   Smoking: Yes [ ]  No [ ]   ______pk yrs  ETOH  Yes [ ]  No [ ]  Social [ ]  DRUGS:  Yes [ ]  No [ ]  if so what______________    FAMILY HISTORY:  Family history of stroke (Father, Mother)      Physical Exam:   Vital Signs Last 24 Hrs  T(C): 36.6 (10 Dec 2019 13:38), Max: 36.9 (10 Dec 2019 05:10)  T(F): 97.8 (10 Dec 2019 13:38), Max: 98.5 (10 Dec 2019 05:10)  HR: 95 (10 Dec 2019 13:38) (87 - 101)  BP: 111/52 (10 Dec 2019 13:38) (106/58 - 120/68)  BP(mean): --  RR: 18 (10 Dec 2019 13:38) (18 - 18)  SpO2: 91% (10 Dec 2019 08:35) (91% - 94%)    Labs:                         9.0    15.18 )-----------( 116      ( 10 Dec 2019 07:11 )             26.5     12-10    120<L>  |  90<L>  |  17  ----------------------------<  82  4.9   |  19  |  0.7    Ca    7.4<L>      10 Dec 2019 07:11  Mg     2.1     12-09    TPro  7.3  /  Alb  1.9<L>  /  TBili  13.6<H>  /  DBili  8.2<H>  /  AST  149<H>  /  ALT  76<H>  /  AlkPhos  193<H>  12-09    PT/INR - ( 10 Dec 2019 07:11 )   PT: 38.00 sec;   INR: 3.34 ratio         Pertinent labs:                      9.0    15.18 )-----------( 116      ( 10 Dec 2019 07:11 )             26.5       12-10    120<L>  |  90<L>  |  17  ----------------------------<  82  4.9   |  19  |  0.7    Ca    7.4<L>      10 Dec 2019 07:11  Mg     2.1     12-09    TPro  7.3  /  Alb  1.9<L>  /  TBili  13.6<H>  /  DBili  8.2<H>  /  AST  149<H>  /  ALT  76<H>  /  AlkPhos  193<H>  12-09      PT/INR - ( 10 Dec 2019 07:11 )   PT: 38.00 sec;   INR: 3.34 ratio         Radiology & Additional Studies:     Radiology imaging reviewed.       ASSESSMENT/ PLAN:     44 year old man with history of alcoholic cirrhosis admitted with abdominal distension, abdominal ascites s/p paracentesis x2, with request made for TIPS procedure.     Patient has with MELD of 34 due to hyperbilirubinemia, hyponatremia, coagulopathy and extensive co morbidities. TIPS would be high risk in this patient- would not recommend TIPS for this patient given high risk of intermediate term mortality, unless patient decompensates 2/2 epigastric variceal hemorrhage.     Thank you for the courtesy of this consult, please call [phone] with any further questions.

## 2019-12-10 NOTE — PROGRESS NOTE ADULT - SUBJECTIVE AND OBJECTIVE BOX
We are following the patient for Decompensated alcoholic cirrhosis      GI HPI Today:  Pt feels more distended today. No SOB, no vomiting and no abdominal pain     PAST MEDICAL & SURGICAL HISTORY  Liver cirrhosis, alcoholic  Anemia  Thrombocytopenia  ETOH abuse  History of incision and drainage: Gluteal abscess      ALLERGIES:  No Known Allergies      MEDICATIONS:  MEDICATIONS  (STANDING):  chlorhexidine 4% Liquid 1 Application(s) Topical daily  folic acid 1 milliGRAM(s) Oral daily  furosemide    Tablet 40 milliGRAM(s) Oral daily  influenza   Vaccine 0.5 milliLiter(s) IntraMuscular once  metroNIDAZOLE  IVPB      metroNIDAZOLE  IVPB 500 milliGRAM(s) IV Intermittent every 8 hours  multivitamin 1 Tablet(s) Oral daily  polyethylene glycol 3350 17 Gram(s) Oral every 12 hours  spironolactone 100 milliGRAM(s) Oral daily  thiamine 100 milliGRAM(s) Oral daily    MEDICATIONS  (PRN):      REVIEW OF SYSTEMS  General:  No fevers  Eyes:  No reported pain   ENT:  No sore throat   NECK: No stiffness   CV:  No chest pain   Resp:  No shortness of breath  GI:  See HPI  :  No dysuria  Muscle: 6+++ weakness  Neuro:  No tingling  Endocrine:  No polyuria  Heme:  No ecchymosis        VITALS:   T(F): 98.5 (12-10 @ 05:10), Max: 98.5 (12-10 @ 05:10)  HR: 101 (12-10 @ 05:10) (78 - 101)  BP: 114/58 (12-10 @ 05:10) (98/52 - 121/71)  BP(mean): --  RR: 18 (12-10 @ 05:10) (18 - 19)  SpO2: 94% (12-09 @ 20:25) (94% - 98%)        PHYSICAL EXAM:  GENERAL:  Appears in no distress  HEENT:  Conjunctivae icteric   CHEST:  Full & symmetric excursion  HEART:  NS1, S2,   ABDOMEN:  Soft, non-tender, +++ distended  EXTEREMITIES:  no edema  SKIN:  No rash  NEURO:  Alert, no asterixis          Blood Work :                        9.5    16.31 )-----------( 119      ( 09 Dec 2019 05:41 )             27.8     PT/INR - ( 08 Dec 2019 07:59 )  INR: 3.14            12-09    122<L>  |  92<L>  |  18  ----------------------------<  98  5.1<H>   |  18  |  0.7    Ca    7.6<L>      09 Dec 2019 05:41  Mg     2.1     12-09      CBC -  ( 09 Dec 2019 05:41 )  Hemoglobin : 9.5    CBC -  ( 08 Dec 2019 07:59 )  Hemoglobin : 9.0    CBC -  ( 07 Dec 2019 20:45 )  Hemoglobin : 9.0    CBC -  ( 07 Dec 2019 06:56 )  Hemoglobin : 9.1      LIVER FUNCTIONS - ( 09 Dec 2019 05:41 )  Alb: 1.9 [3.5 - 5.2] / Pro: 7.3 [6.0 - 8.0] / ALK PHOS: 193 [30 - 115] / ALT: 76 [0 - 41] / AST: 149 [0 - 41] / GGT: x     LIVER FUNCTIONS - ( 08 Dec 2019 07:59 )  Alb: 1.8 [3.5 - 5.2] / Pro: 6.7 [6.0 - 8.0] / ALK PHOS: 189 [30 - 115] / ALT: 68 [0 - 41] / AST: 133 [0 - 41] / GGT: x     LIVER FUNCTIONS - ( 07 Dec 2019 06:56 )  Alb: 1.7 [3.5 - 5.2] / Pro: 6.4 [6.0 - 8.0] / ALK PHOS: 216 [30 - 115] / ALT: 65 [0 - 41] / AST: 122 [0 - 41] / GGT: x     LIVER FUNCTIONS - ( 06 Dec 2019 05:30 )  Alb: 1.9 [3.5 - 5.2] / Pro: 6.7 [6.0 - 8.0] / ALK PHOS: 195 [30 - 115] / ALT: 64 [0 - 41] / AST: 130 [0 - 41] / GGT: x     LIVER FUNCTIONS - ( 05 Dec 2019 06:56 )  Alb: 1.8 [3.5 - 5.2] / Pro: 6.9 [6.0 - 8.0] / ALK PHOS: 199 [30 - 115] / ALT: 65 [0 - 41] / AST: 127 [0 - 41] / GGT: x

## 2019-12-10 NOTE — PROGRESS NOTE ADULT - ASSESSMENT
44 year old man with history of alcoholic cirrhosis presented for shortness of breath of 4 days duration, associated with increase abdominal girth and lower extremities edema.    # Acute decompensation of alcoholic hepatitis in chronically cirrhotic patient:  - Alcohol abstinence recommended  - S/p Therapeutic tap x2 , no SBP, culture of peritoneal fluid is negative.  - MRI abdomen: Hepatic cirrhosis with sequela of portal hypertension including   perisplenic varices and moderate volume abdominopelvic ascites. Otherwise non diagnostic exam because of patient's motion  - C/w vitamin K 10 mg qd   - Daily INR, BMP and CBC  - IR for evaluation for TIPS, consult placed, IR to see patient today    # Anemia and blood per rectum in the setting of coagulopathy:  - Hb stable around 8-9  - keep active type and screen  - Transfuse if <7 or <8 and symptomatic    # Leukocytosis in the setting of perineal/gluteal abscess:  - Blood culture NGTD , peritoneal fluid culture NGTD  - UA negative  - CT abd/pelv with IV and oral contrast did not show any fluid collection in the pernieal area. Surgery won't do any further interventions  - C/w ceftriaxone 1 g q24h and metronidazole 500 mg q8h IV per ID recs UNTIL 12/12. Will switch to PO flagyl tomorrow.    # Alcohol abuse  - last drink 1 week ago  -CIWA protocol PRN, not needing chlordiazepoxide   -chronic hyponatremia  - Educated on alcohol abstinence    # DVT ppx  - SCD for now  # Dispo: to go home with his aunt, will need [facility] clinic appointment and GI appt  # FULL code, Very poor prognosis

## 2019-12-10 NOTE — PROGRESS NOTE ADULT - ATTENDING COMMENTS
I saw and examined the patient independently. I agree with above history, physical exam and plan of care which I have reviewed and edited where appropriate with following additions.    patient reports his abdominal distension is getting little worse today/ reports he will go back to Aunt's house / so alcohol abuse might remain an issue . I saw and examined the patient independently. I agree with above history, physical exam and plan of care which I have reviewed and edited where appropriate with following additions.    patient reports his abdominal distension is getting little worse today/ reports he will go back to Aunt's house / so alcohol abuse might remain an issue .    acute decompensation of alcoholic cirrhosis / hepatitis with ascites and coagulopathy:   LFT's still elevated but stable now.  had  large volume abdominal paracentesis times 2 this admission so far.  MRCP abdomen shows hepatic cirrhosis with mod. large ascites/ no evidence of HCC.  GI fu appreciated - suggest IR eval for TIPS procedure.   fu IR if patient is candidate for TIPS.   due for EGD for variceal screening in 7/2020 as per Gi note.   c/w decreased doses of diuretics lasix to 40mg daily and aldactone to 50mg daily as suggested by nephro to prevent worsening of hyponatremia.  monitor I&O's. in negative balance.  monitor bmp/ renal function daily- stable    chronic hyponatremia likely hypervolemic with cirrhosis:    Na level on lower side, 120 today.   c/w lasix 40mg daily / aldactone 50mg daily  nephrology fu appreciated- suggest to start tolvaptan and avoid restricting fluid or low salt diet.     acute on chronic disease anemia with mild rectal bleed possibly due to draining perianal abscess with hemorrhoids:   H/H remains stable >9.   GI following - if Hb stable with no active bleeding- colonoscopy as OP .   rectal abscess drained spontaneously.  ID evaluated - to complete total of 10 days ABX    Alcohol dependence:   no withdrawal this admission.   alcohol rehab as outpatient     suspected folate and thiamine deficiency:   c/w oral supplements .    dvt ppx with SCD's.     OOb to chair/ reportedly has been ambulating to bathroom. has walker at bedside.       [facility] clinic appointment scheduled for 12/19@12: 30pm.      FULL code.    Progress note Handoff:   Pending: stabilization of Na levels/ IR eval for possible TIPS procedure/GI clearance before discharge.  Discussion: plan of care with  patient /  satisfied- all questions answered.  Disposition: home/ to return to Aunt's house.

## 2019-12-10 NOTE — PROGRESS NOTE ADULT - SUBJECTIVE AND OBJECTIVE BOX
SUBJECTIVE:    Patient is a 44y old Male who presents with a chief complaint of Shortness of breath (10 Dec 2019 07:32)    Currently admitted to medicine with the primary diagnosis of Alcoholic cirrhosis of liver with ascites     Today is hospital day 10d. This morning he is resting comfortably in bed and reports no new issues or overnight events.     PAST MEDICAL & SURGICAL HISTORY  Liver cirrhosis, alcoholic  Anemia  Thrombocytopenia  ETOH abuse  History of incision and drainage: Gluteal abscess    SOCIAL HISTORY:  Negative for smoking/alcohol/drug use.     ALLERGIES:  No Known Allergies    MEDICATIONS:  STANDING MEDICATIONS  chlorhexidine 4% Liquid 1 Application(s) Topical daily  folic acid 1 milliGRAM(s) Oral daily  furosemide    Tablet 40 milliGRAM(s) Oral daily  influenza   Vaccine 0.5 milliLiter(s) IntraMuscular once  metroNIDAZOLE  IVPB      metroNIDAZOLE  IVPB 500 milliGRAM(s) IV Intermittent every 8 hours  multivitamin 1 Tablet(s) Oral daily  polyethylene glycol 3350 17 Gram(s) Oral every 12 hours  spironolactone 100 milliGRAM(s) Oral daily  thiamine 100 milliGRAM(s) Oral daily    PRN MEDICATIONS    VITALS:   T(F): 98.5  HR: 101  BP: 114/58  RR: 18  SpO2: 91%    LABS:                        9.0    15.18 )-----------( 116      ( 10 Dec 2019 07:11 )             26.5     12-10    120<L>  |  90<L>  |  17  ----------------------------<  82  4.9   |  19  |  0.7    Ca    7.4<L>      10 Dec 2019 07:11  Mg     2.1     12-09    TPro  7.3  /  Alb  1.9<L>  /  TBili  13.6<H>  /  DBili  8.2<H>  /  AST  149<H>  /  ALT  76<H>  /  AlkPhos  193<H>  12-09    PT/INR - ( 10 Dec 2019 07:11 )   PT: 38.00 sec;   INR: 3.34 ratio      PHYSICAL EXAM:  GEN: not in distress NC/AT, jaundiced, yellow sclera  LUNGS: Clear to auscultation bilaterally, no wheezing   HEART: S1/S2 present. RRR. No murmurs  ABD: Soft, non-tender, distended. Oozing from the site of the old paracentesis. No caput medusa  SKIN:  jaundiced, yellow sclera, opening in the left buttock cheek of 2 cm ( opened spontaneously)  NEURO: AAOX3, moves all extremities, no focal deficits    Intravenous access: Peripheral access  NG tube: None  Joseph Catheter: None

## 2019-12-10 NOTE — PROGRESS NOTE ADULT - ASSESSMENT
44 year old man with history of alcoholic cirrhosis presented for shortness of breath of 4 days duration, associated with increase abdominal girth and lower extremities edema.      # Elevated bilis - likely 2/2 EtOH hepatitis with superimposed liver cirrhosis   DF: 117 on 12/1   MELD: 32 on 12/1  No SBP on Para   Pt afebrile and no signs of sepsis  On IV abx for perianal abscess   Rec:   Continue Vitamin K 10 mg po once daily and daily INR   IV hydration   Will hold on prednisolone for now given pt have a perianal abscess discovered on exam  Continue Thiamine and folate  Daily liver enzymes and INR   Avoid hepatotoxic drugs  MRI abdomen did  not show CBD dilation     # Decompensated alcoholic liver cirrhosis   MELD 32 on 12/1    Large volume ascites SP paracentesis with 4 L removal on 12/1 and another 4.5 L on 12/5   No SBP   No varices on last EGD on 7/2018   Rec:   decrease Lasix and spironolactone to 20 and 50 respectively   Nephrology follow up   Follow BMP and creatinine daily   Low sodium diet and water restriction given hyponatremia   IR evaluation for Tips   Needs EGD for variceal screening as outpatient     # Mild rectal bleed secondary to hemorrhoids   Had Colonoscopy in 2018 that showed Hemorrhoids and Diverticulosis   Hemodynamically stable   No signs of active GI bleed  Hg stable   Rec:  Follow Hg q24 h   can have Colonoscopy as outpatient    If Hg keeps dropping and signs of active Bleed will do inpatient colonoscopy once stable     # US abdomen could not rule out HCC   MRI with contrast was negative for HCC   Rec:   Follow up lukasz fetoprotein and US abdomen q6 month     # Alcohol abuse   Pt still drinking alcohol  Rec:   Needs detox and rehab 44 year old man with history of alcoholic cirrhosis presented for shortness of breath of 4 days duration, associated with increase abdominal girth and lower extremities edema.      # Elevated bili - likely 2/2 EtOH hepatitis with superimposed liver cirrhosis   DF: 117 on 12/1   MELD: 32 on 12/1  No SBP on Para   Pt afebrile and no signs of sepsis  On IV abx for perianal abscess   Rec:   Continue Vitamin K 10 mg po once daily and daily INR   IV hydration   Will hold on prednisolone for now given pt have a perianal abscess discovered on exam  Continue Thiamine and folate  Daily liver enzymes and INR   Avoid hepatotoxic drugs  MRI abdomen did  not show CBD dilation     # Decompensated alcoholic liver cirrhosis   MELD 32 on 12/1    Large volume ascites SP paracentesis with 4 L removal on 12/1 and another 4.5 L on 12/5   No SBP   No varices on last EGD on 7/2018   Rec:   decrease Lasix and spironolactone to 20 and 50 respectively   Nephrology follow up   Follow BMP and creatinine daily   Low sodium diet and water restriction given hyponatremia   IR evaluation for Tips   Needs EGD for variceal screening as outpatient     # Mild rectal bleed secondary to hemorrhoids   Had Colonoscopy in 2018 that showed Hemorrhoids and Diverticulosis   Hemodynamically stable   No signs of active GI bleed  Hg stable   Rec:  Follow Hg q24 h   can have Colonoscopy as outpatient    If Hg keeps dropping and signs of active Bleed will do inpatient colonoscopy once stable     # US abdomen could not rule out HCC   MRI with contrast was negative for HCC   Rec:   Follow up lukasz fetoprotein and US abdomen q6 month     # Alcohol abuse   Pt still drinking alcohol  Rec:   Needs detox and rehab

## 2019-12-10 NOTE — PROGRESS NOTE ADULT - SUBJECTIVE AND OBJECTIVE BOX
Nephrology progress note  Patient is seen and examined, events over the last 24 h noted .  lying in bed comfortable   no events   no complaints     Allergies:  No Known Allergies    Hospital Medications:   MEDICATIONS  (STANDING):  folic acid 1 milliGRAM(s) Oral daily  furosemide    Tablet 40 milliGRAM(s) Oral daily  influenza   Vaccine 0.5 milliLiter(s) IntraMuscular once  metroNIDAZOLE  IVPB 500 milliGRAM(s) IV Intermittent every 8 hours  multivitamin 1 Tablet(s) Oral daily  polyethylene glycol 3350 17 Gram(s) Oral every 12 hours  spironolactone 100 milliGRAM(s) Oral daily  thiamine 100 milliGRAM(s) Oral daily        VITALS:  T(F): 98.5 (12-10-19 @ 05:10), Max: 98.5 (12-10-19 @ 05:10)  HR: 101 (12-10-19 @ 05:10)  BP: 114/58 (12-10-19 @ 05:10)  RR: 18 (12-10-19 @ 05:10)  SpO2: 91% (12-10-19 @ 08:35)      12-08 @ 07:01  -  12-09 @ 07:00  --------------------------------------------------------  IN: 240 mL / OUT: 1150 mL / NET: -910 mL    12-09 @ 07:01  -  12-10 @ 07:00  --------------------------------------------------------  IN: 1000 mL / OUT: 3050 mL / NET: -2050 mL          PHYSICAL EXAM:  Constitutional: NAD  HEENT: anicteric sclera, oropharynx clear, MMM  Neck: No JVD  Respiratory: CTAB, no wheezes, rales or rhonchi  Cardiovascular: S1, S2, RRR  Gastrointestinal: distended abdomen   Extremities: No cyanosis or clubbing. No peripheral edema  :  No wisdom.   Skin: No rashes    LABS:  12-10    120<L>  |  90<L>  |  17  ----------------------------<  82  4.9   |  19  |  0.7    SODIUM TREND:  Sodium 120 [12-10 @ 07:11]  Sodium 122 [12-09 @ 05:41]  Sodium 120 [12-08 @ 20:13]  Sodium 121 [12-08 @ 07:59]  Sodium 123 [12-07 @ 06:56]  Sodium 124 [12-06 @ 05:30]  Sodium 127 [12-05 @ 06:56]  Sodium 126 [12-03 @ 06:42]  Sodium 125 [12-02 @ 09:05]  Sodium 127 [12-01 @ 11:18]    Ca    7.4<L>      10 Dec 2019 07:11  Mg     2.1     12-09    TPro  7.3  /  Alb  1.9<L>  /  TBili  13.6<H>  /  DBili  8.2<H>  /  AST  149<H>  /  ALT  76<H>  /  AlkPhos  193<H>  12-09                          9.0    15.18 )-----------( 116      ( 10 Dec 2019 07:11 )             26.5       Urine Studies:      RADIOLOGY & ADDITIONAL STUDIES:

## 2019-12-10 NOTE — PROGRESS NOTE ADULT - ASSESSMENT
45 yo M  PMH: Alcoholic Cirrhosis   cc: presented to hospital for the evaluation of shortness of breath x 4 days associated with increase abdominal girth and lower extremities edema.   Hospital Course: Patient with increased ascites s/p paracentesis with unremarkable fluid analysis not predictive of SBP and negative fluid, blood, and urine cultures. CXR showed b/l opacities/effusions confirmed to be effusions on CT scan. Hospital course complicated by the discovery of gluteal / perirectal abscess on examination. ID evaluated - patient started on IV antibiotics ( Ceftriaxone and Metronidazole x 10 days, last day 12/16/2019) with plan for PO Augmentin if discharged prior to 10 day end date. Surgery also evaluated patient, no intervention as spontaneous drainage has occurred.    ASSESSMENT:  - Alcoholic Liver Cirrhosis with subsequent development of Hyponatremia due to cirrhosis ( poor prognosis) - MELD 32  - Coagulopathy - Hyper/Hypo coagulable state due to Liver cirrhosis - elevated INR  - Hyperkalemia likely secondary to Spironolactone better   - Gluteal / Perirectal Abscess discovered on exam with spontaneous drainage    PLAN:   - Strict I's/O's  - would start tolvaptan  - do not restrict free water   - Do not restrict sodium in diet  - Diuretics as follows: Furosemide 40mg PO Q24H and Spironolactone 50mg PO Q24H   - Creatinine has remained stable during admission - please continue to monitor CMP daily.   - Continue Antibiotics for perirectal abscess  -        - Thank you for the courtesy of this consult, we will continue to follow with you.

## 2019-12-11 LAB
ALBUMIN SERPL ELPH-MCNC: 1.7 G/DL — LOW (ref 3.5–5.2)
ALP SERPL-CCNC: 147 U/L — HIGH (ref 30–115)
ALT FLD-CCNC: 78 U/L — HIGH (ref 0–41)
ANION GAP SERPL CALC-SCNC: 12 MMOL/L — SIGNIFICANT CHANGE UP (ref 7–14)
APPEARANCE UR: CLEAR — SIGNIFICANT CHANGE UP
AST SERPL-CCNC: 143 U/L — HIGH (ref 0–41)
BACTERIA # UR AUTO: ABNORMAL
BASOPHILS # BLD AUTO: 0.02 K/UL — SIGNIFICANT CHANGE UP (ref 0–0.2)
BASOPHILS NFR BLD AUTO: 0.1 % — SIGNIFICANT CHANGE UP (ref 0–1)
BILIRUB SERPL-MCNC: 12.9 MG/DL — HIGH (ref 0.2–1.2)
BILIRUB UR-MCNC: ABNORMAL
BUN SERPL-MCNC: 13 MG/DL — SIGNIFICANT CHANGE UP (ref 10–20)
CALCIUM SERPL-MCNC: 7.6 MG/DL — LOW (ref 8.5–10.1)
CHLORIDE SERPL-SCNC: 94 MMOL/L — LOW (ref 98–110)
CO2 SERPL-SCNC: 18 MMOL/L — SIGNIFICANT CHANGE UP (ref 17–32)
COLOR SPEC: ABNORMAL
CREAT SERPL-MCNC: 0.7 MG/DL — SIGNIFICANT CHANGE UP (ref 0.7–1.5)
DIFF PNL FLD: NEGATIVE — SIGNIFICANT CHANGE UP
EOSINOPHIL # BLD AUTO: 0.11 K/UL — SIGNIFICANT CHANGE UP (ref 0–0.7)
EOSINOPHIL NFR BLD AUTO: 0.7 % — SIGNIFICANT CHANGE UP (ref 0–8)
EPI CELLS # UR: 0 /HPF — SIGNIFICANT CHANGE UP (ref 0–5)
GLUCOSE SERPL-MCNC: 87 MG/DL — SIGNIFICANT CHANGE UP (ref 70–99)
GLUCOSE UR QL: NEGATIVE — SIGNIFICANT CHANGE UP
HCT VFR BLD CALC: 26.1 % — LOW (ref 42–52)
HGB BLD-MCNC: 8.7 G/DL — LOW (ref 14–18)
HYALINE CASTS # UR AUTO: 0 /LPF — SIGNIFICANT CHANGE UP (ref 0–7)
IMM GRANULOCYTES NFR BLD AUTO: 1.1 % — HIGH (ref 0.1–0.3)
INR BLD: 3.43 RATIO — HIGH (ref 0.65–1.3)
KETONES UR-MCNC: NEGATIVE — SIGNIFICANT CHANGE UP
LEUKOCYTE ESTERASE UR-ACNC: NEGATIVE — SIGNIFICANT CHANGE UP
LYMPHOCYTES # BLD AUTO: 1.37 K/UL — SIGNIFICANT CHANGE UP (ref 1.2–3.4)
LYMPHOCYTES # BLD AUTO: 8.7 % — LOW (ref 20.5–51.1)
MAGNESIUM SERPL-MCNC: 1.9 MG/DL — SIGNIFICANT CHANGE UP (ref 1.8–2.4)
MCHC RBC-ENTMCNC: 30.9 PG — SIGNIFICANT CHANGE UP (ref 27–31)
MCHC RBC-ENTMCNC: 33.3 G/DL — SIGNIFICANT CHANGE UP (ref 32–37)
MCV RBC AUTO: 92.6 FL — SIGNIFICANT CHANGE UP (ref 80–94)
MONOCYTES # BLD AUTO: 1.41 K/UL — HIGH (ref 0.1–0.6)
MONOCYTES NFR BLD AUTO: 8.9 % — SIGNIFICANT CHANGE UP (ref 1.7–9.3)
NEUTROPHILS # BLD AUTO: 12.75 K/UL — HIGH (ref 1.4–6.5)
NEUTROPHILS NFR BLD AUTO: 80.5 % — HIGH (ref 42.2–75.2)
NITRITE UR-MCNC: NEGATIVE — SIGNIFICANT CHANGE UP
NRBC # BLD: 0 /100 WBCS — SIGNIFICANT CHANGE UP (ref 0–0)
OSMOLALITY UR: 163 MOS/KG — SIGNIFICANT CHANGE UP (ref 50–1400)
PH UR: 6 — SIGNIFICANT CHANGE UP (ref 5–8)
PLATELET # BLD AUTO: 118 K/UL — LOW (ref 130–400)
POTASSIUM SERPL-MCNC: 4.9 MMOL/L — SIGNIFICANT CHANGE UP (ref 3.5–5)
POTASSIUM SERPL-SCNC: 4.9 MMOL/L — SIGNIFICANT CHANGE UP (ref 3.5–5)
PROT SERPL-MCNC: 6.5 G/DL — SIGNIFICANT CHANGE UP (ref 6–8)
PROT UR-MCNC: NEGATIVE — SIGNIFICANT CHANGE UP
PROTHROM AB SERPL-ACNC: 39 SEC — HIGH (ref 9.95–12.87)
RBC # BLD: 2.82 M/UL — LOW (ref 4.7–6.1)
RBC # FLD: 21.5 % — HIGH (ref 11.5–14.5)
RBC CASTS # UR COMP ASSIST: 1 /HPF — SIGNIFICANT CHANGE UP (ref 0–4)
SODIUM SERPL-SCNC: 124 MMOL/L — LOW (ref 135–146)
SODIUM UR-SCNC: 20 MMOL/L — SIGNIFICANT CHANGE UP
SP GR SPEC: 1.01 — LOW (ref 1.01–1.02)
UROBILINOGEN FLD QL: SIGNIFICANT CHANGE UP
WBC # BLD: 15.83 K/UL — HIGH (ref 4.8–10.8)
WBC # FLD AUTO: 15.83 K/UL — HIGH (ref 4.8–10.8)
WBC UR QL: 0 /HPF — SIGNIFICANT CHANGE UP (ref 0–5)

## 2019-12-11 PROCEDURE — 99233 SBSQ HOSP IP/OBS HIGH 50: CPT

## 2019-12-11 RX ADMIN — Medication 500 MILLIGRAM(S): at 21:11

## 2019-12-11 RX ADMIN — POLYETHYLENE GLYCOL 3350 17 GRAM(S): 17 POWDER, FOR SOLUTION ORAL at 05:53

## 2019-12-11 RX ADMIN — SPIRONOLACTONE 50 MILLIGRAM(S): 25 TABLET, FILM COATED ORAL at 05:54

## 2019-12-11 RX ADMIN — Medication 1 TABLET(S): at 12:01

## 2019-12-11 RX ADMIN — TOLVAPTAN 15 MILLIGRAM(S): 15 TABLET ORAL at 12:35

## 2019-12-11 RX ADMIN — Medication 100 MILLIGRAM(S): at 12:01

## 2019-12-11 RX ADMIN — Medication 1 MILLIGRAM(S): at 12:01

## 2019-12-11 RX ADMIN — Medication 500 MILLIGRAM(S): at 16:00

## 2019-12-11 RX ADMIN — Medication 500 MILLIGRAM(S): at 05:54

## 2019-12-11 RX ADMIN — Medication 40 MILLIGRAM(S): at 05:53

## 2019-12-11 RX ADMIN — CHLORHEXIDINE GLUCONATE 1 APPLICATION(S): 213 SOLUTION TOPICAL at 12:01

## 2019-12-11 RX ADMIN — POLYETHYLENE GLYCOL 3350 17 GRAM(S): 17 POWDER, FOR SOLUTION ORAL at 17:36

## 2019-12-11 NOTE — PROGRESS NOTE ADULT - SUBJECTIVE AND OBJECTIVE BOX
SUBJECTIVE:    Patient is a 44y old Male who presents with a chief complaint of Shortness of breath (11 Dec 2019 16:49)    Currently admitted to medicine with the primary diagnosis of Alcoholic cirrhosis of liver with ascites     Today is hospital day 11d. This morning he is resting comfortably in bed and reports no new issues or overnight events.     PAST MEDICAL & SURGICAL HISTORY  Liver cirrhosis, alcoholic  Anemia  Thrombocytopenia  ETOH abuse  History of incision and drainage: Gluteal abscess    SOCIAL HISTORY:  Negative for smoking/alcohol/drug use.     ALLERGIES:  No Known Allergies    MEDICATIONS:  STANDING MEDICATIONS  chlorhexidine 4% Liquid 1 Application(s) Topical daily  folic acid 1 milliGRAM(s) Oral daily  furosemide    Tablet 40 milliGRAM(s) Oral daily  influenza   Vaccine 0.5 milliLiter(s) IntraMuscular once  metroNIDAZOLE    Tablet 500 milliGRAM(s) Oral every 8 hours  multivitamin 1 Tablet(s) Oral daily  polyethylene glycol 3350 17 Gram(s) Oral every 12 hours  spironolactone 50 milliGRAM(s) Oral daily  thiamine 100 milliGRAM(s) Oral daily    PRN MEDICATIONS    VITALS:   T(F): 97.8  HR: 99  BP: 105/56  RR: 18  SpO2: 93%    LABS:                        8.7    15.83 )-----------( 118      ( 11 Dec 2019 05:36 )             26.1     12-11    124<L>  |  94<L>  |  13  ----------------------------<  87  4.9   |  18  |  0.7    Ca    7.6<L>      11 Dec 2019 05:36  Mg     1.9     12-11    TPro  6.5  /  Alb  1.7<L>  /  TBili  12.9<H>  /  DBili  x   /  AST  143<H>  /  ALT  78<H>  /  AlkPhos  147<H>  12-11    PT/INR - ( 11 Dec 2019 05:36 )   PT: 39.00 sec;   INR: 3.43 ratio      PHYSICAL EXAM:  GEN: not in distress NC/AT, jaundiced, yellow sclera  LUNGS: Clear to auscultation bilaterally, no wheezing   HEART: S1/S2 present. RRR. No murmurs  ABD: Soft, non-tender, distended. Oozing from the site of the old paracentesis. No caput medusa  SKIN:  jaundiced, yellow sclera, opening in the left buttock cheek of 2 cm ( opened spontaneously)  NEURO: AAOX3, moves all extremities, no focal deficits    Intravenous access: Peripheral access  NG tube: None  Joseph Catheter: None

## 2019-12-11 NOTE — PROGRESS NOTE ADULT - ASSESSMENT
44 year old man with history of alcoholic cirrhosis presented for shortness of breath of 4 days duration, associated with increase abdominal girth and lower extremities edema.    # Acute decompensation of alcoholic hepatitis in chronically cirrhotic patient:  - Alcohol abstinence recommended  - S/p Therapeutic tap x2 , no SBP, culture of peritoneal fluid is negative.  - MRI abdomen: Hepatic cirrhosis with sequela of portal hypertension including   perisplenic varices and moderate volume abdominopelvic ascites. Otherwise non diagnostic exam because of patient's motion  - C/w vitamin K 10 mg qd   - Daily INR, BMP and CBC  - IR for evaluation for TIPS, consult placed, IR to see patient today    # Anemia and blood per rectum in the setting of coagulopathy:  - Hb stable around 8-9  - keep active type and screen  - Transfuse if <7 or <8 and symptomatic    # Leukocytosis in the setting of perineal/gluteal abscess:  - Blood culture NGTD , peritoneal fluid culture NGTD  - UA negative  - CT abd/pelv with IV and oral contrast did not show any fluid collection in the pernieal area. Surgery won't do any further interventions  - C/w ceftriaxone 1 g q24h and metronidazole 500 mg q8h IV per ID recs UNTIL 12/12. Will switch to PO flagyl tomorrow.    # Alcohol abuse  - last drink 1 week ago  -CIWA protocol PRN, not needing chlordiazepoxide   -chronic hyponatremia  - Educated on alcohol abstinence    # Hyponatremia:  - S/p 2 doses of tolvapatan 15 mg PO  - F/up UA, George, osmolality serum and urine  - F/up uric acid and TSH  - F/up nephrology recs    # DVT ppx  - SCD for now  # Dispo: to go home with his aunt, will need [facility] appointment and GI appt  # FULL code, Very poor prognosis

## 2019-12-11 NOTE — PROGRESS NOTE ADULT - NSHPATTENDINGPLANDISCUSS_GEN_ALL_CORE
Housestaff
Housestaff
care team
patient
patient
care team
care team
all other ROS negative except as per HPI

## 2019-12-11 NOTE — PROGRESS NOTE ADULT - SUBJECTIVE AND OBJECTIVE BOX
We are following the patient for decompensated alcoholic cirrhosis     GI HPI Today:  Pt feels good. No complaints  hemodynamically stable     PAST MEDICAL & SURGICAL HISTORY  Liver cirrhosis, alcoholic  Anemia  Thrombocytopenia  ETOH abuse  History of incision and drainage: Gluteal abscess      ALLERGIES:  No Known Allergies      MEDICATIONS:  MEDICATIONS  (STANDING):  chlorhexidine 4% Liquid 1 Application(s) Topical daily  folic acid 1 milliGRAM(s) Oral daily  furosemide    Tablet 40 milliGRAM(s) Oral daily  influenza   Vaccine 0.5 milliLiter(s) IntraMuscular once  metroNIDAZOLE    Tablet 500 milliGRAM(s) Oral every 8 hours  multivitamin 1 Tablet(s) Oral daily  polyethylene glycol 3350 17 Gram(s) Oral every 12 hours  spironolactone 50 milliGRAM(s) Oral daily  thiamine 100 milliGRAM(s) Oral daily  tolvaptan 15 milliGRAM(s) Oral daily    MEDICATIONS  (PRN):      REVIEW OF SYSTEMS  General:  No fevers  Eyes:  No reported pain   ENT:  No sore throat   NECK: No stiffness   CV:  No chest pain   Resp:  No shortness of breath  GI:  See HPI  :  No dysuria  Muscle:  No weakness  Neuro:  No tingling  Endocrine:  No polyuria  Heme:  No ecchymosis        VITALS:   T(F): 97.1 (12-11 @ 05:07), Max: 98.5 (12-10 @ 05:10)  HR: 88 (12-11 @ 05:07) (78 - 101)  BP: 114/58 (12-11 @ 05:51) (98/52 - 123/62)  BP(mean): --  RR: 18 (12-11 @ 05:07) (18 - 19)  SpO2: 93% (12-11 @ 07:24) (91% - 98%)        PHYSICAL EXAM:  GENERAL:  Appears in no distress  HEENT:  Conjunctivae icteric   CHEST:  Full & symmetric excursion  HEART:  NS1, S2,   ABDOMEN:  Soft, non-tender, +++ distended  EXTEREMITIES:  no edema  SKIN:  No rash  NEURO:  Alert         Blood Work :                        8.7    15.83 )-----------( 118      ( 11 Dec 2019 05:36 )             26.1     PT/INR - ( 11 Dec 2019 05:36 )  INR: 3.43            12-11    124<L>  |  94<L>  |  13  ----------------------------<  87  4.9   |  18  |  0.7    Ca    7.6<L>      11 Dec 2019 05:36  Mg     1.9     12-11      CBC -  ( 11 Dec 2019 05:36 )  Hemoglobin : 8.7    CBC -  ( 10 Dec 2019 07:11 )  Hemoglobin : 9.0    CBC -  ( 09 Dec 2019 05:41 )  Hemoglobin : 9.5    CBC -  ( 08 Dec 2019 07:59 )  Hemoglobin : 9.0    CBC -  ( 07 Dec 2019 20:45 )  Hemoglobin : 9.0      LIVER FUNCTIONS - ( 11 Dec 2019 05:36 )  Alb: 1.7 [3.5 - 5.2] / Pro: 6.5 [6.0 - 8.0] / ALK PHOS: 147 [30 - 115] / ALT: 78 [0 - 41] / AST: 143 [0 - 41] / GGT: x     LIVER FUNCTIONS - ( 09 Dec 2019 05:41 )  Alb: 1.9 [3.5 - 5.2] / Pro: 7.3 [6.0 - 8.0] / ALK PHOS: 193 [30 - 115] / ALT: 76 [0 - 41] / AST: 149 [0 - 41] / GGT: x     LIVER FUNCTIONS - ( 08 Dec 2019 07:59 )  Alb: 1.8 [3.5 - 5.2] / Pro: 6.7 [6.0 - 8.0] / ALK PHOS: 189 [30 - 115] / ALT: 68 [0 - 41] / AST: 133 [0 - 41] / GGT: x     LIVER FUNCTIONS - ( 07 Dec 2019 06:56 )  Alb: 1.7 [3.5 - 5.2] / Pro: 6.4 [6.0 - 8.0] / ALK PHOS: 216 [30 - 115] / ALT: 65 [0 - 41] / AST: 122 [0 - 41] / GGT: x     LIVER FUNCTIONS - ( 06 Dec 2019 05:30 )  Alb: 1.9 [3.5 - 5.2] / Pro: 6.7 [6.0 - 8.0] / ALK PHOS: 195 [30 - 115] / ALT: 64 [0 - 41] / AST: 130 [0 - 41] / GGT: x     LIVER FUNCTIONS - ( 05 Dec 2019 06:56 )  Alb: 1.8 [3.5 - 5.2] / Pro: 6.9 [6.0 - 8.0] / ALK PHOS: 199 [30 - 115] / ALT: 65 [0 - 41] / AST: 127 [0 - 41] / GGT: x

## 2019-12-11 NOTE — PROGRESS NOTE ADULT - ASSESSMENT
45 yo M with Decompensated Alcoholic Cirrhosis, ascites presented to hospital for the evaluation of shortness of breath x 4 days associated with increase abdominal girth and lower extremities edema.     Hospital Course: Patient with increased ascites s/p paracentesis with no SBP on fluid analysis.  CXR showed b/l opacities/effusions confirmed to be effusions on CT scan.   Found to have  gluteal / perirectal abscess on examination. ID evaluated - patient started on IV antibiotics ( Ceftriaxone and Metronidazole x 10 days, last day 12/16/2019) with plan for PO Augmentin if discharged prior to 10 day end date. Surgery also evaluated patient, no intervention as spontaneous drainage has occurred.    ASSESSMENT:  - Alcoholic Liver Cirrhosis with subsequent development of Hyponatremia due to cirrhosis ( poor prognosis) - MELD 32  - Coagulopathy - Hyper/Hypo coagulable state due to Liver cirrhosis - elevated INR  - Hyperkalemia likely secondary to Spironolactone better   - Gluteal / Perirectal Abscess discovered on exam with spontaneous drainage    PLAN:   - Strict I's/O's  - D/C Tolvaptan (abnormal LFTs)  - resume fluid restriction tomorrow  -check Urine na, Osm, serum OSm, Uric Acid, TSH  - Diuretics as follows: Furosemide 40mg PO Q24H and Spironolactone 50mg PO Q24H   - Creatinine has remained stable during admission - please continue to monitor CMP daily.   - Continue Antibiotics for perirectal abscess  MEt acidosis - likely distal RTA, repeat UA, pH, Urine Na

## 2019-12-11 NOTE — PROGRESS NOTE ADULT - SUBJECTIVE AND OBJECTIVE BOX
Nephrology progress note    Patient is seen and examined, events over the last 24 h noted .  Seen for hypoNatremia, started on Tolvaptan yesterday, not on fluid restriction.  Allergies:  No Known Allergies    Hospital Medications:   MEDICATIONS  (STANDING):  chlorhexidine 4% Liquid 1 Application(s) Topical daily  folic acid 1 milliGRAM(s) Oral daily  furosemide    Tablet 40 milliGRAM(s) Oral daily  influenza   Vaccine 0.5 milliLiter(s) IntraMuscular once  metroNIDAZOLE    Tablet 500 milliGRAM(s) Oral every 8 hours  multivitamin 1 Tablet(s) Oral daily  polyethylene glycol 3350 17 Gram(s) Oral every 12 hours  spironolactone 50 milliGRAM(s) Oral daily  thiamine 100 milliGRAM(s) Oral daily  tolvaptan 15 milliGRAM(s) Oral daily        VITALS:  T(F): 97.1 (12-11-19 @ 05:07), Max: 97.8 (12-10-19 @ 13:38)  HR: 88 (12-11-19 @ 05:07)  BP: 114/58 (12-11-19 @ 05:51)  RR: 18 (12-11-19 @ 05:07)  SpO2: 93% (12-11-19 @ 07:24)  Wt(kg): --    12-09 @ 07:01  -  12-10 @ 07:00  --------------------------------------------------------  IN: 1000 mL / OUT: 3050 mL / NET: -2050 mL    12-10 @ 07:01  -  12-11 @ 07:00  --------------------------------------------------------  IN: 1030 mL / OUT: 4400 mL / NET: -3370 mL          PHYSICAL EXAM:  Constitutional: NAD  HEENT: anicteric sclera, oropharynx clear, MMM  Neck: No JVD  Respiratory: CTA  Cardiovascular: S1, S2, RRR  Gastrointestinal: BS+, soft, NT/ND, ascites  Extremities:  No peripheral edema  Neurological: A/O x 3, no focal deficits  :  No wisdom.   Skin: No rashes  Vascular Access:    LABS:  12-11    124<L>  |  94<L>  |  13  ----------------------------<  87  4.9   |  18  |  0.7    Ca    7.6<L>      11 Dec 2019 05:36  Mg     1.9     12-11    TPro  6.5  /  Alb  1.7<L>  /  TBili  12.9<H>  /  DBili      /  AST  143<H>  /  ALT  78<H>  /  AlkPhos  147<H>  12-11                          8.7    15.83 )-----------( 118      ( 11 Dec 2019 05:36 )             26.1       Urine Studies:      RADIOLOGY & ADDITIONAL STUDIES:

## 2019-12-11 NOTE — PROGRESS NOTE ADULT - ASSESSMENT
44 year old man with history of alcoholic cirrhosis presented for shortness of breath of 4 days duration, associated with increase abdominal girth and lower extremities edema.      # Elevated bili - likely 2/2 EtOH hepatitis with superimposed liver cirrhosis   DF: 117 on 12/1   MELD: 32 on 12/1  No SBP on Para   Pt afebrile and no signs of sepsis  On IV abx for perianal abscess   Rec:   Continue Vitamin K 10 mg po once daily and daily INR   Will hold on prednisolone for now given pt have a perianal abscess discovered on exam  Continue Thiamine and folate  Daily liver enzymes and INR   Avoid hepatotoxic drugs  MRI abdomen did  not show CBD dilation     # Decompensated alcoholic liver cirrhosis   MELD 32 on 12/1    Large volume ascites SP paracentesis with 4 L removal on 12/1 and another 4.5 L on 12/5   No SBP   No varices on last EGD on 7/2018   Pt not a good candidate for TIPS as per IR   Hyponatremia improving   Rec:   Continue Lasix and spironolactone to 20 and 50 respectively   Continue tolvaptan as per renal   Nephrology following   Follow BMP and creatinine daily   Needs EGD for variceal screening as outpatient     # Mild rectal bleed secondary to hemorrhoids   Had Colonoscopy in 2018 that showed Hemorrhoids and Diverticulosis   Hemodynamically stable   No signs of active GI bleed  Hg stable   Rec:  Follow Hg q24 h   can have Colonoscopy as outpatient    If Hg keeps dropping and signs of active Bleed will do inpatient colonoscopy once stable     # US abdomen could not rule out HCC   MRI with contrast was negative for HCC   Rec:   Follow up lukasz fetoprotein and US abdomen q6 month     # Alcohol abuse   Pt still drinking alcohol  Rec:   Needs detox and rehab     Follow up in outpatient liver clinic [phone]

## 2019-12-11 NOTE — PROGRESS NOTE ADULT - SUBJECTIVE AND OBJECTIVE BOX
Progress Note:  Provider Speciality                            Hospitalist      DESIRE SWIFT [id number] 44y Male     CHIEF PRESENTING COMPLAINT:  Patient is a 44y old  Male who presents with a chief complaint of Shortness of breath (07 Dec 2019 10:57)      SUBJECTIVE:  Patient was seen and examined at bedside.   Reports doing ok /little more anxious today/ abdominal girth increasing again today/ had bleeding with BM today.   No significant overnight events reported.     HISTORY OF PRESENTING ILLNESS:  HPI:  a 44 year old man with history of alcoholic cirrhosis presented for shortness of breath of 4 days duration, associated with increase abdominal girth and lower extremities edema. he also reported orthopnea, abdominal pain (generalized dull, non radiating), generalized itching and jaundice. he endorsed subjective fever and chills. no cough, no chest pain  the patient last drink was 30% of a bottle of Michell with a friend and before that it was 2 months ago. he also has fresh blood per rectum, minimal amount, chronic.  the patient lives with his aunt, has no enough social support ( from wife and children), he has been in rehab program and was sober for a while then started drinking again. he used to follow at [facility] for hepatology. (30 Nov 2019 22:27)      REVIEW OF SYSTEMS:    At least 10 systems were reviewed in ROS. All systems reviewed  are within normal limits except for the complaints as described in Subjective.    PAST MEDICAL & SURGICAL HISTORY:  PAST MEDICAL & SURGICAL HISTORY:  Liver cirrhosis, alcoholic  Anemia  Thrombocytopenia  ETOH abuse  History of incision and drainage: Gluteal abscess      VITAL SIGNS:  Vital Signs Last 24 Hrs  T(C): 36.6 (11 Dec 2019 13:47), Max: 36.6 (11 Dec 2019 13:47)  T(F): 97.8 (11 Dec 2019 13:47), Max: 97.8 (11 Dec 2019 13:47)  HR: 99 (11 Dec 2019 13:47) (88 - 99)  BP: 105/56 (11 Dec 2019 13:47) (99/53 - 123/62)  RR: 18 (11 Dec 2019 13:47) (18 - 18)  SpO2: 93% (11 Dec 2019 07:24) (93% - 97%)      PHYSICAL EXAMINATION:    General: AAO, Not in acute distress  HEENT:   EOMI, scleral icterus noted,  atraumatic  Heart: S1+S2 audible, no murmur  Lungs: bilateral  fair air entry, no wheezing, no crepitations.  Abdomen: Soft, non-tender, distended , R sided abdominal paracentesis site with clean dressing.  CNS: AAO  , no focal deficits.  Extremities:  LE edema - improving.         CONSULTS:  Consultant(s) Notes Reviewed by me.   Care Discussed with Consultants/Other Providers where required.      MEDICATIONS:    MEDICATIONS  (STANDING):  chlorhexidine 4% Liquid 1 Application(s) Topical daily  folic acid 1 milliGRAM(s) Oral daily  furosemide    Tablet 40 milliGRAM(s) Oral daily  influenza   Vaccine 0.5 milliLiter(s) IntraMuscular once  metroNIDAZOLE    Tablet 500 milliGRAM(s) Oral every 8 hours  multivitamin 1 Tablet(s) Oral daily  polyethylene glycol 3350 17 Gram(s) Oral every 12 hours  spironolactone 50 milliGRAM(s) Oral daily  thiamine 100 milliGRAM(s) Oral daily      LABOROTORY DATA/MICROBIOLOGY/I & O's:                          8.7    15.83 )-----------( 118      ( 11 Dec 2019 05:36 )             26.1       12-11    124<L>  |  94<L>  |  13  ----------------------------<  87  4.9   |  18  |  0.7    Ca    7.6<L>      11 Dec 2019 05:36  Mg     1.9     12-11    TPro  6.5  /  Alb  1.7<L>  /  TBili  12.9<H>  /  DBili  x   /  AST  143<H>  /  ALT  78<H>  /  AlkPhos  147<H>  12-11    PT/INR - ( 11 Dec 2019 05:36 )   PT: 39.00 sec;   INR: 3.43 ratio      12-07-19 @ 07:01  -  12-08-19 @ 07:00  --------------------------------------------------------  IN: 1260 mL / OUT: 725 mL / NET: 535 mL    12-08-19 @ 07:01  -  12-08-19 @ 12:24  --------------------------------------------------------  IN: 120 mL / OUT: 0 mL / NET: 120 mL      ASSESSMENT:    Acute Hypoxic Respiratory Failure (SOB/Dyspnea on presentation)  - Due to Large Abdominal Ascities and bilateral pleural effusions all due to Hypoalbunemia form Cirrhosis   - s/p Large volume parasynthesis x 2 (on admission and on 12/5/2019)    Acute Decompensated ETOH Cirrhosis   - Hepatitis with ascites and coagulopathy  - LFT's elevated but stable no need to trend LFTs and INR daily   - Repeated Paracentesis with removal of 4.5L of fluid on 12/5/19.   - MRCP - no evidence of HCCA - GI rec to f/u with AFP and Liver Sonogram every 6months  - Per GI no need for EGD at this time.   - Aldactone 50mg qd and Lasix 80mg daily.       Chronic Hyponatremia due to Hypervolemic from Cirrhosis  - Tolvaptan was being given with improving Na to 124 from 120 however Nephrology d/carlos due to Liver Disease  - Check Urine Lytes / Osmolality / Uric Acid   - Nephrology follow up   - Daily BMP    - Fluid Restriction 500cc     Acute on Chronic Anemia   - GI evaluated pt and determined no need for EGD and C-scope to be done as an outpt  - Monitor H/H daily and transfuse for Hg below 7     Recurrent Perianal Abscess  - Self expressed and drained itself  - Now still on Flagyl 500mg po TID   - ID f/u for duration of flagyl will be appreciated - may call ID Team Tomorrow    Suspected folate and thiamine deficiency:   - c/w oral supplements    Active ETOH Abuse  - Very unfortunate case as pt is young and with End Stage Liver Disease  - Will be referred to Outpt Alcohol Rehab  - Pt is interested in Liver Transplant but has a history of recent relapse in ETOH Use  - Pt advised to f/u at [facility] and with [facility] for further care regarding any possibility of liver transplant     Activity: As Tolerated   - Ambulates with Walker  - OOB to Chair daily    GOALS OF CARE DISCUSSION   - STARTED AT BEDSIDE TODAY  - POOR PROGNOSIS   - FULL CODE    D/C PLANNING UPON NA CORRECTION Progress Note:  Provider Speciality                            Hospitalist      DESIER SWIFT [id number] 44y Male     CHIEF PRESENTING COMPLAINT:  Patient is a 44y old  Male who presents with a chief complaint of Shortness of breath (07 Dec 2019 10:57)      SUBJECTIVE:  Patient was seen and examined at bedside.   Reports doing ok /little more anxious today/ abdominal girth increasing again today/ had bleeding with BM today.   No significant overnight events reported.     HISTORY OF PRESENTING ILLNESS:  HPI:  a 44 year old man with history of alcoholic cirrhosis presented for shortness of breath of 4 days duration, associated with increase abdominal girth and lower extremities edema. he also reported orthopnea, abdominal pain (generalized dull, non radiating), generalized itching and jaundice. he endorsed subjective fever and chills. no cough, no chest pain  the patient last drink was 30% of a bottle of Michell with a friend and before that it was 2 months ago. he also has fresh blood per rectum, minimal amount, chronic.  the patient lives with his aunt, has no enough social support ( from wife and children), he has been in rehab program and was sober for a while then started drinking again. he used to follow at [facility] for hepatology. (30 Nov 2019 22:27)      REVIEW OF SYSTEMS:    At least 10 systems were reviewed in ROS. All systems reviewed  are within normal limits except for the complaints as described in Subjective.    PAST MEDICAL & SURGICAL HISTORY:  PAST MEDICAL & SURGICAL HISTORY:  Liver cirrhosis, alcoholic  Anemia  Thrombocytopenia  ETOH abuse  History of incision and drainage: Gluteal abscess      VITAL SIGNS:  Vital Signs Last 24 Hrs  T(C): 36.6 (11 Dec 2019 13:47), Max: 36.6 (11 Dec 2019 13:47)  T(F): 97.8 (11 Dec 2019 13:47), Max: 97.8 (11 Dec 2019 13:47)  HR: 99 (11 Dec 2019 13:47) (88 - 99)  BP: 105/56 (11 Dec 2019 13:47) (99/53 - 123/62)  RR: 18 (11 Dec 2019 13:47) (18 - 18)  SpO2: 93% (11 Dec 2019 07:24) (93% - 97%)      PHYSICAL EXAMINATION:    General: AAO, Not in acute distress  HEENT:   EOMI, scleral icterus noted,  atraumatic  Heart: S1+S2 audible, no murmur  Lungs: bilateral  fair air entry, no wheezing, no crepitations.  Abdomen: Soft, non-tender, distended , R sided abdominal paracentesis site with clean dressing.  CNS: AAO  , no focal deficits.  Extremities:  LE edema - improving.         CONSULTS:  Consultant(s) Notes Reviewed by me.   Care Discussed with Consultants/Other Providers where required.      MEDICATIONS:    MEDICATIONS  (STANDING):  chlorhexidine 4% Liquid 1 Application(s) Topical daily  folic acid 1 milliGRAM(s) Oral daily  furosemide    Tablet 40 milliGRAM(s) Oral daily  influenza   Vaccine 0.5 milliLiter(s) IntraMuscular once  metroNIDAZOLE    Tablet 500 milliGRAM(s) Oral every 8 hours  multivitamin 1 Tablet(s) Oral daily  polyethylene glycol 3350 17 Gram(s) Oral every 12 hours  spironolactone 50 milliGRAM(s) Oral daily  thiamine 100 milliGRAM(s) Oral daily      LABOROTORY DATA/MICROBIOLOGY/I & O's:                          8.7    15.83 )-----------( 118      ( 11 Dec 2019 05:36 )             26.1       12-11    124<L>  |  94<L>  |  13  ----------------------------<  87  4.9   |  18  |  0.7    Ca    7.6<L>      11 Dec 2019 05:36  Mg     1.9     12-11    TPro  6.5  /  Alb  1.7<L>  /  TBili  12.9<H>  /  DBili  x   /  AST  143<H>  /  ALT  78<H>  /  AlkPhos  147<H>  12-11    PT/INR - ( 11 Dec 2019 05:36 )   PT: 39.00 sec;   INR: 3.43 ratio      12-07-19 @ 07:01  -  12-08-19 @ 07:00  --------------------------------------------------------  IN: 1260 mL / OUT: 725 mL / NET: 535 mL    12-08-19 @ 07:01  -  12-08-19 @ 12:24  --------------------------------------------------------  IN: 120 mL / OUT: 0 mL / NET: 120 mL      ASSESSMENT:    Acute Hypoxic Respiratory Failure (SOB/Dyspnea on presentation)  - Due to Large Abdominal Ascities and bilateral pleural effusions all due to Hypoalbunemia form Cirrhosis   - s/p Large volume parasynthesis x 2 (on admission and on 12/5/2019)    Acute Decompensated ETOH Cirrhosis   - Hepatitis with ascites and coagulopathy  - LFT's elevated but stable no need to trend LFTs and INR daily   - Repeated Paracentesis with removal of 4.5L of fluid on 12/5/19.   - MRCP - no evidence of HCCA - GI rec to f/u with AFP and Liver Sonogram every 6months  - Per GI no need for EGD at this time.   - Aldactone 50mg qd and Lasix 80mg daily.       Chronic Hyponatremia due to Hypervolemia from Cirrhosis  - Tolvaptan was being given with improving Na to 124 from 120 however Nephrology d/carlos due to Liver Disease  - Check Urine Lytes / Osmolality / Uric Acid   - Nephrology follow up   - Daily BMP    - Fluid Restriction 500cc     Acute on Chronic Anemia   - GI evaluated pt and determined no need for EGD and C-scope to be done as an outpt  - Monitor H/H daily and transfuse for Hg below 7     Recurrent Perianal Abscess  - Self expressed and drained itself  - Now still on Flagyl 500mg po TID   - ID f/u for duration of flagyl will be appreciated - may call ID Team Tomorrow    Suspected folate and thiamine deficiency:   - c/w oral supplements    Active ETOH Abuse  - Very unfortunate case as pt is young and with End Stage Liver Disease  - Will be referred to Outpt Alcohol Rehab  - Pt is interested in Liver Transplant but has a history of recent relapse in ETOH Use  - Pt advised to f/u at [facility] and with [facility] for further care regarding any possibility of liver transplant     Activity: As Tolerated   - Ambulates with Walker  - OOB to Chair daily    GOALS OF CARE DISCUSSION   - STARTED AT BEDSIDE TODAY  - POOR PROGNOSIS   - FULL CODE    D/C PLANNING UPON NA CORRECTION

## 2019-12-12 LAB
ANION GAP SERPL CALC-SCNC: 10 MMOL/L — SIGNIFICANT CHANGE UP (ref 7–14)
BASOPHILS # BLD AUTO: 0.03 K/UL — SIGNIFICANT CHANGE UP (ref 0–0.2)
BASOPHILS NFR BLD AUTO: 0.2 % — SIGNIFICANT CHANGE UP (ref 0–1)
BUN SERPL-MCNC: 13 MG/DL — SIGNIFICANT CHANGE UP (ref 10–20)
CALCIUM SERPL-MCNC: 7.7 MG/DL — LOW (ref 8.5–10.1)
CHLORIDE SERPL-SCNC: 96 MMOL/L — LOW (ref 98–110)
CO2 SERPL-SCNC: 20 MMOL/L — SIGNIFICANT CHANGE UP (ref 17–32)
CREAT SERPL-MCNC: 0.7 MG/DL — SIGNIFICANT CHANGE UP (ref 0.7–1.5)
EOSINOPHIL # BLD AUTO: 0.13 K/UL — SIGNIFICANT CHANGE UP (ref 0–0.7)
EOSINOPHIL NFR BLD AUTO: 0.8 % — SIGNIFICANT CHANGE UP (ref 0–8)
GLUCOSE SERPL-MCNC: 96 MG/DL — SIGNIFICANT CHANGE UP (ref 70–99)
HCT VFR BLD CALC: 27.4 % — LOW (ref 42–52)
HGB BLD-MCNC: 9.1 G/DL — LOW (ref 14–18)
IMM GRANULOCYTES NFR BLD AUTO: 1.1 % — HIGH (ref 0.1–0.3)
LYMPHOCYTES # BLD AUTO: 1.21 K/UL — SIGNIFICANT CHANGE UP (ref 1.2–3.4)
LYMPHOCYTES # BLD AUTO: 7.1 % — LOW (ref 20.5–51.1)
MCHC RBC-ENTMCNC: 31.2 PG — HIGH (ref 27–31)
MCHC RBC-ENTMCNC: 33.2 G/DL — SIGNIFICANT CHANGE UP (ref 32–37)
MCV RBC AUTO: 93.8 FL — SIGNIFICANT CHANGE UP (ref 80–94)
MONOCYTES # BLD AUTO: 1.5 K/UL — HIGH (ref 0.1–0.6)
MONOCYTES NFR BLD AUTO: 8.8 % — SIGNIFICANT CHANGE UP (ref 1.7–9.3)
NEUTROPHILS # BLD AUTO: 13.97 K/UL — HIGH (ref 1.4–6.5)
NEUTROPHILS NFR BLD AUTO: 82 % — HIGH (ref 42.2–75.2)
NRBC # BLD: 0 /100 WBCS — SIGNIFICANT CHANGE UP (ref 0–0)
OSMOLALITY SERPL: 276 MOSMOL/KG — SIGNIFICANT CHANGE UP (ref 275–300)
PLATELET # BLD AUTO: 113 K/UL — LOW (ref 130–400)
POTASSIUM SERPL-MCNC: 5.1 MMOL/L — HIGH (ref 3.5–5)
POTASSIUM SERPL-SCNC: 5.1 MMOL/L — HIGH (ref 3.5–5)
RBC # BLD: 2.92 M/UL — LOW (ref 4.7–6.1)
RBC # FLD: 22.1 % — HIGH (ref 11.5–14.5)
SODIUM SERPL-SCNC: 126 MMOL/L — LOW (ref 135–146)
URATE SERPL-MCNC: 2.9 MG/DL — LOW (ref 3.4–8.8)
WBC # BLD: 17.02 K/UL — HIGH (ref 4.8–10.8)
WBC # FLD AUTO: 17.02 K/UL — HIGH (ref 4.8–10.8)

## 2019-12-12 PROCEDURE — 99233 SBSQ HOSP IP/OBS HIGH 50: CPT

## 2019-12-12 RX ORDER — FUROSEMIDE 40 MG
40 TABLET ORAL EVERY 12 HOURS
Refills: 0 | Status: DISCONTINUED | OUTPATIENT
Start: 2019-12-12 | End: 2019-12-12

## 2019-12-12 RX ORDER — CIPROFLOXACIN LACTATE 400MG/40ML
500 VIAL (ML) INTRAVENOUS DAILY
Refills: 0 | Status: DISCONTINUED | OUTPATIENT
Start: 2019-12-12 | End: 2019-12-13

## 2019-12-12 RX ADMIN — Medication 40 MILLIGRAM(S): at 09:36

## 2019-12-12 RX ADMIN — Medication 100 MILLIGRAM(S): at 12:03

## 2019-12-12 RX ADMIN — Medication 1 TABLET(S): at 12:03

## 2019-12-12 RX ADMIN — SPIRONOLACTONE 50 MILLIGRAM(S): 25 TABLET, FILM COATED ORAL at 06:06

## 2019-12-12 RX ADMIN — CHLORHEXIDINE GLUCONATE 1 APPLICATION(S): 213 SOLUTION TOPICAL at 12:02

## 2019-12-12 RX ADMIN — POLYETHYLENE GLYCOL 3350 17 GRAM(S): 17 POWDER, FOR SOLUTION ORAL at 17:27

## 2019-12-12 RX ADMIN — Medication 40 MILLIGRAM(S): at 06:06

## 2019-12-12 RX ADMIN — Medication 1 MILLIGRAM(S): at 12:02

## 2019-12-12 RX ADMIN — Medication 500 MILLIGRAM(S): at 12:02

## 2019-12-12 RX ADMIN — Medication 500 MILLIGRAM(S): at 06:06

## 2019-12-12 NOTE — PROGRESS NOTE ADULT - REASON FOR ADMISSION

## 2019-12-12 NOTE — PROGRESS NOTE ADULT - ASSESSMENT
44 year old man with history of alcoholic cirrhosis presented for shortness of breath of 4 days duration, associated with increase abdominal girth and lower extremities edema.    # Acute decompensation of alcoholic hepatitis in chronically cirrhotic patient:  - Alcohol abstinence recommended  - S/p Therapeutic tap x2 , no SBP, culture of peritoneal fluid is negative.  - MRI abdomen: Hepatic cirrhosis with sequela of portal hypertension including   perisplenic varices and moderate volume abdominopelvic ascites. Otherwise non diagnostic exam because of patient's motion  - C/w vitamin K 10 mg qd   - Daily INR, BMP and CBC  - Patient deemed bad candidate for TIPS per IR    # Anemia and blood per rectum in the setting of coagulopathy:  - Hb stable around 8-9  - keep active type and screen  - Transfuse if <7 or <8 and symptomatic    # Leukocytosis in the setting of perineal/gluteal abscess:  - Blood culture NGTD , peritoneal fluid culture NGTD  - UA negative  - CT abd/pelv with IV and oral contrast did not show any fluid collection in the pernieal area. Surgery won't do any further interventions  - C/w ciprofloxacin 500 mg PO qd for SBP prophylaxis    # Alcohol abuse  - last drink 1 week ago  -CIWA protocol PRN, not needing chlordiazepoxide   -chronic hyponatremia  - Educated on alcohol abstinence    # Hyponatremia:  - S/p 2 doses of tolvapatan 15 mg PO, off tolvaptan now  - UA negative, normal uric acid  - F/up TSH  - Nephro recs noted    # DVT ppx  - SCD for now  # Dispo: to go home with his aunt, will need MAP clinic appointment and GI appt, anticipate for tomorrow  # FULL code, Very poor prognosis

## 2019-12-12 NOTE — PROGRESS NOTE ADULT - SUBJECTIVE AND OBJECTIVE BOX
Progress Note:  Provider Speciality                            Hospitalist      DESIRE SWIFT [id number] 44y Male     CHIEF PRESENTING COMPLAINT:  Patient is a 44y old  Male who presents with a chief complaint of Shortness of breath (07 Dec 2019 10:57)      SUBJECTIVE:  Patient was seen and examined at bedside.   Reports doing ok /little more anxious today/ abdominal girth increasing again today/ had bleeding with BM today.   No significant overnight events reported.     HISTORY OF PRESENTING ILLNESS:  HPI:  a 44 year old man with history of alcoholic cirrhosis presented for shortness of breath of 4 days duration, associated with increase abdominal girth and lower extremities edema. he also reported orthopnea, abdominal pain (generalized dull, non radiating), generalized itching and jaundice. he endorsed subjective fever and chills. no cough, no chest pain  the patient last drink was 30% of a bottle of Michell with a friend and before that it was 2 months ago. he also has fresh blood per rectum, minimal amount, chronic.  the patient lives with his aunt, has no enough social support ( from wife and children), he has been in rehab program and was sober for a while then started drinking again. he used to follow at [facility] for hepatology. (30 Nov 2019 22:27)      REVIEW OF SYSTEMS:    At least 10 systems were reviewed in ROS. All systems reviewed  are within normal limits except for the complaints as described in Subjective.    PAST MEDICAL & SURGICAL HISTORY:  PAST MEDICAL & SURGICAL HISTORY:  Liver cirrhosis, alcoholic  Anemia  Thrombocytopenia  ETOH abuse  History of incision and drainage: Gluteal abscess      VITAL SIGNS:  Vital Signs Last 24 Hrs  T(C): 36.2 (12 Dec 2019 13:28), Max: 36.5 (12 Dec 2019 05:07)  T(F): 97.2 (12 Dec 2019 13:28), Max: 97.7 (12 Dec 2019 05:07)  HR: 89 (12 Dec 2019 05:07) (89 - 99)  BP: 112/60 (12 Dec 2019 05:07) (105/60 - 112/60)  RR: 18 (12 Dec 2019 05:07) (18 - 18)  SpO2: 95% (12 Dec 2019 07:44) (95% - 95%)      PHYSICAL EXAMINATION:    General: AAO, Not in acute distress  HEENT:   EOMI, scleral icterus noted,  atraumatic  Heart: S1+S2 audible, no murmur  Lungs: bilateral  fair air entry, no wheezing, no crepitations.  Abdomen: Soft, non-tender, distended , R sided abdominal paracentesis site with clean dressing.  CNS: AAO  , no focal deficits.  Extremities:  LE edema - improving.         CONSULTS:  Consultant(s) Notes Reviewed by me.   Care Discussed with Consultants/Other Providers where required.      MEDICATIONS:    chlorhexidine 4% Liquid 1 Application(s) Topical daily  ciprofloxacin     Tablet 500 milliGRAM(s) Oral daily  folic acid 1 milliGRAM(s) Oral daily  furosemide    Tablet 40 milliGRAM(s) Oral daily  influenza   Vaccine 0.5 milliLiter(s) IntraMuscular once  multivitamin 1 Tablet(s) Oral daily  polyethylene glycol 3350 17 Gram(s) Oral every 12 hours  spironolactone 50 milliGRAM(s) Oral daily  thiamine 100 milliGRAM(s) Oral daily      LABOROTORY DATA/MICROBIOLOGY/I & O's:                          8.7    15.83 )-----------( 118      ( 11 Dec 2019 05:36 )             26.1       12-11    124<L>  |  94<L>  |  13  ----------------------------<  87  4.9   |  18  |  0.7    Ca    7.6<L>      11 Dec 2019 05:36  Mg     1.9     12-11    TPro  6.5  /  Alb  1.7<L>  /  TBili  12.9<H>  /  DBili  x   /  AST  143<H>  /  ALT  78<H>  /  AlkPhos  147<H>  12-11    PT/INR - ( 11 Dec 2019 05:36 )   PT: 39.00 sec;   INR: 3.43 ratio      12-07-19 @ 07:01  -  12-08-19 @ 07:00  --------------------------------------------------------  IN: 1260 mL / OUT: 725 mL / NET: 535 mL    12-08-19 @ 07:01  -  12-08-19 @ 12:24  --------------------------------------------------------  IN: 120 mL / OUT: 0 mL / NET: 120 mL      ASSESSMENT:    Acute Hypoxic Respiratory Failure (SOB/Dyspnea on presentation)  - Due to Large Abdominal Ascities and bilateral pleural effusions all due to Hypoalbunemia form Cirrhosis   - s/p Large volume parasynthesis x 2 (on admission and on 12/5/2019)    Acute Decompensated ETOH Cirrhosis   - Hepatitis with ascites and coagulopathy  - LFT's elevated but stable no need to trend LFTs and INR daily   - Repeated Paracentesis with removal of 4.5L of fluid on 12/5/19.   - MRCP - no evidence of HCCA - GI rec to f/u with AFP and Liver Sonogram every 6months  - Per GI no need for EGD at this time.   - Aldactone 50mg qd and Lasix 80mg daily.       Chronic Hyponatremia due to Hypervolemia from Cirrhosis  - Tolvaptan was being given with improving Na to 124 from 120 however Nephrology d/carlos due to Liver Disease  - Check Urine Lytes / Osmolality / Uric Acid   - Nephrology follow up   - Daily BMP    - Fluid Restriction 500cc   - Lasix 40mg IV x1 today and got 40mg po today    Acute on Chronic Anemia   - GI evaluated pt and determined no need for EGD and C-scope to be done as an outpt  - Monitor H/H daily and transfuse for Hg below 7     Recurrent Perianal Abscess  - Self expressed and drained itself  - Now still on Flagyl 500mg po TID   - ID f/u for duration of flagyl will be appreciated - may call ID Team Tomorrow    Suspected folate and thiamine deficiency:   - c/w oral supplements    Active ETOH Abuse  - Very unfortunate case as pt is young and with End Stage Liver Disease  - Will be referred to Outpt Alcohol Rehab  - Pt is interested in Liver Transplant but has a history of recent relapse in ETOH Use  - Pt advised to f/u at [facility] and with [facility] for further care regarding any possibility of liver transplant     Activity: As Tolerated   - Ambulates with Walker  - OOB to Chair daily    GOALS OF CARE DISCUSSION   - STARTED AT BEDSIDE TODAY  - POOR PROGNOSIS   - FULL CODE    Dispo: Anticipate for tomorrow / Ambulating with walker comfortably and Negative Orthostasis / Dizziness resolved  Making MAP and GI Clinics appointments before discharge tomorrow

## 2019-12-12 NOTE — PROGRESS NOTE ADULT - SUBJECTIVE AND OBJECTIVE BOX
SUBJECTIVE:    Patient is a 44y old Male who presents with a chief complaint of Shortness of breath (11 Dec 2019 17:18)    Currently admitted to medicine with the primary diagnosis of Alcoholic cirrhosis of liver with ascites     Today is hospital day 12d. This morning he is resting comfortably in bed and reports no new issues or overnight events.     PAST MEDICAL & SURGICAL HISTORY  Liver cirrhosis, alcoholic  Anemia  Thrombocytopenia  ETOH abuse  History of incision and drainage: Gluteal abscess    SOCIAL HISTORY:  Negative for smoking/alcohol/drug use.     ALLERGIES:  No Known Allergies    MEDICATIONS:  STANDING MEDICATIONS  chlorhexidine 4% Liquid 1 Application(s) Topical daily  ciprofloxacin     Tablet 500 milliGRAM(s) Oral daily  folic acid 1 milliGRAM(s) Oral daily  furosemide    Tablet 40 milliGRAM(s) Oral daily  furosemide   Injectable 40 milliGRAM(s) IV Push every 12 hours  influenza   Vaccine 0.5 milliLiter(s) IntraMuscular once  multivitamin 1 Tablet(s) Oral daily  polyethylene glycol 3350 17 Gram(s) Oral every 12 hours  spironolactone 50 milliGRAM(s) Oral daily  thiamine 100 milliGRAM(s) Oral daily    PRN MEDICATIONS    VITALS:   T(F): 97.7  HR: 89  BP: 112/60  RR: 18  SpO2: 95%    LABS:                        9.1    17.02 )-----------( 113      ( 12 Dec 2019 05:41 )             27.4     12-    126<L>  |  96<L>  |  13  ----------------------------<  96  5.1<H>   |  20  |  0.7    Ca    7.7<L>      12 Dec 2019 05:41  Mg     1.9     -    TPro  6.5  /  Alb  1.7<L>  /  TBili  12.9<H>  /  DBili  x   /  AST  143<H>  /  ALT  78<H>  /  AlkPhos  147<H>      PT/INR - ( 11 Dec 2019 05:36 )   PT: 39.00 sec;   INR: 3.43 ratio           Urinalysis Basic - ( 11 Dec 2019 16:20 )    Color: Yuli / Appearance: Clear / S.007 / pH: x  Gluc: x / Ketone: Negative  / Bili: Moderate / Urobili: <2 mg/dL   Blood: x / Protein: Negative / Nitrite: Negative   Leuk Esterase: Negative / RBC: 1 /HPF / WBC 0 /HPF   Sq Epi: x / Non Sq Epi: 0 /HPF / Bacteria: Occasional    PHYSICAL EXAM:  GEN: not in distress NC/AT, jaundiced, yellow sclera  LUNGS: Clear to auscultation bilaterally, no wheezing   HEART: S1/S2 present. RRR. No murmurs  ABD: Soft, non-tender, distended. Oozing from the site of the old paracentesis. No caput medusa  SKIN:  jaundiced, yellow sclera, opening in the left buttock cheek of 2 cm ( opened spontaneously)  NEURO: AAOX3, moves all extremities, no focal deficits    Intravenous access: Peripheral access  NG tube: None  Joseph Catheter: None

## 2019-12-12 NOTE — PROGRESS NOTE ADULT - SUBJECTIVE AND OBJECTIVE BOX
Nephrology progress note    Patient is seen and examined, events over the last 24 h noted .  no new complaints.    Allergies:  No Known Allergies    Hospital Medications:   MEDICATIONS  (STANDING):  chlorhexidine 4% Liquid 1 Application(s) Topical daily  ciprofloxacin     Tablet 500 milliGRAM(s) Oral daily  folic acid 1 milliGRAM(s) Oral daily  furosemide    Tablet 40 milliGRAM(s) Oral daily  furosemide   Injectable 40 milliGRAM(s) IV Push every 12 hours  influenza   Vaccine 0.5 milliLiter(s) IntraMuscular once  multivitamin 1 Tablet(s) Oral daily  polyethylene glycol 3350 17 Gram(s) Oral every 12 hours  spironolactone 50 milliGRAM(s) Oral daily  thiamine 100 milliGRAM(s) Oral daily        VITALS:  T(F): 97.7 (19 @ 05:07), Max: 97.8 (19 @ 13:47)  HR: 89 (19 @ 05:07)  BP: 112/60 (19 @ 05:07)  RR: 18 (19 @ 05:07)  SpO2: 95% (19 @ 07:44)      12-10 @ 07:01  -   @ 07:00  --------------------------------------------------------  IN: 1030 mL / OUT: 4400 mL / NET: -3370 mL     @ 07:01  -   @ 07:00  --------------------------------------------------------  IN: 670 mL / OUT: 3600 mL / NET: -2930 mL          PHYSICAL EXAM:  Constitutional: NAD  Respiratory: CTAB, no wheezes, rales or rhonchi  Cardiovascular: S1, S2, RRR  Gastrointestinal: distension +  Extremities: No peripheral edema  :  No wisdom.       LABS:      126<L>  |  96<L>  |  13  ----------------------------<  96  5.1<H>   |  20  |  0.7    SODIUM TREND:  Sodium 126 [ 05:41]  Sodium 124 [ @ 05:36]  Sodium 120 [12-10 @ 07:11]  Sodium 122 [ @ 05:41]  Sodium 120 [ @ 20:13]  Sodium 121 [ @ 07:59]  Sodium 123 [ @ 06:56]  Sodium 124 [ @ 05:30]  Sodium 127 [ @ 06:56]  Sodium 126 [ @ 06:42]    Ca    7.7<L>      12 Dec 2019 05:41  Mg     1.9         TPro  6.5  /  Alb  1.7<L>  /  TBili  12.9<H>  /  DBili      /  AST  143<H>  /  ALT  78<H>  /  AlkPhos  147<H>                            9.1    17.02 )-----------( 113      ( 12 Dec 2019 05:41 )             27.4     Osmolality, Serum: 276 mosmol/kg (19 @ 07:59)    Uric Acid, Serum: 2.9 mg/dL (19 @ 05:41)      Urine Studies:  Urinalysis Basic - ( 11 Dec 2019 16:20 )    Color: Yuli / Appearance: Clear / S.007 / pH:   Gluc:  / Ketone: Negative  / Bili: Moderate / Urobili: <2 mg/dL   Blood:  / Protein: Negative / Nitrite: Negative   Leuk Esterase: Negative / RBC: 1 /HPF / WBC 0 /HPF   Sq Epi:  / Non Sq Epi: 0 /HPF / Bacteria: Occasional      Sodium, Random Urine: 20.0 mmoL/L ( @ 16:20)  Osmolality, Random Urine: 163 mos/kg ( @ 16:20)    RADIOLOGY & ADDITIONAL STUDIES:

## 2019-12-12 NOTE — PROGRESS NOTE ADULT - ATTENDING COMMENTS
Pt seen and examined  above note reviewed agree w/ plan  hypoNa improving   Fluid restriction  cont current management  recall w/ any questions/concerns

## 2019-12-12 NOTE — PROGRESS NOTE ADULT - ASSESSMENT
45 yo M with Decompensated Alcoholic Cirrhosis, ascites presented to hospital for the evaluation of shortness of breath x 4 days associated with increase abdominal girth and lower extremities edema.     Hospital Course: Patient with increased ascites s/p paracentesis with no SBP on fluid analysis.  CXR showed b/l opacities/effusions confirmed to be effusions on CT scan.   Found to have  gluteal / perirectal abscess on examination. ID evaluated - patient started on IV antibiotics ( Ceftriaxone and Metronidazole x 10 days, last day 12/16/2019) with plan for PO Augmentin if discharged prior to 10 day end date. Surgery also evaluated patient, no intervention as spontaneous drainage has occurred.    ASSESSMENT:  - Alcoholic Liver Cirrhosis with subsequent development of Hyponatremia due to cirrhosis ( poor prognosis) - MELD 32  - Coagulopathy - Hyper/Hypo coagulable state due to Liver cirrhosis - elevated INR  - Hyperkalemia likely secondary to Spironolactone better   - Gluteal / Perirectal Abscess discovered on exam with spontaneous drainage    PLAN:   - Strict I's/O's  - now off Tolvaptan (abnormal LFTs)  - resume fluid restriction tomorrow  - Urine Na, Osm, serum OSm, Uric Acid noted, c/w polydipsia.   - Diuretics as follows: keep Furosemide 40mg PO Q24H and Spironolactone 50mg PO Q24H   - Creatinine has remained stable during admission - please continue to monitor CMP daily.   - Continue Antibiotics for perirectal abscess  low bicarb with elevated corrected AG noted. check ABG for acid base disturbance.  ?MEt acidosis - likely distal RTA vs ?spironolactone     - repeat UA, pH, Urine Na noted.    will sign off, please recall PRN

## 2019-12-13 ENCOUNTER — TRANSCRIPTION ENCOUNTER (OUTPATIENT)
Age: 44
End: 2019-12-13

## 2019-12-13 VITALS
DIASTOLIC BLOOD PRESSURE: 52 MMHG | HEART RATE: 95 BPM | TEMPERATURE: 98 F | RESPIRATION RATE: 18 BRPM | SYSTOLIC BLOOD PRESSURE: 112 MMHG

## 2019-12-13 LAB
ANION GAP SERPL CALC-SCNC: 10 MMOL/L — SIGNIFICANT CHANGE UP (ref 7–14)
BUN SERPL-MCNC: 13 MG/DL — SIGNIFICANT CHANGE UP (ref 10–20)
CALCIUM SERPL-MCNC: 7.4 MG/DL — LOW (ref 8.5–10.1)
CHLORIDE SERPL-SCNC: 95 MMOL/L — LOW (ref 98–110)
CO2 SERPL-SCNC: 20 MMOL/L — SIGNIFICANT CHANGE UP (ref 17–32)
CREAT SERPL-MCNC: 0.7 MG/DL — SIGNIFICANT CHANGE UP (ref 0.7–1.5)
GLUCOSE SERPL-MCNC: 94 MG/DL — SIGNIFICANT CHANGE UP (ref 70–99)
POTASSIUM SERPL-MCNC: 4.6 MMOL/L — SIGNIFICANT CHANGE UP (ref 3.5–5)
POTASSIUM SERPL-SCNC: 4.6 MMOL/L — SIGNIFICANT CHANGE UP (ref 3.5–5)
SODIUM SERPL-SCNC: 125 MMOL/L — LOW (ref 135–146)
TSH SERPL-MCNC: 3.81 UIU/ML — SIGNIFICANT CHANGE UP (ref 0.27–4.2)

## 2019-12-13 PROCEDURE — 99239 HOSP IP/OBS DSCHRG MGMT >30: CPT

## 2019-12-13 RX ORDER — FUROSEMIDE 40 MG
1 TABLET ORAL
Qty: 30 | Refills: 0
Start: 2019-12-13 | End: 2020-01-11

## 2019-12-13 RX ORDER — CIPROFLOXACIN LACTATE 400MG/40ML
1 VIAL (ML) INTRAVENOUS
Qty: 30 | Refills: 0
Start: 2019-12-13 | End: 2020-01-11

## 2019-12-13 RX ORDER — FOLIC ACID 0.8 MG
1 TABLET ORAL
Qty: 30 | Refills: 0
Start: 2019-12-13 | End: 2020-01-11

## 2019-12-13 RX ORDER — POLYETHYLENE GLYCOL 3350 17 G/17G
17 POWDER, FOR SOLUTION ORAL
Qty: 30 | Refills: 0
Start: 2019-12-13 | End: 2020-01-11

## 2019-12-13 RX ORDER — SPIRONOLACTONE 25 MG/1
2 TABLET, FILM COATED ORAL
Qty: 60 | Refills: 0
Start: 2019-12-13 | End: 2020-01-11

## 2019-12-13 RX ORDER — PANTOPRAZOLE SODIUM 20 MG/1
1 TABLET, DELAYED RELEASE ORAL
Qty: 30 | Refills: 0
Start: 2019-12-13 | End: 2020-01-11

## 2019-12-13 RX ORDER — THIAMINE MONONITRATE (VIT B1) 100 MG
1 TABLET ORAL
Qty: 30 | Refills: 0
Start: 2019-12-13 | End: 2020-01-11

## 2019-12-13 RX ADMIN — Medication 500 MILLIGRAM(S): at 11:59

## 2019-12-13 RX ADMIN — Medication 100 MILLIGRAM(S): at 11:59

## 2019-12-13 RX ADMIN — INFLUENZA VIRUS VACCINE 0.5 MILLILITER(S): 15; 15; 15; 15 SUSPENSION INTRAMUSCULAR at 17:31

## 2019-12-13 RX ADMIN — SPIRONOLACTONE 50 MILLIGRAM(S): 25 TABLET, FILM COATED ORAL at 06:22

## 2019-12-13 RX ADMIN — Medication 1 TABLET(S): at 11:59

## 2019-12-13 RX ADMIN — CHLORHEXIDINE GLUCONATE 1 APPLICATION(S): 213 SOLUTION TOPICAL at 11:59

## 2019-12-13 RX ADMIN — Medication 40 MILLIGRAM(S): at 06:22

## 2019-12-13 RX ADMIN — POLYETHYLENE GLYCOL 3350 17 GRAM(S): 17 POWDER, FOR SOLUTION ORAL at 06:22

## 2019-12-13 RX ADMIN — Medication 1 MILLIGRAM(S): at 11:59

## 2019-12-13 NOTE — DISCHARGE NOTE PROVIDER - NSDCMRMEDTOKEN_GEN_ALL_CORE_FT
ciprofloxacin 500 mg oral tablet: 1 tab(s) orally once a day  folic acid 1 mg oral tablet: 1 tab(s) orally once a day  furosemide 40 mg oral tablet: 1 tab(s) orally once a day  Multiple Vitamins oral tablet: 1 tab(s) orally once a day  polyethylene glycol 3350 oral powder for reconstitution: 17 gram(s) orally every 12 hours, As Needed   Protonix 20 mg oral delayed release tablet: 1 tab(s) orally once a day   spironolactone 25 mg oral tablet: 2 tab(s) orally once a day  thiamine 100 mg oral tablet: 1 tab(s) orally once a day

## 2019-12-13 NOTE — DISCHARGE NOTE PROVIDER - NSDCFUADDAPPT_GEN_ALL_CORE_FT
- You have a follow up appointment at the [facility] ([facility]) on December 19th at 12:30 pm.   - You have to follow up with your liver clinic doctor at [place]. Please call and make an appointment  - You also need to see the gastroenterologist doctor here in the hospital at the liver clinic. Please call that number [phone] ( the GI doctor is Dr. Huerta)  - You will need an Ultrasound of the abdomen in 6 months  - You also need an EGD in few months

## 2019-12-13 NOTE — DISCHARGE NOTE PROVIDER - NSDCFUADDINST_GEN_ALL_CORE_FT
- You will need to take lasix 40 mg tab daily, spironolactone 50 mg every day and vitamins and folic acid.

## 2019-12-13 NOTE — DISCHARGE NOTE PROVIDER - NSDCCPCAREPLAN_GEN_ALL_CORE_FT
PRINCIPAL DISCHARGE DIAGNOSIS  Diagnosis: Alcoholic cirrhosis of liver with ascites  Assessment and Plan of Treatment: You were admitted for decompensated alcoholic cirrhosis because of heavy alcohol drinking. You got a paracentesis twice and fluid was removed ( total of 9.5 L), you were not found to have infection of the peritoneal fluids. You were seen by Gastroenterologist that recommended starting diuretics ( to help you pee and get rid of excess fluids). You need to take ciprofloxacin every day as instructed You will need to stop drinking and follow up with your liver doctor in [place] for the liver issues      SECONDARY DISCHARGE DIAGNOSES  Diagnosis: Perineal abscess  Assessment and Plan of Treatment: You were found to have an abscess around your anus. However, it opened by itself and then CT scan of the pelvis did not show any fluid collection. You got treated with antibiotics.    Diagnosis: Hyponatremia  Assessment and Plan of Treatment: Your sodium was low, and you were given medications to correct that. You had a lot of extra fluids in your body and it was treated with diuretics ( lasix, spironolactone). You need to keep takin lasix and spironolactone at home as instructed

## 2019-12-13 NOTE — DISCHARGE NOTE NURSING/CASE MANAGEMENT/SOCIAL WORK - PATIENT PORTAL LINK FT
You can access the FollowMyHealth Patient Portal offered by [facility] by registering at the following website: [email or web address]. By joining [employer]’s FollowMyHealth portal, you will also be able to view your health information using other applications (apps) compatible with our system.

## 2019-12-13 NOTE — DISCHARGE NOTE PROVIDER - CARE PROVIDER_API CALL
Gagan Huerta)  Gastroenterology; Internal Medicine  [address] [place] [address]  Phone: [phone]  Fax: [phone]  Follow Up Time:

## 2019-12-13 NOTE — DISCHARGE NOTE PROVIDER - HOSPITAL COURSE
44 year old man with history of alcoholic cirrhosis presented for shortness of breath of 4 days duration, associated with increase abdominal girth and lower extremities edema. he also reported orthopnea, abdominal pain (generalized dull, non radiating), generalized itching and jaundice. he endorsed subjective fever and chills. no cough, no chest pain    the patient last drink was 30% of a bottle of Michell with a friend and before that it was 2 months ago. he also has fresh blood per rectum, minimal amount, chronic.    the patient lives with his aunt, has no enough social support ( from wife and children), he has been in rehab program and was sober for a while then started drinking again. he used to follow at [facility] for hepatology.        Hospital Course:    Patient had a therapeutic paracentesis twice ( first time 4 L removed, second time 4.5 L removed) and studies did not show any SBP. He was evaluated by IR for tips but deemed to be a very bad candidate.    He was evaluated by surgery for a possible miles-rectal abscess and was giving antibiotics for that but the CT pelvis did not show any collection ( probably the abscess opened up on its own and it was oozing some blood). Patient was given tolvaptan for severe hyponatremia and it corrected from 120 to 126 and patient was passing urine. He was started on lasix 40 mg qd and spironolactone 50 mg qd. He was feeling itchiness initially and had wound marks from itching on his belly. He was on oxygen at some point due to his distended belly but after the 2nd para, he felt better and was ambulating on room air.     He promised he will stop consumption of alcohol and will follow up with his liver doctor 44 year old man with history of alcoholic cirrhosis presented for shortness of breath of 4 days duration, associated with increase abdominal girth and lower extremities edema. he also reported orthopnea, abdominal pain (generalized dull, non radiating), generalized itching and jaundice. he endorsed subjective fever and chills. no cough, no chest pain    the patient last drink was 30% of a bottle of Michell with a friend and before that it was 2 months ago. he also has fresh blood per rectum, minimal amount, chronic.    the patient lives with his aunt, has no enough social support ( from wife and children), he has been in rehab program and was sober for a while then started drinking again. he used to follow at [facility] for hepatology.        Hospital Course:    Patient had a therapeutic paracentesis twice ( first time 4 L removed, second time 4.5 L removed) and studies did not show any SBP. He was evaluated by IR for tips but deemed to be a very bad candidate.    He was evaluated by surgery for a possible miles-rectal abscess and was giving antibiotics for that but the CT pelvis did not show any collection ( probably the abscess opened up on its own and it was oozing some blood). Patient was given tolvaptan for severe hyponatremia and it corrected from 120 to 126 and patient was passing urine. He was started on lasix 40 mg qd and spironolactone 50 mg qd. He was feeling itchiness initially and had wound marks from itching on his belly. He was on oxygen at some point due to his distended belly but after the 2nd para, he felt better and was ambulating on room air.     He promised he will stop consumption of alcohol and will follow up with his liver doctor        Attending Attestation        Pt seen and examined this am. Plan and case d/w F9 Team and the pt himself in detail at bedside.    I agree with above summary     Pt is stable and cleared for d/c home with MAP/GI Clinics f/u and also can f/u at [facility] to be considered for Liver Transplant Program. Pt promised to stop drinking alcohol and to comply with all medical care upon discharge home today.         Vital Signs Last 24 Hrs    T(C): 36.5 (13 Dec 2019 15:06), Max: 37.1 (13 Dec 2019 08:27)    T(F): 97.7 (13 Dec 2019 15:06), Max: 98.7 (13 Dec 2019 08:27)    HR: 95 (13 Dec 2019 15:06) (89 - 95)    BP: 112/52 (13 Dec 2019 15:06) (100/54 - 120/55)    RR: 18 (13 Dec 2019 15:06) (18 - 18)    SpO2: 96% (13 Dec 2019 08:00) (96% - 96%)        CV: NL S1, S2    Resp: CTA bilateral    GI: +BS, Firm, Ascities, NT    Ext: +1 edema bilateral     MS: AOx3                                9.1      17.02 )-----------( 113      ( 12 Dec 2019 05:41 )               27.4         12-13        125<L>  |  95<L>  |  13    ----------------------------<  94    4.6   |  20  |  0.7        Ca    7.4<L>      13 Dec 2019 07:20        Problems:    1. End Stage Liver Dz    2. Chronic HypoNatremia due #1    3. Miles anal superficial abscess s/p rx abx completed course        DISCHARGE HOME MEDICATIONS     ciprofloxacin     Tablet 500 milliGRAM(s) Oral daily (SBP Prophylaxis per ID)    folic acid 1 milliGRAM(s) Oral daily (ETOH Abuse h/x)    furosemide    Tablet 40 milliGRAM(s) Oral daily (Ascities)     multivitamin 1 Tablet(s) Oral daily (ETOH Abuse h/x)    polyethylene glycol 3350 17 Gram(s) Oral every 12 hours (constipation prn)    spironolactone 50 milliGRAM(s) Oral daily (ascities - liver dz) dose decreased in the hospital from 100mg due to hyperkalemia     thiamine 100 milliGRAM(s) Oral daily (ETOH abuse h/x) 44 year old man with history of alcoholic cirrhosis presented for shortness of breath of 4 days duration, associated with increase abdominal girth and lower extremities edema. he also reported orthopnea, abdominal pain (generalized dull, non radiating), generalized itching and jaundice. he endorsed subjective fever and chills. no cough, no chest pain    the patient last drink was 30% of a bottle of Michell with a friend and before that it was 2 months ago. he also has fresh blood per rectum, minimal amount, chronic.    the patient lives with his aunt, has no enough social support ( from wife and children), he has been in rehab program and was sober for a while then started drinking again. he used to follow at [facility] for hepatology.        Hospital Course:    Patient had a therapeutic paracentesis twice ( first time 4 L removed, second time 4.5 L removed) and studies did not show any SBP. He was evaluated by IR for tips but deemed to be a very bad candidate.    He was evaluated by surgery for a possible miles-rectal abscess and was giving antibiotics for that but the CT pelvis did not show any collection ( probably the abscess opened up on its own and it was oozing some blood). Patient was given tolvaptan for severe hyponatremia and it corrected from 120 to 126 and patient was passing urine. He was started on lasix 40 mg qd and spironolactone 50 mg qd. He was feeling itchiness initially and had wound marks from itching on his belly. He was on oxygen at some point due to his distended belly but after the 2nd para, he felt better and was ambulating on room air.     He promised he will stop consumption of alcohol and will follow up with his liver doctor        Attending Attestation        Pt seen and examined this am. Plan and case d/w F9 Team and the pt himself in detail at bedside.    I agree with above summary     Pt is stable and cleared for d/c home with MAP/GI Clinics f/u and also can f/u at [facility] to be considered for Liver Transplant Program. Pt promised to stop drinking alcohol and to comply with all medical care upon discharge home today.         Vital Signs Last 24 Hrs    T(C): 36.5 (13 Dec 2019 15:06), Max: 37.1 (13 Dec 2019 08:27)    T(F): 97.7 (13 Dec 2019 15:06), Max: 98.7 (13 Dec 2019 08:27)    HR: 95 (13 Dec 2019 15:06) (89 - 95)    BP: 112/52 (13 Dec 2019 15:06) (100/54 - 120/55)    RR: 18 (13 Dec 2019 15:06) (18 - 18)    SpO2: 96% (13 Dec 2019 08:00) (96% - 96%)        CV: NL S1, S2    Resp: CTA bilateral    GI: +BS, Firm, Ascities, NT    Ext: +1 edema bilateral     MS: AOx3                                9.1      17.02 )-----------( 113      ( 12 Dec 2019 05:41 )               27.4         12-13        125<L>  |  95<L>  |  13    ----------------------------<  94    4.6   |  20  |  0.7        Ca    7.4<L>      13 Dec 2019 07:20        Problems:    1. End Stage Liver Dz    2. Chronic HypoNatremia due #1 - 500cc fluid restrictions a day / not a candidate for Tolvaptan due to Liver Dz    3. Miles anal superficial abscess s/p rx abx completed course        DISCHARGE HOME MEDICATIONS     ciprofloxacin     Tablet 500 milliGRAM(s) Oral daily (SBP Prophylaxis per ID)    folic acid 1 milliGRAM(s) Oral daily (ETOH Abuse h/x)    furosemide    Tablet 40 milliGRAM(s) Oral daily (Ascities)     multivitamin 1 Tablet(s) Oral daily (ETOH Abuse h/x)    polyethylene glycol 3350 17 Gram(s) Oral every 12 hours (constipation prn)    spironolactone 50 milliGRAM(s) Oral daily (ascities - liver dz) dose decreased in the hospital from 100mg due to hyperkalemia     thiamine 100 milliGRAM(s) Oral daily (ETOH abuse h/x)

## 2019-12-13 NOTE — DISCHARGE NOTE NURSING/CASE MANAGEMENT/SOCIAL WORK - NSDCFUADDAPPT_GEN_ALL_CORE_FT
- You have a follow up appointment at the [facility] ([facility]) on December 19th at 12:30 pm.   - You have to follow up with your liver clinic doctor at [place]. Please call and make an appointment  - You also need to see the gastroenterologist doctor here in the hospital at the liver clinic. Please call that number [phone] ( the GI doctor is Dr. Huerta)  - You will need an Ultrasound of the abdomen in 6 months  - You also need an EGD in few months

## 2019-12-17 NOTE — PATIENT PROFILE ADULT - NSSCCAGEALCOHOLGUILTYABOUT_GEN_A_NUR
----- Message from Luana Limon MD sent at 12/17/2019  8:54 AM CST -----  Please ask [employer] rep to reach out and help get her set for uploading data etc  
email was sent to Hali Mcgee, with [employer] to have them contact patient about getting patient set up to download the [employer] via [employer]  
yes

## 2019-12-19 ENCOUNTER — INPATIENT (INPATIENT)
Facility: HOSPITAL | Age: 44
LOS: 0 days | Discharge: ANOTHER HOSPITAL | End: 2019-12-20
Attending: INTERNAL MEDICINE | Admitting: INTERNAL MEDICINE
Payer: MEDICAID

## 2019-12-19 ENCOUNTER — APPOINTMENT (OUTPATIENT)
Dept: INTERNAL MEDICINE | Facility: CLINIC | Age: 44
End: 2019-12-19

## 2019-12-19 VITALS
RESPIRATION RATE: 18 BRPM | TEMPERATURE: 98 F | HEART RATE: 87 BPM | SYSTOLIC BLOOD PRESSURE: 104 MMHG | DIASTOLIC BLOOD PRESSURE: 56 MMHG | OXYGEN SATURATION: 96 %

## 2019-12-19 DIAGNOSIS — Z98.890 OTHER SPECIFIED POSTPROCEDURAL STATES: Chronic | ICD-10-CM

## 2019-12-19 LAB
ALBUMIN SERPL ELPH-MCNC: 1.6 G/DL — LOW (ref 3.5–5.2)
ALP SERPL-CCNC: 201 U/L — HIGH (ref 30–115)
ALT FLD-CCNC: 85 U/L — HIGH (ref 0–41)
ANION GAP SERPL CALC-SCNC: 12 MMOL/L — SIGNIFICANT CHANGE UP (ref 7–14)
APTT BLD: 46.1 SEC — HIGH (ref 27–39.2)
AST SERPL-CCNC: 154 U/L — HIGH (ref 0–41)
BASOPHILS # BLD AUTO: 0.02 K/UL — SIGNIFICANT CHANGE UP (ref 0–0.2)
BASOPHILS NFR BLD AUTO: 0.2 % — SIGNIFICANT CHANGE UP (ref 0–1)
BILIRUB SERPL-MCNC: 9.7 MG/DL — HIGH (ref 0.2–1.2)
BUN SERPL-MCNC: 20 MG/DL — SIGNIFICANT CHANGE UP (ref 10–20)
CALCIUM SERPL-MCNC: 7 MG/DL — LOW (ref 8.5–10.1)
CHLORIDE SERPL-SCNC: 92 MMOL/L — LOW (ref 98–110)
CO2 SERPL-SCNC: 15 MMOL/L — LOW (ref 17–32)
CREAT SERPL-MCNC: 0.8 MG/DL — SIGNIFICANT CHANGE UP (ref 0.7–1.5)
EOSINOPHIL # BLD AUTO: 0.14 K/UL — SIGNIFICANT CHANGE UP (ref 0–0.7)
EOSINOPHIL NFR BLD AUTO: 1.1 % — SIGNIFICANT CHANGE UP (ref 0–8)
GLUCOSE SERPL-MCNC: 103 MG/DL — HIGH (ref 70–99)
HCT VFR BLD CALC: 26.8 % — LOW (ref 42–52)
HGB BLD-MCNC: 9.1 G/DL — LOW (ref 14–18)
IMM GRANULOCYTES NFR BLD AUTO: 0.9 % — HIGH (ref 0.1–0.3)
INR BLD: 3.03 RATIO — HIGH (ref 0.65–1.3)
LIDOCAIN IGE QN: 166 U/L — HIGH (ref 7–60)
LYMPHOCYTES # BLD AUTO: 0.83 K/UL — LOW (ref 1.2–3.4)
LYMPHOCYTES # BLD AUTO: 6.6 % — LOW (ref 20.5–51.1)
MAGNESIUM SERPL-MCNC: 2 MG/DL — SIGNIFICANT CHANGE UP (ref 1.8–2.4)
MCHC RBC-ENTMCNC: 32.4 PG — HIGH (ref 27–31)
MCHC RBC-ENTMCNC: 34 G/DL — SIGNIFICANT CHANGE UP (ref 32–37)
MCV RBC AUTO: 95.4 FL — HIGH (ref 80–94)
MONOCYTES # BLD AUTO: 1.24 K/UL — HIGH (ref 0.1–0.6)
MONOCYTES NFR BLD AUTO: 9.8 % — HIGH (ref 1.7–9.3)
NEUTROPHILS # BLD AUTO: 10.29 K/UL — HIGH (ref 1.4–6.5)
NEUTROPHILS NFR BLD AUTO: 81.4 % — HIGH (ref 42.2–75.2)
NRBC # BLD: 0 /100 WBCS — SIGNIFICANT CHANGE UP (ref 0–0)
NT-PROBNP SERPL-SCNC: 125 PG/ML — SIGNIFICANT CHANGE UP (ref 0–300)
PLATELET # BLD AUTO: 125 K/UL — LOW (ref 130–400)
POTASSIUM SERPL-MCNC: 5 MMOL/L — SIGNIFICANT CHANGE UP (ref 3.5–5)
POTASSIUM SERPL-SCNC: 5 MMOL/L — SIGNIFICANT CHANGE UP (ref 3.5–5)
PROT SERPL-MCNC: 6.9 G/DL — SIGNIFICANT CHANGE UP (ref 6–8)
PROTHROM AB SERPL-ACNC: 34.5 SEC — HIGH (ref 9.95–12.87)
RBC # BLD: 2.81 M/UL — LOW (ref 4.7–6.1)
RBC # FLD: 21 % — HIGH (ref 11.5–14.5)
SODIUM SERPL-SCNC: 119 MMOL/L — CRITICAL LOW (ref 135–146)
TROPONIN T SERPL-MCNC: <0.01 NG/ML — SIGNIFICANT CHANGE UP
WBC # BLD: 12.64 K/UL — HIGH (ref 4.8–10.8)
WBC # FLD AUTO: 12.64 K/UL — HIGH (ref 4.8–10.8)

## 2019-12-19 PROCEDURE — 73562 X-RAY EXAM OF KNEE 3: CPT | Mod: 26,RT

## 2019-12-19 PROCEDURE — 70450 CT HEAD/BRAIN W/O DYE: CPT | Mod: 26

## 2019-12-19 PROCEDURE — 72125 CT NECK SPINE W/O DYE: CPT | Mod: 26

## 2019-12-19 PROCEDURE — 93010 ELECTROCARDIOGRAM REPORT: CPT

## 2019-12-19 PROCEDURE — 99285 EMERGENCY DEPT VISIT HI MDM: CPT

## 2019-12-19 PROCEDURE — 71260 CT THORAX DX C+: CPT | Mod: 26

## 2019-12-19 PROCEDURE — 99222 1ST HOSP IP/OBS MODERATE 55: CPT | Mod: GC

## 2019-12-19 PROCEDURE — 71045 X-RAY EXAM CHEST 1 VIEW: CPT | Mod: 26

## 2019-12-19 PROCEDURE — 74177 CT ABD & PELVIS W/CONTRAST: CPT | Mod: 26

## 2019-12-19 RX ORDER — SODIUM CHLORIDE 9 MG/ML
1000 INJECTION INTRAMUSCULAR; INTRAVENOUS; SUBCUTANEOUS ONCE
Refills: 0 | Status: COMPLETED | OUTPATIENT
Start: 2019-12-19 | End: 2019-12-19

## 2019-12-19 RX ORDER — FUROSEMIDE 40 MG
40 TABLET ORAL
Refills: 0 | Status: DISCONTINUED | OUTPATIENT
Start: 2019-12-19 | End: 2019-12-20

## 2019-12-19 RX ADMIN — SODIUM CHLORIDE 1000 MILLILITER(S): 9 INJECTION INTRAMUSCULAR; INTRAVENOUS; SUBCUTANEOUS at 21:31

## 2019-12-19 NOTE — ED PROVIDER NOTE - PROGRESS NOTE DETAILS
AMMON: patient ran by ICU fellow Mary, states patient appropriate for medicine floor. ICU will see him in morning to reassess. MAR kylee.

## 2019-12-19 NOTE — H&P ADULT - ATTENDING COMMENTS
Patient seen and examined independently. I agree with the resident's note, physical exam, and plan except as below.  Vital Signs Last 24 Hrs  T(C): 36.8 (20 Dec 2019 07:30), Max: 36.9 (19 Dec 2019 21:08)  T(F): 98.3 (20 Dec 2019 07:30), Max: 98.5 (19 Dec 2019 21:08)  HR: 97 (20 Dec 2019 07:30) (85 - 97)  BP: 123/64 (20 Dec 2019 07:30) (103/52 - 123/64)  BP(mean): --  RR: 18 (20 Dec 2019 07:30) (16 - 18)  SpO2: 97% (19 Dec 2019 21:08) (96% - 97%)  Pe  nad  aaox3  jaundiced, sclearl icterus  [id number]  ctabl, ec bs at bases  soft distended+ascites+bs  3+pitting edema bilaterally     #mechanical fall - no trauma   #sob due to fluid overload/ascites   #decompensated liver cirrhosis due to ETOH use  -pt states last drink was >2 months ago- no sign sof withdrawal  -on folate and thiamine for suspected deficiency   -coagulapathy due to liver dz - cont po vitamin K - monitor coags  - sp paracentesis today at bedside - 5L - albumin ordered   - follow up fluid analysis  - cont Iv lasix diureiss  -GI recs to cahnge spironolactone to eplerenone (due to gynecomastia) - it is NF - when obtained then dc spironolactone   -Gi team to contact Liver team- may will eventually need transplant     #hypervolemic hyponatremia due to 3rd spacing - cont diuresis - monitor bmp  Na slowly improving   119 to 122  asymptomatic       poor overall prognosis - cont full medical tx - pt counselled on ETOH abstinence

## 2019-12-19 NOTE — ED ADULT NURSE NOTE - NSIMPLEMENTINTERV_GEN_ALL_ED
Implemented All Fall Risk Interventions:  [place] to call system. Call bell, personal items and telephone within reach. Instruct patient to call for assistance. Room bathroom lighting operational. Non-slip footwear when patient is off stretcher. Physically safe environment: no spills, clutter or unnecessary equipment. Stretcher in lowest position, wheels locked, appropriate side rails in place. Provide visual cue, wrist band, yellow gown, etc. Monitor gait and stability. Monitor for mental status changes and reorient to person, place, and time. Review medications for side effects contributing to fall risk. Reinforce activity limits and safety measures with patient and family.

## 2019-12-19 NOTE — H&P ADULT - HISTORY OF PRESENT ILLNESS
Patient is a 44 year old male with history of alcoholic Cirrhosis last drink 2.5 months ago presented to the ED for evaluation after a fall. Patient was admitted for ascites secondary to cirrhosis and discharged on 12/13. Patient is s/p paracentesis x2 with drainage of about 8.5L on last admission. He was discharged on lasix 40mg PO and spironolactone 50mg daily. Patient reports compliance with medications except for antibiotics but his shortness of breath and abdominal distention was worsening. He reports swelling in both legs. Today he went to [facility] clinic as his first appointment after discharge and he tripped and fell on the carpet. He stopped the fall with his right hand. No head injury, loss of consciousness. Remembers all the events during the fall. No c/o fevers, abdominal pain, nausea, vomiting, diarrhea, constipation.  Patient was diagnosed with Cirrhosis last year, was following with [place] and continued to drink until 2.5 months ago when he had worsening symptoms. He had EGD in 7/2018 which showed no varices, esophagitis and gastritis. Previous colonoscopy on 7/2018 which showed hemorrhoids, anal fistula and Mild diverticulosis. He was having bleeding per rectum previously but currently reports no bleeding.   In ED, BP , HR , afberile. Labs showed Patient is a 44 year old male with history of alcoholic Cirrhosis last drink 2.5 months ago presented to the ED for evaluation after a fall with shortness of breath. Patient was admitted for ascites secondary to cirrhosis and discharged on 12/13. Patient is s/p paracentesis x2 with drainage of about 8.5L on last admission. He was discharged on lasix 40mg PO and spironolactone 50mg daily. Patient reports compliance with medications except for antibiotics but his shortness of breath and abdominal distention was worsening. He also reports swelling in both legs. Today he went to [facility] clinic as his first appointment after discharge and he tripped and fell on the carpet. He stopped the fall with his right hand. No head injury, loss of consciousness. Remembers all the events during the fall. No c/o fevers, abdominal pain, nausea, vomiting, diarrhea, constipation.  Patient was diagnosed with Cirrhosis last year, was following with [place] and continued to drink until 2.5 months ago when he had worsening symptoms. He had EGD in 7/2018 which showed no varices, esophagitis and gastritis. Previous colonoscopy on 7/2018 which showed hemorrhoids, anal fistula and Mild diverticulosis. He was having bleeding per rectum previously but currently reports no bleeding.   In ED, /56 , HR 87, afebrile Labs showed INR 3.03, total bilirubin 9.7, alkaline phosphatase 201, , ALT 85, lipase 166, albumin 1.6. CT abdomen showed Cirrhotic liver with moderate abdominopelvic ascites. Trauma work up negative.

## 2019-12-19 NOTE — ED PROVIDER NOTE - PHYSICAL EXAMINATION
VITALS:  I have reviewed the initial vital signs.  GENERAL: Well-developed, well-nourished, in no acute distress.  HEENT: NC/AT. No fitch sign, raccoon eyes, or hemotympanum. Sclera clear. EOMI, PERRLA. Dentition stable.  NECK: supple w FROM. No paraspinal muscle ttp. No midline cervical spinous tenderness, step offs, or deformity.  CARDIO: RRR, nl S1 and S2. No murmurs, rubs, or gallops. 2+ radial and pedal pulses bilaterally. No chest wall tenderness. 2+ b/l lower extremity edema.   PULM: Normal effort. No tachypnea or retractions. No flail chest. CTA b/l without wheezes, rales, or rhonchi. Good air entry.  MSK: +abrasion to right anterior knee. FROM to extremities x4 w/o swelling/erythema/ecchymosis/deformity/ttp. No midline thoracic or lumbar spinous tenderness, step offs, or deformity. Pelvis stable. Normal steady gait.  GI: Abdominal ascites. Nontender throughout.  : No CVA tenderness b/l.  SKIN: Warm, dry. Capillary refill <2 seconds.  NEURO: A&Ox3. Speech clear. CN II-XII intact. 5/5 strength to upper and lower extremities b/l. Sensation intact and equal throughout.

## 2019-12-19 NOTE — H&P ADULT - ASSESSMENT
44 year old man with history of alcoholic cirrhosis presented for shortness of breath of 4 days duration, associated with increase abdominal girth and lower extremities edema.      # Elevated bili - likely 2/2 EtOH hepatitis with superimposed liver cirrhosis   DF: 117 on 12/1   MELD: 32 on 12/1  No SBP on Para   Pt afebrile and no signs of sepsis  On IV abx for perianal abscess   Rec:   Continue Vitamin K 10 mg po once daily and daily INR   Will hold on prednisolone for now given pt have a perianal abscess discovered on exam  Continue Thiamine and folate  Daily liver enzymes and INR   Avoid hepatotoxic drugs  MRI abdomen did  not show CBD dilation     # Decompensated alcoholic liver cirrhosis   MELD 32 on 12/1    Large volume ascites SP paracentesis with 4 L removal on 12/1 and another 4.5 L on 12/5   No SBP   No varices on last EGD on 7/2018   Pt not a good candidate for TIPS as per IR   Hyponatremia improving   Rec:   Continue Lasix and spironolactone to 20 and 50 respectively   Continue tolvaptan as per renal   Nephrology following   Follow BMP and creatinine daily   Needs EGD for variceal screening as outpatient     # Mild rectal bleed secondary to hemorrhoids   Had Colonoscopy in 2018 that showed Hemorrhoids and Diverticulosis   Hemodynamically stable   No signs of active GI bleed  Hg stable   Rec:  Follow Hg q24 h   can have Colonoscopy as outpatient    If Hg keeps dropping and signs of active Bleed will do inpatient colonoscopy once stable     # US abdomen could not rule out HCC   MRI with contrast was negative for HCC   Rec:   Follow up lukasz fetoprotein and US abdomen q6 month     # Alcohol abuse   Pt still drinking alcohol  Rec:   Needs detox and rehab Patient is a 44 year old male with history of alcoholic Cirrhosis last drink 2.5 months ago presented to the ED for evaluation after a fall with shortness of breath. Patient had paracentesis with 8.5L fluid drained on last admission was discharged on 12/13. Labs showed INR 3.03, total bilirubin 9.7, alkaline phosphatase 201, , ALT 85, lipase 166, albumin 1.6. CT abdomen showed Cirrhotic liver with moderate abdominopelvic ascites. Trauma work up negative.    #ASSESSMENT AND PLAN    #Mechanical fall  -Patient was short of breath, tripped and fell  -No head injury. No loss of consciousness  -Trauma work up negative    # Decompensated alcoholic liver cirrhosis   -MELD score 32  -Patient still having abdominal distention, LE edema even on lasix 40mg PO and spironolactone 50mg  -Large volume ascites S/P paracentesis with 4 L removal on 12/1 and another 4.5 L on 12/5. No SBP on fluid analysis  -No varices on last EGD on 7/2018. Patient needs a repeat EGD as outpatient for variceal screening  -Started Lasix 40mg IV BID. Consider increasing spironolactone to 100mg  -Follow BMP and creatinine daily     # Elevated bili - likely 2/2 EtOH hepatitis with superimposed liver cirrhosis   DF: 117 on 12/1   MELD: 32 on 12/1  No SBP on Para   Pt afebrile and no signs of sepsis  On IV abx for perianal abscess   Rec:   Continue Vitamin K 10 mg po once daily and daily INR   Will hold on prednisolone for now given pt have a perianal abscess discovered on exam  Continue Thiamine and folate  Daily liver enzymes and INR   Avoid hepatotoxic drugs  MRI abdomen did  not show CBD dilation         # Mild rectal bleed secondary to hemorrhoids   Had Colonoscopy in 2018 that showed Hemorrhoids and Diverticulosis   Hemodynamically stable   No signs of active GI bleed  Hg stable   Rec:  Follow Hg q24 h   can have Colonoscopy as outpatient    If Hg keeps dropping and signs of active Bleed will do inpatient colonoscopy once stable     # US abdomen could not rule out HCC   MRI with contrast was negative for HCC   Rec:   Follow up lukasz fetoprotein and US abdomen q6 month     # Alcohol abuse   Pt still drinking alcohol  Rec:   Needs detox and rehab Patient is a 44 year old male with history of alcoholic Cirrhosis last drink 2.5 months ago presented to the ED for evaluation after a fall with shortness of breath. Patient had paracentesis with 8.5L fluid drained on last admission was discharged on 12/13. Labs showed INR 3.03, total bilirubin 9.7, alkaline phosphatase 201, , ALT 85, lipase 166, albumin 1.6. CT abdomen showed Cirrhotic liver with moderate abdominopelvic ascites. Trauma work up negative.    #ASSESSMENT AND PLAN    #Mechanical fall  -Patient was short of breath, tripped and fell  -No head injury. No loss of consciousness  -Trauma work up negative    # Decompensated alcoholic liver cirrhosis   -MELD score 32  -Patient still having abdominal distention, LE edema even on lasix 40mg PO and spironolactone 50mg  -Therapeutic paracentesis tomorrow   -Large volume ascites S/P paracentesis with 4 L removal on 12/1 and another 4.5 L on 12/5. No SBP on fluid analysis  -No varices on last EGD on 7/2018. Patient needs a repeat EGD as outpatient for variceal screening  -Started Lasix 40mg IV BID. Consider increasing spironolactone to 100mg  -Follow BMP and creatinine daily   -Needs US abdomen and AFP every 6 months for HCC screening  -Follow at hepatology clinic OP    # Elevated liver enzymes   -likely 2/2 alcoholic hepatitis with liver cirrhosis   -total bilirubin 9.7, alkaline phosphatase 201, , ALT 85, lipase 166, albumin 1.6, INR 3.03  -MRI abdomen from last admission showed no CBD dilatation  -Liver enzymes improved compared to last admission  -Will continue vitamin K 10mg PO once daily  -Monitor INR     #Hyponatremia  -Na 119. Likely dilutional 2/2 volume overload  -Started on lasix IV and spironolactone  -No salt restriction  -Nephrology consult  -Monitor BMP    #Diet: Regular  #DVT ppx: High INR  #GI ppx: protonix  #Dispo; acute

## 2019-12-19 NOTE — ED PROVIDER NOTE - NS ED ROS FT
CONSTITUTIONAL: (-) fevers, (-) chills  EYES: (-) vision changes, (-) blurry vision, (-) double vision  NECK: (-) neck pain, (-) neck stiffness  CARDIO: (-) chest pain, (-) palpitations, (+) b/l LE edema  PULM: (-) cough, (-) sputum, (+) shortness of breath, (-) wheezing, (-) hemoptysis  GI: see HPI, (-) nausea, (-) vomiting, (-) diarrhea, (-) constipation, (-) abdominal pain, (-) melena, (-) hematochezia, (-) rectal bleeding  : (-) dysuria, (-) hematuria, (-) frequency  MSK: see HPI  SKIN: (+) abrasion to right knee, (-) rashes, (-) ecchymosis, (-) jaundice  NEURO: (-) headache, (-) head injury, (-) LOC, (-) dizziness, (-) lightheadedness, (-) syncope, (-) speech changes, (-) confusion, (-) numbness, (-) weakness, (-) paresthesias    *all other systems negative except as documented above and in the HPI*

## 2019-12-19 NOTE — H&P ADULT - NSHPLABSRESULTS_GEN_ALL_CORE
Complete Blood Count + Automated Diff (12.19.19 @ 16:39)    WBC Count: 12.64 K/uL    RBC Count: 2.81 M/uL    Hemoglobin: 9.1 g/dL    Hematocrit: 26.8 %    Mean Cell Volume: 95.4 fL    Mean Cell Hemoglobin: 32.4 pg    Mean Cell Hemoglobin Conc: 34.0 g/dL    Red Cell Distrib Width: 21.0 %    Platelet Count - Automated: 125 K/uL    Auto Neutrophil #: 10.29 K/uL    Auto Lymphocyte #: 0.83 K/uL    Auto Monocyte #: 1.24 K/uL    Auto Eosinophil #: 0.14 K/uL    Auto Basophil #: 0.02 K/uL    Auto Neutrophil %: 81.4: Differential percentages must be correlated with absolute numbers for  clinical significance. %    Auto Lymphocyte %: 6.6 %    Auto Monocyte %: 9.8 %    Auto Eosinophil %: 1.1 %    Auto Basophil %: 0.2 %    Auto Immature Granulocyte %: 0.9 %    Nucleated RBC: 0 /100 WBCs    Comprehensive Metabolic Panel (12.19.19 @ 16:39)    Sodium, Serum: 119: Critical value:  TYPE:(C=Critical, N=Notification, A=Abnormal) c  TESTS: na  DATE/TIME CALLED: 12/19/19 17:20  CALLED TO: vikki melendez  READ BACK (2 Patient Identifiers)(Y/N): y  READ BACK VALUES (Y/N): y  CALLED BY: dw mmol/L    Potassium, Serum: 5.0: Hemolyzed. Interpret with caution mmol/L    Chloride, Serum: 92 mmol/L    Carbon Dioxide, Serum: 15 mmol/L    Anion Gap, Serum: 12 mmol/L    Blood Urea Nitrogen, Serum: 20 mg/dL    Creatinine, Serum: 0.8: Icteric. Interpret with caution mg/dL    Glucose, Serum: 103 mg/dL    Calcium, Total Serum: 7.0 mg/dL    Protein Total, Serum: 6.9 g/dL    Albumin, Serum: 1.6 g/dL    Bilirubin Total, Serum: 9.7 mg/dL    Alkaline Phosphatase, Serum: 201 U/L    Aspartate Aminotransferase (AST/SGOT): 154: Hemolyzed. Interpret with caution U/L    Alanine Aminotransferase (ALT/SGPT): 85: Hemolyzed. Interpret with caution U/L    eGFR if Non : 108: Interpretative comment      < from: CT Abdomen and Pelvis w/ IV Cont (12.19.19 @ 20:14) >    IMPRESSION:    1.  Cirrhotic liver with moderate abdominopelvic ascites.  2.  Stable findings of varices and splenorenal shunt.  3.  No evidence of traumatic injuries in chest, abdomen and pelvis.    < end of copied text >

## 2019-12-19 NOTE — ED PROVIDER NOTE - OBJECTIVE STATEMENT
44 year old male w hx of etoh abuse last drink 2 months ago, cirrhosis presents to the ED for evaluation after a fall. Patient states he was d/c'd last week after being admitted for cirrhosis and ascites. Pt is s/p paracentesis x2, was going to [facility] clinic for f/u today when he tripped on a carpet and fell. +FOOSH and onto right side. Denies head injury/LOC. Remembers everything before/during/after fall. Has been ambulatory since the fall. +mild, non-radiating, right knee pain. Patient also endorses shortness of breath and worsening leg and abdominal swelling since his dc. Denies headaches, vision changes, neck pain/stiffness, chest pain, back pain, abd pain, n/v, dizziness, lightheadedness, behavioral/mental status change, paresthesias, numbness, weakness.

## 2019-12-19 NOTE — H&P ADULT - NSHPPHYSICALEXAM_GEN_ALL_CORE
PHYSICAL EXAM:  GENERAL: NAD, lying in bed comfortably  HEAD:  Atraumatic, Normocephalic  EYES: EOMI, PERRLA, conjunctiva and sclera clear  ENT: Moist mucous membranes  NECK: Supple, No JVD  CHEST/LUNG: Clear to auscultation bilaterally; No rales, rhonchi, wheezing, or rubs. Unlabored respirations  HEART: Regular rate and rhythm; No murmurs, rubs, or gallops  ABDOMEN: Bowel sounds present; Soft, Nontender, + distended. No hepatomegaly  EXTREMITIES:  2+ Peripheral Pulses, brisk capillary refill. No clubbing, cyanosis, or edema  NERVOUS SYSTEM:  Alert & Oriented X3, speech clear. No deficits   MSK: FROM all 4 extremities, full and equal strength  SKIN: No rashes or lesions

## 2019-12-19 NOTE — PATIENT PROFILE ADULT - FUNCTIONAL SCREEN CURRENT LEVEL: DRESSING, MLM
For information on Fall & Injury Prevention, visit [email or web address] = independent

## 2019-12-19 NOTE — ED PROVIDER NOTE - PRIOR HOSPITAL/ED VISITS SUMMARY FREE TEXT FOR MDM OBTAINED AND REVIEWED OLD RECORDS QUESTION
Patient admitted to hospital and discharged last week for worsening ascites and dyspnea, s/p paracentesis x2 and subsequent discharge after improvement.

## 2019-12-19 NOTE — ED PROVIDER NOTE - ATTENDING CONTRIBUTION TO CARE
44 year old male, pmhx ETOH abuse (last drink 2 months ago) cirrhosis from ETOH abuse with previous ascites, presenting s/p fall. Patient was recently dc'd last week from the hospital for cirrhosis and ascites requiring paracentesis x2. States he was following up as outpatient today at which time he tripped and fell, landing on his right knee and his right wrist (+) FOOSH. Denies head trauma or LOC. States currently he is having right knee pain from the fall described as achy, non-radiating, worse with movement, relieved with rest, mild severity. Denies pain in his wrists. States he is having chronically worsening dyspnea and abdominal swelling as well since discharge and believes he needs another paracentesis. Otherwise denies fevers, headache, vision changes, weakness/numbness, confusion, URI symptoms, neck pain, chest pain, back pain, cough, palpitations, nausea, vomiting, diarrhea, constipation, blood in stool/dark stools, urinary symptoms, penile discharge/testicular pain, leg swelling, rash, recent travel or sick contacts.    Vital Signs: I have reviewed the initial vital signs.  Constitutional: NAD, well-nourished, appears stated age, no acute distress.  HEENT: Airway patent, moist MM, no erythema/swelling/deformity of oral structures. EOMI, PERRLA.  CV: regular rate, regular rhythm, well-perfused extremities, 2+ b/l DP and radial pulses equal.  Lungs: BCTA, no increased WOB.  ABD: (+) ascites but Non-tender, no guarding or rebound, no pulsatile mass, no hernias.   MSK: Neck supple, nontender, nl ROM, no stepoff. Chest nontender. Back nontender in TLS spine or to b/l bony structures or flanks. Ext nontender, nl rom, no deformity.   INTEG: Skin warm, dry, no rash.  NEURO: A&Ox3, normal strength, nl sensation throughout, normal speech.   PSYCH: Calm, cooperative, normal affect and interaction.    Patient had mechanical fall and is likely coagulopathic from liver disease. Will hopkins scan for trauma given this, and will obtain labs, re-eval.

## 2019-12-19 NOTE — ED ADULT NURSE NOTE - OBJECTIVE STATEMENT
pt comes in with complaints of right hand, knee, and chest pain after he tripped and fell due to his slipper. pt did not hit his head. no loc.

## 2019-12-20 ENCOUNTER — TRANSCRIPTION ENCOUNTER (OUTPATIENT)
Age: 44
End: 2019-12-20

## 2019-12-20 ENCOUNTER — RESULT REVIEW (OUTPATIENT)
Age: 44
End: 2019-12-20

## 2019-12-20 ENCOUNTER — INPATIENT (INPATIENT)
Facility: HOSPITAL | Age: 44
LOS: 10 days | Discharge: HOME | DRG: 432 | End: 2019-12-31
Attending: STUDENT IN AN ORGANIZED HEALTH CARE EDUCATION/TRAINING PROGRAM | Admitting: HOSPITALIST
Payer: MEDICAID

## 2019-12-20 VITALS
RESPIRATION RATE: 18 BRPM | TEMPERATURE: 98 F | DIASTOLIC BLOOD PRESSURE: 59 MMHG | HEART RATE: 85 BPM | SYSTOLIC BLOOD PRESSURE: 103 MMHG

## 2019-12-20 VITALS
TEMPERATURE: 99 F | RESPIRATION RATE: 17 BRPM | OXYGEN SATURATION: 97 % | HEIGHT: 68 IN | SYSTOLIC BLOOD PRESSURE: 108 MMHG | WEIGHT: 210.98 LBS | DIASTOLIC BLOOD PRESSURE: 63 MMHG | HEART RATE: 92 BPM

## 2019-12-20 DIAGNOSIS — Z98.890 OTHER SPECIFIED POSTPROCEDURAL STATES: Chronic | ICD-10-CM

## 2019-12-20 DIAGNOSIS — K72.90 HEPATIC FAILURE, UNSPECIFIED WITHOUT COMA: ICD-10-CM

## 2019-12-20 LAB
ALBUMIN SERPL ELPH-MCNC: 1.4 G/DL — LOW (ref 3.5–5.2)
ALP SERPL-CCNC: 177 U/L — HIGH (ref 30–115)
ALT FLD-CCNC: 71 U/L — HIGH (ref 0–41)
ANION GAP SERPL CALC-SCNC: 13 MMOL/L — SIGNIFICANT CHANGE UP (ref 7–14)
AST SERPL-CCNC: 111 U/L — HIGH (ref 0–41)
B PERT IGG+IGM PNL SER: CLEAR — SIGNIFICANT CHANGE UP
BILIRUB DIRECT SERPL-MCNC: 5.5 MG/DL — HIGH (ref 0–0.2)
BILIRUB INDIRECT FLD-MCNC: 3 MG/DL — HIGH (ref 0.2–1.2)
BILIRUB SERPL-MCNC: 8.5 MG/DL — HIGH (ref 0.2–1.2)
BUN SERPL-MCNC: 19 MG/DL — SIGNIFICANT CHANGE UP (ref 10–20)
CALCIUM SERPL-MCNC: 7 MG/DL — LOW (ref 8.5–10.1)
CHLORIDE SERPL-SCNC: 94 MMOL/L — LOW (ref 98–110)
CO2 SERPL-SCNC: 15 MMOL/L — LOW (ref 17–32)
COLOR FLD: YELLOW — SIGNIFICANT CHANGE UP
CREAT SERPL-MCNC: 0.6 MG/DL — LOW (ref 0.7–1.5)
FLUID INTAKE SUBSTANCE CLASS: SIGNIFICANT CHANGE UP
FLUID SEGMENTED GRANULOCYTES: SIGNIFICANT CHANGE UP %
GLUCOSE SERPL-MCNC: 93 MG/DL — SIGNIFICANT CHANGE UP (ref 70–99)
HCT VFR BLD CALC: 23.8 % — LOW (ref 42–52)
HGB BLD-MCNC: 8 G/DL — LOW (ref 14–18)
LYMPHOCYTES # FLD: SIGNIFICANT CHANGE UP
MAGNESIUM SERPL-MCNC: 2 MG/DL — SIGNIFICANT CHANGE UP (ref 1.8–2.4)
MCHC RBC-ENTMCNC: 31.7 PG — HIGH (ref 27–31)
MCHC RBC-ENTMCNC: 33.6 G/DL — SIGNIFICANT CHANGE UP (ref 32–37)
MCV RBC AUTO: 94.4 FL — HIGH (ref 80–94)
MESOTHL CELL # FLD: SIGNIFICANT CHANGE UP %
MONOS+MACROS # FLD: SIGNIFICANT CHANGE UP %
NRBC # BLD: 0 /100 WBCS — SIGNIFICANT CHANGE UP (ref 0–0)
OSMOLALITY SERPL: 272 MOSMOL/KG — LOW (ref 275–300)
OSMOLALITY UR: 368 MOS/KG — SIGNIFICANT CHANGE UP (ref 50–1400)
PLATELET # BLD AUTO: 93 K/UL — LOW (ref 130–400)
POTASSIUM SERPL-MCNC: 4.5 MMOL/L — SIGNIFICANT CHANGE UP (ref 3.5–5)
POTASSIUM SERPL-SCNC: 4.5 MMOL/L — SIGNIFICANT CHANGE UP (ref 3.5–5)
PROT SERPL-MCNC: 5.9 G/DL — LOW (ref 6–8)
RBC # BLD: 2.52 M/UL — LOW (ref 4.7–6.1)
RBC # FLD: 20.7 % — HIGH (ref 11.5–14.5)
RCV VOL RI: 0 /UL — SIGNIFICANT CHANGE UP (ref 0–0)
SODIUM SERPL-SCNC: 122 MMOL/L — LOW (ref 135–146)
SODIUM UR-SCNC: <20 MMOL/L — SIGNIFICANT CHANGE UP
TOTAL NUCLEATED CELL COUNT, BODY FLUID: 130 /UL — SIGNIFICANT CHANGE UP
TUBE TYPE: SIGNIFICANT CHANGE UP
WBC # BLD: 10.2 K/UL — SIGNIFICANT CHANGE UP (ref 4.8–10.8)
WBC # FLD AUTO: 10.2 K/UL — SIGNIFICANT CHANGE UP (ref 4.8–10.8)

## 2019-12-20 PROCEDURE — 99223 1ST HOSP IP/OBS HIGH 75: CPT

## 2019-12-20 PROCEDURE — 88305 TISSUE EXAM BY PATHOLOGIST: CPT | Mod: 26

## 2019-12-20 PROCEDURE — 30901 CONTROL OF NOSEBLEED: CPT

## 2019-12-20 PROCEDURE — 88112 CYTOPATH CELL ENHANCE TECH: CPT | Mod: 26

## 2019-12-20 RX ORDER — PANTOPRAZOLE SODIUM 20 MG/1
40 TABLET, DELAYED RELEASE ORAL
Refills: 0 | Status: DISCONTINUED | OUTPATIENT
Start: 2019-12-20 | End: 2019-12-20

## 2019-12-20 RX ORDER — THIAMINE MONONITRATE (VIT B1) 100 MG
100 TABLET ORAL DAILY
Refills: 0 | Status: DISCONTINUED | OUTPATIENT
Start: 2019-12-20 | End: 2019-12-20

## 2019-12-20 RX ORDER — SPIRONOLACTONE 25 MG/1
50 TABLET, FILM COATED ORAL DAILY
Refills: 0 | Status: DISCONTINUED | OUTPATIENT
Start: 2019-12-20 | End: 2019-12-20

## 2019-12-20 RX ORDER — POLYETHYLENE GLYCOL 3350 17 G/17G
17 POWDER, FOR SOLUTION ORAL EVERY 12 HOURS
Refills: 0 | Status: DISCONTINUED | OUTPATIENT
Start: 2019-12-20 | End: 2019-12-20

## 2019-12-20 RX ORDER — PHYTONADIONE (VIT K1) 5 MG
10 TABLET ORAL ONCE
Refills: 0 | Status: COMPLETED | OUTPATIENT
Start: 2019-12-20 | End: 2019-12-20

## 2019-12-20 RX ORDER — PHYTONADIONE (VIT K1) 5 MG
10 TABLET ORAL DAILY
Refills: 0 | Status: DISCONTINUED | OUTPATIENT
Start: 2019-12-20 | End: 2019-12-20

## 2019-12-20 RX ORDER — EPLERENONE 50 MG/1
50 TABLET, FILM COATED ORAL DAILY
Refills: 0 | Status: CANCELLED | OUTPATIENT
Start: 2019-12-26 | End: 2019-12-20

## 2019-12-20 RX ORDER — FOLIC ACID 0.8 MG
1 TABLET ORAL DAILY
Refills: 0 | Status: DISCONTINUED | OUTPATIENT
Start: 2019-12-20 | End: 2019-12-20

## 2019-12-20 RX ORDER — EPLERENONE 50 MG/1
1 TABLET, FILM COATED ORAL
Qty: 0 | Refills: 0 | DISCHARGE
Start: 2019-12-20

## 2019-12-20 RX ORDER — CIPROFLOXACIN LACTATE 400MG/40ML
500 VIAL (ML) INTRAVENOUS DAILY
Refills: 0 | Status: DISCONTINUED | OUTPATIENT
Start: 2019-12-20 | End: 2019-12-20

## 2019-12-20 RX ORDER — ALBUMIN HUMAN 25 %
150 VIAL (ML) INTRAVENOUS ONCE
Refills: 0 | Status: COMPLETED | OUTPATIENT
Start: 2019-12-20 | End: 2019-12-20

## 2019-12-20 RX ADMIN — Medication 40 MILLIGRAM(S): at 17:17

## 2019-12-20 RX ADMIN — PANTOPRAZOLE SODIUM 40 MILLIGRAM(S): 20 TABLET, DELAYED RELEASE ORAL at 06:44

## 2019-12-20 RX ADMIN — Medication 150 MILLILITER(S): at 14:57

## 2019-12-20 RX ADMIN — Medication 100 MILLIGRAM(S): at 11:41

## 2019-12-20 RX ADMIN — Medication 1 MILLIGRAM(S): at 11:41

## 2019-12-20 RX ADMIN — Medication 1 TABLET(S): at 11:41

## 2019-12-20 RX ADMIN — Medication 40 MILLIGRAM(S): at 06:44

## 2019-12-20 RX ADMIN — Medication 500 MILLIGRAM(S): at 11:41

## 2019-12-20 RX ADMIN — POLYETHYLENE GLYCOL 3350 17 GRAM(S): 17 POWDER, FOR SOLUTION ORAL at 06:44

## 2019-12-20 RX ADMIN — SPIRONOLACTONE 50 MILLIGRAM(S): 25 TABLET, FILM COATED ORAL at 06:44

## 2019-12-20 RX ADMIN — POLYETHYLENE GLYCOL 3350 17 GRAM(S): 17 POWDER, FOR SOLUTION ORAL at 17:17

## 2019-12-20 RX ADMIN — Medication 10 MILLIGRAM(S): at 10:24

## 2019-12-20 NOTE — CONSULT NOTE ADULT - ASSESSMENT
44 year old male with history of alcoholic Cirrhosis last drink 2.5 months ago presented to the ED for evaluation after a fall with shortness of breath.    # Alcoholic liver cirrhosis with decompensation      coagulopathy with INR > 3, ascites low albumin, transaminitis      last drink around 2.5 month ago      MELD score 31-32            continue with SBP prphylaxis      continue with lasix and spironolactone for now      check hepatitis profile and 44 year old male with history of alcoholic Cirrhosis last drink 2.5 months ago presented to the ED for evaluation after a fall with shortness of breath.    # Alcoholic liver cirrhosis with decompensation      coagulopathy with INR > 3, ascites low albumin, transaminitis      last drink around 1 MONTH AND 3 WEEKS      previous hepatitis and autoimmune workup negative      RIGOBERTO positive 1: 160      MELD score 31-32            continue with SBP prophylaxis      need therapeutic and diagnostic tap today, infuse albumin regardless of how much fluid removed      continue with lasix 40 mg IV bid      daily weight      strict I/O      since pt has gynecomastia change spironolactone with eplerenone start with 50 mg po daily      will consult liver transplant center for possible transfer under their facility ( pt has good social support)      if not a candidate, definitely a candidate for TIPS because of refractory ascitis      overall prognosis poor      will follow

## 2019-12-20 NOTE — CONSULT NOTE ADULT - SUBJECTIVE AND OBJECTIVE BOX
44 year old male with history of alcoholic Cirrhosis last drink 2.5 months ago presented to the ED for evaluation after a fall with shortness of breath. Patient was admitted for ascites secondary to cirrhosis and discharged on 12/13. Patient is s/p paracentesis x2 with drainage of about 8.5L on last admission. He was discharged on lasix 40mg PO and spironolactone 50mg daily. Patient reports compliance with medications except for antibiotics but his shortness of breath and abdominal distention was worsening. He also reports swelling in both legs. Today he went to [facility] clinic as his first appointment after discharge and he tripped and fell on the carpet. He stopped the fall with his right hand. No head injury, loss of consciousness. Remembers all the events during the fall. No c/o fevers, abdominal pain, nausea, vomiting, diarrhea, constipation.  Patient was diagnosed with Cirrhosis last year, was following with [place] and continued to drink until 2.5 months ago when he had worsening symptoms. He had EGD in 7/2018 which showed no varices, esophagitis and gastritis. Previous colonoscopy on 7/2018 which showed hemorrhoids, anal fistula and Mild diverticulosis. He was having bleeding per rectum previously but currently reports no bleeding.     PAST MEDICAL & SURGICAL HISTORY:  Liver cirrhosis, alcoholic  Anemia  Thrombocytopenia  ETOH abuse  History of incision and drainage: Gluteal abscess      REVIEW OF SYSTEMS      General:	    Skin/Breast:  	  Ophthalmologic:  	  ENMT:	    Respiratory and Thorax:  	  Cardiovascular:	    Gastrointestinal:	    Genitourinary:	    Musculoskeletal:	    Neurological:	    Psychiatric:	    Hematology/Lymphatics:	    Endocrine:	    Allergic/Immunologic:	    MEDICATIONS  (STANDING):  ciprofloxacin     Tablet 500 milliGRAM(s) Oral daily  folic acid 1 milliGRAM(s) Oral daily  furosemide   Injectable 40 milliGRAM(s) IV Push two times a day  multivitamin 1 Tablet(s) Oral daily  pantoprazole    Tablet 40 milliGRAM(s) Oral before breakfast  phytonadione   Solution 10 milliGRAM(s) Oral once  polyethylene glycol 3350 17 Gram(s) Oral every 12 hours  spironolactone 50 milliGRAM(s) Oral daily  thiamine 100 milliGRAM(s) Oral daily    MEDICATIONS  (PRN):      Allergies    No Known Allergies    Intolerances        SOCIAL HISTORY:    FAMILY HISTORY:  Family history of stroke (Father, Mother)      Vital Signs Last 24 Hrs  T(C): 36.8 (20 Dec 2019 07:30), Max: 36.9 (19 Dec 2019 21:08)  T(F): 98.3 (20 Dec 2019 07:30), Max: 98.5 (19 Dec 2019 21:08)  HR: 97 (20 Dec 2019 07:30) (85 - 97)  BP: 123/64 (20 Dec 2019 07:30) (103/52 - 123/64)  BP(mean): --  RR: 18 (20 Dec 2019 07:30) (16 - 18)  SpO2: 97% (19 Dec 2019 21:08) (96% - 97%)    PHYSICAL EXAM:      Eyes:    ENMT:    Neck:    Breasts:    Back:    Respiratory:    Cardiovascular:    Gastrointestinal:    Genitourinary:    Rectal:    Extremities:    Vascular:    Neurological:    Skin:    Lymph Nodes:    Musculoskeletal:    Psychiatric:        LABS:                        8.0    10.20 )-----------( 93       ( 20 Dec 2019 05:31 )             23.8       Hemoglobin: 8.0 g/dL (12-20-19 @ 05:31)  Hemoglobin: 9.1 g/dL (12-19-19 @ 16:39)      12-20    122<L>  |  94<L>  |  19  ----------------------------<  93  4.5   |  15<L>  |  0.6<L>    Ca    7.0<L>      20 Dec 2019 05:31  Mg     2.0     12-20    TPro  5.9<L>  /  Alb  1.4<L>  /  TBili  8.5<H>  /  DBili  5.5<H>  /  AST  111<H>  /  ALT  71<H>  /  AlkPhos  177<H>  12-20    PT/INR - ( 19 Dec 2019 16:39 )   PT: 34.50 sec;   INR: 3.03 ratio         PTT - ( 19 Dec 2019 16:39 )  PTT:46.1 sec      RADIOLOGY & ADDITIONAL STUDIES:    CT Abdomen and Pelvis w/ IV Cont (12.19.19 @ 20:14) >    IMPRESSION:    1.  Cirrhotic liver with moderate abdominopelvic ascites.  2.  Stable findings of varices and splenorenal shunt.  3.  No evidence of traumatic injuries in chest, abdomen and pelvis.      CT Head No Cont (12.19.19 @ 20:11) >    IMPRESSION:     No evidence of acute intracranial pathology.     MR Abdomen w/ IV Cont (12.06.19 @ 16:34) >    IMPRESSION:  1.  Hepatic cirrhosis with sequela of portal hypertension including   perisplenic varices and moderate volume abdominopelvic ascites.  2.  Otherwise, essentially nondiagnostic exam given extensive patient   motion.    < end of copied text >  < end of copied text > 44 year old male with history of alcoholic Cirrhosis last drink 2.5 months ago presented to the ED for evaluation after a fall with shortness of breath. Patient was admitted for ascites secondary to cirrhosis and discharged on 12/13. Patient is s/p paracentesis x2 with drainage of about 8.5L on last admission. He was discharged on lasix 40mg PO and spironolactone 50mg daily. Patient reports compliance with medications except for antibiotics but his shortness of breath and abdominal distention was worsening. He also reports swelling in both legs. Today he went to [facility] clinic as his first appointment after discharge and he tripped and fell on the carpet. He stopped the fall with his right hand. No head injury, loss of consciousness. Remembers all the events during the fall. No c/o fevers, abdominal pain, nausea, vomiting, diarrhea, constipation.  Patient was diagnosed with Cirrhosis last year, was following with [place] and continued to drink until 2.5 months ago when he had worsening symptoms. He had EGD in 7/2018 which showed no varices, esophagitis and gastritis. Previous colonoscopy on 7/2018 which showed hemorrhoids, anal fistula and Mild diverticulosis. He was having bleeding per rectum previously but currently reports no bleeding.     PAST MEDICAL & SURGICAL HISTORY:  Liver cirrhosis, alcoholic  Anemia  Thrombocytopenia  ETOH abuse  History of incision and drainage: Gluteal abscess      REVIEW OF SYSTEMS      General :no fever, weight gain    Skin/Breast: gynecomastia, yellowish discolouration of skin  	  Ophthalmologic: scleral icterus, n eye pain    Respiratory and Thorax: no cough, exertional SOB  	  Cardiovascular:	no cheat pain, no palpitaion    Gastrointestinal:	ascitis, denies any nausea, vomiting or diarrhoea    Genitourinary: no urinary complaints,     Hematology/Lymphatics:	 no bleeding, easy brusibility    MEDICATIONS  (STANDING):  ciprofloxacin     Tablet 500 milliGRAM(s) Oral daily  folic acid 1 milliGRAM(s) Oral daily  furosemide   Injectable 40 milliGRAM(s) IV Push two times a day  multivitamin 1 Tablet(s) Oral daily  pantoprazole    Tablet 40 milliGRAM(s) Oral before breakfast  phytonadione   Solution 10 milliGRAM(s) Oral once  polyethylene glycol 3350 17 Gram(s) Oral every 12 hours  spironolactone 50 milliGRAM(s) Oral daily  thiamine 100 milliGRAM(s) Oral daily    MEDICATIONS  (PRN):      Allergies    No Known Allergies    Intolerances        SOCIAL HISTORY:    FAMILY HISTORY:  Family history of stroke (Father, Mother)      Vital Signs Last 24 Hrs  T(C): 36.8 (20 Dec 2019 07:30), Max: 36.9 (19 Dec 2019 21:08)  T(F): 98.3 (20 Dec 2019 07:30), Max: 98.5 (19 Dec 2019 21:08)  HR: 97 (20 Dec 2019 07:30) (85 - 97)  BP: 123/64 (20 Dec 2019 07:30) (103/52 - 123/64)  BP(mean): --  RR: 18 (20 Dec 2019 07:30) (16 - 18)  SpO2: 97% (19 Dec 2019 21:08) (96% - 97%)    PHYSICAL EXAM:  patient is seen and examined,   general pale, scleral icterus, positive asterix  chest bilateral basal crackles  cvs s1 and s2 no added sound  abdomen soft lax, positive fluid shift, no organomegaly appreciated  2+ pedal edema bilaterally        LABS:                        8.0    10.20 )-----------( 93       ( 20 Dec 2019 05:31 )             23.8       Hemoglobin: 8.0 g/dL (12-20-19 @ 05:31)  Hemoglobin: 9.1 g/dL (12-19-19 @ 16:39)      12-20    122<L>  |  94<L>  |  19  ----------------------------<  93  4.5   |  15<L>  |  0.6<L>    Ca    7.0<L>      20 Dec 2019 05:31  Mg     2.0     12-20    TPro  5.9<L>  /  Alb  1.4<L>  /  TBili  8.5<H>  /  DBili  5.5<H>  /  AST  111<H>  /  ALT  71<H>  /  AlkPhos  177<H>  12-20    PT/INR - ( 19 Dec 2019 16:39 )   PT: 34.50 sec;   INR: 3.03 ratio         PTT - ( 19 Dec 2019 16:39 )  PTT:46.1 sec      RADIOLOGY & ADDITIONAL STUDIES:    CT Abdomen and Pelvis w/ IV Cont (12.19.19 @ 20:14) >    IMPRESSION:    1.  Cirrhotic liver with moderate abdominopelvic ascites.  2.  Stable findings of varices and splenorenal shunt.  3.  No evidence of traumatic injuries in chest, abdomen and pelvis.      CT Head No Cont (12.19.19 @ 20:11) >    IMPRESSION:     No evidence of acute intracranial pathology.     MR Abdomen w/ IV Cont (12.06.19 @ 16:34) >    IMPRESSION:  1.  Hepatic cirrhosis with sequela of portal hypertension including   perisplenic varices and moderate volume abdominopelvic ascites.  2.  Otherwise, essentially nondiagnostic exam given extensive patient   motion.    < end of copied text >  < end of copied text >

## 2019-12-20 NOTE — DISCHARGE NOTE NURSING/CASE MANAGEMENT/SOCIAL WORK - PATIENT PORTAL LINK FT
You can access the FollowMyHealth Patient Portal offered by [facility] by registering at the following website: [email or web address]. By joining [employer]’s FollowMyHealth portal, you will also be able to view your health information using other applications (apps) compatible with our system.

## 2019-12-20 NOTE — DISCHARGE NOTE PROVIDER - NSDCMRMEDTOKEN_GEN_ALL_CORE_FT
ciprofloxacin 500 mg oral tablet: 1 tab(s) orally once a day  eplerenone 50 mg oral tablet: 1 tab(s) orally once a day  folic acid 1 mg oral tablet: 1 tab(s) orally once a day  furosemide 40 mg oral tablet: 1 tab(s) orally once a day  Multiple Vitamins oral tablet: 1 tab(s) orally once a day  polyethylene glycol 3350 oral powder for reconstitution: 17 gram(s) orally every 12 hours, As Needed   Protonix 20 mg oral delayed release tablet: 1 tab(s) orally once a day   thiamine 100 mg oral tablet: 1 tab(s) orally once a day

## 2019-12-20 NOTE — PROCEDURE NOTE - NSPROCDETAILS_GEN_ALL_CORE
location identified, sterile technique used, catheter introduced, fluid drained/sterile dressing applied/ultrasound utilization

## 2019-12-20 NOTE — CHART NOTE - NSCHARTNOTEFT_GEN_A_CORE
patient is a case of acute liver cirrhosis with decompensation  I talked to [facility] liver transplant center dr Jabier sheikh, he accepted the patient , can be transferred to [facility]  awaiting for bed availability  accepting hospitalist Dr robles

## 2019-12-20 NOTE — CONSULT NOTE ADULT - ASSESSMENT
IMPRESSION:    Hyponatremia likely chronic due to cirrhosis/alcohol use  EtOH abuse  Alcoholic cirrhosis    PLAN:    CNS: Avoid CNS depressants    HEENT: Oral care    PULMONARY:  HOB @ 45 degrees    CARDIOVASCULAR: Continue IVF    GI: GI prophylaxis.  Feeding     RENAL:  Follow up lytes.  Correct as needed. F/U BMP    INFECTIOUS DISEASE: Follow up cultures    HEMATOLOGICAL:  DVT prophylaxis.    ENDOCRINE:  Follow up FS.    MUSCULOSKELETAL: OOB to chair    Consider detox consult    At this time patient is hemodynamically stable, Does not reuqire MICU monitoring, Recall if status changes

## 2019-12-20 NOTE — CONSULT NOTE ADULT - ASSESSMENT
44 year old male with history of alcoholic Cirrhosis last drink 2.5 months ago presented to the ED for evaluation after a fall with shortness of breath.  Na 119 on admission, 125 12/13 on discharge.    # Hyponatremia / Liver cirrhosis  - on exam, hypervolemic  - s/p paracentesis on 12/2 & 5th, around 8L removed  - avoid LVP due to risk of HEIKE  - no need for 3% sodium  - can check urine lytes, ur osm, ser osm  - c/w lasix, spironolactone 50mg 44 year old male with history of alcoholic Cirrhosis last drink 2.5 months ago presented to the ED for evaluation after a fall with shortness of breath.  Na 119 on admission, 125 12/13 on discharge.    # Hyponatremia / Liver cirrhosis  - on exam, hypervolemic  - s/p paracentesis on 12/2 & 5th, around 8L removed  - avoid LVP due to risk of HEIKE  - no need for 3% sodium  - can check urine lytes, ur osm, ser osm  - c/w lasix, spironolactone 50mg  fluid restrict 1 L daily

## 2019-12-20 NOTE — DISCHARGE NOTE PROVIDER - HOSPITAL COURSE
Patient is a 44 year old male with history of alcoholic Cirrhosis last drink 2.5 months ago presented to the ED for evaluation after a fall with shortness of breath. Patient was admitted for ascites secondary to cirrhosis and discharged on 12/13. Patient is s/p paracentesis x2 with drainage of about 8.5L on last admission. He was discharged on lasix 40mg PO and spironolactone 50mg daily. Patient reports compliance with medications except for antibiotics but his shortness of breath and abdominal distention was worsening. He also reports swelling in both legs. Today he went to [facility] clinic as his first appointment after discharge and he tripped and fell on the carpet. He stopped the fall with his right hand. No head injury, loss of consciousness. Remembers all the events during the fall. No c/o fevers, abdominal pain, nausea, vomiting, diarrhea, constipation.    Patient was diagnosed with Cirrhosis last year, was following with [place] and continued to drink until 2.5 months ago when he had worsening symptoms. He had EGD in 7/2018 which showed no varices, esophagitis and gastritis. Previous colonoscopy on 7/2018 which showed hemorrhoids, anal fistula and Mild diverticulosis. He was having bleeding per rectum previously but currently reports no bleeding.     In ED, /56 , HR 87, afebrile Labs showed INR 3.03, total bilirubin 9.7, alkaline phosphatase 201, , ALT 85, lipase 166, albumin 1.6. CT abdomen showed Cirrhotic liver with moderate abdominopelvic ascites. Trauma work up negative.     Pt was seen by GI- spoke to [facility] liver transplant center. Pt also underwent 5L therapeutic tap on admission. Pt was accepted at [facility] and will be transferred to [facility] pending availability.

## 2019-12-20 NOTE — CONSULT NOTE ADULT - SUBJECTIVE AND OBJECTIVE BOX
HPI  44 year old male with history of alcoholic Cirrhosis last drink 2.5 months ago presented to the ED for evaluation after a fall with shortness of breath. Patient was admitted for ascites secondary to cirrhosis and discharged on 12/13. Patient is s/p paracentesis x2 with drainage of about 8.5L on last admission. He was discharged on lasix 40mg PO and spironolactone 50mg daily. Patient reports compliance with medications except for antibiotics but his shortness of breath and abdominal distention was worsening. He also reports swelling in both legs. Today he went to [facility] clinic as his first appointment after discharge and he tripped and fell on the carpet. He stopped the fall with his right hand. No head injury, loss of consciousness. Remembers all the events during the fall. No c/o fevers, abdominal pain, nausea, vomiting, diarrhea, constipation.  Patient was diagnosed with Cirrhosis last year, was following with [place] and continued to drink until 2.5 months ago when he had worsening symptoms. He had EGD in 7/2018 which showed no varices, esophagitis and gastritis. Previous colonoscopy on 7/2018 which showed hemorrhoids, anal fistula and Mild diverticulosis. He was having bleeding per rectum previously but currently reports no bleeding.         REVIEW OF SYSTEMS  General:  No fevers  Eyes:  No reported pain   ENT:  No sore throat   NECK: No stiffness   CV:  No chest pain   Resp:  No shortness of breath  GI:  See HPI  :  No dysuria  Muscle:  No aches or weakness  Neuro:  No tingling  Endocrine:  No polyuria  Heme:  No ecchymosis   All other review of systems is negative unless indicated above.    PAST MEDICAL & SURGICAL HISTORY  Liver cirrhosis, alcoholic  Anemia  Thrombocytopenia  ETOH abuse  History of incision and drainage: Gluteal abscess    SOCIAL HISTORY:  Negative for smoking/alcohol/drug use.     ALLERGIES:  No Known Allergies    MEDICATIONS:  STANDING MEDICATIONS  ciprofloxacin     Tablet 500 milliGRAM(s) Oral daily  folic acid 1 milliGRAM(s) Oral daily  furosemide   Injectable 40 milliGRAM(s) IV Push two times a day  multivitamin 1 Tablet(s) Oral daily  pantoprazole    Tablet 40 milliGRAM(s) Oral before breakfast  phytonadione   Solution 10 milliGRAM(s) Oral once  polyethylene glycol 3350 17 Gram(s) Oral every 12 hours  spironolactone 50 milliGRAM(s) Oral daily  thiamine 100 milliGRAM(s) Oral daily    PRN MEDICATIONS    VITALS:   T(F): 98.3  HR: 97  BP: 123/64  RR: 18  SpO2: 97%    PHYSICAL EXAM:  GEN: No acute distress, jaundiced, scleral icterus  LUNGS: Clear to auscultation bilaterally   HEART: S1/S2 present. RRR.   ABD: distended, not tender  EXT: NC/NC/NE/RAO  NEURO: AAOX3    LABS:                        8.0    10.20 )-----------( 93       ( 20 Dec 2019 05:31 )             23.8     12-20    122<L>  |  94<L>  |  19  ----------------------------<  93  4.5   |  15<L>  |  0.6<L>    Ca    7.0<L>      20 Dec 2019 05:31  Mg     2.0     12-20    TPro  5.9<L>  /  Alb  1.4<L>  /  TBili  8.5<H>  /  DBili  5.5<H>  /  AST  111<H>  /  ALT  71<H>  /  AlkPhos  177<H>  12-20    PT/INR - ( 19 Dec 2019 16:39 )   PT: 34.50 sec;   INR: 3.03 ratio         PTT - ( 19 Dec 2019 16:39 )  PTT:46.1 sec      Troponin T, Serum: <0.01 ng/mL (12-19-19 @ 16:39)      CARDIAC MARKERS ( 19 Dec 2019 16:39 )  x     / <0.01 ng/mL / x     / x     / x          RADIOLOGY:

## 2019-12-20 NOTE — PROGRESS NOTE ADULT - ASSESSMENT
Patient is a 44 year old male with history of alcoholic Cirrhosis last drink 2.5 months ago presented to the ED for evaluation after a fall with shortness of breath. Patient had paracentesis with 8.5L fluid drained on last admission was discharged on 12/13. Labs showed INR 3.03, total bilirubin 9.7, alkaline phosphatase 201, , ALT 85, lipase 166, albumin 1.6. CT abdomen showed Cirrhotic liver with moderate abdominopelvic ascites. Trauma work up negative.    #ASSESSMENT AND PLAN    #Mechanical fall  -Patient was short of breath, tripped and fell  -No head injury. No loss of consciousness  -Trauma work up negative    # Decompensated alcoholic liver cirrhosis   -MELD score 32  -Patient still having abdominal distention, LE edema even on lasix 40mg PO and spironolactone 50mg  -Therapeutic paracentesis today at bedside, if LVP will give albumin, Monitor Kidney function.   -Large volume ascites S/P paracentesis with 4 L removal on 12/1 and another 4.5 L on 12/5. No SBP on fluid analysis in past  -No varices on last EGD on 7/2018. Patient needs a repeat EGD as outpatient for variceal screening  -Started Lasix 40mg IV BID. Consider increasing spironolactone to 100mg, Renal consulted- f/u recs  -Follow BMP and creatinine daily   -Needs US abdomen and AFP every 6 months for HCC screening  -Follow at hepatology clinic OP    # Elevated liver enzymes   -likely 2/2 alcoholic hepatitis with liver cirrhosis   -total bilirubin 9.7, alkaline phosphatase 201, , ALT 85, lipase 166, albumin 1.6, INR 3.03  -MRI abdomen from last admission showed no CBD dilatation  -Liver enzymes improved compared to last admission  -Will continue vitamin K 10mg PO once daily  -Monitor INR     #Hyponatremia  -Na 119. Likely dilutional 2/2 volume overload  -Started on lasix IV and spironolactone  -No salt restriction  -Nephrology consult  -Monitor BMP    #Diet: Regular  #DVT ppx: High INR  #GI ppx: protonix  #Dispo; acute Patient is a 44 year old male with history of alcoholic Cirrhosis last drink 2.5 months ago presented to the ED for evaluation after a fall with shortness of breath. Patient had paracentesis with 8.5L fluid drained on last admission was discharged on 12/13. Labs showed INR 3.03, total bilirubin 9.7, alkaline phosphatase 201, , ALT 85, lipase 166, albumin 1.6. CT abdomen showed Cirrhotic liver with moderate abdominopelvic ascites. Trauma work up negative.    #ASSESSMENT AND PLAN    #Mechanical fall  -Patient was short of breath, tripped and fell  -No head injury. No loss of consciousness  -Trauma work up negative    # Decompensated alcoholic liver cirrhosis   -MELD score 32  -Therapeutic paracentesis today at bedside- f/u ascitic panel lab.   -Large volume ascites S/P paracentesis with 4 L removal on 12/1 and another 4.5 L on 12/5. No SBP on fluid analysis in past  -No varices on last EGD on 7/2018. Patient needs a repeat EGD as outpatient for variceal screening  -Started Lasix 40mg IV BID. , Renal consulted- f/u recs  - switch spironolactone to eplerenone  -Follow BMP and creatinine daily   -Needs US abdomen and AFP every 6 months for HCC screening  -Follow at hepatology clinic OP  - s/p therapeutic tap at bedside 5L removed. Albumin ordered for LVP.     # Elevated liver enzymes   -likely 2/2 alcoholic hepatitis with liver cirrhosis   -total bilirubin 9.7, alkaline phosphatase 201, , ALT 85, lipase 166, albumin 1.6, INR 3.03  -MRI abdomen from last admission showed no CBD dilatation  -Liver enzymes improved compared to last admission  -Will continue vitamin K 10mg PO once daily  -Monitor INR     #Hyponatremia  -Na 119. Likely dilutional 2/2 volume overload  -Started on lasix IV and spironolactone, switch to eplerenone when available ( non formulary)  -No salt restriction  -Nephrology consult  -Monitor BMP    #Diet: Regular  #DVT ppx: High INR  #GI ppx: protonix  #Dispo; acute

## 2019-12-20 NOTE — PROGRESS NOTE ADULT - SUBJECTIVE AND OBJECTIVE BOX
SUBJECTIVE:    Patient is a 44y old Male who presents with a chief complaint of Mechanical fall, shortness of breath (20 Dec 2019 09:21)    Currently admitted to medicine with the primary diagnosis of Hyponatremia     Today is hospital day 1d. This morning he is resting comfortably in bed and reports no new issues or overnight events.  Pt seen at bedside. States he fell out 2/2 SOB and weakness. no LOC.     PAST MEDICAL & SURGICAL HISTORY  Liver cirrhosis, alcoholic  Anemia  Thrombocytopenia  ETOH abuse  History of incision and drainage: Gluteal abscess    SOCIAL HISTORY:  Negative for smoking/alcohol/drug use.     ALLERGIES:  No Known Allergies    MEDICATIONS:  STANDING MEDICATIONS  ciprofloxacin     Tablet 500 milliGRAM(s) Oral daily  folic acid 1 milliGRAM(s) Oral daily  furosemide   Injectable 40 milliGRAM(s) IV Push two times a day  multivitamin 1 Tablet(s) Oral daily  pantoprazole    Tablet 40 milliGRAM(s) Oral before breakfast  phytonadione   Solution 10 milliGRAM(s) Oral once  polyethylene glycol 3350 17 Gram(s) Oral every 12 hours  spironolactone 50 milliGRAM(s) Oral daily  thiamine 100 milliGRAM(s) Oral daily    PRN MEDICATIONS    VITALS:   T(F): 98.3  HR: 97  BP: 123/64  RR: 18  SpO2: 97%    LABS:                        8.0    10.20 )-----------( 93       ( 20 Dec 2019 05:31 )             23.8     12-20    122<L>  |  94<L>  |  19  ----------------------------<  93  4.5   |  15<L>  |  0.6<L>    Ca    7.0<L>      20 Dec 2019 05:31  Mg     2.0     12-20    TPro  5.9<L>  /  Alb  1.4<L>  /  TBili  8.5<H>  /  DBili  5.5<H>  /  AST  111<H>  /  ALT  71<H>  /  AlkPhos  177<H>  12-20    PT/INR - ( 19 Dec 2019 16:39 )   PT: 34.50 sec;   INR: 3.03 ratio         PTT - ( 19 Dec 2019 16:39 )  PTT:46.1 sec      Troponin T, Serum: <0.01 ng/mL (12-19-19 @ 16:39)      CARDIAC MARKERS ( 19 Dec 2019 16:39 )  x     / <0.01 ng/mL / x     / x     / x          RADIOLOGY:  < from: CT Chest w/ IV Cont (12.19.19 @ 20:13) >  IMPRESSION:    1.  Cirrhotic liver with moderate abdominopelvic ascites.  2.  Stable findings of varices and splenorenal shunt.  3.  No evidence of traumatic injuries in chest, abdomen and pelvis.    < end of copied text >    < from: CT Cervical Spine No Cont (12.19.19 @ 20:12) >  IMPRESSION:    1.  No evidence of acute fracture or subluxation of the spine.  2.  Degenerative changes involving C5-6.    < end of copied text >    PHYSICAL EXAM:  GEN: icteric male.   LUNGS: Clear to auscultation bilaterally   HEART: S1/S2 present. RRR.   ABD: distended Abdomen  EXT:pitting edema B/L.   NEURO: AAOX3

## 2019-12-20 NOTE — CONSULT NOTE ADULT - SUBJECTIVE AND OBJECTIVE BOX
Patient is a 44y old  Male who presents with a chief complaint of Mechanical fall, shortness of breath (20 Dec 2019 10:12)      HPI:  Patient is a 44 year old male with history of alcoholic Cirrhosis last drink 2.5 months ago presented to the ED for evaluation after a fall with shortness of breath. Patient was admitted for ascites secondary to cirrhosis and discharged on 12/13. Patient is s/p paracentesis x2 with drainage of about 8.5L on last admission. He was discharged on lasix 40mg PO and spironolactone 50mg daily. Patient reports compliance with medications except for antibiotics but his shortness of breath and abdominal distention was worsening. He also reports swelling in both legs. Today he [facility] to [facility] as his first appointment after discharge and he tripped and fell on the carpet. He stopped the fall with his right hand. No head injury, loss of consciousness. Remembers all the events during the fall. No c/o fevers, abdominal pain, nausea, vomiting, diarrhea, constipation.  Patient was diagnosed with Cirrhosis last year, was following with [facility] and continued to drink until 2.5 months ago when he had worsening symptoms. He had EGD in 7/2018 which showed no varices, esophagitis and gastritis. Previous colonoscopy on 7/2018 which showed hemorrhoids, anal fistula and Mild diverticulosis. He was having bleeding per rectum previously but currently reports no bleeding.   In ED, /56 , HR 87, afebrile Labs showed INR 3.03, total bilirubin 9.7, alkaline phosphatase 201, , ALT 85, lipase 166, albumin 1.6. CT abdomen showed Cirrhotic liver with moderate abdominopelvic ascites. Trauma work up negative. (19 Dec 2019 23:06)      PAST MEDICAL & SURGICAL HISTORY:  Liver cirrhosis, alcoholic  Anemia  Thrombocytopenia  ETOH abuse  History of incision and drainage: Gluteal abscess      SOCIAL HX:   Smoking                         ETOH     +                       Other    FAMILY HISTORY:  Family history of stroke (Father, Mother)  :  No known cardiovacular family hisotry     ROS:  See HPI     Allergies    No Known Allergies    Intolerances          PHYSICAL EXAM    ICU Vital Signs Last 24 Hrs  T(C): 36.8 (20 Dec 2019 07:30), Max: 36.9 (19 Dec 2019 21:08)  T(F): 98.3 (20 Dec 2019 07:30), Max: 98.5 (19 Dec 2019 21:08)  HR: 97 (20 Dec 2019 07:30) (85 - 97)  BP: 123/64 (20 Dec 2019 07:30) (103/52 - 123/64)  RR: 18 (20 Dec 2019 07:30) (16 - 18)  SpO2: 97% (19 Dec 2019 21:08) (96% - 97%)      General: In NAD   HEENT:  TYRESE              Lymphatic system: No cervical LN   Lungs: Bilateral BS  Cardiovascular: Regular  Gastrointestinal: Soft, Positive BS  Musculoskeletal: No clubbing.  Moves all extremities.  Full range of motion   Skin: Warm.  Intact  Neurological: No motor or sensory deficit       12-20-19 @ 07:01  -  12-20-19 @ 11:19  --------------------------------------------------------  IN:  Total IN: 0 mL    OUT:    Voided: 1200 mL  Total OUT: 1200 mL    Total NET: -1200 mL          LABS:                          8.0    10.20 )-----------( 93       ( 20 Dec 2019 05:31 )             23.8                                               12-20    122<L>  |  94<L>  |  19  ----------------------------<  93  4.5   |  15<L>  |  0.6<L>    Ca    7.0<L>      20 Dec 2019 05:31  Mg     2.0     12-20    TPro  5.9<L>  /  Alb  1.4<L>  /  TBili  8.5<H>  /  DBili  5.5<H>  /  AST  111<H>  /  ALT  71<H>  /  AlkPhos  177<H>  12-20      PT/INR - ( 19 Dec 2019 16:39 )   PT: 34.50 sec;   INR: 3.03 ratio         PTT - ( 19 Dec 2019 16:39 )  PTT:46.1 sec                                           CARDIAC MARKERS ( 19 Dec 2019 16:39 )  x     / <0.01 ng/mL / x     / x     / x                                                LIVER FUNCTIONS - ( 20 Dec 2019 05:31 )  Alb: 1.4 g/dL / Pro: 5.9 g/dL / ALK PHOS: 177 U/L / ALT: 71 U/L / AST: 111 U/L / GGT: x                                                                                                                                       X-Rays             Bibasilar atelectasis                                                                        ECHO    MEDICATIONS  (STANDING):  ciprofloxacin     Tablet 500 milliGRAM(s) Oral daily  folic acid 1 milliGRAM(s) Oral daily  furosemide   Injectable 40 milliGRAM(s) IV Push two times a day  multivitamin 1 Tablet(s) Oral daily  pantoprazole    Tablet 40 milliGRAM(s) Oral before breakfast  polyethylene glycol 3350 17 Gram(s) Oral every 12 hours  spironolactone 50 milliGRAM(s) Oral daily  thiamine 100 milliGRAM(s) Oral daily    MEDICATIONS  (PRN):

## 2019-12-20 NOTE — CONSULT NOTE ADULT - ATTENDING COMMENTS
Pt seen examined Agree with above  Pt with decompensated Liver cirrhosis, ascites, on diuretics seen for hyponatremia  High Urine OSm (368 ) c/w ADH presence due to cirrhosis and decreased effective circulatory volume  avoid aggressive diuresis, do not escalate diuretics - paracentesis, TIPS as per GI  - free water restrict 1 l daily
43 yo male with alcoholic cirrhosis and ascites.  The patient will be referred to [facility] [place] for transplant evaluation.  Medication changes as above.

## 2019-12-21 DIAGNOSIS — D63.8 ANEMIA IN OTHER CHRONIC DISEASES CLASSIFIED ELSEWHERE: ICD-10-CM

## 2019-12-21 DIAGNOSIS — D68.9 COAGULATION DEFECT, UNSPECIFIED: ICD-10-CM

## 2019-12-21 DIAGNOSIS — E87.1 HYPO-OSMOLALITY AND HYPONATREMIA: ICD-10-CM

## 2019-12-21 DIAGNOSIS — D69.6 THROMBOCYTOPENIA, UNSPECIFIED: ICD-10-CM

## 2019-12-21 DIAGNOSIS — R09.89 OTHER SPECIFIED SYMPTOMS AND SIGNS INVOLVING THE CIRCULATORY AND RESPIRATORY SYSTEMS: ICD-10-CM

## 2019-12-21 DIAGNOSIS — Z02.9 ENCOUNTER FOR ADMINISTRATIVE EXAMINATIONS, UNSPECIFIED: ICD-10-CM

## 2019-12-21 DIAGNOSIS — K70.31 ALCOHOLIC CIRRHOSIS OF LIVER WITH ASCITES: ICD-10-CM

## 2019-12-21 DIAGNOSIS — R60.0 LOCALIZED EDEMA: ICD-10-CM

## 2019-12-21 DIAGNOSIS — Z29.9 ENCOUNTER FOR PROPHYLACTIC MEASURES, UNSPECIFIED: ICD-10-CM

## 2019-12-21 LAB
ALBUMIN FLD-MCNC: <0.2 G/DL — SIGNIFICANT CHANGE UP
ALBUMIN SERPL ELPH-MCNC: 1.6 G/DL — LOW (ref 3.3–5)
ALP SERPL-CCNC: 153 U/L — HIGH (ref 40–120)
ALT FLD-CCNC: 65 U/L — HIGH (ref 10–45)
ANION GAP SERPL CALC-SCNC: 10 MMOL/L — SIGNIFICANT CHANGE UP (ref 5–17)
APTT BLD: 72 SEC — HIGH (ref 27.5–36.3)
AST SERPL-CCNC: 103 U/L — HIGH (ref 10–40)
BASOPHILS # BLD AUTO: 0.03 K/UL — SIGNIFICANT CHANGE UP (ref 0–0.2)
BASOPHILS NFR BLD AUTO: 0.3 % — SIGNIFICANT CHANGE UP (ref 0–2)
BILIRUB SERPL-MCNC: 8.8 MG/DL — HIGH (ref 0.2–1.2)
BUN SERPL-MCNC: 16 MG/DL — SIGNIFICANT CHANGE UP (ref 7–23)
CALCIUM SERPL-MCNC: 7 MG/DL — LOW (ref 8.4–10.5)
CHLORIDE SERPL-SCNC: 92 MMOL/L — LOW (ref 96–108)
CO2 SERPL-SCNC: 18 MMOL/L — LOW (ref 22–31)
CREAT SERPL-MCNC: 0.58 MG/DL — SIGNIFICANT CHANGE UP (ref 0.5–1.3)
EOSINOPHIL # BLD AUTO: 0.11 K/UL — SIGNIFICANT CHANGE UP (ref 0–0.5)
EOSINOPHIL NFR BLD AUTO: 1 % — SIGNIFICANT CHANGE UP (ref 0–6)
GLUCOSE FLD-MCNC: 104 MG/DL — SIGNIFICANT CHANGE UP
GLUCOSE SERPL-MCNC: 122 MG/DL — HIGH (ref 70–99)
GRAM STN FLD: SIGNIFICANT CHANGE UP
HCT VFR BLD CALC: 23.9 % — LOW (ref 39–50)
HGB BLD-MCNC: 8.1 G/DL — LOW (ref 13–17)
IMM GRANULOCYTES NFR BLD AUTO: 0.4 % — SIGNIFICANT CHANGE UP (ref 0–1.5)
INR BLD: 2.76 RATIO — HIGH (ref 0.88–1.16)
LDH SERPL L TO P-CCNC: 41 U/L — SIGNIFICANT CHANGE UP
LYMPHOCYTES # BLD AUTO: 1.04 K/UL — SIGNIFICANT CHANGE UP (ref 1–3.3)
LYMPHOCYTES # BLD AUTO: 9.2 % — LOW (ref 13–44)
MAGNESIUM SERPL-MCNC: 1.8 MG/DL — SIGNIFICANT CHANGE UP (ref 1.6–2.6)
MCHC RBC-ENTMCNC: 32.3 PG — SIGNIFICANT CHANGE UP (ref 27–34)
MCHC RBC-ENTMCNC: 33.9 GM/DL — SIGNIFICANT CHANGE UP (ref 32–36)
MCV RBC AUTO: 95.2 FL — SIGNIFICANT CHANGE UP (ref 80–100)
MONOCYTES # BLD AUTO: 1.02 K/UL — HIGH (ref 0–0.9)
MONOCYTES NFR BLD AUTO: 9.1 % — SIGNIFICANT CHANGE UP (ref 2–14)
NEUTROPHILS # BLD AUTO: 9.01 K/UL — HIGH (ref 1.8–7.4)
NEUTROPHILS NFR BLD AUTO: 80 % — HIGH (ref 43–77)
PHOSPHATE SERPL-MCNC: 2.9 MG/DL — SIGNIFICANT CHANGE UP (ref 2.5–4.5)
PLATELET # BLD AUTO: 82 K/UL — LOW (ref 150–400)
POTASSIUM SERPL-MCNC: 3.8 MMOL/L — SIGNIFICANT CHANGE UP (ref 3.5–5.3)
POTASSIUM SERPL-SCNC: 3.8 MMOL/L — SIGNIFICANT CHANGE UP (ref 3.5–5.3)
PROT FLD-MCNC: <1 G/DL — SIGNIFICANT CHANGE UP
PROT SERPL-MCNC: 5.9 G/DL — LOW (ref 6–8.3)
PROTHROM AB SERPL-ACNC: 32.5 SEC — HIGH (ref 10–13.1)
RBC # BLD: 2.51 M/UL — LOW (ref 4.2–5.8)
RBC # FLD: 21 % — HIGH (ref 10.3–14.5)
SODIUM SERPL-SCNC: 120 MMOL/L — CRITICAL LOW (ref 135–145)
SPECIMEN SOURCE: SIGNIFICANT CHANGE UP
WBC # BLD: 11.25 K/UL — HIGH (ref 3.8–10.5)
WBC # FLD AUTO: 11.25 K/UL — HIGH (ref 3.8–10.5)

## 2019-12-21 PROCEDURE — 99223 1ST HOSP IP/OBS HIGH 75: CPT

## 2019-12-21 PROCEDURE — 93010 ELECTROCARDIOGRAM REPORT: CPT

## 2019-12-21 PROCEDURE — 12345: CPT | Mod: NC

## 2019-12-21 RX ORDER — EPLERENONE 50 MG/1
50 TABLET, FILM COATED ORAL DAILY
Refills: 0 | Status: DISCONTINUED | OUTPATIENT
Start: 2019-12-21 | End: 2019-12-23

## 2019-12-21 RX ORDER — POLYETHYLENE GLYCOL 3350 17 G/17G
17 POWDER, FOR SOLUTION ORAL
Refills: 0 | Status: DISCONTINUED | OUTPATIENT
Start: 2019-12-21 | End: 2019-12-24

## 2019-12-21 RX ORDER — FUROSEMIDE 40 MG
40 TABLET ORAL DAILY
Refills: 0 | Status: DISCONTINUED | OUTPATIENT
Start: 2019-12-21 | End: 2019-12-22

## 2019-12-21 RX ORDER — FOLIC ACID 0.8 MG
1 TABLET ORAL DAILY
Refills: 0 | Status: DISCONTINUED | OUTPATIENT
Start: 2019-12-21 | End: 2019-12-31

## 2019-12-21 RX ORDER — PANTOPRAZOLE SODIUM 20 MG/1
40 TABLET, DELAYED RELEASE ORAL
Refills: 0 | Status: DISCONTINUED | OUTPATIENT
Start: 2019-12-21 | End: 2019-12-31

## 2019-12-21 RX ORDER — THIAMINE MONONITRATE (VIT B1) 100 MG
100 TABLET ORAL DAILY
Refills: 0 | Status: DISCONTINUED | OUTPATIENT
Start: 2019-12-21 | End: 2019-12-31

## 2019-12-21 RX ORDER — CIPROFLOXACIN LACTATE 400MG/40ML
500 VIAL (ML) INTRAVENOUS DAILY
Refills: 0 | Status: DISCONTINUED | OUTPATIENT
Start: 2019-12-21 | End: 2019-12-31

## 2019-12-21 RX ADMIN — Medication 500 MILLIGRAM(S): at 11:10

## 2019-12-21 RX ADMIN — Medication 100 MILLIGRAM(S): at 11:11

## 2019-12-21 RX ADMIN — Medication 40 MILLIGRAM(S): at 06:25

## 2019-12-21 RX ADMIN — Medication 1 MILLIGRAM(S): at 11:11

## 2019-12-21 RX ADMIN — EPLERENONE 50 MILLIGRAM(S): 50 TABLET, FILM COATED ORAL at 06:25

## 2019-12-21 RX ADMIN — PANTOPRAZOLE SODIUM 40 MILLIGRAM(S): 20 TABLET, DELAYED RELEASE ORAL at 06:25

## 2019-12-21 RX ADMIN — Medication 1 TABLET(S): at 11:11

## 2019-12-21 NOTE — H&P ADULT - HISTORY OF PRESENT ILLNESS
44 M PMH alcoholic cirrhosis with ascites initially dx 2018, continual EtOH abuse post diagnosis now abstinent x 3 months, who presents as transfer from [facility] for decompensated cirrhosis with ascites, coagulopathy, chronic anemia, chronic thrombocytopenia, and chronic hyponatremia; here for hepatology evaluation.     Pt was first admitted 11/30-12/13/19 at [facility] for sob, abd swelling, and anasarca. He was found with large ascites, and was s/p Tx/Dx para x 2 (12/1 4L out, 12/5 4.5 L out).  SBP negative on that admission.  Seen by IR for possible TIPS but deemed poor candidate. Seen by surgery for a miles-rectal abscess; was given course of abx and interval CTAP was negative for abscess, and it was presumed that it had spontaneously opened and drained.  His course was c/b severe hypoNa to 120, for which he was given a course of tolvaptan and it increased to 126; tolvaptan subsequently held 2/2 known liver dysfunction. He was stared on lasix 40/aldactone 50, and discharged with SBP prophylaxis on cipro po.      Pt then was readmitted to [facility] from 12/19-12/20/19 after a mechanical fall (fell forward, broke fall with R hand, no LOC/prodrome) and was also c/o recurrent SOB, abd swelling, and anasarca. Trauma imaging (CTH, CT c/a/p, XR knee) were noted nondiagnostic of acute actionable injury.  CTAP on that admission showed known cirrhosis and reaccumulated moderate ascites.  Pt had large volume paracentesis on 12/20 with 5L out, and given 150 cc of 25% albumin x 1. SBP again noted negative. Lasix dose was increased to 40 IV bid in addition to daily spironolactone 50.  Eventually, spironolactone was changed to eplenerone as pt was noted to have gynecomastia. Course c/b acute on chronic hypoNatremia to 119. Initially thought secondary purely to hypervolemic hyponatremia in setting of known decompensated cirrhosis. Renal was consulted, and urine lytes suggested SIADH picture (low Sosm 270, high Uosm 370, very low Tong, hypouricemia), and renal recommended not aggressively increasing diuretics as his high Uosm was suggestive of decreased effective circulating volume and could potentiate HEIKE. Pt was placed on 1L daily fluid restriction and eventually d/c on eplenerone 50 and lasix 40 once daily.     Regarding his personal history and prior workup of his cirrhosis: he was diagnosed in 2018.  RIGOBERTO noted 1:160 but hepatitis/autoimmune w/u was negative. EGD 7/2018 negative for varices, +gastritis/esophagitis but negative for H pylori.  [place] 7/2018 +hemorrhoids/anal fistula/diverticulosis.  MRI Abd 12/6: cirrhosis with portal HTN c/b perisplenic varices, moderat ascites, no visualized liver mass. AFP 2.2. Duplex 12/1 LE was neg for DVt.     Currently, patient endorses no acute complaints.  He states his abd distention is improved after his most recent LVP.  Notes improved dyspnea. +improved LE edema and UE edema. No sig abd ttp, no ttp at para site.  Denies cp, f/c, n/v/d, dysuria, cough, hematemesis, hematochezia.     VS: 98.6, 92, 108/63, 17, 97% RA.  Labs from am of 12/20 at [facility]: no leukocytosis, chronic stable anemia hgb 8 b/l 8-10, chronic stable thrombocytopenia plt 93 bl/ 100s, chronic stable hypoNa 122 improved from 119, prev b/l 120-130. Hypocal 7 Hypoalbuminemia 1.4 corrected Calcium 9.1  Known elevated LFTs, all appearing either stable or mildly downtrended from prior admission.  MELD 12/19/19: 32.

## 2019-12-21 NOTE — H&P ADULT - PROBLEM SELECTOR PLAN 2
-HypoNa 122 uptrended from 119. Suspecting SIADH picture in addition to noted hypervolemic hyponatremia from decompensated cirrhosis  -c/w maintenance diuretics noted above; hold off on IV diuretics at this point  -1L daily fluid restriction  -daily bmp unless downtrending; this appears chronic and correction can occur more slowly.

## 2019-12-21 NOTE — H&P ADULT - NSHPREVIEWOFSYSTEMS_GEN_ALL_CORE
REVIEW OF SYSTEMS:  CONSTITUTIONAL: No weakness. No fevers. No chills. No weight loss. Good appetite.  EYES: No visual changes. No eye pain.  ENT: No hearing difficulty. No vertigo. No dysphagia. No Sinusitis/rhinorrhea.  NECK: No pain. No stiffness/rigidity.  CARDIAC: No chest pain. No palpitations.  RESPIRATORY: No cough. No SOB. No hemoptysis.  GASTROINTESTINAL: No abdominal pain. No nausea. No vomiting. No hematemesis. No diarrhea. No constipation. No melena. No hematochezia.  GENITOURINARY: No dysuria. No frequency. No hesitancy. No hematuria.  NEUROLOGICAL: No numbness. No focal weakness. No incontinence. No headache.  BACK: No back pain.  EXTREMITIES: No lower extremity edema. Full ROM.  SKIN: No rashes. No itching. No other lesions.  PSYCHIATRIC: No depression. No anxiety. No SI/HI.  ALLERGIC: No lip swelling. No hives.  All other review of systems is negative unless indicated above.  [  ] Unable to assess ROS because REVIEW OF SYSTEMS:  CONSTITUTIONAL: chronic unchanged weakness. No fevers. No chills. No weight loss. Good appetite.  EYES: No visual changes. No eye pain.  ENT: No hearing difficulty. No vertigo. No dysphagia. No Sinusitis/rhinorrhea.  NECK: No pain. No stiffness/rigidity.  CARDIAC: No chest pain. No palpitations.  RESPIRATORY: No cough. Improved SOB. No hemoptysis.  GASTROINTESTINAL: No abdominal pain. No nausea. No vomiting. No hematemesis. No diarrhea. No constipation. No melena. No hematochezia.  GENITOURINARY: No dysuria. No frequency. No hesitancy. No hematuria.  NEUROLOGICAL: No numbness. No focal weakness. No incontinence. No headache.  BACK: No back pain.  EXTREMITIES: Improved lower extremity edema. Full ROM.  SKIN: No rashes. No itching. No other lesions.  PSYCHIATRIC: No depression. No anxiety. No SI/HI.  ALLERGIC: No lip swelling. No hives.  All other review of systems is negative unless indicated above.

## 2019-12-21 NOTE — H&P ADULT - PROBLEM SELECTOR PLAN 1
-decompensated cirrhosis, presented to [facility] for sob/anasarca/abd distention, CTAP with moderate refractory ascites, s/p LVP 5L out + albumin, SBP neg. Transfer for hepatology eval  -Emailed [facility] overnight for consult  -admit to medicine, [facility]  -will get labs repeat labs with AM blood draw.  Labs 12/20 reviewed; mostly stable or mildly downtrended LFTs c/w known cirrhosis, MELD 32. Rest noted below. get daily coags.   -c/w lasix 40 qd, eplenerone 50 qd. prev on Lasix 40 iv bid for refractory ascites but this was decreased after pt's HypoNa w/u revealed SIADH picture and possible intravascular depletion/dec ECV.   -c/w 1L daily fluid restriction  -afeb, no leukocytosis, noted neg for SBP on recent para. c/w Cipro daily po sbp ppx  -abstinent from etoh x 3 months. No need for CIWA at this time  -c/w thiamine, MV, folate daily.   -no s/s of hepatic encephalopathy at this time. c/w prn miralax.   -MRI 12/2019 neg hepatoma, +portal HTN with perisplenic varices. EGD 7/2018 neg varices, +gastritis/esophagitis neg H pylori. [place] 7/2018 +hemorrhoids/anal fistula/diverticulosis. AFP neg.   -If note candidate for transplant, consider repeat eval for TIPS for refractory ascites.

## 2019-12-21 NOTE — PROGRESS NOTE ADULT - ATTENDING COMMENTS
Douglas Marin MD  Division of [facility]  Cell: [phone]  Pager: [phone]  Office: [phone]

## 2019-12-21 NOTE — H&P ADULT - PROBLEM SELECTOR PLAN 7
-ordinarily would want to start vte ppx as pt is not autoanticoagulated despite coagulopathy with INR >3; however, pt with mildly downtrended thrombocytopenia and slowly downtrending hgb.   -for now hold pharm vte ppx.  -once dvt LE ruled out can start SCD.

## 2019-12-21 NOTE — PROGRESS NOTE ADULT - PROBLEM SELECTOR PLAN 1
-decompensated cirrhosis, presented to [facility] for sob/anasarca/abd distention, CTAP with moderate refractory ascites, s/p LVP 5L out + albumin, SBP neg. Transfer for hepatology eval  -Hepatology consult pending.   -Monitor CBC, coags, LFT's daily.    -c/w lasix 40mg qd, eplerenone 50mg qd. prev on Lasix 40 iv bid for refractory ascites but this was decreased after pt's HypoNa w/u revealed SIADH picture and possible intravascular depletion/dec ECV.   -c/w 1L daily fluid restriction  -c/w Cipro daily po sbp ppx  -abstinent from etoh x 3 months. No need for CIWA at this time  -c/w thiamine, MVI, folate daily.   -no s/s of hepatic encephalopathy at this time. c/w prn miralax.   -MRI 12/2019 neg hepatoma, +portal HTN with perisplenic varices. EGD 7/2018 neg varices, +gastritis/esophagitis neg H pylori. [place] 7/2018 +hemorrhoids/anal fistula/diverticulosis. AFP neg.   -If not candidate for transplant, consider repeat eval for TIPS for refractory ascites.

## 2019-12-21 NOTE — H&P ADULT - PROBLEM SELECTOR PLAN 4
-stable, chronic 2/2 decompensated cirrhosis  -no active s/s of hemodynamically significant bleeding  -noted mildly downtrended hgb current 8.0, prev 8-10.   -suspect aocd but known hemorrhoids/anal fistula/perirectal abscess with prior slow ooze; possible mild slow blood loss component  -monitor counts daily

## 2019-12-21 NOTE — PROGRESS NOTE ADULT - ASSESSMENT
44 M PMH alcoholic cirrhosis with ascites initially dx 2018, continual EtOH abuse post diagnosis now abstinent x 3 months, who presents as transfer from [facility] for decompensated cirrhosis with ascites, coagulopathy, chronic anemia, chronic thrombocytopenia, and chronic hyponatremia; here for hepatology evaluation.

## 2019-12-21 NOTE — H&P ADULT - PROBLEM SELECTOR PLAN 8
Transitions of Care Status:  1.  Name of PCP: Dr. Skaggs ([place] [facility]) has not actually seen yet  2.  PCP Contacted on Admission: [ ] Y    [x ] N    3.  PCP contacted at Discharge: [ ] Y    [ ] N    [ ] N/A  4.  Post-Discharge Appointment Date and Location:  5.  Summary of Handoff given to PCP:

## 2019-12-21 NOTE — CONSULT NOTE ADULT - SUBJECTIVE AND OBJECTIVE BOX
Chief Complaint: SOB    HPI:  44 M PMH alcoholic cirrhosis decompensated with ascites initially dx 2018, now abstinent x 3 months, who presents as transfer from [facility] for decompensated cirrhosis with ascites, coagulopathy, chronic anemia, chronic thrombocytopenia, and chronic hyponatremia; here for transplant hepatology evaluation.     Pt was first admitted 11/30-12/13/19 at [facility] for sob, abd swelling, and anasarca. He was found with large ascites, and was s/p Tx/Dx para x 2 (12/1 4L out, 12/5 4.5 L out).  SBP negative on that admission.  Seen by IR for possible TIPS for refractory ascites but deemed poor candidate due to high MELD score 34. Seen by surgery for a miles-rectal abscess; was given course of abx and interval CTAP was negative for abscess, and it was presumed that it had spontaneously opened and drained.  His course was c/b severe hyponatremia to 120, for which he was given a course of tolvaptan and it increased to 126; tolvaptan subsequently held 2/2 known liver dysfunction. He was stared on Lasix 40/aldactone 50, and discharged with SBP prophylaxis on Cipro po.      Pat then was readmitted to [facility] from 12/19-12/20/19 after a mechanical fall and was also c/o recurrent SOB, abd swelling, and anasarca. Trauma imaging (CTH, CT c/a/p, XR knee) were noted nondiagnostic of acute actionable injury.  CTAP on that admission showed known cirrhosis and reaccumulated moderate ascites.  Pt had large volume paracentesis on 12/20 with 5L out, and given 150 cc of 25% albumin x 1. SBP again noted negative. Lasix dose was increased to 40 IV bid in addition to daily spironolactone 50.  Eventually, spironolactone was changed to Eplerenone as pt was noted to have gynecomastia. Course c/b acute on chronic hyponatremia to 119. Renal was consulted, and urine lytes suggested SIADH picture (low Sosm 270, high Uosm 370, very low Tong, hypouricemia), and renal recommended not aggressively increasing diuretics as his high Uosm was suggestive of decreased effective circulating volume and could potentiate HEIKE. Pt was placed on 1L daily fluid restriction and eventually d/c on Eplerenone 50 and lasix 40 once daily.     Regarding his personal history and prior workup of his cirrhosis: he was diagnosed in 2018.  RIGOBERTO noted 1:160 but hepatitis/autoimmune w/u was negative. EGD 7/2018 negative for varices, +gastritis/esophagitis but negative for H pylori.  [place] 7/2018 +hemorrhoids/anal fistula/diverticulosis.  MRI Abd 12/6: cirrhosis with portal HTN c/b perisplenic varices, moderate ascites, no visualized liver mass. AFP 2.2. Duplex 12/1 LE was neg for DVt.     Currently, patient endorses no acute complaints.  He states his abd distention is improved after his most recent LVP.  Notes improved dyspnea. +improved LE edema and UE edema. No sig abd ttp, no ttp at para site.  Denies cp, f/c, n/v/d, dysuria, cough, hematemesis, hematochezia.     Allergies:  No Known Allergies    Home Medications:   * Patient Currently Takes Medications as of 21-Dec-2019 03:51 documented in Structured Notes  · 	eplerenone 50 mg oral tablet: Last Dose Taken:  , 1 tab(s) orally once a day  · 	polyethylene glycol 3350 oral powder for reconstitution: Last Dose Taken:  , 17 gram(s) orally every 12 hours, As Needed   · 	Protonix 20 mg oral delayed release tablet: Last Dose Taken:  , 1 tab(s) orally once a day   · 	thiamine 100 mg oral tablet: Last Dose Taken:  , 1 tab(s) orally once a day  · 	folic acid 1 mg oral tablet: Last Dose Taken:  , 1 tab(s) orally once a day  · 	Multiple Vitamins oral tablet: Last Dose Taken:  , 1 tab(s) orally once a day  · 	ciprofloxacin 500 mg oral tablet: Last Dose Taken:  , 1 tab(s) orally once a day  · 	furosemide 40 mg oral tablet: Last Dose Taken:  , 1 tab(s) orally once a day    Hospital Medications:  ciprofloxacin     Tablet 500 milliGRAM(s) Oral daily  eplerenone 50 milliGRAM(s) Oral daily  folic acid 1 milliGRAM(s) Oral daily  furosemide    Tablet 40 milliGRAM(s) Oral daily  multivitamin 1 Tablet(s) Oral daily  pantoprazole    Tablet 40 milliGRAM(s) Oral before breakfast  polyethylene glycol 3350 17 Gram(s) Oral two times a day PRN  thiamine 100 milliGRAM(s) Oral daily      PMHX/PSHX:  Liver cirrhosis, alcoholic  Anemia  Thrombocytopenia  Nosebleed  ETOH abuse  History of incision and drainage  S/P EGD and colonoscopy     Family history:  Family history of stroke (Father, Mother)    Social History: no smoking    ROS:   General:  No fevers, chills or night sweats.  ENT:  No sore throat or dysphagia  CV:  No pain or palpitations  Resp:  No dyspnea, cough, wheezing  GI:  as above  Skin:  No rash or edema      GENERAL: chronically ill appearing.   HEAD:  Atraumatic, Normocephalic  EYES: EOMI, PERRLA, +scleral icterus b/l  ENMT: No tonsillar erythema, exudates, or enlargement; dry mucous membranes, Good dentition, No lesions  NECK: Supple, No JVD, Normal thyroid  CHEST/LUNG: Clear to percussion bilaterally with dec bs at bases; No rales, rhonchi, wheezing, or rubs no resp distress or accessory muscle usage.  HEART: Regular rate and rhythm; No murmurs, rubs, or gallops, 1+ edema b/l LE. It appears LLE is more swollen assymetrically than RLE.  ABDOMEN: Soft, nontender, +moderately tender +fluid wave. Bowel sounds present. +paracentesis site covered with external collection pouch with scant amount of serous fluid.   EXTREMITIES:  2+ Peripheral Pulses, No clubbing, cyanosis.   LYMPH: No lymphadenopathy noted, no lymphangitis.   SKIN: +diffuse jaundice. no significant pruritis. +L knee abrasion, healed.   NERVOUS SYSTEM:  Alert & Oriented X3, Good concentration; Motor Strength 5/5 B/L upper and lower extremities.  No facial droop, no dysarthria. No asterixis on exam.  PSYCH: no si, no hi, flat affect.    Vital Signs:  Vital Signs Last 24 Hrs  T(C): 36.9 (21 Dec 2019 14:50), Max: 37.1 (21 Dec 2019 04:43)  T(F): 98.5 (21 Dec 2019 14:50), Max: 98.7 (21 Dec 2019 04:43)  HR: 94 (21 Dec 2019 14:50) (92 - 94)  BP: 102/59 (21 Dec 2019 14:50) (102/59 - 108/63)  BP(mean): --  RR: 18 (21 Dec 2019 14:50) (17 - 18)  SpO2: 96% (21 Dec 2019 14:50) (94% - 97%)  Daily Height in cm: 172.72 (20 Dec 2019 23:59)    Daily     LABS:                        8.1    11.25 )-----------( 82       ( 21 Dec 2019 16:01 )             23.9     Mean Cell Volume: 95.2 fl (12-21-19 @ 16:01)    12-21    120<LL>  |  92<L>  |  16  ----------------------------<  122<H>  3.8   |  18<L>  |  0.58    Ca    7.0<L>      21 Dec 2019 13:03  Phos  2.9     12-21  Mg     1.8     12-21    TPro  5.9<L>  /  Alb  1.6<L>  /  TBili  8.8<H>  /  DBili  x   /  AST  103<H>  /  ALT  65<H>  /  AlkPhos  153<H>  12-21    LIVER FUNCTIONS - ( 21 Dec 2019 13:03 )  Alb: 1.6 g/dL / Pro: 5.9 g/dL / ALK PHOS: 153 U/L / ALT: 65 U/L / AST: 103 U/L / GGT: x           PT/INR - ( 21 Dec 2019 16:36 )   PT: 32.5 sec;   INR: 2.76 ratio         PTT - ( 21 Dec 2019 16:36 )  PTT:72.0 sec                            8.1    11.25 )-----------( 82       ( 21 Dec 2019 16:01 )             23.9                         8.0    10.20 )-----------( 93       ( 20 Dec 2019 05:31 )             23.8                         9.1    12.64 )-----------( 125      ( 19 Dec 2019 16:39 )             26.8     Imaging:  < from: CT Abdomen and Pelvis w/ IV Cont (12.19.19 @ 20:14) >  EXAM:  CT ABDOMEN AND PELVIS IC        EXAM:  CT CHEST IC            PROCEDURE DATE:  12/19/2019            INTERPRETATION:  CLINICAL INDICATION: Status post fall    COMPARISON: CT abdomen pelvis-12/2/2019    TECHNIQUE: CT of the chest, abdomen andpelvis performed following administration of intravenous contrast. Reformatted coronal and sagittal images are obtained.    FINDINGS:     LUNGS, PLEURA, AND AIRWAYS: Central airways are patent without any evidence of endobronchial lesions. Bilateral lower lobe atelectasis. No evidence of pneumothorax or focal consolidation.    There are no filling defects in the main pulmonary artery or segmental branches.     MEDIASTINUM: Partially visualized hypoattenuating right thyroid nodule, measures 1.1 x 0.6 cm. No evidence of mediastinal lymphadenopathy.    HEART: Normal cardiac size. Pericardium is unremarkable. Mild coronary atherosclerosis.    HEPATOBILIARY: Noted again is nodular liver, suggestive of cirrhosis. Cholelithiasis..    SPLEEN: Stable.    PANCREAS: Unchanged.    ADRENAL GLANDS: Unchanged.    KIDNEYS: Symmetrical enhancement without any obstructive findings..    ABDOMINAL NODES: No evidence of enlarged lymphadenopathy..    PELVIC ORGANS: No acute findings.    PERITONEUM/MESENTERY/BOWEL: No acute bowel findings.. Unchanged moderate to large abdominal pelvic ascites. A few scattered colonic diverticulosis    BONES/SOFT TISSUES: Bilateral gynecomastia. No evidence of osseous fractures. Stable mild degenerative changes of spine.    OTHERS: Stable findings of perigastric and perisplenic varices with splenorenal shunt. Anasarca.    IMPRESSION:    1.  Cirrhotic liver with moderate abdominopelvic ascites.  2.  Stable findings of varices and splenorenal shunt.  3.  No evidence of traumatic injuries in chest, abdomen and pelvis.          < end of copied text >

## 2019-12-21 NOTE — H&P ADULT - ASSESSMENT
44 M PMH alcoholic cirrhosis with ascites initially dx 2018, continual EtOH abuse post diagnosis now abstinent x 3 months, who presents as transfer from [facility] for decompensated cirrhosis with ascites, coagulopathy, chronic anemia, chronic thrombocytopenia, and chronic hyponatremia; here for hepatology evaluation.

## 2019-12-21 NOTE — H&P ADULT - NSHPSOCIALHISTORY_GEN_ALL_CORE
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 would drink 1/2 to 1 bottle of whiskey at a time.

## 2019-12-21 NOTE — H&P ADULT - PROBLEM SELECTOR PLAN 6
-no active s/s of hemodynamically significant bleeding  -noted mildly downtrended hgb current 8.0, prev 8-10.   -suspect aocd but known hemorrhoids/anal fistula/perirectal abscess with prior slow ooze; possible mild slow blood loss component  -monitor counts daily

## 2019-12-21 NOTE — PROGRESS NOTE ADULT - PROBLEM SELECTOR PLAN 8
Transitions of Care Status:  1.  Name of PCP: Dr. Skaggs ([place]) has not actually seen yet  2.  PCP Contacted on Admission: [ ] Y    [x ] N    3.  PCP contacted at Discharge: [ ] Y    [ ] N    [ ] N/A  4.  Post-Discharge Appointment Date and Location:  5.  Summary of Handoff given to PCP:

## 2019-12-21 NOTE — H&P ADULT - PROBLEM SELECTOR PLAN 5
-recurrent presentation with anasarca, however today, on exam pt appears to have assymetric LLE > RLE swelling.  -will get dopplers LE to r/o DVT  -prior duplex 12/1 noted negative.

## 2019-12-21 NOTE — H&P ADULT - ATTENDING COMMENTS
Care to be assumed by day hospitalist at 8 am.  This patient was assigned to me by the hospitalist in charge; my involvement in this case has consisted of the initial history, physical, chart review, and management plan.  This patient was previously unknown to me.  Case endorsed to NP covering 4DSU at  4 am.  She is aware of outlined plan above.

## 2019-12-21 NOTE — H&P ADULT - NSHPPHYSICALEXAM_GEN_ALL_CORE
PHYSICAL EXAM:    Vital Signs Last 24 Hrs  T(C): 37 (20 Dec 2019 23:59), Max: 37 (20 Dec 2019 23:59)  T(F): 98.6 (20 Dec 2019 23:59), Max: 98.6 (20 Dec 2019 23:59)  HR: 92 (20 Dec 2019 23:59) (85 - 97)  BP: 108/63 (20 Dec 2019 23:59) (103/59 - 123/64)  BP(mean): --  RR: 17 (20 Dec 2019 23:59) (17 - 18)  SpO2: 97% (20 Dec 2019 23:59) (97% - 97%)    GENERAL: NAD, well-groomed, well-developed  HEAD:  Atraumatic, Normocephalic  EYES: EOMI, PERRLA, conjunctiva and sclera clear  ENMT: No tonsillar erythema, exudates, or enlargement; Moist mucous membranes, Good dentition, No lesions  NECK: Supple, No JVD, Normal thyroid  CHEST/LUNG: Clear to percussion bilaterally; No rales, rhonchi, wheezing, or rubs  HEART: Regular rate and rhythm; No murmurs, rubs, or gallops  ABDOMEN: Soft, Nontender, Nondistended; Bowel sounds present  EXTREMITIES:  2+ Peripheral Pulses, No clubbing, cyanosis, or edema  LYMPH: No lymphadenopathy noted  SKIN: No rashes or lesions  NERVOUS SYSTEM:  Alert & Oriented X3, Good concentration; Motor Strength 5/5 B/L upper and lower extremities; DTRs 2+ intact and symmetric PHYSICAL EXAM:    Vital Signs Last 24 Hrs  T(C): 37 (20 Dec 2019 23:59), Max: 37 (20 Dec 2019 23:59)  T(F): 98.6 (20 Dec 2019 23:59), Max: 98.6 (20 Dec 2019 23:59)  HR: 92 (20 Dec 2019 23:59) (85 - 97)  BP: 108/63 (20 Dec 2019 23:59) (103/59 - 123/64)  BP(mean): --  RR: 17 (20 Dec 2019 23:59) (17 - 18)  SpO2: 97% (20 Dec 2019 23:59) (97% - 97%)    GENERAL: NAD, chronically ill appearing.   HEAD:  Atraumatic, Normocephalic  EYES: EOMI, PERRLA, +scleral icterus b/l  ENMT: No tonsillar erythema, exudates, or enlargement; dry mucous membranes, Good dentition, No lesions  NECK: Supple, No JVD, Normal thyroid  CHEST/LUNG: Clear to percussion bilaterally with dec bs at bases; No rales, rhonchi, wheezing, or rubs no resp distress or accessory muscle usage.  HEART: Regular rate and rhythm; No murmurs, rubs, or gallops, 1+ edema b/l LE. It appears LLE is more swollen assymetrically than RLE.  ABDOMEN: Soft, nontender, +moderately tender +fluid wave. Bowel sounds present. +paracentesis site covered with external collection pouch with scant amount of serous fluid.   EXTREMITIES:  2+ Peripheral Pulses, No clubbing, cyanosis.   LYMPH: No lymphadenopathy noted, no lymphangitis.   SKIN: +diffuse jaundice. no significant pruritis. +L knee abrasion, healed.   NERVOUS SYSTEM:  Alert & Oriented X3, Good concentration; Motor Strength 5/5 B/L upper and lower extremities.  No facial droop, no dysarthria. No asterixis on exam.  PSYCH: no si, no hi, flat affect.

## 2019-12-21 NOTE — PROGRESS NOTE ADULT - SUBJECTIVE AND OBJECTIVE BOX
Patient is a 44y old  Male who presents with a chief complaint of decompensated cirrhosis (21 Dec 2019 15:01)        SUBJECTIVE / OVERNIGHT EVENTS: Patient says LE edema and abdominal distention is better now. He denies other symptoms.       MEDICATIONS  (STANDING):  ciprofloxacin     Tablet 500 milliGRAM(s) Oral daily  eplerenone 50 milliGRAM(s) Oral daily  folic acid 1 milliGRAM(s) Oral daily  furosemide    Tablet 40 milliGRAM(s) Oral daily  multivitamin 1 Tablet(s) Oral daily  pantoprazole    Tablet 40 milliGRAM(s) Oral before breakfast  thiamine 100 milliGRAM(s) Oral daily    MEDICATIONS  (PRN):  polyethylene glycol 3350 17 Gram(s) Oral two times a day PRN Constipation      Vital Signs Last 24 Hrs  T(C): 36.9 (21 Dec 2019 14:50), Max: 37.1 (21 Dec 2019 04:43)  T(F): 98.5 (21 Dec 2019 14:50), Max: 98.7 (21 Dec 2019 04:43)  HR: 94 (21 Dec 2019 14:50) (92 - 94)  BP: 102/59 (21 Dec 2019 14:50) (102/59 - 108/63)  BP(mean): --  RR: 18 (21 Dec 2019 14:50) (17 - 18)  SpO2: 96% (21 Dec 2019 14:50) (94% - 97%)  CAPILLARY BLOOD GLUCOSE        I&O's Summary    20 Dec 2019 07:01  -  21 Dec 2019 07:00  --------------------------------------------------------  IN: 30 mL / OUT: 180 mL / NET: -150 mL    21 Dec 2019 07:01  -  21 Dec 2019 18:32  --------------------------------------------------------  IN: 480 mL / OUT: 1320 mL / NET: -840 mL          PHYSICAL EXAM:   GENERAL: NAD  HEAD:  Atraumatic, Normocephalic  EYES: Conjunctiva and sclera icteric.   NECK: Supple  CHEST/LUNG: Clear to auscultation bilaterally; No wheeze  HEART: S1S2 normal. Regular rate and rhythm; No murmurs, rubs, or gallops  ABDOMEN: Soft, Nontender, distended; Bowel sounds present  EXTREMITIES:  Trace LE edema  PSYCH/Neuro: AAOx3. Non-focal.       LABS:                        8.1    11.25 )-----------( 82       ( 21 Dec 2019 16:01 )             23.9     12-21    120<LL>  |  92<L>  |  16  ----------------------------<  122<H>  3.8   |  18<L>  |  0.58    Ca    7.0<L>      21 Dec 2019 13:03  Phos  2.9     12-21  Mg     1.8     12-21    TPro  5.9<L>  /  Alb  1.6<L>  /  TBili  8.8<H>  /  DBili  x   /  AST  103<H>  /  ALT  65<H>  /  AlkPhos  153<H>  12-21    PT/INR - ( 21 Dec 2019 16:36 )   PT: 32.5 sec;   INR: 2.76 ratio         PTT - ( 21 Dec 2019 16:36 )  PTT:72.0 sec          RADIOLOGY & ADDITIONAL TESTS:    Imaging Personally Reviewed:    Consultant(s) Notes Reviewed:    Care Discussed with Consultants/Other Providers:

## 2019-12-21 NOTE — H&P ADULT - PROBLEM SELECTOR PLAN 3
-Noted coagulopathy 2/2 decompensated cirrhosis and overall decreased liver synthetic function; decreased TP and severe hypoalbuminemia.   -INR is 3.03 yesterday; appears about stable between 2.7-3.5 over last month  -pt has been receiving PRN doses of oral vitamin K solution 10 mg po, last given 12/20/19  -trend INR, if uptrending consider additional vitamin K.  No s/s of active bleeding currently.

## 2019-12-22 DIAGNOSIS — E87.1 HYPO-OSMOLALITY AND HYPONATREMIA: ICD-10-CM

## 2019-12-22 LAB
ALBUMIN SERPL ELPH-MCNC: 1.6 G/DL — LOW (ref 3.3–5)
ALP SERPL-CCNC: 160 U/L — HIGH (ref 40–120)
ALT FLD-CCNC: 58 U/L — HIGH (ref 10–45)
ANION GAP SERPL CALC-SCNC: 10 MMOL/L — SIGNIFICANT CHANGE UP (ref 5–17)
ANION GAP SERPL CALC-SCNC: 4 MMOL/L — LOW (ref 5–17)
APTT BLD: 70.6 SEC — HIGH (ref 27.5–36.3)
AST SERPL-CCNC: 95 U/L — HIGH (ref 10–40)
BILIRUB SERPL-MCNC: 8.2 MG/DL — HIGH (ref 0.2–1.2)
BUN SERPL-MCNC: 15 MG/DL — SIGNIFICANT CHANGE UP (ref 7–23)
BUN SERPL-MCNC: 16 MG/DL — SIGNIFICANT CHANGE UP (ref 7–23)
CALCIUM SERPL-MCNC: 7 MG/DL — LOW (ref 8.4–10.5)
CALCIUM SERPL-MCNC: 7.1 MG/DL — LOW (ref 8.4–10.5)
CHLORIDE SERPL-SCNC: 95 MMOL/L — LOW (ref 96–108)
CHLORIDE SERPL-SCNC: 97 MMOL/L — SIGNIFICANT CHANGE UP (ref 96–108)
CO2 SERPL-SCNC: 17 MMOL/L — LOW (ref 22–31)
CO2 SERPL-SCNC: 20 MMOL/L — LOW (ref 22–31)
CREAT SERPL-MCNC: 0.53 MG/DL — SIGNIFICANT CHANGE UP (ref 0.5–1.3)
CREAT SERPL-MCNC: 0.76 MG/DL — SIGNIFICANT CHANGE UP (ref 0.5–1.3)
GLUCOSE SERPL-MCNC: 133 MG/DL — HIGH (ref 70–99)
GLUCOSE SERPL-MCNC: 83 MG/DL — SIGNIFICANT CHANGE UP (ref 70–99)
HCT VFR BLD CALC: 22.9 % — LOW (ref 39–50)
HGB BLD-MCNC: 7.8 G/DL — LOW (ref 13–17)
INR BLD: 2.66 RATIO — HIGH (ref 0.88–1.16)
MCHC RBC-ENTMCNC: 32.2 PG — SIGNIFICANT CHANGE UP (ref 27–34)
MCHC RBC-ENTMCNC: 34.1 GM/DL — SIGNIFICANT CHANGE UP (ref 32–36)
MCV RBC AUTO: 94.6 FL — SIGNIFICANT CHANGE UP (ref 80–100)
NRBC # BLD: 0 /100 WBCS — SIGNIFICANT CHANGE UP (ref 0–0)
OSMOLALITY UR: 421 MOSM/KG — SIGNIFICANT CHANGE UP (ref 50–1200)
PLATELET # BLD AUTO: 70 K/UL — LOW (ref 150–400)
POTASSIUM SERPL-MCNC: 4 MMOL/L — SIGNIFICANT CHANGE UP (ref 3.5–5.3)
POTASSIUM SERPL-MCNC: 4.2 MMOL/L — SIGNIFICANT CHANGE UP (ref 3.5–5.3)
POTASSIUM SERPL-SCNC: 4 MMOL/L — SIGNIFICANT CHANGE UP (ref 3.5–5.3)
POTASSIUM SERPL-SCNC: 4.2 MMOL/L — SIGNIFICANT CHANGE UP (ref 3.5–5.3)
POTASSIUM UR-SCNC: 25 MMOL/L — SIGNIFICANT CHANGE UP
PROT SERPL-MCNC: 6 G/DL — SIGNIFICANT CHANGE UP (ref 6–8.3)
PROTHROM AB SERPL-ACNC: 31.3 SEC — HIGH (ref 10–12.9)
RBC # BLD: 2.42 M/UL — LOW (ref 4.2–5.8)
RBC # FLD: 20.7 % — HIGH (ref 10.3–14.5)
SODIUM SERPL-SCNC: 121 MMOL/L — LOW (ref 135–145)
SODIUM SERPL-SCNC: 122 MMOL/L — LOW (ref 135–145)
SODIUM UR-SCNC: <35 MMOL/L — SIGNIFICANT CHANGE UP
WBC # BLD: 11.18 K/UL — HIGH (ref 3.8–10.5)
WBC # FLD AUTO: 11.18 K/UL — HIGH (ref 3.8–10.5)

## 2019-12-22 PROCEDURE — 99222 1ST HOSP IP/OBS MODERATE 55: CPT | Mod: GC

## 2019-12-22 PROCEDURE — 99233 SBSQ HOSP IP/OBS HIGH 50: CPT

## 2019-12-22 PROCEDURE — 99223 1ST HOSP IP/OBS HIGH 75: CPT | Mod: GC

## 2019-12-22 RX ORDER — FUROSEMIDE 40 MG
40 TABLET ORAL
Refills: 0 | Status: DISCONTINUED | OUTPATIENT
Start: 2019-12-22 | End: 2019-12-27

## 2019-12-22 RX ADMIN — Medication 1 TABLET(S): at 11:38

## 2019-12-22 RX ADMIN — Medication 1 MILLIGRAM(S): at 11:38

## 2019-12-22 RX ADMIN — PANTOPRAZOLE SODIUM 40 MILLIGRAM(S): 20 TABLET, DELAYED RELEASE ORAL at 05:24

## 2019-12-22 RX ADMIN — Medication 40 MILLIGRAM(S): at 05:24

## 2019-12-22 RX ADMIN — Medication 100 MILLIGRAM(S): at 11:38

## 2019-12-22 RX ADMIN — Medication 500 MILLIGRAM(S): at 11:38

## 2019-12-22 RX ADMIN — Medication 40 MILLIGRAM(S): at 17:43

## 2019-12-22 RX ADMIN — POLYETHYLENE GLYCOL 3350 17 GRAM(S): 17 POWDER, FOR SOLUTION ORAL at 21:45

## 2019-12-22 RX ADMIN — EPLERENONE 50 MILLIGRAM(S): 50 TABLET, FILM COATED ORAL at 05:24

## 2019-12-22 NOTE — PHYSICAL THERAPY INITIAL EVALUATION ADULT - GENERAL OBSERVATIONS, REHAB EVAL
45 y/o Male presents as transfer from [facility] for decompensated cirrhosis with ascites, coagulopathy, chronic anemia, chronic thrombocytopenia, and chronic hyponatremia; here for hepatology evaluation. PMH alcoholic cirrhosis with ascites initially dx 2018, continual EtOH abuse post diagnosis now abstinent x 3 months (+) Alcoholic cirrhosis of liver with ascites. CTAP with moderate refractory ascites, s/p LVP 5L out + albumin, SBP neg.

## 2019-12-22 NOTE — CONSULT NOTE ADULT - SUBJECTIVE AND OBJECTIVE BOX
[facility] Division of Kidney Diseases & Hypertension  INITIAL CONSULT NOTE  --------------------------------------------------------------------------------  HPI:    44 M PMH alcoholic cirrhosis with ascites initially dx 2018, continual EtOH abuse post diagnosis now abstinent x 3 months, who presents as transfer from [facility] for decompensated cirrhosis with ascites, coagulopathy, chronic anemia, chronic thrombocytopenia, and chronic hyponatremia. Nephrology consulted for Hyponatremia.    On review of labs at [facility] patient without history of hyponatremia in 2018 up until July 2019. On 11/30/10 Serum Na: 127 admitted at [facility] with dulce Serum Na: 120 for which he was given Tolvaptan which increased to 126. He was discharge don Lasix 40mg daily and Aldactone 50mg. Patient was readmitted at [facility] again from 12/19-12/20/19 after a mechanical fall. Found to have hyponatremia to Serum Na: 119. Nephrology was consulted.. Urine lytes suggested SIADH picture (Serum Osm 270,  Uosm 370) Pt was placed on 1L daily fluid restriction and eventually discharged on Eplenerone 50 and Lasix 40 once daily.     Patient now admitted to [facility] for fluid retention manifested by abdominal distention and progressive LE edema. Serum Na: 119 on 12/20 increased to SNa: 120 on 12/20. Current Serum Na:121, his last Serum Na: 125 on 12/13/19. On review of systems patient endorses having abdominal distention with occasional reflux symptoms No associated pain, no chest pain, shortness of breath, dizziness, lightheadedness , no nausea/vomiting. Patient s/p large volume paracentesis on 12/20 with 5L.       PAST HISTORY  --------------------------------------------------------------------------------  PAST MEDICAL & SURGICAL HISTORY:  Liver cirrhosis, alcoholic  Anemia  Thrombocytopenia  ETOH abuse  History of incision and drainage: Gluteal abscess    FAMILY HISTORY:  Family history of stroke (Father, Mother)    PAST SOCIAL HISTORY:    ALLERGIES & MEDICATIONS  --------------------------------------------------------------------------------  Allergies    No Known Allergies    Intolerances      Standing Inpatient Medications  ciprofloxacin     Tablet 500 milliGRAM(s) Oral daily  eplerenone 50 milliGRAM(s) Oral daily  folic acid 1 milliGRAM(s) Oral daily  furosemide    Tablet 40 milliGRAM(s) Oral two times a day  multivitamin 1 Tablet(s) Oral daily  pantoprazole    Tablet 40 milliGRAM(s) Oral before breakfast  thiamine 100 milliGRAM(s) Oral daily    PRN Inpatient Medications  polyethylene glycol 3350 17 Gram(s) Oral two times a day PRN      REVIEW OF SYSTEMS  --------------------------------------------------------------------------------  Gen: No  fevers/chills, weakness  Skin: No rashes  Head/Eyes/Ears/Mouth: No headache; Normal hearing; Normal vision w/o blurriness;   Respiratory: No dyspnea, cough, wheezing, hemoptysis  CV: No chest pain,   GI: + abdominal distention, no nausea/vomiting   : No increased frequency, dysuria, hematuria, nocturia  MSK: + LE swelling   Neuro: No dizziness/lightheadedness, weakness, seizures    All other systems were reviewed and are negative, except as noted.    VITALS/PHYSICAL EXAM  --------------------------------------------------------------------------------  T(C): 37.1 (12-22-19 @ 04:39), Max: 37.1 (12-22-19 @ 04:39)  HR: 94 (12-22-19 @ 12:34) (89 - 104)  BP: 108/66 (12-22-19 @ 12:34) (101/53 - 111/66)  RR: 18 (12-22-19 @ 04:39) (18 - 18)  SpO2: 96% (12-22-19 @ 12:34) (96% - 97%)  Wt(kg): --  Height (cm): 172.7 (12-20-19 @ 23:59)  Weight (kg): 96.6 (12-21-19 @ 07:01)  BMI (kg/m2): 32.4 (12-21-19 @ 07:01)  BSA (m2): 2.1 (12-21-19 @ 07:01)      12-21-19 @ 07:01  -  12-22-19 @ 07:00  --------------------------------------------------------  IN: 598 mL / OUT: 1770 mL / NET: -1172 mL      Physical Exam:  	Gen: NAD,   	HEENT:  supple neck, icteric sclerae  	Pulm: rales bibasally   	CV:  S1S2  	Abd: +BS, soft, distended, (+) fluid wave, LLQ paracentesis site with external    	Ext: trace bilateral LE edema  	Neuro: No focal deficits  	Skin: Warm, without rashes, jaundice, spider angiomas  	Vascular access:     LABS/STUDIES  --------------------------------------------------------------------------------              7.8    11.18 >-----------<  70       [12-22-19 @ 06:57]              22.9     121  |  97  |  15  ----------------------------<  83      [12-22-19 @ 06:57]  4.0   |  20  |  0.53        Ca     7.1     [12-22-19 @ 06:57]      Mg     1.8     [12-21-19 @ 13:03]      Phos  2.9     [12-21-19 @ 13:03]    TPro  6.0  /  Alb  1.6  /  TBili  8.2  /  DBili  x   /  AST  95  /  ALT  58  /  AlkPhos  160  [12-22-19 @ 06:57]    PT/INR: PT 31.3 , INR 2.66       [12-22-19 @ 06:57]  PTT: 70.6       [12-22-19 @ 06:57]      Creatinine Trend:  SCr 0.53 [12-22 @ 06:57]  SCr 0.58 [12-21 @ 13:03]  SCr 0.6 [12-20 @ 05:31]  SCr 0.8 [12-19 @ 16:39]  SCr 0.7 [12-13 @ 07:20]      Urine Sodium <20.0      [12-20-19 @ 11:30]  Urine Osmolality 368      [12-20-19 @ 11:30]

## 2019-12-22 NOTE — CONSULT NOTE ADULT - ATTENDING COMMENTS
Patient with severe alcoholic cirrhosis with ascites and hyponatremia with alcohol drinking until about 3 months ago.  Has required paracenteses for fluid control.  On diuretics lasix/epleronone, but recurrent ascites and not TIPS candidate due to severity of liver disease.  No live rmass on imaging, no varices on EGD 2018. creat normal 0.58, but serum sodium 120 which has not been able to be improved with outpatient management of fluid restriction.  No SBP , but patient on cipro prophylaxis for sbp.  Liver disease severe with imp[aired hepatic synthetic function INR 2.76 abd bilirubin 8.8.  Patient has past history of perirectal abscess now inactive.  Exam shows some discoordinated movement and speech slow.  I question hepatic encephalopathy and suggest lactulose /rifaximin therapy and confirm normal ceruloplasmin.  Suggest consult renal for management of hyponatremia.  Restrict po fluid intake.    Patient to begin consideration for liver transplant.
Young male with decompensated cirrhosis with ascites, coagulopathy, chronic anemia, chronic thrombocytopenia, and chronic hyponatremia admitted with shortness of breath, leg swelling and found to have hyponatremia.     hyponatremia likely sec to fluid overload in a cirrhotic paient. check uineosm/uine eletrolyes.   Increase lasix to 40 mg BID   Continue aldactone.

## 2019-12-22 NOTE — PHYSICAL THERAPY INITIAL EVALUATION ADULT - ACTIVE RANGE OF MOTION EXAMINATION, REHAB EVAL
aneudy. upper extremity Active ROM was WNL (within normal limits)/bilateral  lower extremity Active ROM was WFL (within functional limits)

## 2019-12-22 NOTE — DIETITIAN INITIAL EVALUATION ADULT. - CONTINUE CURRENT NUTRITION CARE PLAN
1. Recommend continue regular diet + 1000 ml fluid restriction (defer needs for fluids/low salt diet to team). 2. Recommend Ensure Enlive 1x daily (350 darin and 20 gm protein) to optimize kcal and protein intake. Discussed diet and supplement with NP./yes

## 2019-12-22 NOTE — CONSULT NOTE ADULT - PROBLEM SELECTOR RECOMMENDATION 9
Patient with Hypervolemic Hypo-osmolar Hyponatremia secondary to decompensated liver cirrhosis s/p paracentesis. Current serum Na: 120    Recommendations:   - Increase Lasix 40mg BID PO  - Add Ure-Na 15mg   - Continue Eplerenone 50mg daily  - Check BMP, serum Osm, UOsm, Urine electrolytes now  - Fluid restrict 1L per day  - BMP every 6 hours with goal correction not more than 6-8 meq in 24 hours. Goal: 128 in 24 hours. Patient with Hypervolemic Hypo-osmolar Hyponatremia secondary to decompensated liver cirrhosis s/p paracentesis. Current serum Na: 120    Recommendations:   - Increase Lasix 40mg BID PO  - Add Ure-Na 15mg BID- medication non-formulary will need to call pharmacy (ordered)  - Continue Eplerenone 50mg daily  - Check BMP, serum Osm, UOsm, Urine electrolytes now  - Fluid restrict 1L per day  - BMP every 6 hours with goal correction not more than 6-8 meq in 24 hours.   Goal: 128 in 24 hours.

## 2019-12-22 NOTE — PROGRESS NOTE ADULT - PROBLEM SELECTOR PLAN 8
Transitions of Care Status:  1.  Name of PCP: Dr. Skaggs ([place] [facility]) has not actually seen yet  2.  PCP Contacted on Admission: [ ] Y    [x ] N    3.  PCP contacted at Discharge: [ ] Y    [ ] N    [ ] N/A  4.  Post-Discharge Appointment Date and Location:  5.  Summary of Handoff given to PCP:

## 2019-12-22 NOTE — DIETITIAN INITIAL EVALUATION ADULT. - PROBLEM SELECTOR PLAN 1
-decompensated cirrhosis, presented to [facility] for sob/anasarca/abd distention, CTAP with moderate refractory ascites, s/p LVP 5L out + albumin, SBP neg. Transfer for hepatology eval  -Emailed [facility] overnight for consult  -admit to medicine, [facility]  -will get labs repeat labs with AM blood draw.  Labs 12/20 reviewed; mostly stable or mildly downtrended LFTs c/w known cirrhosis, MELD 32. Rest noted below. get daily coags.   -c/w lasix 40 qd, eplenerone 50 qd. prev on Lasix 40 iv bid for refractory ascites but this was decreased after pt's HypoNa w/u revealed SIADH picture and possible intravascular depletion/dec ECV.   -c/w 1L daily fluid restriction  -afeb, no leukocytosis, noted neg for SBP on recent para. c/w Cipro daily po sbp ppx  -abstinent from etoh x 3 months. No need for CIWA at this time  -c/w thiamine, MV, folate daily.   -no s/s of hepatic encephalopathy at this time. c/w prn miralax.   -MRI 12/2019 neg hepatoma, +portal HTN with perisplenic varices. EGD 7/2018 neg varices, +gastritis/esophagitis neg H pylori. [place] 7/2018 +hemorrhoids/anal fistula/diverticulosis. AFP neg.   -If note candidate for transplant, consider repeat eval for TIPS for refractory ascites.

## 2019-12-22 NOTE — PROGRESS NOTE ADULT - PROBLEM SELECTOR PLAN 1
-decompensated cirrhosis, presented to [facility] for sob/anasarca/abd distention, CTAP with moderate refractory ascites, s/p LVP 5L out + albumin, SBP neg. Transfer for hepatology eval  -Hepatology recs appreciated.   -Monitor CBC, coags, LFT's daily.    -Added lactulose 20gm TID, per hepatology.   -Increase lasix to 40mg BID per renal, c/w eplerenone 50mg qd. prev on Lasix 40 iv bid for refractory ascites but this was decreased after pt's HypoNa w/u revealed SIADH picture and possible intravascular depletion/dec ECV.   -c/w 1L daily fluid restriction  -c/w Cipro daily po sbp ppx  -abstinent from etoh x 3 months. No need for CIWA at this time  -c/w thiamine, MVI, folate daily.   -no s/s of hepatic encephalopathy at this time. Lactulose per hepatology.   -MRI 12/2019 neg hepatoma, +portal HTN with perisplenic varices. EGD 7/2018 neg varices, +gastritis/esophagitis neg H pylori. [place] 7/2018 +hemorrhoids/anal fistula/diverticulosis. AFP neg.   -If not candidate for transplant, consider repeat eval for TIPS for refractory ascites.  -Echo per hepatology.

## 2019-12-22 NOTE — CHART NOTE - NSCHARTNOTEFT_GEN_A_CORE
Upon Nutritional Assessment by the Registered Dietitian your patient was determined to meet criteria / has evidence of the following diagnosis/diagnoses:          [ ]  Mild Protein Calorie Malnutrition        [ ]  Moderate Protein Calorie Malnutrition        [x] Severe Protein Calorie Malnutrition        [ ] Unspecified Protein Calorie Malnutrition        [ ] Underweight / BMI <19        [ ] Morbid Obesity / BMI > 40      Findings as based on:  [x] Comprehensive nutrition assessment   [x] Nutrition Focused Physical Exam with pt's consent and noted severe muscle loss in temporales, mild muscle loss in scapula and clavicles, moderate muscle loss in calves and thighs, and severe fat loss in triceps.  [x] Other: PO intake <75% & 27% weight loss x 11 months; fluid accumulation/ascites    Nutrition Plan/Recommendations:    1. Recommend continue regular diet + 1000 ml fluid restriction (defer needs for fluids/low salt diet to team).   2. Recommend Ensure Enlive 1x daily (350 darin and 20 gm protein) to optimize kcal and protein intake. Discussed with NP.   3. Encourage PO intake and obtain food preferences (obtained at this time).   4. Continue Multivitamin, Vitamin B1, and Folic Acid to optimize nutrient intake in setting of liver cirrhosis.   5. Provided recommendations to optimize PO and protein intake and reviewed fluid restriction - pt made aware RD remains available.     RD remains available  Libby Cortes MS RDN CDN #[phone]    PROVIDER Section:     By signing this assessment you are acknowledging and agree with the diagnosis/diagnoses assigned by the Registered Dietitian    Comments: Upon Nutritional Assessment by the Registered Dietitian your patient was determined to meet criteria / has evidence of the following diagnosis/diagnoses:          [ ]  Mild Protein Calorie Malnutrition        [ ]  Moderate Protein Calorie Malnutrition        [x] Severe Protein Calorie Malnutrition        [ ] Unspecified Protein Calorie Malnutrition        [ ] Underweight / BMI <19        [ ] Morbid Obesity / BMI > 40      Findings as based on:  [x] Comprehensive nutrition assessment   [x] Nutrition Focused Physical Exam with pt's consent and noted severe muscle loss in temporales, mild muscle loss in scapula and clavicles, moderate muscle loss in calves and thighs, and severe fat loss in triceps.  [x] Other: PO intake <75% & 27% weight loss x 10 months; fluid accumulation/ascites    Nutrition Plan/Recommendations:    1. Recommend continue regular diet + 1000 ml fluid restriction (defer needs for fluids/low salt diet to team).   2. Recommend Ensure Enlive 1x daily (350 darin and 20 gm protein) to optimize kcal and protein intake. Discussed with NP.   3. Encourage PO intake and obtain food preferences (obtained at this time).   4. Continue Multivitamin, Vitamin B1, and Folic Acid to optimize nutrient intake in setting of liver cirrhosis.   5. Provided recommendations to optimize PO and protein intake and reviewed fluid restriction - pt made aware RD remains available.     RD remains available  Libby Cotres MS RDN CDN #[id number]    PROVIDER Section:     By signing this assessment you are acknowledging and agree with the diagnosis/diagnoses assigned by the Registered Dietitian    Comments:

## 2019-12-22 NOTE — DIETITIAN INITIAL EVALUATION ADULT. - PROBLEM/PLAN-5
[FreeTextEntry1] : 69 year female who comes for preop evaluation prior to right THR at [facility] on July 31, 2019 with Dr Benson. She walks a few blocks and is limited by hills due to hip pain. She was active with Pilates and Yoga until early 2019 without chest pain. She denies having any palpitations, chest pain, syncope or near syncope. She denies PND or Orhtopnea. She went for preop labs at [facility] which we reviewed at the time of his this visit. Her CXR is stable but shows hyperaeration. She has eczema which is controlled with shampoos and lack of soap. She notes having problems with fragrances in Chlorhexidine wipes. She used Bactroban for staph aureus that is not MRSA
DISPLAY PLAN FREE TEXT

## 2019-12-22 NOTE — PROGRESS NOTE ADULT - ASSESSMENT
44 M PMH alcoholic cirrhosis with ascites initially dx 2018, continual EtOH abuse post diagnosis now abstinent x 3 months, who presents as transfer from [facility] for decompensated cirrhosis with ascites, coagulopathy, chronic anemia, chronic thrombocytopenia, and chronic hyponatremia; here for hepatology evaluation.

## 2019-12-22 NOTE — PHYSICAL THERAPY INITIAL EVALUATION ADULT - IMPAIRMENTS FOUND, PT EVAL
Pt will benefit from daily ambulation program with ambulation aid, pt does not requires skilled PT services at this time./aerobic capacity/endurance/gait, locomotion, and balance/muscle strength

## 2019-12-22 NOTE — DIETITIAN INITIAL EVALUATION ADULT. - ENERGY NEEDS
Pertinent information as per chart: Pt 43 y/o M with PMH: alcoholic cirrhosis with ascites initially dx 2018, continual EtOH abuse post diagnosis now abstinent x 3 months, who presents as transfer from [facility] for decompensated cirrhosis with ascites, coagulopathy, chronic anemia, chronic thrombocytopenia, and chronic hyponatremia; here for hepatology evaluation.

## 2019-12-22 NOTE — DIETITIAN INITIAL EVALUATION ADULT. - PHYSICAL APPEARANCE
Performed nutrition focused physical exam with pt's consent and noted severe muscle loss in temporales, mild muscle loss in scapula and clavicles, moderate muscle loss in calves and thighs, and severe fat loss in triceps./other (specify) Ht: 68 inches Usual-dry weight stated per pt: 172 pounds BMI: 26.1 kg/m2 IBW: 154 (+/-10%) 111.6 %IBW  Noted +2 right leg and +3 left leg edema as per flow sheets.   Skin: no noted pressure injuries as per documentation.

## 2019-12-22 NOTE — DIETITIAN INITIAL EVALUATION ADULT. - OTHER INFO
Pt reports decreased appetite and PO intake since February 2019 (10 months) due to feeling sick. Confirms NKFA. Reports following a low salt diet at home with fluid restriction (unable to recall ml). Reports taking Multivitamin, Vitamin B1, and Folic Acid PTA; denies drinking any nutritional supplement.     Pt reports continues with fair appetite and PO intake. Noted 50% PO intake as per lunch tray at bedside. Offered nutritional supplementation - pt agreed to Ensure Enlive only 1xday due to fluid restriction, states will not order soup or other fluids instead, discussed with NP. Denies difficulty chewing/swallowing. Pt denies nausea, vomiting, diarrhea, or constipation, last BM yesterday (12/21).     Provided recommendations to optimize PO and protein intake, recommended small frequent meals by ordering nutrient-dense snacks and leaving non-perishable food away from tray for later consumption during the day or between meals, to start with protein, and sips of supplement throughout the day; reviewed foods with protein and menu order procedures in hospital.     Reviewed education fluid restriction. Discussed amount of fluids allowed per medical team and foods that count as fluids. Pt amenable for recommendations/education - made aware RD remains available.

## 2019-12-22 NOTE — DIETITIAN INITIAL EVALUATION ADULT. - PROBLEM SELECTOR PLAN 8
Transitions of Care Status:  1.  Name of PCP: Dr. Skaggs ([place] [facility]) has not actually seen yet  2.  PCP Contacted on Admission: [ ] Y    [x ] N    3.  PCP contacted at Discharge: [ ] Y    [ ] N    [ ] N/A  4.  Post-Discharge Appointment Date and Location:  5.  Summary of Handoff given to PCP:

## 2019-12-22 NOTE — PHYSICAL THERAPY INITIAL EVALUATION ADULT - ADDITIONAL COMMENTS
Currently pt lives with his aunt w/3steps to enter and no stairs inside. Pt was completely independent in all functional mobility, ADLs/IADLs. Owns RW given to pt on prior d/c from [facility].

## 2019-12-22 NOTE — PHYSICAL THERAPY INITIAL EVALUATION ADULT - DIAGNOSIS, PT EVAL
pt has decrease activity tolerance, decreased ambulation endurance, decreased strength - pt will benefit from daily ambulation with ambulation aid

## 2019-12-22 NOTE — DIETITIAN INITIAL EVALUATION ADULT. - REASON INDICATOR FOR ASSESSMENT
Nutrition consult received for assessment and education.  Information obtained from: medical record and pt.  Pt Turkmen-Speaking, dietitian fluent in Turkmen.

## 2019-12-22 NOTE — PROGRESS NOTE ADULT - ATTENDING COMMENTS
Douglas Marin MD  Division of [facility] Medicine  Cell: [phone]  Pager: [phone]  Office: [phone]

## 2019-12-22 NOTE — DIETITIAN INITIAL EVALUATION ADULT. - ADD RECOMMEND
1. Will continue to monitor PO intake, weight, labs, skin, GI status, diet. 2. Encourage PO intake and obtain food preferences (obtained at this time). 3. Continue Multivitamin, Vitamin B1, and Folic Acid to optimize nutrient intake in setting of liver cirrhosis. 4. Provided recommendations to optimize PO and protein intake and reviewed fluid restriction - pt made aware RD remains available. 5. Malnutrition notification placed in chart - spoke to NP.

## 2019-12-22 NOTE — DIETITIAN INITIAL EVALUATION ADULT. - SIGNS/SYMPTOMS
PO intake <75% & 27% weight loss x 11 months; fluid accumulation/ascites; signs of muscle/fat loss PO intake <75% & 27% weight loss x 10 months; fluid accumulation/ascites; signs of muscle/fat loss

## 2019-12-22 NOTE — PROGRESS NOTE ADULT - SUBJECTIVE AND OBJECTIVE BOX
Patient is a 44y old  Male who presents with a chief complaint of decompensated cirrhosis (23 Dec 2019 08:19)        SUBJECTIVE / OVERNIGHT EVENTS: Patient says he feels better. Denies any CP or SOB.       MEDICATIONS  (STANDING):  ciprofloxacin     Tablet 500 milliGRAM(s) Oral daily  eplerenone 50 milliGRAM(s) Oral daily  folic acid 1 milliGRAM(s) Oral daily  furosemide    Tablet 40 milliGRAM(s) Oral two times a day  multivitamin 1 Tablet(s) Oral daily  pantoprazole    Tablet 40 milliGRAM(s) Oral before breakfast  thiamine 100 milliGRAM(s) Oral daily  Ure-Na Powder 15 Gram(s) 15 Gram(s) Oral two times a day    MEDICATIONS  (PRN):  polyethylene glycol 3350 17 Gram(s) Oral two times a day PRN Constipation      Vital Signs Last 24 Hrs  T(C): 36.9 (23 Dec 2019 04:48), Max: 37.1 (22 Dec 2019 20:52)  T(F): 98.5 (23 Dec 2019 04:48), Max: 98.7 (22 Dec 2019 20:52)  HR: 95 (23 Dec 2019 04:48) (94 - 96)  BP: 111/71 (23 Dec 2019 04:48) (108/66 - 112/59)  BP(mean): --  RR: 18 (23 Dec 2019 04:48) (18 - 18)  SpO2: 96% (23 Dec 2019 04:48) (94% - 96%)  CAPILLARY BLOOD GLUCOSE        I&O's Summary    22 Dec 2019 07:01  -  23 Dec 2019 07:00  --------------------------------------------------------  IN: 354 mL / OUT: 800 mL / NET: -446 mL          PHYSICAL EXAM:   GENERAL: NAD  HEAD:  Atraumatic, Normocephalic  EYES: Conjunctiva and sclera icteric  NECK: Supple  CHEST/LUNG: Clear to auscultation bilaterally; No wheeze  HEART: S1S2 normal. Regular rate and rhythm; No murmurs, rubs, or gallops  ABDOMEN: Soft, Nontender, distended; Bowel sounds present  EXTREMITIES:  Trace LE edema  PSYCH/Neuro: AAOx3. Non-focal.   SKIN: Jaundiced.       LABS:                        7.8    11.18 )-----------( 70       ( 22 Dec 2019 06:57 )             22.9     12-23    123<L>  |  97  |  22  ----------------------------<  76  4.0   |  19<L>  |  0.59    Ca    7.2<L>      23 Dec 2019 06:57  Phos  2.9     12-21  Mg     1.8     12-21    TPro  6.1  /  Alb  1.5<L>  /  TBili  7.8<H>  /  DBili  x   /  AST  100<H>  /  ALT  60<H>  /  AlkPhos  158<H>  12-23    PT/INR - ( 22 Dec 2019 06:57 )   PT: 31.3 sec;   INR: 2.66 ratio         PTT - ( 22 Dec 2019 06:57 )  PTT:70.6 sec            RADIOLOGY & ADDITIONAL TESTS:    Imaging Personally Reviewed:  Consultant(s) Notes Reviewed:  Renal  Care Discussed with Consultants/Other Providers: Patient is a 44y old  Male who presents with a chief complaint of decompensated cirrhosis (23 Dec 2019 08:19)        SUBJECTIVE / OVERNIGHT EVENTS: Patient says he feels better. Denies any CP or SOB.       MEDICATIONS  (STANDING):  ciprofloxacin     Tablet 500 milliGRAM(s) Oral daily  eplerenone 50 milliGRAM(s) Oral daily  folic acid 1 milliGRAM(s) Oral daily  furosemide    Tablet 40 milliGRAM(s) Oral two times a day  multivitamin 1 Tablet(s) Oral daily  pantoprazole    Tablet 40 milliGRAM(s) Oral before breakfast  thiamine 100 milliGRAM(s) Oral daily  Ure-Na Powder 15 Gram(s) 15 Gram(s) Oral two times a day    MEDICATIONS  (PRN):  polyethylene glycol 3350 17 Gram(s) Oral two times a day PRN Constipation      Vital Signs Last 24 Hrs  T(C): 36.9 (23 Dec 2019 04:48), Max: 37.1 (22 Dec 2019 20:52)  T(F): 98.5 (23 Dec 2019 04:48), Max: 98.7 (22 Dec 2019 20:52)  HR: 95 (23 Dec 2019 04:48) (94 - 96)  BP: 111/71 (23 Dec 2019 04:48) (108/66 - 112/59)  BP(mean): --  RR: 18 (23 Dec 2019 04:48) (18 - 18)  SpO2: 96% (23 Dec 2019 04:48) (94% - 96%)  CAPILLARY BLOOD GLUCOSE        I&O's Summary    22 Dec 2019 07:01  -  23 Dec 2019 07:00  --------------------------------------------------------  IN: 354 mL / OUT: 800 mL / NET: -446 mL          PHYSICAL EXAM:   GENERAL: NAD  HEAD:  Atraumatic, Normocephalic  EYES: Conjunctiva and sclera icteric  NECK: Supple  CHEST/LUNG: Clear to auscultation bilaterally; No wheeze  HEART: S1S2 normal. Regular rate and rhythm; No murmurs, rubs, or gallops  ABDOMEN: Soft, Nontender, distended; Bowel sounds present  EXTREMITIES:  Trace LE edema  PSYCH/Neuro: AAOx3. Non-focal.   SKIN: Jaundiced.       LABS:                        7.8    11.18 )-----------( 70       ( 22 Dec 2019 06:57 )             22.9     12-23    123<L>  |  97  |  22  ----------------------------<  76  4.0   |  19<L>  |  0.59    Ca    7.2<L>      23 Dec 2019 06:57  Phos  2.9     12-21  Mg     1.8     12-21    TPro  6.1  /  Alb  1.5<L>  /  TBili  7.8<H>  /  DBili  x   /  AST  100<H>  /  ALT  60<H>  /  AlkPhos  158<H>  12-23    PT/INR - ( 22 Dec 2019 06:57 )   PT: 31.3 sec;   INR: 2.66 ratio         PTT - ( 22 Dec 2019 06:57 )  PTT:70.6 sec            RADIOLOGY & ADDITIONAL TESTS:    Imaging Personally Reviewed:  Consultant(s) Notes Reviewed:  Renal and hepatology  Care Discussed with Consultants/Other Providers:

## 2019-12-22 NOTE — CONSULT NOTE ADULT - ASSESSMENT
44 M PMH alcoholic cirrhosis with ascites initially dx 2018, continual EtOH abuse post diagnosis now abstinent x 3 months, who presents as transfer from [facility] for decompensated cirrhosis with ascites, coagulopathy, chronic anemia, chronic thrombocytopenia, and chronic hyponatremia. Nephrology consulted for Hyponatremia.

## 2019-12-22 NOTE — PHYSICAL THERAPY INITIAL EVALUATION ADULT - PERTINENT HX OF CURRENT PROBLEM, REHAB EVAL
Chronic hyponatremia, (+) Coagulopathy, (+) Thrombocytopenia, (+) LE edema LLE>RLE, (+) Anemia of Chronic Disease, hold DVT ppx.

## 2019-12-22 NOTE — DIETITIAN INITIAL EVALUATION ADULT. - NS FNS WEIGHT CHANGE REASON
Pt reports 64 pounds weight loss since February (10 months) due to being sick and decreased PO intake, from 236 to 172 pounds. Weight as per flow sheets (12/20) 210.9 pounds -> (12/22) 212.3 pounds -?accuracy as pt with edema/ascites, will continue to monitor.

## 2019-12-23 ENCOUNTER — TRANSCRIPTION ENCOUNTER (OUTPATIENT)
Age: 44
End: 2019-12-23

## 2019-12-23 LAB
ALBUMIN SERPL ELPH-MCNC: 1.5 G/DL — LOW (ref 3.3–5)
ALP SERPL-CCNC: 158 U/L — HIGH (ref 40–120)
ALT FLD-CCNC: 60 U/L — HIGH (ref 10–45)
ANION GAP SERPL CALC-SCNC: 6 MMOL/L — SIGNIFICANT CHANGE UP (ref 5–17)
ANION GAP SERPL CALC-SCNC: 7 MMOL/L — SIGNIFICANT CHANGE UP (ref 5–17)
ANION GAP SERPL CALC-SCNC: 9 MMOL/L — SIGNIFICANT CHANGE UP (ref 5–17)
ANION GAP SERPL CALC-SCNC: 9 MMOL/L — SIGNIFICANT CHANGE UP (ref 5–17)
APTT BLD: 64.8 SEC — HIGH (ref 27.5–36.3)
AST SERPL-CCNC: 100 U/L — HIGH (ref 10–40)
BILIRUB SERPL-MCNC: 7.8 MG/DL — HIGH (ref 0.2–1.2)
BUN SERPL-MCNC: 22 MG/DL — SIGNIFICANT CHANGE UP (ref 7–23)
BUN SERPL-MCNC: 22 MG/DL — SIGNIFICANT CHANGE UP (ref 7–23)
BUN SERPL-MCNC: 24 MG/DL — HIGH (ref 7–23)
BUN SERPL-MCNC: 26 MG/DL — HIGH (ref 7–23)
CALCIUM SERPL-MCNC: 7.1 MG/DL — LOW (ref 8.4–10.5)
CALCIUM SERPL-MCNC: 7.2 MG/DL — LOW (ref 8.4–10.5)
CALCIUM SERPL-MCNC: 7.3 MG/DL — LOW (ref 8.4–10.5)
CALCIUM SERPL-MCNC: 7.6 MG/DL — LOW (ref 8.4–10.5)
CHLORIDE SERPL-SCNC: 94 MMOL/L — LOW (ref 96–108)
CHLORIDE SERPL-SCNC: 95 MMOL/L — LOW (ref 96–108)
CHLORIDE SERPL-SCNC: 97 MMOL/L — SIGNIFICANT CHANGE UP (ref 96–108)
CHLORIDE SERPL-SCNC: 97 MMOL/L — SIGNIFICANT CHANGE UP (ref 96–108)
CO2 SERPL-SCNC: 19 MMOL/L — LOW (ref 22–31)
CO2 SERPL-SCNC: 20 MMOL/L — LOW (ref 22–31)
CREAT SERPL-MCNC: 0.55 MG/DL — SIGNIFICANT CHANGE UP (ref 0.5–1.3)
CREAT SERPL-MCNC: 0.59 MG/DL — SIGNIFICANT CHANGE UP (ref 0.5–1.3)
CREAT SERPL-MCNC: 0.61 MG/DL — SIGNIFICANT CHANGE UP (ref 0.5–1.3)
CREAT SERPL-MCNC: 0.71 MG/DL — SIGNIFICANT CHANGE UP (ref 0.5–1.3)
GLUCOSE SERPL-MCNC: 110 MG/DL — HIGH (ref 70–99)
GLUCOSE SERPL-MCNC: 76 MG/DL — SIGNIFICANT CHANGE UP (ref 70–99)
GLUCOSE SERPL-MCNC: 77 MG/DL — SIGNIFICANT CHANGE UP (ref 70–99)
GLUCOSE SERPL-MCNC: 89 MG/DL — SIGNIFICANT CHANGE UP (ref 70–99)
HCT VFR BLD CALC: 23.5 % — LOW (ref 39–50)
HGB BLD-MCNC: 8.1 G/DL — LOW (ref 13–17)
INR BLD: 2.68 RATIO — HIGH (ref 0.88–1.16)
MCHC RBC-ENTMCNC: 32.5 PG — SIGNIFICANT CHANGE UP (ref 27–34)
MCHC RBC-ENTMCNC: 34.5 GM/DL — SIGNIFICANT CHANGE UP (ref 32–36)
MCV RBC AUTO: 94.4 FL — SIGNIFICANT CHANGE UP (ref 80–100)
OSMOLALITY SERPL: 268 MOSMOL/KG — LOW (ref 275–300)
PLATELET # BLD AUTO: 72 K/UL — LOW (ref 150–400)
POTASSIUM SERPL-MCNC: 3.8 MMOL/L — SIGNIFICANT CHANGE UP (ref 3.5–5.3)
POTASSIUM SERPL-MCNC: 4 MMOL/L — SIGNIFICANT CHANGE UP (ref 3.5–5.3)
POTASSIUM SERPL-MCNC: 4 MMOL/L — SIGNIFICANT CHANGE UP (ref 3.5–5.3)
POTASSIUM SERPL-MCNC: 4.2 MMOL/L — SIGNIFICANT CHANGE UP (ref 3.5–5.3)
POTASSIUM SERPL-SCNC: 3.8 MMOL/L — SIGNIFICANT CHANGE UP (ref 3.5–5.3)
POTASSIUM SERPL-SCNC: 4 MMOL/L — SIGNIFICANT CHANGE UP (ref 3.5–5.3)
POTASSIUM SERPL-SCNC: 4 MMOL/L — SIGNIFICANT CHANGE UP (ref 3.5–5.3)
POTASSIUM SERPL-SCNC: 4.2 MMOL/L — SIGNIFICANT CHANGE UP (ref 3.5–5.3)
PROT SERPL-MCNC: 6.1 G/DL — SIGNIFICANT CHANGE UP (ref 6–8.3)
PROTHROM AB SERPL-ACNC: 31.2 SEC — HIGH (ref 10–13.1)
RBC # BLD: 2.49 M/UL — LOW (ref 4.2–5.8)
RBC # FLD: 20.9 % — HIGH (ref 10.3–14.5)
SODIUM SERPL-SCNC: 122 MMOL/L — LOW (ref 135–145)
SODIUM SERPL-SCNC: 123 MMOL/L — LOW (ref 135–145)
WBC # BLD: 9.94 K/UL — SIGNIFICANT CHANGE UP (ref 3.8–10.5)
WBC # FLD AUTO: 9.94 K/UL — SIGNIFICANT CHANGE UP (ref 3.8–10.5)

## 2019-12-23 PROCEDURE — 99232 SBSQ HOSP IP/OBS MODERATE 35: CPT | Mod: GC

## 2019-12-23 PROCEDURE — 99232 SBSQ HOSP IP/OBS MODERATE 35: CPT

## 2019-12-23 PROCEDURE — 93970 EXTREMITY STUDY: CPT | Mod: 26

## 2019-12-23 RX ORDER — LACTULOSE 10 G/15ML
20 SOLUTION ORAL THREE TIMES A DAY
Refills: 0 | Status: DISCONTINUED | OUTPATIENT
Start: 2019-12-23 | End: 2019-12-31

## 2019-12-23 RX ADMIN — Medication 500 MILLIGRAM(S): at 12:40

## 2019-12-23 RX ADMIN — Medication 100 MILLIGRAM(S): at 12:40

## 2019-12-23 RX ADMIN — EPLERENONE 50 MILLIGRAM(S): 50 TABLET, FILM COATED ORAL at 05:14

## 2019-12-23 RX ADMIN — Medication 40 MILLIGRAM(S): at 17:15

## 2019-12-23 RX ADMIN — Medication 1 MILLIGRAM(S): at 12:40

## 2019-12-23 RX ADMIN — Medication 1 TABLET(S): at 12:40

## 2019-12-23 RX ADMIN — Medication 40 MILLIGRAM(S): at 05:14

## 2019-12-23 RX ADMIN — LACTULOSE 20 GRAM(S): 10 SOLUTION ORAL at 12:40

## 2019-12-23 RX ADMIN — PANTOPRAZOLE SODIUM 40 MILLIGRAM(S): 20 TABLET, DELAYED RELEASE ORAL at 05:14

## 2019-12-23 NOTE — DISCHARGE NOTE NURSING/CASE MANAGEMENT/SOCIAL WORK - PATIENT PORTAL LINK FT
You can access the FollowMyHealth Patient Portal offered by [facility] by registering at the following website: [email or web address]. By joining [employer]’s FollowMyHealth portal, you will also be able to view your health information using other applications (apps) compatible with our system.

## 2019-12-23 NOTE — PROGRESS NOTE ADULT - ASSESSMENT
44 M PMH alcoholic cirrhosis with ascites initially dx 2018, continual EtOH abuse post diagnosis now abstinent x 3 months, who presents as transfer from [facility] for decompensated cirrhosis with ascites, coagulopathy, chronic anemia, chronic thrombocytopenia, and chronic hyponatremia; here for hepatology evaluation.

## 2019-12-23 NOTE — PROGRESS NOTE ADULT - ATTENDING COMMENTS
45 yo HM with alcohol-related cirrhosis, transferred from [facility] to [facility] due to acute on chronic liver failure, refractory ascites, and hyponatremia for consideration of liver transplant evaluation.    # Hyponatremia: Na improved from 120 to 123 with 1L fluid restriction. Appreciate Nephrology input. Recommend to discontinue eplerenone for now but continue Lasix 40 mg po bid. Agree with discontinuation of Ure-Na.    # Ascites: S/p 5L LVP on 12/20. Large volume but non-tense ascites on exam. Diuretic management as above. Recommend to discontinue Ure-Na as above and to sodium restrict his diet to <2g/day. Not a TIPS candidate given high MELD score. Continue ciprofloxacin prophylaxis.    # Leukocytosis: Resolved. Infectious work-up negative thus far. May have had recent alcoholic hepatitis, as last reported drink ~3 months ago, but would defer corticosteroids for now given history of perirectal abscess in 11/2019.     # Decompensated alcohol-related cirrhosis with ACLF: Blood type A, with current MELD-Na 31. Last reported drink ~3 months ago (patient unable to give exact date). He previously was followed at [facility] and completed an alcohol RPP in 1/2019, but reports relapsing after 3-4 months due to stress related to  from his wife. His 17- and 20-year-old children live with her. He has been living with his aunt. Recommend to send phosphatidylethanol (PETH) with next labs. Discussed with patient that he will need to enroll in another alcohol RPP and establish outpatient Hepatology follow-up, as he does not currently meet our criteria for early liver transplantation. 43 yo HM with alcohol-related cirrhosis, transferred from [facility] to [facility] due to acute on chronic liver failure, refractory ascites, and hyponatremia for consideration of liver transplant evaluation.    # Hyponatremia: Na improved from 120 to 123 with 1L fluid restriction. Appreciate Nephrology input. Recommend to discontinue eplerenone for now but continue Lasix 40 mg po bid. Agree with discontinuation of Ure-Na.    # Ascites: S/p 5L LVP on 12/20. Large volume but non-tense ascites on exam. Diuretic management as above. Recommend to discontinue Ure-Na as above and to sodium restrict his diet to <2g/day. Not a TIPS candidate given high MELD score. Continue ciprofloxacin prophylaxis. Ascites leakage from LLQ paracentesis site needs to be managed with Dermabond application +/- suture ASAP, as continued leakage presents a risk of infection.    # Leukocytosis: Resolved. Infectious work-up negative thus far. May have had recent alcoholic hepatitis, as last reported drink ~3 months ago, but would defer corticosteroids for now given history of perirectal abscess in 11/2019.     # Decompensated alcohol-related cirrhosis with ACLF: Blood type A, with current MELD-Na 31. Last reported drink ~3 months ago (patient unable to give exact date). He previously was followed at [facility] and completed an alcohol RPP in 1/2019, but reports relapsing after 3-4 months due to stress related to  from his wife. His 17- and 20-year-old children live with her. He has been living with his aunt. Recommend to send phosphatidylethanol (PETH) with next labs. Discussed with patient that he will need to enroll in another alcohol RPP and establish outpatient Hepatology follow-up, as he does not currently meet our criteria for early liver transplantation.    Plan of care discussed with hospitalist Dr. Sarah Han.    Please don't hesitate to call with any questions/concerns.    Thomas Marsahll M.D., Ph.D.  Transplant Hepatology  Cell: [phone]

## 2019-12-23 NOTE — PROGRESS NOTE ADULT - ASSESSMENT
44 M PMH alcoholic cirrhosis with ascites initially dx 2018, continual EtOH abuse post diagnosis now abstinent x 3 months, who presents as transfer from [facility] for decompensated cirrhosis with ascites, coagulopathy, chronic anemia, chronic thrombocytopenia, and chronic hyponatremia. Nephrology consulted for Hyponatremia.

## 2019-12-23 NOTE — PROGRESS NOTE ADULT - PROBLEM SELECTOR PLAN 8
Transitions of Care Status:  1.  Name of PCP: Dr. Skaggs ([place] [facility]) has not actually seen yet  2.  PCP Contacted on Admission: [ ] Y    [x ] N    3.  PCP contacted at Discharge: [ ] Y    [ ] N    [ ] N/A  4.  Post-Discharge Appointment Date and Location:  5.  Summary of Handoff given to PCP:

## 2019-12-23 NOTE — PROGRESS NOTE ADULT - PROBLEM SELECTOR PLAN 1
-decompensated cirrhosis, presented to [facility] for sob/anasarca/abd distention, CTAP with moderate refractory ascites, s/p LVP 5L out + albumin, SBP neg. 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    -Increase Lasix to 40mg BID and eplerenone 50mg qd.  -c/w 1L daily fluid restriction  -c/w Cipro daily po sbp ppx  -abstinent from etoh x 3 months.  -c/w thiamine, MVI, folate daily.   -MRI 12/2019 neg hepatoma, +portal HTN with perisplenic varices. EGD 7/2018 neg varices, +gastritis/esophagitis neg H pylori. Colon 7/2018 +hemorrhoids/anal fistula/diverticulosis. AFP neg.   -If not candidate for transplant, consider repeat eval for TIPS for refractory ascites.  - F/U Echo

## 2019-12-23 NOTE — PROGRESS NOTE ADULT - PROBLEM SELECTOR PLAN 1
Hypervolemic Hypo-osmolar Hyponatremia secondary to fluid overload in setting decompensated liver cirrhosis.  -s/p paracentesis 5L removed (12/20). On admission SNa 120, most current Na 123    Recommendations:   - continue Lasix 40mg BID PO  - discontinue Ure-Na 15mg BID for now  - Fluid restrict 1L per day  - Continue Eplerenone 50mg daily  - Check daily  BMP, serum Osm, UOsm, Urine electrolytes  - BMP every 6 hours with goal correction not more than 6-8 meq in 24 hours.   Goal: 129-130 in 24 hours.

## 2019-12-23 NOTE — PROGRESS NOTE ADULT - SUBJECTIVE AND OBJECTIVE BOX
Chief Complaint:  Patient is a 44y old  Male who presents with a chief complaint of decompensated cirrhosis (22 Dec 2019 19:14)      Interval Events:   Sodium stable.  Nephrology consulted for hyponatremia.      Allergies:  No Known Allergies      Hospital Medications:  ciprofloxacin     Tablet 500 milliGRAM(s) Oral daily  eplerenone 50 milliGRAM(s) Oral daily  folic acid 1 milliGRAM(s) Oral daily  furosemide    Tablet 40 milliGRAM(s) Oral two times a day  multivitamin 1 Tablet(s) Oral daily  pantoprazole    Tablet 40 milliGRAM(s) Oral before breakfast  polyethylene glycol 3350 17 Gram(s) Oral two times a day PRN  thiamine 100 milliGRAM(s) Oral daily  Ure-Na Powder 15 Gram(s) 15 Gram(s) Oral two times a day      PMHX/PSHX:  Liver cirrhosis, alcoholic  Anemia  Thrombocytopenia  Nosebleed  ETOH abuse  History of incision and drainage  No significant past surgical history      Family history:  Family history of stroke (Father, Mother)          PHYSICAL EXAM:     GENERAL:  NAD  HEENT:  NC/AT,  conjunctivae clear, sclera -anicteric  CHEST:  Full & symmetric excursion, no increased  HEART:  Regular rhythm  ABDOMEN:  Soft, non-tender, non-distended, normoactive bowel sounds,  no masses ,no hepato-splenomegaly,   EXTREMITIES:  no cyanosis,clubbing or edema  SKIN:  No rash/erythema/ecchymoses/petechiae/wounds/abscess/warm/dry  NEURO:  Alert, oriented    Vital Signs:  Vital Signs Last 24 Hrs  T(C): 36.9 (23 Dec 2019 04:48), Max: 37.1 (22 Dec 2019 20:52)  T(F): 98.5 (23 Dec 2019 04:48), Max: 98.7 (22 Dec 2019 20:52)  HR: 95 (23 Dec 2019 04:48) (94 - 96)  BP: 111/71 (23 Dec 2019 04:48) (108/66 - 112/59)  BP(mean): --  RR: 18 (23 Dec 2019 04:48) (18 - 18)  SpO2: 96% (23 Dec 2019 04:48) (94% - 96%)  Daily     Daily Weight in k.8 (23 Dec 2019 08:07)    LABS:                        7.8    11.18 )-----------( 70       ( 22 Dec 2019 06:57 )             22.9     12-23    123<L>  |  97  |  22  ----------------------------<  76  4.0   |  19<L>  |  0.59    Ca    7.2<L>      23 Dec 2019 06:57  Phos  2.9       Mg     1.8         TPro  6.1  /  Alb  1.5<L>  /  TBili  7.8<H>  /  DBili  x   /  AST  100<H>  /  ALT  60<H>  /  AlkPhos  158<H>      LIVER FUNCTIONS - ( 23 Dec 2019 06:57 )  Alb: 1.5 g/dL / Pro: 6.1 g/dL / ALK PHOS: 158 U/L / ALT: 60 U/L / AST: 100 U/L / GGT: x           PT/INR - ( 22 Dec 2019 06:57 )   PT: 31.3 sec;   INR: 2.66 ratio         PTT - ( 22 Dec 2019 06:57 )  PTT:70.6 sec        Imaging: Chief Complaint:  Patient is a 44y old  Male who presents with a chief complaint of decompensated cirrhosis (22 Dec 2019 19:14)      Interval Events:   Sodium stable.  Nephrology consulted for hyponatremia.      Allergies:  No Known Allergies      Hospital Medications:  ciprofloxacin     Tablet 500 milliGRAM(s) Oral daily  eplerenone 50 milliGRAM(s) Oral daily  folic acid 1 milliGRAM(s) Oral daily  furosemide    Tablet 40 milliGRAM(s) Oral two times a day  multivitamin 1 Tablet(s) Oral daily  pantoprazole    Tablet 40 milliGRAM(s) Oral before breakfast  polyethylene glycol 3350 17 Gram(s) Oral two times a day PRN  thiamine 100 milliGRAM(s) Oral daily  Ure-Na Powder 15 Gram(s) 15 Gram(s) Oral two times a day      PMHX/PSHX:  Liver cirrhosis, alcoholic  Anemia  Thrombocytopenia  Nosebleed  ETOH abuse  History of incision and drainage  No significant past surgical history      Family history:  Family history of stroke (Father, Mother)          PHYSICAL EXAM:     GENERAL:  NAD  HEENT:  NC/AT,  conjunctivae clear  CHEST:  Full & symmetric excursion, no increased  HEART:  Regular rhythm  ABDOMEN:  Soft, non-tender, distended, normoactive bowel sounds,  no masses ,no hepato-splenomegaly,   EXTREMITIES:  no cyanosis,clubbing or edema  SKIN:  No rash/erythema/ecchymoses/petechiae/wounds/abscess/warm/dry  NEURO:  Alert, oriented    Vital Signs:  Vital Signs Last 24 Hrs  T(C): 36.9 (23 Dec 2019 04:48), Max: 37.1 (22 Dec 2019 20:52)  T(F): 98.5 (23 Dec 2019 04:48), Max: 98.7 (22 Dec 2019 20:52)  HR: 95 (23 Dec 2019 04:48) (94 - 96)  BP: 111/71 (23 Dec 2019 04:48) (108/66 - 112/59)  BP(mean): --  RR: 18 (23 Dec 2019 04:48) (18 - 18)  SpO2: 96% (23 Dec 2019 04:48) (94% - 96%)  Daily     Daily Weight in k.8 (23 Dec 2019 08:07)    LABS:                        7.8    11.18 )-----------( 70       ( 22 Dec 2019 06:57 )             22.9     12-23    123<L>  |  97  |  22  ----------------------------<  76  4.0   |  19<L>  |  0.59    Ca    7.2<L>      23 Dec 2019 06:57  Phos  2.9       Mg     1.8         TPro  6.1  /  Alb  1.5<L>  /  TBili  7.8<H>  /  DBili  x   /  AST  100<H>  /  ALT  60<H>  /  AlkPhos  158<H>      LIVER FUNCTIONS - ( 23 Dec 2019 06:57 )  Alb: 1.5 g/dL / Pro: 6.1 g/dL / ALK PHOS: 158 U/L / ALT: 60 U/L / AST: 100 U/L / GGT: x           PT/INR - ( 22 Dec 2019 06:57 )   PT: 31.3 sec;   INR: 2.66 ratio         PTT - ( 22 Dec 2019 06:57 )  PTT:70.6 sec        Imaging: Chief Complaint:  Patient is a 44y old  Male who presents with a chief complaint of decompensated cirrhosis (22 Dec 2019 19:14)      Interval Events:   Leaking ascites from LLQ    Allergies:  No Known Allergies      Hospital Medications:  ciprofloxacin     Tablet 500 milliGRAM(s) Oral daily  eplerenone 50 milliGRAM(s) Oral daily  folic acid 1 milliGRAM(s) Oral daily  furosemide    Tablet 40 milliGRAM(s) Oral two times a day  multivitamin 1 Tablet(s) Oral daily  pantoprazole    Tablet 40 milliGRAM(s) Oral before breakfast  polyethylene glycol 3350 17 Gram(s) Oral two times a day PRN  thiamine 100 milliGRAM(s) Oral daily  Ure-Na Powder 15 Gram(s) 15 Gram(s) Oral two times a day      PMHX/PSHX:  Liver cirrhosis, alcoholic  Anemia  Thrombocytopenia  Nosebleed  ETOH abuse  History of incision and drainage  No significant past surgical history      Family history:  Family history of stroke (Father, Mother)          PHYSICAL EXAM:     GENERAL:  NAD  HEENT:  NC/AT,  conjunctivae clear  CHEST:  Full & symmetric excursion, no increased  HEART:  Regular rhythm  ABDOMEN:  Soft, non-tender, distended, normoactive bowel sounds,  no masses ,no hepato-splenomegaly, +ascites leaking from LLQ  EXTREMITIES:  no cyanosis,clubbing or edema  SKIN:  No rash/erythema/ecchymoses/petechiae/wounds/abscess/warm/dry  NEURO:  Alert, oriented    Vital Signs:  Vital Signs Last 24 Hrs  T(C): 36.9 (23 Dec 2019 04:48), Max: 37.1 (22 Dec 2019 20:52)  T(F): 98.5 (23 Dec 2019 04:48), Max: 98.7 (22 Dec 2019 20:52)  HR: 95 (23 Dec 2019 04:48) (94 - 96)  BP: 111/71 (23 Dec 2019 04:48) (108/66 - 112/59)  BP(mean): --  RR: 18 (23 Dec 2019 04:48) (18 - 18)  SpO2: 96% (23 Dec 2019 04:48) (94% - 96%)  Daily     Daily Weight in k.8 (23 Dec 2019 08:07)    LABS:                        7.8    11.18 )-----------( 70       ( 22 Dec 2019 06:57 )             22.9     12-23    123<L>  |  97  |    ----------------------------<  76  4.0   |  19<L>  |  0.59    Ca    7.2<L>      23 Dec 2019 06:57  Phos  2.9       Mg     1.8         TPro  6.1  /  Alb  1.5<L>  /  TBili  7.8<H>  /  DBili  x   /  AST  100<H>  /  ALT  60<H>  /  AlkPhos  158<H>      LIVER FUNCTIONS - ( 23 Dec 2019 06:57 )  Alb: 1.5 g/dL / Pro: 6.1 g/dL / ALK PHOS: 158 U/L / ALT: 60 U/L / AST: 100 U/L / GGT: x           PT/INR - ( 22 Dec 2019 06:57 )   PT: 31.3 sec;   INR: 2.66 ratio         PTT - ( 22 Dec 2019 06:57 )  PTT:70.6 sec        Imaging:

## 2019-12-23 NOTE — PROGRESS NOTE ADULT - SUBJECTIVE AND OBJECTIVE BOX
[facility] DIVISION OF KIDNEY DISEASES AND HYPERTENSION -- FOLLOW UP NOTE  --------------------------------------------------------------------------------  Chief Complaint:    24 hour events/subjective:  - overnight no events reported, vitals wnl, total  ml voided and para drain 150 ml  - patient seen and examined at bedside this morning without complain  - vitals/lab/medications reviewed, noted for unchanged serum sodium 123 otherwise wnl K and Cr       PAST HISTORY  --------------------------------------------------------------------------------  No significant changes to PMH, PSH, FHx, SHx, unless otherwise noted    ALLERGIES & MEDICATIONS  --------------------------------------------------------------------------------  Allergies    No Known Allergies    Intolerances      Standing Inpatient Medications  ciprofloxacin     Tablet 500 milliGRAM(s) Oral daily  eplerenone 50 milliGRAM(s) Oral daily  folic acid 1 milliGRAM(s) Oral daily  furosemide    Tablet 40 milliGRAM(s) Oral two times a day  lactulose Syrup 20 Gram(s) Oral three times a day  multivitamin 1 Tablet(s) Oral daily  pantoprazole    Tablet 40 milliGRAM(s) Oral before breakfast  thiamine 100 milliGRAM(s) Oral daily  Ure-Na Powder 15 Gram(s) 15 Gram(s) Oral two times a day    PRN Inpatient Medications  polyethylene glycol 3350 17 Gram(s) Oral two times a day PRN      REVIEW OF SYSTEMS  --------------------------------------------------------------------------------  Gen: No fatigue, fevers/chills, weakness  Skin: No rashes  Respiratory: No dyspnea, cough, wheezing, hemoptysis  CV: No chest pain, orthopnea  GI: No abdominal pain, diarrhea, constipation, nausea, vomiting, melena, hematochezia  : No increased frequency, dysuria, hematuria  MSK: No edema  Neuro: No dizziness/lightheadedness, weakness, numbness, tingling    All other systems were reviewed and are negative, except as noted.    VITALS/PHYSICAL EXAM  --------------------------------------------------------------------------------  T(C): 36.9 (12-23-19 @ 04:48), Max: 37.1 (12-22-19 @ 20:52)  HR: 95 (12-23-19 @ 04:48) (94 - 96)  BP: 111/71 (12-23-19 @ 04:48) (108/66 - 112/59)  RR: 18 (12-23-19 @ 04:48) (18 - 18)  SpO2: 96% (12-23-19 @ 04:48) (94% - 96%)  Wt(kg): --        12-22-19 @ 07:01  -  12-23-19 @ 07:00  --------------------------------------------------------  IN: 354 mL / OUT: 800 mL / NET: -446 mL    12-23-19 @ 07:01  -  12-23-19 @ 12:13  --------------------------------------------------------  IN: 360 mL / OUT: 0 mL / NET: 360 mL      Physical Exam:  	Gen: NAD, well-appearing on room air  	HEENT: sclera icterus, MMM, supple neck, clear oropharynx  	Pulm: CTA B/L no crackles  	CV: RRR, S1S2; no murmur  	Abd: +BS, soft, nontender, mild distended, paracentesis drain w/ urine  	: no wisdom  	UE: mild asterixis  	LE: Warm, no edema  	Psych: Normal affect and mood  	Skin: Warm, without rashes  	Vascular access:    LABS/STUDIES  --------------------------------------------------------------------------------              8.1    9.94  >-----------<  72       [12-23-19 @ 08:36]              23.5     123  |  97  |  22  ----------------------------<  76      [12-23-19 @ 06:57]  4.0   |  19  |  0.59        Ca     7.2     [12-23-19 @ 06:57]      Mg     1.8     [12-21-19 @ 13:03]      Phos  2.9     [12-21-19 @ 13:03]    TPro  6.1  /  Alb  1.5  /  TBili  7.8  /  DBili  x   /  AST  100  /  ALT  60  /  AlkPhos  158  [12-23-19 @ 06:57]    PT/INR: PT 31.2 , INR 2.68       [12-23-19 @ 08:18]  PTT: 64.8       [12-23-19 @ 08:18]    Serum Osmolality 268      [12-23-19 @ 09:20]    Creatinine Trend:  SCr 0.59 [12-23 @ 06:57]  SCr 0.61 [12-23 @ 06:56]  SCr 0.71 [12-23 @ 02:49]  SCr 0.76 [12-22 @ 18:57]  SCr 0.53 [12-22 @ 06:57]    Urinalysis - [12-11-19 @ 16:20]      Color Yuli / Appearance Clear / SG 1.007 / pH 6.0      Gluc Negative / Ketone Negative  / Bili Moderate / Urobili <2 mg/dL       Blood Negative / Protein Negative / Leuk Est Negative / Nitrite Negative      RBC 1 / WBC 0 / Hyaline 0 / Gran  / Sq Epi  / Non Sq Epi 0 / Bacteria Occasional    Urine Sodium <35      [12-22-19 @ 14:29]  Urine Potassium 25      [12-22-19 @ 14:29]  Urine Osmolality 421      [12-22-19 @ 17:56]    Iron 33, TIBC 147, %sat 22      [11-30-19 @ 23:20]  Ferritin 52      [11-30-19 @ 23:20]  TSH 3.81      [12-12-19 @ 05:41] [facility] DIVISION OF KIDNEY DISEASES AND HYPERTENSION -- FOLLOW UP NOTE  --------------------------------------------------------------------------------  Chief Complaint:    24 hour events/subjective:  - overnight no events reported, vitals wnl, total  ml voided and para drain 150 ml  - patient seen and examined at bedside this morning without complain  - vitals/lab/medications reviewed, noted for unchanged serum sodium 123 otherwise wnl K and Cr       PAST HISTORY  --------------------------------------------------------------------------------  No significant changes to PMH, PSH, FHx, SHx, unless otherwise noted    ALLERGIES & MEDICATIONS  --------------------------------------------------------------------------------  Allergies    No Known Allergies    Intolerances      Standing Inpatient Medications  ciprofloxacin     Tablet 500 milliGRAM(s) Oral daily  eplerenone 50 milliGRAM(s) Oral daily  folic acid 1 milliGRAM(s) Oral daily  furosemide    Tablet 40 milliGRAM(s) Oral two times a day  lactulose Syrup 20 Gram(s) Oral three times a day  multivitamin 1 Tablet(s) Oral daily  pantoprazole    Tablet 40 milliGRAM(s) Oral before breakfast  thiamine 100 milliGRAM(s) Oral daily  Ure-Na Powder 15 Gram(s) 15 Gram(s) Oral two times a day    PRN Inpatient Medications  polyethylene glycol 3350 17 Gram(s) Oral two times a day PRN      REVIEW OF SYSTEMS  --------------------------------------------------------------------------------  Gen: No fatigue, fevers/chills, weakness  Skin: No rashes  Respiratory: No dyspnea, cough, wheezing, hemoptysis  CV: No chest pain, orthopnea  GI: No abdominal pain, diarrhea, constipation, nausea, vomiting, melena, hematochezia  : No increased frequency, dysuria, hematuria  MSK: No edema  Neuro: No dizziness/lightheadedness, weakness, numbness, tingling    All other systems were reviewed and are negative, except as noted.    VITALS/PHYSICAL EXAM  --------------------------------------------------------------------------------  T(C): 36.9 (12-23-19 @ 04:48), Max: 37.1 (12-22-19 @ 20:52)  HR: 95 (12-23-19 @ 04:48) (94 - 96)  BP: 111/71 (12-23-19 @ 04:48) (108/66 - 112/59)  RR: 18 (12-23-19 @ 04:48) (18 - 18)  SpO2: 96% (12-23-19 @ 04:48) (94% - 96%)  Wt(kg): --        12-22-19 @ 07:01  -  12-23-19 @ 07:00  --------------------------------------------------------  IN: 354 mL / OUT: 800 mL / NET: -446 mL    12-23-19 @ 07:01  -  12-23-19 @ 12:13  --------------------------------------------------------  IN: 360 mL / OUT: 0 mL / NET: 360 mL      Physical Exam:  	Gen: NAD, well-appearing on room air  	HEENT: sclera icterus, MMM, supple neck, clear oropharynx  	Pulm: CTA B/L no crackles  	CV: RRR, S1S2; no murmur  	Abd: +BS, soft, nontender, mild distended, paracentesis drain w/ ascite fluid  	: no wisdom  	UE: mild asterixis  	LE: Warm, no edema  	Psych: Normal affect and mood  	Skin: Warm, without rashes  	Vascular access:    LABS/STUDIES  --------------------------------------------------------------------------------              8.1    9.94  >-----------<  72       [12-23-19 @ 08:36]              23.5     123  |  97  |  22  ----------------------------<  76      [12-23-19 @ 06:57]  4.0   |  19  |  0.59        Ca     7.2     [12-23-19 @ 06:57]      Mg     1.8     [12-21-19 @ 13:03]      Phos  2.9     [12-21-19 @ 13:03]    TPro  6.1  /  Alb  1.5  /  TBili  7.8  /  DBili  x   /  AST  100  /  ALT  60  /  AlkPhos  158  [12-23-19 @ 06:57]    PT/INR: PT 31.2 , INR 2.68       [12-23-19 @ 08:18]  PTT: 64.8       [12-23-19 @ 08:18]    Serum Osmolality 268      [12-23-19 @ 09:20]    Creatinine Trend:  SCr 0.59 [12-23 @ 06:57]  SCr 0.61 [12-23 @ 06:56]  SCr 0.71 [12-23 @ 02:49]  SCr 0.76 [12-22 @ 18:57]  SCr 0.53 [12-22 @ 06:57]    Urinalysis - [12-11-19 @ 16:20]      Color Yuli / Appearance Clear / SG 1.007 / pH 6.0      Gluc Negative / Ketone Negative  / Bili Moderate / Urobili <2 mg/dL       Blood Negative / Protein Negative / Leuk Est Negative / Nitrite Negative      RBC 1 / WBC 0 / Hyaline 0 / Gran  / Sq Epi  / Non Sq Epi 0 / Bacteria Occasional    Urine Sodium <35      [12-22-19 @ 14:29]  Urine Potassium 25      [12-22-19 @ 14:29]  Urine Osmolality 421      [12-22-19 @ 17:56]    Iron 33, TIBC 147, %sat 22      [11-30-19 @ 23:20]  Ferritin 52      [11-30-19 @ 23:20]  TSH 3.81      [12-12-19 @ 05:41]

## 2019-12-23 NOTE — PROGRESS NOTE ADULT - ASSESSMENT
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    # Chronic anemia and thrombocytopenia     Recommendations:  - Start Lactulose 20 gm tid for hepatic encephalopathy.   - trend CBC, INR, CMP   - Continue diuretics per nephrology recommendations    - fluid restrictions less than 1 L  - transplant nephrology recs appreciated  - Continue PO Cipro for SBP prophylaxis ( low protein in the ascitic fluid)   - Please obtain Echocardiogram as part of the work up and screening for transplant. 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    # Chronic anemia and thrombocytopenia     Recommendations:  - Start Lactulose 20 gm tid for hepatic encephalopathy.   - trend CBC, INR, CMP   - Continue diuretics per nephrology recommendations    - fluid restrictions less than 1 L  - transplant nephrology recs appreciated  - Continue PO Cipro for SBP prophylaxis ( low protein in the ascitic fluid)   - Please obtain Echocardiogram as part of the work up and screening for transplant.

## 2019-12-23 NOTE — PROGRESS NOTE ADULT - ATTENDING COMMENTS
Advanced chronic liver disease, alcohol related, with portal hypertension  Hyponatremia, clinically not hypovolemic  Anemia  Suggestions  Agree with furosemide, monitor Na  Check Epoetin level  Restrict hypotonic fluids  Will follow  I was present during and reviewed clinical and lab data as well as assessment and plan as documented by the house staff as noted. Please contact if any additional questions with any change in clinical condition or on availability of any additional information or reports.

## 2019-12-23 NOTE — PROGRESS NOTE ADULT - SUBJECTIVE AND OBJECTIVE BOX
PROGRESS NOTE:     Patient is a 44y old  Male who presents with a chief complaint of decompensated cirrhosis (23 Dec 2019 12:12)      SUBJECTIVE / OVERNIGHT EVENTS:  Pt tearful today no acute complaints.     ADDITIONAL REVIEW OF SYSTEMS:  No fevers or chills  No n/v/d    MEDICATIONS  (STANDING):  ciprofloxacin     Tablet 500 milliGRAM(s) Oral daily  eplerenone 50 milliGRAM(s) Oral daily  folic acid 1 milliGRAM(s) Oral daily  furosemide    Tablet 40 milliGRAM(s) Oral two times a day  lactulose Syrup 20 Gram(s) Oral three times a day  multivitamin 1 Tablet(s) Oral daily  pantoprazole    Tablet 40 milliGRAM(s) Oral before breakfast  thiamine 100 milliGRAM(s) Oral daily  Ure-Na Powder 15 Gram(s) 15 Gram(s) Oral two times a day    MEDICATIONS  (PRN):  polyethylene glycol 3350 17 Gram(s) Oral two times a day PRN Constipation      CAPILLARY BLOOD GLUCOSE        I&O's Summary    22 Dec 2019 07:01  -  23 Dec 2019 07:00  --------------------------------------------------------  IN: 354 mL / OUT: 800 mL / NET: -446 mL    23 Dec 2019 07:01  -  23 Dec 2019 15:28  --------------------------------------------------------  IN: 600 mL / OUT: 150 mL / NET: 450 mL        PHYSICAL EXAM:  Vital Signs Last 24 Hrs  T(C): 36.8 (23 Dec 2019 12:50), Max: 37.1 (22 Dec 2019 20:52)  T(F): 98.2 (23 Dec 2019 12:50), Max: 98.7 (22 Dec 2019 20:52)  HR: 95 (23 Dec 2019 12:50) (95 - 96)  BP: 108/67 (23 Dec 2019 12:50) (108/67 - 112/59)  BP(mean): --  RR: 18 (23 Dec 2019 12:50) (18 - 18)  SpO2: 94% (23 Dec 2019 12:50) (94% - 96%)    CONSTITUTIONAL: NAD, well-developed, chronically ill appearing, thin  HEENT: Scleral icterus and dry mm  RESPIRATORY: Normal respiratory effort; lungs are clear to auscultation bilaterally  CARDIOVASCULAR: Regular rate and rhythm, normal S1 and S2, no murmur/rub/gallop; +lower extremity edema; Peripheral pulses are 2+ bilaterally  ABDOMEN: Nontender to palpation, normoactive bowel sounds, no rebound/guarding; Distended with fluid wave  MUSCLOSKELETAL: no clubbing or cyanosis of digits; no joint swelling or tenderness to palpation  PSYCH: A+O to person, place, and time; affect appropriate    LABS:                        8.1    9.94  )-----------( 72       ( 23 Dec 2019 08:36 )             23.5     12-23    123<L>  |  97  |  22  ----------------------------<  76  4.0   |  19<L>  |  0.59    Ca    7.2<L>      23 Dec 2019 06:57    TPro  6.1  /  Alb  1.5<L>  /  TBili  7.8<H>  /  DBili  x   /  AST  100<H>  /  ALT  60<H>  /  AlkPhos  158<H>  12-23    PT/INR - ( 23 Dec 2019 08:18 )   PT: 31.2 sec;   INR: 2.68 ratio         PTT - ( 23 Dec 2019 08:18 )  PTT:64.8 sec            RADIOLOGY & ADDITIONAL TESTS:  Results Reviewed:   Imaging Personally Reviewed:  Electrocardiogram Personally Reviewed:    COORDINATION OF CARE:  Care Discussed with Consultants/Other Providers [Y/N]:  Prior or Outpatient Records Reviewed [Y/N]:

## 2019-12-24 DIAGNOSIS — N25.89 OTHER DISORDERS RESULTING FROM IMPAIRED RENAL TUBULAR FUNCTION: ICD-10-CM

## 2019-12-24 DIAGNOSIS — E87.5 HYPERKALEMIA: ICD-10-CM

## 2019-12-24 DIAGNOSIS — K70.31 ALCOHOLIC CIRRHOSIS OF LIVER WITH ASCITES: ICD-10-CM

## 2019-12-24 DIAGNOSIS — J90 PLEURAL EFFUSION, NOT ELSEWHERE CLASSIFIED: ICD-10-CM

## 2019-12-24 DIAGNOSIS — Z82.3 FAMILY HISTORY OF STROKE: ICD-10-CM

## 2019-12-24 DIAGNOSIS — L08.89 OTHER SPECIFIED LOCAL INFECTIONS OF THE SKIN AND SUBCUTANEOUS TISSUE: ICD-10-CM

## 2019-12-24 DIAGNOSIS — K61.1 RECTAL ABSCESS: ICD-10-CM

## 2019-12-24 DIAGNOSIS — E53.8 DEFICIENCY OF OTHER SPECIFIED B GROUP VITAMINS: ICD-10-CM

## 2019-12-24 DIAGNOSIS — E51.9 THIAMINE DEFICIENCY, UNSPECIFIED: ICD-10-CM

## 2019-12-24 DIAGNOSIS — E88.09 OTHER DISORDERS OF PLASMA-PROTEIN METABOLISM, NOT ELSEWHERE CLASSIFIED: ICD-10-CM

## 2019-12-24 DIAGNOSIS — D68.9 COAGULATION DEFECT, UNSPECIFIED: ICD-10-CM

## 2019-12-24 DIAGNOSIS — E87.2 ACIDOSIS: ICD-10-CM

## 2019-12-24 DIAGNOSIS — K59.00 CONSTIPATION, UNSPECIFIED: ICD-10-CM

## 2019-12-24 DIAGNOSIS — E87.1 HYPO-OSMOLALITY AND HYPONATREMIA: ICD-10-CM

## 2019-12-24 DIAGNOSIS — D72.829 ELEVATED WHITE BLOOD CELL COUNT, UNSPECIFIED: ICD-10-CM

## 2019-12-24 DIAGNOSIS — K83.8 OTHER SPECIFIED DISEASES OF BILIARY TRACT: ICD-10-CM

## 2019-12-24 DIAGNOSIS — F10.10 ALCOHOL ABUSE, UNCOMPLICATED: ICD-10-CM

## 2019-12-24 DIAGNOSIS — R06.02 SHORTNESS OF BREATH: ICD-10-CM

## 2019-12-24 DIAGNOSIS — K57.90 DIVERTICULOSIS OF INTESTINE, PART UNSPECIFIED, WITHOUT PERFORATION OR ABSCESS WITHOUT BLEEDING: ICD-10-CM

## 2019-12-24 DIAGNOSIS — R65.10 SYSTEMIC INFLAMMATORY RESPONSE SYNDROME (SIRS) OF NON-INFECTIOUS ORIGIN WITHOUT ACUTE ORGAN DYSFUNCTION: ICD-10-CM

## 2019-12-24 DIAGNOSIS — K76.6 PORTAL HYPERTENSION: ICD-10-CM

## 2019-12-24 DIAGNOSIS — K64.9 UNSPECIFIED HEMORRHOIDS: ICD-10-CM

## 2019-12-24 DIAGNOSIS — Z87.891 PERSONAL HISTORY OF NICOTINE DEPENDENCE: ICD-10-CM

## 2019-12-24 DIAGNOSIS — D69.6 THROMBOCYTOPENIA, UNSPECIFIED: ICD-10-CM

## 2019-12-24 DIAGNOSIS — K70.11 ALCOHOLIC HEPATITIS WITH ASCITES: ICD-10-CM

## 2019-12-24 DIAGNOSIS — J96.01 ACUTE RESPIRATORY FAILURE WITH HYPOXIA: ICD-10-CM

## 2019-12-24 DIAGNOSIS — D64.89 OTHER SPECIFIED ANEMIAS: ICD-10-CM

## 2019-12-24 LAB
ALBUMIN SERPL ELPH-MCNC: 1.5 G/DL — LOW (ref 3.3–5)
ALP SERPL-CCNC: 159 U/L — HIGH (ref 40–120)
ALT FLD-CCNC: 58 U/L — HIGH (ref 10–45)
AMMONIA BLD-MCNC: 42 UMOL/L — SIGNIFICANT CHANGE UP (ref 11–55)
ANION GAP SERPL CALC-SCNC: 10 MMOL/L — SIGNIFICANT CHANGE UP (ref 5–17)
ANION GAP SERPL CALC-SCNC: 11 MMOL/L — SIGNIFICANT CHANGE UP (ref 5–17)
ANION GAP SERPL CALC-SCNC: 11 MMOL/L — SIGNIFICANT CHANGE UP (ref 5–17)
ANION GAP SERPL CALC-SCNC: 9 MMOL/L — SIGNIFICANT CHANGE UP (ref 5–17)
AST SERPL-CCNC: 98 U/L — HIGH (ref 10–40)
BILIRUB SERPL-MCNC: 7.3 MG/DL — HIGH (ref 0.2–1.2)
BUN SERPL-MCNC: 17 MG/DL — SIGNIFICANT CHANGE UP (ref 7–23)
BUN SERPL-MCNC: 18 MG/DL — SIGNIFICANT CHANGE UP (ref 7–23)
BUN SERPL-MCNC: 20 MG/DL — SIGNIFICANT CHANGE UP (ref 7–23)
BUN SERPL-MCNC: 23 MG/DL — SIGNIFICANT CHANGE UP (ref 7–23)
CALCIUM SERPL-MCNC: 7.3 MG/DL — LOW (ref 8.4–10.5)
CALCIUM SERPL-MCNC: 7.4 MG/DL — LOW (ref 8.4–10.5)
CHLORIDE SERPL-SCNC: 96 MMOL/L — SIGNIFICANT CHANGE UP (ref 96–108)
CHLORIDE SERPL-SCNC: 98 MMOL/L — SIGNIFICANT CHANGE UP (ref 96–108)
CO2 SERPL-SCNC: 18 MMOL/L — LOW (ref 22–31)
CO2 SERPL-SCNC: 20 MMOL/L — LOW (ref 22–31)
CO2 SERPL-SCNC: 20 MMOL/L — LOW (ref 22–31)
CO2 SERPL-SCNC: 21 MMOL/L — LOW (ref 22–31)
CREAT SERPL-MCNC: 0.55 MG/DL — SIGNIFICANT CHANGE UP (ref 0.5–1.3)
CREAT SERPL-MCNC: 0.58 MG/DL — SIGNIFICANT CHANGE UP (ref 0.5–1.3)
CREAT SERPL-MCNC: 0.6 MG/DL — SIGNIFICANT CHANGE UP (ref 0.5–1.3)
CREAT SERPL-MCNC: 0.65 MG/DL — SIGNIFICANT CHANGE UP (ref 0.5–1.3)
EPO SERPL-MCNC: 60.2 MIU/ML — HIGH (ref 2.6–18.5)
GLUCOSE SERPL-MCNC: 101 MG/DL — HIGH (ref 70–99)
GLUCOSE SERPL-MCNC: 114 MG/DL — HIGH (ref 70–99)
GLUCOSE SERPL-MCNC: 75 MG/DL — SIGNIFICANT CHANGE UP (ref 70–99)
GLUCOSE SERPL-MCNC: 95 MG/DL — SIGNIFICANT CHANGE UP (ref 70–99)
HCT VFR BLD CALC: 23 % — LOW (ref 39–50)
HGB BLD-MCNC: 7.9 G/DL — LOW (ref 13–17)
INR BLD: 2.63 RATIO — HIGH (ref 0.88–1.16)
MCHC RBC-ENTMCNC: 33.1 PG — SIGNIFICANT CHANGE UP (ref 27–34)
MCHC RBC-ENTMCNC: 34.3 GM/DL — SIGNIFICANT CHANGE UP (ref 32–36)
MCV RBC AUTO: 96.2 FL — SIGNIFICANT CHANGE UP (ref 80–100)
NON-GYNECOLOGICAL CYTOLOGY STUDY: SIGNIFICANT CHANGE UP
PLATELET # BLD AUTO: 70 K/UL — LOW (ref 150–400)
POTASSIUM SERPL-MCNC: 3.5 MMOL/L — SIGNIFICANT CHANGE UP (ref 3.5–5.3)
POTASSIUM SERPL-MCNC: 4 MMOL/L — SIGNIFICANT CHANGE UP (ref 3.5–5.3)
POTASSIUM SERPL-MCNC: 4.1 MMOL/L — SIGNIFICANT CHANGE UP (ref 3.5–5.3)
POTASSIUM SERPL-MCNC: 4.1 MMOL/L — SIGNIFICANT CHANGE UP (ref 3.5–5.3)
POTASSIUM SERPL-SCNC: 3.5 MMOL/L — SIGNIFICANT CHANGE UP (ref 3.5–5.3)
POTASSIUM SERPL-SCNC: 4 MMOL/L — SIGNIFICANT CHANGE UP (ref 3.5–5.3)
POTASSIUM SERPL-SCNC: 4.1 MMOL/L — SIGNIFICANT CHANGE UP (ref 3.5–5.3)
POTASSIUM SERPL-SCNC: 4.1 MMOL/L — SIGNIFICANT CHANGE UP (ref 3.5–5.3)
PROT SERPL-MCNC: 6.2 G/DL — SIGNIFICANT CHANGE UP (ref 6–8.3)
PROTHROM AB SERPL-ACNC: 30.6 SEC — HIGH (ref 10–13.1)
RBC # BLD: 2.39 M/UL — LOW (ref 4.2–5.8)
RBC # FLD: 21.2 % — HIGH (ref 10.3–14.5)
SODIUM SERPL-SCNC: 125 MMOL/L — LOW (ref 135–145)
SODIUM SERPL-SCNC: 126 MMOL/L — LOW (ref 135–145)
SODIUM SERPL-SCNC: 127 MMOL/L — LOW (ref 135–145)
SODIUM SERPL-SCNC: 128 MMOL/L — LOW (ref 135–145)
WBC # BLD: 9.77 K/UL — SIGNIFICANT CHANGE UP (ref 3.8–10.5)
WBC # FLD AUTO: 9.77 K/UL — SIGNIFICANT CHANGE UP (ref 3.8–10.5)

## 2019-12-24 PROCEDURE — 99232 SBSQ HOSP IP/OBS MODERATE 35: CPT | Mod: GC

## 2019-12-24 PROCEDURE — 99232 SBSQ HOSP IP/OBS MODERATE 35: CPT

## 2019-12-24 RX ORDER — POLYETHYLENE GLYCOL 3350 17 G/17G
17 POWDER, FOR SOLUTION ORAL
Refills: 0 | Status: DISCONTINUED | OUTPATIENT
Start: 2019-12-24 | End: 2019-12-31

## 2019-12-24 RX ADMIN — LACTULOSE 20 GRAM(S): 10 SOLUTION ORAL at 13:19

## 2019-12-24 RX ADMIN — Medication 40 MILLIGRAM(S): at 17:12

## 2019-12-24 RX ADMIN — PANTOPRAZOLE SODIUM 40 MILLIGRAM(S): 20 TABLET, DELAYED RELEASE ORAL at 06:25

## 2019-12-24 RX ADMIN — Medication 100 MILLIGRAM(S): at 11:31

## 2019-12-24 RX ADMIN — Medication 1 TABLET(S): at 11:31

## 2019-12-24 RX ADMIN — Medication 500 MILLIGRAM(S): at 11:30

## 2019-12-24 RX ADMIN — Medication 1 MILLIGRAM(S): at 11:30

## 2019-12-24 RX ADMIN — Medication 40 MILLIGRAM(S): at 06:26

## 2019-12-24 RX ADMIN — POLYETHYLENE GLYCOL 3350 17 GRAM(S): 17 POWDER, FOR SOLUTION ORAL at 17:12

## 2019-12-24 NOTE — PROGRESS NOTE ADULT - SUBJECTIVE AND OBJECTIVE BOX
PROGRESS NOTE:     Patient is a 44y old  Male who presents with a chief complaint of decompensated cirrhosis (24 Dec 2019 10:55)      SUBJECTIVE / OVERNIGHT EVENTS: Pt refusing his lactulose and is somewhat more agitated but still awake and alert answering all questions    ADDITIONAL REVIEW OF SYSTEMS:  No fevers or chills  No n/v/d    MEDICATIONS  (STANDING):  ciprofloxacin     Tablet 500 milliGRAM(s) Oral daily  folic acid 1 milliGRAM(s) Oral daily  furosemide    Tablet 40 milliGRAM(s) Oral two times a day  lactulose Syrup 20 Gram(s) Oral three times a day  multivitamin 1 Tablet(s) Oral daily  pantoprazole    Tablet 40 milliGRAM(s) Oral before breakfast  polyethylene glycol 3350 17 Gram(s) Oral two times a day  thiamine 100 milliGRAM(s) Oral daily    MEDICATIONS  (PRN):      CAPILLARY BLOOD GLUCOSE        I&O's Summary    23 Dec 2019 07:01  -  24 Dec 2019 07:00  --------------------------------------------------------  IN: 718 mL / OUT: 1450 mL / NET: -732 mL    24 Dec 2019 07:01  -  24 Dec 2019 16:32  --------------------------------------------------------  IN: 120 mL / OUT: 245 mL / NET: -125 mL        PHYSICAL EXAM:  Vital Signs Last 24 Hrs  T(C): 36.7 (24 Dec 2019 12:42), Max: 37 (24 Dec 2019 03:47)  T(F): 98.1 (24 Dec 2019 12:42), Max: 98.6 (24 Dec 2019 03:47)  HR: 89 (24 Dec 2019 12:42) (89 - 95)  BP: 103/52 (24 Dec 2019 12:42) (98/56 - 104/61)  BP(mean): --  RR: 18 (24 Dec 2019 12:42) (18 - 18)  SpO2: 95% (24 Dec 2019 12:42) (95% - 96%)    CONSTITUTIONAL: NAD, non toxic but jaundiced and physically agitated  RESPIRATORY: Normal respiratory effort; lungs are clear to auscultation bilaterally  CARDIOVASCULAR: Regular rate and rhythm, normal S1 and S2, no murmur/rub/gallop; No lower extremity edema; Peripheral pulses are 2+ bilaterally  ABDOMEN: Nontender to palpation, normoactive bowel sounds, no rebound/guarding; Distended with fluid wave and ostomy over abd draining ascitic fluid, no asterxis   MUSCLOSKELETAL: no clubbing or cyanosis of digits; no joint swelling or tenderness to palpation  PSYCH: A+O to person, place, and time; affect appropriate    LABS:                        7.9    9.77  )-----------( 70       ( 24 Dec 2019 08:41 )             23.0     12-24    128<L>  |  96  |  18  ----------------------------<  95  3.5   |  21<L>  |  0.55    Ca    7.3<L>      24 Dec 2019 12:18    TPro  6.2  /  Alb  1.5<L>  /  TBili  7.3<H>  /  DBili  x   /  AST  98<H>  /  ALT  58<H>  /  AlkPhos  159<H>  12-24    PT/INR - ( 24 Dec 2019 08:34 )   PT: 30.6 sec;   INR: 2.63 ratio         PTT - ( 23 Dec 2019 08:18 )  PTT:64.8 sec            RADIOLOGY & ADDITIONAL TESTS:  Results Reviewed:   Imaging Personally Reviewed:  Electrocardiogram Personally Reviewed:    COORDINATION OF CARE:  Care Discussed with Consultants/Other Providers [Y/N]:  Prior or Outpatient Records Reviewed [Y/N]:

## 2019-12-24 NOTE — PROGRESS NOTE ADULT - ATTENDING COMMENTS
45 yo HM with alcohol-related cirrhosis, transferred from [facility] to [facility] due to acute on chronic liver failure, refractory ascites, and hyponatremia for consideration of liver transplant evaluation.    # Hyponatremia: Na improving, to 128 today, with 1L fluid restriction and Lasix 40 mg po bid. Eplerenone held.    # Ascites: S/p 5L LVP on 12/20. Still distended, suspect combination of residual/recurrent ascites and some gaseous distension related to lactulose. Diuretic management as above. Not a TIPS candidate given high MELD score. Continue ciprofloxacin prophylaxis. Ascites leakage from LLQ paracentesis site needs to be managed with Dermabond application +/- suture ASAP, as continued leakage presents a potential nidus of infection.    # Leukocytosis: Resolved. Infectious work-up negative thus far. May have had recent alcoholic hepatitis, as last reported drink ~3 months ago, but would defer corticosteroids for now given history of perirectal abscess in 11/2019.     # Decompensated alcohol-related cirrhosis with ACLF: Blood type A, with current MELD-Na 30. Last reported drink ~3 months ago (patient unable to give exact date). He previously was followed at [place] and completed an alcohol RPP in 1/2019, but reports relapsing after 3-4 months due to stress related to  from his wife. His 17- and 20-year-old children live with her. He has been living with his aunt. Recommend to send phosphatidylethanol (PETH) with next labs. Discussed with patient that he will need to enroll in another alcohol RPP and establish outpatient Hepatology follow-up, as he does not currently meet our criteria for early liver transplantation.    Plan of care discussed with hospitalist Dr. Sarah Han.    Please don't hesitate to call with any questions/concerns.    Thomas Marshall M.D., Ph.D.  Transplant Hepatology  Cell: [phone]

## 2019-12-24 NOTE — PROGRESS NOTE ADULT - SUBJECTIVE AND OBJECTIVE BOX
[facility] DIVISION OF KIDNEY DISEASES AND HYPERTENSION -- FOLLOW UP NOTE  --------------------------------------------------------------------------------  Chief Complaint:    24 hour events/subjective:  - yesterday Elver U/S neg DVT  - overnight no events reported, vitals bp /50-60s, total UOP 1.2L and urostomy bag 250 ml  - patient seen and examined at bedside this morning without complain  - vitals/lab/medications reviewed, noted for improving serum Na 122 to 126, stable LFT, normal K & Cr       PAST HISTORY  --------------------------------------------------------------------------------  No significant changes to PMH, PSH, FHx, SHx, unless otherwise noted    ALLERGIES & MEDICATIONS  --------------------------------------------------------------------------------  Allergies    No Known Allergies    Intolerances      Standing Inpatient Medications  ciprofloxacin     Tablet 500 milliGRAM(s) Oral daily  folic acid 1 milliGRAM(s) Oral daily  furosemide    Tablet 40 milliGRAM(s) Oral two times a day  lactulose Syrup 20 Gram(s) Oral three times a day  multivitamin 1 Tablet(s) Oral daily  pantoprazole    Tablet 40 milliGRAM(s) Oral before breakfast  thiamine 100 milliGRAM(s) Oral daily    PRN Inpatient Medications  polyethylene glycol 3350 17 Gram(s) Oral two times a day PRN      REVIEW OF SYSTEMS  Gen: No fatigue, fevers/chills, weakness  Skin: No rashes  Respiratory: No dyspnea, cough, wheezing, hemoptysis  CV: No chest pain, orthopnea  GI: No abdominal pain, diarrhea, constipation, nausea, vomiting, melena, hematochezia  : No increased frequency, dysuria, hematuria  MSK: No edema  Neuro: No dizziness/lightheadedness, weakness, numbness, tingling    All other systems were reviewed and are negative, except as noted.    VITALS/PHYSICAL EXAM  --------------------------------------------------------------------------------  T(C): 37 (12-24-19 @ 03:47), Max: 37 (12-24-19 @ 03:47)  HR: 94 (12-24-19 @ 03:47) (94 - 95)  BP: 98/56 (12-24-19 @ 03:47) (98/56 - 108/67)  RR: 18 (12-24-19 @ 03:47) (18 - 18)  SpO2: 96% (12-24-19 @ 03:47) (94% - 96%)  Wt(kg): --        12-23-19 @ 07:01  -  12-24-19 @ 07:00  --------------------------------------------------------  IN: 718 mL / OUT: 1450 mL / NET: -732 mL      Physical Exam:  	Gen: NAD, well-appearing on room air  	HEENT: sclera icterus, MMM, supple neck, clear oropharynx  	Pulm: CTA B/L no crackles  	CV: RRR, S1S2; no murmur  	Abd: +BS, soft, nontender, mild distended, urostomy bag  	: no wisdom  	LE: Warm, no edema              Neuro: AAOx3  	Psych: Normal affect and mood  	Skin: Warm, without rashes    LABS/STUDIES  --------------------------------------------------------------------------------              7.9    9.77  >-----------<  70       [12-24-19 @ 08:41]              23.0     126  |  96  |  20  ----------------------------<  75      [12-24-19 @ 06:51]  4.0   |  20  |  0.58        Ca     7.3     [12-24-19 @ 06:51]    TPro  6.2  /  Alb  1.5  /  TBili  7.3  /  DBili  x   /  AST  98  /  ALT  58  /  AlkPhos  159  [12-24-19 @ 06:51]    PT/INR: PT 30.6 , INR 2.63       [12-24-19 @ 08:34]  PTT: 64.8       [12-23-19 @ 08:18]    Serum Osmolality 268      [12-23-19 @ 09:20]    Creatinine Trend:  SCr 0.58 [12-24 @ 06:51]  SCr 0.65 [12-24 @ 00:15]  SCr 0.55 [12-23 @ 17:32]  SCr 0.59 [12-23 @ 06:57]  SCr 0.61 [12-23 @ 06:56]    Urinalysis - [12-11-19 @ 16:20]      Color Yuli / Appearance Clear / SG 1.007 / pH 6.0      Gluc Negative / Ketone Negative  / Bili Moderate / Urobili <2 mg/dL       Blood Negative / Protein Negative / Leuk Est Negative / Nitrite Negative      RBC 1 / WBC 0 / Hyaline 0 / Gran  / Sq Epi  / Non Sq Epi 0 / Bacteria Occasional    Urine Sodium <35      [12-22-19 @ 14:29]  Urine Potassium 25      [12-22-19 @ 14:29]  Urine Osmolality 421      [12-22-19 @ 17:56]    Iron 33, TIBC 147, %sat 22      [11-30-19 @ 23:20]  Ferritin 52      [11-30-19 @ 23:20]  TSH 3.81      [12-12-19 @ 05:41]

## 2019-12-24 NOTE — PROGRESS NOTE ADULT - ASSESSMENT
44 M PMH alcoholic cirrhosis with ascites initially dx 2018, continual EtOH abuse post diagnosis now abstinent x 3 months, who presents as transfer from [facility] for decompensated cirrhosis with ascites, coagulopathy, chronic anemia, chronic thrombocytopenia, and chronic hyponatremia; here for hepatology evaluation.

## 2019-12-24 NOTE — PROGRESS NOTE ADULT - PROBLEM SELECTOR PLAN 8
Transitions of Care Status:  1.  Name of PCP: Dr. Skaggs ([facility]) has not actually seen yet  2.  PCP Contacted on Admission: [ ] Y    [x ] N    3.  PCP contacted at Discharge: [ ] Y    [ ] N    [ ] N/A  4.  Post-Discharge Appointment Date and Location:  5.  Summary of Handoff given to PCP:

## 2019-12-24 NOTE — PROGRESS NOTE ADULT - ATTENDING COMMENTS
Hyponatremia  Clinically not hypovolemic  Advanced chronic liver disease, alcohol related with portal hypertension  Anemia  Suggestions:  Restrict hypotonic fluids  Continue Lasix   daily weights, I/O, Serum Na  Will follow  I was present during and reviewed clinical and lab data as well as assessment and plan as documented by the house staff as noted. Please contact if any additional questions with any change in clinical condition or on availability of any additional information or reports.

## 2019-12-24 NOTE — PROGRESS NOTE ADULT - PROBLEM SELECTOR PLAN 1
-decompensated cirrhosis, presented to [facility] for sob/anasarca/abd distention, CTAP with moderate refractory ascites, s/p LVP 5L out + albumin, SBP neg. Transfer for hepatology eval  # Ethanol related _Cirrhosis, MELD-Na _ 31 ( 12/23/2019)        - varices:  perigastric and perisplenic varices with splenorenal shunt        - ascites: yes, s/p large volume paracentesis on 12/20 with 5L        - SPE: present grade 1 at least, Lactulose 20mg PO TID check ammonia level as pt more agiated and refusing his lactulose        - HCC: none CT 12/2019  -Monitor CBC, coags, LFT's daily.    -Lasix to 40mg BID and DC eplerenone 50mg qd per hepatology.  -c/w 1L daily fluid restriction  -c/w Cipro daily po sbp ppx  -abstinent from etoh x 3 months.  -c/w thiamine, MVI, folate daily.   -MRI 12/2019 neg hepatoma, +portal HTN with perisplenic varices. EGD 7/2018 neg varices, +gastritis/esophagitis neg H pylori. [employer] 7/2018 +hemorrhoids/anal fistula/diverticulosis. AFP neg.   -If not candidate for transplant, consider repeat eval for TIPS for refractory ascites.  - F/U Echo

## 2019-12-24 NOTE — PROGRESS NOTE ADULT - ASSESSMENT
44 M PMH alcoholic cirrhosis with ascites initially dx 2018, continual EtOH abuse post diagnosis now abstinent x 3 months, who presents as transfer from [facility] for decompensated cirrhosis - liver transplant workup.    Nephrology consulted for Hyponatremia hypervolemia likely 2/2 cirrhosis, improving w/ fluid restriction and lasix

## 2019-12-24 NOTE — PROGRESS NOTE ADULT - PROBLEM SELECTOR PLAN 1
Hypervolemic Hypo-osmolar Hyponatremia secondary to fluid overload in setting decompensated liver cirrhosis.  -s/p paracentesis 5L removed (12/20). On admission SNa 120, most current Na 123    Recommendations:  - Continue Lasix 40mg BID PO  - Fluid restrict 1L per day  - Continue Eplerenone 50mg daily  - Check daily  BMP, serum Osm, UOsm, Urine electrolytes  - BMP every 6 hours with goal correction not more than 6-8 meq in 24 hours.   Goal: 132-134 by 12/25 morning labs Hypervolemic Hypo-osmolar Hyponatremia secondary to fluid overload in setting decompensated liver cirrhosis.  -s/p paracentesis 5L removed (12/20). On admission SNa 120, most current Na 126    Recommendations:  - Continue Lasix 40mg BID PO  - Fluid restrict 1L per day  - Continue Eplerenone 50mg daily  - Check daily  BMP, serum Osm, UOsm, Urine electrolytes  - BMP every day with goal correction not more than 6-8 meq in 24 hours.

## 2019-12-24 NOTE — PROGRESS NOTE ADULT - ASSESSMENT
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    # Chronic anemia and thrombocytopenia     Recommendations:  - Start Lactulose 20 gm tid for hepatic encephalopathy.   - trend CBC, INR, CMP   - Continue diuretics per nephrology recommendations    - fluid restrictions less than 1 L  - transplant nephrology recs appreciated  - Continue PO Cipro for SBP prophylaxis ( low protein in the ascitic fluid)   - Please obtain Echocardiogram as part of the work up and screening for transplant. 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    # Chronic anemia and thrombocytopenia     Recommendations:  - change to Lactulose 20 gm daily and mirilax scheduled BID given distention  - trend CBC, INR, CMP   - Continue diuretics per nephrology recommendations    - fluid restrictions less than 1 L  - transplant nephrology recs appreciated  - Continue PO Cipro for SBP prophylaxis ( low protein in the ascitic fluid)   - Please obtain Echocardiogram as part of the work up and screening for transplant. 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    # Chronic anemia and thrombocytopenia     Recommendations:  - dermabond to LLQ leakage site ASAP  - change to Lactulose 20 gm daily and miralax scheduled BID given distention  - trend CBC, INR, CMP   - Continue diuretics per nephrology recommendations    - fluid restrictions less than 1 L  - transplant nephrology recs appreciated  - Continue PO Cipro for SBP prophylaxis ( low protein in the ascitic fluid)   - Please obtain Echocardiogram as part of the work up and screening for transplant.

## 2019-12-24 NOTE — CHART NOTE - NSCHARTNOTEFT_GEN_A_CORE
Nutrition follow up     Pt seen for malnutrition follow up.   Care plan in progress.     Hospital course as per chart: Pt 45 y/o M with PMH: alcoholic cirrhosis with ascites initially dx 2018, continual EtOH abuse post diagnosis now abstinent x 3 months, who presents as transfer from [facility] for decompensated cirrhosis with ascites S/P large volume paracentesis on (12/20) with 5L, coagulopathy, chronic anemia, chronic thrombocytopenia, and chronic hyponatremia; here for hepatology evaluation.    Source: Patient [x]    Family [ ]     other [x]; Medical record    Pt Lao-Speaking, dietitian fluent in Lao. Pt reports good appetite and PO intake. Noted 100% PO intake as per breakfast tray at bedside. Reports drinking Ensure Enlive 1xday. Denies difficulty chewing/swallowing. Pt denies nausea, vomiting, diarrhea, or constipation, last BM today (12/24). Denies having questions about diet, nutrition, or fluid restriction at this time. Reviewed recommendations to optimize PO and protein intake.     Diet: DASH/TLC + 1000 ml fluid restriction + Ensure Enlive 1x daily (350 darin and 20 gm protein)     Enteral /Parenteral Nutrition: n/a    Current Weight: (12/20) 210.9 pounds -> (12/24) 215.6 pounds -?accuracy of weight fluctuations likely due to fluid shifts as pt with ascites and edema, will continue to monitor.   % Weight Change: n/a     Pertinent Medications: MEDICATIONS  (STANDING):  ciprofloxacin     Tablet 500 milliGRAM(s) Oral daily  folic acid 1 milliGRAM(s) Oral daily  furosemide    Tablet 40 milliGRAM(s) Oral two times a day  lactulose Syrup 20 Gram(s) Oral three times a day  multivitamin 1 Tablet(s) Oral daily  pantoprazole    Tablet 40 milliGRAM(s) Oral before breakfast  thiamine 100 milliGRAM(s) Oral daily    MEDICATIONS  (PRN):  polyethylene glycol 3350 17 Gram(s) Oral two times a day PRN Constipation    Pertinent Labs: (12/24) Na126 mmol/L<L> Alkaline Phosphatase 159, AST 98, ALT 58    Skin: no noted pressure injuries as per documentation.   Noted +2 right leg and +3 aneudy. ankle edema as per flow sheets.     Estimated Needs:   [x] no change since previous assessment  [ ] recalculated:     Previous Nutrition Diagnosis: [x] Malnutrition; severe      Nutrition Diagnosis is [x] ongoing - being addressed with PO intake encouragement and nutritional supplement.   Care plan in progress; goal: Pt to meet >75% of estimated nutritional needs during hospital stay.    New Nutrition Diagnosis: [x] not applicable    Interventions:     1. Recommend change to low salt or regular diet + 1000 ml fluid restriction to allow pt more food options and optimize intake (defer needs for fluids/low salt diet to team - discussed with NP).   2. Continue Ensure Enlive 1x daily (350 darin and 20 gm protein) to optimize kcal and protein intake.  3. Encourage PO intake and obtain food preferences (obtained at this time).   4. Continue Multivitamin, Vitamin B1, and Folic Acid to optimize nutrient intake in setting of liver cirrhosis.   5. Reviewed recommendations to optimize PO and protein intake, recommended small frequent meals by ordering nutrient-dense snacks and leaving non-perishable food away from tray for later consumption during the day or between meals, to start with protein, and sips of supplement throughout the day; reviewed foods with protein and menu order procedures in hospital.   6. Continue to obtain weights to identify changes if any.     Monitoring and Evaluation:     [x] PO intake [x] Tolerance to diet prescription [x] weights [x] follow up per protocol    RD remains available.  Libby Cortes MS RDN CDN #[phone].

## 2019-12-24 NOTE — PROGRESS NOTE ADULT - SUBJECTIVE AND OBJECTIVE BOX
Chief Complaint:  Patient is a 44y old  Male who presents with a chief complaint of decompensated cirrhosis (23 Dec 2019 15:28)      Interval Events:   Sodium increasing today from yesterday    Allergies:  No Known Allergies      Hospital Medications:  ciprofloxacin     Tablet 500 milliGRAM(s) Oral daily  folic acid 1 milliGRAM(s) Oral daily  furosemide    Tablet 40 milliGRAM(s) Oral two times a day  lactulose Syrup 20 Gram(s) Oral three times a day  multivitamin 1 Tablet(s) Oral daily  pantoprazole    Tablet 40 milliGRAM(s) Oral before breakfast  polyethylene glycol 3350 17 Gram(s) Oral two times a day PRN  thiamine 100 milliGRAM(s) Oral daily      PMHX/PSHX:  Liver cirrhosis, alcoholic  Anemia  Thrombocytopenia  Nosebleed  ETOH abuse  History of incision and drainage  No significant past surgical history      Family history:  Family history of stroke (Father, Mother)          PHYSICAL EXAM:     GENERAL:  NAD  HEENT:  NC/AT,  conjunctivae clear, sclera -anicteric  CHEST:  Full & symmetric excursion, no increased  HEART:  Regular rhythm  ABDOMEN:  Soft, non-tender, non-distended, normoactive bowel sounds,  no masses ,no hepato-splenomegaly,   EXTREMITIES:  no cyanosis,clubbing or edema  SKIN:  No rash/erythema/ecchymoses/petechiae/wounds/abscess/warm/dry  NEURO:  Alert, oriented    Vital Signs:  Vital Signs Last 24 Hrs  T(C): 37 (24 Dec 2019 03:47), Max: 37 (24 Dec 2019 03:47)  T(F): 98.6 (24 Dec 2019 03:47), Max: 98.6 (24 Dec 2019 03:47)  HR: 94 (24 Dec 2019 03:47) (94 - 95)  BP: 98/56 (24 Dec 2019 03:47) (98/56 - 108/67)  BP(mean): --  RR: 18 (24 Dec 2019 03:47) (18 - 18)  SpO2: 96% (24 Dec 2019 03:47) (94% - 96%)  Daily     Daily Weight in k.8 (24 Dec 2019 08:08)    LABS:                        8.1    9.94  )-----------( 72       ( 23 Dec 2019 08:36 )             23.5     12-24    126<L>  |  96  |  20  ----------------------------<  75  4.0   |  20<L>  |  0.58    Ca    7.3<L>      24 Dec 2019 06:51    TPro  6.2  /  Alb  1.5<L>  /  TBili  7.3<H>  /  DBili  x   /  AST  98<H>  /  ALT  58<H>  /  AlkPhos  159<H>  12-24    LIVER FUNCTIONS - ( 24 Dec 2019 06:51 )  Alb: 1.5 g/dL / Pro: 6.2 g/dL / ALK PHOS: 159 U/L / ALT: 58 U/L / AST: 98 U/L / GGT: x           PT/INR - ( 23 Dec 2019 08:18 )   PT: 31.2 sec;   INR: 2.68 ratio         PTT - ( 23 Dec 2019 08:18 )  PTT:64.8 sec        Imaging: Chief Complaint:  Patient is a 44y old  Male who presents with a chief complaint of decompensated cirrhosis (23 Dec 2019 15:28)      Interval Events:   Sodium increasing today from yesterday    Allergies:  No Known Allergies      Hospital Medications:  ciprofloxacin     Tablet 500 milliGRAM(s) Oral daily  folic acid 1 milliGRAM(s) Oral daily  furosemide    Tablet 40 milliGRAM(s) Oral two times a day  lactulose Syrup 20 Gram(s) Oral three times a day  multivitamin 1 Tablet(s) Oral daily  pantoprazole    Tablet 40 milliGRAM(s) Oral before breakfast  polyethylene glycol 3350 17 Gram(s) Oral two times a day PRN  thiamine 100 milliGRAM(s) Oral daily      PMHX/PSHX:  Liver cirrhosis, alcoholic  Anemia  Thrombocytopenia  Nosebleed  ETOH abuse  History of incision and drainage  No significant past surgical history      Family history:  Family history of stroke (Father, Mother)          PHYSICAL EXAM:     GENERAL:  NAD  HEENT:  NC/AT,  conjunctivae clear, sclera -anicteric  CHEST:  Full & symmetric excursion, no increased  HEART:  Regular rhythm  ABDOMEN:  Soft, non-tender, +distended, normoactive bowel sounds,  no masses ,no hepato-splenomegaly,   EXTREMITIES:  no cyanosis,clubbing or edema  SKIN:  No rash/erythema/ecchymoses/petechiae/wounds/abscess/warm/dry  NEURO:  Alert, oriented    Vital Signs:  Vital Signs Last 24 Hrs  T(C): 37 (24 Dec 2019 03:47), Max: 37 (24 Dec 2019 03:47)  T(F): 98.6 (24 Dec 2019 03:47), Max: 98.6 (24 Dec 2019 03:47)  HR: 94 (24 Dec 2019 03:47) (94 - 95)  BP: 98/56 (24 Dec 2019 03:47) (98/56 - 108/67)  BP(mean): --  RR: 18 (24 Dec 2019 03:47) (18 - 18)  SpO2: 96% (24 Dec 2019 03:47) (94% - 96%)  Daily     Daily Weight in k.8 (24 Dec 2019 08:08)    LABS:                        8.1    9.94  )-----------( 72       ( 23 Dec 2019 08:36 )             23.5     12-24    126<L>  |  96  |  20  ----------------------------<  75  4.0   |  20<L>  |  0.58    Ca    7.3<L>      24 Dec 2019 06:51    TPro  6.2  /  Alb  1.5<L>  /  TBili  7.3<H>  /  DBili  x   /  AST  98<H>  /  ALT  58<H>  /  AlkPhos  159<H>  12-24    LIVER FUNCTIONS - ( 24 Dec 2019 06:51 )  Alb: 1.5 g/dL / Pro: 6.2 g/dL / ALK PHOS: 159 U/L / ALT: 58 U/L / AST: 98 U/L / GGT: x           PT/INR - ( 23 Dec 2019 08:18 )   PT: 31.2 sec;   INR: 2.68 ratio         PTT - ( 23 Dec 2019 08:18 )  PTT:64.8 sec        Imaging: Chief Complaint:  Patient is a 44y old  Male who presents with a chief complaint of decompensated cirrhosis (23 Dec 2019 15:28)      Interval Events:   Sodium increasing today from yesterday    Allergies:  No Known Allergies      Hospital Medications:  ciprofloxacin     Tablet 500 milliGRAM(s) Oral daily  folic acid 1 milliGRAM(s) Oral daily  furosemide    Tablet 40 milliGRAM(s) Oral two times a day  lactulose Syrup 20 Gram(s) Oral three times a day  multivitamin 1 Tablet(s) Oral daily  pantoprazole    Tablet 40 milliGRAM(s) Oral before breakfast  polyethylene glycol 3350 17 Gram(s) Oral two times a day PRN  thiamine 100 milliGRAM(s) Oral daily      PMHX/PSHX:  Liver cirrhosis, alcoholic  Anemia  Thrombocytopenia  Nosebleed  ETOH abuse  History of incision and drainage  No significant past surgical history      Family history:  Family history of stroke (Father, Mother)          PHYSICAL EXAM:     GENERAL:  NAD  HEENT:  NC/AT,  conjunctivae clear, sclera +icteric  CHEST:  Full & symmetric excursion, no increased  HEART:  Regular rhythm  ABDOMEN:  Soft, non-tender, +distended, +ascites leakage from LLQ site  EXTREMITIES:  no cyanosis,clubbing or edema  SKIN:  No rash/erythema/ecchymoses/petechiae/wounds/abscess/warm/dry  NEURO:  Alert, oriented    Vital Signs:  Vital Signs Last 24 Hrs  T(C): 37 (24 Dec 2019 03:47), Max: 37 (24 Dec 2019 03:47)  T(F): 98.6 (24 Dec 2019 03:47), Max: 98.6 (24 Dec 2019 03:47)  HR: 94 (24 Dec 2019 03:47) (94 - 95)  BP: 98/56 (24 Dec 2019 03:47) (98/56 - 108/67)  BP(mean): --  RR: 18 (24 Dec 2019 03:47) (18 - 18)  SpO2: 96% (24 Dec 2019 03:47) (94% - 96%)  Daily     Daily Weight in k.8 (24 Dec 2019 08:08)    LABS:                        8.1    9.94  )-----------( 72       ( 23 Dec 2019 08:36 )             23.5     12-24    126<L>  |  96  |  20  ----------------------------<  75  4.0   |  20<L>  |  0.58    Ca    7.3<L>      24 Dec 2019 06:51    TPro  6.2  /  Alb  1.5<L>  /  TBili  7.3<H>  /  DBili  x   /  AST  98<H>  /  ALT  58<H>  /  AlkPhos  159<H>  12-24    LIVER FUNCTIONS - ( 24 Dec 2019 06:51 )  Alb: 1.5 g/dL / Pro: 6.2 g/dL / ALK PHOS: 159 U/L / ALT: 58 U/L / AST: 98 U/L / GGT: x           PT/INR - ( 23 Dec 2019 08:18 )   PT: 31.2 sec;   INR: 2.68 ratio         PTT - ( 23 Dec 2019 08:18 )  PTT:64.8 sec        Imaging:

## 2019-12-25 LAB
ALBUMIN SERPL ELPH-MCNC: 1.8 G/DL — LOW (ref 3.3–5)
ALP SERPL-CCNC: 168 U/L — HIGH (ref 40–120)
ALT FLD-CCNC: 58 U/L — HIGH (ref 10–45)
ANION GAP SERPL CALC-SCNC: 11 MMOL/L — SIGNIFICANT CHANGE UP (ref 5–17)
ANION GAP SERPL CALC-SCNC: 9 MMOL/L — SIGNIFICANT CHANGE UP (ref 5–17)
ANION GAP SERPL CALC-SCNC: 9 MMOL/L — SIGNIFICANT CHANGE UP (ref 5–17)
AST SERPL-CCNC: 103 U/L — HIGH (ref 10–40)
BILIRUB SERPL-MCNC: 7.4 MG/DL — HIGH (ref 0.2–1.2)
BUN SERPL-MCNC: 17 MG/DL — SIGNIFICANT CHANGE UP (ref 7–23)
BUN SERPL-MCNC: 17 MG/DL — SIGNIFICANT CHANGE UP (ref 7–23)
BUN SERPL-MCNC: 19 MG/DL — SIGNIFICANT CHANGE UP (ref 7–23)
CALCIUM SERPL-MCNC: 7.3 MG/DL — LOW (ref 8.4–10.5)
CALCIUM SERPL-MCNC: 7.3 MG/DL — LOW (ref 8.4–10.5)
CALCIUM SERPL-MCNC: 7.5 MG/DL — LOW (ref 8.4–10.5)
CHLORIDE SERPL-SCNC: 95 MMOL/L — LOW (ref 96–108)
CHLORIDE SERPL-SCNC: 96 MMOL/L — SIGNIFICANT CHANGE UP (ref 96–108)
CHLORIDE SERPL-SCNC: 97 MMOL/L — SIGNIFICANT CHANGE UP (ref 96–108)
CO2 SERPL-SCNC: 19 MMOL/L — LOW (ref 22–31)
CO2 SERPL-SCNC: 20 MMOL/L — LOW (ref 22–31)
CO2 SERPL-SCNC: 22 MMOL/L — SIGNIFICANT CHANGE UP (ref 22–31)
CREAT SERPL-MCNC: 0.57 MG/DL — SIGNIFICANT CHANGE UP (ref 0.5–1.3)
CREAT SERPL-MCNC: 0.57 MG/DL — SIGNIFICANT CHANGE UP (ref 0.5–1.3)
CREAT SERPL-MCNC: 0.63 MG/DL — SIGNIFICANT CHANGE UP (ref 0.5–1.3)
CULTURE RESULTS: SIGNIFICANT CHANGE UP
GLUCOSE SERPL-MCNC: 101 MG/DL — HIGH (ref 70–99)
GLUCOSE SERPL-MCNC: 131 MG/DL — HIGH (ref 70–99)
GLUCOSE SERPL-MCNC: 82 MG/DL — SIGNIFICANT CHANGE UP (ref 70–99)
HCT VFR BLD CALC: 24.5 % — LOW (ref 39–50)
HGB BLD-MCNC: 8.2 G/DL — LOW (ref 13–17)
INR BLD: 2.59 RATIO — HIGH (ref 0.88–1.16)
MCHC RBC-ENTMCNC: 32.4 PG — SIGNIFICANT CHANGE UP (ref 27–34)
MCHC RBC-ENTMCNC: 33.5 GM/DL — SIGNIFICANT CHANGE UP (ref 32–36)
MCV RBC AUTO: 96.8 FL — SIGNIFICANT CHANGE UP (ref 80–100)
PLATELET # BLD AUTO: 70 K/UL — LOW (ref 150–400)
POTASSIUM SERPL-MCNC: 3.9 MMOL/L — SIGNIFICANT CHANGE UP (ref 3.5–5.3)
POTASSIUM SERPL-MCNC: 4 MMOL/L — SIGNIFICANT CHANGE UP (ref 3.5–5.3)
POTASSIUM SERPL-MCNC: 4.2 MMOL/L — SIGNIFICANT CHANGE UP (ref 3.5–5.3)
POTASSIUM SERPL-SCNC: 3.9 MMOL/L — SIGNIFICANT CHANGE UP (ref 3.5–5.3)
POTASSIUM SERPL-SCNC: 4 MMOL/L — SIGNIFICANT CHANGE UP (ref 3.5–5.3)
POTASSIUM SERPL-SCNC: 4.2 MMOL/L — SIGNIFICANT CHANGE UP (ref 3.5–5.3)
PROT SERPL-MCNC: 6.5 G/DL — SIGNIFICANT CHANGE UP (ref 6–8.3)
PROTHROM AB SERPL-ACNC: 30.4 SEC — HIGH (ref 10–13.1)
RBC # BLD: 2.53 M/UL — LOW (ref 4.2–5.8)
RBC # FLD: 21.4 % — HIGH (ref 10.3–14.5)
SODIUM SERPL-SCNC: 125 MMOL/L — LOW (ref 135–145)
SODIUM SERPL-SCNC: 125 MMOL/L — LOW (ref 135–145)
SODIUM SERPL-SCNC: 128 MMOL/L — LOW (ref 135–145)
SPECIMEN SOURCE: SIGNIFICANT CHANGE UP
WBC # BLD: 9.91 K/UL — SIGNIFICANT CHANGE UP (ref 3.8–10.5)
WBC # FLD AUTO: 9.91 K/UL — SIGNIFICANT CHANGE UP (ref 3.8–10.5)

## 2019-12-25 PROCEDURE — 99232 SBSQ HOSP IP/OBS MODERATE 35: CPT

## 2019-12-25 RX ADMIN — Medication 40 MILLIGRAM(S): at 18:06

## 2019-12-25 RX ADMIN — Medication 40 MILLIGRAM(S): at 05:38

## 2019-12-25 RX ADMIN — PANTOPRAZOLE SODIUM 40 MILLIGRAM(S): 20 TABLET, DELAYED RELEASE ORAL at 05:38

## 2019-12-25 RX ADMIN — Medication 500 MILLIGRAM(S): at 11:59

## 2019-12-25 RX ADMIN — Medication 100 MILLIGRAM(S): at 11:59

## 2019-12-25 RX ADMIN — LACTULOSE 20 GRAM(S): 10 SOLUTION ORAL at 05:38

## 2019-12-25 RX ADMIN — POLYETHYLENE GLYCOL 3350 17 GRAM(S): 17 POWDER, FOR SOLUTION ORAL at 05:38

## 2019-12-25 RX ADMIN — Medication 1 MILLIGRAM(S): at 11:58

## 2019-12-25 RX ADMIN — Medication 1 TABLET(S): at 11:58

## 2019-12-25 NOTE — PROGRESS NOTE ADULT - PROBLEM SELECTOR PLAN 1
-decompensated cirrhosis, presented to [facility] for sob/anasarca/abd distention, CTAP with moderate refractory ascites, s/p LVP 5L out + albumin, SBP neg. Transfer for hepatology eval  # Ethanol related _Cirrhosis, MELD-Na _ 31 ( 12/23/2019)        - varices:  perigastric and perisplenic varices with splenorenal shunt        - ascites: yes, s/p large volume paracentesis on 12/20 with 5L, plan for repeat tomorrow and needs dermabond for leak.         - SPE: present grade 1 at least, Lactulose 20mg PO TID normal ammonia level         - HCC: none CT 12/2019  -Monitor CBC, coags, LFT's daily.    -Lasix to 40mg BID and DC eplerenone 50mg qd per hepatology.  -c/w 1L daily fluid restriction  -c/w Cipro daily po sbp ppx  -abstinent from etoh x 3 months.  -c/w thiamine, MVI, folate daily.   -MRI 12/2019 neg hepatoma, +portal HTN with perisplenic varices. EGD 7/2018 neg varices, +gastritis/esophagitis neg H pylori. [place] 7/2018 +hemorrhoids/anal fistula/diverticulosis. AFP neg.   -If not candidate for transplant, consider repeat eval for TIPS for refractory ascites.  - F/U Echo

## 2019-12-25 NOTE — PROGRESS NOTE ADULT - PROBLEM SELECTOR PLAN 8
Transitions of Care Status:  1.  Name of PCP: Dr. Skaggs ([facility]) has not actually seen yet  2.  PCP Contacted on Admission: [ ] Y    [x ] N    3.  PCP contacted at Discharge: [ ] Y    [ ] N    [ ] N/A  4.  Post-Discharge Appointment Date and Location:  5.  Summary of Handoff given to PCP:

## 2019-12-25 NOTE — PROGRESS NOTE ADULT - ASSESSMENT
44 M PMH alcoholic cirrhosis with ascites initially dx 2018, continual EtOH abuse post diagnosis now abstinent x 3 months, who presents as transfer from [facility] for decompensated cirrhosis with ascites, coagulopathy, chronic anemia, chronic thrombocytopenia, and chronic hyponatremia; here for hepatology evaluation.

## 2019-12-25 NOTE — PROGRESS NOTE ADULT - SUBJECTIVE AND OBJECTIVE BOX
PROGRESS NOTE:     Patient is a 44y old  Male who presents with a chief complaint of decompensated cirrhosis (24 Dec 2019 16:31)      SUBJECTIVE / OVERNIGHT EVENTS: No events, still leaking ascitic fluid.     ADDITIONAL REVIEW OF SYSTEMS:  No fevers or chills  No n/v/d    MEDICATIONS  (STANDING):  ciprofloxacin     Tablet 500 milliGRAM(s) Oral daily  folic acid 1 milliGRAM(s) Oral daily  furosemide    Tablet 40 milliGRAM(s) Oral two times a day  lactulose Syrup 20 Gram(s) Oral three times a day  multivitamin 1 Tablet(s) Oral daily  pantoprazole    Tablet 40 milliGRAM(s) Oral before breakfast  polyethylene glycol 3350 17 Gram(s) Oral two times a day  thiamine 100 milliGRAM(s) Oral daily    MEDICATIONS  (PRN):      CAPILLARY BLOOD GLUCOSE        I&O's Summary    24 Dec 2019 07:01  -  25 Dec 2019 07:00  --------------------------------------------------------  IN: 340 mL / OUT: 1250 mL / NET: -910 mL    25 Dec 2019 07:01  -  25 Dec 2019 15:22  --------------------------------------------------------  IN: 1200 mL / OUT: 1000 mL / NET: 200 mL        PHYSICAL EXAM:  Vital Signs Last 24 Hrs  T(C): 36.4 (25 Dec 2019 13:07), Max: 37.1 (25 Dec 2019 05:05)  T(F): 97.6 (25 Dec 2019 13:07), Max: 98.8 (25 Dec 2019 05:05)  HR: 81 (25 Dec 2019 13:07) (81 - 98)  BP: 99/58 (25 Dec 2019 13:07) (99/58 - 108/64)  BP(mean): --  RR: 18 (25 Dec 2019 13:07) (18 - 18)  SpO2: 97% (25 Dec 2019 13:07) (95% - 98%)    CONSTITUTIONAL: NAD, well-developed, non toxic  RESPIRATORY: Normal respiratory effort; lungs are clear to auscultation bilaterally  CARDIOVASCULAR: Regular rate and rhythm, normal S1 and S2, no murmur/rub/gallop; No lower extremity edema; Peripheral pulses are 2+ bilaterally  ABDOMEN: Nontender to palpation, normoactive bowel sounds, no rebound/guarding;distended with ascitic fluid leaking out  MUSCLOSKELETAL: no clubbing or cyanosis of digits; no joint swelling or tenderness to palpation  PSYCH: A+O to person, place, and time; affect appropriate    LABS:                        8.2    9.91  )-----------( 70       ( 25 Dec 2019 08:47 )             24.5     12-25    128<L>  |  97  |  17  ----------------------------<  82  4.2   |  22  |  0.57    Ca    7.5<L>      25 Dec 2019 07:34    TPro  6.5  /  Alb  1.8<L>  /  TBili  7.4<H>  /  DBili  x   /  AST  103<H>  /  ALT  58<H>  /  AlkPhos  168<H>  12-25    PT/INR - ( 25 Dec 2019 08:30 )   PT: 30.4 sec;   INR: 2.59 ratio                     RADIOLOGY & ADDITIONAL TESTS:  Results Reviewed:   Imaging Personally Reviewed:  Electrocardiogram Personally Reviewed:    COORDINATION OF CARE:  Care Discussed with Consultants/Other Providers [Y/N]:  Prior or Outpatient Records Reviewed [Y/N]:

## 2019-12-26 LAB
ALBUMIN SERPL ELPH-MCNC: 1.4 G/DL — LOW (ref 3.3–5)
ALP SERPL-CCNC: 145 U/L — HIGH (ref 40–120)
ALT FLD-CCNC: 57 U/L — HIGH (ref 10–45)
ANION GAP SERPL CALC-SCNC: 10 MMOL/L — SIGNIFICANT CHANGE UP (ref 5–17)
ANION GAP SERPL CALC-SCNC: 12 MMOL/L — SIGNIFICANT CHANGE UP (ref 5–17)
ANION GAP SERPL CALC-SCNC: 8 MMOL/L — SIGNIFICANT CHANGE UP (ref 5–17)
AST SERPL-CCNC: 100 U/L — HIGH (ref 10–40)
BILIRUB SERPL-MCNC: 7.3 MG/DL — HIGH (ref 0.2–1.2)
BUN SERPL-MCNC: 15 MG/DL — SIGNIFICANT CHANGE UP (ref 7–23)
BUN SERPL-MCNC: 15 MG/DL — SIGNIFICANT CHANGE UP (ref 7–23)
BUN SERPL-MCNC: 16 MG/DL — SIGNIFICANT CHANGE UP (ref 7–23)
CALCIUM SERPL-MCNC: 7.2 MG/DL — LOW (ref 8.4–10.5)
CALCIUM SERPL-MCNC: 7.2 MG/DL — LOW (ref 8.4–10.5)
CALCIUM SERPL-MCNC: 7.4 MG/DL — LOW (ref 8.4–10.5)
CHLORIDE SERPL-SCNC: 94 MMOL/L — LOW (ref 96–108)
CHLORIDE SERPL-SCNC: 94 MMOL/L — LOW (ref 96–108)
CHLORIDE SERPL-SCNC: 96 MMOL/L — SIGNIFICANT CHANGE UP (ref 96–108)
CO2 SERPL-SCNC: 20 MMOL/L — LOW (ref 22–31)
CO2 SERPL-SCNC: 21 MMOL/L — LOW (ref 22–31)
CO2 SERPL-SCNC: 21 MMOL/L — LOW (ref 22–31)
CREAT SERPL-MCNC: 0.52 MG/DL — SIGNIFICANT CHANGE UP (ref 0.5–1.3)
CREAT SERPL-MCNC: 0.53 MG/DL — SIGNIFICANT CHANGE UP (ref 0.5–1.3)
CREAT SERPL-MCNC: 0.53 MG/DL — SIGNIFICANT CHANGE UP (ref 0.5–1.3)
GLUCOSE SERPL-MCNC: 105 MG/DL — HIGH (ref 70–99)
GLUCOSE SERPL-MCNC: 73 MG/DL — SIGNIFICANT CHANGE UP (ref 70–99)
GLUCOSE SERPL-MCNC: 74 MG/DL — SIGNIFICANT CHANGE UP (ref 70–99)
HCT VFR BLD CALC: 23.3 % — LOW (ref 39–50)
HGB BLD-MCNC: 7.8 G/DL — LOW (ref 13–17)
INR BLD: 2.58 RATIO — HIGH (ref 0.88–1.16)
MCHC RBC-ENTMCNC: 32.2 PG — SIGNIFICANT CHANGE UP (ref 27–34)
MCHC RBC-ENTMCNC: 33.5 GM/DL — SIGNIFICANT CHANGE UP (ref 32–36)
MCV RBC AUTO: 96.3 FL — SIGNIFICANT CHANGE UP (ref 80–100)
PLATELET # BLD AUTO: 64 K/UL — LOW (ref 150–400)
POTASSIUM SERPL-MCNC: 4 MMOL/L — SIGNIFICANT CHANGE UP (ref 3.5–5.3)
POTASSIUM SERPL-MCNC: 4 MMOL/L — SIGNIFICANT CHANGE UP (ref 3.5–5.3)
POTASSIUM SERPL-MCNC: 4.1 MMOL/L — SIGNIFICANT CHANGE UP (ref 3.5–5.3)
POTASSIUM SERPL-SCNC: 4 MMOL/L — SIGNIFICANT CHANGE UP (ref 3.5–5.3)
POTASSIUM SERPL-SCNC: 4 MMOL/L — SIGNIFICANT CHANGE UP (ref 3.5–5.3)
POTASSIUM SERPL-SCNC: 4.1 MMOL/L — SIGNIFICANT CHANGE UP (ref 3.5–5.3)
PROT SERPL-MCNC: 6.1 G/DL — SIGNIFICANT CHANGE UP (ref 6–8.3)
PROTHROM AB SERPL-ACNC: 30.3 SEC — HIGH (ref 10–13.1)
RBC # BLD: 2.42 M/UL — LOW (ref 4.2–5.8)
RBC # FLD: 21.4 % — HIGH (ref 10.3–14.5)
SODIUM SERPL-SCNC: 123 MMOL/L — LOW (ref 135–145)
SODIUM SERPL-SCNC: 126 MMOL/L — LOW (ref 135–145)
SODIUM SERPL-SCNC: 127 MMOL/L — LOW (ref 135–145)
WBC # BLD: 9.22 K/UL — SIGNIFICANT CHANGE UP (ref 3.8–10.5)
WBC # FLD AUTO: 9.22 K/UL — SIGNIFICANT CHANGE UP (ref 3.8–10.5)

## 2019-12-26 PROCEDURE — 99232 SBSQ HOSP IP/OBS MODERATE 35: CPT | Mod: GC

## 2019-12-26 PROCEDURE — 76705 ECHO EXAM OF ABDOMEN: CPT | Mod: 26

## 2019-12-26 PROCEDURE — 99232 SBSQ HOSP IP/OBS MODERATE 35: CPT

## 2019-12-26 RX ORDER — PHYTONADIONE (VIT K1) 5 MG
10 TABLET ORAL ONCE
Refills: 0 | Status: COMPLETED | OUTPATIENT
Start: 2019-12-26 | End: 2019-12-26

## 2019-12-26 RX ADMIN — Medication 40 MILLIGRAM(S): at 17:01

## 2019-12-26 RX ADMIN — POLYETHYLENE GLYCOL 3350 17 GRAM(S): 17 POWDER, FOR SOLUTION ORAL at 17:01

## 2019-12-26 RX ADMIN — POLYETHYLENE GLYCOL 3350 17 GRAM(S): 17 POWDER, FOR SOLUTION ORAL at 05:50

## 2019-12-26 RX ADMIN — Medication 1 TABLET(S): at 11:44

## 2019-12-26 RX ADMIN — Medication 100 MILLIGRAM(S): at 11:44

## 2019-12-26 RX ADMIN — Medication 1 MILLIGRAM(S): at 11:44

## 2019-12-26 RX ADMIN — PANTOPRAZOLE SODIUM 40 MILLIGRAM(S): 20 TABLET, DELAYED RELEASE ORAL at 05:42

## 2019-12-26 RX ADMIN — Medication 10 MILLIGRAM(S): at 21:06

## 2019-12-26 RX ADMIN — Medication 40 MILLIGRAM(S): at 05:42

## 2019-12-26 RX ADMIN — Medication 500 MILLIGRAM(S): at 11:44

## 2019-12-26 NOTE — PROGRESS NOTE ADULT - PROBLEM SELECTOR PLAN 1
Hypervolemic Hypo-osmolar Hyponatremia secondary to fluid overload in setting decompensated liver cirrhosis.  -s/p paracentesis 5L removed (12/20). On admission SNa 120, most current Na 126    Recommendations:  - Continue Lasix 40mg BID PO  - Fluid restrict 1L per day  - Continue Eplerenone 50mg daily  - Check daily  BMP, serum Osm, UOsm, Urine electrolytes  - BMP every day with goal correction not more than 6-8 meq in 24 hours. Hypervolemic Hypo-osmolar Hyponatremia secondary to fluid overload in setting decompensated liver cirrhosis.  -s/p paracentesis 5L removed (12/20). On admission SNa 120, most current Na 126    Recommendations:  - switch Lasix 40mg BID PO to IV  - Fluid restrict 1L per day  - Continue Eplerenone 50mg daily  - Check daily  BMP, serum Osm, UOsm, Urine electrolytes  - BMP every day with goal correction not more than 6-8 meq in 24 hours.

## 2019-12-26 NOTE — PROGRESS NOTE ADULT - ASSESSMENT
44 M PMH alcoholic cirrhosis with ascites initially dx 2018, continual EtOH abuse post diagnosis now abstinent x 3 months, who presents as transfer from [facility] for decompensated cirrhosis with ascites, coagulopathy, chronic anemia, chronic thrombocytopenia, and chronic hyponatremia; here for hepatology evaluation.

## 2019-12-26 NOTE — PROGRESS NOTE ADULT - SUBJECTIVE AND OBJECTIVE BOX
PROGRESS NOTE:     Patient is a 44y old  Male who presents with a chief complaint of decompensated cirrhosis (26 Dec 2019 11:05)      SUBJECTIVE / OVERNIGHT EVENTS: Still leaking ascites    ADDITIONAL REVIEW OF SYSTEMS:  No fevers or chills  No n/v/d    MEDICATIONS  (STANDING):  ciprofloxacin     Tablet 500 milliGRAM(s) Oral daily  folic acid 1 milliGRAM(s) Oral daily  furosemide    Tablet 40 milliGRAM(s) Oral two times a day  lactulose Syrup 20 Gram(s) Oral three times a day  multivitamin 1 Tablet(s) Oral daily  pantoprazole    Tablet 40 milliGRAM(s) Oral before breakfast  polyethylene glycol 3350 17 Gram(s) Oral two times a day  thiamine 100 milliGRAM(s) Oral daily    MEDICATIONS  (PRN):      CAPILLARY BLOOD GLUCOSE        I&O's Summary    25 Dec 2019 07:01  -  26 Dec 2019 07:00  --------------------------------------------------------  IN: 1670 mL / OUT: 2450 mL / NET: -780 mL        PHYSICAL EXAM:  Vital Signs Last 24 Hrs  T(C): 36.8 (26 Dec 2019 05:15), Max: 36.8 (26 Dec 2019 05:15)  T(F): 98.3 (26 Dec 2019 05:15), Max: 98.3 (26 Dec 2019 05:15)  HR: 90 (26 Dec 2019 05:15) (81 - 90)  BP: 102/58 (26 Dec 2019 05:15) (99/58 - 112/66)  BP(mean): --  RR: 18 (26 Dec 2019 05:15) (18 - 18)  SpO2: 96% (26 Dec 2019 05:15) (96% - 97%)    CONSTITUTIONAL: NAD, , chronically ill appearing, thin  HEENT: Jaundice w/ scleral icterus   RESPIRATORY: Normal respiratory effort; lungs are clear to auscultation bilaterally  CARDIOVASCULAR: Regular rate and rhythm, normal S1 and S2, no murmur/rub/gallop; No lower extremity edema; Peripheral pulses are 2+ bilaterally  ABDOMEN: Nontender to palpation, normoactive bowel sounds, no rebound/guarding; Distended abd w/ ostomy bag over site  MUSCLOSKELETAL: no clubbing or cyanosis of digits; no joint swelling or tenderness to palpation  PSYCH: A+O to person, place, and time; affect appropriate    LABS:                        7.8    9.22  )-----------( 64       ( 26 Dec 2019 08:52 )             23.3     12-26    126<L>  |  94<L>  |  15  ----------------------------<  73  4.1   |  20<L>  |  0.53    Ca    7.2<L>      26 Dec 2019 07:27    TPro  6.1  /  Alb  1.4<L>  /  TBili  7.3<H>  /  DBili  x   /  AST  100<H>  /  ALT  57<H>  /  AlkPhos  145<H>  12-26    PT/INR - ( 26 Dec 2019 08:46 )   PT: 30.3 sec;   INR: 2.58 ratio                     RADIOLOGY & ADDITIONAL TESTS:  Results Reviewed:   Imaging Personally Reviewed:  Electrocardiogram Personally Reviewed:    COORDINATION OF CARE:  Care Discussed with Consultants/Other Providers [Y/N]:  Prior or Outpatient Records Reviewed [Y/N]:

## 2019-12-26 NOTE — PROGRESS NOTE ADULT - ASSESSMENT
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    # Chronic anemia and thrombocytopenia     Recommendations:  - paracentesis today if possible  - dermabond to LLQ leakage site   - c/w Lactulose 20 gm daily and miralax scheduled BID given distention  - trend CBC, INR, CMP   - Continue diuretics per nephrology recommendations    - fluid restrictions less than 1 L  - transplant nephrology recs appreciated  - Continue PO Cipro for SBP prophylaxis ( low protein in the ascitic fluid)   - Please obtain Echocardiogram as part of the work up and screening for transplant. 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    # Chronic anemia and thrombocytopenia     Recommendations:  - paracentesis today if possible  - dermabond to LLQ leakage site   - would change lasix to 40 mg IV BID with pretreament with once does of 25% albumin  - c/w Lactulose 20 gm daily and miralax scheduled BID given distention  - trend CBC, INR, CMP   - Continue diuretics per nephrology recommendations    - fluid restrictions less than 1 L  - transplant nephrology recs appreciated  - Continue PO Cipro for SBP prophylaxis ( low protein in the ascitic fluid)   - Please obtain Echocardiogram as part of the work up and screening for transplant. 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    # Chronic anemia and thrombocytopenia     Recommendations:  - paracentesis today  - dermabond to LLQ leakage site ASAP  - would change lasix to 40 mg IV BID with pretreament with 25% albumin 100 mL iv bid  - c/w Lactulose 20 gm daily and miralax scheduled BID given distention  - trend CBC, INR, CMP   - Continue diuretics per nephrology recommendations    - fluid restrictions less than 1 L  - transplant nephrology recs appreciated  - Continue PO Cipro for SBP prophylaxis ( low protein in the ascitic fluid)   - Please obtain Echocardiogram as part of ascites evaluation

## 2019-12-26 NOTE — PROGRESS NOTE ADULT - ATTENDING COMMENTS
Advanced chronic liver disease alcohol related, cirrhosis with portal hypertension  Hyponatremia, clinically not hypovolemic, on diuretic  Normal creatinine  Plan:  Monitor Na  Avoid rapid correction  attempts of Na level at present  Will follow  I was present during and reviewed clinical and lab data as well as assessment and plan as documented by the house staff as noted. Please contact if any additional questions with any change in clinical condition or on availability of any additional information or reports.

## 2019-12-26 NOTE — PROGRESS NOTE ADULT - SUBJECTIVE AND OBJECTIVE BOX
[facility] DIVISION OF KIDNEY DISEASES AND HYPERTENSION -- FOLLOW UP NOTE  --------------------------------------------------------------------------------  Chief Complaint:    24 hour events/subjective:  - overnight hypothermia 96.2F otherwise vitals wnl BP 100s/60s, total UOP 2.4L voided  - patient seen and examined at bedside this morning without complain state feels well   - vitals/lab/medications reviewed, noted for serum Na 125 to 127      PAST HISTORY  --------------------------------------------------------------------------------  No significant changes to PMH, PSH, FHx, SHx, unless otherwise noted    ALLERGIES & MEDICATIONS  --------------------------------------------------------------------------------  Allergies    No Known Allergies    Intolerances      Standing Inpatient Medications  ciprofloxacin     Tablet 500 milliGRAM(s) Oral daily  folic acid 1 milliGRAM(s) Oral daily  furosemide    Tablet 40 milliGRAM(s) Oral two times a day  lactulose Syrup 20 Gram(s) Oral three times a day  multivitamin 1 Tablet(s) Oral daily  pantoprazole    Tablet 40 milliGRAM(s) Oral before breakfast  polyethylene glycol 3350 17 Gram(s) Oral two times a day  thiamine 100 milliGRAM(s) Oral daily    PRN Inpatient Medications      REVIEW OF SYSTEMS  Gen: No fatigue, fevers/chills, weakness  Skin: No rashes  Respiratory: No dyspnea, cough, wheezing, hemoptysis  CV: No chest pain, orthopnea  GI: No abdominal pain, diarrhea, constipation, nausea, vomiting, melena, hematochezia  : No increased frequency, dysuria, hematuria  MSK: No edema  Neuro: No dizziness/lightheadedness, weakness, numbness, tingling    All other systems were reviewed and are negative, except as noted.    VITALS/PHYSICAL EXAM  --------------------------------------------------------------------------------  T(C): 36.8 (12-26-19 @ 05:15), Max: 36.8 (12-26-19 @ 05:15)  HR: 90 (12-26-19 @ 05:15) (81 - 90)  BP: 102/58 (12-26-19 @ 05:15) (99/58 - 112/66)  RR: 18 (12-26-19 @ 05:15) (18 - 18)  SpO2: 96% (12-26-19 @ 05:15) (96% - 97%)  Wt(kg): --        12-25-19 @ 07:01  -  12-26-19 @ 07:00  --------------------------------------------------------  IN: 1670 mL / OUT: 2450 mL / NET: -780 mL      Physical Exam:  	Gen: NAD, well-appearing on room air  	HEENT: sclera icterus, MMM, clear oropharynx  	Pulm: CTA B/L no crackles  	CV: RRR, S1S2; no murmur  	Abd: +BS, soft, nontender, mild distended, urostomy bag  	: no wisdom  	LE: Warm, no edema, no asterixis               Neuro: AAOx3  	Psych: Normal affect and mood  	Skin: Warm, without rashes    LABS/STUDIES  --------------------------------------------------------------------------------              7.8    9.22  >-----------<  64       [12-26-19 @ 08:52]              23.3     126  |  94  |  15  ----------------------------<  73      [12-26-19 @ 07:27]  4.1   |  20  |  0.53        Ca     7.2     [12-26-19 @ 07:27]    TPro  6.1  /  Alb  1.4  /  TBili  7.3  /  DBili  x   /  AST  100  /  ALT  57  /  AlkPhos  145  [12-26-19 @ 07:27]    PT/INR: PT 30.3 , INR 2.58       [12-26-19 @ 08:46]      Creatinine Trend:  SCr 0.53 [12-26 @ 07:27]  SCr 0.57 [12-25 @ 19:17]  SCr 0.57 [12-25 @ 07:34]  SCr 0.63 [12-25 @ 00:39]  SCr 0.60 [12-24 @ 18:27]    Urinalysis - [12-11-19 @ 16:20]      Color Yuli / Appearance Clear / SG 1.007 / pH 6.0      Gluc Negative / Ketone Negative  / Bili Moderate / Urobili <2 mg/dL       Blood Negative / Protein Negative / Leuk Est Negative / Nitrite Negative      RBC 1 / WBC 0 / Hyaline 0 / Gran  / Sq Epi  / Non Sq Epi 0 / Bacteria Occasional    Urine Sodium <35      [12-22-19 @ 14:29]  Urine Potassium 25      [12-22-19 @ 14:29]  Urine Osmolality 421      [12-22-19 @ 17:56]    Iron 33, TIBC 147, %sat 22      [11-30-19 @ 23:20]  Ferritin 52      [11-30-19 @ 23:20]  TSH 3.81      [12-12-19 @ 05:41]

## 2019-12-26 NOTE — PROGRESS NOTE ADULT - ASSESSMENT
44 M PMH alcoholic cirrhosis with ascites initially dx 2018, continual EtOH abuse post diagnosis now abstinent x 3 months, who presents as transfer from [facility] for decompensated cirrhosis - liver transplant workup.    Nephrology consulted for Hyponatremia hypervolemia likely 2/2 cirrhosis, improving w/ fluid restriction and lasix

## 2019-12-26 NOTE — PROGRESS NOTE ADULT - SUBJECTIVE AND OBJECTIVE BOX
Chief Complaint:  Patient is a 44y old  Male who presents with a chief complaint of decompensated cirrhosis (25 Dec 2019 15:22)      Interval Events:   Labs stable.  Patient would like paracentesis today    Allergies:  No Known Allergies      Hospital Medications:  ciprofloxacin     Tablet 500 milliGRAM(s) Oral daily  folic acid 1 milliGRAM(s) Oral daily  furosemide    Tablet 40 milliGRAM(s) Oral two times a day  lactulose Syrup 20 Gram(s) Oral three times a day  multivitamin 1 Tablet(s) Oral daily  pantoprazole    Tablet 40 milliGRAM(s) Oral before breakfast  polyethylene glycol 3350 17 Gram(s) Oral two times a day  thiamine 100 milliGRAM(s) Oral daily      PMHX/PSHX:  Liver cirrhosis, alcoholic  Anemia  Thrombocytopenia  Nosebleed  ETOH abuse  History of incision and drainage  No significant past surgical history      Family history:  Family history of stroke (Father, Mother)          PHYSICAL EXAM:     GENERAL:  NAD  HEENT:  NC/AT,  conjunctivae clear, sclera icteric  CHEST:  Full & symmetric excursion, no increased  HEART:  Regular rhythm  ABDOMEN:  Soft, non-tender, distended, normoactive bowel sounds,  no masses ,no hepato-splenomegaly,   EXTREMITIES:  no cyanosis,clubbing or edema  SKIN:  No rash/erythema/ecchymoses/petechiae/wounds/abscess/warm/dry  NEURO:  Alert, oriented    Vital Signs:  Vital Signs Last 24 Hrs  T(C): 36.8 (26 Dec 2019 05:15), Max: 36.8 (26 Dec 2019 05:15)  T(F): 98.3 (26 Dec 2019 05:15), Max: 98.3 (26 Dec 2019 05:15)  HR: 90 (26 Dec 2019 05:15) (81 - 90)  BP: 102/58 (26 Dec 2019 05:15) (99/58 - 112/66)  BP(mean): --  RR: 18 (26 Dec 2019 05:15) (18 - 18)  SpO2: 96% (26 Dec 2019 05:15) (96% - 97%)  Daily     Daily Weight in k.9 (26 Dec 2019 05:15)    LABS:                        8.2    9.91  )-----------( 70       ( 25 Dec 2019 08:47 )             24.5     12-    126<L>  |  94<L>  |  15  ----------------------------<  73  4.1   |  20<L>  |  0.53    Ca    7.2<L>      26 Dec 2019 07:27    TPro  6.1  /  Alb  1.4<L>  /  TBili  7.3<H>  /  DBili  x   /  AST  100<H>  /  ALT  57<H>  /  AlkPhos  145<H>  12-    LIVER FUNCTIONS - ( 26 Dec 2019 07:27 )  Alb: 1.4 g/dL / Pro: 6.1 g/dL / ALK PHOS: 145 U/L / ALT: 57 U/L / AST: 100 U/L / GGT: x           PT/INR - ( 25 Dec 2019 08:30 )   PT: 30.4 sec;   INR: 2.59 ratio                 Imaging: Chief Complaint:  Patient is a 44y old  Male who presents with a chief complaint of decompensated cirrhosis (25 Dec 2019 15:22)      Interval Events:   Labs stable.  Patient would like paracentesis today. Still leaking ascites.    Allergies:  No Known Allergies      Hospital Medications:  ciprofloxacin     Tablet 500 milliGRAM(s) Oral daily  folic acid 1 milliGRAM(s) Oral daily  furosemide    Tablet 40 milliGRAM(s) Oral two times a day  lactulose Syrup 20 Gram(s) Oral three times a day  multivitamin 1 Tablet(s) Oral daily  pantoprazole    Tablet 40 milliGRAM(s) Oral before breakfast  polyethylene glycol 3350 17 Gram(s) Oral two times a day  thiamine 100 milliGRAM(s) Oral daily      PMHX/PSHX:  Liver cirrhosis, alcoholic  Anemia  Thrombocytopenia  Nosebleed  ETOH abuse  History of incision and drainage  No significant past surgical history      Family history:  Family history of stroke (Father, Mother)          PHYSICAL EXAM:     GENERAL:  NAD  HEENT:  NC/AT,  conjunctivae clear, sclera icteric  CHEST:  Full & symmetric excursion, no increased  HEART:  Regular rhythm  ABDOMEN:  Soft, non-tender, distended, normoactive bowel sounds, +leakage of ascites from LLQ  EXTREMITIES:  no cyanosis,clubbing or edema  SKIN:  No rash/erythema/ecchymoses/petechiae/wounds/abscess/warm/dry  NEURO:  Alert, oriented    Vital Signs:  Vital Signs Last 24 Hrs  T(C): 36.8 (26 Dec 2019 05:15), Max: 36.8 (26 Dec 2019 05:15)  T(F): 98.3 (26 Dec 2019 05:15), Max: 98.3 (26 Dec 2019 05:15)  HR: 90 (26 Dec 2019 05:15) (81 - 90)  BP: 102/58 (26 Dec 2019 05:15) (99/58 - 112/66)  BP(mean): --  RR: 18 (26 Dec 2019 05:15) (18 - 18)  SpO2: 96% (26 Dec 2019 05:15) (96% - 97%)  Daily     Daily Weight in k.9 (26 Dec 2019 05:15)    LABS:                        8.2    9.91  )-----------( 70       ( 25 Dec 2019 08:47 )             24.5     12-    126<L>  |  94<L>  |  15  ----------------------------<  73  4.1   |  20<L>  |  0.53    Ca    7.2<L>      26 Dec 2019 07:27    TPro  6.1  /  Alb  1.4<L>  /  TBili  7.3<H>  /  DBili  x   /  AST  100<H>  /  ALT  57<H>  /  AlkPhos  145<H>  12-    LIVER FUNCTIONS - ( 26 Dec 2019 07:27 )  Alb: 1.4 g/dL / Pro: 6.1 g/dL / ALK PHOS: 145 U/L / ALT: 57 U/L / AST: 100 U/L / GGT: x           PT/INR - ( 25 Dec 2019 08:30 )   PT: 30.4 sec;   INR: 2.59 ratio                 Imaging:

## 2019-12-26 NOTE — PROGRESS NOTE ADULT - PROBLEM SELECTOR PLAN 1
-decompensated cirrhosis, presented to [facility] for sob/anasarca/abd distention, CTAP with moderate refractory ascites, s/p LVP 5L out + albumin, SBP neg. 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    -Lasix to 40mg BID and DC eplerenone 50mg qd per hepatology.  -c/w 1L daily fluid restriction  -c/w Cipro daily po sbp ppx  -abstinent from etoh x 3 months.  -c/w thiamine, MVI, folate daily.   -MRI 12/2019 neg hepatoma, +portal HTN with perisplenic varices. EGD 7/2018 neg varices, +gastritis/esophagitis neg H pylori. [place] 7/2018 +hemorrhoids/anal fistula/diverticulosis. AFP neg.   -If not candidate for transplant, consider repeat eval for TIPS for refractory ascites.  - F/U Echo  -IR consult Para today w/ dermabond to site

## 2019-12-26 NOTE — PROGRESS NOTE ADULT - PROBLEM SELECTOR PLAN 8
Transitions of Care Status:  1.  Name of PCP: Dr. Skaggs ([facility]) has not actually seen yet  2.  PCP Contacted on Admission: [ ] Y    [x ] N    3.  PCP contacted at Discharge: [ ] Y    [ ] N    [ ] N/A  4.  Post-Discharge Appointment Date and Location:  5.  Summary of Handoff given to PCP:

## 2019-12-27 DIAGNOSIS — R04.0 EPISTAXIS: ICD-10-CM

## 2019-12-27 LAB
ALBUMIN SERPL ELPH-MCNC: 1.6 G/DL — LOW (ref 3.3–5)
ALP SERPL-CCNC: 143 U/L — HIGH (ref 40–120)
ALT FLD-CCNC: 58 U/L — HIGH (ref 10–45)
ANION GAP SERPL CALC-SCNC: 10 MMOL/L — SIGNIFICANT CHANGE UP (ref 5–17)
ANION GAP SERPL CALC-SCNC: 8 MMOL/L — SIGNIFICANT CHANGE UP (ref 5–17)
ANION GAP SERPL CALC-SCNC: 8 MMOL/L — SIGNIFICANT CHANGE UP (ref 5–17)
APTT BLD: 63.3 SEC — HIGH (ref 27.5–36.3)
AST SERPL-CCNC: 100 U/L — HIGH (ref 10–40)
BILIRUB SERPL-MCNC: 7.6 MG/DL — HIGH (ref 0.2–1.2)
BLD GP AB SCN SERPL QL: NEGATIVE — SIGNIFICANT CHANGE UP
BUN SERPL-MCNC: 13 MG/DL — SIGNIFICANT CHANGE UP (ref 7–23)
BUN SERPL-MCNC: 14 MG/DL — SIGNIFICANT CHANGE UP (ref 7–23)
BUN SERPL-MCNC: 14 MG/DL — SIGNIFICANT CHANGE UP (ref 7–23)
CALCIUM SERPL-MCNC: 6.9 MG/DL — LOW (ref 8.4–10.5)
CALCIUM SERPL-MCNC: 7.1 MG/DL — LOW (ref 8.4–10.5)
CALCIUM SERPL-MCNC: 7.2 MG/DL — LOW (ref 8.4–10.5)
CHLORIDE SERPL-SCNC: 97 MMOL/L — SIGNIFICANT CHANGE UP (ref 96–108)
CHLORIDE SERPL-SCNC: 98 MMOL/L — SIGNIFICANT CHANGE UP (ref 96–108)
CHLORIDE SERPL-SCNC: 98 MMOL/L — SIGNIFICANT CHANGE UP (ref 96–108)
CO2 SERPL-SCNC: 18 MMOL/L — LOW (ref 22–31)
CO2 SERPL-SCNC: 19 MMOL/L — LOW (ref 22–31)
CO2 SERPL-SCNC: 20 MMOL/L — LOW (ref 22–31)
CREAT SERPL-MCNC: 0.5 MG/DL — SIGNIFICANT CHANGE UP (ref 0.5–1.3)
CREAT SERPL-MCNC: 0.51 MG/DL — SIGNIFICANT CHANGE UP (ref 0.5–1.3)
CREAT SERPL-MCNC: 0.59 MG/DL — SIGNIFICANT CHANGE UP (ref 0.5–1.3)
GLUCOSE SERPL-MCNC: 104 MG/DL — HIGH (ref 70–99)
GLUCOSE SERPL-MCNC: 105 MG/DL — HIGH (ref 70–99)
GLUCOSE SERPL-MCNC: 88 MG/DL — SIGNIFICANT CHANGE UP (ref 70–99)
HCT VFR BLD CALC: 21.6 % — LOW (ref 39–50)
HCT VFR BLD CALC: 23.9 % — LOW (ref 39–50)
HGB BLD-MCNC: 7.1 G/DL — LOW (ref 13–17)
HGB BLD-MCNC: 8 G/DL — LOW (ref 13–17)
INR BLD: 2.58 RATIO — HIGH (ref 0.88–1.16)
MCHC RBC-ENTMCNC: 32.3 PG — SIGNIFICANT CHANGE UP (ref 27–34)
MCHC RBC-ENTMCNC: 32.4 PG — SIGNIFICANT CHANGE UP (ref 27–34)
MCHC RBC-ENTMCNC: 32.9 GM/DL — SIGNIFICANT CHANGE UP (ref 32–36)
MCHC RBC-ENTMCNC: 33.5 GM/DL — SIGNIFICANT CHANGE UP (ref 32–36)
MCV RBC AUTO: 96.4 FL — SIGNIFICANT CHANGE UP (ref 80–100)
MCV RBC AUTO: 98.6 FL — SIGNIFICANT CHANGE UP (ref 80–100)
NRBC # BLD: 0 /100 WBCS — SIGNIFICANT CHANGE UP (ref 0–0)
NRBC # BLD: 0 /100 WBCS — SIGNIFICANT CHANGE UP (ref 0–0)
PLATELET # BLD AUTO: 66 K/UL — LOW (ref 150–400)
PLATELET # BLD AUTO: 68 K/UL — LOW (ref 150–400)
POTASSIUM SERPL-MCNC: 3.4 MMOL/L — LOW (ref 3.5–5.3)
POTASSIUM SERPL-MCNC: 3.9 MMOL/L — SIGNIFICANT CHANGE UP (ref 3.5–5.3)
POTASSIUM SERPL-MCNC: 3.9 MMOL/L — SIGNIFICANT CHANGE UP (ref 3.5–5.3)
POTASSIUM SERPL-SCNC: 3.4 MMOL/L — LOW (ref 3.5–5.3)
POTASSIUM SERPL-SCNC: 3.9 MMOL/L — SIGNIFICANT CHANGE UP (ref 3.5–5.3)
POTASSIUM SERPL-SCNC: 3.9 MMOL/L — SIGNIFICANT CHANGE UP (ref 3.5–5.3)
PROT SERPL-MCNC: 6.2 G/DL — SIGNIFICANT CHANGE UP (ref 6–8.3)
PROTHROM AB SERPL-ACNC: 30.6 SEC — HIGH (ref 10–12.9)
RBC # BLD: 2.19 M/UL — LOW (ref 4.2–5.8)
RBC # BLD: 2.48 M/UL — LOW (ref 4.2–5.8)
RBC # FLD: 21.4 % — HIGH (ref 10.3–14.5)
RBC # FLD: 21.5 % — HIGH (ref 10.3–14.5)
RH IG SCN BLD-IMP: POSITIVE — SIGNIFICANT CHANGE UP
SODIUM SERPL-SCNC: 124 MMOL/L — LOW (ref 135–145)
SODIUM SERPL-SCNC: 124 MMOL/L — LOW (ref 135–145)
SODIUM SERPL-SCNC: 128 MMOL/L — LOW (ref 135–145)
WBC # BLD: 9.45 K/UL — SIGNIFICANT CHANGE UP (ref 3.8–10.5)
WBC # BLD: 9.56 K/UL — SIGNIFICANT CHANGE UP (ref 3.8–10.5)
WBC # FLD AUTO: 9.45 K/UL — SIGNIFICANT CHANGE UP (ref 3.8–10.5)
WBC # FLD AUTO: 9.56 K/UL — SIGNIFICANT CHANGE UP (ref 3.8–10.5)

## 2019-12-27 PROCEDURE — 99232 SBSQ HOSP IP/OBS MODERATE 35: CPT

## 2019-12-27 PROCEDURE — 99232 SBSQ HOSP IP/OBS MODERATE 35: CPT | Mod: GC

## 2019-12-27 PROCEDURE — 49083 ABD PARACENTESIS W/IMAGING: CPT

## 2019-12-27 RX ORDER — ALBUMIN HUMAN 25 %
50 VIAL (ML) INTRAVENOUS EVERY 8 HOURS
Refills: 0 | Status: DISCONTINUED | OUTPATIENT
Start: 2019-12-27 | End: 2019-12-31

## 2019-12-27 RX ORDER — SODIUM CHLORIDE 0.65 %
1 AEROSOL, SPRAY (ML) NASAL THREE TIMES A DAY
Refills: 0 | Status: DISCONTINUED | OUTPATIENT
Start: 2019-12-27 | End: 2019-12-28

## 2019-12-27 RX ORDER — FUROSEMIDE 40 MG
40 TABLET ORAL DAILY
Refills: 0 | Status: DISCONTINUED | OUTPATIENT
Start: 2019-12-27 | End: 2019-12-31

## 2019-12-27 RX ORDER — CEPHALEXIN 500 MG
500 CAPSULE ORAL EVERY 12 HOURS
Refills: 0 | Status: DISCONTINUED | OUTPATIENT
Start: 2019-12-27 | End: 2019-12-31

## 2019-12-27 RX ADMIN — LACTULOSE 20 GRAM(S): 10 SOLUTION ORAL at 13:14

## 2019-12-27 RX ADMIN — Medication 1 TABLET(S): at 13:14

## 2019-12-27 RX ADMIN — Medication 40 MILLIGRAM(S): at 13:13

## 2019-12-27 RX ADMIN — Medication 50 MILLILITER(S): at 21:31

## 2019-12-27 RX ADMIN — PANTOPRAZOLE SODIUM 40 MILLIGRAM(S): 20 TABLET, DELAYED RELEASE ORAL at 05:36

## 2019-12-27 RX ADMIN — LACTULOSE 20 GRAM(S): 10 SOLUTION ORAL at 05:35

## 2019-12-27 RX ADMIN — Medication 1 SPRAY(S): at 22:24

## 2019-12-27 RX ADMIN — Medication 40 MILLIGRAM(S): at 05:36

## 2019-12-27 RX ADMIN — Medication 1 MILLIGRAM(S): at 13:14

## 2019-12-27 RX ADMIN — POLYETHYLENE GLYCOL 3350 17 GRAM(S): 17 POWDER, FOR SOLUTION ORAL at 17:39

## 2019-12-27 RX ADMIN — Medication 50 MILLILITER(S): at 13:10

## 2019-12-27 RX ADMIN — Medication 100 MILLIGRAM(S): at 13:14

## 2019-12-27 RX ADMIN — Medication 500 MILLIGRAM(S): at 13:14

## 2019-12-27 NOTE — CHART NOTE - NSCHARTNOTEFT_GEN_A_CORE
Interventional Radiology Pre-Procedure Note      This is a 45yo old male who presents with decompensated cirrhosis, with recurrent ascites, s/p large volume paracentesis on 12/20 with 5L drained, patient presents today to IR for therapeutic paracentesis.     PAST MEDICAL & SURGICAL HISTORY:  Liver cirrhosis, alcoholic  Anemia  Thrombocytopenia  ETOH abuse  History of incision and drainage: Gluteal abscess       Allergies:   No Known Allergies      LABS:                      8.0    9.56  )-----------( 68       ( 27 Dec 2019 06:35 )             23.9     12-27    124<L>  |  97  |  14  ----------------------------<  105<H>  3.9   |  19<L>  |  0.51    Ca    7.1<L>      27 Dec 2019 06:35    TPro  6.2  /  Alb  1.6<L>  /  TBili  7.6<H>  /  DBili  x   /  AST  100<H>  /  ALT  58<H>  /  AlkPhos  143<H>  12-27    PT/INR - ( 27 Dec 2019 06:35 )   PT: 30.6 sec;   INR: 2.58 ratio         PTT - ( 27 Dec 2019 06:35 )  PTT:63.3 sec    Procedure: US guided paracentesis    Procedure/ risks/ benefits were explained, informed consent obtained from patient, verbalizes understanding.    Erinn Blackburn PA-C  [phone]

## 2019-12-27 NOTE — PROGRESS NOTE ADULT - SUBJECTIVE AND OBJECTIVE BOX
[facility] DIVISION OF KIDNEY DISEASES AND HYPERTENSION -- FOLLOW UP NOTE  --------------------------------------------------------------------------------  Chief Complaint:    24 hour events/subjective:  - overnight no events reported, vitals wnl afebrile, total  ml voided  - patient seen and examined at bedside this morning without complain  - vitals/lab/medications reviewed, noted for Na 123 to 124  - plan IR paracentesis today    PAST HISTORY  --------------------------------------------------------------------------------  No significant changes to PMH, PSH, FHx, SHx, unless otherwise noted    ALLERGIES & MEDICATIONS  --------------------------------------------------------------------------------  Allergies    No Known Allergies    Intolerances      Standing Inpatient Medications  albumin human 25% IVPB 50 milliLiter(s) IV Intermittent every 8 hours  ciprofloxacin     Tablet 500 milliGRAM(s) Oral daily  folic acid 1 milliGRAM(s) Oral daily  furosemide   Injectable 40 milliGRAM(s) IV Push daily  lactulose Syrup 20 Gram(s) Oral three times a day  multivitamin 1 Tablet(s) Oral daily  pantoprazole    Tablet 40 milliGRAM(s) Oral before breakfast  polyethylene glycol 3350 17 Gram(s) Oral two times a day  thiamine 100 milliGRAM(s) Oral daily    PRN Inpatient Medications      REVIEW OF SYSTEMS  Gen: No fatigue, fevers/chills, weakness  Skin: No rashes  Respiratory: No dyspnea, cough, wheezing, hemoptysis  CV: No chest pain, orthopnea  GI: No abdominal pain, diarrhea, constipation, nausea, vomiting, melena, hematochezia  : No increased frequency, dysuria, hematuria  MSK: No edema  Neuro: No dizziness/lightheadedness, weakness, numbness, tingling    All other systems were reviewed and are negative, except as noted.    VITALS/PHYSICAL EXAM  --------------------------------------------------------------------------------  T(C): 36.9 (12-27-19 @ 05:15), Max: 36.9 (12-27-19 @ 05:15)  HR: 102 (12-27-19 @ 05:15) (96 - 102)  BP: 107/60 (12-27-19 @ 05:15) (100/61 - 109/65)  RR: 18 (12-27-19 @ 05:15) (18 - 18)  SpO2: 96% (12-27-19 @ 05:15) (95% - 96%)  Wt(kg): --        12-26-19 @ 07:01  -  12-27-19 @ 07:00  --------------------------------------------------------  IN: 350 mL / OUT: 555 mL / NET: -205 mL    12-27-19 @ 07:01  -  12-27-19 @ 12:33  --------------------------------------------------------  IN: 0 mL / OUT: 800 mL / NET: -800 mL      Physical Exam:  	Gen: NAD, well-appearing on room air  	HEENT: sclera icterus, MMM, clear oropharynx  	Pulm: CTA B/L no crackles  	CV: RRR, S1S2; no murmur  	Abd: +BS, soft, nontender, mild distended, urostomy bag  	: no wisdom  	LE: Warm, no edema, no asterixis               Neuro: AAOx3  	Psych: Normal affect and mood  	Skin: Warm, without rashes  LABS/STUDIES  --------------------------------------------------------------------------------              8.0    9.56  >-----------<  68       [12-27-19 @ 06:35]              23.9     124  |  97  |  14  ----------------------------<  105      [12-27-19 @ 06:35]  3.9   |  19  |  0.51        Ca     7.1     [12-27-19 @ 06:35]    TPro  6.2  /  Alb  1.6  /  TBili  7.6  /  DBili  x   /  AST  100  /  ALT  58  /  AlkPhos  143  [12-27-19 @ 06:35]    PT/INR: PT 30.6 , INR 2.58       [12-27-19 @ 06:35]  PTT: 63.3       [12-27-19 @ 06:35]      Creatinine Trend:  SCr 0.51 [12-27 @ 06:35]  SCr 0.53 [12-26 @ 17:50]  SCr 0.53 [12-26 @ 07:27]  SCr 0.57 [12-25 @ 19:17]  SCr 0.57 [12-25 @ 07:34]    Urinalysis - [12-11-19 @ 16:20]      Color Yuli / Appearance Clear / SG 1.007 / pH 6.0      Gluc Negative / Ketone Negative  / Bili Moderate / Urobili <2 mg/dL       Blood Negative / Protein Negative / Leuk Est Negative / Nitrite Negative      RBC 1 / WBC 0 / Hyaline 0 / Gran  / Sq Epi  / Non Sq Epi 0 / Bacteria Occasional    Urine Sodium <35      [12-22-19 @ 14:29]  Urine Potassium 25      [12-22-19 @ 14:29]  Urine Osmolality 421      [12-22-19 @ 17:56]    Iron 33, TIBC 147, %sat 22      [11-30-19 @ 23:20]  Ferritin 52      [11-30-19 @ 23:20]  TSH 3.81      [12-12-19 @ 05:41]

## 2019-12-27 NOTE — PROGRESS NOTE ADULT - PROBLEM SELECTOR PLAN 1
-decompensated cirrhosis, presented to [facility] for sob/anasarca/abd distention, CTAP with moderate refractory ascites, s/p LVP 5L out + albumin,repeat para today and DERMABOND SITE TO AVOID LEAKING AN AREA FOR INFECTION.   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    -Lasix to 40mg BID iv with albumin now  -c/w 1L daily fluid restriction  -c/w Cipro daily po sbp ppx  -abstinent from etoh x 3 months.  -c/w thiamine, MVI, folate daily.   -MRI 12/2019 neg hepatoma, +portal HTN with perisplenic varices. EGD 7/2018 neg varices, +gastritis/esophagitis neg H pylori. [place] 7/2018 +hemorrhoids/anal fistula/diverticulosis. AFP neg.   -If not candidate for transplant, consider repeat eval for TIPS for refractory ascites.  - F/U Echo

## 2019-12-27 NOTE — CONSULT NOTE ADULT - SUBJECTIVE AND OBJECTIVE BOX
CC: Epistaxis     HPI: 44 M PMH alcoholic cirrhosis with ascites initially dx 2018, continual EtOH abuse post diagnosis now abstinent x 3 months, who presents as transfer from [facility] for decompensated cirrhosis with ascites, coagulopathy, chronic anemia, chronic thrombocytopenia, and chronic hyponatremia; here for hepatology evaluation. ENT consulted as pt began to have a nose bleed after blowing his nose. Reports has been having nose bleeds more frequently lately       PAST MEDICAL & SURGICAL HISTORY:  Liver cirrhosis, alcoholic  Anemia  Thrombocytopenia  ETOH abuse  History of incision and drainage: Gluteal abscess    Allergies    No Known Allergies    Intolerances      MEDICATIONS  (STANDING):  albumin human 25% IVPB 50 milliLiter(s) IV Intermittent every 8 hours  cephalexin 500 milliGRAM(s) Oral every 12 hours  ciprofloxacin     Tablet 500 milliGRAM(s) Oral daily  folic acid 1 milliGRAM(s) Oral daily  furosemide   Injectable 40 milliGRAM(s) IV Push daily  lactulose Syrup 20 Gram(s) Oral three times a day  multivitamin 1 Tablet(s) Oral daily  pantoprazole    Tablet 40 milliGRAM(s) Oral before breakfast  polyethylene glycol 3350 17 Gram(s) Oral two times a day  sodium chloride 0.65% Nasal 1 Spray(s) Both Nostrils three times a day  thiamine 100 milliGRAM(s) Oral daily    MEDICATIONS  (PRN):      Social History: +ETOH, +smoker     Family history:     ROS:   ENT: all negative except as noted in HPI   CV: denies palpitations  Pulm: denies SOB, cough, hemoptysis  GI: denies change in apetite, indigestion, n/v  : denies pertinent urinary symptoms, urgency  Neuro: denies numbness/tingling, loss of sensation  Psych: denies anxiety  MS: denies muscle weakness, instability  Heme: denies easy bruising or bleeding  Endo: denies heat/cold intolerance, excessive sweating  Vascular: denies LE edema    Vital Signs Last 24 Hrs  T(C): 37.3 (27 Dec 2019 20:46), Max: 37.3 (27 Dec 2019 20:46)  T(F): 99.1 (27 Dec 2019 20:46), Max: 99.1 (27 Dec 2019 20:46)  HR: 92 (27 Dec 2019 20:46) (88 - 102)  BP: 102/56 (27 Dec 2019 20:46) (102/56 - 107/61)  BP(mean): --  RR: 18 (27 Dec 2019 20:46) (18 - 18)  SpO2: 96% (27 Dec 2019 20:46) (96% - 96%)                          7.1    9.45  )-----------( 66       ( 27 Dec 2019 21:49 )             21.6    12-27    128<L>  |  98  |  13  ----------------------------<  88  3.4<L>   |  20<L>  |  0.59    Ca    6.9<L>      27 Dec 2019 19:01    TPro  6.2  /  Alb  1.6<L>  /  TBili  7.6<H>  /  DBili  x   /  AST  100<H>  /  ALT  58<H>  /  AlkPhos  143<H>  12-27   PT/INR - ( 27 Dec 2019 06:35 )   PT: 30.6 sec;   INR: 2.58 ratio         PTT - ( 27 Dec 2019 06:35 )  PTT:63.3 sec    PHYSICAL EXAM:  Gen: NAD  Skin: No rashes, bruises, or lesions  Head: Normocephalic, Atraumatic  Face: no edema, erythema, or fluctuance. Parotid glands soft without mass  Eyes: no scleral injection  Nose: Right nare with mericel, bleeding controlled   Mouth: No Stridor / Drooling / Trismus.  Mucosa moist, tongue/uvula midline, oropharynx clear  Neck: Flat, supple, no lymphadenopathy, trachea midline, no masses  Lymphatic: No lymphadenopathy  Resp: breathing easily, no stridor  CV: no peripheral edema/cyanosis  GI: nondistended   Peripheral vascular: no JVD or edema  Neuro: facial nerve intact, no facial droop        Diagnostic Nasal Endoscopy: (Scope #2 used)    Fiberoptic Indirect laryngoscopy:  (Scope #2 used)        IMAGING/ADDITIONAL STUDIES: CC: Epistaxis     HPI: 44 M PMH alcoholic cirrhosis with ascites initially dx 2018, continual EtOH abuse post diagnosis now abstinent x 3 months, who presents as transfer from [facility] for decompensated cirrhosis with ascites, coagulopathy, chronic anemia, chronic thrombocytopenia, and chronic hyponatremia; here for hepatology evaluation. ENT consulted as pt began to have a nose bleed after blowing his nose. Reports has been having nose bleeds more frequently lately. Coags have been abnormal secondary to the severe cirrhosis. INR 2.6, Plt 66         PAST MEDICAL & SURGICAL HISTORY:  Liver cirrhosis, alcoholic  Anemia  Thrombocytopenia  ETOH abuse  History of incision and drainage: Gluteal abscess    Allergies    No Known Allergies    Intolerances      MEDICATIONS  (STANDING):  albumin human 25% IVPB 50 milliLiter(s) IV Intermittent every 8 hours  cephalexin 500 milliGRAM(s) Oral every 12 hours  ciprofloxacin     Tablet 500 milliGRAM(s) Oral daily  folic acid 1 milliGRAM(s) Oral daily  furosemide   Injectable 40 milliGRAM(s) IV Push daily  lactulose Syrup 20 Gram(s) Oral three times a day  multivitamin 1 Tablet(s) Oral daily  pantoprazole    Tablet 40 milliGRAM(s) Oral before breakfast  polyethylene glycol 3350 17 Gram(s) Oral two times a day  sodium chloride 0.65% Nasal 1 Spray(s) Both Nostrils three times a day  thiamine 100 milliGRAM(s) Oral daily    MEDICATIONS  (PRN):      Social History: +ETOH, +smoker     Family history: no pertinent     ROS:   ENT: all negative except as noted in HPI     Vital Signs Last 24 Hrs  T(C): 37.3 (27 Dec 2019 20:46), Max: 37.3 (27 Dec 2019 20:46)  T(F): 99.1 (27 Dec 2019 20:46), Max: 99.1 (27 Dec 2019 20:46)  HR: 92 (27 Dec 2019 20:46) (88 - 102)  BP: 102/56 (27 Dec 2019 20:46) (102/56 - 107/61)  BP(mean): --  RR: 18 (27 Dec 2019 20:46) (18 - 18)  SpO2: 96% (27 Dec 2019 20:46) (96% - 96%)                          7.1    9.45  )-----------( 66       ( 27 Dec 2019 21:49 )             21.6    12-27    128<L>  |  98  |  13  ----------------------------<  88  3.4<L>   |  20<L>  |  0.59    Ca    6.9<L>      27 Dec 2019 19:01    TPro  6.2  /  Alb  1.6<L>  /  TBili  7.6<H>  /  DBili  x   /  AST  100<H>  /  ALT  58<H>  /  AlkPhos  143<H>  12-27   PT/INR - ( 27 Dec 2019 06:35 )   PT: 30.6 sec;   INR: 2.58 ratio         PTT - ( 27 Dec 2019 06:35 )  PTT:63.3 sec    PHYSICAL EXAM:  Gen: NAD  Skin: No rashes, bruises, or lesions  Head: Normocephalic, Atraumatic  Face: no edema, erythema, or fluctuance. Parotid glands soft without mass  Eyes: no scleral injection  Nose: Right nare with merocel, bleeding controlled   Mouth: No Stridor / Drooling / Trismus.  Mucosa moist, tongue/uvula midline, oropharynx clear  Neck: Flat, supple, no lymphadenopathy, trachea midline, no masses  Lymphatic: No lymphadenopathy  Resp: breathing easily, no stridor  CV: no peripheral edema/cyanosis  GI: nondistended   Peripheral vascular: no JVD or edema  Neuro: facial nerve intact, no facial droop        Diagnostic Nasal Endoscopy: (Scope #2 used)    Fiberoptic Indirect laryngoscopy:  (Scope #2 used)        IMAGING/ADDITIONAL STUDIES:

## 2019-12-27 NOTE — PROGRESS NOTE ADULT - PROBLEM SELECTOR PLAN 8
Transitions of Care Status:  1.  Name of PCP: Dr. Sakggs ([facility]) has not actually seen yet  2.  PCP Contacted on Admission: [ ] Y    [x ] N    3.  PCP contacted at Discharge: [ ] Y    [ ] N    [ ] N/A  4.  Post-Discharge Appointment Date and Location:  5.  Summary of Handoff given to PCP: Transitions of Care Status:  1.  Name of PCP: Dr. Skaggs ([facility]) has never seen yet and will f/u with our liver team on DC.  2.  PCP Contacted on Admission: [ ] Y    [x ] N    3.  PCP contacted at Discharge: [ ] Y    [ ] N    [ ] N/A  4.  Post-Discharge Appointment Date and Location:  5.  Summary of Handoff given to PCP:

## 2019-12-27 NOTE — PROGRESS NOTE ADULT - ATTENDING COMMENTS
Advanced chronic lever disease, alcohol related cirrhosis with portal hypertension   Hyponatremia, clinically not hypovolemic  Noted Na trend, on Diuretic  Normal creatinine  Plan:  Avoid hypotonic fluids., monitor Na  Continue furosemide  Will follow  I was present during and reviewed clinical and lab data as well as assessment and plan as documented by the house staff as noted. Please contact if any additional questions with any change in clinical condition or on availability of any additional information or reports.

## 2019-12-27 NOTE — PROGRESS NOTE ADULT - ASSESSMENT
44 M PMH alcoholic cirrhosis with ascites initially dx 2018, continual EtOH abuse post diagnosis now abstinent x 3 months, who presents as transfer from [facility] for decompensated cirrhosis with ascites, coagulopathy, chronic anemia, chronic thrombocytopenia, and chronic hyponatremia; here for hepatology evaluation.

## 2019-12-27 NOTE — PROGRESS NOTE ADULT - ASSESSMENT
44 M PMH alcoholic cirrhosis with ascites initially dx 2018, continual EtOH abuse post diagnosis now abstinent x 3 months, who presents as transfer from [facility] for decompensated cirrhosis - liver transplant workup.    Nephrology consulted for Hyponatremia hypervolemia likely 2/2 cirrhosis, improving w/ fluid restriction and lasix

## 2019-12-27 NOTE — CONSULT NOTE ADULT - ASSESSMENT
44 M PMH alcoholic cirrhosis with ascites, coagulopathy, chronic thrombocytopenia. ENT consulted as pt began to have a nose bleed after blowing his nose. Reports has been having nose bleeds more frequently lately. Coags have been abnormal secondary to the severe cirrhosis. INR 2.6, Plt 66. Posterior Merocel was placed in his right nare, bleeding controlled

## 2019-12-27 NOTE — PROGRESS NOTE ADULT - SUBJECTIVE AND OBJECTIVE BOX
Chief Complaint:  Patient is a 44y old  Male who presents with a chief complaint of decompensated cirrhosis (26 Dec 2019 11:27)      Interval Events:   Patients LFTs and INR stable today.    Allergies:  No Known Allergies      Hospital Medications:  ciprofloxacin     Tablet 500 milliGRAM(s) Oral daily  folic acid 1 milliGRAM(s) Oral daily  furosemide    Tablet 40 milliGRAM(s) Oral two times a day  lactulose Syrup 20 Gram(s) Oral three times a day  multivitamin 1 Tablet(s) Oral daily  pantoprazole    Tablet 40 milliGRAM(s) Oral before breakfast  polyethylene glycol 3350 17 Gram(s) Oral two times a day  thiamine 100 milliGRAM(s) Oral daily      PMHX/PSHX:  Liver cirrhosis, alcoholic  Anemia  Thrombocytopenia  Nosebleed  ETOH abuse  History of incision and drainage  No significant past surgical history      Family history:  Family history of stroke (Father, Mother)          PHYSICAL EXAM:     GENERAL:  NAD  HEENT:  NC/AT,  conjunctivae clear, sclera -anicteric  CHEST:  Full & symmetric excursion, no increased  HEART:  Regular rhythm  ABDOMEN:  Soft, non-tender, non-distended, normoactive bowel sounds,  no masses ,no hepato-splenomegaly,   EXTREMITIES:  no cyanosis,clubbing or edema  SKIN:  No rash/erythema/ecchymoses/petechiae/wounds/abscess/warm/dry  NEURO:  Alert, oriented    Vital Signs:  Vital Signs Last 24 Hrs  T(C): 36.9 (27 Dec 2019 05:15), Max: 36.9 (27 Dec 2019 05:15)  T(F): 98.5 (27 Dec 2019 05:15), Max: 98.5 (27 Dec 2019 05:15)  HR: 102 (27 Dec 2019 05:15) (96 - 102)  BP: 107/60 (27 Dec 2019 05:15) (100/61 - 109/65)  BP(mean): --  RR: 18 (27 Dec 2019 05:15) (18 - 18)  SpO2: 96% (27 Dec 2019 05:15) (95% - 96%)  Daily     Daily Weight in k.4 (27 Dec 2019 05:15)    LABS:                        8.0    9.56  )-----------( 68       ( 27 Dec 2019 06:35 )             23.9     12-    124<L>  |  97  |  14  ----------------------------<  105<H>  3.9   |  19<L>  |  0.51    Ca    7.1<L>      27 Dec 2019 06:35    TPro  6.2  /  Alb  1.6<L>  /  TBili  7.6<H>  /  DBili  x   /  AST  100<H>  /  ALT  58<H>  /  AlkPhos  143<H>  12-    LIVER FUNCTIONS - ( 27 Dec 2019 06:35 )  Alb: 1.6 g/dL / Pro: 6.2 g/dL / ALK PHOS: 143 U/L / ALT: 58 U/L / AST: 100 U/L / GGT: x           PT/INR - ( 27 Dec 2019 06:35 )   PT: 30.6 sec;   INR: 2.58 ratio         PTT - ( 27 Dec 2019 06:35 )  PTT:63.3 sec        Imaging: Chief Complaint:  Patient is a 44y old  Male who presents with a chief complaint of decompensated cirrhosis (26 Dec 2019 11:27)      Interval Events:   Patients LFTs and INR stable today. Lasix switched to IV yesterday.    Allergies:  No Known Allergies      Hospital Medications:  ciprofloxacin     Tablet 500 milliGRAM(s) Oral daily  folic acid 1 milliGRAM(s) Oral daily  furosemide    Tablet 40 milliGRAM(s) Oral two times a day  lactulose Syrup 20 Gram(s) Oral three times a day  multivitamin 1 Tablet(s) Oral daily  pantoprazole    Tablet 40 milliGRAM(s) Oral before breakfast  polyethylene glycol 3350 17 Gram(s) Oral two times a day  thiamine 100 milliGRAM(s) Oral daily      PMHX/PSHX:  Liver cirrhosis, alcoholic  Anemia  Thrombocytopenia  Nosebleed  ETOH abuse  History of incision and drainage  No significant past surgical history      Family history:  Family history of stroke (Father, Mother)          PHYSICAL EXAM:     GENERAL:  NAD  HEENT:  NC/AT,  conjunctivae clear, sclera icteric  CHEST:  Full & symmetric excursion, no increased  HEART:  Regular rhythm  ABDOMEN:  Soft, non-tender, non-distended, normoactive bowel sounds,  no masses ,no hepato-splenomegaly,   EXTREMITIES:  no cyanosis,clubbing or edema  SKIN:  No rash/erythema/ecchymoses/petechiae/wounds/abscess/warm/dry  NEURO:  Alert, oriented    Vital Signs:  Vital Signs Last 24 Hrs  T(C): 36.9 (27 Dec 2019 05:15), Max: 36.9 (27 Dec 2019 05:15)  T(F): 98.5 (27 Dec 2019 05:15), Max: 98.5 (27 Dec 2019 05:15)  HR: 102 (27 Dec 2019 05:15) (96 - 102)  BP: 107/60 (27 Dec 2019 05:15) (100/61 - 109/65)  BP(mean): --  RR: 18 (27 Dec 2019 05:15) (18 - 18)  SpO2: 96% (27 Dec 2019 05:15) (95% - 96%)  Daily     Daily Weight in k.4 (27 Dec 2019 05:15)    LABS:                        8.0    9.56  )-----------( 68       ( 27 Dec 2019 06:35 )             23.9         124<L>  |  97  |  14  ----------------------------<  105<H>  3.9   |  19<L>  |  0.51    Ca    7.1<L>      27 Dec 2019 06:35    TPro  6.2  /  Alb  1.6<L>  /  TBili  7.6<H>  /  DBili  x   /  AST  100<H>  /  ALT  58<H>  /  AlkPhos  143<H>      LIVER FUNCTIONS - ( 27 Dec 2019 06:35 )  Alb: 1.6 g/dL / Pro: 6.2 g/dL / ALK PHOS: 143 U/L / ALT: 58 U/L / AST: 100 U/L / GGT: x           PT/INR - ( 27 Dec 2019 06:35 )   PT: 30.6 sec;   INR: 2.58 ratio         PTT - ( 27 Dec 2019 06:35 )  PTT:63.3 sec        Imaging: Chief Complaint:  Patient is a 44y old  Male who presents with a chief complaint of decompensated cirrhosis (26 Dec 2019 11:27)      Interval Events:   Patients LFTs and INR stable today. Lasix switched to IV yesterday.    Allergies:  No Known Allergies      Hospital Medications:  ciprofloxacin     Tablet 500 milliGRAM(s) Oral daily  folic acid 1 milliGRAM(s) Oral daily  furosemide    Tablet 40 milliGRAM(s) Oral two times a day  lactulose Syrup 20 Gram(s) Oral three times a day  multivitamin 1 Tablet(s) Oral daily  pantoprazole    Tablet 40 milliGRAM(s) Oral before breakfast  polyethylene glycol 3350 17 Gram(s) Oral two times a day  thiamine 100 milliGRAM(s) Oral daily      PMHX/PSHX:  Liver cirrhosis, alcoholic  Anemia  Thrombocytopenia  Nosebleed  ETOH abuse  History of incision and drainage  No significant past surgical history      Family history:  Family history of stroke (Father, Mother)          PHYSICAL EXAM:     GENERAL:  NAD  HEENT:  NC/AT,  conjunctivae clear, sclera icteric  CHEST:  Full & symmetric excursion, no increased  HEART:  Regular rhythm  ABDOMEN:  Soft, non-tender, +distended, normoactive bowel sounds  EXTREMITIES:  no cyanosis,clubbing or edema  SKIN:  No rash/erythema/ecchymoses/petechiae/wounds/abscess/warm/dry  NEURO:  Alert, oriented    Vital Signs:  Vital Signs Last 24 Hrs  T(C): 36.9 (27 Dec 2019 05:15), Max: 36.9 (27 Dec 2019 05:15)  T(F): 98.5 (27 Dec 2019 05:15), Max: 98.5 (27 Dec 2019 05:15)  HR: 102 (27 Dec 2019 05:15) (96 - 102)  BP: 107/60 (27 Dec 2019 05:15) (100/61 - 109/65)  BP(mean): --  RR: 18 (27 Dec 2019 05:15) (18 - 18)  SpO2: 96% (27 Dec 2019 05:15) (95% - 96%)  Daily     Daily Weight in k.4 (27 Dec 2019 05:15)    LABS:                        8.0    9.56  )-----------( 68       ( 27 Dec 2019 06:35 )             23.9     12-    124<L>  |  97  |  14  ----------------------------<  105<H>  3.9   |  19<L>  |  0.51    Ca    7.1<L>      27 Dec 2019 06:35    TPro  6.2  /  Alb  1.6<L>  /  TBili  7.6<H>  /  DBili  x   /  AST  100<H>  /  ALT  58<H>  /  AlkPhos  143<H>  12    LIVER FUNCTIONS - ( 27 Dec 2019 06:35 )  Alb: 1.6 g/dL / Pro: 6.2 g/dL / ALK PHOS: 143 U/L / ALT: 58 U/L / AST: 100 U/L / GGT: x           PT/INR - ( 27 Dec 2019 06:35 )   PT: 30.6 sec;   INR: 2.58 ratio         PTT - ( 27 Dec 2019 06:35 )  PTT:63.3 sec        Imaging:

## 2019-12-27 NOTE — PROGRESS NOTE ADULT - PROBLEM SELECTOR PLAN 1
Hypervolemic Hypo-osmolar Hyponatremia secondary to fluid overload in setting decompensated liver cirrhosis.  -s/p paracentesis 5L removed (12/20). On admission SNa 120, most current Na 126    Recommendations:  - plan paracentesis today 12/27 with albumin 25% pre tx  - continue Lasix 40mg BID IV  - Fluid restriction 1L per day  - Continue Eplerenone 50mg daily  - Check daily  BMP, serum Osm, UOsm, Urine electrolytes  - BMP every day with goal correction not more than 6-8 meq in 24 hours.

## 2019-12-27 NOTE — PROGRESS NOTE ADULT - ASSESSMENT
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    # Chronic anemia and thrombocytopenia     Recommendations:  - paracentesis today  - dermabond to LLQ leakage site ASAP  - would change lasix to 40 mg IV BID with pretreament with 25% albumin 100 mL iv bid  - c/w Lactulose 20 gm daily and miralax scheduled BID given distention  - trend CBC, INR, CMP   - Continue diuretics per nephrology recommendations    - fluid restrictions less than 1 L  - transplant nephrology recs appreciated  - Continue PO Cipro for SBP prophylaxis ( low protein in the ascitic fluid)   - Please obtain Echocardiogram as part of ascites evaluation Impression:  # Ethanol related _Cirrhosis, MELD-Na _ 31 ( 12/23/2019)        - varices:  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    # Chronic anemia and thrombocytopenia     Recommendations:  - paracentesis today  - dermabond to LLQ leakage site ASAP  - c/w lasix to 40 mg IV BID with pretreament with 25% albumin 100 mL iv bid  - c/w Lactulose 20 gm daily and miralax scheduled BID given distention  - trend CBC, INR, CMP   - Continue diuretics per nephrology recommendations    - fluid restrictions less than 1 L  - transplant nephrology recs appreciated  - Continue PO Cipro for SBP prophylaxis ( low protein in the ascitic fluid)   - Please obtain Echocardiogram as part of ascites evaluation

## 2019-12-27 NOTE — PROGRESS NOTE ADULT - ATTENDING COMMENTS
45 yo HM with alcohol-related cirrhosis, transferred from [facility] to [facility] due to acute on chronic liver failure, refractory ascites, and hyponatremia for consideration of liver transplant evaluation.    # Hyponatremia: Na 124 today despite continued 1L fluid restriction. Recommend to switch Lasix to 40 mg iv bid and add albumin 25% 100 mL iv bid. Hold eplerenone. Follow-up Transplant Nephrology recommendations.    # Ascites: S/p 5L LVP on 12/20. Tensely distended and still leaking ascites from prior LLQ paracentesis site. Plan for repeat LVP today by IR as well as Dermabond application to leakage site. Diuretic management as above to decrease reaccumulation. Not a TIPS candidate given high MELD score. Continue ciprofloxacin prophylaxis.    # Leukocytosis: Resolved. Infectious work-up negative. May have had recent alcoholic hepatitis, as last reported drink ~3 months ago, but would defer corticosteroids for now given history of perirectal abscess in 11/2019.     # Decompensated alcohol-related cirrhosis with ACLF: Blood type A, with current MELD-Na 31. Last reported drink ~3 months ago (patient unable to give exact date). He previously was followed at [place] and completed an alcohol RPP in 1/2019, but reports relapsing after 3-4 months due to stress related to  from his wife. His 17- and 20-year-old children live with her. He has been living with his aunt. Recommend to send phosphatidylethanol (PETH) with next labs to ascertain whether he has had any more recent drinking within the past ~6 weeks. Discussed with patient that he will need to enroll in another alcohol RPP and establish outpatient Hepatology follow-up, as he does not currently meet our criteria for early liver transplantation.    Plan of care discussed with hospitalist Dr. Sarah Han.    Please don't hesitate to call with any questions/concerns.    Thomas Marshall M.D., Ph.D.  Transplant Hepatology  Cell: [phone]

## 2019-12-27 NOTE — PROGRESS NOTE ADULT - SUBJECTIVE AND OBJECTIVE BOX
Interventional Radiology Brief- Operative Note    Procedure: Therapeutic Paracentesis    Operators: Iqra    Anesthesia (type): Lidocaine 2%    Contrast: None    EBL: None    Findings/Follow up Plan of Care: Limited US of the abdomen showed large volume ascites.     Specimens Removed: 7.2 L of Clear yellow ascitic fluid.    Implants: IV Albumin x2    Complications: None    Condition/Disposition: Stable/Floors    Please call Interventional Radiology x [phone] with any questions, concerns, or issues.

## 2019-12-27 NOTE — PHARMACOTHERAPY INTERVENTION NOTE - COMMENTS
45 yo M currently on ciprofloxacin 500mg PO daily for SBP prophylxis. QTc from 12/21 was 464, prior to resuming home cipro. Recommended EKG monitoring.    Russell Zamarripa, PharmD, BCPS  [phone]

## 2019-12-27 NOTE — PROGRESS NOTE ADULT - SUBJECTIVE AND OBJECTIVE BOX
PROGRESS NOTE:     Patient is a 44y old  Male who presents with a chief complaint of decompensated cirrhosis (27 Dec 2019 08:17)      SUBJECTIVE / OVERNIGHT EVENTS: Went for para today.      ADDITIONAL REVIEW OF SYSTEMS:  No fevers or chills   No n/v/d    MEDICATIONS  (STANDING):  albumin human 25% IVPB 50 milliLiter(s) IV Intermittent every 8 hours  ciprofloxacin     Tablet 500 milliGRAM(s) Oral daily  folic acid 1 milliGRAM(s) Oral daily  furosemide   Injectable 40 milliGRAM(s) IV Push daily  lactulose Syrup 20 Gram(s) Oral three times a day  multivitamin 1 Tablet(s) Oral daily  pantoprazole    Tablet 40 milliGRAM(s) Oral before breakfast  polyethylene glycol 3350 17 Gram(s) Oral two times a day  thiamine 100 milliGRAM(s) Oral daily    MEDICATIONS  (PRN):      CAPILLARY BLOOD GLUCOSE        I&O's Summary    26 Dec 2019 07:01  -  27 Dec 2019 07:00  --------------------------------------------------------  IN: 350 mL / OUT: 555 mL / NET: -205 mL    27 Dec 2019 07:01  -  27 Dec 2019 11:56  --------------------------------------------------------  IN: 0 mL / OUT: 800 mL / NET: -800 mL        PHYSICAL EXAM:  Vital Signs Last 24 Hrs  T(C): 36.9 (27 Dec 2019 05:15), Max: 36.9 (27 Dec 2019 05:15)  T(F): 98.5 (27 Dec 2019 05:15), Max: 98.5 (27 Dec 2019 05:15)  HR: 102 (27 Dec 2019 05:15) (96 - 102)  BP: 107/60 (27 Dec 2019 05:15) (100/61 - 109/65)  BP(mean): --  RR: 18 (27 Dec 2019 05:15) (18 - 18)  SpO2: 96% (27 Dec 2019 05:15) (95% - 96%)    CONSTITUTIONAL: NAD, well-developed, jaundice  RESPIRATORY: Normal respiratory effort; lungs are clear to auscultation bilaterally  CARDIOVASCULAR: Regular rate and rhythm, normal S1 and S2, no murmur/rub/gallop; No lower extremity edema; Peripheral pulses are 2+ bilaterally  ABDOMEN: Nontender to palpation, normoactive bowel sounds, no rebound/guarding; very distended with ascites draining into ostomy bag   MUSCLOSKELETAL: no clubbing or cyanosis of digits; no joint swelling or tenderness to palpation  PSYCH: A+O to person, place, and time; affect appropriate    LABS:                        8.0    9.56  )-----------( 68       ( 27 Dec 2019 06:35 )             23.9     12-27    124<L>  |  97  |  14  ----------------------------<  105<H>  3.9   |  19<L>  |  0.51    Ca    7.1<L>      27 Dec 2019 06:35    TPro  6.2  /  Alb  1.6<L>  /  TBili  7.6<H>  /  DBili  x   /  AST  100<H>  /  ALT  58<H>  /  AlkPhos  143<H>  12-27    PT/INR - ( 27 Dec 2019 06:35 )   PT: 30.6 sec;   INR: 2.58 ratio         PTT - ( 27 Dec 2019 06:35 )  PTT:63.3 sec            RADIOLOGY & ADDITIONAL TESTS:  Results Reviewed:   Imaging Personally Reviewed:  Electrocardiogram Personally Reviewed:    COORDINATION OF CARE:  Care Discussed with Consultants/Other Providers [Y/N]:  Prior or Outpatient Records Reviewed [Y/N]:

## 2019-12-27 NOTE — PROVIDER CONTACT NOTE (OTHER) - SITUATION
Patient complained of nose right nostril bleeding, nostril stuffed with gauze and pressure applied and it did not stop

## 2019-12-28 LAB
ALBUMIN SERPL ELPH-MCNC: 1.7 G/DL — LOW (ref 3.3–5)
ALP SERPL-CCNC: 131 U/L — HIGH (ref 40–120)
ALT FLD-CCNC: 48 U/L — HIGH (ref 10–45)
ANION GAP SERPL CALC-SCNC: 10 MMOL/L — SIGNIFICANT CHANGE UP (ref 5–17)
ANION GAP SERPL CALC-SCNC: 11 MMOL/L — SIGNIFICANT CHANGE UP (ref 5–17)
ANION GAP SERPL CALC-SCNC: 12 MMOL/L — SIGNIFICANT CHANGE UP (ref 5–17)
APTT BLD: 64.7 SEC — HIGH (ref 27.5–36.3)
AST SERPL-CCNC: 90 U/L — HIGH (ref 10–40)
BILIRUB SERPL-MCNC: 7.9 MG/DL — HIGH (ref 0.2–1.2)
BUN SERPL-MCNC: 13 MG/DL — SIGNIFICANT CHANGE UP (ref 7–23)
BUN SERPL-MCNC: 17 MG/DL — SIGNIFICANT CHANGE UP (ref 7–23)
BUN SERPL-MCNC: 17 MG/DL — SIGNIFICANT CHANGE UP (ref 7–23)
CALCIUM SERPL-MCNC: 7.5 MG/DL — LOW (ref 8.4–10.5)
CALCIUM SERPL-MCNC: 7.5 MG/DL — LOW (ref 8.4–10.5)
CALCIUM SERPL-MCNC: 7.6 MG/DL — LOW (ref 8.4–10.5)
CHLORIDE SERPL-SCNC: 96 MMOL/L — SIGNIFICANT CHANGE UP (ref 96–108)
CHLORIDE SERPL-SCNC: 97 MMOL/L — SIGNIFICANT CHANGE UP (ref 96–108)
CHLORIDE SERPL-SCNC: 98 MMOL/L — SIGNIFICANT CHANGE UP (ref 96–108)
CO2 SERPL-SCNC: 19 MMOL/L — LOW (ref 22–31)
CO2 SERPL-SCNC: 20 MMOL/L — LOW (ref 22–31)
CO2 SERPL-SCNC: 21 MMOL/L — LOW (ref 22–31)
CREAT SERPL-MCNC: 0.47 MG/DL — LOW (ref 0.5–1.3)
CREAT SERPL-MCNC: 0.49 MG/DL — LOW (ref 0.5–1.3)
CREAT SERPL-MCNC: 0.53 MG/DL — SIGNIFICANT CHANGE UP (ref 0.5–1.3)
GLUCOSE SERPL-MCNC: 142 MG/DL — HIGH (ref 70–99)
GLUCOSE SERPL-MCNC: 79 MG/DL — SIGNIFICANT CHANGE UP (ref 70–99)
GLUCOSE SERPL-MCNC: 79 MG/DL — SIGNIFICANT CHANGE UP (ref 70–99)
HCT VFR BLD CALC: 24.6 % — LOW (ref 39–50)
HCT VFR BLD CALC: 26.7 % — LOW (ref 39–50)
HGB BLD-MCNC: 8.1 G/DL — LOW (ref 13–17)
HGB BLD-MCNC: 8.3 G/DL — LOW (ref 13–17)
INR BLD: 2.56 RATIO — HIGH (ref 0.88–1.16)
MCHC RBC-ENTMCNC: 31.1 GM/DL — LOW (ref 32–36)
MCHC RBC-ENTMCNC: 31.9 PG — SIGNIFICANT CHANGE UP (ref 27–34)
MCHC RBC-ENTMCNC: 32.3 PG — SIGNIFICANT CHANGE UP (ref 27–34)
MCHC RBC-ENTMCNC: 32.9 GM/DL — SIGNIFICANT CHANGE UP (ref 32–36)
MCV RBC AUTO: 103.9 FL — HIGH (ref 80–100)
MCV RBC AUTO: 96.9 FL — SIGNIFICANT CHANGE UP (ref 80–100)
NRBC # BLD: 0 /100 WBCS — SIGNIFICANT CHANGE UP (ref 0–0)
NRBC # BLD: 0 /100 WBCS — SIGNIFICANT CHANGE UP (ref 0–0)
PLATELET # BLD AUTO: 65 K/UL — LOW (ref 150–400)
PLATELET # BLD AUTO: 70 K/UL — LOW (ref 150–400)
POTASSIUM SERPL-MCNC: 3.3 MMOL/L — LOW (ref 3.5–5.3)
POTASSIUM SERPL-MCNC: 3.7 MMOL/L — SIGNIFICANT CHANGE UP (ref 3.5–5.3)
POTASSIUM SERPL-MCNC: 3.9 MMOL/L — SIGNIFICANT CHANGE UP (ref 3.5–5.3)
POTASSIUM SERPL-SCNC: 3.3 MMOL/L — LOW (ref 3.5–5.3)
POTASSIUM SERPL-SCNC: 3.7 MMOL/L — SIGNIFICANT CHANGE UP (ref 3.5–5.3)
POTASSIUM SERPL-SCNC: 3.9 MMOL/L — SIGNIFICANT CHANGE UP (ref 3.5–5.3)
PROT SERPL-MCNC: 6 G/DL — SIGNIFICANT CHANGE UP (ref 6–8.3)
PROTHROM AB SERPL-ACNC: 30.1 SEC — HIGH (ref 10–12.9)
RBC # BLD: 2.54 M/UL — LOW (ref 4.2–5.8)
RBC # BLD: 2.57 M/UL — LOW (ref 4.2–5.8)
RBC # FLD: 20.8 % — HIGH (ref 10.3–14.5)
RBC # FLD: 22 % — HIGH (ref 10.3–14.5)
SODIUM SERPL-SCNC: 126 MMOL/L — LOW (ref 135–145)
SODIUM SERPL-SCNC: 129 MMOL/L — LOW (ref 135–145)
SODIUM SERPL-SCNC: 129 MMOL/L — LOW (ref 135–145)
WBC # BLD: 10.32 K/UL — SIGNIFICANT CHANGE UP (ref 3.8–10.5)
WBC # BLD: 9.87 K/UL — SIGNIFICANT CHANGE UP (ref 3.8–10.5)
WBC # FLD AUTO: 10.32 K/UL — SIGNIFICANT CHANGE UP (ref 3.8–10.5)
WBC # FLD AUTO: 9.87 K/UL — SIGNIFICANT CHANGE UP (ref 3.8–10.5)

## 2019-12-28 PROCEDURE — 99233 SBSQ HOSP IP/OBS HIGH 50: CPT

## 2019-12-28 PROCEDURE — 99232 SBSQ HOSP IP/OBS MODERATE 35: CPT | Mod: GC

## 2019-12-28 RX ORDER — SODIUM CHLORIDE 0.65 %
2 AEROSOL, SPRAY (ML) NASAL
Refills: 0 | Status: DISCONTINUED | OUTPATIENT
Start: 2019-12-28 | End: 2019-12-31

## 2019-12-28 RX ADMIN — Medication 50 MILLILITER(S): at 06:47

## 2019-12-28 RX ADMIN — POLYETHYLENE GLYCOL 3350 17 GRAM(S): 17 POWDER, FOR SOLUTION ORAL at 17:42

## 2019-12-28 RX ADMIN — Medication 500 MILLIGRAM(S): at 17:42

## 2019-12-28 RX ADMIN — Medication 2 SPRAY(S): at 12:42

## 2019-12-28 RX ADMIN — Medication 1 MILLIGRAM(S): at 12:43

## 2019-12-28 RX ADMIN — Medication 2 SPRAY(S): at 22:03

## 2019-12-28 RX ADMIN — Medication 50 MILLILITER(S): at 14:43

## 2019-12-28 RX ADMIN — Medication 50 MILLILITER(S): at 22:02

## 2019-12-28 RX ADMIN — Medication 40 MILLIGRAM(S): at 06:47

## 2019-12-28 RX ADMIN — Medication 2 SPRAY(S): at 06:49

## 2019-12-28 RX ADMIN — Medication 100 MILLIGRAM(S): at 12:43

## 2019-12-28 RX ADMIN — Medication 500 MILLIGRAM(S): at 12:42

## 2019-12-28 RX ADMIN — Medication 2 SPRAY(S): at 17:41

## 2019-12-28 RX ADMIN — PANTOPRAZOLE SODIUM 40 MILLIGRAM(S): 20 TABLET, DELAYED RELEASE ORAL at 06:48

## 2019-12-28 RX ADMIN — Medication 1 TABLET(S): at 12:43

## 2019-12-28 RX ADMIN — Medication 500 MILLIGRAM(S): at 06:48

## 2019-12-28 RX ADMIN — LACTULOSE 20 GRAM(S): 10 SOLUTION ORAL at 14:48

## 2019-12-28 RX ADMIN — LACTULOSE 20 GRAM(S): 10 SOLUTION ORAL at 06:49

## 2019-12-28 NOTE — PROGRESS NOTE ADULT - ASSESSMENT
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    # Chronic anemia and thrombocytopenia     Recommendations:  - c/w Lasix 40mg IV BID and 25% albumin 100mL IV BID  - c/w 1L fluid restriction  - f/u nephrology recommendations  - c/w lactulose 20gm daily and miralax BID  - add rifaximin  - c/w cipro for SBP ppx given low ascitic fluid protein  - please obtain TTE as part of ascites work-up  - trend CMP, CBC, INR daily  - trend Hb, transfuse to Hb = 7, trend plts transfuse to plts > 50 given active bleeding  - f/u ENT recs    Shyam Purcell  Gastroenterology Fellow  Pager# [phone] ([facility]) or [phone] ([facility])  GI Phone# [phone] (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    # Chronic anemia and thrombocytopenia     Recommendations:  - c/w Lasix 40mg IV daily and albumin 25% 50 mL q8h  - c/w 1L fluid restriction  - f/u nephrology recommendations  - c/w lactulose 20gm daily and miralax BID  - add rifaximin  - c/w cipro for SBP ppx given low ascitic fluid protein  - please obtain TTE as part of ascites work-up  - trend CMP, CBC, INR daily  - trend Hb, transfuse to Hb = 7, trend plts transfuse to plts > 50 given active bleeding  - f/u ENT recs    Shyam Purcell  Gastroenterology Fellow  Pager# [phone] ([facility]) or [phone] ([facility])  GI Phone# [phone] ([facility])  Page on-call GI fellow via  from 5pm-8am and on weekends

## 2019-12-28 NOTE — PROGRESS NOTE ADULT - SUBJECTIVE AND OBJECTIVE BOX
Chief Complaint:  Patient is a 44y old  Male who presents with a chief complaint of decompensated cirrhosis (27 Dec 2019 22:46)    Interval Events: Pt w/ epistaxis overnight s/p packing by ENT. Hb dropped 1 pt to 7.1, given 1u pRBC, repeat pending.    Hospital Medications:  albumin human 25% IVPB 50 milliLiter(s) IV Intermittent every 8 hours  cephalexin 500 milliGRAM(s) Oral every 12 hours  ciprofloxacin     Tablet 500 milliGRAM(s) Oral daily  folic acid 1 milliGRAM(s) Oral daily  furosemide   Injectable 40 milliGRAM(s) IV Push daily  lactulose Syrup 20 Gram(s) Oral three times a day  multivitamin 1 Tablet(s) Oral daily  pantoprazole    Tablet 40 milliGRAM(s) Oral before breakfast  polyethylene glycol 3350 17 Gram(s) Oral two times a day  sodium chloride 0.65% Nasal 2 Spray(s) Both Nostrils four times a day  thiamine 100 milliGRAM(s) Oral daily    ROS:   General:  No wt loss, fevers, chills, night sweats  Eyes:  Good vision, no reported pain  ENT:  No sore throat, pain  CV:  No pain, palpitations, hypo/hypertension  Pulm:  No cough, tachypnea, wheezing  GI:  See HPI, otherwise negative  :  No pain, bleeding, incontinence, nocturia  Muscle:  No pain, weakness  Neuro:  No weakness, tingling, memory problems  Psych:  No fatigue, insomnia, mood problems, depression  Endocrine:  No polyuria, polydipsia, cold/heat intolerance  Heme:  No petechiae, ecchymosis, easy bruisability  Skin:  No rash, tattoos, scars    PHYSICAL EXAM:   Vital Signs:  Vital Signs Last 24 Hrs  T(C): 36.9 (28 Dec 2019 04:38), Max: 37.3 (27 Dec 2019 20:46)  T(F): 98.4 (28 Dec 2019 04:38), Max: 99.1 (27 Dec 2019 20:46)  HR: 100 (28 Dec 2019 04:38) (88 - 100)  BP: 100/57 (28 Dec 2019 04:38) (100/57 - 107/61)  BP(mean): --  RR: 17 (28 Dec 2019 04:38) (17 - 18)  SpO2: 94% (28 Dec 2019 04:38) (94% - 99%)  Daily     Daily Weight in k.5 (28 Dec 2019 04:38)    GENERAL: no acute distress  NEURO: alert and oriented x 3, no asterixis  HEENT: anicteric sclera, no conjunctival pallor appreciated  CHEST: no respiratory distress, no accessory muscle use  CARDIAC: regular rate, rhythm  ABDOMEN: soft, non-tender, ++ distended, no rebound or guarding  EXTREMITIES: warm, well perfused  SKIN: no lesions noted    LABS: reviewed                        7.1    9.45  )-----------( 66       ( 27 Dec 2019 21:49 )             21.6         128<L>  |  98  |  13  ----------------------------<  88  3.4<L>   |  20<L>  |  0.59    Ca    6.9<L>      27 Dec 2019 19:01    TPro  6.2  /  Alb  1.6<L>  /  TBili  7.6<H>  /  DBili  x   /  AST  100<H>  /  ALT  58<H>  /  AlkPhos  143<H>      LIVER FUNCTIONS - ( 27 Dec 2019 06:35 )  Alb: 1.6 g/dL / Pro: 6.2 g/dL / ALK PHOS: 143 U/L / ALT: 58 U/L / AST: 100 U/L / GGT: x             Interval Diagnostic Studies: see sunrise for full report Chief Complaint:  Patient is a 44y old  Male who presents with a chief complaint of decompensated cirrhosis (27 Dec 2019 22:46)    Interval Events: Pt w/ epistaxis overnight s/p packing by ENT. Hb dropped 1 pt to 7.1, given 1u pRBC, repeat pending.    Hospital Medications:  albumin human 25% IVPB 50 milliLiter(s) IV Intermittent every 8 hours  cephalexin 500 milliGRAM(s) Oral every 12 hours  ciprofloxacin     Tablet 500 milliGRAM(s) Oral daily  folic acid 1 milliGRAM(s) Oral daily  furosemide   Injectable 40 milliGRAM(s) IV Push daily  lactulose Syrup 20 Gram(s) Oral three times a day  multivitamin 1 Tablet(s) Oral daily  pantoprazole    Tablet 40 milliGRAM(s) Oral before breakfast  polyethylene glycol 3350 17 Gram(s) Oral two times a day  sodium chloride 0.65% Nasal 2 Spray(s) Both Nostrils four times a day  thiamine 100 milliGRAM(s) Oral daily    ROS:   General:  No wt loss, fevers, chills, night sweats  Eyes:  Good vision, no reported pain  ENT:  No sore throat, pain  CV:  No pain, palpitations, hypo/hypertension  Pulm:  No cough, tachypnea, wheezing  GI:  See HPI, otherwise negative  :  No pain, bleeding, incontinence, nocturia  Muscle:  No pain, weakness  Neuro:  No weakness, tingling, memory problems  Psych:  No fatigue, insomnia, mood problems, depression  Endocrine:  No polyuria, polydipsia, cold/heat intolerance  Heme:  No petechiae, ecchymosis, easy bruisability  Skin:  No rash, tattoos, scars    PHYSICAL EXAM:   Vital Signs:  Vital Signs Last 24 Hrs  T(C): 36.9 (28 Dec 2019 04:38), Max: 37.3 (27 Dec 2019 20:46)  T(F): 98.4 (28 Dec 2019 04:38), Max: 99.1 (27 Dec 2019 20:46)  HR: 100 (28 Dec 2019 04:38) (88 - 100)  BP: 100/57 (28 Dec 2019 04:38) (100/57 - 107/61)  BP(mean): --  RR: 17 (28 Dec 2019 04:38) (17 - 18)  SpO2: 94% (28 Dec 2019 04:38) (94% - 99%)  Daily     Daily Weight in k.5 (28 Dec 2019 04:38)    GENERAL: no acute distress  NEURO: alert and oriented x 3, no asterixis  HEENT: +icteric sclera, no conjunctival pallor appreciated, +saturated packing within right nare  CHEST: no respiratory distress, no accessory muscle use  CARDIAC: regular rate, rhythm  ABDOMEN: soft, non-tender, ++ distended, no rebound or guarding  EXTREMITIES: warm, well perfused  SKIN: no lesions noted    LABS: reviewed                        7.1    9.45  )-----------( 66       ( 27 Dec 2019 21:49 )             21.6         128<L>  |  98  |  13  ----------------------------<  88  3.4<L>   |  20<L>  |  0.59    Ca    6.9<L>      27 Dec 2019 19:01    TPro  6.2  /  Alb  1.6<L>  /  TBili  7.6<H>  /  DBili  x   /  AST  100<H>  /  ALT  58<H>  /  AlkPhos  143<H>      LIVER FUNCTIONS - ( 27 Dec 2019 06:35 )  Alb: 1.6 g/dL / Pro: 6.2 g/dL / ALK PHOS: 143 U/L / ALT: 58 U/L / AST: 100 U/L / GGT: x             Interval Diagnostic Studies: see sunrise for full report

## 2019-12-28 NOTE — PROGRESS NOTE ADULT - SUBJECTIVE AND OBJECTIVE BOX
ENT ISSUE/POD: Epistaxis     HPI: 44 M PMH alcoholic cirrhosis with ascites initially dx 2018, continual EtOH abuse post diagnosis now abstinent x 3 months, who presents as transfer from [facility] for decompensated cirrhosis with ascites, coagulopathy, chronic anemia, chronic thrombocytopenia, and chronic hyponatremia; here for hepatology evaluation. ENT consulted as pt began to have a nose bleed after blowing his nose. Reports has been having nose bleeds more frequently lately. Coags have been abnormal secondary to the severe cirrhosis. INR 2.56, Plt 65        PAST MEDICAL & SURGICAL HISTORY:  Liver cirrhosis, alcoholic  Anemia  Thrombocytopenia  ETOH abuse  History of incision and drainage: Gluteal abscess    Allergies    No Known Allergies    Intolerances      MEDICATIONS  (STANDING):  albumin human 25% IVPB 50 milliLiter(s) IV Intermittent every 8 hours  cephalexin 500 milliGRAM(s) Oral every 12 hours  ciprofloxacin     Tablet 500 milliGRAM(s) Oral daily  folic acid 1 milliGRAM(s) Oral daily  furosemide   Injectable 40 milliGRAM(s) IV Push daily  lactulose Syrup 20 Gram(s) Oral three times a day  multivitamin 1 Tablet(s) Oral daily  pantoprazole    Tablet 40 milliGRAM(s) Oral before breakfast  polyethylene glycol 3350 17 Gram(s) Oral two times a day  sodium chloride 0.65% Nasal 2 Spray(s) Both Nostrils four times a day  thiamine 100 milliGRAM(s) Oral daily    MEDICATIONS  (PRN):      Social History: see consult    Family history: see consult    ROS:   ENT: all negative except as noted in HPI   Pulm: denies SOB, cough, hemoptysis  Neuro: denies numbness/tingling, loss of sensation  Endo: denies heat/cold intolerance, excessive sweating      Vital Signs Last 24 Hrs  T(C): 36.9 (28 Dec 2019 04:38), Max: 37.3 (27 Dec 2019 20:46)  T(F): 98.4 (28 Dec 2019 04:38), Max: 99.1 (27 Dec 2019 20:46)  HR: 100 (28 Dec 2019 04:38) (88 - 100)  BP: 100/57 (28 Dec 2019 04:38) (100/57 - 107/61)  BP(mean): --  RR: 17 (28 Dec 2019 04:38) (17 - 18)  SpO2: 94% (28 Dec 2019 04:38) (94% - 99%)                          8.1    9.87  )-----------( 65       ( 28 Dec 2019 07:17 )             24.6    12-28    129<L>  |  98  |  17  ----------------------------<  79  3.9   |  21<L>  |  0.47<L>    Ca    7.6<L>      28 Dec 2019 07:00    TPro  6.0  /  Alb  1.7<L>  /  TBili  7.9<H>  /  DBili  x   /  AST  90<H>  /  ALT  48<H>  /  AlkPhos  131<H>  12-28   PT/INR - ( 28 Dec 2019 07:00 )   PT: 30.1 sec;   INR: 2.56 ratio         PTT - ( 28 Dec 2019 07:00 )  PTT:64.7 sec    PHYSICAL EXAM:  Gen: NAD  Skin: No rashes, bruises, or lesions  Head: Normocephalic, Atraumatic  Face: no edema, erythema, or fluctuance. Parotid glands soft without mass  Eyes: no scleral injection  Nose: Right nare with merocel, no active bleeding. Left nare patent, no bleeding or discharge  Mouth: No Stridor / Drooling / Trismus.  Mucosa moist, tongue/uvula midline, oropharynx clear  Neck: Flat, supple, no lymphadenopathy, trachea midline, no masses  Lymphatic: No lymphadenopathy  Resp: breathing easily, no stridor  CV: no peripheral edema/cyanosis  GI: nondistended   Peripheral vascular: no JVD or edema  Neuro: facial nerve intact, no facial droop

## 2019-12-28 NOTE — PROGRESS NOTE ADULT - SUBJECTIVE AND OBJECTIVE BOX
Patient is a 44y old  Male who presents with a chief complaint of decompensated cirrhosis (28 Dec 2019 11:43)      SUBJECTIVE / OVERNIGHT EVENTS:  episode of epistaxis overnight requiring ENT consult, s/p rhino packing and requiring 1 unit of pRBC transfusion  pt reports that the bleeding has stopped since last night  pt is happy to hear that his sodium level is stable  has no complaints    ROS:  14 point ROS negative in detail except stated as above    MEDICATIONS  (STANDING):  albumin human 25% IVPB 50 milliLiter(s) IV Intermittent every 8 hours  cephalexin 500 milliGRAM(s) Oral every 12 hours  ciprofloxacin     Tablet 500 milliGRAM(s) Oral daily  folic acid 1 milliGRAM(s) Oral daily  furosemide   Injectable 40 milliGRAM(s) IV Push daily  lactulose Syrup 20 Gram(s) Oral three times a day  multivitamin 1 Tablet(s) Oral daily  pantoprazole    Tablet 40 milliGRAM(s) Oral before breakfast  polyethylene glycol 3350 17 Gram(s) Oral two times a day  sodium chloride 0.65% Nasal 2 Spray(s) Both Nostrils four times a day  thiamine 100 milliGRAM(s) Oral daily    MEDICATIONS  (PRN):      CAPILLARY BLOOD GLUCOSE        I&O's Summary    27 Dec 2019 07:01  -  28 Dec 2019 07:00  --------------------------------------------------------  IN: 350 mL / OUT: 2200 mL / NET: -1850 mL    28 Dec 2019 07:01  -  28 Dec 2019 12:45  --------------------------------------------------------  IN: 0 mL / OUT: 300 mL / NET: -300 mL        PHYSICAL EXAM:  Vital Signs Last 24 Hrs  T(C): 37 (28 Dec 2019 12:18), Max: 37.3 (27 Dec 2019 20:46)  T(F): 98.6 (28 Dec 2019 12:18), Max: 99.1 (27 Dec 2019 20:46)  HR: 96 (28 Dec 2019 12:18) (88 - 100)  BP: 105/59 (28 Dec 2019 12:18) (100/57 - 107/61)  BP(mean): --  RR: 17 (28 Dec 2019 12:18) (17 - 18)  SpO2: 96% (28 Dec 2019 12:18) (94% - 99%)    GENERAL: NAD, well-developed  HEAD:  Atraumatic, Normocephalic  EYES: (+) scleral icterus  NECK: Supple, No JVD  CHEST/LUNG: Clear to auscultation bilaterally; No wheeze  HEART: Regular rate and rhythm; No murmurs, rubs, or gallops  ABDOMEN: Soft, Nontender, Nondistended; Bowel sounds present  EXTREMITIES:  2+ Peripheral Pulses, No clubbing, cyanosis, or edema  NEUROLOGY: AAOx3; non-focal  SKIN: No rashes or lesions    LABS:  personally reviewed                        8.1    9.87  )-----------( 65       ( 28 Dec 2019 07:17 )             24.6     12-28    129<L>  |  98  |  17  ----------------------------<  79  3.9   |  21<L>  |  0.47<L>    Ca    7.6<L>      28 Dec 2019 07:00    TPro  6.0  /  Alb  1.7<L>  /  TBili  7.9<H>  /  DBili  x   /  AST  90<H>  /  ALT  48<H>  /  AlkPhos  131<H>  12-28    PT/INR - ( 28 Dec 2019 07:00 )   PT: 30.1 sec;   INR: 2.56 ratio         PTT - ( 28 Dec 2019 07:00 )  PTT:64.7 sec          RADIOLOGY & ADDITIONAL TESTS:    Imaging Personally Reviewed:    Consultant(s) Notes Reviewed:  hepatology, ENT    Care Discussed with Consultants/Other Providers: Dr. Purcell

## 2019-12-28 NOTE — PROGRESS NOTE ADULT - PROBLEM SELECTOR PLAN 1
Merocel packing remains in right nare, no active bleeding noted  -s/p 1unit of pRBC transfusion, now H/H stable  -appreciate ENT recs  -continue keflex while packing in place  -monitor cbc and transfuse as needed

## 2019-12-28 NOTE — PROGRESS NOTE ADULT - ASSESSMENT
44 M PMH alcoholic cirrhosis with ascites initially dx 2018, continual EtOH abuse post diagnosis now abstinent x 3 months, who presents as transfer from [facility] for decompensated cirrhosis with ascites, coagulopathy, chronic anemia, chronic thrombocytopenia, and chronic hyponatremia; here for hepatology evaluation.

## 2019-12-28 NOTE — CHART NOTE - NSCHARTNOTEFT_GEN_A_CORE
CC: Epistaxis      HPI:  Called by RN that patient was having a nose bleed.  Patient seen and examined by me at beside.  Patient was noted to have significant amount of blood from his right nare.    Patient states that he felt dizzy, but denied headache, CP, SOB, abdominal  pain, N/V/D, numbness/tingling, extremity weakness, dysuria.         Vital Signs Last 24 Hrs  T(C): 36.9 (28 Dec 2019 04:38), Max: 37.3 (27 Dec 2019 20:46)  T(F): 98.4 (28 Dec 2019 04:38), Max: 99.1 (27 Dec 2019 20:46)  HR: 100 (28 Dec 2019 04:38) (88 - 102)  BP: 100/57 (28 Dec 2019 04:38) (100/57 - 107/61)  BP(mean): --  RR: 17 (28 Dec 2019 04:38) (17 - 18)  SpO2: 94% (28 Dec 2019 04:38) (94% - 99%)      Physical Exam:  General: WN/WD NAD, AOx3, nontoxic appearing  Head:  NC/AT  ENT: Blood in right nostril.  (-) blood in oropharynx  CV: RRR, S1S2   Respiratory: CTA B/L, nonlabored  Abdominal: (+) bowel sounds x4. Soft, NT, ND, no palpable mass, no guarding, or rebound tenderness  MSK: No BLLE edema, + peripheral pulses, FROM all 4 extremity  Skin: (+) warm, dry   Psych: Appropriate affect       Labs:                        7.1    9.45  )-----------( 66       ( 27 Dec 2019 21:49 )             21.6     12-27    128<L>  |  98  |  13  ----------------------------<  88  3.4<L>   |  20<L>  |  0.59    Ca    6.9<L>      27 Dec 2019 19:01    TPro  6.2  /  Alb  1.6<L>  /  TBili  7.6<H>  /  DBili  x   /  AST  100<H>  /  ALT  58<H>  /  AlkPhos  143<H>  12-27              Radiology:          Assessment & Plan:  HPI:  44 M PMH alcoholic cirrhosis with ascites initially dx 2018, continual EtOH abuse post diagnosis now abstinent x 3 months, who presents as transfer from [facility] for decompensated cirrhosis with ascites, coagulopathy, chronic anemia, chronic thrombocytopenia, and chronic hyponatremia; here for hepatology evaluation.     Patient now with Epistaxis of right nostril          1. Epistaxis - Resolved  -Pressure applied and guaze placed, without resolution  -Patient seen by ENT, s/p Rhino packing with bleeding controlled  -Patient started on Keflex 500mg BID for gram positive coverage  -Patient started on Nasal saline sprays  -Will continue to closely monitor patient/vitals  -Primary Team to follow up in AM, attending to follow     2. Acute Blood loss  -Repeat CBC revealed a drop in Hb, 8.0 -->7.1  -Patient given PRBC x 1 unit      Marc Feliz PA-C  Dept of Medicine  #[id number]

## 2019-12-28 NOTE — PROGRESS NOTE ADULT - ATTENDING COMMENTS
45 yo HM with alcohol-related cirrhosis, transferred from [facility] to [facility] due to acute on chronic liver failure, refractory ascites, and hyponatremia for consideration of liver transplant evaluation.    # Hyponatremia: Improving, with Na 129 today. Continue 1L fluid restriction. Continue IV albumin and Lasix. Hold eplerenone.    # Ascites: S/p 5L LVP on 12/20 and s/p 7.2L LVP yesterday. Prior leakage resolved. Continue Lasix to decrease reaccumulation. Not a TIPS candidate given high MELD score. Continue ciprofloxacin prophylaxis.    # Epistaxis: Resolved s/p right nare packing by ENT. Currently on cephalexin given nasal foreign body. Follow-up further ENT recommendations re: timing of packing removal.    # Leukocytosis: Resolved. Infectious work-up negative. May have had recent alcoholic hepatitis, as last reported drink ~3 months ago, but would defer corticosteroids for now given history of perirectal abscess in 11/2019.     # Decompensated alcohol-related cirrhosis with ACLF: Blood type A, with current MELD-Na 29. He does not currently meet our criteria for early liver transplantation given history of relapse despite known cirrhosis, but liver transplant candidacy can be re-considered on an outpatient basis once he has enrolled in another alcohol relapse prevention program and achieved a longer duration of sobriety.    Please don't hesitate to call with any questions/concerns.    Thomas Marshall M.D., Ph.D.  Transplant Hepatology  Cell: [phone]

## 2019-12-28 NOTE — PROGRESS NOTE ADULT - ATTENDING COMMENTS
Fartun Justice MD  Division of Hospital Medicine  Cell: [phone]  Pager: [phone]  Office: [phone]

## 2019-12-28 NOTE — PROGRESS NOTE ADULT - PROBLEM SELECTOR PLAN 9
Transitions of Care Status:  1.  Name of PCP: Dr. Skaggs ([place] [facility]) has never seen yet and will f/u with our liver team on DC.  2.  PCP Contacted on Admission: [ ] Y    [x ] N    3.  PCP contacted at Discharge: [ ] Y    [ ] N    [ ] N/A  4.  Post-Discharge Appointment Date and Location:  5.  Summary of Handoff given to PCP:

## 2019-12-28 NOTE — PROGRESS NOTE ADULT - ASSESSMENT
44 M PMH alcoholic cirrhosis with ascites, coagulopathy, chronic thrombocytopenia presents with epistaxis. Merocel packing remains in right nare, no active bleeding noted.

## 2019-12-28 NOTE — PROGRESS NOTE ADULT - PROBLEM SELECTOR PLAN 1
- gram-positive abx coverage for duration of packing placement  - Continue with Nasal Packing (Rapid Rhino)  - Strict blood pressure control.  - Nasal saline, 2 sprays to both nares 4 times a day  - Avoid nasal trauma; no nose rubbing, blowing or manipulating nasal packing.  - Sneeze with mouth open and pinching nares.  - Avoid bending with head blow the waist.    - No heavy lifting.

## 2019-12-29 LAB
ALBUMIN SERPL ELPH-MCNC: 2 G/DL — LOW (ref 3.3–5)
ALP SERPL-CCNC: 118 U/L — SIGNIFICANT CHANGE UP (ref 40–120)
ALT FLD-CCNC: 46 U/L — HIGH (ref 10–45)
ANION GAP SERPL CALC-SCNC: 9 MMOL/L — SIGNIFICANT CHANGE UP (ref 5–17)
APTT BLD: 69.9 SEC — HIGH (ref 27.5–36.3)
AST SERPL-CCNC: 87 U/L — HIGH (ref 10–40)
BILIRUB SERPL-MCNC: 8.9 MG/DL — HIGH (ref 0.2–1.2)
BUN SERPL-MCNC: 11 MG/DL — SIGNIFICANT CHANGE UP (ref 7–23)
CALCIUM SERPL-MCNC: 7.7 MG/DL — LOW (ref 8.4–10.5)
CHLORIDE SERPL-SCNC: 98 MMOL/L — SIGNIFICANT CHANGE UP (ref 96–108)
CO2 SERPL-SCNC: 22 MMOL/L — SIGNIFICANT CHANGE UP (ref 22–31)
CREAT SERPL-MCNC: 0.43 MG/DL — LOW (ref 0.5–1.3)
GLUCOSE SERPL-MCNC: 83 MG/DL — SIGNIFICANT CHANGE UP (ref 70–99)
HCT VFR BLD CALC: 22.5 % — LOW (ref 39–50)
HGB BLD-MCNC: 7.6 G/DL — LOW (ref 13–17)
INR BLD: 2.62 RATIO — HIGH (ref 0.88–1.16)
MCHC RBC-ENTMCNC: 32.2 PG — SIGNIFICANT CHANGE UP (ref 27–34)
MCHC RBC-ENTMCNC: 33.8 GM/DL — SIGNIFICANT CHANGE UP (ref 32–36)
MCV RBC AUTO: 95.3 FL — SIGNIFICANT CHANGE UP (ref 80–100)
PLATELET # BLD AUTO: 64 K/UL — LOW (ref 150–400)
POTASSIUM SERPL-MCNC: 3.3 MMOL/L — LOW (ref 3.5–5.3)
POTASSIUM SERPL-SCNC: 3.3 MMOL/L — LOW (ref 3.5–5.3)
PROT SERPL-MCNC: 5.8 G/DL — LOW (ref 6–8.3)
PROTHROM AB SERPL-ACNC: 31 SEC — HIGH (ref 10–13.1)
RBC # BLD: 2.36 M/UL — LOW (ref 4.2–5.8)
RBC # FLD: 21.2 % — HIGH (ref 10.3–14.5)
SODIUM SERPL-SCNC: 129 MMOL/L — LOW (ref 135–145)
WBC # BLD: 8.87 K/UL — SIGNIFICANT CHANGE UP (ref 3.8–10.5)
WBC # FLD AUTO: 8.87 K/UL — SIGNIFICANT CHANGE UP (ref 3.8–10.5)

## 2019-12-29 PROCEDURE — 99232 SBSQ HOSP IP/OBS MODERATE 35: CPT

## 2019-12-29 PROCEDURE — 99232 SBSQ HOSP IP/OBS MODERATE 35: CPT | Mod: GC

## 2019-12-29 RX ORDER — POTASSIUM CHLORIDE 20 MEQ
40 PACKET (EA) ORAL ONCE
Refills: 0 | Status: DISCONTINUED | OUTPATIENT
Start: 2019-12-29 | End: 2019-12-29

## 2019-12-29 RX ADMIN — Medication 40 MILLIGRAM(S): at 06:44

## 2019-12-29 RX ADMIN — Medication 50 MILLILITER(S): at 06:43

## 2019-12-29 RX ADMIN — Medication 500 MILLIGRAM(S): at 12:25

## 2019-12-29 RX ADMIN — Medication 100 MILLIGRAM(S): at 12:26

## 2019-12-29 RX ADMIN — Medication 2 SPRAY(S): at 22:54

## 2019-12-29 RX ADMIN — Medication 1 TABLET(S): at 12:25

## 2019-12-29 RX ADMIN — Medication 500 MILLIGRAM(S): at 18:22

## 2019-12-29 RX ADMIN — PANTOPRAZOLE SODIUM 40 MILLIGRAM(S): 20 TABLET, DELAYED RELEASE ORAL at 06:43

## 2019-12-29 RX ADMIN — Medication 2 SPRAY(S): at 18:23

## 2019-12-29 RX ADMIN — Medication 1 MILLIGRAM(S): at 12:25

## 2019-12-29 RX ADMIN — Medication 50 MILLILITER(S): at 22:56

## 2019-12-29 RX ADMIN — Medication 500 MILLIGRAM(S): at 06:41

## 2019-12-29 RX ADMIN — LACTULOSE 20 GRAM(S): 10 SOLUTION ORAL at 06:39

## 2019-12-29 RX ADMIN — Medication 2 SPRAY(S): at 12:25

## 2019-12-29 RX ADMIN — Medication 50 MILLILITER(S): at 15:41

## 2019-12-29 RX ADMIN — Medication 2 SPRAY(S): at 06:43

## 2019-12-29 NOTE — PROGRESS NOTE ADULT - ATTENDING COMMENTS
Fartun Justice MD  Division of Hospital Medicine  Cell: [phone]  Pager: [phone]  Office: [phone]

## 2019-12-29 NOTE — PROGRESS NOTE ADULT - PROBLEM SELECTOR PLAN 1
Merocel packing remains in right nare, no active bleeding noted  -s/p 1unit of pRBC transfusion, now H/H stable  -appreciate ENT recs: plan to remove the packing tmrw  -continue keflex while packing in place  -monitor cbc and transfuse as needed

## 2019-12-29 NOTE — PROGRESS NOTE ADULT - SUBJECTIVE AND OBJECTIVE BOX
Patient is a 44y old  Male who presents with a chief complaint of decompensated cirrhosis (29 Dec 2019 07:25)      SUBJECTIVE / OVERNIGHT EVENTS:  no acute events overnight, vss, afebrile  pt denies further nose-bleeding  feels well, wondering when he can go home    ROS:  14 point ROS negative in detail except stated as above    MEDICATIONS  (STANDING):  albumin human 25% IVPB 50 milliLiter(s) IV Intermittent every 8 hours  cephalexin 500 milliGRAM(s) Oral every 12 hours  ciprofloxacin     Tablet 500 milliGRAM(s) Oral daily  folic acid 1 milliGRAM(s) Oral daily  furosemide   Injectable 40 milliGRAM(s) IV Push daily  lactulose Syrup 20 Gram(s) Oral three times a day  multivitamin 1 Tablet(s) Oral daily  pantoprazole    Tablet 40 milliGRAM(s) Oral before breakfast  polyethylene glycol 3350 17 Gram(s) Oral two times a day  sodium chloride 0.65% Nasal 2 Spray(s) Both Nostrils four times a day  thiamine 100 milliGRAM(s) Oral daily    MEDICATIONS  (PRN):      CAPILLARY BLOOD GLUCOSE        I&O's Summary    28 Dec 2019 07:01  -  29 Dec 2019 07:00  --------------------------------------------------------  IN: 290 mL / OUT: 2020 mL / NET: -1730 mL    29 Dec 2019 07:01  -  29 Dec 2019 12:24  --------------------------------------------------------  IN: 0 mL / OUT: 800 mL / NET: -800 mL        PHYSICAL EXAM:  Vital Signs Last 24 Hrs  T(C): 37.3 (29 Dec 2019 11:50), Max: 37.3 (29 Dec 2019 11:50)  T(F): 99.2 (29 Dec 2019 11:50), Max: 99.2 (29 Dec 2019 11:50)  HR: 107 (29 Dec 2019 11:50) (97 - 107)  BP: 103/49 (29 Dec 2019 11:50) (103/49 - 114/57)  BP(mean): --  RR: 18 (29 Dec 2019 11:50) (18 - 18)  SpO2: 95% (29 Dec 2019 11:50) (94% - 97%)    GENERAL: NAD, well-developed  HEAD:  Atraumatic, Normocephalic  EYES: EOMI, PERRLA, scleral icterus  NOSE: rhinopacking in place, no further active bleed  NECK: Supple, No JVD  CHEST/LUNG: Clear to auscultation bilaterally; No wheeze  HEART: Regular rate and rhythm; No murmurs, rubs, or gallops  ABDOMEN: Soft, Nontender, Nondistended; Bowel sounds present  EXTREMITIES:  2+ Peripheral Pulses, No clubbing, cyanosis, or edema  NEUROLOGY: AAOx3; non-focal  SKIN: (+) jaundice    LABS:  personally reviewed                        7.6    8.87  )-----------( 64       ( 29 Dec 2019 09:14 )             22.5     12-29    129<L>  |  98  |  11  ----------------------------<  83  3.3<L>   |  22  |  0.43<L>    Ca    7.7<L>      29 Dec 2019 07:08    TPro  5.8<L>  /  Alb  2.0<L>  /  TBili  8.9<H>  /  DBili  x   /  AST  87<H>  /  ALT  46<H>  /  AlkPhos  118  12-29    PT/INR - ( 29 Dec 2019 09:00 )   PT: 31.0 sec;   INR: 2.62 ratio         PTT - ( 29 Dec 2019 09:00 )  PTT:69.9 sec          RADIOLOGY & ADDITIONAL TESTS:    Imaging Personally Reviewed:    Consultant(s) Notes Reviewed:  ENT, hepatology    Care Discussed with Consultants/Other Providers: ENT Dr. Rolando SMALL

## 2019-12-29 NOTE — PROGRESS NOTE ADULT - SUBJECTIVE AND OBJECTIVE BOX
ENT ISSUE/POD: Epistaxis     HPI: 44 M PMH alcoholic decompensated cirrhosis, coagulopathy, chronic anemia, chronic thrombocytopenia, here for hepatology evaluation. ENT consulted as pt began to have a nose bleed after blowing his nose. Reports has been having nose bleeds more frequently lately. Now with 8cm merocel in right nare. Coags have been abnormal secondary to the severe cirrhosis. H/H 8/26; INR 2.56; Plt 70          PAST MEDICAL & SURGICAL HISTORY:  Liver cirrhosis, alcoholic  Anemia  Thrombocytopenia  ETOH abuse  History of incision and drainage: Gluteal abscess    Allergies    No Known Allergies    Intolerances      MEDICATIONS  (STANDING):  albumin human 25% IVPB 50 milliLiter(s) IV Intermittent every 8 hours  cephalexin 500 milliGRAM(s) Oral every 12 hours  ciprofloxacin     Tablet 500 milliGRAM(s) Oral daily  folic acid 1 milliGRAM(s) Oral daily  furosemide   Injectable 40 milliGRAM(s) IV Push daily  lactulose Syrup 20 Gram(s) Oral three times a day  multivitamin 1 Tablet(s) Oral daily  pantoprazole    Tablet 40 milliGRAM(s) Oral before breakfast  polyethylene glycol 3350 17 Gram(s) Oral two times a day  sodium chloride 0.65% Nasal 2 Spray(s) Both Nostrils four times a day  thiamine 100 milliGRAM(s) Oral daily    MEDICATIONS  (PRN):      Social History: see consult    Family history: see consult    ROS:   ENT: all negative except as noted in HPI   Pulm: denies SOB, cough, hemoptysis  Neuro: denies numbness/tingling, loss of sensation  Endo: denies heat/cold intolerance, excessive sweating      Vital Signs Last 24 Hrs  T(C): 37.1 (29 Dec 2019 04:59), Max: 37.1 (29 Dec 2019 04:59)  T(F): 98.7 (29 Dec 2019 04:59), Max: 98.7 (29 Dec 2019 04:59)  HR: 97 (29 Dec 2019 04:59) (96 - 99)  BP: 114/57 (29 Dec 2019 04:59) (104/59 - 114/57)  BP(mean): --  RR: 18 (29 Dec 2019 04:59) (17 - 18)  SpO2: 94% (29 Dec 2019 04:59) (94% - 97%)                          8.3    10.32 )-----------( 70       ( 28 Dec 2019 19:56 )             26.7    12-28    126<L>  |  96  |  13  ----------------------------<  142<H>  3.3<L>   |  19<L>  |  0.53    Ca    7.5<L>      28 Dec 2019 19:56    TPro  6.0  /  Alb  1.7<L>  /  TBili  7.9<H>  /  DBili  x   /  AST  90<H>  /  ALT  48<H>  /  AlkPhos  131<H>  12-28   PT/INR - ( 28 Dec 2019 07:00 )   PT: 30.1 sec;   INR: 2.56 ratio         PTT - ( 28 Dec 2019 07:00 )  PTT: 64.7 sec    PHYSICAL EXAM:  Gen: NAD  Skin: No rashes, bruises, or lesions  Head: Normocephalic, Atraumatic  Face: no edema, erythema, or fluctuance. Parotid glands soft without mass  Eyes: no scleral injection  Nose: Right nare with merocel, no active bleeding. Left nare patent, no bleeding or discharge  Mouth: No Stridor / Drooling / Trismus.  Mucosa moist, tongue/uvula midline, oropharynx clear  Neck: Flat, supple, no lymphadenopathy, trachea midline, no masses  Lymphatic: No lymphadenopathy  Resp: breathing easily, no stridor  CV: no peripheral edema/cyanosis  GI: nondistended   Peripheral vascular: no JVD or edema  Neuro: facial nerve intact, no facial droop

## 2019-12-29 NOTE — PROGRESS NOTE ADULT - NSHPATTENDINGPLANDISCUSS_GEN_ALL_CORE
AMY Jacobson and GEOVANNY Freeman
House staff
House staff, Hepatology team
House staff
house staff
med NP
med NP
med NP and Dr. Marshall (hepatology)
med Np and Dr. Marshall
AMY Wan
AMY Mendoza
AMY Wan

## 2019-12-29 NOTE — PROGRESS NOTE ADULT - SUBJECTIVE AND OBJECTIVE BOX
Chief Complaint:  Patient is a 44y old  Male who presents with a chief complaint of decompensated cirrhosis (29 Dec 2019 12:24)    Interval Events: Nasal packing dislodged. No more bleeding. Hb remains stable. Na uptrending.     Hospital Medications:  albumin human 25% IVPB 50 milliLiter(s) IV Intermittent every 8 hours  cephalexin 500 milliGRAM(s) Oral every 12 hours  ciprofloxacin     Tablet 500 milliGRAM(s) Oral daily  folic acid 1 milliGRAM(s) Oral daily  furosemide   Injectable 40 milliGRAM(s) IV Push daily  lactulose Syrup 20 Gram(s) Oral three times a day  multivitamin 1 Tablet(s) Oral daily  pantoprazole    Tablet 40 milliGRAM(s) Oral before breakfast  polyethylene glycol 3350 17 Gram(s) Oral two times a day  sodium chloride 0.65% Nasal 2 Spray(s) Both Nostrils four times a day  thiamine 100 milliGRAM(s) Oral daily      ROS:   General:  No wt loss, fevers, chills, night sweats  Eyes:  Good vision, no reported pain  ENT:  No sore throat, pain, runny nose, dysphagia  CV:  No pain, palpitations, hypo/hypertension  Pulm:  No dyspnea, cough, tachypnea, wheezing  GI:  See HPI, otherwise negative  :  No pain, bleeding, incontinence, nocturia  Muscle:  No pain, weakness  Neuro:  No weakness, tingling, memory problems  Psych:  No fatigue, insomnia, mood problems, depression  Endocrine:  No polyuria, polydipsia, cold/heat intolerance  Heme:  No petechiae, ecchymosis, easy bruisability  Skin:  No rash, tattoos, scars, edema    PHYSICAL EXAM:   Vital Signs:  Vital Signs Last 24 Hrs  T(C): 37.3 (29 Dec 2019 11:50), Max: 37.3 (29 Dec 2019 11:50)  T(F): 99.2 (29 Dec 2019 11:50), Max: 99.2 (29 Dec 2019 11:50)  HR: 107 (29 Dec 2019 11:50) (97 - 107)  BP: 103/49 (29 Dec 2019 11:50) (103/49 - 114/57)  BP(mean): --  RR: 18 (29 Dec 2019 11:50) (18 - 18)  SpO2: 95% (29 Dec 2019 11:50) (94% - 97%)  Daily     Daily Weight in k.9 (29 Dec 2019 04:59)    GENERAL: no acute distress  NEURO: alert and oriented x 3, no asterixis  HEENT: icteric sclera, no conjunctival pallor appreciated, no epistaxis noted  CHEST: no respiratory distress, no accessory muscle use  CARDIAC: regular rate, rhythm  ABDOMEN: soft, non-tender, non-distended, no rebound or guarding  EXTREMITIES: warm, well perfused, no edema  SKIN: no lesions noted    LABS: reviewed                        7.6    8.87  )-----------( 64       ( 29 Dec 2019 09:14 )             22.5     12-    129<L>  |  98  |  11  ----------------------------<  83  3.3<L>   |  22  |  0.43<L>    Ca    7.7<L>      29 Dec 2019 07:08    TPro  5.8<L>  /  Alb  2.0<L>  /  TBili  8.9<H>  /  DBili  x   /  AST  87<H>  /  ALT  46<H>  /  AlkPhos  118      LIVER FUNCTIONS - ( 29 Dec 2019 07:08 )  Alb: 2.0 g/dL / Pro: 5.8 g/dL / ALK PHOS: 118 U/L / ALT: 46 U/L / AST: 87 U/L / GGT: x             Interval Diagnostic Studies: see sunrise for full report

## 2019-12-29 NOTE — PROGRESS NOTE ADULT - ASSESSMENT
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    # Chronic anemia and thrombocytopenia     Recommendations:  - no hepatology contraindication to discharge; appt to be made w/ Dr. Thomas Marshall  - c/w 1L fluid restriction at home (discussed w/ patient)  - c/w lactulose 20gm daily and miralax BID  - c/w rifaximin  - c/w cipro for SBP ppx given low ascitic fluid protein  - f/u ENT recs    Shyam Purcell  Gastroenterology Fellow  Pager# [phone] ([facility]) or [phone] ([facility])  GI Phone# [phone] ([facility])  Page on-call GI fellow via  from 5pm-8am and on weekends

## 2019-12-29 NOTE — PROGRESS NOTE ADULT - ASSESSMENT
44 M PMH alcoholic cirrhosis with ascites initially dx 2018, continual EtOH abuse post diagnosis now abstinent x 3 months, who presents as transfer from [facility] for decompensated cirrhosis with ascites, coagulopathy, chronic anemia, chronic thrombocytopenia, and chronic hyponatremia; here for hepatology evaluation.

## 2019-12-29 NOTE — PROGRESS NOTE ADULT - ATTENDING COMMENTS
45 yo HM with alcohol-related cirrhosis, transferred from [facility] to [facility] due to acute on chronic liver failure, refractory ascites, and hyponatremia for consideration of liver transplant evaluation.    # Hyponatremia: Improved since admission, with Na stable at 129 today. Continue 1L fluid restriction. Continue IV albumin and Lasix for now. Hold eplerenone.    # Ascites: S/p 5L LVP on 12/20 and s/p 7.2L LVP on 12/27. Prior leakage resolved. Continue Lasix to decrease reaccumulation. Not a TIPS candidate given high MELD score. Continue ciprofloxacin prophylaxis.    # Epistaxis: Resolved s/p right nare packing by ENT. Currently on cephalexin given nasal foreign body. Follow-up further ENT recommendations re: timing of packing removal.    # Leukocytosis: Resolved. Infectious work-up negative. May have had recent alcoholic hepatitis, as last reported drink ~3 months ago, but would defer corticosteroids for now given history of perirectal abscess in 11/2019.     # Decompensated alcohol-related cirrhosis with ACLF: Blood type A, with current MELD-Na 29. He does not currently meet our criteria for early liver transplantation given history of relapse despite known cirrhosis, but liver transplant candidacy can be re-considered on an outpatient basis once he has enrolled in another alcohol relapse prevention program and achieved a longer duration of sobriety.    Please don't hesitate to call with any questions/concerns.    Thomas Marshall M.D., Ph.D.  Transplant Hepatology  Cell: [phone]

## 2019-12-29 NOTE — PROGRESS NOTE ADULT - PROBLEM SELECTOR PLAN 1
- plan for packing removal tomorrow  - gram-positive abx coverage for duration of packing placement  - Continue with Nasal Packing (Merocel)  - Strict blood pressure control.  - Nasal saline, 2 sprays to both nares 4 times a day  - Avoid nasal trauma; no nose rubbing, blowing or manipulating nasal packing.  - Sneeze with mouth open and pinching nares.  - Avoid bending with head blow the waist.    - No heavy lifting.

## 2019-12-29 NOTE — PROGRESS NOTE ADULT - PROBLEM SELECTOR PLAN 9
Transitions of Care Status:  1.  Name of PCP: Dr. Skaggs ([place] [facility]) has never seen yet and will f/u with our liver team on DC.  2.  PCP Contacted on Admission: [ ] Y    [x ] N    3.  PCP contacted at Discharge: [ ] Y    [ ] N    [ ] N/A  4.  Post-Discharge Appointment Date and Location:  5.  Summary of Handoff given to PCP:

## 2019-12-30 DIAGNOSIS — E87.1 HYPO-OSMOLALITY AND HYPONATREMIA: ICD-10-CM

## 2019-12-30 DIAGNOSIS — K70.11 ALCOHOLIC HEPATITIS WITH ASCITES: ICD-10-CM

## 2019-12-30 DIAGNOSIS — D68.9 COAGULATION DEFECT, UNSPECIFIED: ICD-10-CM

## 2019-12-30 DIAGNOSIS — Z91.81 HISTORY OF FALLING: ICD-10-CM

## 2019-12-30 DIAGNOSIS — F10.10 ALCOHOL ABUSE, UNCOMPLICATED: ICD-10-CM

## 2019-12-30 DIAGNOSIS — E87.70 FLUID OVERLOAD, UNSPECIFIED: ICD-10-CM

## 2019-12-30 DIAGNOSIS — N62 HYPERTROPHY OF BREAST: ICD-10-CM

## 2019-12-30 DIAGNOSIS — K76.6 PORTAL HYPERTENSION: ICD-10-CM

## 2019-12-30 DIAGNOSIS — D69.6 THROMBOCYTOPENIA, UNSPECIFIED: ICD-10-CM

## 2019-12-30 DIAGNOSIS — K70.31 ALCOHOLIC CIRRHOSIS OF LIVER WITH ASCITES: ICD-10-CM

## 2019-12-30 DIAGNOSIS — D64.9 ANEMIA, UNSPECIFIED: ICD-10-CM

## 2019-12-30 LAB
ALBUMIN SERPL ELPH-MCNC: 2.1 G/DL — LOW (ref 3.3–5)
ALP SERPL-CCNC: 120 U/L — SIGNIFICANT CHANGE UP (ref 40–120)
ALT FLD-CCNC: 42 U/L — SIGNIFICANT CHANGE UP (ref 10–45)
ANION GAP SERPL CALC-SCNC: 11 MMOL/L — SIGNIFICANT CHANGE UP (ref 5–17)
ANION GAP SERPL CALC-SCNC: 9 MMOL/L — SIGNIFICANT CHANGE UP (ref 5–17)
APTT BLD: 68 SEC — HIGH (ref 27.5–36.3)
AST SERPL-CCNC: 76 U/L — HIGH (ref 10–40)
BILIRUB SERPL-MCNC: 7.7 MG/DL — HIGH (ref 0.2–1.2)
BUN SERPL-MCNC: 13 MG/DL — SIGNIFICANT CHANGE UP (ref 7–23)
BUN SERPL-MCNC: 14 MG/DL — SIGNIFICANT CHANGE UP (ref 7–23)
CALCIUM SERPL-MCNC: 7.4 MG/DL — LOW (ref 8.4–10.5)
CALCIUM SERPL-MCNC: 7.5 MG/DL — LOW (ref 8.4–10.5)
CHLORIDE SERPL-SCNC: 98 MMOL/L — SIGNIFICANT CHANGE UP (ref 96–108)
CHLORIDE SERPL-SCNC: 98 MMOL/L — SIGNIFICANT CHANGE UP (ref 96–108)
CO2 SERPL-SCNC: 20 MMOL/L — LOW (ref 22–31)
CO2 SERPL-SCNC: 22 MMOL/L — SIGNIFICANT CHANGE UP (ref 22–31)
CREAT SERPL-MCNC: 0.47 MG/DL — LOW (ref 0.5–1.3)
CREAT SERPL-MCNC: 0.54 MG/DL — SIGNIFICANT CHANGE UP (ref 0.5–1.3)
GLUCOSE SERPL-MCNC: 106 MG/DL — HIGH (ref 70–99)
GLUCOSE SERPL-MCNC: 95 MG/DL — SIGNIFICANT CHANGE UP (ref 70–99)
HCT VFR BLD CALC: 19.7 % — CRITICAL LOW (ref 39–50)
HCT VFR BLD CALC: 20.9 % — CRITICAL LOW (ref 39–50)
HCT VFR BLD CALC: 21.3 % — LOW (ref 39–50)
HGB BLD-MCNC: 6.8 G/DL — CRITICAL LOW (ref 13–17)
HGB BLD-MCNC: 7 G/DL — CRITICAL LOW (ref 13–17)
HGB BLD-MCNC: 7.1 G/DL — LOW (ref 13–17)
INR BLD: 2.78 RATIO — HIGH (ref 0.88–1.16)
MCHC RBC-ENTMCNC: 32.6 PG — SIGNIFICANT CHANGE UP (ref 27–34)
MCHC RBC-ENTMCNC: 32.6 PG — SIGNIFICANT CHANGE UP (ref 27–34)
MCHC RBC-ENTMCNC: 33.3 GM/DL — SIGNIFICANT CHANGE UP (ref 32–36)
MCHC RBC-ENTMCNC: 33.5 GM/DL — SIGNIFICANT CHANGE UP (ref 32–36)
MCHC RBC-ENTMCNC: 33.8 PG — SIGNIFICANT CHANGE UP (ref 27–34)
MCHC RBC-ENTMCNC: 34.5 GM/DL — SIGNIFICANT CHANGE UP (ref 32–36)
MCV RBC AUTO: 97.2 FL — SIGNIFICANT CHANGE UP (ref 80–100)
MCV RBC AUTO: 97.7 FL — SIGNIFICANT CHANGE UP (ref 80–100)
MCV RBC AUTO: 98 FL — SIGNIFICANT CHANGE UP (ref 80–100)
NRBC # BLD: 0 /100 WBCS — SIGNIFICANT CHANGE UP (ref 0–0)
NRBC # BLD: 0 /100 WBCS — SIGNIFICANT CHANGE UP (ref 0–0)
PLATELET # BLD AUTO: 64 K/UL — LOW (ref 150–400)
PLATELET # BLD AUTO: 70 K/UL — LOW (ref 150–400)
PLATELET # BLD AUTO: 71 K/UL — LOW (ref 150–400)
POTASSIUM SERPL-MCNC: 3.8 MMOL/L — SIGNIFICANT CHANGE UP (ref 3.5–5.3)
POTASSIUM SERPL-MCNC: 3.8 MMOL/L — SIGNIFICANT CHANGE UP (ref 3.5–5.3)
POTASSIUM SERPL-SCNC: 3.8 MMOL/L — SIGNIFICANT CHANGE UP (ref 3.5–5.3)
POTASSIUM SERPL-SCNC: 3.8 MMOL/L — SIGNIFICANT CHANGE UP (ref 3.5–5.3)
PROT SERPL-MCNC: 5.6 G/DL — LOW (ref 6–8.3)
PROTHROM AB SERPL-ACNC: 32.7 SEC — HIGH (ref 10–13.1)
RBC # BLD: 2.01 M/UL — LOW (ref 4.2–5.8)
RBC # BLD: 2.15 M/UL — LOW (ref 4.2–5.8)
RBC # BLD: 2.18 M/UL — LOW (ref 4.2–5.8)
RBC # FLD: 21.1 % — HIGH (ref 10.3–14.5)
RBC # FLD: 21.5 % — HIGH (ref 10.3–14.5)
RBC # FLD: 21.7 % — HIGH (ref 10.3–14.5)
SODIUM SERPL-SCNC: 129 MMOL/L — LOW (ref 135–145)
SODIUM SERPL-SCNC: 129 MMOL/L — LOW (ref 135–145)
WBC # BLD: 11.54 K/UL — HIGH (ref 3.8–10.5)
WBC # BLD: 9.25 K/UL — SIGNIFICANT CHANGE UP (ref 3.8–10.5)
WBC # BLD: 9.72 K/UL — SIGNIFICANT CHANGE UP (ref 3.8–10.5)
WBC # FLD AUTO: 11.54 K/UL — HIGH (ref 3.8–10.5)
WBC # FLD AUTO: 9.25 K/UL — SIGNIFICANT CHANGE UP (ref 3.8–10.5)
WBC # FLD AUTO: 9.72 K/UL — SIGNIFICANT CHANGE UP (ref 3.8–10.5)

## 2019-12-30 PROCEDURE — 99232 SBSQ HOSP IP/OBS MODERATE 35: CPT | Mod: GC

## 2019-12-30 PROCEDURE — 93306 TTE W/DOPPLER COMPLETE: CPT | Mod: 26

## 2019-12-30 PROCEDURE — 99233 SBSQ HOSP IP/OBS HIGH 50: CPT

## 2019-12-30 RX ORDER — PHYTONADIONE (VIT K1) 5 MG
10 TABLET ORAL ONCE
Refills: 0 | Status: COMPLETED | OUTPATIENT
Start: 2019-12-30 | End: 2019-12-30

## 2019-12-30 RX ADMIN — LACTULOSE 20 GRAM(S): 10 SOLUTION ORAL at 05:24

## 2019-12-30 RX ADMIN — Medication 100 MILLIGRAM(S): at 11:09

## 2019-12-30 RX ADMIN — Medication 1 MILLIGRAM(S): at 11:08

## 2019-12-30 RX ADMIN — Medication 10 MILLIGRAM(S): at 11:08

## 2019-12-30 RX ADMIN — Medication 1 TABLET(S): at 11:09

## 2019-12-30 RX ADMIN — Medication 500 MILLIGRAM(S): at 17:23

## 2019-12-30 RX ADMIN — POLYETHYLENE GLYCOL 3350 17 GRAM(S): 17 POWDER, FOR SOLUTION ORAL at 17:23

## 2019-12-30 RX ADMIN — Medication 40 MILLIGRAM(S): at 05:28

## 2019-12-30 RX ADMIN — Medication 50 MILLILITER(S): at 05:28

## 2019-12-30 RX ADMIN — Medication 2 SPRAY(S): at 17:24

## 2019-12-30 RX ADMIN — Medication 50 MILLILITER(S): at 22:56

## 2019-12-30 RX ADMIN — Medication 2 SPRAY(S): at 11:09

## 2019-12-30 RX ADMIN — PANTOPRAZOLE SODIUM 40 MILLIGRAM(S): 20 TABLET, DELAYED RELEASE ORAL at 09:07

## 2019-12-30 RX ADMIN — Medication 2 SPRAY(S): at 05:27

## 2019-12-30 RX ADMIN — Medication 500 MILLIGRAM(S): at 05:27

## 2019-12-30 RX ADMIN — Medication 500 MILLIGRAM(S): at 11:09

## 2019-12-30 RX ADMIN — Medication 50 MILLILITER(S): at 14:19

## 2019-12-30 NOTE — PROGRESS NOTE ADULT - ASSESSMENT
43 y/o M PMH alcoholic cirrhosis with ascites initially dx 2018, continual EtOH abuse post diagnosis now abstinent x 3 months, transferred from [facility] for further management of decompensated cirrhosis with ascites, coagulopathy, hyponatremia. Course c/b epistaxis requiring nasal packing by ENT and blood transfusion.

## 2019-12-30 NOTE — PROGRESS NOTE ADULT - SUBJECTIVE AND OBJECTIVE BOX
Chief Complaint:  Patient is a 44y old  Male who presents with a chief complaint of decompensated cirrhosis (30 Dec 2019 13:29)      Interval Events: Patient had rhinorocket removed, developed recurrent nosebleed this AM, replaced by ENT    Allergies:  No Known Allergies      Hospital Medications:  albumin human 25% IVPB 50 milliLiter(s) IV Intermittent every 8 hours  cephalexin 500 milliGRAM(s) Oral every 12 hours  ciprofloxacin     Tablet 500 milliGRAM(s) Oral daily  folic acid 1 milliGRAM(s) Oral daily  furosemide   Injectable 40 milliGRAM(s) IV Push daily  lactulose Syrup 20 Gram(s) Oral three times a day  multivitamin 1 Tablet(s) Oral daily  pantoprazole    Tablet 40 milliGRAM(s) Oral before breakfast  polyethylene glycol 3350 17 Gram(s) Oral two times a day  sodium chloride 0.65% Nasal 2 Spray(s) Both Nostrils four times a day  thiamine 100 milliGRAM(s) Oral daily      PMHX/PSHX:  Liver cirrhosis, alcoholic  Anemia  Thrombocytopenia  Nosebleed  ETOH abuse  History of incision and drainage  No significant past surgical history      Family history:  Family history of stroke (Father, Mother)      ROS:     General:  No wt loss, fevers, chills, night sweats, fatigue,   Eyes:  Good vision, no reported pain  ENT:  No sore throat, pain, runny nose, dysphagia  CV:  No pain, palpitations, hypo/hypertension  Resp:  No dyspnea, cough, tachypnea, wheezing  GI:  See HPI  :  No pain, bleeding, incontinence, nocturia  Muscle:  No pain, weakness  Neuro:  No weakness, tingling, memory problems  Psych:  No fatigue, insomnia, mood problems, depression  Endocrine:  No polyuria, polydipsia, cold/heat intolerance  Heme:  No petechiae, ecchymosis, easy bruisability  Skin:  No rash, edema      PHYSICAL EXAM:     GENERAL: NAD  HEENT:  NC/AT, rhinorocket in place in right nare  CHEST:  Full & symmetric excursion, no increased effort  ABDOMEN:  Soft, non-tender, non-distended, +BS  EXTREMITIES:  no edema  SKIN: jaundice  NEURO:  Alert      Vital Signs:  Vital Signs Last 24 Hrs  T(C): 37.3 (30 Dec 2019 14:26), Max: 37.3 (30 Dec 2019 14:26)  T(F): 99.1 (30 Dec 2019 14:26), Max: 99.1 (30 Dec 2019 14:26)  HR: 94 (30 Dec 2019 14:26) (94 - 106)  BP: 103/36 (30 Dec 2019 14:26) (103/36 - 110/61)  BP(mean): --  RR: 18 (30 Dec 2019 14:26) (18 - 18)  SpO2: 93% (30 Dec 2019 14:26) (93% - 96%)  Daily     Daily Weight in k.6 (30 Dec 2019 08:02)    LABS:                        6.8    9.72  )-----------( 70       ( 30 Dec 2019 08:53 )             19.7     12-30    129<L>  |  98  |  14  ----------------------------<  95  3.8   |  22  |  0.47<L>    Ca    7.4<L>      30 Dec 2019 06:52    TPro  5.6<L>  /  Alb  2.1<L>  /  TBili  7.7<H>  /  DBili  x   /  AST  76<H>  /  ALT  42  /  AlkPhos  120  12-30    LIVER FUNCTIONS - ( 30 Dec 2019 06:52 )  Alb: 2.1 g/dL / Pro: 5.6 g/dL / ALK PHOS: 120 U/L / ALT: 42 U/L / AST: 76 U/L / GGT: x           PT/INR - ( 30 Dec 2019 08:37 )   PT: 32.7 sec;   INR: 2.78 ratio         PTT - ( 30 Dec 2019 08:37 )  PTT:68.0 sec        Imaging:  reviewed

## 2019-12-30 NOTE — PROGRESS NOTE ADULT - ASSESSMENT
44 M PMH alcoholic decompensated cirrhosis, coagulopathy, chronic anemia, chronic thrombocytopenia, here for hepatology evaluation. ENT consulted as pt began to have a nose bleed after blowing his nose. Reports has been having nose bleeds more frequently lately. Now with 8cm merocel in right nare. Coags have been abnormal secondary to the severe cirrhosis. H/H 7.1/21.3 ; INR 2.62; Plt 71, Patient reports the merocel came out yesterday morning and he scratched his nose and rebled this morning. New merocel was placed and reinforced with surgicel

## 2019-12-30 NOTE — PROVIDER CONTACT NOTE (CRITICAL VALUE NOTIFICATION) - ASSESSMENT
alert, orientedx4, pt assymptomatic
Alert and oriented X 4. Respirations regular and unlabored. Denies pain or discomfort. No active bleeding at present.
Alert and oriented X 4. Respirations regular and unlabored. Denies pain or discomfort. No active bleeding noted.

## 2019-12-30 NOTE — PROGRESS NOTE ADULT - PROBLEM SELECTOR PLAN 1
Continue with nasal packing in right nare, no active bleeding noted  -transfuse 1unit of pRBC transfusion and vitamin K PO x1  -continue keflex while packing in place  -monitor cbc and transfuse as needed

## 2019-12-30 NOTE — PROGRESS NOTE ADULT - PROBLEM SELECTOR PLAN 9
Transitions of Care Status:  1.  Name of PCP: Dr. Skaggs ([place] [facility]) has never seen yet and will f/u with our liver team on DC.  2.  PCP Contacted on Admission: [ ] Y    [x ] N    3.  PCP contacted at Discharge: [ ] Y    [ ] N    [ ] N/A  4.  Post-Discharge Appointment Date and Location:  5.  Summary of Handoff given to PCP:

## 2019-12-30 NOTE — PROGRESS NOTE ADULT - SUBJECTIVE AND OBJECTIVE BOX
ENT ISSUE/POD: Epistaxis     HPI: 44 M PMH alcoholic decompensated cirrhosis, coagulopathy, chronic anemia, chronic thrombocytopenia, here for hepatology evaluation. ENT consulted as pt began to have a nose bleed after blowing his nose. Reports has been having nose bleeds more frequently lately. Now with 8cm merocel in right nare. Coags have been abnormal secondary to the severe cirrhosis. H/H 7.1/21.3 ; INR 2.62; Plt 71, Patient reports the merocel came out and he scratched his nose and rebled this morning.           PAST MEDICAL & SURGICAL HISTORY:  Liver cirrhosis, alcoholic  Anemia  Thrombocytopenia  ETOH abuse  History of incision and drainage: Gluteal abscess    Allergies    No Known Allergies    Intolerances      MEDICATIONS  (STANDING):  albumin human 25% IVPB 50 milliLiter(s) IV Intermittent every 8 hours  cephalexin 500 milliGRAM(s) Oral every 12 hours  ciprofloxacin     Tablet 500 milliGRAM(s) Oral daily  folic acid 1 milliGRAM(s) Oral daily  furosemide   Injectable 40 milliGRAM(s) IV Push daily  lactulose Syrup 20 Gram(s) Oral three times a day  multivitamin 1 Tablet(s) Oral daily  pantoprazole    Tablet 40 milliGRAM(s) Oral before breakfast  polyethylene glycol 3350 17 Gram(s) Oral two times a day  sodium chloride 0.65% Nasal 2 Spray(s) Both Nostrils four times a day  thiamine 100 milliGRAM(s) Oral daily    MEDICATIONS  (PRN):      Social History: **??**    Family history: **??**    ROS:   ENT: all negative except as noted in HPI   Pulm: denies SOB, cough, hemoptysis  Neuro: denies numbness/tingling, loss of sensation  Endo: denies heat/cold intolerance, excessive sweating      Vital Signs Last 24 Hrs  T(C): 37.1 (30 Dec 2019 04:32), Max: 37.3 (29 Dec 2019 11:50)  T(F): 98.7 (30 Dec 2019 04:32), Max: 99.2 (29 Dec 2019 11:50)  HR: 106 (30 Dec 2019 04:32) (105 - 107)  BP: 107/57 (30 Dec 2019 04:32) (103/49 - 110/61)  BP(mean): --  RR: 18 (30 Dec 2019 04:32) (18 - 18)  SpO2: 96% (30 Dec 2019 04:32) (95% - 96%)                          7.1    11.54 )-----------( 71       ( 30 Dec 2019 01:30 )             21.3    12-30    129<L>  |  98  |  13  ----------------------------<  106<H>  3.8   |  20<L>  |  0.54    Ca    7.5<L>      30 Dec 2019 01:30    TPro  5.8<L>  /  Alb  2.0<L>  /  TBili  8.9<H>  /  DBili  x   /  AST  87<H>  /  ALT  46<H>  /  AlkPhos  118  12-29   PT/INR - ( 29 Dec 2019 09:00 )   PT: 31.0 sec;   INR: 2.62 ratio         PTT - ( 29 Dec 2019 09:00 )  PTT:69.9 sec    PHYSICAL EXAM:  Gen: NAD  Skin: No rashes, bruises, or lesions  Head: Normocephalic, Atraumatic  Face: no edema, erythema, or fluctuance. Parotid glands soft without mass  Eyes: no scleral injection  Nose: Nares bilaterally patent, no discharge  Mouth: No Stridor / Drooling / Trismus.  Mucosa moist, tongue/uvula midline, oropharynx clear  Neck: Flat, supple, no lymphadenopathy, trachea midline, no masses  Lymphatic: No lymphadenopathy  Resp: breathing easily, no stridor  Neuro: facial nerve intact, no facial droop

## 2019-12-30 NOTE — PROVIDER CONTACT NOTE (CRITICAL VALUE NOTIFICATION) - ACTION/TREATMENT ORDERED:
follow fluid restriction 1000 ml daily
As per MAY Ruvalcaba transfuse PRBC
As per NP Jordon will transfuse PRBC

## 2019-12-30 NOTE — PROGRESS NOTE ADULT - ATTENDING COMMENTS
Pt with cirrhosis and hyponatremia  Serum sodium low but stable at 129  Ok from renal standpoint for discharge  Cont fluid restriction at home - 1 L.

## 2019-12-30 NOTE — PROGRESS NOTE ADULT - PROBLEM SELECTOR PLAN 1
Hypervolemic Hypo-osmolar Hyponatremia secondary to fluid overload in setting decompensated liver cirrhosis.  -s/p paracentesis (12/20 and 12/27). On admission SNa 120, most current Na 129    Recommendations:  - continue Lasix 40mg IV, albumin  - Fluid restriction 1L per day  - contineu hold Eplerenone 50mg daily  - Check daily  BMP, serum Osm, UOsm, Urine electrolytes  - BMP every day with goal correction not more than 6-8 meq in 24 hours.

## 2019-12-30 NOTE — PROGRESS NOTE ADULT - ASSESSMENT
44 M PMH alcoholic cirrhosis with ascites initially dx 2018, continual EtOH abuse post diagnosis now abstinent x 3 months, who presents as transfer from [facility] for decompensated cirrhosis - liver transplant workup.    Nephrology consulted for Hyponatremia hypervolemia likely 2/2 cirrhosis, improving w/ fluid restriction and lasix

## 2019-12-30 NOTE — PROGRESS NOTE ADULT - ATTENDING COMMENTS
Patient with severe alcohol abuse history.  Is now accepting of alcohol rehabilitation and recognition of inability to reach success alone.  Initial issue of hyponatremia now resolved.  Currently not a liver transplant candidate.  Continue efforts to control epistaxis.  Manage fluid overload with diuretics with strict sodium restriction an diuretic use may be limited due to renal function and electrolyte disturbance.  Encourage nutrition and social service support.

## 2019-12-30 NOTE — PROGRESS NOTE ADULT - SUBJECTIVE AND OBJECTIVE BOX
[facility] DIVISION OF KIDNEY DISEASES AND HYPERTENSION -- FOLLOW UP NOTE  --------------------------------------------------------------------------------  Chief Complaint:    24 hour events/subjective:  - over weekend had epistaxis, ENT consulted where inserted packing.  Yesterday night patient removed packing leading to nose bleed.  New packing inserted.   - overnight no events reported, vitals wnl, total UOP  - patient seen and examined at bedside this morning without complain  - vitals/lab/medications reviewed, noted for    PAST HISTORY  --------------------------------------------------------------------------------  No significant changes to PMH, PSH, FHx, SHx, unless otherwise noted    ALLERGIES & MEDICATIONS  --------------------------------------------------------------------------------  Allergies    No Known Allergies    Intolerances      Standing Inpatient Medications  albumin human 25% IVPB 50 milliLiter(s) IV Intermittent every 8 hours  cephalexin 500 milliGRAM(s) Oral every 12 hours  ciprofloxacin     Tablet 500 milliGRAM(s) Oral daily  folic acid 1 milliGRAM(s) Oral daily  furosemide   Injectable 40 milliGRAM(s) IV Push daily  lactulose Syrup 20 Gram(s) Oral three times a day  multivitamin 1 Tablet(s) Oral daily  pantoprazole    Tablet 40 milliGRAM(s) Oral before breakfast  polyethylene glycol 3350 17 Gram(s) Oral two times a day  sodium chloride 0.65% Nasal 2 Spray(s) Both Nostrils four times a day  thiamine 100 milliGRAM(s) Oral daily    PRN Inpatient Medications      REVIEW OF SYSTEMS  --------------------------------------------------------------------------------  Gen: No fatigue, fevers/chills, weakness  Skin: No rashes  Respiratory: No dyspnea, cough, wheezing, hemoptysis  CV: No chest pain, orthopnea  GI: No abdominal pain, diarrhea, constipation, nausea, vomiting, melena, hematochezia  : No increased frequency, dysuria, hematuria  MSK: No edema  Neuro: No dizziness/lightheadedness, weakness, numbness, tingling    All other systems were reviewed and are negative, except as noted.    VITALS/PHYSICAL EXAM  --------------------------------------------------------------------------------  T(C): 37.1 (12-30-19 @ 04:32), Max: 37.3 (12-29-19 @ 11:50)  HR: 106 (12-30-19 @ 04:32) (105 - 107)  BP: 107/57 (12-30-19 @ 04:32) (103/49 - 110/61)  RR: 18 (12-30-19 @ 04:32) (18 - 18)  SpO2: 96% (12-30-19 @ 04:32) (95% - 96%)  Wt(kg): --        12-29-19 @ 07:01  -  12-30-19 @ 07:00  --------------------------------------------------------  IN: 630 mL / OUT: 2051 mL / NET: -1421 mL      Physical Exam:  	Gen: NAD, well-appearing on room air  	HEENT: sclera icterus, MMM, clear oropharynx  	Pulm: CTA B/L no crackles  	CV: RRR, S1S2; no murmur  	Abd: +BS, soft, nontender, mild distended, urostomy bag  	: no wisdom  	LE: Warm, no edema, no asterixis               Neuro: AAOx3  	Psych: Normal affect and mood  	Skin: Warm, without rashes    LABS/STUDIES  --------------------------------------------------------------------------------              6.8    9.72  >-----------<  70       [12-30-19 @ 08:53]              19.7     129  |  98  |  14  ----------------------------<  95      [12-30-19 @ 06:52]  3.8   |  22  |  0.47        Ca     7.4     [12-30-19 @ 06:52]    TPro  5.6  /  Alb  2.1  /  TBili  7.7  /  DBili  x   /  AST  76  /  ALT  42  /  AlkPhos  120  [12-30-19 @ 06:52]    PT/INR: PT 32.7 , INR 2.78       [12-30-19 @ 08:37]  PTT: 68.0       [12-30-19 @ 08:37]      Creatinine Trend:  SCr 0.47 [12-30 @ 06:52]  SCr 0.54 [12-30 @ 01:30]  SCr 0.43 [12-29 @ 07:08]  SCr 0.53 [12-28 @ 19:56]  SCr 0.47 [12-28 @ 07:00]    Urinalysis - [12-11-19 @ 16:20]      Color Yuli / Appearance Clear / SG 1.007 / pH 6.0      Gluc Negative / Ketone Negative  / Bili Moderate / Urobili <2 mg/dL       Blood Negative / Protein Negative / Leuk Est Negative / Nitrite Negative      RBC 1 / WBC 0 / Hyaline 0 / Gran  / Sq Epi  / Non Sq Epi 0 / Bacteria Occasional      Iron 33, TIBC 147, %sat 22      [11-30-19 @ 23:20]  Ferritin 52      [11-30-19 @ 23:20]  TSH 3.81      [12-12-19 @ 05:41] [facility] DIVISION OF KIDNEY DISEASES AND HYPERTENSION -- FOLLOW UP NOTE  --------------------------------------------------------------------------------  Chief Complaint:    24 hour events/subjective:  - over weekend had epistaxis, ENT consulted where inserted nasal packing.  Yesterday night patient removed nasal packing leading to nose bleed -  new packing inserted. This morning hgb drop to 6.8 requiring 1U PRBC. vitals wnl afebrile, total UOP voided 2L  - patient seen and examined at bedside this morning without complain receiving blood transfusion.  - vitals/lab/medications reviewed, noted for hgb 7.1 to 6.8, sodium 129 (unchanged)    PAST HISTORY  --------------------------------------------------------------------------------  No significant changes to PMH, PSH, FHx, SHx, unless otherwise noted    ALLERGIES & MEDICATIONS  --------------------------------------------------------------------------------  Allergies    No Known Allergies    Intolerances      Standing Inpatient Medications  albumin human 25% IVPB 50 milliLiter(s) IV Intermittent every 8 hours  cephalexin 500 milliGRAM(s) Oral every 12 hours  ciprofloxacin     Tablet 500 milliGRAM(s) Oral daily  folic acid 1 milliGRAM(s) Oral daily  furosemide   Injectable 40 milliGRAM(s) IV Push daily  lactulose Syrup 20 Gram(s) Oral three times a day  multivitamin 1 Tablet(s) Oral daily  pantoprazole    Tablet 40 milliGRAM(s) Oral before breakfast  polyethylene glycol 3350 17 Gram(s) Oral two times a day  sodium chloride 0.65% Nasal 2 Spray(s) Both Nostrils four times a day  thiamine 100 milliGRAM(s) Oral daily    PRN Inpatient Medications      REVIEW OF SYSTEMS  --------------------------------------------------------------------------------  Gen: + Epistaxis.  No fatigue, fevers/chills, weakness  Skin: No rashes  Respiratory: No dyspnea, cough, wheezing, hemoptysis  CV: No chest pain, orthopnea  GI: No abdominal pain, diarrhea, constipation, nausea, vomiting, melena, hematochezia  : No increased frequency, dysuria, hematuria  MSK: No edema  Neuro: No dizziness/lightheadedness, weakness, numbness, tingling    All other systems were reviewed and are negative, except as noted.    VITALS/PHYSICAL EXAM  --------------------------------------------------------------------------------  T(C): 37.1 (12-30-19 @ 04:32), Max: 37.3 (12-29-19 @ 11:50)  HR: 106 (12-30-19 @ 04:32) (105 - 107)  BP: 107/57 (12-30-19 @ 04:32) (103/49 - 110/61)  RR: 18 (12-30-19 @ 04:32) (18 - 18)  SpO2: 96% (12-30-19 @ 04:32) (95% - 96%)  Wt(kg): --        12-29-19 @ 07:01  -  12-30-19 @ 07:00  --------------------------------------------------------  IN: 630 mL / OUT: 2051 mL / NET: -1421 mL      Physical Exam:  	Gen: NAD, well-appearing on room air  	HEENT: nasal packing r nostril.  sclera icterus, MMM  	Pulm: CTA B/L no crackles  	CV: RRR, S1S2; no rub  	Abd: +BS, soft, nontender, mild distended, urostomy bag  	: no wisdom  	LE: Warm, no edema, no asterixis               Neuro: AAOx3  	Psych: Normal affect and mood  	Skin: Warm, without rashes    LABS/STUDIES  --------------------------------------------------------------------------------              6.8    9.72  >-----------<  70       [12-30-19 @ 08:53]              19.7     129  |  98  |  14  ----------------------------<  95      [12-30-19 @ 06:52]  3.8   |  22  |  0.47        Ca     7.4     [12-30-19 @ 06:52]    TPro  5.6  /  Alb  2.1  /  TBili  7.7  /  DBili  x   /  AST  76  /  ALT  42  /  AlkPhos  120  [12-30-19 @ 06:52]    PT/INR: PT 32.7 , INR 2.78       [12-30-19 @ 08:37]  PTT: 68.0       [12-30-19 @ 08:37]      Creatinine Trend:  SCr 0.47 [12-30 @ 06:52]  SCr 0.54 [12-30 @ 01:30]  SCr 0.43 [12-29 @ 07:08]  SCr 0.53 [12-28 @ 19:56]  SCr 0.47 [12-28 @ 07:00]    Urinalysis - [12-11-19 @ 16:20]      Color Yuli / Appearance Clear / SG 1.007 / pH 6.0      Gluc Negative / Ketone Negative  / Bili Moderate / Urobili <2 mg/dL       Blood Negative / Protein Negative / Leuk Est Negative / Nitrite Negative      RBC 1 / WBC 0 / Hyaline 0 / Gran  / Sq Epi  / Non Sq Epi 0 / Bacteria Occasional      Iron 33, TIBC 147, %sat 22      [11-30-19 @ 23:20]  Ferritin 52      [11-30-19 @ 23:20]  TSH 3.81      [12-12-19 @ 05:41]

## 2019-12-30 NOTE — PROGRESS NOTE ADULT - ASSESSMENT
Impression:  45 yo HM with alcohol-related cirrhosis, transferred from [facility] to [facility] due to acute on chronic liver failure, refractory ascites, and hyponatremia for consideration of liver transplant evaluation.    # Decompensated alcoholic cirrhosis, MELD-Na 30 on 12/30     - HE: grade I, on lactulose     - ascites: present, s/p LVP x 2, last 12/27 with 7.2L removed.  On lasix 40mg daily. 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  On cephalexin for ppx      Recommendations:  - followup with ENT re: timing of packing removal   - c/w 1L fluid restriction at home (discussed w/ patient)  - c/w lactulose 20gm daily and miralax BID, as well as Xifaxan BID for HE  - c/w cipro for SBP ppx given low ascitic fluid protein  - continue with lasix 40mg daily for ascites management  - outpatient followup with Dr. Marshall in liver center ([phone])  - patient will need to be plugged into outpatient or inpatient rehab again on discharge given ETOH relapse

## 2019-12-30 NOTE — CHART NOTE - NSCHARTNOTEFT_GEN_A_CORE
NP note called by RN for critical value of hgb 6.8.  Pt seen and examined.  Transfused 1Uprbc and will repeat cbc post.  Discussed with Dr. Leandra Ruvalcaba NP

## 2019-12-30 NOTE — PROGRESS NOTE ADULT - SUBJECTIVE AND OBJECTIVE BOX
[facility] Division of Hospital Medicine  Kristen Mccracken MD  Pager (M-F, 1G-1E): [phone]  Other Times:  [phone]    Patient is a 44y old  Male who presents with a chief complaint of decompensated cirrhosis (30 Dec 2019 11:47)      SUBJECTIVE / OVERNIGHT EVENTS: patient had recurrent epistaxis due to nose blowing and required repacking by ENT.   ADDITIONAL REVIEW OF SYSTEMS: No ab pain. + BM. No other bleeding.     MEDICATIONS  (STANDING):  albumin human 25% IVPB 50 milliLiter(s) IV Intermittent every 8 hours  cephalexin 500 milliGRAM(s) Oral every 12 hours  ciprofloxacin     Tablet 500 milliGRAM(s) Oral daily  folic acid 1 milliGRAM(s) Oral daily  furosemide   Injectable 40 milliGRAM(s) IV Push daily  lactulose Syrup 20 Gram(s) Oral three times a day  multivitamin 1 Tablet(s) Oral daily  pantoprazole    Tablet 40 milliGRAM(s) Oral before breakfast  polyethylene glycol 3350 17 Gram(s) Oral two times a day  sodium chloride 0.65% Nasal 2 Spray(s) Both Nostrils four times a day  thiamine 100 milliGRAM(s) Oral daily    MEDICATIONS  (PRN):      CAPILLARY BLOOD GLUCOSE        I&O's Summary    29 Dec 2019 07:01  -  30 Dec 2019 07:00  --------------------------------------------------------  IN: 630 mL / OUT: 2051 mL / NET: -1421 mL        PHYSICAL EXAM:  Vital Signs Last 24 Hrs  T(C): 37.1 (30 Dec 2019 04:32), Max: 37.1 (29 Dec 2019 21:08)  T(F): 98.7 (30 Dec 2019 04:32), Max: 98.7 (29 Dec 2019 21:08)  HR: 106 (30 Dec 2019 04:32) (105 - 106)  BP: 107/57 (30 Dec 2019 04:32) (107/57 - 110/61)  BP(mean): --  RR: 18 (30 Dec 2019 04:32) (18 - 18)  SpO2: 96% (30 Dec 2019 04:32) (95% - 96%)    CONSTITUTIONAL: NAD, well-developed, well-groomed  NECK: Supple, no palpable masses; no thyromegaly  RESPIRATORY: Normal respiratory effort; lungs are clear to auscultation bilaterally  CARDIOVASCULAR: normal S1 and S2; No lower extremity edema  ABDOMEN: Nontender to palpation, normoactive bowel sounds, no rebound/guarding; No hepatosplenomegaly  MUSCULOSKELETAL:  no cyanosis of digits; no joint swelling or tenderness to palpation  PSYCH: A+O to person, place, and time; affect appropriate   SKIN: No rashes; no palpable lesions    LABS:                        6.8    9.72  )-----------( 70       ( 30 Dec 2019 08:53 )             19.7     12-30    129<L>  |  98  |  14  ----------------------------<  95  3.8   |  22  |  0.47<L>    Ca    7.4<L>      30 Dec 2019 06:52    TPro  5.6<L>  /  Alb  2.1<L>  /  TBili  7.7<H>  /  DBili  x   /  AST  76<H>  /  ALT  42  /  AlkPhos  120  12-30    PT/INR - ( 30 Dec 2019 08:37 )   PT: 32.7 sec;   INR: 2.78 ratio         PTT - ( 30 Dec 2019 08:37 )  PTT:68.0 sec            RADIOLOGY & ADDITIONAL TESTS:  Results Reviewed:     < from: Transthoracic Echocardiogram (12.30.19 @ 06:04) >  Dimensions:    Normal Values:  LA:     4.7    2.0 - 4.0 cm  Ao:     3.2    2.0 - 3.8 cm  SEPTUM:0.8    0.6 - 1.2 cm  PWT:    0.9    0.6 - 1.1 cm  LVIDd:  5.4    3.0 - 5.6 cm  LVIDs:  2.9    1.8 - 4.0 cm  Derived variables:  LVMI: 80 g/m2  RWT: 0.33  EF (Visual Estimate): 75 %  Doppler Peak Velocity (m/sec): AoV=1.7  ------------------------------------------------------------------------  Observations:  Mitral Valve: Normal mitral valve.  Aortic Valve/Aorta: Normal aortic valve.  Normal aortic root size.  Left Atrium: Normal left atrium.  Left Ventricle: Normal left ventricular internal dimensions  and wall thicknesses.  Hyperdynamic left ventricular systolic function.  Right Heart: Normal right atrium. Normal right ventricular  size and function. Normal tricuspid valve. Minimal  tricuspid regurgitation. Normal pulmonic valve.  Pericardium/Pleura: Normal pericardium with no pericardial  effusion.  Left pleural effusion.  Hemodynamic: Estimated right atrial pressure normal.  No evidence of pulmonary hypertension.  No PFO seen with color Doppler.  ------------------------------------------------------------------------  Conclusions:  Hyperdynamic left ventricular systolic function.    < end of copied text >    Imaging Personally Reviewed:     Electrocardiogram Personally Reviewed:    COORDINATION OF CARE:  Care Discussed with Consultants/Other Providers [Y/N]: medicine NP  Prior or Outpatient Records Reviewed [Y/N]:

## 2019-12-31 ENCOUNTER — TRANSCRIPTION ENCOUNTER (OUTPATIENT)
Age: 44
End: 2019-12-31

## 2019-12-31 VITALS
TEMPERATURE: 99 F | RESPIRATION RATE: 18 BRPM | DIASTOLIC BLOOD PRESSURE: 52 MMHG | HEART RATE: 95 BPM | SYSTOLIC BLOOD PRESSURE: 110 MMHG | OXYGEN SATURATION: 95 %

## 2019-12-31 LAB
ALBUMIN SERPL ELPH-MCNC: 2.7 G/DL — LOW (ref 3.3–5)
ALP SERPL-CCNC: 145 U/L — HIGH (ref 40–120)
ALT FLD-CCNC: 42 U/L — SIGNIFICANT CHANGE UP (ref 10–45)
ANION GAP SERPL CALC-SCNC: 14 MMOL/L — SIGNIFICANT CHANGE UP (ref 5–17)
AST SERPL-CCNC: 76 U/L — HIGH (ref 10–40)
BILIRUB SERPL-MCNC: 9.2 MG/DL — HIGH (ref 0.2–1.2)
BUN SERPL-MCNC: 11 MG/DL — SIGNIFICANT CHANGE UP (ref 7–23)
CALCIUM SERPL-MCNC: 7.8 MG/DL — LOW (ref 8.4–10.5)
CHLORIDE SERPL-SCNC: 99 MMOL/L — SIGNIFICANT CHANGE UP (ref 96–108)
CO2 SERPL-SCNC: 21 MMOL/L — LOW (ref 22–31)
CREAT SERPL-MCNC: 0.43 MG/DL — LOW (ref 0.5–1.3)
GLUCOSE SERPL-MCNC: 92 MG/DL — SIGNIFICANT CHANGE UP (ref 70–99)
HCT VFR BLD CALC: 23.3 % — LOW (ref 39–50)
HGB BLD-MCNC: 7.8 G/DL — LOW (ref 13–17)
INR BLD: 2.41 RATIO — HIGH (ref 0.88–1.16)
MCHC RBC-ENTMCNC: 33.1 PG — SIGNIFICANT CHANGE UP (ref 27–34)
MCHC RBC-ENTMCNC: 33.5 GM/DL — SIGNIFICANT CHANGE UP (ref 32–36)
MCV RBC AUTO: 98.7 FL — SIGNIFICANT CHANGE UP (ref 80–100)
PHOSPHATIDYLETHANOL (PETH) - RESULT: NEGATIVE NG/ML — SIGNIFICANT CHANGE UP
PLATELET # BLD AUTO: 76 K/UL — LOW (ref 150–400)
POTASSIUM SERPL-MCNC: 3.1 MMOL/L — LOW (ref 3.5–5.3)
POTASSIUM SERPL-SCNC: 3.1 MMOL/L — LOW (ref 3.5–5.3)
PROT SERPL-MCNC: 6.4 G/DL — SIGNIFICANT CHANGE UP (ref 6–8.3)
PROTHROM AB SERPL-ACNC: 28 SEC — HIGH (ref 10–13.1)
RBC # BLD: 2.36 M/UL — LOW (ref 4.2–5.8)
RBC # FLD: 22.3 % — HIGH (ref 10.3–14.5)
SODIUM SERPL-SCNC: 134 MMOL/L — LOW (ref 135–145)
WBC # BLD: 10.22 K/UL — SIGNIFICANT CHANGE UP (ref 3.8–10.5)
WBC # FLD AUTO: 10.22 K/UL — SIGNIFICANT CHANGE UP (ref 3.8–10.5)

## 2019-12-31 PROCEDURE — 93005 ELECTROCARDIOGRAM TRACING: CPT

## 2019-12-31 PROCEDURE — 93970 EXTREMITY STUDY: CPT

## 2019-12-31 PROCEDURE — 86900 BLOOD TYPING SEROLOGIC ABO: CPT

## 2019-12-31 PROCEDURE — 85730 THROMBOPLASTIN TIME PARTIAL: CPT

## 2019-12-31 PROCEDURE — 84100 ASSAY OF PHOSPHORUS: CPT

## 2019-12-31 PROCEDURE — 86901 BLOOD TYPING SEROLOGIC RH(D): CPT

## 2019-12-31 PROCEDURE — 86850 RBC ANTIBODY SCREEN: CPT

## 2019-12-31 PROCEDURE — 76705 ECHO EXAM OF ABDOMEN: CPT

## 2019-12-31 PROCEDURE — 83935 ASSAY OF URINE OSMOLALITY: CPT

## 2019-12-31 PROCEDURE — 80053 COMPREHEN METABOLIC PANEL: CPT

## 2019-12-31 PROCEDURE — 84300 ASSAY OF URINE SODIUM: CPT

## 2019-12-31 PROCEDURE — 82140 ASSAY OF AMMONIA: CPT

## 2019-12-31 PROCEDURE — 99232 SBSQ HOSP IP/OBS MODERATE 35: CPT | Mod: GC

## 2019-12-31 PROCEDURE — 93306 TTE W/DOPPLER COMPLETE: CPT

## 2019-12-31 PROCEDURE — 85027 COMPLETE CBC AUTOMATED: CPT

## 2019-12-31 PROCEDURE — 82668 ASSAY OF ERYTHROPOIETIN: CPT

## 2019-12-31 PROCEDURE — P9047: CPT

## 2019-12-31 PROCEDURE — 85610 PROTHROMBIN TIME: CPT

## 2019-12-31 PROCEDURE — G0480: CPT

## 2019-12-31 PROCEDURE — 97161 PT EVAL LOW COMPLEX 20 MIN: CPT

## 2019-12-31 PROCEDURE — 99239 HOSP IP/OBS DSCHRG MGMT >30: CPT

## 2019-12-31 PROCEDURE — P9016: CPT

## 2019-12-31 PROCEDURE — 83735 ASSAY OF MAGNESIUM: CPT

## 2019-12-31 PROCEDURE — 36430 TRANSFUSION BLD/BLD COMPNT: CPT

## 2019-12-31 PROCEDURE — 84133 ASSAY OF URINE POTASSIUM: CPT

## 2019-12-31 PROCEDURE — 86923 COMPATIBILITY TEST ELECTRIC: CPT

## 2019-12-31 PROCEDURE — 83930 ASSAY OF BLOOD OSMOLALITY: CPT

## 2019-12-31 PROCEDURE — C1729: CPT

## 2019-12-31 PROCEDURE — 80048 BASIC METABOLIC PNL TOTAL CA: CPT

## 2019-12-31 PROCEDURE — 49083 ABD PARACENTESIS W/IMAGING: CPT

## 2019-12-31 RX ORDER — LACTULOSE 10 G/15ML
30 SOLUTION ORAL
Qty: 1400 | Refills: 0
Start: 2019-12-31 | End: 2020-01-14

## 2019-12-31 RX ORDER — FUROSEMIDE 40 MG
40 TABLET ORAL DAILY
Refills: 0 | Status: DISCONTINUED | OUTPATIENT
Start: 2020-01-01 | End: 2019-12-31

## 2019-12-31 RX ORDER — SODIUM CHLORIDE 0.65 %
2 AEROSOL, SPRAY (ML) NASAL
Qty: 1 | Refills: 0
Start: 2019-12-31 | End: 2020-01-09

## 2019-12-31 RX ORDER — CEPHALEXIN 500 MG
1 CAPSULE ORAL
Qty: 14 | Refills: 0
Start: 2019-12-31 | End: 2020-01-06

## 2019-12-31 RX ADMIN — Medication 100 MILLIGRAM(S): at 13:29

## 2019-12-31 RX ADMIN — Medication 40 MILLIGRAM(S): at 05:58

## 2019-12-31 RX ADMIN — Medication 2 SPRAY(S): at 01:10

## 2019-12-31 RX ADMIN — PANTOPRAZOLE SODIUM 40 MILLIGRAM(S): 20 TABLET, DELAYED RELEASE ORAL at 06:00

## 2019-12-31 RX ADMIN — Medication 2 SPRAY(S): at 05:58

## 2019-12-31 RX ADMIN — Medication 500 MILLIGRAM(S): at 13:29

## 2019-12-31 RX ADMIN — Medication 50 MILLILITER(S): at 05:59

## 2019-12-31 RX ADMIN — Medication 1 MILLIGRAM(S): at 13:29

## 2019-12-31 RX ADMIN — Medication 500 MILLIGRAM(S): at 05:58

## 2019-12-31 RX ADMIN — Medication 2 SPRAY(S): at 13:29

## 2019-12-31 RX ADMIN — Medication 1 TABLET(S): at 13:29

## 2019-12-31 RX ADMIN — POLYETHYLENE GLYCOL 3350 17 GRAM(S): 17 POWDER, FOR SOLUTION ORAL at 05:59

## 2019-12-31 RX ADMIN — LACTULOSE 20 GRAM(S): 10 SOLUTION ORAL at 05:58

## 2019-12-31 NOTE — PROGRESS NOTE ADULT - ATTENDING COMMENTS
Patient with decompensated alcoholic cirrhosis and now with epistaxis requiring transfusion.  Is now planning alcohol rehabilitation and will be considered for liver transplant evaluation as he controls his alcoholism.  suggest patient have outpatient appointmen tfor transplant evaluation.  Maintain nutrition with sodium restriction and fluid restriction.

## 2019-12-31 NOTE — PROGRESS NOTE ADULT - PROBLEM SELECTOR PLAN 7
-suspected aocd with component of acute blood loss due to epistaxis and known hemorrhoids/anal fistula/perirectal abscess with prior slow ooze; Appears to be ALBERTO on iron studies.   -monitor counts daily
-Hold with INR 2.5  Keep Mg  > 2 K >4
-Hold with INR 2.5  Keep Mg  > 2 K >4
-no active s/s of hemodynamically significant bleeding  -noted mildly downtrended hgb, monitor closely.   -suspected aocd but known hemorrhoids/anal fistula/perirectal abscess with prior slow ooze; possible mild slow blood loss component. Appears to be ALBERTO on iron studies.   -monitor counts daily
-no active s/s of hemodynamically significant bleeding  -noted mildly downtrended hgb, monitor closely.   -suspected aocd but known hemorrhoids/anal fistula/perirectal abscess with prior slow ooze; possible mild slow blood loss component. Appears to be ALBERTO on iron studies.   -monitor counts daily
-ordinarily would want to start vte ppx as pt is not autoanticoagulated despite coagulopathy with elevated INR; however, pt with mildly downtrended thrombocytopenia and slowly downtrended hgb.   -for now hold pharm vte ppx.  -once dvt LE ruled out can start SCD.
-suspected aocd with component of acute blood loss due to epistaxis and known hemorrhoids/anal fistula/perirectal abscess with prior slow ooze; Appears to be ALBERTO on iron studies.   -monitor counts daily
-ordinarily would want to start vte ppx as pt is not autoanticoagulated despite coagulopathy with elevated INR; however, pt with mildly downtrended thrombocytopenia and slowly downtrending hgb.   -for now hold pharm vte ppx.  -once dvt LE ruled out can start SCD.
-ordinarily would want to start vte ppx as pt is not autoanticoagulated despite coagulopathy with elevated INR; however, pt with mildly downtrended thrombocytopenia and slowly downtrended hgb.   -for now hold pharm vte ppx.  -once dvt LE ruled out can start SCD.

## 2019-12-31 NOTE — PROGRESS NOTE ADULT - PROBLEM SELECTOR PROBLEM 1
Alcoholic cirrhosis of liver with ascites
Epistaxis
Hyponatremia
Alcoholic cirrhosis of liver with ascites
Epistaxis
Hyponatremia
Alcoholic cirrhosis of liver with ascites
Epistaxis

## 2019-12-31 NOTE — DISCHARGE NOTE PROVIDER - CARE PROVIDERS DIRECT ADDRESSES
[email or web address]

## 2019-12-31 NOTE — PROGRESS NOTE ADULT - REASON FOR ADMISSION
decompensated cirrhosis

## 2019-12-31 NOTE — PROGRESS NOTE ADULT - PROBLEM SELECTOR PROBLEM 5
Thrombocytopenia
Lower extremity edema
Thrombocytopenia
Lower extremity edema
Lower extremity edema

## 2019-12-31 NOTE — PROGRESS NOTE ADULT - SUBJECTIVE AND OBJECTIVE BOX
[facility] Division of Hospital Medicine  Kristen Mccracken MD  Pager (M-F, 3Q-3O): [phone]  Other Times:  [phone]    Patient is a 44y old  Male who presents with a chief complaint of decompensated cirrhosis (31 Dec 2019 11:58)      SUBJECTIVE / OVERNIGHT EVENTS: no acute events overnight  ADDITIONAL REVIEW OF SYSTEMS: no acute complaints.     MEDICATIONS  (STANDING):  albumin human 25% IVPB 50 milliLiter(s) IV Intermittent every 8 hours  cephalexin 500 milliGRAM(s) Oral every 12 hours  ciprofloxacin     Tablet 500 milliGRAM(s) Oral daily  folic acid 1 milliGRAM(s) Oral daily  lactulose Syrup 20 Gram(s) Oral three times a day  multivitamin 1 Tablet(s) Oral daily  pantoprazole    Tablet 40 milliGRAM(s) Oral before breakfast  polyethylene glycol 3350 17 Gram(s) Oral two times a day  sodium chloride 0.65% Nasal 2 Spray(s) Both Nostrils four times a day  thiamine 100 milliGRAM(s) Oral daily    MEDICATIONS  (PRN):      CAPILLARY BLOOD GLUCOSE        I&O's Summary    30 Dec 2019 07:01  -  31 Dec 2019 07:00  --------------------------------------------------------  IN: 350 mL / OUT: 1800 mL / NET: -1450 mL    31 Dec 2019 07:01  -  31 Dec 2019 12:50  --------------------------------------------------------  IN: 240 mL / OUT: 950 mL / NET: -710 mL        PHYSICAL EXAM:  Vital Signs Last 24 Hrs  T(C): 37.2 (31 Dec 2019 12:30), Max: 37.3 (30 Dec 2019 14:26)  T(F): 99 (31 Dec 2019 12:30), Max: 99.1 (30 Dec 2019 14:26)  HR: 95 (31 Dec 2019 12:30) (88 - 95)  BP: 110/52 (31 Dec 2019 12:30) (101/56 - 110/52)  BP(mean): --  RR: 18 (31 Dec 2019 12:30) (18 - 18)  SpO2: 95% (31 Dec 2019 12:30) (93% - 95%)    CONSTITUTIONAL: NAD, well-developed, well-groomed  NECK: Supple, no palpable masses; no thyromegaly  RESPIRATORY: Normal respiratory effort; lungs are clear to auscultation bilaterally  CARDIOVASCULAR: normal S1 and S2, No lower extremity edema  ABDOMEN: slight distention, Nontender to palpation, normoactive bowel sounds, no rebound/guarding; No hepatosplenomegaly  MUSCULOSKELETAL:  no clubbing or cyanosis of digits; no joint swelling or tenderness to palpation  PSYCH: A+O to person, place, and time; affect appropriate   SKIN: No rashes; no palpable lesions    LABS:                        7.8    10.22 )-----------( 76       ( 31 Dec 2019 09:40 )             23.3     12-31    134<L>  |  99  |  11  ----------------------------<  92  3.1<L>   |  21<L>  |  0.43<L>    Ca    7.8<L>      31 Dec 2019 07:22    TPro  6.4  /  Alb  2.7<L>  /  TBili  9.2<H>  /  DBili  x   /  AST  76<H>  /  ALT  42  /  AlkPhos  145<H>  12-31    PT/INR - ( 31 Dec 2019 09:17 )   PT: 28.0 sec;   INR: 2.41 ratio         PTT - ( 30 Dec 2019 08:37 )  PTT:68.0 sec            RADIOLOGY & ADDITIONAL TESTS:  Results Reviewed:   Imaging Personally Reviewed:  Electrocardiogram Personally Reviewed:    COORDINATION OF CARE:  Care Discussed with Consultants/Other Providers [Y/N]: medicine NP, ENT  Prior or Outpatient Records Reviewed [Y/N]:

## 2019-12-31 NOTE — PROGRESS NOTE ADULT - PROBLEM SELECTOR PROBLEM 7
Anemia of chronic disease
Prophylactic measure

## 2019-12-31 NOTE — PROGRESS NOTE ADULT - PROBLEM SELECTOR PLAN 1
Problem: Epistaxis.  Plan: - gram-positive abx coverage for duration of packing placement  - Continue with Nasal Packing (Rapid Rhino)  - Strict blood pressure control.  - Nasal saline, 2 sprays to both nares 4 times a day  - Avoid nasal trauma; no nose rubbing, blowing or manipulating nasal packing.  Sneeze with mouth open and pinching nares.  - Avoid bending with head blow the waist.    -No heavy lifting.

## 2019-12-31 NOTE — PROGRESS NOTE ADULT - PROBLEM SELECTOR PLAN 3
-HypoNa low 120's. Suspecting hypervolemic hyponatremia from decompensated cirrhosis  -1L daily fluid restriction  -Lasix 40mg IV daily with albumin  -BMP Q12hrs
-HypoNa low 120's. Suspecting hypervolemic hyponatremia from decompensated cirrhosis  -1L daily fluid restriction  -Lasix 40mg IV daily with albumin  -BMP Q12hrs
-Noted coagulopathy 2/2 decompensated cirrhosis and overall decreased liver synthetic function; decreased TP and severe hypoalbuminemia.   -INR appears about stable between 2.7-3.5 over last month  -pt has been receiving PRN doses of oral vitamin K solution 10 mg po, last given 12/20/19  -trend INR, if uptrending consider additional vitamin K vs FFP.  No s/s of active bleeding currently.
-Noted coagulopathy 2/2 decompensated cirrhosis and overall decreased liver synthetic function; decreased TP and severe hypoalbuminemia.   -INR appears about stable between 2.7-3.5 over last month  -pt has been receiving PRN doses of oral vitamin K solution 10 mg po, last given 12/20/19 bleeding currently.
-Noted coagulopathy 2/2 decompensated cirrhosis and overall decreased liver synthetic function; decreased TP and severe hypoalbuminemia.   -INR appears about stable between 2.7-3.5 over last month  -pt has been receiving PRN doses of oral vitamin K solution 10 mg po, last given 12/20/19bleeding currently.
-Suspect hypervolemic hyponatremia from decompensated cirrhosis  -1L daily fluid restriction  -Lasix 40mg IV daily with albumin  -BMP daily
-Noted coagulopathy 2/2 decompensated cirrhosis and overall decreased liver synthetic function; decreased TP and severe hypoalbuminemia.   -INR appears about stable between 2.7-3.5 over last month  -pt has been receiving PRN doses of oral vitamin K solution 10 mg po, last given 12/20/19  -trend INR, if uptrending consider additional vitamin K vs FFP.  No s/s of active bleeding currently.
-Noted coagulopathy 2/2 decompensated cirrhosis and overall decreased liver synthetic function; decreased TP and severe hypoalbuminemia.   -INR appears about stable between 2.7-3.5 over last month  -pt has been receiving PRN doses of oral vitamin K solution 10 mg po, last given 12/20/19  -trend INR, if uptrending consider additional vitamin K vs FFP.  No s/s of active bleeding currently.
-Suspect hypervolemic hyponatremia from decompensated cirrhosis  -1L daily fluid restriction  -continue lasix. d/c albumin on discharge  -BMP daily

## 2019-12-31 NOTE — DISCHARGE NOTE PROVIDER - PROVIDER TOKENS
PROVIDER:[TOKEN:[[id number]],ESTABLISHEDPATIENT:[T]],PROVIDER:[[id number]],ESTABLISHEDPATIENT:[T]]

## 2019-12-31 NOTE — DISCHARGE NOTE PROVIDER - NSDCFUSCHEDAPPT_GEN_ALL_CORE_FT
DESIRE SWIFT ; 01/07/2020 ; NPP Hepatology 400 [facility]  DESIRE SWIFT ; 02/03/2020 ; NPP OtoLaryng 430 OP DESIRE SWIFT ; 01/07/2020 ; NPP Hepatology 400 [facility]  DESIRE SWIFT ; 02/03/2020 ; NPP OtoLaryng 430 OP DESIRE SWIFT ; 01/07/2020 ; NPP Hepatology 400 [facility]  DESIRE SWIFT ; 02/03/2020 ; NPP OtoLaryng 430 OP DESIRE SWIFT ; 01/06/2020 ; NPP OtoLaryng 430 OP  DESIRE SWIFT ; 01/07/2020 ; NPP Hepatology 400 [facility]

## 2019-12-31 NOTE — PROGRESS NOTE ADULT - PROBLEM SELECTOR PLAN 5
2/2 cirrhosis  stable
-recurrent presentation with anasarca, on exam pt appears to have asymmetric LLE   -prior duplex 12/1 noted negative.
-recurrent presentation with anasarca, on exam pt appears to have asymmetric LLE   -prior duplex 12/1 noted negative.  -F/U TTE
-recurrent presentation with anasarca, on exam pt appears to have asymmetric LLE   -prior duplex 12/1 noted negative.  -F/U TTE
2/2 cirrhosis  stable
-recurrent presentation with anasarca, on exam pt appears to have asymmetric LLE > RLE swelling.  -dopplers LE to r/o DVT pending  -prior duplex 12/1 noted negative.
-recurrent presentation with anasarca, on exam pt appears to have asymmetric LLE > RLE swelling.  -dopplers LE to r/o DVT pending  -prior duplex 12/1 noted negative.

## 2019-12-31 NOTE — PROGRESS NOTE ADULT - PROBLEM SELECTOR PROBLEM 8
Prophylactic measure
Discharge planning issues
Prophylactic measure
Discharge planning issues
Discharge planning issues

## 2019-12-31 NOTE — PROGRESS NOTE ADULT - PROBLEM SELECTOR PROBLEM 3
Chronic hyponatremia
Coagulopathy
Chronic hyponatremia

## 2019-12-31 NOTE — PROGRESS NOTE ADULT - PROBLEM SELECTOR PROBLEM 6
Lower extremity edema
Anemia of chronic disease
Lower extremity edema
Anemia of chronic disease
Anemia of chronic disease

## 2019-12-31 NOTE — DISCHARGE NOTE PROVIDER - NSDCFUADDINST_GEN_ALL_CORE_FT
You have an appt. with Dr. Thomas Marshall on 1/7 @ 2pm  You have an appt. with the ENT clinic @[address] [place] [address] on 1/6 @ 11:30am with Dr. Singh  You must f/u with your out-pt appts.

## 2019-12-31 NOTE — PROGRESS NOTE ADULT - ASSESSMENT
44 M PMH alcoholic decompensated cirrhosis, coagulopathy, chronic anemia, chronic thrombocytopenia found to have L epistaxis, controlled with nasal packing. H+H 7.8/23

## 2019-12-31 NOTE — DISCHARGE NOTE PROVIDER - CARE PROVIDER_API CALL
Thomas Marshall)  Gastroenterology; Internal Medicine  [address] [place] [address]  Phone: [phone]  Fax: [phone]  Established Patient  Follow Up Time:     Talha Singh)  Otolaryngology  [address] [place] [id number]  Phone: [phone]  Fax: [phone]  Established Patient  Follow Up Time:

## 2019-12-31 NOTE — PROGRESS NOTE ADULT - PROBLEM SELECTOR PROBLEM 2
Alcoholic cirrhosis of liver with ascites
Chronic hyponatremia
Alcoholic cirrhosis of liver with ascites

## 2019-12-31 NOTE — DISCHARGE NOTE PROVIDER - NSDCMRMEDTOKEN_GEN_ALL_CORE_FT
ciprofloxacin 500 mg oral tablet: 1 tab(s) orally once a day  eplerenone 50 mg oral tablet: 1 tab(s) orally once a day  folic acid 1 mg oral tablet: 1 tab(s) orally once a day  furosemide 40 mg oral tablet: 1 tab(s) orally once a day  Multiple Vitamins oral tablet: 1 tab(s) orally once a day  polyethylene glycol 3350 oral powder for reconstitution: 17 gram(s) orally every 12 hours, As Needed   Protonix 20 mg oral delayed release tablet: 1 tab(s) orally once a day   thiamine 100 mg oral tablet: 1 tab(s) orally once a day cephalexin 500 mg oral capsule: 1 cap(s) orally every 12 hours  ciprofloxacin 500 mg oral tablet: 1 tab(s) orally once a day  eplerenone 50 mg oral tablet: 1 tab(s) orally once a day  folic acid 1 mg oral tablet: 1 tab(s) orally once a day  furosemide 40 mg oral tablet: 1 tab(s) orally once a day  Multiple Vitamins oral tablet: 1 tab(s) orally once a day  polyethylene glycol 3350 oral powder for reconstitution: 17 gram(s) orally every 12 hours, As Needed   Protonix 20 mg oral delayed release tablet: 1 tab(s) orally once a day   thiamine 100 mg oral tablet: 1 tab(s) orally once a day cephalexin 500 mg oral capsule: 1 cap(s) orally every 12 hours  ciprofloxacin 500 mg oral tablet: 1 tab(s) orally once a day  folic acid 1 mg oral tablet: 1 tab(s) orally once a day  furosemide 40 mg oral tablet: 1 tab(s) orally once a day  lactulose 10 g/15 mL oral syrup: 30 milliliter(s) orally 3 times a day MDD:Maintain 3 BM&#x27;s per day  Multiple Vitamins oral tablet: 1 tab(s) orally once a day  polyethylene glycol 3350 oral powder for reconstitution: 17 gram(s) orally every 12 hours, As Needed   Protonix 20 mg oral delayed release tablet: 1 tab(s) orally once a day   sodium chloride 0.65% nasal spray: 2  in each affected nostril 4 times a day   thiamine 100 mg oral tablet: 1 tab(s) orally once a day

## 2019-12-31 NOTE — PROGRESS NOTE ADULT - ASSESSMENT
Impression:  43 yo HM with alcohol-related cirrhosis, transferred from [facility] to [facility] due to acute on chronic liver failure, refractory ascites, and hyponatremia for consideration of liver transplant evaluation.    # Decompensated alcoholic cirrhosis, MELD-Na 27 on 12/31     - HE: grade I, on lactulose     - ascites: present, s/p LVP x 2, last 12/27 with 7.2L removed.  On lasix 40mg daily. Not TIPS candidate due to high MELD-Na     - varices: perigastric and perisplenic varices with splenorenal shunt     - HCC: none CT 12/2019     - Transplant: Blood type A, not currently a candidate given h/o ETOH relapse, however patient will need to return to addiction rehab and be evaluated as an outpatient for liver transplant on ongoing basis  # Hyponatremia, improving     - on 1L fluid restriction  # Epistaxis, recurrent with anemia, packed currently by ENT.  On cephalexin for ppx      Recommendations:  - ok for discharge from hepatology perspective.  He will need close outpatient followup with transplant hepatology, as well as a well established substance abuse rehab plan   - followup with ENT re: timing of packing removal, continuation of antibiotics to prevent sinusitis, etc  - c/w 1L fluid restriction at home (discussed w/ patient) as well as Glucerna or Ensure supplementation 1-2 cans per day  - c/w lactulose 20gm daily and miralax BID, as well as Xifaxan BID for HE  - c/w cipro for SBP ppx given low ascitic fluid protein  - continue with lasix 40mg daily for ascites management  - outpatient followup with Dr. Marshall in liver center ([phone])  - patient will need to be plugged into outpatient or inpatient rehab again on discharge given ETOH relapse

## 2019-12-31 NOTE — PROGRESS NOTE ADULT - PROBLEM SELECTOR PLAN 2
-HypoNa low 120's. Suspecting ?SIADH picture in addition to noted hypervolemic hyponatremia from decompensated cirrhosis  -1L daily fluid restriction  -Lasix 40mg PO daily  -BMP Q6H, per renal. Do not overcorrect.   -Ure-Na 15mg BID, per renal.   -Had received tolvaptan at OSH, but held due to worsened LFT's.  -F/u urine studies.
-HypoNa low 120's. Suspecting hypervolemic hyponatremia from decompensated cirrhosis  -1L daily fluid restriction, consult IR for para  -Lasix 40mg PO daily  -BMP Q12hrs
-HypoNa low 120's. Suspecting hypervolemic hyponatremia from decompensated cirrhosis  -1L daily fluid restriction, repeat Para today  -Lasix 40mg IV daily with albumin  -BMP Q12hrs
-decompensated cirrhosis, presented to [facility] for sob/anasarca/abd distention, CTAP with moderate refractory ascites, s/p LVP 5L out + albumin, repeat para today and DERMABOND SITE TO AVOID LEAKING AN AREA FOR INFECTION.   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    -Lasix to 40mg BID iv with albumin now  -c/w 1L daily fluid restriction  -c/w Cipro daily po sbp ppx  -abstinent from etoh x 3 months.  -c/w thiamine, MVI, folate daily.   -MRI 12/2019 neg hepatoma, +portal HTN with perisplenic varices. EGD 7/2018 neg varices, +gastritis/esophagitis neg H pylori. [place] 7/2018 +hemorrhoids/anal fistula/diverticulosis. AFP neg.   -If not candidate for transplant, consider repeat eval for TIPS for refractory ascites.
-decompensated cirrhosis, presented to [facility] for sob/anasarca/abd distention, CTAP with moderate refractory ascites, s/p LVP 5L out + albumin, repeat para today and DERMABOND SITE TO AVOID LEAKING AN AREA FOR INFECTION.   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    -Lasix to 40mg BID iv with albumin now  -c/w 1L daily fluid restriction  -c/w Cipro daily po sbp ppx  -abstinent from etoh x 3 months.  -c/w thiamine, MVI, folate daily.   -MRI 12/2019 neg hepatoma, +portal HTN with perisplenic varices. EGD 7/2018 neg varices, +gastritis/esophagitis neg H pylori. [place] 7/2018 +hemorrhoids/anal fistula/diverticulosis. AFP neg.   -If not candidate for transplant, consider repeat eval for TIPS for refractory ascites.
-decompensated cirrhosis, presented to [facility] for sob/anasarca/abd distention, CTAP with moderate refractory ascites, s/p LVP 5L out + albumin, s/p para 12/20, 12/27 with DERMABOND SITE TO AVOID LEAKING AN AREA FOR INFECTION.   - varices:  perigastric and perisplenic varices with splenorenal shunt  - ascites: yes, s/p large volume paracentesis on 12/20 with 5L AND AGAIN 12/27  - SPE: present grade 1 at least, Lactulose 20mg PO 3 times daily  - HCC: none CT 12/2019  -Monitor CBC, coags, LFT's daily.    -Lasix to 40mg BID iv with albumin   -c/w 1L daily fluid restriction  -c/w Cipro daily po sbp ppx  -abstinent from etoh x 3 months.  -c/w thiamine, MVI, folate daily.   -MRI 12/2019 neg hepatoma, +portal HTN with perisplenic varices. EGD 7/2018 neg varices, +gastritis/esophagitis neg H pylori. [place] 7/2018 +hemorrhoids/anal fistula/diverticulosis. AFP neg.   -currently not a candidate for TIPS (due to increased MELD score) and transplant (due to hx of relapse) at this time per hepatology
-HypoNa 122 uptrended from 119, down to 120 today. Suspecting ?SIADH picture in addition to noted hypervolemic hyponatremia from decompensated cirrhosis  -c/w maintenance diuretics noted above; hold off on IV diuretics at this point  -1L daily fluid restriction  -daily bmp unless significantly downtrending; this appears chronic and correction can occur more slowly.  -May need renal eval.  -Had received tolvaptan at OSH, but held due to worsened LFT's.
-HypoNa low 120's. Suspecting ?SIADH picture in addition to noted hypervolemic hyponatremia from decompensated cirrhosis  -c/w diuretics as noted above; hold off on IV diuretics at this point  -1L daily fluid restriction  -BMP Q6H, per renal. Do not overcorrect.   -Ure-Na 15mg BID, per renal.   -Had received tolvaptan at OSH, but held due to worsened LFT's.  -F/u urine studies.
-decompensated cirrhosis, presented to [facility] for sob/anasarca/abd distention, CTAP with moderate refractory ascites, s/p LVP 5L out + albumin, s/p para 12/20, 12/27 with DERMABOND SITE TO AVOID LEAKING AN AREA FOR INFECTION.   - varices:  perigastric and perisplenic varices with splenorenal shunt  - ascites: yes, s/p large volume paracentesis on 12/20 with 5L AND AGAIN 12/27  - SPE: present grade 1 at least, Lactulose 20mg PO 3 times daily  - HCC: none CT 12/2019  -Monitor CBC, coags, LFT's daily.    -change lasix to 40mg PO daily. d/c albumin   -c/w 1L daily fluid restriction  -c/w Cipro daily po sbp ppx  -abstinent from etoh x 3 months.  -c/w thiamine, MVI, folate daily.   -MRI 12/2019 neg hepatoma, +portal HTN with perisplenic varices. EGD 7/2018 neg varices, +gastritis/esophagitis neg H pylori. [place] 7/2018 +hemorrhoids/anal fistula/diverticulosis. AFP neg.   -currently not a candidate for TIPS (due to increased MELD score) and transplant (due to hx of relapse) at this time per hepatology

## 2019-12-31 NOTE — PROGRESS NOTE ADULT - PROVIDER SPECIALTY LIST ADULT
ENT
Hepatology
Hospitalist
Internal Medicine
Intervent Radiology
Nephrology
ENT
Internal Medicine

## 2019-12-31 NOTE — PROGRESS NOTE ADULT - SUBJECTIVE AND OBJECTIVE BOX
ENT ISSUE/POD: epistaxis    HPI: 44 M PMH alcoholic decompensated cirrhosis, coagulopathy, chronic anemia, chronic thrombocytopenia, here for hepatology evaluation. ENT consulted as pt began to have a nose bleed after blowing his nose. Reports has been having nose bleeds more frequently lately. Now with 8cm merocel in right nare. Coags have been abnormal secondary to the severe cirrhosis. H/H 7.1/21.3 ; INR 2.62; Plt 71, Patient reports the merocel came out and he scratched his nose and rebled. No epistaxis overnight.         PAST MEDICAL & SURGICAL HISTORY:  Liver cirrhosis, alcoholic  Anemia  Thrombocytopenia  ETOH abuse  History of incision and drainage: Gluteal abscess    Allergies    No Known Allergies    Intolerances      MEDICATIONS  (STANDING):  albumin human 25% IVPB 50 milliLiter(s) IV Intermittent every 8 hours  cephalexin 500 milliGRAM(s) Oral every 12 hours  ciprofloxacin     Tablet 500 milliGRAM(s) Oral daily  folic acid 1 milliGRAM(s) Oral daily  furosemide   Injectable 40 milliGRAM(s) IV Push daily  lactulose Syrup 20 Gram(s) Oral three times a day  multivitamin 1 Tablet(s) Oral daily  pantoprazole    Tablet 40 milliGRAM(s) Oral before breakfast  polyethylene glycol 3350 17 Gram(s) Oral two times a day  sodium chloride 0.65% Nasal 2 Spray(s) Both Nostrils four times a day  thiamine 100 milliGRAM(s) Oral daily    MEDICATIONS  (PRN):      Social History: see consult    Family history: see consult    ROS:   ENT: all negative except as noted in HPI   Pulm: denies SOB, cough, hemoptysis  Neuro: denies numbness/tingling, loss of sensation  Endo: denies heat/cold intolerance, excessive sweating      Vital Signs Last 24 Hrs  T(C): 37.1 (31 Dec 2019 05:02), Max: 37.3 (30 Dec 2019 14:26)  T(F): 98.7 (31 Dec 2019 05:02), Max: 99.1 (30 Dec 2019 14:26)  HR: 93 (31 Dec 2019 05:02) (88 - 94)  BP: 101/56 (31 Dec 2019 05:02) (101/56 - 106/68)  BP(mean): --  RR: 18 (31 Dec 2019 05:02) (18 - 18)  SpO2: 94% (31 Dec 2019 05:02) (93% - 94%)                          7.8    10.22 )-----------( 76       ( 31 Dec 2019 09:40 )             23.3    12-31    134<L>  |  99  |  11  ----------------------------<  92  3.1<L>   |  21<L>  |  0.43<L>    Ca    7.8<L>      31 Dec 2019 07:22    TPro  6.4  /  Alb  2.7<L>  /  TBili  9.2<H>  /  DBili  x   /  AST  76<H>  /  ALT  42  /  AlkPhos  145<H>  12-31   PT/INR - ( 31 Dec 2019 09:17 )   PT: 28.0 sec;   INR: 2.41 ratio         PTT - ( 30 Dec 2019 08:37 )  PTT:68.0 sec    PHYSICAL EXAM:  Gen: NAD  Skin: No rashes, bruises, or lesions  Head: Normocephalic, Atraumatic  Face: no edema, erythema, or fluctuance. Parotid glands soft without mass  Eyes: no scleral injection  Nose: Merocel packing in Right nare with dry blood around it, no active bleeding, no purulence, L nare dry and patent.   Mouth: No Stridor / Drooling / Trismus.  Mucosa moist, tongue/uvula midline, oropharynx clear  Neck: Flat, supple, no lymphadenopathy, trachea midline, no masses  Lymphatic: No lymphadenopathy  Resp: breathing easily, no stridor  Neuro: facial nerve intact, no facial droop

## 2019-12-31 NOTE — PROGRESS NOTE ADULT - PROBLEM SELECTOR PLAN 9
Transitions of Care Status:  1.  Name of PCP: Dr. Skaggs ([place] [facility]) has never seen yet and will f/u with our liver team on discharge. patient has appointment with Dr. Marshall on 1/7 at 2pm. d/c time 45 mins.  2.  PCP Contacted on Admission: [ ] Y    [x ] N    3.  PCP contacted at Discharge: [ ] Y    [ ] N    [ ] N/A  4.  Post-Discharge Appointment Date and Location:  5.  Summary of Handoff given to PCP: Transitions of Care Status:  1.  Name of PCP: Dr. Skaggs ([place] [facility]) has never seen yet and will f/u with our liver team on discharge. patient has appointment with Dr. Masrhall on 1/7 at 2pm and ENT clinic on 1/6 at 11:30am. d/c time 45 mins.  2.  PCP Contacted on Admission: [ ] Y    [x ] N    3.  PCP contacted at Discharge: [ ] Y    [ ] N    [ ] N/A  4.  Post-Discharge Appointment Date and Location:  5.  Summary of Handoff given to PCP:

## 2019-12-31 NOTE — PROGRESS NOTE ADULT - PROBLEM SELECTOR PLAN 1
Continue with nasal packing in right nare, no active bleeding noted  -s/p 1unit of pRBC transfusion and vitamin K PO x1 on 12/30  -continue keflex while packing in place  -patient to follow up with ENT as outpatient to have it removed  -monitor cbc and transfuse as needed

## 2019-12-31 NOTE — PROGRESS NOTE ADULT - PROBLEM SELECTOR PLAN 6
-recurrent presentation with anasarca  -LE duplex 12/23 negative.  -TTE 12/30 with hyperdynamic LV
-no active s/s of hemodynamically significant bleeding  -noted mildly downtrended hgb, monitor closely.   -suspected aocd but known hemorrhoids/anal fistula/perirectal abscess with prior slow ooze; possible mild slow blood loss component. Appears to be ALBERTO on iron studies.   -monitor counts daily
-recurrent presentation with anasarca  -LE duplex 12/23 negative.  -TTE 12/30 with hyperdynamic LV
-recurrent presentation with anasarca, on exam pt appears to have asymmetric LLE   -prior duplex 12/1 noted negative.  -F/U TTE
-recurrent presentation with anasarca, on exam pt appears to have asymmetric LLE   -prior duplex 12/1 noted negative.  -F/U TTE
-no active s/s of hemodynamically significant bleeding  -noted mildly downtrended hgb, monitor closely.   -suspect aocd but known hemorrhoids/anal fistula/perirectal abscess with prior slow ooze; possible mild slow blood loss component. Appears to be ALBERTO on iron studies.   -monitor counts daily
-no active s/s of hemodynamically significant bleeding  -noted mildly downtrended hgb, monitor closely.   -suspected aocd but known hemorrhoids/anal fistula/perirectal abscess with prior slow ooze; possible mild slow blood loss component. Appears to be ALBERTO on iron studies.   -monitor counts daily

## 2019-12-31 NOTE — PROGRESS NOTE ADULT - PROBLEM SELECTOR PROBLEM 4
Coagulopathy
Thrombocytopenia

## 2019-12-31 NOTE — PROGRESS NOTE ADULT - ASSESSMENT
43 y/o M PMH alcoholic cirrhosis with ascites initially dx 2018, continual EtOH abuse post diagnosis now abstinent x 3 months, transferred from [facility] for further management of decompensated cirrhosis with ascites, coagulopathy, hyponatremia. Course c/b epistaxis requiring nasal packing by ENT and blood transfusion.

## 2019-12-31 NOTE — PROGRESS NOTE ADULT - PROBLEM SELECTOR PLAN 4
-Noted coagulopathy 2/2 decompensated cirrhosis and overall decreased liver synthetic function; decreased TP and severe hypoalbuminemia.   -INR appears about stable between 2.7-3.5 over last month  -pt has been receiving PRN doses of oral vitamin K solution 10 mg po due to epistaxis
-Noted coagulopathy 2/2 decompensated cirrhosis and overall decreased liver synthetic function; decreased TP and severe hypoalbuminemia.   -INR appears about stable between 2.7-3.5 over last month  -pt has been receiving PRN doses of oral vitamin K solution 10 mg po due to epistaxis
-Noted coagulopathy 2/2 decompensated cirrhosis and overall decreased liver synthetic function; decreased TP and severe hypoalbuminemia.   -INR appears about stable between 2.7-3.5 over last month  -pt has been receiving PRN doses of oral vitamin K solution 10 mg po, last given 12/20/19 bleeding currently.
-Noted coagulopathy 2/2 decompensated cirrhosis and overall decreased liver synthetic function; decreased TP and severe hypoalbuminemia.   -INR appears about stable between 2.7-3.5 over last month  -pt has been receiving PRN doses of oral vitamin K solution 10 mg po, last given 12/20/19 bleeding currently.
-stable, chronic 2/2 decompensated cirrhosis  -no active s/s of hemodynamically significant bleeding  -noted mildly downtrended hgb, continue to monitor closely.   -suspected aocd but known hemorrhoids/anal fistula/perirectal abscess with prior slow ooze; possible mild slow blood loss component. Appears to be ALBERTO on iron studies.   -monitor counts daily
2/2 cirrhosis  stable
2/2 cirrhosis  stable
-stable, chronic 2/2 decompensated cirrhosis  -no active s/s of hemodynamically significant bleeding  -noted mildly downtrended hgb, continue to monitor closely.   -suspect aocd but known hemorrhoids/anal fistula/perirectal abscess with prior slow ooze; possible mild slow blood loss component. Appears to be ALBERTO on iron studies.   -monitor counts daily
-stable, chronic 2/2 decompensated cirrhosis  -no active s/s of hemodynamically significant bleeding  -noted mildly downtrended hgb, continue to monitor closely.   -suspected aocd but known hemorrhoids/anal fistula/perirectal abscess with prior slow ooze; possible mild slow blood loss component. Appears to be ALBERTO on iron studies.   -monitor counts daily

## 2019-12-31 NOTE — DISCHARGE NOTE PROVIDER - NSDCCPCAREPLAN_GEN_ALL_CORE_FT
PRINCIPAL DISCHARGE DIAGNOSIS  Diagnosis: Alcoholic cirrhosis of liver with ascites  Assessment and Plan of Treatment: Alcoholic cirrhosis of liver with ascites  Alcoholic cirrhosis of liver with ascites. decompensated cirrhosis, presented to [facility] for sob/anasarca/abd distention, CTAP with moderate refractory ascites, s/p LVP 5L out + albumin, s/p para 12/20, 12/27 with DERMABOND SITE TO AVOID LEAKING AN AREA FOR INFECTION.   - varices:  perigastric and perisplenic varices with splenorenal shunt  - ascites: yes, s/p large volume paracentesis on 12/20 with 5L AND AGAIN 12/27  - SPE: present grade 1 at least, Lactulose 20mg PO 3 times daily  - HCC: none CT 12/2019  -Monitor CBC, coags, LFT's daily.    -change lasix to 40mg PO daily. d/c albumin   -c/w 1L daily fluid restriction  -c/w Cipro daily po sbp ppx  -abstinent from etoh x 3 months.  -c/w thiamine, MVI, folate daily.   -MRI 12/2019 neg hepatoma, +portal HTN with perisplenic varices. EGD 7/2018 neg varices, +gastritis/esophagitis neg H pylori. [place] 7/2018 +hemorrhoids/anal fistula/diverticulosis. AFP neg.   -currently not a candidate for TIPS (due to increased MELD score) and transplant (due to hx of relapse) at this time per hepatology.      SECONDARY DISCHARGE DIAGNOSES  Diagnosis: Epistaxis  Assessment and Plan of Treatment: Epistaxis - seen and follwed by ENT   Continue with nasal packing in right nare, no active bleeding noted  -s/p 1unit of pRBC transfusion and vitamin K PO x1 on 12/30  -continue keflex while packing in place  -patient to follow up with ENT as outpatient to have it removed  -monitor cbc and transfuse as needed.    Diagnosis: Coagulopathy  Assessment and Plan of Treatment: Coagulopathy 2/2 decompensated cirrhosis and overall decreased liver synthetic function; decreased TP and severe hypoalbuminemia.   -INR appears about stable between 2.7-3.5 over last month  -pt has been receiving PRN doses of oral vitamin K solution 10 mg po due to epistaxis.       Diagnosis: Thrombocytopenia  Assessment and Plan of Treatment: Thrombocytopenia -  2/2 cirrhosis / stable.    Diagnosis: Lower extremity edema  Assessment and Plan of Treatment: Lower extremity edema - recurrent presentation with anasarca  -LE duplex 12/23 negative.  -TTE 12/30 with hyperdynamic LV.    Diagnosis: Anemia of chronic disease  Assessment and Plan of Treatment: Anemia of chronic disease - suspected aocd with component of acute blood loss due to epistaxis and known hemorrhoids/ anal fistula / perirectal abscess with prior slow ooze;   Appears to be ALBERTO on iron studies  - monitored lab counts daily.

## 2019-12-31 NOTE — PROGRESS NOTE ADULT - SUBJECTIVE AND OBJECTIVE BOX
Chief Complaint:  Patient is a 44y old  Male who presents with a chief complaint of decompensated cirrhosis (31 Dec 2019 10:18)      Interval Events: No adverse events overnight.  Likely discharge home soon.    Allergies:  No Known Allergies      Hospital Medications:  albumin human 25% IVPB 50 milliLiter(s) IV Intermittent every 8 hours  cephalexin 500 milliGRAM(s) Oral every 12 hours  ciprofloxacin     Tablet 500 milliGRAM(s) Oral daily  folic acid 1 milliGRAM(s) Oral daily  furosemide   Injectable 40 milliGRAM(s) IV Push daily  lactulose Syrup 20 Gram(s) Oral three times a day  multivitamin 1 Tablet(s) Oral daily  pantoprazole    Tablet 40 milliGRAM(s) Oral before breakfast  polyethylene glycol 3350 17 Gram(s) Oral two times a day  sodium chloride 0.65% Nasal 2 Spray(s) Both Nostrils four times a day  thiamine 100 milliGRAM(s) Oral daily      PMHX/PSHX:  Liver cirrhosis, alcoholic  Anemia  Thrombocytopenia  Nosebleed  ETOH abuse  History of incision and drainage  No significant past surgical history      Family history:  Family history of stroke (Father, Mother)      ROS:     General:  No wt loss, fevers, chills, night sweats, fatigue,   Eyes:  Good vision, no reported pain  ENT:  No sore throat, pain, runny nose, dysphagia  CV:  No pain, palpitations, hypo/hypertension  Resp:  No dyspnea, cough, tachypnea, wheezing  GI:  See HPI  :  No pain, bleeding, incontinence, nocturia  Muscle:  No pain, weakness  Neuro:  No weakness, tingling, memory problems  Psych:  No fatigue, insomnia, mood problems, depression  Endocrine:  No polyuria, polydipsia, cold/heat intolerance  Heme:  No petechiae, ecchymosis, easy bruisability  Skin:  No rash, edema      PHYSICAL EXAM:     GENERAL: NAD  HEENT:  NC/AT, packing in right nare  CHEST:  Full & symmetric excursion, no increased effort  ABDOMEN:  Soft, non-tender, non-distended, +BS  EXTREMITIES:  no edema  SKIN:  jaundice  NEURO:  Alert      Vital Signs:  Vital Signs Last 24 Hrs  T(C): 37.1 (31 Dec 2019 05:02), Max: 37.3 (30 Dec 2019 14:26)  T(F): 98.7 (31 Dec 2019 05:02), Max: 99.1 (30 Dec 2019 14:26)  HR: 93 (31 Dec 2019 05:02) (88 - 94)  BP: 101/56 (31 Dec 2019 05:02) (101/56 - 106/68)  BP(mean): --  RR: 18 (31 Dec 2019 05:02) (18 - 18)  SpO2: 94% (31 Dec 2019 05:02) (93% - 94%)  Daily     Daily Weight in k.1 (31 Dec 2019 05:02)    LABS:                        7.8    10.22 )-----------( 76       ( 31 Dec 2019 09:40 )             23.3         134<L>  |  99  |  11  ----------------------------<  92  3.1<L>   |  21<L>  |  0.43<L>    Ca    7.8<L>      31 Dec 2019 07:22    TPro  6.4  /  Alb  2.7<L>  /  TBili  9.2<H>  /  DBili  x   /  AST  76<H>  /  ALT  42  /  AlkPhos  145<H>  12    LIVER FUNCTIONS - ( 31 Dec 2019 07:22 )  Alb: 2.7 g/dL / Pro: 6.4 g/dL / ALK PHOS: 145 U/L / ALT: 42 U/L / AST: 76 U/L / GGT: x           PT/INR - ( 31 Dec 2019 09:17 )   PT: 28.0 sec;   INR: 2.41 ratio         PTT - ( 30 Dec 2019 08:37 )  PTT:68.0 sec        Imaging:  reviewed

## 2019-12-31 NOTE — DISCHARGE NOTE PROVIDER - HOSPITAL COURSE
This is  a 43 y/o M PMH alcoholic cirrhosis with ascites initially dx 2018, continual EtOH abuse post diagnosis now abstinent x 3 months, transferred from [facility] on 12/20 for further management of decompensated cirrhosis with ascites, coagulopathy, hyponatremia. Course c/b epistaxis requiring nasal packing by ENT and blood transfusion.         Alcoholic cirrhosis of liver with ascites / decompensated cirrhosis, presented to [facility] for sob/anasarca/abd distention    CTAP with moderate refractory ascites, s/p LVP 5L out + albumin, s/p para 12/20, 12/27 with DERMABOND SITE TO AVOID LEAKING AN AREA FOR INFECTION.     - varices:  perigastric and perisplenic varices with splenorenal shunt    - ascites: yes, s/p large volume paracentesis on 12/20 with 5L AND AGAIN 12/27    - SPE: present grade 1 at least, Lactulose 20mg PO 3 times daily    - HCC: none CT 12/2019    - Monitor CBC, coags, LFT's daily.      - change lasix to 40mg PO daily. d/c albumin     - c/w 1L daily fluid restriction    - c/w Cipro daily po sbp ppx    - abstinent from etoh x 3 months.    - c/w thiamine, MVI, folate daily.     - MRI 12/2019 neg hepatoma, +portal HTN with perisplenic varices. EGD 7/2018 neg varices, +gastritis/esophagitis neg H pylori. [place] 7/2018 +hemorrhoids/anal fistula/diverticulosis. AFP neg.     -currently not a candidate for TIPS (due to increased MELD score) and transplant (due to hx of relapse) at this time per hepatology.         Epistaxis. Continue with nasal packing in right nare, no active bleeding noted    -s/p 1unit of pRBC transfusion and vitamin K PO x1 on 12/30    -continue keflex while packing in place    -patient to follow up with ENT as outpatient to have it removed    -monitor cbc and transfuse as needed.         Chronic hyponatremia. -Suspect hypervolemic hyponatremia from decompensated cirrhosis    -1L daily fluid restriction    -continue lasix. d/c albumin on discharge    -BMP daily.         Coagulopathy. -Noted coagulopathy 2/2 decompensated cirrhosis and overall decreased liver synthetic function; decreased TP and severe hypoalbuminemia.     -INR appears about stable between 2.7-3.5 over last month    -pt has been receiving PRN doses of oral vitamin K solution 10 mg po due to epistaxis.         Thrombocytopenia. 2/2 cirrhosis / stable.         Lower extremity edema - recurrent presentation with anasarca    -LE duplex 12/23 negative.    -TTE 12/30 with hyperdynamic LV.        Anemia of chronic disease. -suspected aocd with component of acute blood loss due to epistaxis and known hemorrhoids/ anal fistula / perirectal abscess with prior slow ooze;     Appears to be ALBERTO on iron studies  - monitor lab counts daily.         On 12/31  - pt. discharged home and adds that he has a ride to go home This is  a 45 y/o M PMH alcoholic cirrhosis with ascites initially dx 2018, continual EtOH abuse post diagnosis now abstinent x 3 months, transferred from [facility] on 12/20 for further management of decompensated cirrhosis with ascites, coagulopathy, hyponatremia. Course c/b epistaxis requiring nasal packing by ENT and blood transfusion.         Alcoholic cirrhosis of liver with ascites / decompensated cirrhosis, presented to [facility] for sob/anasarca/abd distention    CTAP with moderate refractory ascites, s/p LVP 5L out + albumin, s/p para 12/20, 12/27 with DERMABOND SITE TO AVOID LEAKING AN AREA FOR INFECTION.     - treated with lasix and albumin.     - 1L daily fluid restriction    - Cipro daily po sbp ppx    - MRI 12/2019 neg hepatoma, +portal HTN with perisplenic varices. EGD 7/2018 neg varices, +gastritis/esophagitis neg H pylori. [place] 7/2018 +hemorrhoids/anal fistula/diverticulosis. AFP neg.     -currently not a candidate for TIPS (due to increased MELD score) and transplant (due to hx of relapse) at this time per hepatology.         Epistaxis. Continue with nasal packing in right nare    -required PRBC transfusion and vit K    -on keflex while packing in place    -patient to follow up with ENT as outpatient to have it removed on 1/6/20 at ENT clinic        Hyponatremia, suspect due to hypervolemic hyponatremia from decompensated cirrhosis    -1L daily fluid restriction    -treated with lasix/albumin while inpatient        Lower extremity edema resolved    -LE duplex 12/23 negative.    -TTE 12/30 with hyperdynamic LV.        Patient remained medically stable for d/c home with outpatient follow up with hepatology on 1/7/20 and ENT on 1/6/20.

## 2020-01-05 NOTE — PROGRESS NOTE ADULT - SUBJECTIVE AND OBJECTIVE BOX
We are following the patient for Alcoholic cirrhosis/ Hepatitis     GI HPI Today:    PAST MEDICAL & SURGICAL HISTORY  Liver cirrhosis, alcoholic  Anemia  Thrombocytopenia  ETOH abuse  History of incision and drainage: Gluteal abscess      ALLERGIES:  No Known Allergies      MEDICATIONS:  MEDICATIONS  (STANDING):  chlorhexidine 4% Liquid 1 Application(s) Topical daily  ciprofloxacin   IVPB 400 milliGRAM(s) IV Intermittent every 12 hours  folic acid 1 milliGRAM(s) Oral daily  influenza   Vaccine 0.5 milliLiter(s) IntraMuscular once  metroNIDAZOLE  IVPB      metroNIDAZOLE  IVPB 500 milliGRAM(s) IV Intermittent every 8 hours  multivitamin 1 Tablet(s) Oral daily  prednisoLONE    3 mG/mL Solution (ORAPRED) 40 milliGRAM(s) Oral daily  sodium chloride 0.9%. 1000 milliLiter(s) (50 mL/Hr) IV Continuous <Continuous>  spironolactone 100 milliGRAM(s) Oral daily  thiamine 100 milliGRAM(s) Oral daily  vancomycin  IVPB      vancomycin  IVPB 1500 milliGRAM(s) IV Intermittent once    MEDICATIONS  (PRN):  chlordiazePOXIDE 50 milliGRAM(s) Oral every 1 hour PRN Symptom-triggered: each CIWA -Ar score 8 or GREATER      REVIEW OF SYSTEMS  General:  No fevers  Eyes:  No reported pain   ENT:  No sore throat   NECK: No stiffness   CV:  No chest pain   Resp:  No shortness of breath  GI:  See HPI  :  No dysuria  Muscle:  ++ weakness  Neuro:  No tingling  Endocrine:  No polyuria  Heme:  No ecchymosis        VITALS:   T(F): 97 (12-03 @ 06:13), Max: 99.2 (12-02 @ 01:22)  HR: 95 (12-03 @ 06:13) (80 - 105)  BP: 131/59 (12-03 @ 06:13) (97/53 - 142/72)  BP(mean): --  RR: 18 (12-03 @ 06:13) (18 - 22)  SpO2: 95% (12-02 @ 19:58) (95% - 100%)        PHYSICAL EXAM:  GENERAL:  Appears in no distress  HEENT:  Conjunctivae icteric   CHEST:  Full & symmetric excursion  HEART:  NS1, S2,   ABDOMEN:  Soft, non-tender, +++ distended  SKIN:  No rash  NEURO:  Alert         Blood Work :                        9.0    17.63 )-----------( 107      ( 03 Dec 2019 06:42 )             27.6     PT/INR - ( 02 Dec 2019 20:15 )  INR: 3.25          PTT - ( 01 Dec 2019 11:18 )  PTT:47.4<H>  12-03    126<L>  |  96<L>  |  13  ----------------------------<  106<H>  4.6   |  21  |  0.6<L>    Ca    7.3<L>      03 Dec 2019 06:42  Phos  3.3     11-30  Mg     1.9     12-01      CBC -  ( 03 Dec 2019 06:42 )  Hemoglobin : 9.0    CBC -  ( 02 Dec 2019 16:27 )  Hemoglobin : 8.9    CBC -  ( 02 Dec 2019 09:05 )  Hemoglobin : 9.0    CBC -  ( 01 Dec 2019 11:18 )  Hemoglobin : 8.9    CBC -  ( 30 Nov 2019 23:20 )  Hemoglobin : 8.8    CBC -  ( 30 Nov 2019 17:00 )  Hemoglobin : 9.1      LIVER FUNCTIONS - ( 03 Dec 2019 06:42 )  Alb: 1.8 [3.5 - 5.2] / Pro: 6.7 [6.0 - 8.0] / ALK PHOS: 199 [30 - 115] / ALT: 60 [0 - 41] / AST: 108 [0 - 41] / GGT: x     LIVER FUNCTIONS - ( 02 Dec 2019 09:05 )  Alb: 1.8 [3.5 - 5.2] / Pro: 6.6 [6.0 - 8.0] / ALK PHOS: 175 [30 - 115] / ALT: 61 [0 - 41] / AST: 123 [0 - 41] / GGT: x     LIVER FUNCTIONS - ( 01 Dec 2019 11:18 )  Alb: 1.9 [3.5 - 5.2] / Pro: 6.8 [6.0 - 8.0] / ALK PHOS: 194 [30 - 115] / ALT: 64 [0 - 41] / AST: 126 [0 - 41] / GGT: x     LIVER FUNCTIONS - ( 30 Nov 2019 23:20 )  Alb: 2.0 [3.5 - 5.2] / Pro: 6.8 [6.0 - 8.0] / ALK PHOS: 216 [30 - 115] / ALT: 61 [0 - 41] / AST: 120 [0 - 41] / GGT: x     LIVER FUNCTIONS - ( 30 Nov 2019 17:00 )  Alb: 2.3 [3.5 - 5.2] / Pro: 7.5 [6.0 - 8.0] / ALK PHOS: 226 [30 - 115] / ALT: 69 [0 - 41] / AST: 186 [0 - 41] / GGT: x         RADIOLOGY:  MRI pending Simple: Patient demonstrates quick and easy understanding

## 2020-01-06 ENCOUNTER — APPOINTMENT (OUTPATIENT)
Dept: OTOLARYNGOLOGY | Facility: CLINIC | Age: 45
End: 2020-01-06

## 2020-01-07 ENCOUNTER — LABORATORY RESULT (OUTPATIENT)
Age: 45
End: 2020-01-07

## 2020-01-07 ENCOUNTER — APPOINTMENT (OUTPATIENT)
Dept: HEPATOLOGY | Facility: CLINIC | Age: 45
End: 2020-01-07
Payer: MEDICAID

## 2020-01-07 VITALS
DIASTOLIC BLOOD PRESSURE: 56 MMHG | SYSTOLIC BLOOD PRESSURE: 111 MMHG | HEART RATE: 105 BPM | TEMPERATURE: 98.8 F | BODY MASS INDEX: 27.94 KG/M2 | HEIGHT: 67 IN | WEIGHT: 178 LBS

## 2020-01-07 DIAGNOSIS — Z82.3 FAMILY HISTORY OF STROKE: ICD-10-CM

## 2020-01-07 DIAGNOSIS — K64.9 UNSPECIFIED HEMORRHOIDS: ICD-10-CM

## 2020-01-07 DIAGNOSIS — Z82.49 FAMILY HISTORY OF ISCHEMIC HEART DISEASE AND OTHER DISEASES OF THE CIRCULATORY SYSTEM: ICD-10-CM

## 2020-01-07 DIAGNOSIS — K57.30 DIVERTICULOSIS OF LARGE INTESTINE W/OUT PERFORATION OR ABSCESS W/OUT BLEEDING: ICD-10-CM

## 2020-01-07 DIAGNOSIS — K61.1 RECTAL ABSCESS: ICD-10-CM

## 2020-01-07 DIAGNOSIS — K22.10 ULCER OF ESOPHAGUS W/OUT BLEEDING: ICD-10-CM

## 2020-01-07 PROCEDURE — 99205 OFFICE O/P NEW HI 60 MIN: CPT

## 2020-01-07 RX ORDER — FUROSEMIDE 40 MG
1 TABLET ORAL
Qty: 0 | Refills: 0 | DISCHARGE
Start: 2020-01-07

## 2020-01-07 RX ORDER — CEPHALEXIN 500 MG/1
500 CAPSULE ORAL
Refills: 0 | Status: COMPLETED | COMMUNITY
End: 2020-01-07

## 2020-01-07 NOTE — REASON FOR VISIT
[Post Hospitalization] : a post hospitalization visit [Patient Declined  Services] : - None: Patient declined  services

## 2020-01-07 NOTE — ASSESSMENT
[FreeTextEntry1] : 43 yo M with AUD, with decompensated alcohol-related cirrhosis complicated by refractory ascites, hepatic encephalopathy, and hyponatremia. He has no prior history of SBP, variceal hemorrhage, PVT, or HCC.\par \par # Ascites and anasarca:\par - Occurred in the context of non-adherence with furosemide since being discharged home on 12/31/19, also with incomplete adherence with low sodium diet.\par - Re-start furosemide at 40 mg po bid, pending results of BMP today. Continue to hold eplerenone for now given recent hyponatremia.\par - Will arrange for urgent LVP with IR this week.\par - Re-start ciprofloxacin for primary SBP prophylaxis given ascitic fluid protein <1.0 and advanced liver disease.\par - Advised patient to check daily weights at home and to notify me if increasing.\par - Mr. SWIFT was counseled to adhere to a low sodium (<2 grams of sodium per day) diet by: avoiding adding any salt to meals (removing the salt shaker from the table); eliminating salty foods from his diet; eating more home-cooked meals; choosing fresh or frozen, not canned, vegetables and fruits; and reading ingredient and food labels to choose low sodium foods.\par \par # Hypervolemic hyponatremia:\par - Re-check BMP today, as above.\par - Continue fluid restriction to 1L/day.\par \par # Hepatic encephalopathy:\par - No current overt HE, appears well-controlled.\par - Continue lactulose 30 mL po tid, titrated as needed to maintain 3-4 BMs/day.\par \par # Decompensated alcohol-related cirrhosis:\par - Re-check labs today to re-calculate MELD-Na.\par - No varices on last EGD in 7/2018, but needs surveillance as his liver disease has progressed since then. The risks, benefits and alternatives of the procedure were discussed with the patient in detail. Risks include bleeding, infection, perforation, adverse reaction to sedation and missed lesions. All questions were answered. The patient expressed understanding of these risks and is agreeable to proceed. Will schedule EGD.\par - No evidence of HCC or PVT on recent inpatient imaging. Continue q6 month surveillance for HCC.\par - Does not meet our transplant center's criteria for early (<6 months sobriety) liver transplantation given relapse despite known alcohol-related liver disease, but will re-assess candidacy on an ongoing basis as his duration of sobriety increases. He was encouraged to continue to attend AA but also needs to enroll in a formal rehab program. Phosphatidylethanol was negative on 12/24/19. Last reported alcoholic drink was on 8/12/19.\par \par # Health maintenance:\par - Will check for vitamin/mineral deficiencies and supplement if deficient.\par - Mr. SWIFT was counseled to: abstain from alcohol and all illicit drugs; avoid use of herbal and dietary supplements due to potential hepatotoxicity; limit use of acetaminophen to <2 grams per day; avoid use of nonsteroidal antiinflammatory drugs (NSAIDs) as these can lead to diuretic resistance and precipitate renal dysfunction in patients with advanced liver disease; avoid eating any unpasteurized dairy products; and avoid eating raw/steamed oysters or other shellfish to avoid risk of Vibrio infection.\par \par He will return for close follow-up in 2-3 weeks.

## 2020-01-07 NOTE — HISTORY OF PRESENT ILLNESS
[de-identified] : Mr. Jules is a 45 yo M with AUD and decompensated alcohol-related cirrhosis, who is being seen today for post-hospital discharge follow-up. He was recently admitted to [facility] 12/19/19 and then transferred to [facility] on 12/20/19 due to acute on chronic liver failure with jaundice, coagulopathy, large volume ascites, mild hepatic encephalopathy, hyponatremia, and leukocytosis, for consideration of evaluation for early liver transplantation. Inpatient infectious work-up was negative and his leukocytosis subsequently resolved. He underwent 5L LVP on 12/20/19 and 7.2L LVP on 12/27/19 with no evidence of SBP, but with ascitic fluid studies that showed high SAAG and ascitic fluid protein <1.0. He was started on ciprofloxaxin for SBP prophylaxis. Due to hyponatremia, eplerenone was discontinued and he was put on 1L fluid restriction, with gradual improvement. Subsequently, furosemide 40 mg po daily was added to help decrease ascites reaccumulation. His mild hepatic encephalopathy was managed with lactulose. His hospital course was complicated by an episode of epistaxis that required temporary packing of his right nare by ENT. The packing was removed prior to discharge and he was prescribed a course of cephalexin (given the prior nasal foreign body). He was discharged home on 12/31/19.\par \par Today, he complains of abdominal distension and lower extremity swelling. He reports that his daughter cleaned out his room shortly after he was discharged and threw out all of his medications, so he has only been taking the two new medications given to him when discharged (cephalexin and lactulose), but no other medications (i.e., no ciprofloxacin, furosemide, folic acid, thiamine, MVI, or PPI). He denies SOB. No fevers/chills. His appetite has been okay. He has not been driving recently; his cousin brought him to the appointment today. He denies any further episodes of epistaxis and denies any overt GI bleeding. He reports that he has been drinking 1L of water daily, "sometimes just a little more."\par \par His cirrhosis was first diagnosed in 2018. He was previously seen and evaluated for liver transplant at [place] in 2018 but was not listed, and says that his liver disease eventually improved. He completed an alcohol RPP in 1/2019, but relapsed after 3-4 months due to stress related to being  from his wife and started drinking "a lot" of beer daily. His 17- and 20-year-old children live with his wife. He lives with his aunt. He works as a . He reports that his last drink was on 8/12/19. Since being discharged from the hospital recently, he has started attending AA daily. He denies any recent alcohol cravings or slips.\par \par EGD in 7/2018 had no varices but showed esophagitis and gastritis (H pylori negative).\par Colonoscopy in 7/2018 showed hemorrhoids, diverticulosis, and ?anal fistula.\par He has a history of a perirectal abscess in 11/2019 that required I+D.

## 2020-01-07 NOTE — REVIEW OF SYSTEMS
[Recent Weight Gain (___ Lbs)] : recent [unfilled] ~Ulb weight gain [As noted in HPI] : as noted in HPI [As Noted in HPI] : as noted in HPI [Sleep Disturbances] : sleep disturbances [Lower Ext Edema] : lower extremity edema [Easy Bruising] : a tendency for easy bruising [FreeTextEntry7] : abdominal distension [Negative] : Heme/Lymph

## 2020-01-08 DIAGNOSIS — E55.9 VITAMIN D DEFICIENCY, UNSPECIFIED: ICD-10-CM

## 2020-01-08 LAB
25(OH)D3 SERPL-MCNC: 9.7 NG/ML
AFP-TM SERPL-MCNC: 2.3 NG/ML
ALBUMIN SERPL ELPH-MCNC: 2.5 G/DL
ALP BLD-CCNC: 157 U/L
ALT SERPL-CCNC: 37 U/L
ANION GAP SERPL CALC-SCNC: 9 MMOL/L
AST SERPL-CCNC: 66 U/L
BASOPHILS # BLD AUTO: 0.05 K/UL
BASOPHILS NFR BLD AUTO: 0.6 %
BILIRUB DIRECT SERPL-MCNC: 5.1 MG/DL
BILIRUB SERPL-MCNC: 9.6 MG/DL
BUN SERPL-MCNC: 12 MG/DL
CALCIUM SERPL-MCNC: 7.8 MG/DL
CHLORIDE SERPL-SCNC: 106 MMOL/L
CO2 SERPL-SCNC: 20 MMOL/L
CREAT SERPL-MCNC: 0.48 MG/DL
EOSINOPHIL # BLD AUTO: 0.12 K/UL
EOSINOPHIL NFR BLD AUTO: 1.4 %
ETHANOL BLD-MCNC: <10 MG/DL
FOLATE SERPL-MCNC: 11.8 NG/ML
GLUCOSE SERPL-MCNC: 93 MG/DL
HCT VFR BLD CALC: 25.5 %
HGB BLD-MCNC: 8.1 G/DL
IMM GRANULOCYTES NFR BLD AUTO: 0.7 %
INR PPP: 2.44 RATIO
LYMPHOCYTES # BLD AUTO: 1.62 K/UL
LYMPHOCYTES NFR BLD AUTO: 19 %
MAGNESIUM SERPL-MCNC: 1.8 MG/DL
MAN DIFF?: NORMAL
MCHC RBC-ENTMCNC: 31.8 GM/DL
MCHC RBC-ENTMCNC: 34.2 PG
MCV RBC AUTO: 107.6 FL
MONOCYTES # BLD AUTO: 0.92 K/UL
MONOCYTES NFR BLD AUTO: 10.8 %
NEUTROPHILS # BLD AUTO: 5.74 K/UL
NEUTROPHILS NFR BLD AUTO: 67.5 %
PHOSPHATE SERPL-MCNC: 3.6 MG/DL
PLATELET # BLD AUTO: 109 K/UL
POTASSIUM SERPL-SCNC: 3.6 MMOL/L
PROT SERPL-MCNC: 6.6 G/DL
PT BLD: 28.3 SEC
RBC # BLD: 2.37 M/UL
RBC # FLD: 22.3 %
SODIUM SERPL-SCNC: 135 MMOL/L
VIT B12 SERPL-MCNC: >2000 PG/ML
WBC # FLD AUTO: 8.51 K/UL

## 2020-01-10 DIAGNOSIS — E60 DIETARY ZINC DEFICIENCY: ICD-10-CM

## 2020-01-10 LAB — ZINC SERPL-MCNC: 32 UG/DL

## 2020-01-12 ENCOUNTER — INPATIENT (INPATIENT)
Facility: HOSPITAL | Age: 45
LOS: 2 days | Discharge: HOME | DRG: 442 | End: 2020-01-15
Attending: STUDENT IN AN ORGANIZED HEALTH CARE EDUCATION/TRAINING PROGRAM | Admitting: STUDENT IN AN ORGANIZED HEALTH CARE EDUCATION/TRAINING PROGRAM
Payer: MEDICAID

## 2020-01-12 VITALS
WEIGHT: 175.93 LBS | SYSTOLIC BLOOD PRESSURE: 100 MMHG | OXYGEN SATURATION: 95 % | TEMPERATURE: 99 F | HEIGHT: 67 IN | HEART RATE: 101 BPM | RESPIRATION RATE: 18 BRPM | DIASTOLIC BLOOD PRESSURE: 57 MMHG

## 2020-01-12 DIAGNOSIS — Z98.890 OTHER SPECIFIED POSTPROCEDURAL STATES: Chronic | ICD-10-CM

## 2020-01-12 LAB
ALBUMIN SERPL ELPH-MCNC: 2.1 G/DL — LOW (ref 3.3–5)
ALP SERPL-CCNC: 149 U/L — HIGH (ref 40–120)
ALT FLD-CCNC: 31 U/L — SIGNIFICANT CHANGE UP (ref 10–45)
ANION GAP SERPL CALC-SCNC: 12 MMOL/L — SIGNIFICANT CHANGE UP (ref 5–17)
APTT BLD: 65.6 SEC — HIGH (ref 27.5–36.3)
AST SERPL-CCNC: 60 U/L — HIGH (ref 10–40)
BASOPHILS # BLD AUTO: 0 K/UL — SIGNIFICANT CHANGE UP (ref 0–0.2)
BASOPHILS NFR BLD AUTO: 0 % — SIGNIFICANT CHANGE UP (ref 0–2)
BILIRUB SERPL-MCNC: 9 MG/DL — HIGH (ref 0.2–1.2)
BUN SERPL-MCNC: 10 MG/DL — SIGNIFICANT CHANGE UP (ref 7–23)
CALCIUM SERPL-MCNC: 7.6 MG/DL — LOW (ref 8.4–10.5)
CHLORIDE SERPL-SCNC: 99 MMOL/L — SIGNIFICANT CHANGE UP (ref 96–108)
CO2 SERPL-SCNC: 21 MMOL/L — LOW (ref 22–31)
CREAT SERPL-MCNC: 0.75 MG/DL — SIGNIFICANT CHANGE UP (ref 0.5–1.3)
EOSINOPHIL # BLD AUTO: 0 K/UL — SIGNIFICANT CHANGE UP (ref 0–0.5)
EOSINOPHIL NFR BLD AUTO: 0 % — SIGNIFICANT CHANGE UP (ref 0–6)
GLUCOSE SERPL-MCNC: 108 MG/DL — HIGH (ref 70–99)
HCT VFR BLD CALC: 24 % — LOW (ref 39–50)
HGB BLD-MCNC: 7.7 G/DL — LOW (ref 13–17)
INR BLD: 2.79 RATIO — HIGH (ref 0.88–1.16)
LYMPHOCYTES # BLD AUTO: 1.23 K/UL — SIGNIFICANT CHANGE UP (ref 1–3.3)
LYMPHOCYTES # BLD AUTO: 13.1 % — SIGNIFICANT CHANGE UP (ref 13–44)
MCHC RBC-ENTMCNC: 32.1 GM/DL — SIGNIFICANT CHANGE UP (ref 32–36)
MCHC RBC-ENTMCNC: 33.8 PG — SIGNIFICANT CHANGE UP (ref 27–34)
MCV RBC AUTO: 105.3 FL — HIGH (ref 80–100)
MONOCYTES # BLD AUTO: 0.83 K/UL — SIGNIFICANT CHANGE UP (ref 0–0.9)
MONOCYTES NFR BLD AUTO: 8.8 % — SIGNIFICANT CHANGE UP (ref 2–14)
NEUTROPHILS # BLD AUTO: 7.26 K/UL — SIGNIFICANT CHANGE UP (ref 1.8–7.4)
NEUTROPHILS NFR BLD AUTO: 77.2 % — HIGH (ref 43–77)
PLATELET # BLD AUTO: 96 K/UL — LOW (ref 150–400)
POTASSIUM SERPL-MCNC: 2.7 MMOL/L — CRITICAL LOW (ref 3.5–5.3)
POTASSIUM SERPL-SCNC: 2.7 MMOL/L — CRITICAL LOW (ref 3.5–5.3)
PROT SERPL-MCNC: 6.3 G/DL — SIGNIFICANT CHANGE UP (ref 6–8.3)
PROTHROM AB SERPL-ACNC: 33.1 SEC — HIGH (ref 10–12.9)
RBC # BLD: 2.28 M/UL — LOW (ref 4.2–5.8)
RBC # FLD: 19.1 % — HIGH (ref 10.3–14.5)
SODIUM SERPL-SCNC: 132 MMOL/L — LOW (ref 135–145)
WBC # BLD: 9.41 K/UL — SIGNIFICANT CHANGE UP (ref 3.8–10.5)
WBC # FLD AUTO: 9.41 K/UL — SIGNIFICANT CHANGE UP (ref 3.8–10.5)

## 2020-01-12 PROCEDURE — 99285 EMERGENCY DEPT VISIT HI MDM: CPT

## 2020-01-12 RX ORDER — FUROSEMIDE 40 MG
80 TABLET ORAL ONCE
Refills: 0 | Status: COMPLETED | OUTPATIENT
Start: 2020-01-12 | End: 2020-01-12

## 2020-01-12 RX ORDER — POTASSIUM CHLORIDE 20 MEQ
40 PACKET (EA) ORAL EVERY 4 HOURS
Refills: 0 | Status: COMPLETED | OUTPATIENT
Start: 2020-01-12 | End: 2020-01-13

## 2020-01-12 NOTE — ED PROVIDER NOTE - ATTENDING CONTRIBUTION TO CARE
pt with cirrhosis here with LE edema and abd distention. no significant abd pain, fever, n/v, confusion, no SOB. on exam, pt with stable vitals, +tense abd but non-tender, mild ascites, significant leg edema, some crackles at bases, breathing comfortably with no distress. given lack of fever, abd tenderness, systemic sx, low concern for SBP at this time. pt is not symptomatic enough that would require emergent paracentesis. will check labs, provide IV diuresis, determine if pt can be treated as outpt w close f/u or if he needs to be admitted.

## 2020-01-12 NOTE — ED PROVIDER NOTE - CARE PLAN
Principal Discharge DX:	Alcoholic cirrhosis, unspecified whether ascites present  Assessment and plan of treatment:	Pt with etoh cirrhosis presenting with increased LE edema. Diuretics recently increased by hepatology on 1/7, plan for repeat BMP and uptitration on 1/14. Pt found to be hypokalemic on metabolic panel, will admit to CDU for repletion and reassess.

## 2020-01-12 NOTE — ED PROVIDER NOTE - PLAN OF CARE
Pt with etoh cirrhosis presenting with increased LE edema. Diuretics recently increased by hepatology on 1/7, plan for repeat BMP and uptitration on 1/14. Pt found to be hypokalemic on metabolic panel, will admit to CDU for repletion and reassess.

## 2020-01-12 NOTE — ED ADULT NURSE NOTE - NSIMPLEMENTINTERV_GEN_ALL_ED
Implemented All Fall Risk Interventions:  [place] to call system. Call bell, personal items and telephone within reach. Instruct patient to call for assistance. Room bathroom lighting operational. Non-slip footwear when patient is off stretcher. Physically safe environment: no spills, clutter or unnecessary equipment. Stretcher in lowest position, wheels locked, appropriate side rails in place. Provide visual cue, wrist band, yellow gown, etc. Monitor gait and stability. Monitor for mental status changes and reorient to person, place, and time. Review medications for side effects contributing to fall risk. Reinforce activity limits and safety measures with patient and family.

## 2020-01-12 NOTE — ED PROVIDER NOTE - OBJECTIVE STATEMENT
Pt is a 44 year old man with PMHx EtOH cirrhosis dx 2018, recently d/c'd from [facility] for decompensated cirrhosis (12/31), presenting for increased swelling of legs and abdomen. Pt says that he last saw hepatology on 1/6 and his diuretic was doubled, usually takes lasix 40mg PO qd. Despite this his swelling progressed. He had a paracentesis x2 with >5L drained during previous hospitalization. He also has known chronic hyponatremia and advised to keep 1L fluid restriction. He is denying n/v, constipation, diarrhea, fevers, chills, worsening jaundice, abdominal pain, confusion. Pt is a 44 year old man with PMHx EtOH cirrhosis dx 2018, recently d/c'd from [facility] for decompensated cirrhosis (12/31), presenting for increased swelling of legs and abdomen. Pt says that he last saw hepatology on 1/6 and his diuretic was doubled, usually takes lasix 40mg PO qd. Also added spironolactone 50mg qd. Despite this his swelling progressed. He had a paracentesis x2 with >5L drained during previous hospitalization. He also has known chronic hyponatremia and advised to keep 1L fluid restriction. He is denying n/v, constipation, diarrhea, fevers, chills, worsening jaundice, abdominal pain, confusion.

## 2020-01-12 NOTE — ED ADULT NURSE NOTE - OBJECTIVE STATEMENT
44 YOM A&OX3 with pmh of cirrhosis presents to ED for abdominal bloating. pt states his daughter accidentally threw out his medication earlier this month and and started to have bloating of stomach. pt states was told by his PCP on January 6th to be seen at hospital. pt states has had fluid drained from abdomen in the past. upon assessment abdomen rounded and distended, +2 non-pitting edema noted to legs bilaterally. pt states can ambulate but becomes SOB. pt denies chest pain, n/v/d, headaches, dizziness, blurry vision. safety maintained.

## 2020-01-12 NOTE — ED PROVIDER NOTE - CARDIAC, MLM
2+ pitting edema from feet to waist. Normal rate, regular rhythm.  Heart sounds S1, S2.  No murmurs, rubs or gallops.

## 2020-01-13 DIAGNOSIS — E87.6 HYPOKALEMIA: ICD-10-CM

## 2020-01-13 LAB
ANION GAP SERPL CALC-SCNC: 11 MMOL/L — SIGNIFICANT CHANGE UP (ref 5–17)
ANION GAP SERPL CALC-SCNC: 11 MMOL/L — SIGNIFICANT CHANGE UP (ref 5–17)
BUN SERPL-MCNC: 9 MG/DL — SIGNIFICANT CHANGE UP (ref 7–23)
BUN SERPL-MCNC: 9 MG/DL — SIGNIFICANT CHANGE UP (ref 7–23)
CALCIUM SERPL-MCNC: 6.9 MG/DL — LOW (ref 8.4–10.5)
CALCIUM SERPL-MCNC: 7.4 MG/DL — LOW (ref 8.4–10.5)
CHLORIDE SERPL-SCNC: 102 MMOL/L — SIGNIFICANT CHANGE UP (ref 96–108)
CHLORIDE SERPL-SCNC: 103 MMOL/L — SIGNIFICANT CHANGE UP (ref 96–108)
CO2 SERPL-SCNC: 21 MMOL/L — LOW (ref 22–31)
CO2 SERPL-SCNC: 21 MMOL/L — LOW (ref 22–31)
CREAT SERPL-MCNC: 0.55 MG/DL — SIGNIFICANT CHANGE UP (ref 0.5–1.3)
CREAT SERPL-MCNC: 0.59 MG/DL — SIGNIFICANT CHANGE UP (ref 0.5–1.3)
GLUCOSE SERPL-MCNC: 127 MG/DL — HIGH (ref 70–99)
GLUCOSE SERPL-MCNC: 98 MG/DL — SIGNIFICANT CHANGE UP (ref 70–99)
MAGNESIUM SERPL-MCNC: 2 MG/DL — SIGNIFICANT CHANGE UP (ref 1.6–2.6)
PHOSPHATE SERPL-MCNC: 3.3 MG/DL — SIGNIFICANT CHANGE UP (ref 2.5–4.5)
POTASSIUM SERPL-MCNC: 2.8 MMOL/L — CRITICAL LOW (ref 3.5–5.3)
POTASSIUM SERPL-MCNC: 3.4 MMOL/L — LOW (ref 3.5–5.3)
POTASSIUM SERPL-SCNC: 2.8 MMOL/L — CRITICAL LOW (ref 3.5–5.3)
POTASSIUM SERPL-SCNC: 3.4 MMOL/L — LOW (ref 3.5–5.3)
SODIUM SERPL-SCNC: 134 MMOL/L — LOW (ref 135–145)
SODIUM SERPL-SCNC: 135 MMOL/L — SIGNIFICANT CHANGE UP (ref 135–145)

## 2020-01-13 PROCEDURE — 99223 1ST HOSP IP/OBS HIGH 75: CPT

## 2020-01-13 PROCEDURE — 99236 HOSP IP/OBS SAME DATE HI 85: CPT

## 2020-01-13 PROCEDURE — 76705 ECHO EXAM OF ABDOMEN: CPT | Mod: 26

## 2020-01-13 RX ORDER — POTASSIUM CHLORIDE 20 MEQ
10 PACKET (EA) ORAL
Refills: 0 | Status: COMPLETED | OUTPATIENT
Start: 2020-01-13 | End: 2020-01-13

## 2020-01-13 RX ORDER — POLYETHYLENE GLYCOL 3350 17 G/17G
17 POWDER, FOR SOLUTION ORAL
Refills: 0 | Status: DISCONTINUED | OUTPATIENT
Start: 2020-01-13 | End: 2020-01-15

## 2020-01-13 RX ORDER — THIAMINE MONONITRATE (VIT B1) 100 MG
100 TABLET ORAL DAILY
Refills: 0 | Status: DISCONTINUED | OUTPATIENT
Start: 2020-01-13 | End: 2020-01-15

## 2020-01-13 RX ORDER — FOLIC ACID 0.8 MG
1 TABLET ORAL DAILY
Refills: 0 | Status: DISCONTINUED | OUTPATIENT
Start: 2020-01-13 | End: 2020-01-15

## 2020-01-13 RX ORDER — PANTOPRAZOLE SODIUM 20 MG/1
40 TABLET, DELAYED RELEASE ORAL
Refills: 0 | Status: DISCONTINUED | OUTPATIENT
Start: 2020-01-13 | End: 2020-01-15

## 2020-01-13 RX ORDER — POTASSIUM CHLORIDE 20 MEQ
40 PACKET (EA) ORAL ONCE
Refills: 0 | Status: COMPLETED | OUTPATIENT
Start: 2020-01-13 | End: 2020-01-13

## 2020-01-13 RX ORDER — HEPARIN SODIUM 5000 [USP'U]/ML
5000 INJECTION INTRAVENOUS; SUBCUTANEOUS EVERY 8 HOURS
Refills: 0 | Status: DISCONTINUED | OUTPATIENT
Start: 2020-01-13 | End: 2020-01-13

## 2020-01-13 RX ORDER — MAGNESIUM SULFATE 500 MG/ML
2 VIAL (ML) INJECTION ONCE
Refills: 0 | Status: COMPLETED | OUTPATIENT
Start: 2020-01-13 | End: 2020-01-13

## 2020-01-13 RX ORDER — CIPROFLOXACIN LACTATE 400MG/40ML
500 VIAL (ML) INTRAVENOUS DAILY
Refills: 0 | Status: DISCONTINUED | OUTPATIENT
Start: 2020-01-13 | End: 2020-01-15

## 2020-01-13 RX ORDER — LACTULOSE 10 G/15ML
20 SOLUTION ORAL THREE TIMES A DAY
Refills: 0 | Status: DISCONTINUED | OUTPATIENT
Start: 2020-01-13 | End: 2020-01-13

## 2020-01-13 RX ORDER — SPIRONOLACTONE 25 MG/1
100 TABLET, FILM COATED ORAL DAILY
Refills: 0 | Status: DISCONTINUED | OUTPATIENT
Start: 2020-01-13 | End: 2020-01-15

## 2020-01-13 RX ADMIN — Medication 100 MILLIEQUIVALENT(S): at 05:53

## 2020-01-13 RX ADMIN — Medication 40 MILLIEQUIVALENT(S): at 18:24

## 2020-01-13 RX ADMIN — Medication 40 MILLIEQUIVALENT(S): at 00:06

## 2020-01-13 RX ADMIN — Medication 500 MILLIGRAM(S): at 11:47

## 2020-01-13 RX ADMIN — Medication 1 MILLIGRAM(S): at 11:47

## 2020-01-13 RX ADMIN — PANTOPRAZOLE SODIUM 40 MILLIGRAM(S): 20 TABLET, DELAYED RELEASE ORAL at 11:47

## 2020-01-13 RX ADMIN — Medication 100 MILLIEQUIVALENT(S): at 04:43

## 2020-01-13 RX ADMIN — Medication 1 TABLET(S): at 11:47

## 2020-01-13 RX ADMIN — Medication 80 MILLIGRAM(S): at 00:06

## 2020-01-13 RX ADMIN — Medication 50 GRAM(S): at 10:04

## 2020-01-13 RX ADMIN — POLYETHYLENE GLYCOL 3350 17 GRAM(S): 17 POWDER, FOR SOLUTION ORAL at 21:40

## 2020-01-13 RX ADMIN — Medication 100 MILLIEQUIVALENT(S): at 14:43

## 2020-01-13 RX ADMIN — Medication 40 MILLIEQUIVALENT(S): at 21:40

## 2020-01-13 RX ADMIN — Medication 100 MILLIGRAM(S): at 11:46

## 2020-01-13 RX ADMIN — LACTULOSE 20 GRAM(S): 10 SOLUTION ORAL at 05:53

## 2020-01-13 RX ADMIN — Medication 40 MILLIEQUIVALENT(S): at 02:32

## 2020-01-13 NOTE — H&P ADULT - PROBLEM SELECTOR PLAN 3
hypervolemic hyponatremia improving s/p diuresis.  - monitor CMP daily hypervolemic hyponatremia improving s/p diuresis.  2/2 to patient's decompensated cirrhosis.  - monitor CMP daily

## 2020-01-13 NOTE — H&P ADULT - HISTORY OF PRESENT ILLNESS
44 year old male with PMH significant for hx EtOH abuse and decompensated cirrhosis (dx'ed in 2018) who was recently discharged from [facility] for decompensated cirrhosis on 12/31/19 who presents with increased abdominal girth and swelling of his lower extremities. States after he was discharged from [facility] with his new medications, his daughter had accidentally disposed of the bag containing all his new prescriptions including his diuretics. Had a follow-up appointment with Dr. Marshall on 1/6/20 who increased his from 40mg daily to 40mg BID and added spironolactone 50mg daily with plan to repeat labs. Otherwise states has been compliant with his low salt, fluid restricted to < 1L/day diet, denying and fevers, chills, confusion, chest pain, worsening dyspnea, abd pain, nausea, vomiting, diarrhea, nor constipation. Patient states despite the increased diuretic regimen, he continued to have increased swelling of his abd and lower extremities prompting him to present to the ED.    In the ED, vitals stable. Labs remarkable for hypokalemia for which he was monitored in CDU and repleted. Was given lasix 80mg IVP with >4L urine output. Evaluated by hepatology in ED and admitted to medicine for further management.

## 2020-01-13 NOTE — H&P ADULT - PROBLEM SELECTOR PLAN 7
DVT ppx: hold chemical VTE ppx in setting of INR>2  GI ppx: on PPI  Diet: low salt, fluid restrict to 1L/day    Code Status: Full Code

## 2020-01-13 NOTE — ED CDU PROVIDER INITIAL DAY NOTE - PROGRESS NOTE DETAILS
GEOVANNY De La Cruz: Patient seen at bedside in [facility].  VSS.  Patient resting comfortably however reported abdominal distension and b/l peripheral edema since 1/3/2020. Denies SOB, orthopnea, or pitting edema. Last paracentesis performed during last admission taking off 8L. Abdomen distended non tender b/l pitting peripheral edema. Will order abdominal US to assess for ascites, call hepatology, and repeat labs at 3pm after KCL and MgSulfate given. GEOVANNY De La Cruz: Patient seen at bedside in [facility].  VSS.  Patient resting comfortably without complaints. US shows mild ascites. pending hepatology consult and repeat labs will likely require admission for electrolyte disturbances GEOVANNY De La Cruz: patient seen by hepatology fellow Dr. Strickland who recommended to hold lasix given hypokalemia, hold lactulose as it can cause abdominal distension from gas, and start miralax BID. informed hepatology pt pending repeat labs at 3pm however will likely require admission

## 2020-01-13 NOTE — H&P ADULT - PROBLEM SELECTOR PLAN 5
2/2 decompensated cirrhosis. stable with no active signs/symptoms of active bleed.   - continue to monitor

## 2020-01-13 NOTE — H&P ADULT - NSHPPHYSICALEXAM_GEN_ALL_CORE
CONSTITUTIONAL: in NAD, lying comfortably in bed  EYES: PERRLA; +scleral icterus b/l  ENMT: Moist oral mucosa, no pharyngeal injection or exudates; normal dentition  NECK: Supple, no palpable masses; no thyromegaly  RESPIRATORY: Normal respiratory effort; lungs are clear to auscultation bilaterally  CARDIOVASCULAR: Regular rate and rhythm, normal S1 and S2, no murmur/rub/gallop; 2+ pitting edema in lower extremities b/l  ABDOMEN: +abdominal distension with increased abd girth. Nontender to palpation, normoactive bowel sounds  MUSCULOSKELETAL:  Normal gait; no clubbing or cyanosis of digits; no joint swelling or tenderness to palpation  PSYCH: A+O to person, place, and time; affect appropriate  NEUROLOGY: CN 2-12 are intact and symmetric; no gross sensory deficits, no asterixis on exam  SKIN: No rashes; no palpable lesions

## 2020-01-13 NOTE — CONSULT NOTE ADULT - ATTENDING COMMENTS
43 yo HM with decompensated alcohol-related cirrhosis complicated by refractory ascites and hyponatremia, re-admitted on 1/13/20 due to large volume ascites and peripheral edema.    # Abdominal distension: Likely secondary to combination of gas distension (now off lactulose, switched to Miralax) in addition to ascites.    # Ascites and edema: Moderate in volume based on exam and limited US. Recommend LVP prior to discharge, with ascitic fluid to be sent for cell count and with 1:1 albumin replacement to be provided given his history of hyponatremia (i.e., should give 12.5g = 50 mL of 25% iv albumin for every liter removed). Increase furosemide to 60 mg po daily and increase spironolactone to 150 mg po daily. Monitor I/Os and daily weights. Continue ciprofloxacin for SBP prophylaxis.    # Hypervolemic hyponatremia: Controlled with 1L fluid restriction, but Na remains 132. Continue to monitor, especially in setting of increased spironolactone dosing.     # Decompensated alcohol-related cirrhosis: Blood type A, with current MELD-Na 28. He does not currently meet our criteria for early liver transplantation given history of relapse despite known cirrhosis, but liver transplant candidacy can be re-considered on an outpatient basis once he has enrolled in another alcohol relapse prevention program and achieved a longer duration of sobriety.    Please don't hesitate to call with any questions/concerns.    Thomas Marshall M.D., Ph.D.  Transplant Hepatology  Cell: [phone]

## 2020-01-13 NOTE — ED CDU PROVIDER INITIAL DAY NOTE - DETAILS
Hypokalemia. Hypomagnesemia   44 year old man with PMHx EtOH cirrhosis dx 2018, recently d/c'd from [facility] for decompensated cirrhosis (12/31), presenting for increased swelling of legs and abdomen.   Plan : Tele, frequent reeval, vitals q 4hrs, electrolyte replacement

## 2020-01-13 NOTE — CONSULT NOTE ADULT - ASSESSMENT
43 yo M with AUD, with decompensated alcohol-related cirrhosis complicated by refractory ascites, hepatic encephalopathy, and hyponatremia presenting for increased swelling of legs and abdomen. He was seen seen by Dr. Marshall on 1/6 and his Lasix 40mg PO qd was increased to twice a day. Also added spironolactone 50mg qd.   In ED, Pt was given Lasix 80mg IVP. Patient had laboratory significant for serum potassium 2.7. Pt was given total 80mEq potassium po and repeat serum potassium improved to 2.8. Pt was started on KCL 10mEq/100ml IVPB and sent to CDU for Tele, frequent reeval, vitals q 4hrs, electrolyte replacement.   He has no prior history of SBP, variceal hemorrhage, PVT, or HCC. -No varices on last EGD in 7/2018, but needs surveillance as his liver disease has progressed since then  U/S abdomen showed: Small volume ascites in the right upper and lower quadrants.  Clinically abdomen distended but looks like gaseous distension.    Impression:  # Hypokalemia likely due to recent increase of his Lasix dose  # Decompensated alcohol-related cirrhosis MELD-Na: 27  # Ascites and anasarca  # Hypervolemic hyponatremia:  # Hepatic encephalopathy      Recommendations:  - Hold Lactulose for now. Start Miralax bid to avoid constipation.  - Replace potassium aggressively   - Start Spironolactone 100 mg po daily   - Hold Lasix for today ( received 80 mg last night) - can give 40 mg daily tomorrow    - Monitor electrolytes and liver chemistry daily   - Continue Cipro for primary SBP prophylaxis given ascitic fluid protein <1.0 and advanced liver disease.  - Low sodium (<2 grams of sodium per day) diet  - Continue fluid restriction to 1L/day. 43 yo M with AUD, with decompensated alcohol-related cirrhosis complicated by refractory ascites, hepatic encephalopathy, and hyponatremia presenting for increased swelling of legs and abdomen. He was seen seen by Dr. Marshall on 1/6 and his Lasix 40mg PO qd was increased to twice a day. Also added spironolactone 50mg qd.   In ED, Pt was given Lasix 80mg IVP. Patient had laboratory significant for serum potassium 2.7. Pt was given total 80mEq potassium po and repeat serum potassium improved to 2.8. Pt was started on KCL 10mEq/100ml IVPB and sent to CDU for Tele, frequent reeval, vitals q 4hrs, electrolyte replacement.   He has no prior history of SBP, variceal hemorrhage, PVT, or HCC. -No varices on last EGD in 7/2018, but needs surveillance as his liver disease has progressed since then  U/S abdomen showed: Small volume ascites in the right upper and lower quadrants.  Clinically abdomen distended but looks like gaseous distension.    Impression:  # Hypokalemia likely due to recent increase of his Lasix dose  # Decompensated alcohol-related cirrhosis MELD-Na: 27  # Ascites and anasarca  # Hypervolemic hyponatremia:  # Hepatic encephalopathy      Recommendations:  - Hold Lactulose for now. Start Miralax bid to avoid constipation.  - Replace potassium aggressively   - Increase spironolactone to 150 mg po daily  - Increase Lasix to 60 mg po daily  - LVP  - Monitor electrolytes and liver chemistry daily   - Continue Cipro for primary SBP prophylaxis given ascitic fluid protein <1.0 and advanced liver disease.  - Low sodium (<2 grams of sodium per day) diet  - Continue fluid restriction to 1L/day.

## 2020-01-13 NOTE — ED CDU PROVIDER DISPOSITION NOTE - ATTENDING CONTRIBUTION TO CARE
I , Dr Candelario Espitia has seen and evaluated the patient.  The patient has persistent hypokalemia and hypomagnesemia -- likely will need large volume paracentesis - will admit

## 2020-01-13 NOTE — ED ADULT NURSE REASSESSMENT NOTE - NS ED NURSE REASSESS COMMENT FT1
Report taken from Rosemarie ENGEL. states pt have a good night with no complaints. Will continue to monitor.
pt appears to be in no acute distress. VSS. pt has potassium chloride currently infusing, pt to have 2 more infusions. pt on cardiac monitor shows NSR. safety maintained.
Pt received from MAREN Wang. Pt oriented to CDU & plan of care was discussed. Pt denies any palpitations or tingling of the extremities. Pt states he feels well and denies any symptoms. Safety & comfort measures maintained. Call bell in reach. Will continue to monitor.

## 2020-01-13 NOTE — CONSULT NOTE ADULT - SUBJECTIVE AND OBJECTIVE BOX
Chief Complaint:  abdominal swelling     HPI:  43 yo M with AUD and decompensated alcohol-related cirrhosis, dx 2018, recently d/c'd from [facility] for decompensated cirrhosis (12/31), presenting for increased swelling of legs and abdomen. Pt says that he last saw hepatology on 1/6 and his diuretic was doubled, usually takes Lasix 40mg PO qd. Also added spironolactone 50mg qd. Despite this his swelling progressed. He had a paracentesis x2 with >5L drained during previous hospitalization. He also has known chronic hyponatremia and advised to keep 1L fluid restriction. He is denying n/v, constipation, diarrhea, fevers, chills, worsening jaundice, abdominal pain, confusion. He was unable to get LVP as scheduled as an outpatient due to insurance / need to be seen in fellows clinic as he has medicaid first.    In ED, Pt was given Lasix 80mg IVP. Patient had laboratory significant for serum potassium 2.7. Pt was given total 80mEq potassium po and repeat serum potassium improved to 2.8. Pt was started on KCL 10mEq/100ml IVPB and sent to CDU for Tele, frequent reeval, vitals q 4hrs, electrolyte replacement.     He was recently admitted to [facility] 12/19/19 and then transferred to [facility] on 12/20/19 due to acute on chronic liver failure with jaundice, coagulopathy, large volume ascites, mild hepatic encephalopathy, hyponatremia, and leukocytosis, for consideration of evaluation for early liver transplantation. Inpatient infectious work-up was negative and his leukocytosis subsequently resolved. He underwent 5L LVP on 12/20/19 and 7.2L LVP on 12/27/19 with no evidence of SBP, but with ascitic fluid studies that showed high SAAG and ascitic fluid protein <1.0. He was started on ciprofloxaxin for SBP prophylaxis. Due to hyponatremia, eplerenone was discontinued and he was put on 1L fluid restriction, with gradual improvement. Subsequently, furosemide 40 mg po daily was added to help decrease ascites reaccumulation. His mild hepatic encephalopathy was managed with lactulose. His hospital course was complicated by an episode of epistaxis that required temporary packing of his right nare by ENT. The packing was removed prior to discharge and he was prescribed a course of cephalexin (given the prior nasal foreign body). He was discharged home on 12/31/19    His cirrhosis was first diagnosed in 2018. He was previously seen and evaluated for liver transplant at [place] in 2018 but was not listed, and says that his liver disease eventually improved. He completed an alcohol RPP in 1/2019, but relapsed after 3-4 months due to stress related to being  from his wife and started drinking "a lot" of beer daily. His 17- and 20-year-old children live with his wife. He lives with his aunt. He works as a . He reports that his last drink was on 8/12/19. Since being discharged from the hospital recently, he has started attending AA daily. He denies any recent alcohol cravings or slips.    EGD in 7/2018 had no varices but showed esophagitis and gastritis (H pylori negative).  Colonoscopy in 7/2018 showed hemorrhoids, diverticulosis, and ?anal fistula.  He has a history of a perirectal abscess in 11/2019 that required I+D.     Allergies:  No Known Allergies    Home Medications:   * Patient Currently Takes Medications as of 31-Dec-2019 14:34 documented in Structured Notes  · 	sodium chloride 0.65% nasal spray: 2  in each affected nostril 4 times a day   · 	lactulose 10 g/15 mL oral syrup: 30 milliliter(s) orally 3 times a day MDD:Maintain 3 BM's per day  · 	cephalexin 500 mg oral capsule: 1 cap(s) orally every 12 hours  · 	polyethylene glycol 3350 oral powder for reconstitution: 17 gram(s) orally every 12 hours, As Needed   · 	Protonix 20 mg oral delayed release tablet: 1 tab(s) orally once a day   · 	thiamine 100 mg oral tablet: 1 tab(s) orally once a day  · 	folic acid 1 mg oral tablet: 1 tab(s) orally once a day  · 	Multiple Vitamins oral tablet: 1 tab(s) orally once a day  · 	ciprofloxacin 500 mg oral tablet: 1 tab(s) orally once a day  · 	furosemide 40 mg oral tablet: 1 tab(s) orally once a day    Hospital Medications:  ciprofloxacin     Tablet 500 milliGRAM(s) Oral daily  folic acid 1 milliGRAM(s) Oral daily  multivitamin 1 Tablet(s) Oral daily  pantoprazole    Tablet 40 milliGRAM(s) Oral before breakfast  potassium chloride  10 mEq/100 mL IVPB 10 milliEquivalent(s) IV Intermittent every 1 hour  thiamine 100 milliGRAM(s) Oral daily      PMHX/PSHX:  Liver cirrhosis, alcoholic  Anemia  Thrombocytopenia  Nosebleed  ETOH abuse  History of incision and drainage  s/p EGD and colonoscopy       Family history:  Family history of stroke (Father, Mother)      Social History: no smoking    ROS:   General:  No fevers, chills or night sweats.  ENT:  No sore throat or dysphagia  CV:  No pain or palpitations  Resp:  No dyspnea, cough, wheezing  GI:  as above  Skin:  No rash or edema      PHYSICAL EXAM:   GENERAL:  NAD, Appears stated age  HEENT:  NC/AT,  conjunctivae clear and pink, sclera -anicteric  CHEST:  CTA B/L, Normal effort  HEART:  RRR S1/S2, No murmurs  ABDOMEN:  Soft, non-tender, distended (tympanic on percussion) normoactive bowel sounds,  no masses   EXTREMITIES:  + Edema  SKIN:  Warm & Dry. No rash or erythema  NEURO:  Alert, oriented    Vital Signs:  Vital Signs Last 24 Hrs  T(C): 36.8 (13 Jan 2020 12:55), Max: 37.3 (13 Jan 2020 01:18)  T(F): 98.2 (13 Jan 2020 12:55), Max: 99.1 (13 Jan 2020 01:18)  HR: 90 (13 Jan 2020 12:55) (90 - 101)  BP: 101/57 (13 Jan 2020 12:55) (96/52 - 113/47)  BP(mean): 62 (13 Jan 2020 04:45) (62 - 62)  RR: 18 (13 Jan 2020 12:55) (18 - 18)  SpO2: 94% (13 Jan 2020 12:55) (94% - 98%)  Daily Height in cm: 170.18 (12 Jan 2020 21:28)    Daily     LABS:                        7.7    9.41  )-----------( 96       ( 12 Jan 2020 23:14 )             24.0     Mean Cell Volume: 105.3 fl (01-12-20 @ 23:14)    01-13    135  |  103  |  9   ----------------------------<  98  2.8<LL>   |  21<L>  |  0.59    Ca    6.9<L>      13 Jan 2020 03:47  Phos  3.5     01-13  Mg     1.6     01-13    TPro  5.7<L>  /  Alb  1.9<L>  /  TBili  7.7<H>  /  DBili  4.1<H>  /  AST  52<H>  /  ALT  27  /  AlkPhos  139<H>  01-13    LIVER FUNCTIONS - ( 13 Jan 2020 03:47 )  Alb: 1.9 g/dL / Pro: 5.7 g/dL / ALK PHOS: 139 U/L / ALT: 27 U/L / AST: 52 U/L / GGT: x           PT/INR - ( 12 Jan 2020 23:14 )   PT: 33.1 sec;   INR: 2.79 ratio         PTT - ( 12 Jan 2020 23:14 )  PTT:65.6 sec                            7.7    9.41  )-----------( 96       ( 12 Jan 2020 23:14 )             24.0     Imaging:  < from: US Abdomen Limited (01.13.20 @ 08:46) >  EXAM:  US ABDOMEN LIMITED                            PROCEDURE DATE:  01/13/2020        INTERPRETATION:  Clinical information: Evaluate for ascites. Liver failure.    Comparison:     Technique: All 4 quadrants were scanned.    Findings: Small volume ascites is seen in the right upper and lower quadrants with minimal ascites in the left upper and lower quadrants.    IMPRESSION: Small volume ascites in the right upper and lower quadrants.      < end of copied text >

## 2020-01-13 NOTE — H&P ADULT - NSHPREVIEWOFSYSTEMS_GEN_ALL_CORE
CONSTITUTIONAL: No fever, weight loss, or fatigue  EYES: No eye pain, visual disturbances, or discharge  ENMT:  No difficulty hearing, tinnitus, vertigo; No sinus or throat pain  NECK: No pain or stiffness  RESPIRATORY: No cough, wheezing, chills or hemoptysis; No shortness of breath  CARDIOVASCULAR: No chest pain, palpitations, dizziness, +lower extremity swelling b/l  GASTROINTESTINAL: No abdominal or epigastric pain. No nausea, vomiting, or hematemesis; No diarrhea or constipation. No melena or hematochezia. +increased abdominal girth  GENITOURINARY: No dysuria, frequency, hematuria, or incontinence  NEUROLOGICAL: No headaches, memory loss, loss of strength, numbness, or tremors  SKIN: No itching, burning, rashes, or lesions   LYMPH NODES: No enlarged glands  ENDOCRINE: No heat or cold intolerance; No hair loss  MUSCULOSKELETAL: No joint pain or swelling; No muscle, back, or extremity pain  PSYCHIATRIC: No depression, anxiety, mood swings, or difficulty sleeping  HEME/LYMPH: No easy bruising, or bleeding gums

## 2020-01-13 NOTE — H&P ADULT - PROBLEM SELECTOR PROBLEM 1
Patient verified name, , and surgery as listed in [facility]. TYPE  CASE:3              Orders:  received  Labs per Spine protocol:  Mrsa/mssa, cbc with diff, bmp, urinalysis,    Labs per anesthesia protocol: type and screen signed and held for DOS. EKG/cardiac records  :  Not required    All instructions given via . Patient verbalized understanding. Medication bottles visualized today. Requested pt to validate each medication, dose and frequency while here today. Copy of medication reconciliation provided to the patient and instructed patient to compare to medication bottles. If any changes need to be made call this RN at [phone]. Instructed patient to continue previous medications as prescribed prior to surgery and  to take the following medications the day of surgery according to anesthesia guidelines with a small sip of water : wellbutrin, prozac, oxycodone. Continue all previous medications unless otherwise directed. Instructed patient to hold all vitamins 7 days prior to surgery and the following medications prior to surgery: naproxen. Pt viewed Spine Pre-hab video. All further questions were addressed. Pt was provided with antibacterial soap, Hibiclens, long-handled sponge, Spine Recovery booklet and incentive spirometer. Pt correctly demonstrated use of incentive spirometer and instruction to bring it to the hospital on day of surgery. Handouts and all Surgery instructions provided to pt and pt verbalizes understanding. Patient Guide to Surgery Packet provided to patient. Packet includes Patient Guide to surgery handout, Facts about Pain Management handout, Facts about Urinary Catherization handout, Hand Hygiene handout, Patient Education and Teaching Sheet -Transfusion of Blood and Blood Products handout, and  [facility] frequent question and answer sheet.  Guide reviewed with the patient and all questions answered to the satisfaction of the patient. Pt advised to visit [email or web address] for more educational information regarding anesthesia and to record any additional questions that arise so that it can be addressed by the anesthesiologist on the morning of surgery. Patient instructed on the following and verbalized understanding:  Arrive at main entrance, time of arrival to be called the day before by 1700. Responsible adult must drive patient to and from hospital, stay during surgery and 24 hours postoperatively. Npo after midnight including gum, mints and ice chips. Shower using hibiclens the night before and the morning of surgery. Hibiclens provided to the patient with handout and verbal instructions for use. Leave all valuables at home. Instructed on no jewelry or body piercings on the dos. Bring insurance card and ID. No perfumes, oil, powder, colognes, makeup or  lotions on the skin. Patient verbalized understanding of all instructions and provided all medical/health information to the best of their ability. Alcoholic cirrhosis of liver with ascites

## 2020-01-13 NOTE — H&P ADULT - PROBLEM SELECTOR PLAN 1
presenting with increased abd girth and increased lower extremity swelling due to being out of medications x 1 days.   - s/p lasix 80mg IVP in ED with >4L UOP  - hold lasix today, resume lasix 40mg PO starting tomorrow 1/14/20  - start spironolactone 100mg daily   - hold lactulose, start miralax 17mg BID  - c/w cipro 500mg daily for ppx  - low salt, fluid restricted to 1L/day diet  - monitor LFTs, chem, and coags daily  - hepatology following, recs appreciated presenting with increased abd girth and increased lower extremity swelling due to being out of medications x 7 days recently seen by hepatology Dr. Marshall who increased his diuretic regimen as outpatient.  - Abd US with mild ascites RUQ+LUQ  - s/p lasix 80mg IVP in ED with >4L UOP  - hold lasix today, resume lasix 40mg PO starting tomorrow 1/14/20  - start spironolactone 100mg daily   - hold lactulose, start miralax 17mg BID  - c/w cipro 500mg daily for ppx  - low salt, fluid restricted to 1L/day diet  - monitor LFTs, chem, and coags daily  - hepatology following, recs appreciated  - c/w folate, thiamine, MVI

## 2020-01-13 NOTE — H&P ADULT - ASSESSMENT
44 year old male with PMH significant for hx EtOH abuse and decompensated cirrhosis (dx'ed in 2018) who was recently discharged from [facility] for decompensated cirrhosis on 12/31/19 who presents with increased abd girth and lower extremity edema admitted for medication titration and further management.

## 2020-01-13 NOTE — H&P ADULT - NSHPSOCIALHISTORY_GEN_ALL_CORE
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 lives with aunt due to difficulty climbing flight of stairs)

## 2020-01-13 NOTE — ED CDU PROVIDER INITIAL DAY NOTE - MEDICAL DECISION MAKING DETAILS
pt placed in CDU for persistent hypokalemia, needing to recheck lytes and replenish k. while pt also exhibitis some peripheral edema he is not in resp distress. got one dose of lasix but will not I ncrease given potential for worsening hypoK. recommend sending out with oral K supplement once he is ready to go home.

## 2020-01-13 NOTE — ED CDU PROVIDER DISPOSITION NOTE - CLINICAL COURSE
Pt is a 44 year old man with PMHx EtOH cirrhosis dx 2018, recently d/c'd from [facility] for decompensated cirrhosis (12/31), presenting for increased swelling of legs and abdomen. Pt says that he last saw hepatology on 1/6 and his diuretic was doubled, usually takes lasix 40mg PO qd. Also added spironolactone 50mg qd. Despite this his swelling progressed. He had a paracentesis x2 with >5L drained during previous hospitalization. He also has known chronic hyponatremia and advised to keep 1L fluid restriction. He is denying n/v, constipation, diarrhea, fevers, chills, worsening jaundice, abdominal pain, confusion.  In ED, Pt was given Lasix 80mg IVP. Patient had laboratory significant for serum potassium 2.7. Pt was given total 80mEq potassium po and repeat serum potassium improved to 2.8. Pt was started on KCL 10mEq/100ml IVPB and sent to CDU for Tele, frequent reeval, vitals q 4hrs, electrolyte replacement. Pt is a 44 year old man with PMHx EtOH cirrhosis dx 2018, recently d/c'd from [facility] for decompensated cirrhosis (12/31), presenting for increased swelling of legs and abdomen. Pt says that he last saw hepatology on 1/6 and his diuretic was doubled, usually takes lasix 40mg PO qd. Also added spironolactone 50mg qd. Despite this his swelling progressed. He had a paracentesis x2 with >5L drained during previous hospitalization. He also has known chronic hyponatremia and advised to keep 1L fluid restriction. He is denying n/v, constipation, diarrhea, fevers, chills, worsening jaundice, abdominal pain, confusion.  In ED, Pt was given Lasix 80mg IVP. Patient had laboratory significant for serum potassium 2.7. Pt was given total 80mEq potassium po and repeat serum potassium improved to 2.8. Pt was started on KCL 10mEq/100ml IVPB and sent to CDU for Tele, frequent reeval, vitals q 4hrs, electrolyte replacement. K 2.7-->3.0. Mg given with improvement 1.6-->2. Hepatology consulted who recommended hold lasix, start spironolactone, given miralax and no paracentesis as US shows mild ascites. ED attending Dr. Espitia recommended admission to the hospital

## 2020-01-13 NOTE — H&P ADULT - PROBLEM SELECTOR PLAN 4
2/2 decompensated cirrhosis and overall decreased liver synthetic function. per chart review, INR ranging between 2-3. at baseline  - continue to monitor   - holding chemical VTE ppx 2/2 decreased liver synthetic function in setting of decompensated cirrhosis. per chart review, INR ranging between 2-3. at baseline  - continue to monitor   - holding chemical VTE ppx

## 2020-01-13 NOTE — H&P ADULT - NSHPLABSRESULTS_GEN_ALL_CORE
Labs reviewed:                         7.7    9.41  )-----------( 96       ( 12 Jan 2020 23:14 )             24.0     01-13    136  |  102  |  10  ----------------------------<  121<H>  3.0<L>   |  26  |  0.57    Ca    7.7<L>      13 Jan 2020 14:58  Phos  3.3     01-13  Mg     2.0     01-13    TPro  6.3  /  Alb  2.1<L>  /  TBili  8.9<H>  /  DBili  x   /  AST  58<H>  /  ALT  28  /  AlkPhos  134<H>  01-13    PT/INR - ( 12 Jan 2020 23:14 )   PT: 33.1 sec;   INR: 2.79 ratio         PTT - ( 12 Jan 2020 23:14 )  PTT:65.6 sec    1/13/20 Abd US:  INTERPRETATION:  Clinical information: Evaluate for ascites. Liver failure.    Comparison:     Technique: All 4 quadrants were scanned.    Findings: Small volume ascites is seen in the right upper and lower quadrants with minimal ascites in the left upper and lower quadrants.    IMPRESSION: Small volume ascites in the right upper and lower quadrants.    ECG reviewed and interpreted: NSR with no ischemic changes

## 2020-01-13 NOTE — ED CDU PROVIDER INITIAL DAY NOTE - OBJECTIVE STATEMENT
Pt is a 44 year old man with PMHx EtOH cirrhosis dx 2018, recently d/c'd from [facility] for decompensated cirrhosis (12/31), presenting for increased swelling of legs and abdomen. Pt says that he last saw hepatology on 1/6 and his diuretic was doubled, usually takes lasix 40mg PO qd. Also added spironolactone 50mg qd. Despite this his swelling progressed. He had a paracentesis x2 with >5L drained during previous hospitalization. He also has known chronic hyponatremia and advised to keep 1L fluid restriction. He is denying n/v, constipation, diarrhea, fevers, chills, worsening jaundice, abdominal pain, confusion.  In ED, Pt was given Lasix 80mg IVP. Patient had laboratory significant for serum potassium 2.7. Pt was given total 80mEq potassium po and repeat serum potassium improved to 2.8. Pt was started on KCL 10mEq/100ml IVPB and sent to CDU for Tele, frequent reeval, vitals q 4hrs, electrolyte replacement.

## 2020-01-13 NOTE — ED CDU PROVIDER DISPOSITION NOTE - NSFOLLOWUPINSTRUCTIONS_ED_ALL_ED_FT
As per Hepatology....  Follow up with your Appointment tomorrow with Dr. Rolando Guzman  Bring a copy of your test results with you to your appointment  Return to the Emergency Room if you experience new or worsening symptoms

## 2020-01-14 LAB
ALBUMIN SERPL ELPH-MCNC: 2 G/DL — LOW (ref 3.3–5)
ALCOHOL BIOMARKERS QUANT, URINE: NORMAL NG/ML
ALP SERPL-CCNC: 132 U/L — HIGH (ref 40–120)
ALT FLD-CCNC: 28 U/L — SIGNIFICANT CHANGE UP (ref 10–45)
ANION GAP SERPL CALC-SCNC: 8 MMOL/L — SIGNIFICANT CHANGE UP (ref 5–17)
ANISOCYTOSIS BLD QL: SLIGHT — SIGNIFICANT CHANGE UP
APTT BLD: 65 SEC — HIGH (ref 27.5–36.3)
AST SERPL-CCNC: 54 U/L — HIGH (ref 10–40)
BASOPHILS # BLD AUTO: 0.06 K/UL — SIGNIFICANT CHANGE UP (ref 0–0.2)
BASOPHILS NFR BLD AUTO: 0.8 % — SIGNIFICANT CHANGE UP (ref 0–2)
BILIRUB SERPL-MCNC: 8.3 MG/DL — HIGH (ref 0.2–1.2)
BUN SERPL-MCNC: 9 MG/DL — SIGNIFICANT CHANGE UP (ref 7–23)
CALCIUM SERPL-MCNC: 7.4 MG/DL — LOW (ref 8.4–10.5)
CHLORIDE SERPL-SCNC: 103 MMOL/L — SIGNIFICANT CHANGE UP (ref 96–108)
CO2 SERPL-SCNC: 21 MMOL/L — LOW (ref 22–31)
CREAT SERPL-MCNC: 0.5 MG/DL — SIGNIFICANT CHANGE UP (ref 0.5–1.3)
EOSINOPHIL # BLD AUTO: 0.14 K/UL — SIGNIFICANT CHANGE UP (ref 0–0.5)
EOSINOPHIL NFR BLD AUTO: 2 % — SIGNIFICANT CHANGE UP (ref 0–6)
ETHYL SULFATE: NORMAL NG/ML
GLUCOSE SERPL-MCNC: 80 MG/DL — SIGNIFICANT CHANGE UP (ref 70–99)
HCT VFR BLD CALC: 23.6 % — LOW (ref 39–50)
HGB BLD-MCNC: 7.5 G/DL — LOW (ref 13–17)
IMM GRANULOCYTES NFR BLD AUTO: 0.4 % — SIGNIFICANT CHANGE UP (ref 0–1.5)
INR BLD: 2.8 RATIO — HIGH (ref 0.88–1.16)
LYMPHOCYTES # BLD AUTO: 2.04 K/UL — SIGNIFICANT CHANGE UP (ref 1–3.3)
LYMPHOCYTES # BLD AUTO: 28.5 % — SIGNIFICANT CHANGE UP (ref 13–44)
MACROCYTES BLD QL: SLIGHT — SIGNIFICANT CHANGE UP
MAGNESIUM SERPL-MCNC: 1.9 MG/DL — SIGNIFICANT CHANGE UP (ref 1.6–2.6)
MANUAL SMEAR VERIFICATION: SIGNIFICANT CHANGE UP
MCHC RBC-ENTMCNC: 31.8 GM/DL — LOW (ref 32–36)
MCHC RBC-ENTMCNC: 33.3 PG — SIGNIFICANT CHANGE UP (ref 27–34)
MCV RBC AUTO: 104.9 FL — HIGH (ref 80–100)
MONOCYTES # BLD AUTO: 0.8 K/UL — SIGNIFICANT CHANGE UP (ref 0–0.9)
MONOCYTES NFR BLD AUTO: 11.2 % — SIGNIFICANT CHANGE UP (ref 2–14)
NEUTROPHILS # BLD AUTO: 4.1 K/UL — SIGNIFICANT CHANGE UP (ref 1.8–7.4)
NEUTROPHILS NFR BLD AUTO: 57.1 % — SIGNIFICANT CHANGE UP (ref 43–77)
PHOSPHATE SERPL-MCNC: 2.9 MG/DL — SIGNIFICANT CHANGE UP (ref 2.5–4.5)
PLAT MORPH BLD: NORMAL — SIGNIFICANT CHANGE UP
PLATELET # BLD AUTO: 87 K/UL — LOW (ref 150–400)
POIKILOCYTOSIS BLD QL AUTO: SLIGHT — SIGNIFICANT CHANGE UP
POTASSIUM SERPL-MCNC: 3.6 MMOL/L — SIGNIFICANT CHANGE UP (ref 3.5–5.3)
POTASSIUM SERPL-SCNC: 3.6 MMOL/L — SIGNIFICANT CHANGE UP (ref 3.5–5.3)
PROT SERPL-MCNC: 6 G/DL — SIGNIFICANT CHANGE UP (ref 6–8.3)
PROTHROM AB SERPL-ACNC: 33.3 SEC — HIGH (ref 10–13.1)
RBC # BLD: 2.25 M/UL — LOW (ref 4.2–5.8)
RBC # FLD: 18.6 % — HIGH (ref 10.3–14.5)
RBC BLD AUTO: ABNORMAL
SODIUM SERPL-SCNC: 132 MMOL/L — LOW (ref 135–145)
VIT B1 SERPL-MCNC: 113.4 NMOL/L
WBC # BLD: 7.17 K/UL — SIGNIFICANT CHANGE UP (ref 3.8–10.5)
WBC # FLD AUTO: 7.17 K/UL — SIGNIFICANT CHANGE UP (ref 3.8–10.5)
ZZALCOHOL BIOMARKERS QUANT UR: NEGATIVE

## 2020-01-14 PROCEDURE — 99254 IP/OBS CNSLTJ NEW/EST MOD 60: CPT | Mod: GC

## 2020-01-14 PROCEDURE — 99233 SBSQ HOSP IP/OBS HIGH 50: CPT | Mod: 25

## 2020-01-14 PROCEDURE — 49082 ABD PARACENTESIS: CPT

## 2020-01-14 RX ORDER — PHYTONADIONE (VIT K1) 5 MG
5 TABLET ORAL ONCE
Refills: 0 | Status: COMPLETED | OUTPATIENT
Start: 2020-01-14 | End: 2020-01-14

## 2020-01-14 RX ORDER — FUROSEMIDE 40 MG
40 TABLET ORAL DAILY
Refills: 0 | Status: DISCONTINUED | OUTPATIENT
Start: 2020-01-14 | End: 2020-01-15

## 2020-01-14 RX ORDER — CHLORHEXIDINE GLUCONATE 213 G/1000ML
1 SOLUTION TOPICAL DAILY
Refills: 0 | Status: DISCONTINUED | OUTPATIENT
Start: 2020-01-14 | End: 2020-01-15

## 2020-01-14 RX ADMIN — Medication 1 TABLET(S): at 11:15

## 2020-01-14 RX ADMIN — PANTOPRAZOLE SODIUM 40 MILLIGRAM(S): 20 TABLET, DELAYED RELEASE ORAL at 04:55

## 2020-01-14 RX ADMIN — POLYETHYLENE GLYCOL 3350 17 GRAM(S): 17 POWDER, FOR SOLUTION ORAL at 17:05

## 2020-01-14 RX ADMIN — POLYETHYLENE GLYCOL 3350 17 GRAM(S): 17 POWDER, FOR SOLUTION ORAL at 04:55

## 2020-01-14 RX ADMIN — Medication 500 MILLIGRAM(S): at 11:15

## 2020-01-14 RX ADMIN — Medication 5 MILLIGRAM(S): at 21:55

## 2020-01-14 RX ADMIN — Medication 100 MILLIGRAM(S): at 21:55

## 2020-01-14 RX ADMIN — SPIRONOLACTONE 100 MILLIGRAM(S): 25 TABLET, FILM COATED ORAL at 04:55

## 2020-01-14 RX ADMIN — Medication 40 MILLIGRAM(S): at 11:15

## 2020-01-14 RX ADMIN — Medication 1 MILLIGRAM(S): at 11:15

## 2020-01-14 NOTE — PROGRESS NOTE ADULT - PROBLEM SELECTOR PLAN 3
hypervolemic hyponatremia improving s/p diuresis.  2/2 to patient's decompensated cirrhosis.  - monitor CMP daily

## 2020-01-14 NOTE — PROCEDURE NOTE - NSTOLERANCE_GEN_A_CORE
Pt had mild pain on insertion of needle and didn't tolerate.  No rebound, guarding or peritoneal signs during or after procedure.

## 2020-01-14 NOTE — PROGRESS NOTE ADULT - PROBLEM SELECTOR PLAN 8
Transitions of Care Status:  1.  Name of PCP: Dr. Skaggs - has had appointments to establish care but has not seen yet   2.  PCP Contacted on Admission: [ ] Y    [x ] N    3.  PCP contacted at Discharge: [ ] Y    [ ] N    [ ] N/A  4.  Post-Discharge Appointment Date and Location:  5.  Summary of Handoff given to PCP:

## 2020-01-14 NOTE — PROGRESS NOTE ADULT - SUBJECTIVE AND OBJECTIVE BOX
PROGRESS NOTE:     Patient is a 44y old  Male who presents with a chief complaint of increased abd girth and swelling of lower extremities (13 Jan 2020 17:21)      SUBJECTIVE / OVERNIGHT EVENTS: Mild SOB from ascites.  Otherwise no complaints    ADDITIONAL REVIEW OF SYSTEMS:  No fevers or chills  no n/v/d    MEDICATIONS  (STANDING):  ciprofloxacin     Tablet 500 milliGRAM(s) Oral daily  folic acid 1 milliGRAM(s) Oral daily  furosemide    Tablet 40 milliGRAM(s) Oral daily  multivitamin 1 Tablet(s) Oral daily  pantoprazole    Tablet 40 milliGRAM(s) Oral before breakfast  polyethylene glycol 3350 17 Gram(s) Oral two times a day  spironolactone 100 milliGRAM(s) Oral daily  thiamine 100 milliGRAM(s) Oral daily    MEDICATIONS  (PRN):      CAPILLARY BLOOD GLUCOSE        I&O's Summary    13 Jan 2020 07:01  -  14 Jan 2020 07:00  --------------------------------------------------------  IN: 0 mL / OUT: 550 mL / NET: -550 mL    14 Jan 2020 07:01  -  14 Jan 2020 16:25  --------------------------------------------------------  IN: 540 mL / OUT: 500 mL / NET: 40 mL        PHYSICAL EXAM:  Vital Signs Last 24 Hrs  T(C): 36.9 (14 Jan 2020 07:40), Max: 37.5 (13 Jan 2020 23:05)  T(F): 98.4 (14 Jan 2020 07:40), Max: 99.5 (13 Jan 2020 23:05)  HR: 93 (14 Jan 2020 07:40) (80 - 97)  BP: 98/57 (14 Jan 2020 07:40) (98/57 - 116/69)  BP(mean): --  RR: 18 (14 Jan 2020 07:40) (18 - 18)  SpO2: 97% (14 Jan 2020 07:40) (94% - 97%)    CONSTITUTIONAL: NAD, well-developed, non toxic  RESPIRATORY: Normal respiratory effort; lungs are clear to auscultation bilaterally  CARDIOVASCULAR: Regular rate and rhythm, normal S1 and S2, no murmur/rub/gallop; No lower extremity edema; Peripheral pulses are 2+ bilaterally  ABDOMEN: Nontender to palpation, normoactive bowel sounds, no rebound/guarding; Distended +fluid wave  MUSCLOSKELETAL: no clubbing or cyanosis of digits; no joint swelling or tenderness to palpation  PSYCH: A+O to person, place, and time; affect appropriate    LABS:                        7.5    7.17  )-----------( 87       ( 14 Jan 2020 09:19 )             23.6     01-14    132<L>  |  103  |  9   ----------------------------<  80  3.6   |  21<L>  |  0.50    Ca    7.4<L>      14 Jan 2020 06:45  Phos  2.9     01-14  Mg     1.9     01-14    TPro  6.0  /  Alb  2.0<L>  /  TBili  8.3<H>  /  DBili  x   /  AST  54<H>  /  ALT  28  /  AlkPhos  132<H>  01-14    PT/INR - ( 14 Jan 2020 09:29 )   PT: 33.3 sec;   INR: 2.80 ratio         PTT - ( 14 Jan 2020 09:29 )  PTT:65.0 sec            RADIOLOGY & ADDITIONAL TESTS:  Results Reviewed:   Imaging Personally Reviewed:  Electrocardiogram Personally Reviewed:    COORDINATION OF CARE:  Care Discussed with Consultants/Other Providers [Y/N]:  Prior or Outpatient Records Reviewed [Y/N]:

## 2020-01-14 NOTE — PROGRESS NOTE ADULT - PROBLEM SELECTOR PLAN 4
2/2 decreased liver synthetic function in setting of decompensated cirrhosis. per chart review, INR ranging between 2-3. at baseline  - continue to monitor   - holding chemical VTE ppx

## 2020-01-14 NOTE — PROGRESS NOTE ADULT - PROBLEM SELECTOR PLAN 1
presenting with increased abd girth and increased lower extremity swelling due to being out of medications x 7 days recently seen by hepatology Dr. Marshall who increased his diuretic regimen as outpatient.  - Abd US with mild ascites RUQ+LUQ attempted para on floor unsucessful IR consulted  - s/p lasix 80mg IVP in ED with >4L UOP  - Lasix 40mg PO daily  - start spironolactone 100mg daily   - hold lactulose, start miralax 17mg BID  - c/w cipro 500mg daily for ppx  - low salt, fluid restricted to 1L/day diet  - monitor LFTs, chem, and coags daily  - hepatology following, recs appreciated  - c/w folate, thiamine, MVI

## 2020-01-14 NOTE — PROCEDURE NOTE - NSPROCDETAILS_GEN_ALL_CORE
location identified, sterile technique used, catheter introduced, fluid drained/sterile dressing applied

## 2020-01-14 NOTE — PROGRESS NOTE ADULT - ASSESSMENT
44 year old male with PMH significant for hx EtOH abuse and decompensated cirrhosis (dx'ed in 2018) who was recently discharged from [facility] for decompensated cirrhosis on 12/31/19 who presents with increased abd girth and lower extremity edema admitted for medication titration and further management.

## 2020-01-15 ENCOUNTER — TRANSCRIPTION ENCOUNTER (OUTPATIENT)
Age: 45
End: 2020-01-15

## 2020-01-15 ENCOUNTER — APPOINTMENT (OUTPATIENT)
Dept: INTERNAL MEDICINE | Facility: CLINIC | Age: 45
End: 2020-01-15

## 2020-01-15 VITALS
DIASTOLIC BLOOD PRESSURE: 73 MMHG | OXYGEN SATURATION: 99 % | TEMPERATURE: 99 F | HEART RATE: 65 BPM | SYSTOLIC BLOOD PRESSURE: 116 MMHG | RESPIRATION RATE: 18 BRPM

## 2020-01-15 LAB
ALBUMIN SERPL ELPH-MCNC: 2 G/DL — LOW (ref 3.3–5)
ALP SERPL-CCNC: 136 U/L — HIGH (ref 40–120)
ALT FLD-CCNC: 27 U/L — SIGNIFICANT CHANGE UP (ref 10–45)
AMMONIA BLD-MCNC: 62 UMOL/L — HIGH (ref 11–55)
ANION GAP SERPL CALC-SCNC: 10 MMOL/L — SIGNIFICANT CHANGE UP (ref 5–17)
ANISOCYTOSIS BLD QL: SLIGHT — SIGNIFICANT CHANGE UP
APTT BLD: 69.3 SEC — HIGH (ref 27.5–36.3)
AST SERPL-CCNC: 52 U/L — HIGH (ref 10–40)
BASOPHILS # BLD AUTO: 0.06 K/UL — SIGNIFICANT CHANGE UP (ref 0–0.2)
BASOPHILS NFR BLD AUTO: 0.9 % — SIGNIFICANT CHANGE UP (ref 0–2)
BILIRUB SERPL-MCNC: 8 MG/DL — HIGH (ref 0.2–1.2)
BUN SERPL-MCNC: 9 MG/DL — SIGNIFICANT CHANGE UP (ref 7–23)
CALCIUM SERPL-MCNC: 7.4 MG/DL — LOW (ref 8.4–10.5)
CHLORIDE SERPL-SCNC: 104 MMOL/L — SIGNIFICANT CHANGE UP (ref 96–108)
CO2 SERPL-SCNC: 20 MMOL/L — LOW (ref 22–31)
CREAT SERPL-MCNC: 0.5 MG/DL — SIGNIFICANT CHANGE UP (ref 0.5–1.3)
ELLIPTOCYTES BLD QL SMEAR: SLIGHT — SIGNIFICANT CHANGE UP
EOSINOPHIL # BLD AUTO: 0.14 K/UL — SIGNIFICANT CHANGE UP (ref 0–0.5)
EOSINOPHIL NFR BLD AUTO: 2 % — SIGNIFICANT CHANGE UP (ref 0–6)
GLUCOSE SERPL-MCNC: 90 MG/DL — SIGNIFICANT CHANGE UP (ref 70–99)
HCT VFR BLD CALC: 22.3 % — LOW (ref 39–50)
HGB BLD-MCNC: 7.2 G/DL — LOW (ref 13–17)
IMM GRANULOCYTES NFR BLD AUTO: 0.6 % — SIGNIFICANT CHANGE UP (ref 0–1.5)
INR BLD: 2.82 RATIO — HIGH (ref 0.88–1.16)
LYMPHOCYTES # BLD AUTO: 2.17 K/UL — SIGNIFICANT CHANGE UP (ref 1–3.3)
LYMPHOCYTES # BLD AUTO: 31.4 % — SIGNIFICANT CHANGE UP (ref 13–44)
MACROCYTES BLD QL: SLIGHT — SIGNIFICANT CHANGE UP
MAGNESIUM SERPL-MCNC: 1.8 MG/DL — SIGNIFICANT CHANGE UP (ref 1.6–2.6)
MANUAL SMEAR VERIFICATION: SIGNIFICANT CHANGE UP
MCHC RBC-ENTMCNC: 32.3 GM/DL — SIGNIFICANT CHANGE UP (ref 32–36)
MCHC RBC-ENTMCNC: 34.6 PG — HIGH (ref 27–34)
MCV RBC AUTO: 107.2 FL — HIGH (ref 80–100)
MONOCYTES # BLD AUTO: 0.76 K/UL — SIGNIFICANT CHANGE UP (ref 0–0.9)
MONOCYTES NFR BLD AUTO: 11 % — SIGNIFICANT CHANGE UP (ref 2–14)
NEUTROPHILS # BLD AUTO: 3.73 K/UL — SIGNIFICANT CHANGE UP (ref 1.8–7.4)
NEUTROPHILS NFR BLD AUTO: 54.1 % — SIGNIFICANT CHANGE UP (ref 43–77)
PHOSPHATE SERPL-MCNC: 3.2 MG/DL — SIGNIFICANT CHANGE UP (ref 2.5–4.5)
PLAT MORPH BLD: NORMAL — SIGNIFICANT CHANGE UP
PLATELET # BLD AUTO: 80 K/UL — LOW (ref 150–400)
POIKILOCYTOSIS BLD QL AUTO: SLIGHT — SIGNIFICANT CHANGE UP
POLYCHROMASIA BLD QL SMEAR: SLIGHT — SIGNIFICANT CHANGE UP
POTASSIUM SERPL-MCNC: 3.8 MMOL/L — SIGNIFICANT CHANGE UP (ref 3.5–5.3)
POTASSIUM SERPL-SCNC: 3.8 MMOL/L — SIGNIFICANT CHANGE UP (ref 3.5–5.3)
PROT SERPL-MCNC: 6.1 G/DL — SIGNIFICANT CHANGE UP (ref 6–8.3)
PROTHROM AB SERPL-ACNC: 33.2 SEC — HIGH (ref 10–13.1)
RBC # BLD: 2.08 M/UL — LOW (ref 4.2–5.8)
RBC # FLD: 18.5 % — HIGH (ref 10.3–14.5)
RBC BLD AUTO: ABNORMAL
SCHISTOCYTES BLD QL AUTO: SLIGHT — SIGNIFICANT CHANGE UP
SODIUM SERPL-SCNC: 134 MMOL/L — LOW (ref 135–145)
WBC # BLD: 6.9 K/UL — SIGNIFICANT CHANGE UP (ref 3.8–10.5)
WBC # FLD AUTO: 6.9 K/UL — SIGNIFICANT CHANGE UP (ref 3.8–10.5)

## 2020-01-15 PROCEDURE — 84100 ASSAY OF PHOSPHORUS: CPT

## 2020-01-15 PROCEDURE — 80076 HEPATIC FUNCTION PANEL: CPT

## 2020-01-15 PROCEDURE — 80048 BASIC METABOLIC PNL TOTAL CA: CPT

## 2020-01-15 PROCEDURE — 99239 HOSP IP/OBS DSCHRG MGMT >30: CPT

## 2020-01-15 PROCEDURE — 76705 ECHO EXAM OF ABDOMEN: CPT

## 2020-01-15 PROCEDURE — 82140 ASSAY OF AMMONIA: CPT

## 2020-01-15 PROCEDURE — 85730 THROMBOPLASTIN TIME PARTIAL: CPT

## 2020-01-15 PROCEDURE — G0378: CPT

## 2020-01-15 PROCEDURE — 99285 EMERGENCY DEPT VISIT HI MDM: CPT | Mod: 25

## 2020-01-15 PROCEDURE — 96374 THER/PROPH/DIAG INJ IV PUSH: CPT

## 2020-01-15 PROCEDURE — 85027 COMPLETE CBC AUTOMATED: CPT

## 2020-01-15 PROCEDURE — 99232 SBSQ HOSP IP/OBS MODERATE 35: CPT | Mod: GC

## 2020-01-15 PROCEDURE — 80053 COMPREHEN METABOLIC PANEL: CPT

## 2020-01-15 PROCEDURE — 85610 PROTHROMBIN TIME: CPT

## 2020-01-15 PROCEDURE — 96375 TX/PRO/DX INJ NEW DRUG ADDON: CPT

## 2020-01-15 PROCEDURE — 93005 ELECTROCARDIOGRAM TRACING: CPT

## 2020-01-15 PROCEDURE — 96376 TX/PRO/DX INJ SAME DRUG ADON: CPT

## 2020-01-15 PROCEDURE — 83735 ASSAY OF MAGNESIUM: CPT

## 2020-01-15 RX ORDER — PHYTONADIONE (VIT K1) 5 MG
10 TABLET ORAL ONCE
Refills: 0 | Status: COMPLETED | OUTPATIENT
Start: 2020-01-15 | End: 2020-01-15

## 2020-01-15 RX ORDER — LACTULOSE 10 G/15ML
30 SOLUTION ORAL
Qty: 1400 | Refills: 0
Start: 2020-01-15 | End: 2020-01-29

## 2020-01-15 RX ORDER — SPIRONOLACTONE 25 MG/1
1.5 TABLET, FILM COATED ORAL
Qty: 45 | Refills: 0
Start: 2020-01-15 | End: 2020-02-13

## 2020-01-15 RX ORDER — FUROSEMIDE 40 MG
3 TABLET ORAL
Qty: 90 | Refills: 0
Start: 2020-01-15 | End: 2020-02-13

## 2020-01-15 RX ORDER — CIPROFLOXACIN LACTATE 400MG/40ML
1 VIAL (ML) INTRAVENOUS
Qty: 30 | Refills: 0
Start: 2020-01-15 | End: 2020-02-13

## 2020-01-15 RX ADMIN — CHLORHEXIDINE GLUCONATE 1 APPLICATION(S): 213 SOLUTION TOPICAL at 12:00

## 2020-01-15 RX ADMIN — SPIRONOLACTONE 100 MILLIGRAM(S): 25 TABLET, FILM COATED ORAL at 05:03

## 2020-01-15 RX ADMIN — Medication 1 TABLET(S): at 11:56

## 2020-01-15 RX ADMIN — Medication 500 MILLIGRAM(S): at 11:56

## 2020-01-15 RX ADMIN — PANTOPRAZOLE SODIUM 40 MILLIGRAM(S): 20 TABLET, DELAYED RELEASE ORAL at 05:03

## 2020-01-15 RX ADMIN — POLYETHYLENE GLYCOL 3350 17 GRAM(S): 17 POWDER, FOR SOLUTION ORAL at 05:03

## 2020-01-15 RX ADMIN — Medication 100 MILLIGRAM(S): at 11:56

## 2020-01-15 RX ADMIN — Medication 40 MILLIGRAM(S): at 05:03

## 2020-01-15 RX ADMIN — Medication 10 MILLIGRAM(S): at 13:51

## 2020-01-15 RX ADMIN — Medication 1 MILLIGRAM(S): at 11:56

## 2020-01-15 NOTE — DISCHARGE NOTE PROVIDER - CARE PROVIDER_API CALL
Thomas Marshall)  Gastroenterology; Internal Medicine  [address] [place] [address]  Phone: [phone]  Fax: [phone]  Follow Up Time: 1 week

## 2020-01-15 NOTE — DISCHARGE NOTE PROVIDER - HOSPITAL COURSE
45 yo M with AUD, with decompensated alcohol-related cirrhosis complicated by refractory ascites, hepatic encephalopathy, and hyponatremia presenting for increased swelling of legs and abdomen. He was seen seen by Dr. Marshall on 1/6 and his Lasix 40mg PO qd was increased to twice a day. Also added spironolactone 50mg qd. Hypokalemia likely due to recent increase of his Lasix dose. Pt noted to have ascites pending paracentesis but pt refuses to wait for procedure and wants to leave against medical advice. Pt advised against leaving AMA and educated on reasons for remaining in hospital for completion of medical work up. Pt states he will follow up with PMD this week. 43 yo M with AUD, with decompensated alcohol-related cirrhosis complicated by refractory ascites, hepatic encephalopathy, and hyponatremia presenting for increased swelling of legs and abdomen. He was seen seen by Dr. Marshall on 1/6 and his Lasix 40mg PO qd was increased to twice a day. Also added spironolactone 50mg qd. Hypokalemia likely due to recent increase of his Lasix dose. Pt noted to have ascites pending paracentesis but pt refuses to wait for procedure wants to get para as an outpatient. Pt states he will follow up with Dr. Marshall this week.

## 2020-01-15 NOTE — PROGRESS NOTE ADULT - ATTENDING COMMENTS
Abdomen markedly distended with ascites, awaiting therapeutic paracentesis.  Anasarca overall improving with diuresis but still with some pitting edema to dependent portions of torso and lower extremities.  Electrolytes and renal function stable.  Continue furosemide 60 mg po daily and spironolactone 150 mg po daily.  Continue 1L fluid restriction given history of significant hypervolemic hyponatremia.  Current MELD-Na 27. Will need close outpatient follow-up.
Sarah Han DO  Hospitalist  [phone]    Dispo: Para then DC on diuretics with outpatient follow up.

## 2020-01-15 NOTE — DISCHARGE NOTE PROVIDER - NSDCFUSCHEDAPPT_GEN_ALL_CORE_FT
[facility] ; 01/21/2020 ; [facility] [address] [place] [address] [facility] ; 01/21/2020 ; [facility] [address] [place] [address] [facility] ; 01/21/2020 ; [facility] [address] [place] [address]

## 2020-01-15 NOTE — PROGRESS NOTE ADULT - ASSESSMENT
43 yo M with AUD, with decompensated alcohol-related cirrhosis complicated by refractory ascites, hepatic encephalopathy, and hyponatremia presenting for increased swelling of legs and abdomen. He was seen seen by Dr. Marshall on 1/6 and his Lasix 40mg PO qd was increased to twice a day. Also added spironolactone 50mg qd.   In ED, Pt was given Lasix 80mg IVP. Patient had laboratory significant for serum potassium 2.7. Pt was given total 80mEq potassium po and repeat serum potassium improved to 2.8. Pt was started on KCL 10mEq/100ml IVPB and sent to CDU for Tele, frequent reeval, vitals q 4hrs, electrolyte replacement.   He has no prior history of SBP, variceal hemorrhage, PVT, or HCC. -No varices on last EGD in 7/2018, but needs surveillance as his liver disease has progressed since then  U/S abdomen showed: Small volume ascites in the right upper and lower quadrants.  Clinically abdomen distended but looks like gaseous distension.    Impression:  # Hypokalemia likely due to recent increase of his Lasix dose  # Decompensated alcohol-related cirrhosis MELD-Na: 27  # Ascites and anasarca  # Hypervolemic hyponatremia:  # Hepatic encephalopathy      Recommendations:  - therapeutic paracentesis with albumin replacement if over 5L removed  - Hold Lactulose for now. Start Miralax bid to avoid constipation.  - Replace potassium aggressively   - spironolactone to 150 mg po daily  - Lasix to 60 mg po daily  - LVP  - Monitor electrolytes and liver chemistry daily   - Continue Cipro for primary SBP prophylaxis given ascitic fluid protein <1.0 and advanced liver disease.  - Low sodium (<2 grams of sodium per day) diet  - Continue fluid restriction to 1L/day. 45 yo M with AUD, with decompensated alcohol-related cirrhosis complicated by refractory ascites, hepatic encephalopathy, and hyponatremia presenting for increased swelling of legs and abdomen. He was seen seen by Dr. Marshall on 1/6 and his Lasix 40mg PO qd was increased to twice a day. Also added spironolactone 50mg qd.         Impression:  # Hypokalemia likely due to recent increase of his Lasix dose  # Decompensated alcohol-related cirrhosis MELD-Na: 27  # Ascites and anasarca  # Hypervolemic hyponatremia:  # Hepatic encephalopathy      Recommendations:  - therapeutic paracentesis with albumin replacement if over 5L removed  - resume lactulose  - spironolactone to 150 mg po daily  - Lasix to 60 mg po daily  - LVP  - Monitor electrolytes and liver chemistry daily   - Continue Cipro for primary SBP prophylaxis given ascitic fluid protein <1.0 and advanced liver disease.  - Low sodium (<2 grams of sodium per day) diet 45 yo M with AUD, with decompensated alcohol-related cirrhosis complicated by refractory ascites, hepatic encephalopathy, and hyponatremia presenting for increased swelling of legs and abdomen. He was seen seen by Dr. Marshall on 1/6 and his Lasix 40mg PO qd was increased to twice a day. Also added spironolactone 50mg qd.         Impression:  # Hypokalemia likely due to recent increase of his Lasix dose  # Decompensated alcohol-related cirrhosis MELD-Na: 27  # Ascites and anasarca  # Hypervolemic hyponatremia:  # Hepatic encephalopathy      Recommendations:  - therapeutic paracentesis with albumin replacement  - resume lactulose  - continue spironolactone 150 mg po daily  - continue furosemide 60 mg po daily  - LVP  - Monitor electrolytes and liver chemistry daily   - Continue Cipro for primary SBP prophylaxis given ascitic fluid protein <1.0 and advanced liver disease.  - Low sodium (<2 grams of sodium per day) diet

## 2020-01-15 NOTE — DISCHARGE NOTE PROVIDER - NSDCMRMEDTOKEN_GEN_ALL_CORE_FT
ciprofloxacin 500 mg oral tablet: 1 tab(s) orally once a day  folic acid 1 mg oral tablet: 1 tab(s) orally once a day  furosemide 40 mg oral tablet: 1 tab(s) orally 2 times a day  lactulose 10 g/15 mL oral syrup: 30 milliliter(s) orally 3 times a day MDD:Maintain 3 BM&#x27;s per day  Multiple Vitamins oral tablet: 1 tab(s) orally once a day  polyethylene glycol 3350 oral powder for reconstitution: 17 gram(s) orally every 12 hours, As Needed   Protonix 20 mg oral delayed release tablet: 1 tab(s) orally once a day   spironolactone 50 mg oral tablet: 1 tab(s) orally once a day  thiamine 100 mg oral tablet: 1 tab(s) orally once a day ciprofloxacin 500 mg oral tablet: 1 tab(s) orally once a day  folic acid 1 mg oral tablet: 1 tab(s) orally once a day  furosemide 20 mg oral tablet: 3 tab(s) orally once a day   lactulose 10 g/15 mL oral syrup: 30 milliliter(s) orally 3 times a day MDD:Maintain 3 BM&#x27;s per day  Multiple Vitamins oral tablet: 1 tab(s) orally once a day  polyethylene glycol 3350 oral powder for reconstitution: 17 gram(s) orally every 12 hours, As Needed   Protonix 20 mg oral delayed release tablet: 1 tab(s) orally once a day   spironolactone 100 mg oral tablet: 1.5 tab(s) orally once a day   thiamine 100 mg oral tablet: 1 tab(s) orally once a day

## 2020-01-15 NOTE — DISCHARGE NOTE NURSING/CASE MANAGEMENT/SOCIAL WORK - PATIENT PORTAL LINK FT
You can access the FollowMyHealth Patient Portal offered by [facility] by registering at the following website: [email or web address]. By joining [facility]’s FollowMyHealth portal, you will also be able to view your health information using other applications (apps) compatible with our system.

## 2020-01-15 NOTE — DISCHARGE NOTE PROVIDER - NSDCPNSUBOBJ_GEN_ALL_CORE
PT was admitted for volume overload after not taking diuretics.  He was diuresed and his Lasix and Spironolactone were prescribed to him.  He was waiting for paracentesis but due to delays he would like to follow up as an outpatient. He will DC today and follow up with his hepatology team as an outpatient.  Discharge time 43 minutes.

## 2020-01-15 NOTE — DISCHARGE NOTE PROVIDER - NSDCCPCAREPLAN_GEN_ALL_CORE_FT
PRINCIPAL DISCHARGE DIAGNOSIS  Diagnosis: Liver cirrhosis, alcoholic  Assessment and Plan of Treatment: Pt noted to have ascites and pending paracentesis but pt refuses to wait for procedure  Please follow up with hepatology upon leaving the hospital to set up outpatient paracentesis  C/w meds as prescribed      SECONDARY DISCHARGE DIAGNOSES  Diagnosis: Hypokalemia  Assessment and Plan of Treatment: Likely 2/2 increased lasix dose  Resolved

## 2020-01-15 NOTE — PROGRESS NOTE ADULT - REASON FOR ADMISSION
increased abd girth and swelling of lower extremities
increased abd girth and swelling of lower extremities

## 2020-01-18 LAB
CULTURE RESULTS: SIGNIFICANT CHANGE UP
SPECIMEN SOURCE: SIGNIFICANT CHANGE UP

## 2020-01-21 ENCOUNTER — APPOINTMENT (OUTPATIENT)
Dept: HEPATOLOGY | Facility: CLINIC | Age: 45
End: 2020-01-21
Payer: MEDICAID

## 2020-01-21 VITALS
SYSTOLIC BLOOD PRESSURE: 96 MMHG | HEIGHT: 67 IN | WEIGHT: 193 LBS | BODY MASS INDEX: 30.29 KG/M2 | DIASTOLIC BLOOD PRESSURE: 53 MMHG | HEART RATE: 99 BPM | TEMPERATURE: 98.5 F

## 2020-01-21 PROCEDURE — 99214 OFFICE O/P EST MOD 30 MIN: CPT

## 2020-01-21 RX ORDER — SPIRONOLACTONE 50 MG/1
50 TABLET ORAL DAILY
Qty: 30 | Refills: 0 | Status: DISCONTINUED | COMMUNITY
Start: 2020-01-08 | End: 2020-01-13

## 2020-01-21 RX ORDER — FUROSEMIDE 40 MG/1
40 TABLET ORAL TWICE DAILY
Qty: 60 | Refills: 0 | Status: DISCONTINUED | COMMUNITY
Start: 2020-01-07 | End: 2020-01-13

## 2020-01-21 NOTE — REASON FOR VISIT
[Follow-Up: _____] : a [unfilled] follow-up visit [Patient Declined  Services] : - None: Patient declined  services

## 2020-01-22 LAB
ANION GAP SERPL CALC-SCNC: 12 MMOL/L
BUN SERPL-MCNC: 9 MG/DL
CALCIUM SERPL-MCNC: 8.2 MG/DL
CHLORIDE SERPL-SCNC: 101 MMOL/L
CO2 SERPL-SCNC: 20 MMOL/L
CREAT SERPL-MCNC: 0.48 MG/DL
GLUCOSE SERPL-MCNC: 102 MG/DL
POTASSIUM SERPL-SCNC: 3.9 MMOL/L
SODIUM SERPL-SCNC: 133 MMOL/L

## 2020-02-03 NOTE — ED PROVIDER NOTE - NS ED MD DISPO ISOLATION TYPES
William Roberson called. She states she was taking the increased dose of lasix until yesterday, but then she ran out of her Potassium and she was afraid of her electrolytes being out of balance. She didn't take it today either, but should be getting her potassium this afternoon. She states the swelling is gone and her legs are back to normal.  She asked if she should continue to take the increased dose or go back to her regular dose. I instructed her to go back to her regular dose since the swelling is gone. If you have different instructions, let me know and I will call her. She states she is still having a little difficulty breathing. CXR order was sent to [facility] this am.  Has not been done yet. None

## 2020-02-06 ENCOUNTER — APPOINTMENT (OUTPATIENT)
Dept: GASTROENTEROLOGY | Facility: CLINIC | Age: 45
End: 2020-02-06

## 2020-02-11 ENCOUNTER — OUTPATIENT (OUTPATIENT)
Dept: OUTPATIENT SERVICES | Facility: HOSPITAL | Age: 45
LOS: 1 days | Discharge: HOME | End: 2020-02-11

## 2020-02-11 ENCOUNTER — APPOINTMENT (OUTPATIENT)
Dept: INTERNAL MEDICINE | Facility: CLINIC | Age: 45
End: 2020-02-11
Payer: MEDICAID

## 2020-02-11 VITALS
DIASTOLIC BLOOD PRESSURE: 69 MMHG | HEART RATE: 98 BPM | WEIGHT: 183 LBS | BODY MASS INDEX: 28.72 KG/M2 | HEIGHT: 67 IN | SYSTOLIC BLOOD PRESSURE: 112 MMHG

## 2020-02-11 DIAGNOSIS — Z98.890 OTHER SPECIFIED POSTPROCEDURAL STATES: Chronic | ICD-10-CM

## 2020-02-11 PROCEDURE — 99213 OFFICE O/P EST LOW 20 MIN: CPT | Mod: GC

## 2020-02-11 NOTE — PHYSICAL EXAM
[No Acute Distress] : no acute distress [Well Nourished] : well nourished [Well Developed] : well developed [No Respiratory Distress] : no respiratory distress  [No Accessory Muscle Use] : no accessory muscle use [Normal Rate] : normal rate  [Regular Rhythm] : with a regular rhythm [Non Tender] : non-tender [Soft] : abdomen soft [Normal S1, S2] : normal S1 and S2 [Non-distended] : non-distended

## 2020-02-11 NOTE — HISTORY OF PRESENT ILLNESS
[de-identified] : 44 year old recently hospitalized and put on liver transplant list for liver cirrhosis secondary to alcohol, presents for routine exam. \par Currently is asymtompatic.NO chest pain no shortness of breath no nausea/ vomiting no diarrha no constipation.

## 2020-02-11 NOTE — ASSESSMENT
[FreeTextEntry1] : 44 year old recently hospitalized and put on liver transplant list for liver cirrhosis secondary to alcohol, presents for routine exam. ROs negative.\par Pt also wants refills of meds.\par \par #decompensated alcohol-related cirrhosis \par -complicated by ascites, hepatic encephalopathy, and hyponatremia. on exam pt is euvolemic no ascites no HE\par -no prior history of SBP, variceal hemorrhage, PVT, or HCC.\par -follow hepatology\par -pt goes to  and is on liver transplant list\par \par HCM\par -on zinc sulfate\par -vitamin d suplpementation\par -filate, thiamine\par \par \par \par \par \par \par

## 2020-02-11 NOTE — REVIEW OF SYSTEMS
[Fever] : no fever [Chest Pain] : no chest pain [Chills] : no chills [Shortness Of Breath] : no shortness of breath [Cough] : no cough [Wheezing] : no wheezing [Constipation] : no constipation [Abdominal Pain] : no abdominal pain [Nausea] : no nausea [Heartburn] : no heartburn [Vomiting] : no vomiting

## 2020-02-18 DIAGNOSIS — K70.31 ALCOHOLIC CIRRHOSIS OF LIVER WITH ASCITES: ICD-10-CM

## 2020-03-24 NOTE — ASSESSMENT
[FreeTextEntry1] : 43 yo M with AUD, with decompensated alcohol-related cirrhosis complicated by ascites, hepatic encephalopathy, and hyponatremia. He has no prior history of SBP, variceal hemorrhage, PVT, or HCC.\par \par # Ascites and anasarca:\par - Improved since last visit and since hospitalization.\par - Re-check BMP today.\par - Pending lab results, will continue furosemide 60 mg po daily and spironolactone 150 mg po daily.\par - Continue ciprofloxacin for primary SBP prophylaxis given ascitic fluid protein <1.0 and advanced liver disease.\par - Advised patient to check daily weights at home and to notify me if increasing.\par - Mr. SWIFT was counseled to adhere to a low sodium (<2 grams of sodium per day) diet by: avoiding adding any salt to meals (removing the salt shaker from the table); eliminating salty foods from his diet; eating more home-cooked meals; choosing fresh or frozen, not canned, vegetables and fruits; and reading ingredient and food labels to choose low sodium foods.\par \par # Hypervolemic hyponatremia:\par - Re-check BMP today, as above.\par - Continue fluid restriction to 1L/day.\par \par # Hepatic encephalopathy:\par - No current overt HE, appears well-controlled.\par - Continue lactulose 30 mL po tid, titrated as needed to maintain 3-4 BMs/day.\par - Continue Miralax 17g po bid.\par \par # Decompensated alcohol-related cirrhosis:\par - No varices on last EGD in 7/2018, but needs surveillance as his liver disease has progressed since then. EGD previously ordered but not yet scheduled due to insurance issue -- trying to obtain appointment through the fellows' practice.\par - No evidence of HCC or PVT on recent inpatient imaging. Continue q6 month surveillance for HCC.\par - Does not meet our transplant center's criteria for early (<6 months sobriety) liver transplantation given relapse despite known alcohol-related liver disease, but will re-assess candidacy on an ongoing basis as his duration of sobriety increases. He was encouraged to continue to attend AA but also needs to enroll in a formal rehab program. Phosphatidylethanol was negative on 12/24/19. Last reported alcoholic drink was on 8/12/19.\par \par # Health maintenance:\par - Continue zinc sulfate 220 mg po bid given prior deficiency.\par - Continue ergocalciferol 25917 units po weekly for vitamin D deficiency.\par - Continue folate, thiamine, and MVI supplementation.\par - Mr. SWIFT was counseled to: abstain from alcohol and all illicit drugs; avoid use of herbal and dietary supplements due to potential hepatotoxicity; limit use of acetaminophen to <2 grams per day; avoid use of nonsteroidal antiinflammatory drugs (NSAIDs) as these can lead to diuretic resistance and precipitate renal dysfunction in patients with advanced liver disease; avoid eating any unpasteurized dairy products; and avoid eating raw/steamed oysters or other shellfish to avoid risk of Vibrio infection.\par \par He will return for follow-up in 2 months.

## 2020-04-06 ENCOUNTER — APPOINTMENT (OUTPATIENT)
Dept: HEPATOLOGY | Facility: CLINIC | Age: 45
End: 2020-04-06

## 2020-07-21 ENCOUNTER — APPOINTMENT (OUTPATIENT)
Dept: HEPATOLOGY | Facility: CLINIC | Age: 45
End: 2020-07-21

## 2020-07-28 ENCOUNTER — APPOINTMENT (OUTPATIENT)
Dept: HEPATOLOGY | Facility: CLINIC | Age: 45
End: 2020-07-28

## 2020-08-11 ENCOUNTER — APPOINTMENT (OUTPATIENT)
Dept: NEUROLOGY | Facility: CLINIC | Age: 45
End: 2020-08-11

## 2020-08-17 ENCOUNTER — APPOINTMENT (OUTPATIENT)
Dept: INTERNAL MEDICINE | Facility: CLINIC | Age: 45
End: 2020-08-17

## 2021-01-21 NOTE — CONSULT NOTE ADULT - ASSESSMENT
44 year old man with medical history of alcoholic cirrhosis decompensated with ascites, now abstinent x 3 months, who presents as transfer from [facility] for refractory ascites, coagulopathy and chronic hyponatremia; here for transplant hepatology evaluation.   Seen by IR for possible TIPS for refractory ascites but deemed poor candidate due to high MELD score 34.   His course was c/b severe hyponatremia to 120, for which he was given a course of tolvaptan and it increased to 126; tolvaptan subsequently held 2/2 known liver dysfunction. Eventually, spironolactone was changed to Eplerenone as pt was noted to have gynecomastia. Course c/b acute on chronic hyponatremia to 119. Renal was consulted, and urine lytes suggested SIADH picture    Regarding his personal history and prior workup of his cirrhosis: he was diagnosed in 2018.  RIGOBERTO noted 1:160 but hepatitis/autoimmune w/u was negative. EGD 7/2018 negative for varices, +gastritis/esophagitis but negative for H pylori.  [place] 7/2018 +hemorrhoids/anal fistula/diverticulosis.    MRI Abd 12/6: cirrhosis with portal HTN, perigastric and perisplenic varices with splenorenal shunt. Anasarca, no visualized liver mass.   AFP 2.2.   Duplex 12/1 LE was neg for DVT.    Impression:  # Ethanol related _Cirrhosis, MELD-Na _ 31 ( 12/21/2019)        - varices:  perigastric and perisplenic varices with splenorenal shunt        - ascites: yes, s/p large volume paracentesis on 12/20 with 5L        - SPE: No        - HCC: none CT 12/2019    # Severe hyponatremia in the setting of advanced cirrhosis and anasarca  # Coagulapathy   # Elevated liver enzymes due to alcoholic liver disease and recent alcoholic hepatitis   # Diverticulosis.    # Chronic anemia and thrombocytopenia     Recommendations:  - trend CBC, INR, CMP   - Continue current regimen with diuretics and fluid restrictions less than 1 L  - Please consult transplant nephrology for hyponatremia   - Continue PO Cipro for SBP prophylaxis  - Please obtain Echocardiogram as part of the work up and screening for transplant. 44 year old man with medical history of alcoholic cirrhosis decompensated with ascites, now abstinent x 3 months, who presents as transfer from [facility] for refractory ascites, coagulopathy and chronic hyponatremia; here for transplant hepatology evaluation.   Seen by IR for possible TIPS for refractory ascites but deemed poor candidate due to high MELD score 34.   His course was c/b severe hyponatremia to 120, for which he was given a course of tolvaptan and it increased to 126; tolvaptan subsequently held 2/2 known liver dysfunction. Eventually, spironolactone was changed to Eplerenone as pt was noted to have gynecomastia. Course c/b acute on chronic hyponatremia to 119. Renal was consulted, and urine lytes suggested SIADH picture    Regarding his personal history and prior workup of his cirrhosis: he was diagnosed in 2018.  RIGOBERTO noted 1:160 but hepatitis/autoimmune w/u was negative. EGD 7/2018 negative for varices, +gastritis/esophagitis but negative for H pylori.  [place] 7/2018 +hemorrhoids/anal fistula/diverticulosis.    MRI Abd 12/6: cirrhosis with portal HTN, perigastric and perisplenic varices with splenorenal shunt. Anasarca, no visualized liver mass.   AFP 2.2.   Duplex 12/1 LE was neg for DVT.    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    # Chronic anemia and thrombocytopenia     Recommendations:  - Start Lactulose 20 gm tid for hepatic encephalopathy.   - trend CBC, INR, CMP   - Continue current regimen with diuretics and fluid restrictions less than 1 L  - Please consult transplant nephrology for hyponatremia   - Continue PO Cipro for SBP prophylaxis ( low protein in the ascitic fluid)   - Please obtain Echocardiogram as part of the work up and screening for transplant. [facility]

## 2021-03-09 ENCOUNTER — NON-APPOINTMENT (OUTPATIENT)
Age: 46
End: 2021-03-09

## 2021-04-09 ENCOUNTER — APPOINTMENT (OUTPATIENT)
Dept: HEPATOLOGY | Facility: CLINIC | Age: 46
End: 2021-04-09
Payer: MEDICAID

## 2021-04-09 ENCOUNTER — LABORATORY RESULT (OUTPATIENT)
Age: 46
End: 2021-04-09

## 2021-04-09 VITALS
HEART RATE: 79 BPM | SYSTOLIC BLOOD PRESSURE: 132 MMHG | OXYGEN SATURATION: 100 % | BODY MASS INDEX: 31.39 KG/M2 | HEIGHT: 67 IN | WEIGHT: 200 LBS | DIASTOLIC BLOOD PRESSURE: 87 MMHG | TEMPERATURE: 98.3 F

## 2021-04-09 PROCEDURE — 99214 OFFICE O/P EST MOD 30 MIN: CPT

## 2021-04-09 RX ORDER — FOLIC ACID 1 MG/1
1 TABLET ORAL DAILY
Qty: 90 | Refills: 2 | Status: DISCONTINUED | COMMUNITY
End: 2021-04-09

## 2021-04-09 RX ORDER — MULTIVITAMIN
TABLET ORAL DAILY
Qty: 90 | Refills: 2 | Status: DISCONTINUED | COMMUNITY
End: 2021-04-09

## 2021-04-09 RX ORDER — ZINC SULFATE TAB 220 MG (50 MG ZINC EQUIVALENT) 220 (50 ZN) MG
220 (50 ZN) TAB ORAL TWICE DAILY
Qty: 180 | Refills: 2 | Status: DISCONTINUED | COMMUNITY
Start: 2020-01-10 | End: 2021-04-09

## 2021-04-09 RX ORDER — ERGOCALCIFEROL 1.25 MG/1
1.25 MG CAPSULE ORAL WEEKLY
Qty: 12 | Refills: 2 | Status: DISCONTINUED | COMMUNITY
Start: 2020-01-08 | End: 2021-04-09

## 2021-04-09 RX ORDER — CIPROFLOXACIN HYDROCHLORIDE 500 MG/1
500 TABLET, FILM COATED ORAL DAILY
Qty: 90 | Refills: 2 | Status: DISCONTINUED | COMMUNITY
Start: 2020-01-07 | End: 2021-04-09

## 2021-04-09 RX ORDER — POLYETHYLENE GLYCOL 3350 17 G/17G
17 POWDER, FOR SOLUTION ORAL TWICE DAILY
Qty: 180 | Refills: 2 | Status: DISCONTINUED | COMMUNITY
End: 2021-04-09

## 2021-04-09 NOTE — ASSESSMENT
[FreeTextEntry1] : 46 yo M with AUD (now reportedly sober since 8/12/19 and participating in AA), with decompensated alcohol-related cirrhosis complicated by ascites, peripheral edema, hepatic encephalopathy, and hyponatremia. He has no prior history of SBP, variceal hemorrhage, PVT, or HCC.\par \par # Ascites and peripheral edema:\par - Minimal ascites on exam. Can discontinue ciprofloxacin for primary SBP prophylaxis.\par - Given peripheral edema on exam, will resume furosemide 60 mg po daily and continue spironolactone 150 mg po daily, pending re-check BMP today. We discussed the importance of taking both medications together to avoid electrolyte disturbances such as hyperkalemia from spironolactone alone at a high dose.\par - Mr. SWIFT was counseled to adhere to a low sodium (<2 grams of sodium per day) diet by: avoiding adding any salt to meals (removing the salt shaker from the table); eliminating salty foods from his diet; eating more home-cooked meals; choosing fresh or frozen, not canned, vegetables and fruits; and reading ingredient and food labels to choose low sodium foods.\par \par # Hypervolemic hyponatremia:\par - Re-check BMP today, as above.\par - Pending labs, continue fluid restriction to 4-6 8 oz glasses/day.\par \par # Hepatic encephalopathy:\par - No current overt HE, appears well-controlled despite being off lactulose for 2 months.\par - Okay to resume lactulose 30 mL po tid, titrated as needed to maintain 3-4 BMs/day. Patient prefers to resume.\par \par # Decompensated alcohol-related cirrhosis:\par - No varices on last EGD in 7/2018, but needs surveillance as his liver disease has progressed since then. EGD re-ordered today and will schedule; previously had been delayed due to an insurance problem and then due to the COVID-19 pandemic.\par - No evidence of HCC or PVT on last imaging, but now overdue for his q6 month surveillance for HCC (>1 year since last imaging). Abdominal US ordered today and he said he will get it done in [facility].\par - He reports sobriety from alcohol since 8/12/19 and has been participating in AA regularly. PEth was negative on 12/24/19. Will re-check biomarkers with labs today. Will re-assess candidacy for liver transplantation pending his repeat labs today.\par \par # Health maintenance:\par - Currently off all vitamin/mineral supplements. Will assess for need for resumption based on labs today.\par - Mr. SWIFT was counseled to: abstain from alcohol and all illicit drugs; avoid use of herbal and dietary supplements due to potential hepatotoxicity; limit use of acetaminophen to <2 grams per day; avoid use of nonsteroidal antiinflammatory drugs (NSAIDs) as these can lead to diuretic resistance and precipitate renal dysfunction in patients with advanced liver disease; avoid eating any unpasteurized dairy products; and avoid eating raw/steamed oysters or other shellfish to avoid risk of Vibrio infection.\par \par He will return for follow-up in 1 month.

## 2021-04-09 NOTE — REVIEW OF SYSTEMS
[Lower Ext Edema] : lower extremity edema [Negative] : Heme/Lymph [As noted in HPI] : as noted in HPI

## 2021-04-09 NOTE — HISTORY OF PRESENT ILLNESS
[de-identified] : Mr. Jules is a 44 yo M with AUD, with decompensated alcohol-related cirrhosis complicated by ascites, hepatic encephalopathy, and hyponatremia. He has no prior history of SBP, variceal hemorrhage, PVT, or HCC. His cirrhosis was first diagnosed in 2018. He previously completed an alcohol RPP in 1/2019, but then relapsed after 3-4 months, leading to progression of his cirrhosis. His last reported alcoholic drink was on 8/12/19.\par \par He was last seen by me on 1/21/20 (>1 year ago), then was lost to follow-up until today due to the COVID-19 pandemic. He had a cancelled visit during the first surge that was later re-scheduled, but he said he missed that visit because he did not feel safe coming back in to the office until recently. He is not yet vaccinated, but has his first appointment scheduled.\par \par He reports that he was feeling well until he ran out of his medications 2 months ago. He called our office 1 month ago but apparently was told that he needed to be seen before his medications would be refilled. He has only been taking spironolactone 150 mg po daily, without any other medications (including no furosemide), and has had progressive bilateral lower extremity swelling for the past month, "little by little". No abdominal distension. Although he has also run out of lactulose, he denies having any episodes of confusion. However, he wants to continue the lactulose as he finds it helpful to keep his bowel movements more regular.\par \par He has gynecomastia, but he says it has been that way since even before he was diagnosed with cirrhosis, stating that his wife used to joke with him that he has "bigger breasts than her." He is not bothered by it, with no tenderness. He does not think there has been any noticeable change to him while being on spironolactone.\par \par He reports continued sobriety from alcohol without any slips, and he continues to participate in AA, with in person meetings on Fridays and Mondays. He also moved back in with his wife 9 months ago now that he is sober, though he says she is still keeping him somewhat at a distance because she is not sure if she fully trusts him yet. He says he understands that and that he is working to regain her trust. He is happy to be back living with her and their two children (20-year-old daughter and 22-year-old son). They live in [place].\par \par EGD in 7/2018 had no varices but showed esophagitis and gastritis (H pylori negative).\par Colonoscopy in 7/2018 showed hemorrhoids, diverticulosis, and ?anal fistula.

## 2021-04-12 LAB
25(OH)D3 SERPL-MCNC: 12.1 NG/ML
AFP-TM SERPL-MCNC: 5.6 NG/ML
ALBUMIN SERPL ELPH-MCNC: 2.8 G/DL
ALP BLD-CCNC: 276 U/L
ALT SERPL-CCNC: 25 U/L
ANION GAP SERPL CALC-SCNC: 8 MMOL/L
AST SERPL-CCNC: 45 U/L
BASOPHILS # BLD AUTO: 0.03 K/UL
BASOPHILS NFR BLD AUTO: 0.7 %
BILIRUB DIRECT SERPL-MCNC: 1.9 MG/DL
BILIRUB SERPL-MCNC: 3.4 MG/DL
BUN SERPL-MCNC: 12 MG/DL
CALCIUM SERPL-MCNC: 8.7 MG/DL
CHLORIDE SERPL-SCNC: 104 MMOL/L
CO2 SERPL-SCNC: 22 MMOL/L
CREAT SERPL-MCNC: 0.61 MG/DL
EOSINOPHIL # BLD AUTO: 0.19 K/UL
EOSINOPHIL NFR BLD AUTO: 4.2 %
ESTIMATED AVERAGE GLUCOSE: 77 MG/DL
FERRITIN SERPL-MCNC: 37 NG/ML
GLUCOSE SERPL-MCNC: 110 MG/DL
HBA1C MFR BLD HPLC: 4.3 %
HCT VFR BLD CALC: 32.3 %
HGB BLD-MCNC: 10.5 G/DL
IMM GRANULOCYTES NFR BLD AUTO: 0.2 %
INR PPP: 1.69 RATIO
IRON SATN MFR SERPL: 14 %
IRON SERPL-MCNC: 45 UG/DL
LYMPHOCYTES # BLD AUTO: 1.06 K/UL
LYMPHOCYTES NFR BLD AUTO: 23.6 %
MAGNESIUM SERPL-MCNC: 1.9 MG/DL
MAN DIFF?: NORMAL
MCHC RBC-ENTMCNC: 30.9 PG
MCHC RBC-ENTMCNC: 32.5 GM/DL
MCV RBC AUTO: 95 FL
MONOCYTES # BLD AUTO: 0.7 K/UL
MONOCYTES NFR BLD AUTO: 15.6 %
NEUTROPHILS # BLD AUTO: 2.51 K/UL
NEUTROPHILS NFR BLD AUTO: 55.7 %
PHOSPHATE SERPL-MCNC: 3.9 MG/DL
PLATELET # BLD AUTO: 68 K/UL
POTASSIUM SERPL-SCNC: 5 MMOL/L
PROT SERPL-MCNC: 7.6 G/DL
PT BLD: 19.4 SEC
RBC # BLD: 3.4 M/UL
RBC # FLD: 16.1 %
SODIUM SERPL-SCNC: 134 MMOL/L
TIBC SERPL-MCNC: 329 UG/DL
TSH SERPL-ACNC: 3.01 UIU/ML
UIBC SERPL-MCNC: 283 UG/DL
WBC # FLD AUTO: 4.5 K/UL

## 2021-04-15 LAB — ETHYL GLUCURONIDE UR QL SCN: NEGATIVE

## 2021-04-19 LAB — PHOSPHATIDYETHANOL (PETH), WHOLE BLOOD: NEGATIVE NG/ML

## 2021-05-20 ENCOUNTER — APPOINTMENT (OUTPATIENT)
Dept: HEPATOLOGY | Facility: CLINIC | Age: 46
End: 2021-05-20
Payer: MEDICAID

## 2021-05-20 VITALS
TEMPERATURE: 97 F | BODY MASS INDEX: 31.39 KG/M2 | OXYGEN SATURATION: 100 % | HEIGHT: 67 IN | SYSTOLIC BLOOD PRESSURE: 102 MMHG | WEIGHT: 200 LBS | DIASTOLIC BLOOD PRESSURE: 66 MMHG | HEART RATE: 91 BPM

## 2021-05-20 DIAGNOSIS — E87.1 HYPO-OSMOLALITY AND HYPONATREMIA: ICD-10-CM

## 2021-05-20 DIAGNOSIS — J90 PLEURAL EFFUSION, NOT ELSEWHERE CLASSIFIED: ICD-10-CM

## 2021-05-20 LAB
ALBUMIN SERPL ELPH-MCNC: 3 G/DL
ALP BLD-CCNC: 169 U/L
ALT SERPL-CCNC: 29 U/L
ANION GAP SERPL CALC-SCNC: 10 MMOL/L
AST SERPL-CCNC: 62 U/L
BILIRUB DIRECT SERPL-MCNC: 2.2 MG/DL
BILIRUB SERPL-MCNC: 5.3 MG/DL
BUN SERPL-MCNC: 16 MG/DL
CALCIUM SERPL-MCNC: 8.4 MG/DL
CHLORIDE SERPL-SCNC: 98 MMOL/L
CO2 SERPL-SCNC: 20 MMOL/L
CREAT SERPL-MCNC: 0.81 MG/DL
GLUCOSE SERPL-MCNC: 119 MG/DL
INR PPP: 1.62 RATIO
POTASSIUM SERPL-SCNC: 4.1 MMOL/L
PROT SERPL-MCNC: 7.8 G/DL
PT BLD: 18.7 SEC
SODIUM SERPL-SCNC: 128 MMOL/L

## 2021-05-20 PROCEDURE — 99214 OFFICE O/P EST MOD 30 MIN: CPT

## 2021-05-20 RX ORDER — LACTULOSE 10 G/15ML
10 SOLUTION ORAL 3 TIMES DAILY
Qty: 10 | Refills: 2 | Status: DISCONTINUED | COMMUNITY
Start: 1900-01-01 | End: 2021-05-20

## 2021-05-21 ENCOUNTER — NON-APPOINTMENT (OUTPATIENT)
Age: 46
End: 2021-05-21

## 2021-05-21 LAB
ABO + RH PNL BLD: NORMAL
BASOPHILS # BLD AUTO: 0.02 K/UL
BASOPHILS NFR BLD AUTO: 0.4 %
EOSINOPHIL # BLD AUTO: 0.11 K/UL
EOSINOPHIL NFR BLD AUTO: 2 %
HBV SURFACE AB SERPL IA-ACNC: 15.1 MIU/ML
HCT VFR BLD CALC: 30.7 %
HGB BLD-MCNC: 10.4 G/DL
IMM GRANULOCYTES NFR BLD AUTO: 0.4 %
LYMPHOCYTES # BLD AUTO: 0.81 K/UL
LYMPHOCYTES NFR BLD AUTO: 14.9 %
MAN DIFF?: NORMAL
MCHC RBC-ENTMCNC: 31.1 PG
MCHC RBC-ENTMCNC: 33.9 GM/DL
MCV RBC AUTO: 91.9 FL
MONOCYTES # BLD AUTO: 0.81 K/UL
MONOCYTES NFR BLD AUTO: 14.9 %
NEUTROPHILS # BLD AUTO: 3.66 K/UL
NEUTROPHILS NFR BLD AUTO: 67.4 %
PLATELET # BLD AUTO: 75 K/UL
RBC # BLD: 3.34 M/UL
RBC # FLD: 16 %
WBC # FLD AUTO: 5.43 K/UL

## 2021-05-22 NOTE — ASSESSMENT
[FreeTextEntry1] : 44 yo M with AUD (now reportedly sober since 8/12/19 and participating in AA), with decompensated alcohol-related cirrhosis complicated by ascites, peripheral edema, hepatic encephalopathy, and hyponatremia. He has no prior history of SBP, variceal hemorrhage, PVT, or HCC.\par \par # Ascites and peripheral edema:\par - Minimal ascites on exam, and with improving edema.\par - Continue current diuretic regimen: furosemide 60 mg po daily and spironolactone 150 mg po daily.\par - Re-check BMP with labs today.\par - Mr. SWIFT was counseled to adhere to a low sodium (<2 grams of sodium per day) diet by: avoiding adding any salt to meals (removing the salt shaker from the table); eliminating salty foods from his diet; eating more home-cooked meals; choosing fresh or frozen, not canned, vegetables and fruits; and reading ingredient and food labels to choose low sodium foods.\par \par # Hypervolemic hyponatremia:\par - Re-check BMP today, as above.\par - Pending labs, continue fluid restriction to 4-6 8 oz glasses/day.\par \par # Hepatic encephalopathy:\par - No current overt HE, appears well-controlled despite being off lactulose for several months.\par - Can discontinue lactulose as per patient preference, and start Miralax as needed to maintain 2-4 BMs/day.\par \par # Normocytic anemia:\par - Labs (from 4/9/21) with borderline iron deficiency with transferrin saturation 14%.\par - No recent overt GI bleeding.\par - Last EGD in 7/2018 had no varices but showed esophagitis and gastritis (H pylori negative).\par - Last colonoscopy in 7/2018 showed hemorrhoids, diverticulosis, and ?anal fistula.\par - Repeat EGD and colonoscopy ordered for further evaluation.\par \par # Decompensated alcohol-related cirrhosis:\par - No evidence of HCC or PVT on last imaging, but now overdue for his q6 month surveillance for HCC (>1 year since last imaging). Abdominal US previously ordered and we discussed its importance again today. He said he has it scheduled to be done in [place] in 2 weeks.\par - He reports sobriety from alcohol since 8/12/19 and has been participating in AA regularly. PEth was negative on 12/24/19 and 4/9/21 and urine ETG was also negative on 4/9/21.\par - MELD-Na 19 based on last labs (4/9/21), and will refer to start liver transplant evaluation pending financial authorization.\par \par # Health maintenance:\par - HAV immune. Previously non-immune to HBV based on labs in 2018 but may have been vaccinated since then, will need to re-check HBsAb.\par - Advised COVID-19 vaccination.\par - Mr. SWIFT was counseled to: abstain from alcohol and all illicit drugs; avoid use of herbal and dietary supplements due to potential hepatotoxicity; limit use of acetaminophen to <2 grams per day; avoid use of nonsteroidal antiinflammatory drugs (NSAIDs) as these can lead to diuretic resistance and precipitate renal dysfunction in patients with advanced liver disease; avoid eating any unpasteurized dairy products; and avoid eating raw/steamed oysters or other shellfish to avoid risk of Vibrio infection.\par \par He will return for follow-up in 3 months.

## 2021-05-22 NOTE — HISTORY OF PRESENT ILLNESS
[de-identified] : Mr. Hardy is a 46 yo M with AUD, with decompensated alcohol-related cirrhosis complicated by ascites, hepatic encephalopathy, and hyponatremia. He has no prior history of SBP, variceal hemorrhage, PVT, or HCC. His cirrhosis was first diagnosed in 2018. He previously completed an alcohol RPP in 1/2019, but then relapsed after 3-4 months, leading to progression of his cirrhosis. His last reported alcoholic drink was on 8/12/19.\par \par He was last seen by me on 4/9/21, when he was resumed on furosemide 60 mg po daily and spironolactone 150 mg po daily. He is here today for follow-up. He reports improvement in his lower extremity swelling with the diuretics. He has not had any overt confusion, though he says the lactulose was, for some reason, very expensive when he tried to pick it up from the pharmacy so he has not been using it recently. He wants to try Miralax instead. He did not get his abdominal sonogram done yet but says it is scheduled in 2 weeks. He did not receive a call to schedule his EGD yet, and based on recent labs with anemia with borderline iron deficiency, we will also need to add colonoscopy.\par \par EGD in 7/2018 had no varices but showed esophagitis and gastritis (H pylori negative).\par Colonoscopy in 7/2018 showed hemorrhoids, diverticulosis, and ?anal fistula.\par \par He has gynecomastia, but he says it has been that way since even before he was diagnosed with cirrhosis, stating that his wife used to joke with him that he has "bigger breasts than her." He is not bothered by it, with no tenderness. He does not think there has been any noticeable change to him while being on spironolactone.\par \par He reports continued sobriety from alcohol without any slips, and he continues to participate in AA, with in person meetings on Fridays and Mondays. He also moved back in with his wife 10 months ago now that he is sober, though he says she is still keeping him somewhat at a distance because she is not sure if she fully trusts him yet. He says he understands that and that he is working to regain her trust. He is happy to be back living with her and their two children (20-year-old daughter and 22-year-old son). They live in [place].

## 2021-05-23 ENCOUNTER — OUTPATIENT (OUTPATIENT)
Dept: OUTPATIENT SERVICES | Facility: HOSPITAL | Age: 46
LOS: 1 days | End: 2021-05-23

## 2021-05-23 ENCOUNTER — RESULT REVIEW (OUTPATIENT)
Age: 46
End: 2021-05-23

## 2021-05-23 ENCOUNTER — APPOINTMENT (OUTPATIENT)
Dept: ULTRASOUND IMAGING | Facility: CLINIC | Age: 46
End: 2021-05-23
Payer: MEDICAID

## 2021-05-23 DIAGNOSIS — Z98.890 OTHER SPECIFIED POSTPROCEDURAL STATES: Chronic | ICD-10-CM

## 2021-05-23 PROCEDURE — 76700 US EXAM ABDOM COMPLETE: CPT | Mod: 26

## 2021-05-26 RX ORDER — SPIRONOLACTONE 25 MG/1
1 TABLET, FILM COATED ORAL
Qty: 0 | Refills: 0 | DISCHARGE

## 2021-05-26 RX ORDER — PANTOPRAZOLE SODIUM 20 MG/1
1 TABLET, DELAYED RELEASE ORAL
Qty: 0 | Refills: 0 | DISCHARGE

## 2021-05-27 PROBLEM — D69.6 THROMBOCYTOPENIA, UNSPECIFIED: Chronic | Status: ACTIVE | Noted: 2018-10-11

## 2021-05-27 PROBLEM — K70.30 ALCOHOLIC CIRRHOSIS OF LIVER WITHOUT ASCITES: Chronic | Status: ACTIVE | Noted: 2018-10-11

## 2021-05-27 PROBLEM — D64.9 ANEMIA, UNSPECIFIED: Chronic | Status: ACTIVE | Noted: 2018-10-11

## 2021-05-27 PROBLEM — F10.10 ALCOHOL ABUSE, UNCOMPLICATED: Chronic | Status: ACTIVE | Noted: 2018-07-13

## 2021-05-29 ENCOUNTER — APPOINTMENT (OUTPATIENT)
Dept: DISASTER EMERGENCY | Facility: CLINIC | Age: 46
End: 2021-05-29

## 2021-05-29 DIAGNOSIS — Z01.818 ENCOUNTER FOR OTHER PREPROCEDURAL EXAMINATION: ICD-10-CM

## 2021-05-30 ENCOUNTER — OUTPATIENT (OUTPATIENT)
Dept: OUTPATIENT SERVICES | Facility: HOSPITAL | Age: 46
LOS: 1 days | Discharge: HOME | End: 2021-05-30

## 2021-05-30 DIAGNOSIS — Z98.890 OTHER SPECIFIED POSTPROCEDURAL STATES: Chronic | ICD-10-CM

## 2021-05-30 DIAGNOSIS — Z11.59 ENCOUNTER FOR SCREENING FOR OTHER VIRAL DISEASES: ICD-10-CM

## 2021-05-30 LAB — SARS-COV-2 N GENE NPH QL NAA+PROBE: NOT DETECTED

## 2021-06-01 ENCOUNTER — APPOINTMENT (OUTPATIENT)
Dept: HEPATOLOGY | Facility: HOSPITAL | Age: 46
End: 2021-06-01

## 2021-06-01 ENCOUNTER — RESULT REVIEW (OUTPATIENT)
Age: 46
End: 2021-06-01

## 2021-06-01 ENCOUNTER — OUTPATIENT (OUTPATIENT)
Dept: OUTPATIENT SERVICES | Facility: HOSPITAL | Age: 46
LOS: 1 days | End: 2021-06-01
Payer: MEDICAID

## 2021-06-01 VITALS
SYSTOLIC BLOOD PRESSURE: 120 MMHG | OXYGEN SATURATION: 99 % | HEART RATE: 86 BPM | DIASTOLIC BLOOD PRESSURE: 67 MMHG | RESPIRATION RATE: 20 BRPM

## 2021-06-01 VITALS
RESPIRATION RATE: 15 BRPM | WEIGHT: 197.09 LBS | SYSTOLIC BLOOD PRESSURE: 115 MMHG | TEMPERATURE: 98 F | HEART RATE: 84 BPM | DIASTOLIC BLOOD PRESSURE: 62 MMHG | HEIGHT: 65 IN | OXYGEN SATURATION: 99 %

## 2021-06-01 DIAGNOSIS — D50.9 IRON DEFICIENCY ANEMIA, UNSPECIFIED: ICD-10-CM

## 2021-06-01 DIAGNOSIS — K70.31 ALCOHOLIC CIRRHOSIS OF LIVER WITH ASCITES: ICD-10-CM

## 2021-06-01 DIAGNOSIS — Z98.890 OTHER SPECIFIED POSTPROCEDURAL STATES: Chronic | ICD-10-CM

## 2021-06-01 PROCEDURE — 43239 EGD BIOPSY SINGLE/MULTIPLE: CPT

## 2021-06-01 PROCEDURE — 88305 TISSUE EXAM BY PATHOLOGIST: CPT

## 2021-06-01 PROCEDURE — 45378 DIAGNOSTIC COLONOSCOPY: CPT

## 2021-06-01 PROCEDURE — 88305 TISSUE EXAM BY PATHOLOGIST: CPT | Mod: 26

## 2021-06-01 PROCEDURE — 45378 DIAGNOSTIC COLONOSCOPY: CPT | Mod: 59

## 2021-06-01 RX ORDER — POLYETHYLENE GLYCOL-3350, SODIUM CHLORIDE, POTASSIUM CHLORIDE AND SODIUM BICARBONATE 420; 11.2; 5.72; 1.48 G/438.4G; G/438.4G; G/438.4G; G/438.4G
420 POWDER, FOR SOLUTION ORAL
Qty: 1 | Refills: 0 | Status: COMPLETED | COMMUNITY
Start: 2021-05-20 | End: 2021-06-01

## 2021-06-01 RX ORDER — PANTOPRAZOLE SODIUM 20 MG/1
40 TABLET, DELAYED RELEASE ORAL
Qty: 1200 | Refills: 0
Start: 2021-06-01 | End: 2021-06-30

## 2021-06-01 RX ORDER — SODIUM CHLORIDE 9 MG/ML
500 INJECTION INTRAMUSCULAR; INTRAVENOUS; SUBCUTANEOUS
Refills: 0 | Status: COMPLETED | OUTPATIENT
Start: 2021-06-01 | End: 2021-06-01

## 2021-06-01 RX ADMIN — SODIUM CHLORIDE 30 MILLILITER(S): 9 INJECTION INTRAMUSCULAR; INTRAVENOUS; SUBCUTANEOUS at 09:04

## 2021-06-01 NOTE — PRE PROCEDURE NOTE - PRE PROCEDURE EVALUATION
Attending Physician:               Dr. Thomas Marshall             Procedure: EGD and colonoscopy    Indication for Procedure:  ________________________________________________________  PAST MEDICAL & SURGICAL HISTORY:  ETOH abuse    Thrombocytopenia    Anemia    Liver cirrhosis, alcoholic    History of incision and drainage  Gluteal abscess      ALLERGIES:  No Known Allergies    HOME MEDICATIONS:    AICD/PPM: [ ] yes   [ ] no    PERTINENT LAB DATA:                      PHYSICAL EXAMINATION:    T(C): --  HR: --  BP: --  RR: --  SpO2: --    Constitutional: NAD    Neck:  No JVD  Respiratory: CTAB/L  Cardiovascular: S1 and S2  Gastrointestinal: BS+, soft, NT/ND  Extremities: No peripheral edema  Neurological: A/O x 3, no focal deficits        COMMENTS:    The patient is a suitable candidate for the planned procedure unless box checked [ ]  No, explain:

## 2021-06-02 NOTE — ED PROVIDER NOTE - CLINICAL SUMMARY MEDICAL DECISION MAKING FREE TEXT BOX
Home Pt with etoh cirrhosis presenting with increased LE edema. Diuretics recently increased by hepatology on 1/7, plan for repeat BMP and uptitration on 1/14. Pt found to be hypokalemic on metabolic panel, will admit to CDU for repletion and reassess.

## 2021-06-03 LAB — SURGICAL PATHOLOGY STUDY: SIGNIFICANT CHANGE UP

## 2021-06-07 ENCOUNTER — NON-APPOINTMENT (OUTPATIENT)
Age: 46
End: 2021-06-07

## 2021-06-08 DIAGNOSIS — R13.10 DYSPHAGIA, UNSPECIFIED: ICD-10-CM

## 2021-06-08 NOTE — ED ADULT NURSE NOTE - NSFALLRSKINDICTYPE_ED_ALL_ED
Recent Visits    05/10/2021 Intractable chronic migraine without aura and without status migrainosus   [facility] and [address] [place] [address], [facility] Impaired Gait

## 2021-06-11 ENCOUNTER — INPATIENT (INPATIENT)
Facility: HOSPITAL | Age: 46
LOS: 2 days | Discharge: HOME | End: 2021-06-14
Attending: STUDENT IN AN ORGANIZED HEALTH CARE EDUCATION/TRAINING PROGRAM | Admitting: STUDENT IN AN ORGANIZED HEALTH CARE EDUCATION/TRAINING PROGRAM
Payer: MEDICAID

## 2021-06-11 VITALS
TEMPERATURE: 98 F | RESPIRATION RATE: 16 BRPM | WEIGHT: 179.9 LBS | DIASTOLIC BLOOD PRESSURE: 76 MMHG | HEART RATE: 87 BPM | OXYGEN SATURATION: 99 % | HEIGHT: 65 IN | SYSTOLIC BLOOD PRESSURE: 121 MMHG

## 2021-06-11 DIAGNOSIS — Z98.890 OTHER SPECIFIED POSTPROCEDURAL STATES: Chronic | ICD-10-CM

## 2021-06-11 LAB
ALBUMIN SERPL ELPH-MCNC: 3 G/DL — LOW (ref 3.5–5.2)
ALP SERPL-CCNC: 200 U/L — HIGH (ref 30–115)
ALT FLD-CCNC: 29 U/L — SIGNIFICANT CHANGE UP (ref 0–41)
AMMONIA BLD-MCNC: 62 UMOL/L — HIGH (ref 11–55)
ANION GAP SERPL CALC-SCNC: 11 MMOL/L — SIGNIFICANT CHANGE UP (ref 7–14)
APTT BLD: 39.4 SEC — HIGH (ref 27–39.2)
AST SERPL-CCNC: 53 U/L — HIGH (ref 0–41)
BASOPHILS # BLD AUTO: 0.03 K/UL — SIGNIFICANT CHANGE UP (ref 0–0.2)
BASOPHILS NFR BLD AUTO: 0.5 % — SIGNIFICANT CHANGE UP (ref 0–1)
BILIRUB DIRECT SERPL-MCNC: 1.7 MG/DL — HIGH (ref 0–0.2)
BILIRUB INDIRECT FLD-MCNC: 1.8 MG/DL — HIGH (ref 0.2–1.2)
BILIRUB SERPL-MCNC: 3.5 MG/DL — HIGH (ref 0.2–1.2)
BUN SERPL-MCNC: 23 MG/DL — HIGH (ref 10–20)
CALCIUM SERPL-MCNC: 8.6 MG/DL — SIGNIFICANT CHANGE UP (ref 8.5–10.1)
CHLORIDE SERPL-SCNC: 103 MMOL/L — SIGNIFICANT CHANGE UP (ref 98–110)
CO2 SERPL-SCNC: 18 MMOL/L — SIGNIFICANT CHANGE UP (ref 17–32)
CREAT SERPL-MCNC: 1 MG/DL — SIGNIFICANT CHANGE UP (ref 0.7–1.5)
EOSINOPHIL # BLD AUTO: 0.13 K/UL — SIGNIFICANT CHANGE UP (ref 0–0.7)
EOSINOPHIL NFR BLD AUTO: 2.2 % — SIGNIFICANT CHANGE UP (ref 0–8)
GLUCOSE SERPL-MCNC: 97 MG/DL — SIGNIFICANT CHANGE UP (ref 70–99)
HCT VFR BLD CALC: 28.9 % — LOW (ref 42–52)
HGB BLD-MCNC: 10.2 G/DL — LOW (ref 14–18)
IMM GRANULOCYTES NFR BLD AUTO: 0.5 % — HIGH (ref 0.1–0.3)
INR BLD: 1.72 RATIO — HIGH (ref 0.65–1.3)
LACTATE SERPL-SCNC: 1.3 MMOL/L — SIGNIFICANT CHANGE UP (ref 0.7–2)
LIDOCAIN IGE QN: 85 U/L — HIGH (ref 7–60)
LYMPHOCYTES # BLD AUTO: 1.02 K/UL — LOW (ref 1.2–3.4)
LYMPHOCYTES # BLD AUTO: 17.5 % — LOW (ref 20.5–51.1)
MAGNESIUM SERPL-MCNC: 2 MG/DL — SIGNIFICANT CHANGE UP (ref 1.8–2.4)
MCHC RBC-ENTMCNC: 32 PG — HIGH (ref 27–31)
MCHC RBC-ENTMCNC: 35.3 G/DL — SIGNIFICANT CHANGE UP (ref 32–37)
MCV RBC AUTO: 90.6 FL — SIGNIFICANT CHANGE UP (ref 80–94)
MONOCYTES # BLD AUTO: 0.92 K/UL — HIGH (ref 0.1–0.6)
MONOCYTES NFR BLD AUTO: 15.8 % — HIGH (ref 1.7–9.3)
NEUTROPHILS # BLD AUTO: 3.69 K/UL — SIGNIFICANT CHANGE UP (ref 1.4–6.5)
NEUTROPHILS NFR BLD AUTO: 63.5 % — SIGNIFICANT CHANGE UP (ref 42.2–75.2)
NRBC # BLD: 0 /100 WBCS — SIGNIFICANT CHANGE UP (ref 0–0)
PHOSPHATE SERPL-MCNC: 3.9 MG/DL — SIGNIFICANT CHANGE UP (ref 2.1–4.9)
PLATELET # BLD AUTO: 72 K/UL — LOW (ref 130–400)
POTASSIUM SERPL-MCNC: 4.5 MMOL/L — SIGNIFICANT CHANGE UP (ref 3.5–5)
POTASSIUM SERPL-SCNC: 4.5 MMOL/L — SIGNIFICANT CHANGE UP (ref 3.5–5)
PROT SERPL-MCNC: 7.6 G/DL — SIGNIFICANT CHANGE UP (ref 6–8)
PROTHROM AB SERPL-ACNC: 19.8 SEC — HIGH (ref 9.95–12.87)
RBC # BLD: 3.19 M/UL — LOW (ref 4.7–6.1)
RBC # FLD: 15.4 % — HIGH (ref 11.5–14.5)
SARS-COV-2 RNA SPEC QL NAA+PROBE: SIGNIFICANT CHANGE UP
SODIUM SERPL-SCNC: 132 MMOL/L — LOW (ref 135–146)
WBC # BLD: 5.82 K/UL — SIGNIFICANT CHANGE UP (ref 4.8–10.8)
WBC # FLD AUTO: 5.82 K/UL — SIGNIFICANT CHANGE UP (ref 4.8–10.8)

## 2021-06-11 PROCEDURE — 99285 EMERGENCY DEPT VISIT HI MDM: CPT

## 2021-06-11 PROCEDURE — 70450 CT HEAD/BRAIN W/O DYE: CPT | Mod: 26,MA

## 2021-06-11 PROCEDURE — 93010 ELECTROCARDIOGRAM REPORT: CPT

## 2021-06-11 RX ORDER — FOLIC ACID 0.8 MG
1 TABLET ORAL DAILY
Refills: 0 | Status: DISCONTINUED | OUTPATIENT
Start: 2021-06-11 | End: 2021-06-14

## 2021-06-11 RX ORDER — THIAMINE MONONITRATE (VIT B1) 100 MG
100 TABLET ORAL DAILY
Refills: 0 | Status: DISCONTINUED | OUTPATIENT
Start: 2021-06-11 | End: 2021-06-14

## 2021-06-11 RX ORDER — THIAMINE MONONITRATE (VIT B1) 100 MG
500 TABLET ORAL ONCE
Refills: 0 | Status: COMPLETED | OUTPATIENT
Start: 2021-06-11 | End: 2021-06-11

## 2021-06-11 RX ORDER — CHLORHEXIDINE GLUCONATE 213 G/1000ML
1 SOLUTION TOPICAL
Refills: 0 | Status: DISCONTINUED | OUTPATIENT
Start: 2021-06-11 | End: 2021-06-14

## 2021-06-11 RX ORDER — PREGABALIN 225 MG/1
1000 CAPSULE ORAL DAILY
Refills: 0 | Status: DISCONTINUED | OUTPATIENT
Start: 2021-06-11 | End: 2021-06-14

## 2021-06-11 RX ORDER — GABAPENTIN 400 MG/1
400 CAPSULE ORAL EVERY 8 HOURS
Refills: 0 | Status: DISCONTINUED | OUTPATIENT
Start: 2021-06-11 | End: 2021-06-14

## 2021-06-11 RX ADMIN — Medication 1 TABLET(S): at 16:22

## 2021-06-11 RX ADMIN — Medication 105 MILLIGRAM(S): at 16:24

## 2021-06-11 NOTE — ED PROVIDER NOTE - CARE PLAN
Principal Discharge DX:	Weakness of lower extremity  Secondary Diagnosis:	Ataxia  Secondary Diagnosis:	Paresthesia

## 2021-06-11 NOTE — ED PROVIDER NOTE - CLINICAL SUMMARY MEDICAL DECISION MAKING FREE TEXT BOX
46 yo M presented to ED for gait abnormality and tingling of his feet. CT head normal. Labs at baseline with LFTs improving. Pt admitted for further management.

## 2021-06-11 NOTE — ED ADULT NURSE NOTE - OBJECTIVE STATEMENT
Pt c/o weakness x few days. Pt c/o weakness in lower extremities x few days, also states feeling "pins & needles " under his feet. Denies SOB, nausea, vomiting na chest pain.

## 2021-06-11 NOTE — ED PROVIDER NOTE - PHYSICAL EXAMINATION
CONSTITUTIONAL: Well-developed; well-nourished; in no acute distress.   SKIN: warm, dry  HEAD: Normocephalic; atraumatic.  EYES: + mild scleral icterus,  no nystagmus, PERRL   ENT: No nasal discharge; airway clear.  NECK: Supple; non tender.  CARD:  Regular rate and rhythm.   RESP: NO inc WOB , lungs CTBA  ABD: soft ntnd  EXT: Normal ROM.  No asterisks   LYMPH: No acute cervical adenopathy.  NEURO: Alert, oriented, grossly unremarkable   RLE : 5/5, LLE 5/5 , general sensation equal and intact bilaterally ,   pt observed having broad based shuffling gait  + proprioception intact on lower extremity

## 2021-06-11 NOTE — ED PROVIDER NOTE - OBJECTIVE STATEMENT
45 yo M alcohol-related cirrhosis ( last drink ~3 years ago) presenting w/ weakness in his lower extremity x 3 days associated w/ " pins and needles" under the bottom of his feet.  pt reports it started suddenly when he was driving . He had to pull over and call his brother to come and pick him up.  Pt denies any IVDU, no fevers, chills, no hx of trauma , no urinary retention or incontinence ,

## 2021-06-12 LAB
ALBUMIN SERPL ELPH-MCNC: 3 G/DL — LOW (ref 3.5–5.2)
ALBUMIN SERPL ELPH-MCNC: 3 G/DL — LOW (ref 3.5–5.2)
ALP SERPL-CCNC: 173 U/L — HIGH (ref 30–115)
ALP SERPL-CCNC: 182 U/L — HIGH (ref 30–115)
ALT FLD-CCNC: 27 U/L — SIGNIFICANT CHANGE UP (ref 0–41)
ALT FLD-CCNC: 29 U/L — SIGNIFICANT CHANGE UP (ref 0–41)
ANION GAP SERPL CALC-SCNC: 12 MMOL/L — SIGNIFICANT CHANGE UP (ref 7–14)
ANION GAP SERPL CALC-SCNC: 12 MMOL/L — SIGNIFICANT CHANGE UP (ref 7–14)
APPEARANCE UR: CLEAR — SIGNIFICANT CHANGE UP
AST SERPL-CCNC: 46 U/L — HIGH (ref 0–41)
AST SERPL-CCNC: 51 U/L — HIGH (ref 0–41)
BILIRUB DIRECT SERPL-MCNC: 1.5 MG/DL — HIGH (ref 0–0.2)
BILIRUB INDIRECT FLD-MCNC: 2 MG/DL — HIGH (ref 0.2–1.2)
BILIRUB SERPL-MCNC: 3 MG/DL — HIGH (ref 0.2–1.2)
BILIRUB SERPL-MCNC: 3.5 MG/DL — HIGH (ref 0.2–1.2)
BILIRUB UR-MCNC: NEGATIVE — SIGNIFICANT CHANGE UP
BUN SERPL-MCNC: 19 MG/DL — SIGNIFICANT CHANGE UP (ref 10–20)
BUN SERPL-MCNC: 20 MG/DL — SIGNIFICANT CHANGE UP (ref 10–20)
CALCIUM SERPL-MCNC: 8.6 MG/DL — SIGNIFICANT CHANGE UP (ref 8.5–10.1)
CALCIUM SERPL-MCNC: 9.1 MG/DL — SIGNIFICANT CHANGE UP (ref 8.5–10.1)
CHLORIDE SERPL-SCNC: 100 MMOL/L — SIGNIFICANT CHANGE UP (ref 98–110)
CHLORIDE SERPL-SCNC: 102 MMOL/L — SIGNIFICANT CHANGE UP (ref 98–110)
CO2 SERPL-SCNC: 16 MMOL/L — LOW (ref 17–32)
CO2 SERPL-SCNC: 18 MMOL/L — SIGNIFICANT CHANGE UP (ref 17–32)
COLOR SPEC: YELLOW — SIGNIFICANT CHANGE UP
COVID-19 SPIKE DOMAIN AB INTERP: POSITIVE
COVID-19 SPIKE DOMAIN ANTIBODY RESULT: >250 U/ML — HIGH
CREAT SERPL-MCNC: 0.8 MG/DL — SIGNIFICANT CHANGE UP (ref 0.7–1.5)
CREAT SERPL-MCNC: 0.8 MG/DL — SIGNIFICANT CHANGE UP (ref 0.7–1.5)
DIFF PNL FLD: NEGATIVE — SIGNIFICANT CHANGE UP
FERRITIN SERPL-MCNC: 60 NG/ML — SIGNIFICANT CHANGE UP (ref 30–400)
FOLATE SERPL-MCNC: 11.4 NG/ML — SIGNIFICANT CHANGE UP
FOLATE SERPL-MCNC: 12.7 NG/ML — SIGNIFICANT CHANGE UP
GLUCOSE SERPL-MCNC: 118 MG/DL — HIGH (ref 70–99)
GLUCOSE SERPL-MCNC: 202 MG/DL — HIGH (ref 70–99)
GLUCOSE UR QL: NEGATIVE — SIGNIFICANT CHANGE UP
HCT VFR BLD CALC: 30 % — LOW (ref 42–52)
HGB BLD-MCNC: 10.3 G/DL — LOW (ref 14–18)
INR BLD: 1.69 RATIO — HIGH (ref 0.65–1.3)
IRON SATN MFR SERPL: 20 % — SIGNIFICANT CHANGE UP (ref 15–50)
IRON SATN MFR SERPL: 62 UG/DL — SIGNIFICANT CHANGE UP (ref 35–150)
KETONES UR-MCNC: NEGATIVE — SIGNIFICANT CHANGE UP
LEUKOCYTE ESTERASE UR-ACNC: NEGATIVE — SIGNIFICANT CHANGE UP
MAGNESIUM SERPL-MCNC: 1.7 MG/DL — LOW (ref 1.8–2.4)
MCHC RBC-ENTMCNC: 30.9 PG — SIGNIFICANT CHANGE UP (ref 27–31)
MCHC RBC-ENTMCNC: 34.3 G/DL — SIGNIFICANT CHANGE UP (ref 32–37)
MCV RBC AUTO: 90.1 FL — SIGNIFICANT CHANGE UP (ref 80–94)
NITRITE UR-MCNC: NEGATIVE — SIGNIFICANT CHANGE UP
NRBC # BLD: 0 /100 WBCS — SIGNIFICANT CHANGE UP (ref 0–0)
PH UR: 7 — SIGNIFICANT CHANGE UP (ref 5–8)
PLATELET # BLD AUTO: 70 K/UL — LOW (ref 130–400)
POTASSIUM SERPL-MCNC: 4.3 MMOL/L — SIGNIFICANT CHANGE UP (ref 3.5–5)
POTASSIUM SERPL-MCNC: 4.4 MMOL/L — SIGNIFICANT CHANGE UP (ref 3.5–5)
POTASSIUM SERPL-SCNC: 4.3 MMOL/L — SIGNIFICANT CHANGE UP (ref 3.5–5)
POTASSIUM SERPL-SCNC: 4.4 MMOL/L — SIGNIFICANT CHANGE UP (ref 3.5–5)
PROT SERPL-MCNC: 7.4 G/DL — SIGNIFICANT CHANGE UP (ref 6–8.3)
PROT SERPL-MCNC: 7.4 G/DL — SIGNIFICANT CHANGE UP (ref 6–8.3)
PROT SERPL-MCNC: 7.7 G/DL — SIGNIFICANT CHANGE UP (ref 6–8)
PROT SERPL-MCNC: 7.9 G/DL — SIGNIFICANT CHANGE UP (ref 6–8)
PROT UR-MCNC: SIGNIFICANT CHANGE UP
PROTHROM AB SERPL-ACNC: 19.4 SEC — HIGH (ref 9.95–12.87)
RBC # BLD: 3.33 M/UL — LOW (ref 4.7–6.1)
RBC # FLD: 15.6 % — HIGH (ref 11.5–14.5)
SARS-COV-2 IGG+IGM SERPL QL IA: >250 U/ML — HIGH
SARS-COV-2 IGG+IGM SERPL QL IA: POSITIVE
SODIUM SERPL-SCNC: 130 MMOL/L — LOW (ref 135–146)
SODIUM SERPL-SCNC: 130 MMOL/L — LOW (ref 135–146)
SP GR SPEC: 1.02 — SIGNIFICANT CHANGE UP (ref 1.01–1.03)
TIBC SERPL-MCNC: 316 UG/DL — SIGNIFICANT CHANGE UP (ref 220–430)
UIBC SERPL-MCNC: 254 UG/DL — SIGNIFICANT CHANGE UP (ref 110–370)
UROBILINOGEN FLD QL: ABNORMAL
VIT B12 SERPL-MCNC: 1607 PG/ML — HIGH (ref 232–1245)
VIT B12 SERPL-MCNC: 1652 PG/ML — HIGH (ref 232–1245)
WBC # BLD: 3.8 K/UL — LOW (ref 4.8–10.8)
WBC # FLD AUTO: 3.8 K/UL — LOW (ref 4.8–10.8)

## 2021-06-12 PROCEDURE — 99221 1ST HOSP IP/OBS SF/LOW 40: CPT

## 2021-06-12 PROCEDURE — 72148 MRI LUMBAR SPINE W/O DYE: CPT | Mod: 26

## 2021-06-12 PROCEDURE — 99223 1ST HOSP IP/OBS HIGH 75: CPT

## 2021-06-12 RX ORDER — LACTULOSE 10 G/15ML
20 SOLUTION ORAL THREE TIMES A DAY
Refills: 0 | Status: DISCONTINUED | OUTPATIENT
Start: 2021-06-12 | End: 2021-06-14

## 2021-06-12 RX ORDER — CIPROFLOXACIN LACTATE 400MG/40ML
500 VIAL (ML) INTRAVENOUS DAILY
Refills: 0 | Status: DISCONTINUED | OUTPATIENT
Start: 2021-06-12 | End: 2021-06-14

## 2021-06-12 RX ORDER — BACLOFEN 100 %
5 POWDER (GRAM) MISCELLANEOUS EVERY 8 HOURS
Refills: 0 | Status: DISCONTINUED | OUTPATIENT
Start: 2021-06-12 | End: 2021-06-14

## 2021-06-12 RX ORDER — SPIRONOLACTONE 25 MG/1
150 TABLET, FILM COATED ORAL DAILY
Refills: 0 | Status: DISCONTINUED | OUTPATIENT
Start: 2021-06-12 | End: 2021-06-14

## 2021-06-12 RX ORDER — SODIUM CHLORIDE 9 MG/ML
1000 INJECTION, SOLUTION INTRAVENOUS
Refills: 0 | Status: DISCONTINUED | OUTPATIENT
Start: 2021-06-12 | End: 2021-06-13

## 2021-06-12 RX ORDER — FUROSEMIDE 40 MG
60 TABLET ORAL DAILY
Refills: 0 | Status: DISCONTINUED | OUTPATIENT
Start: 2021-06-12 | End: 2021-06-13

## 2021-06-12 RX ORDER — OXYCODONE AND ACETAMINOPHEN 5; 325 MG/1; MG/1
1 TABLET ORAL ONCE
Refills: 0 | Status: DISCONTINUED | OUTPATIENT
Start: 2021-06-12 | End: 2021-06-12

## 2021-06-12 RX ORDER — PANTOPRAZOLE SODIUM 20 MG/1
40 TABLET, DELAYED RELEASE ORAL
Refills: 0 | Status: DISCONTINUED | OUTPATIENT
Start: 2021-06-12 | End: 2021-06-14

## 2021-06-12 RX ORDER — POLYETHYLENE GLYCOL 3350 17 G/17G
17 POWDER, FOR SOLUTION ORAL EVERY 12 HOURS
Refills: 0 | Status: DISCONTINUED | OUTPATIENT
Start: 2021-06-12 | End: 2021-06-14

## 2021-06-12 RX ADMIN — LACTULOSE 20 GRAM(S): 10 SOLUTION ORAL at 13:22

## 2021-06-12 RX ADMIN — LACTULOSE 20 GRAM(S): 10 SOLUTION ORAL at 23:06

## 2021-06-12 RX ADMIN — Medication 5 MILLIGRAM(S): at 23:02

## 2021-06-12 RX ADMIN — GABAPENTIN 400 MILLIGRAM(S): 400 CAPSULE ORAL at 00:00

## 2021-06-12 RX ADMIN — Medication 1 MILLIGRAM(S): at 13:22

## 2021-06-12 RX ADMIN — Medication 100 MILLIGRAM(S): at 13:22

## 2021-06-12 RX ADMIN — SPIRONOLACTONE 150 MILLIGRAM(S): 25 TABLET, FILM COATED ORAL at 05:28

## 2021-06-12 RX ADMIN — PREGABALIN 1000 MICROGRAM(S): 225 CAPSULE ORAL at 13:22

## 2021-06-12 RX ADMIN — Medication 1 TABLET(S): at 13:22

## 2021-06-12 RX ADMIN — SODIUM CHLORIDE 75 MILLILITER(S): 9 INJECTION, SOLUTION INTRAVENOUS at 17:48

## 2021-06-12 RX ADMIN — Medication 500 MILLIGRAM(S): at 13:22

## 2021-06-12 RX ADMIN — GABAPENTIN 400 MILLIGRAM(S): 400 CAPSULE ORAL at 23:05

## 2021-06-12 RX ADMIN — PANTOPRAZOLE SODIUM 40 MILLIGRAM(S): 20 TABLET, DELAYED RELEASE ORAL at 05:29

## 2021-06-12 RX ADMIN — GABAPENTIN 400 MILLIGRAM(S): 400 CAPSULE ORAL at 05:32

## 2021-06-12 RX ADMIN — Medication 5 MILLIGRAM(S): at 17:17

## 2021-06-12 RX ADMIN — GABAPENTIN 400 MILLIGRAM(S): 400 CAPSULE ORAL at 13:22

## 2021-06-12 RX ADMIN — Medication 60 MILLIGRAM(S): at 05:29

## 2021-06-12 NOTE — CONSULT NOTE ADULT - SUBJECTIVE AND OBJECTIVE BOX
Neurology Consult    Patient is a 45y old  Male who presents with a chief complaint of Neuropathy (2021 00:01)      HPI:  Chief Complaint: pain and tingling in bilateral legs    Past Medical History: Liver cirrhosis due to chronic alcohol use on liver transplant list    Past Surgical History: None Relevant     Mr. Hardy is a 45M with a past medical history as described above who presented to the hospital for evaluation of bilateral lower extremity neuropathy. Three days ago, he was driving and was unable to feel his feet on the pedals and was afraid to drive so he pulled over and called his brother to drive him home. He endorsed one previous episode like this before that resolved. he describes typical neuropathic pain - pins and needles affecting both lower extremities and occasional bursts of pain. He is on the liver transplant list, evaluated at [facility], since he has been sober for about 3 years.     In the ED, his vitals were stable. Labs consistent with advanced liver disease - thrombocytopenia, coagulopathy, and evidence of hepatocellular dysfunction. Admitted to medicine for further evaluation.     ROS: Denies headache, changes in vision, chest pain, palpitations, shortness of breath, cough, fever, chills, nausea, vomiting, diarrhea, and constipation.  (2021 00:01)      PAST MEDICAL & SURGICAL HISTORY:  ETOH abuse    Thrombocytopenia    Anemia    Liver cirrhosis, alcoholic    History of incision and drainage  Gluteal abscess        FAMILY HISTORY:  Family history of stroke        Social History: (-) x 3    Allergies    No Known Allergies    Intolerances        MEDICATIONS  (STANDING):  chlorhexidine 4% Liquid 1 Application(s) Topical <User Schedule>  ciprofloxacin     Tablet 500 milliGRAM(s) Oral daily  cyanocobalamin 1000 MICROGram(s) Oral daily  folic acid 1 milliGRAM(s) Oral daily  furosemide    Tablet 60 milliGRAM(s) Oral daily  gabapentin 400 milliGRAM(s) Oral every 8 hours  lactulose Syrup 20 Gram(s) Oral three times a day  multivitamin 1 Tablet(s) Oral daily  pantoprazole    Tablet 40 milliGRAM(s) Oral before breakfast  spironolactone 150 milliGRAM(s) Oral daily  thiamine 100 milliGRAM(s) Oral daily    MEDICATIONS  (PRN):  polyethylene glycol 3350 17 Gram(s) Oral every 12 hours PRN Constipation    Vital Signs Last 24 Hrs  T(C): 36.5 (2021 05:34), Max: 37.1 (2021 22:51)  T(F): 97.7 (2021 05:34), Max: 98.8 (2021 22:51)  HR: 82 (2021 05:34) (71 - 87)  BP: 109/56 (2021 05:34) (109/56 - 122/61)  BP(mean): --  RR: 18 (2021 05:34) (16 - 18)  SpO2: 98% (2021 22:51) (97% - 99%)    Examination:  General:  Appearance is consistent with chronologic age.  No abnormal facies.  Gross skin survey within normal limits.    Cognitive/Language:  The patient is oriented to person, place, time and date.  Recent and remote memory intact.  Fund of knowledge is intact and normal.  Language with normal repetition, comprehension and naming.  Nondysarthric.    Eyes: intact VA, VFF.  EOMI w/o nystagmus, skew or reported double vision.  PERRL.  No ptosis/weakness of eyelid closure.    Face:  Facial sensation normal V1 - 3, no facial asymmetry.    Motor examination:   Normal tone, bulk and range of motion.  No tenderness, twitching, tremors or involuntary movements.  Formal Muscle Strength Testing: (MRC grade R/L) 5/5 UE; 5/5 LE.  No observable drift.  Sensory examination:  describes bilateral LE decreased sensation off bilateral feet.   Cerebellum:   FTN/HKS intact with normal VIC in all limbs.  No dysmetria or dysdiadokinesia.  Gait deferred for hour of sleep  Evidence for gastrocnemius atrophy bilaterally.     Labs:   CBC Full  -  ( 2021 14:45 )  WBC Count : 5.82 K/uL  RBC Count : 3.19 M/uL  Hemoglobin : 10.2 g/dL  Hematocrit : 28.9 %  Platelet Count - Automated : 72 K/uL  Mean Cell Volume : 90.6 fL  Mean Cell Hemoglobin : 32.0 pg  Mean Cell Hemoglobin Concentration : 35.3 g/dL  Auto Neutrophil # : 3.69 K/uL  Auto Lymphocyte # : 1.02 K/uL  Auto Monocyte # : 0.92 K/uL  Auto Eosinophil # : 0.13 K/uL  Auto Basophil # : 0.03 K/uL  Auto Neutrophil % : 63.5 %  Auto Lymphocyte % : 17.5 %  Auto Monocyte % : 15.8 %  Auto Eosinophil % : 2.2 %  Auto Basophil % : 0.5 %        132<L>  |  103  |  23<H>  ----------------------------<  97  4.5   |  18  |  1.0    Ca    8.6      2021 14:45  Phos  3.9       Mg     2.0         TPro  7.6  /  Alb  3.0<L>  /  TBili  3.5<H>  /  DBili  1.7<H>  /  AST  53<H>  /  ALT  29  /  AlkPhos  200<H>      LIVER FUNCTIONS - ( 2021 14:45 )  Alb: 3.0 g/dL / Pro: 7.6 g/dL / ALK PHOS: 200 U/L / ALT: 29 U/L / AST: 53 U/L / GGT: x           PT/INR - ( 2021 14:45 )   PT: 19.80 sec;   INR: 1.72 ratio         PTT - ( 2021 14:45 )  PTT:39.4 sec  Urinalysis Basic - ( 2021 00:00 )    Color: Yellow / Appearance: Clear / S.025 / pH: x  Gluc: x / Ketone: Negative  / Bili: Negative / Urobili: 6 mg/dL   Blood: x / Protein: Trace / Nitrite: Negative   Leuk Esterase: Negative / RBC: x / WBC x   Sq Epi: x / Non Sq Epi: x / Bacteria: x          Neuroimaging:  NCHCT: CT Head No Cont:   EXAM:  CT BRAIN            IMPRESSION:    No evidence of acute intracranial pathology. Stable exam since 2019.

## 2021-06-12 NOTE — CONSULT NOTE ADULT - ATTENDING COMMENTS
I have personally seen and examined this patient.  I have fully participated in the care of this patient.  I have reviewed all pertinent clinical information, including history, physical exam, plan and note.  Patient with history of liver Cirrhosis and reported history of neuropathy presents with LE paresthesia and muscle cramps. IV hydration. Check electrolytes. MRI lumbar spine. Baclofen. Continue gabapentin.   I have reviewed all pertinent clinical information and reviewed all relevant imaging and diagnostic studies personally.  Recommendations as above.  Agree with above assessment except as noted.

## 2021-06-12 NOTE — H&P ADULT - NSICDXPASTMEDICALHX_GEN_ALL_CORE_FT
PAST MEDICAL HISTORY:  Anemia     ETOH abuse     Liver cirrhosis, alcoholic     Thrombocytopenia

## 2021-06-12 NOTE — H&P ADULT - ATTENDING COMMENTS
I saw and examined the patient independently. I agree with above history, physical exam and plan of care which I have reviewed and edited where appropriate with following additions.     pt AAO, reports b/l LE decreased sensation/ numbness/tingling feeling.     LE numbness/gait disturbance:  MRI L spine noted- no significant stenosis of spine.   c/w gabapentin  fu final neuro note for further recs.   PT eval noted- home with [facility] PT .     decompensated alcholic liver cirrhosis/thrombocytopenia:   c/w diuretics  lactulose with target of 2-3 bm's daily   pt on transplant list .     dvt ppx.

## 2021-06-12 NOTE — PHYSICAL THERAPY INITIAL EVALUATION ADULT - ADDITIONAL COMMENTS
no LEXIE, 10 steps to 2nd floor  pt stated he was amb I, but it took him little while to "get going"

## 2021-06-12 NOTE — H&P ADULT - ASSESSMENT
DESIRE SWIFT is a 45y Male with a past medical history as described above who presented for evaluation of bilateral lower extremity pain     # Gait disturbance due to BLE neuropathy   - Could be a late sequela of chronic alcoholism, vitamin deficiency, or other demyelinating/neurological disease   - Check B vitamins, HbA1c, SPEP, UPEP  - MRI L-spine per neuro   - Gabapentin 400mg PO q8 for pain   - Supplement vitamins  - PT/Rehab    # Hepatic Cirrhosis with evidence of decompensation   - Platelets 72, INR 1.72, MELD-Na 22  - Transplant hepatology follow-up as outpatient   - Continue Aldactone 150mg daily and Lasix 60mg daily   - Supposed to take cipro 500mg daily, but was not. Educated him to resume   - Lactulose titrate to 3 BMs a day - NH3 62  - PPI daily     DVT ppx: Would avoid chemical VTE ppx, SCDs for now   GI ppx: Protonix 40mg daily   Diet: DASH/TLC - low Na w/ fluid restriction per hepatology notes   Activity: IAT   Dispo: From home   Code: FULL  DESIRE SWIFT is a 45y Male with a past medical history as described above who presented for evaluation of bilateral lower extremity pain     # Gait disturbance due to BLE neuropathy   - Could be a late sequela of chronic alcoholism, vitamin deficiency, or other demyelinating/neurological disease   - Check B vitamins, HbA1c, SPEP, UPEP  - MRI L-spine per neuro   - Gabapentin 400mg PO q8 for pain   - Supplement vitamins  - PT/Rehab    # Hepatic Cirrhosis with evidence of decompensation   - Platelets 72, INR 1.72, MELD-Na 22  - Transplant hepatology follow-up as outpatient   - Continue Aldactone 150mg daily and Lasix 60mg daily   - Supposed to take cipro 500mg daily, but was not. Educated him to resume   - Lactulose titrate to 3 BMs a day - NH3 62  - PPI daily     # Normocytic Anemia   - Likely ACD related, follow-up iron studies     # Hypoalbuminemia   - Related to advanced liver disease, dietary evaluation       DVT ppx: Would avoid chemical VTE ppx, SCDs for now   GI ppx: Protonix 40mg daily   Diet: DASH/TLC - low Na w/ fluid restriction per hepatology notes   Activity: IAT   Dispo: From home   Code: FULL

## 2021-06-12 NOTE — H&P ADULT - NSHPLABSRESULTS_GEN_ALL_CORE
(06-11 @ 14:45)                      10.2  5.82 )-----------( 72                 28.9    Neutrophils = 3.69 (63.5%)  Lymphocytes = 1.02 (17.5%)  Eosinophils = 0.13 (2.2%)  Basophils = 0.03 (0.5%)  Monocytes = 0.92 (15.8%)  Bands = --%    06-11    132<L>  |  103  |  23<H>  ----------------------------<  97  4.5   |  18  |  1.0    Ca    8.6      11 Jun 2021 14:45  Phos  3.9     06-11  Mg     2.0     06-11    TPro  7.6  /  Alb  3.0<L>  /  TBili  3.5<H>  /  DBili  1.7<H>  /  AST  53<H>  /  ALT  29  /  AlkPhos  200<H>  06-11    ( 11 Jun 2021 14:45 )   PT: 19.80 sec;   INR: 1.72 ratio;       PTT:39.4 sec      RVP:          Tox:

## 2021-06-12 NOTE — H&P ADULT - CLICK TO LAUNCH ORM
. Purse String (Intermediate) Text: Given the location of the defect and the characteristics of the surrounding skin a purse string intermediate closure was deemed most appropriate.  Undermining was performed circumfirentially around the surgical defect.  A purse string suture was then placed and tightened.

## 2021-06-12 NOTE — CONSULT NOTE ADULT - ASSESSMENT
Impression:  Mr. Hardy is a 45M with a past medical history of liver cirrhosis presented to the hospital for evaluation of bilateral lower extremity neuropathy. Three days ago, he was driving and was unable to feel his feet on the pedals and was afraid to drive so he pulled over and called his brother to drive him home. Gair deferred for hour of sleep. Decreased sensation to bilateral LE with associated pain.     Suggestion:  B12, folate, TSH  PT OT Rehab  Attending note to follow Impression:  Mr. Hardy is a 45M with a past medical history of liver cirrhosis presented to the hospital for evaluation of bilateral lower extremity neuropathy. Three days ago, he was driving and was unable to feel his feet on the pedals and was afraid to drive so he pulled over and called his brother to drive him home. Gair deferred for hour of sleep. Decreased sensation to bilateral LE with associated pain. Patent also muscle cramps.     Suggestion:  B12, folate, TSH  Check MRI lumbar spine  Hydration   Consider Baclofen   Continue gabapentin   Attending note to follow

## 2021-06-12 NOTE — H&P ADULT - HISTORY OF PRESENT ILLNESS
Chief Complaint: pain and tingling in bilateral legs    Past Medical History: Liver cirrhosis due to chronic alcohol use on liver transplant list    Past Surgical History: None Relevant     Mr. Hardy is a 45M with a past medical history as described above who presented to the hospital for evaluation of bilateral lower extremity neuropathy. Three days ago, he was driving and was unable to feel his feet on the pedals and was afraid to drive so he pulled over and called his brother to drive him home. He endorsed one previous episode like this before that resolved. he describes typical neuropathic pain - pins and needles affecting both lower extremities and occasional bursts of pain. He is on the liver transplant list, evaluated at [facility], since he has been sober for about 3 years.     In the ED, his vitals were stable. Labs consistent with advanced liver disease - thrombocytopenia, coagulopathy, and evidence of hepatocellular dysfunction. Admitted to medicine for further evaluation.     ROS: Denies headache, changes in vision, chest pain, palpitations, shortness of breath, cough, fever, chills, nausea, vomiting, diarrhea, and constipation.

## 2021-06-13 ENCOUNTER — TRANSCRIPTION ENCOUNTER (OUTPATIENT)
Age: 46
End: 2021-06-13

## 2021-06-13 LAB
ALBUMIN SERPL ELPH-MCNC: 2.8 G/DL — LOW (ref 3.5–5.2)
ALP SERPL-CCNC: 163 U/L — HIGH (ref 30–115)
ALT FLD-CCNC: 25 U/L — SIGNIFICANT CHANGE UP (ref 0–41)
ANION GAP SERPL CALC-SCNC: 12 MMOL/L — SIGNIFICANT CHANGE UP (ref 7–14)
ANION GAP SERPL CALC-SCNC: 13 MMOL/L — SIGNIFICANT CHANGE UP (ref 7–14)
AST SERPL-CCNC: 43 U/L — HIGH (ref 0–41)
BASOPHILS # BLD AUTO: 0.03 K/UL — SIGNIFICANT CHANGE UP (ref 0–0.2)
BASOPHILS NFR BLD AUTO: 0.7 % — SIGNIFICANT CHANGE UP (ref 0–1)
BILIRUB SERPL-MCNC: 2.6 MG/DL — HIGH (ref 0.2–1.2)
BUN SERPL-MCNC: 14 MG/DL — SIGNIFICANT CHANGE UP (ref 10–20)
BUN SERPL-MCNC: 15 MG/DL — SIGNIFICANT CHANGE UP (ref 10–20)
CALCIUM SERPL-MCNC: 8.4 MG/DL — LOW (ref 8.5–10.1)
CALCIUM SERPL-MCNC: 8.6 MG/DL — SIGNIFICANT CHANGE UP (ref 8.5–10.1)
CHLORIDE SERPL-SCNC: 103 MMOL/L — SIGNIFICANT CHANGE UP (ref 98–110)
CHLORIDE SERPL-SCNC: 97 MMOL/L — LOW (ref 98–110)
CO2 SERPL-SCNC: 18 MMOL/L — SIGNIFICANT CHANGE UP (ref 17–32)
CO2 SERPL-SCNC: 19 MMOL/L — SIGNIFICANT CHANGE UP (ref 17–32)
CREAT SERPL-MCNC: 0.7 MG/DL — SIGNIFICANT CHANGE UP (ref 0.7–1.5)
CREAT SERPL-MCNC: 0.8 MG/DL — SIGNIFICANT CHANGE UP (ref 0.7–1.5)
EOSINOPHIL # BLD AUTO: 0.16 K/UL — SIGNIFICANT CHANGE UP (ref 0–0.7)
EOSINOPHIL NFR BLD AUTO: 3.6 % — SIGNIFICANT CHANGE UP (ref 0–8)
GLUCOSE SERPL-MCNC: 116 MG/DL — HIGH (ref 70–99)
GLUCOSE SERPL-MCNC: 97 MG/DL — SIGNIFICANT CHANGE UP (ref 70–99)
HCT VFR BLD CALC: 28.9 % — LOW (ref 42–52)
HGB BLD-MCNC: 9.8 G/DL — LOW (ref 14–18)
IMM GRANULOCYTES NFR BLD AUTO: 0.4 % — HIGH (ref 0.1–0.3)
LYMPHOCYTES # BLD AUTO: 1.02 K/UL — LOW (ref 1.2–3.4)
LYMPHOCYTES # BLD AUTO: 22.8 % — SIGNIFICANT CHANGE UP (ref 20.5–51.1)
MAGNESIUM SERPL-MCNC: 1.7 MG/DL — LOW (ref 1.8–2.4)
MCHC RBC-ENTMCNC: 30.5 PG — SIGNIFICANT CHANGE UP (ref 27–31)
MCHC RBC-ENTMCNC: 33.9 G/DL — SIGNIFICANT CHANGE UP (ref 32–37)
MCV RBC AUTO: 90 FL — SIGNIFICANT CHANGE UP (ref 80–94)
MONOCYTES # BLD AUTO: 0.75 K/UL — HIGH (ref 0.1–0.6)
MONOCYTES NFR BLD AUTO: 16.7 % — HIGH (ref 1.7–9.3)
NEUTROPHILS # BLD AUTO: 2.5 K/UL — SIGNIFICANT CHANGE UP (ref 1.4–6.5)
NEUTROPHILS NFR BLD AUTO: 55.8 % — SIGNIFICANT CHANGE UP (ref 42.2–75.2)
NRBC # BLD: 0 /100 WBCS — SIGNIFICANT CHANGE UP (ref 0–0)
PLATELET # BLD AUTO: 66 K/UL — LOW (ref 130–400)
POTASSIUM SERPL-MCNC: 3.8 MMOL/L — SIGNIFICANT CHANGE UP (ref 3.5–5)
POTASSIUM SERPL-MCNC: 4.2 MMOL/L — SIGNIFICANT CHANGE UP (ref 3.5–5)
POTASSIUM SERPL-SCNC: 3.8 MMOL/L — SIGNIFICANT CHANGE UP (ref 3.5–5)
POTASSIUM SERPL-SCNC: 4.2 MMOL/L — SIGNIFICANT CHANGE UP (ref 3.5–5)
PROT SERPL-MCNC: 7.3 G/DL — SIGNIFICANT CHANGE UP (ref 6–8)
RBC # BLD: 3.21 M/UL — LOW (ref 4.7–6.1)
RBC # FLD: 15.3 % — HIGH (ref 11.5–14.5)
SODIUM SERPL-SCNC: 129 MMOL/L — LOW (ref 135–146)
SODIUM SERPL-SCNC: 133 MMOL/L — LOW (ref 135–146)
WBC # BLD: 4.48 K/UL — LOW (ref 4.8–10.8)
WBC # FLD AUTO: 4.48 K/UL — LOW (ref 4.8–10.8)

## 2021-06-13 PROCEDURE — 99233 SBSQ HOSP IP/OBS HIGH 50: CPT

## 2021-06-13 RX ORDER — SODIUM CHLORIDE 9 MG/ML
1000 INJECTION INTRAMUSCULAR; INTRAVENOUS; SUBCUTANEOUS
Refills: 0 | Status: DISCONTINUED | OUTPATIENT
Start: 2021-06-13 | End: 2021-06-13

## 2021-06-13 RX ORDER — MAGNESIUM SULFATE 500 MG/ML
2 VIAL (ML) INJECTION ONCE
Refills: 0 | Status: COMPLETED | OUTPATIENT
Start: 2021-06-13 | End: 2021-06-13

## 2021-06-13 RX ORDER — FUROSEMIDE 40 MG
40 TABLET ORAL DAILY
Refills: 0 | Status: DISCONTINUED | OUTPATIENT
Start: 2021-06-13 | End: 2021-06-14

## 2021-06-13 RX ADMIN — Medication 5 MILLIGRAM(S): at 14:00

## 2021-06-13 RX ADMIN — SPIRONOLACTONE 150 MILLIGRAM(S): 25 TABLET, FILM COATED ORAL at 06:14

## 2021-06-13 RX ADMIN — PREGABALIN 1000 MICROGRAM(S): 225 CAPSULE ORAL at 12:47

## 2021-06-13 RX ADMIN — Medication 5 MILLIGRAM(S): at 21:14

## 2021-06-13 RX ADMIN — LACTULOSE 20 GRAM(S): 10 SOLUTION ORAL at 21:15

## 2021-06-13 RX ADMIN — GABAPENTIN 400 MILLIGRAM(S): 400 CAPSULE ORAL at 06:14

## 2021-06-13 RX ADMIN — Medication 500 MILLIGRAM(S): at 12:47

## 2021-06-13 RX ADMIN — GABAPENTIN 400 MILLIGRAM(S): 400 CAPSULE ORAL at 21:15

## 2021-06-13 RX ADMIN — Medication 50 GRAM(S): at 12:47

## 2021-06-13 RX ADMIN — Medication 100 MILLIGRAM(S): at 12:47

## 2021-06-13 RX ADMIN — Medication 5 MILLIGRAM(S): at 06:15

## 2021-06-13 RX ADMIN — Medication 1 TABLET(S): at 12:47

## 2021-06-13 RX ADMIN — GABAPENTIN 400 MILLIGRAM(S): 400 CAPSULE ORAL at 12:47

## 2021-06-13 RX ADMIN — PANTOPRAZOLE SODIUM 40 MILLIGRAM(S): 20 TABLET, DELAYED RELEASE ORAL at 06:14

## 2021-06-13 RX ADMIN — Medication 60 MILLIGRAM(S): at 06:14

## 2021-06-13 RX ADMIN — LACTULOSE 20 GRAM(S): 10 SOLUTION ORAL at 14:47

## 2021-06-13 RX ADMIN — Medication 1 MILLIGRAM(S): at 12:47

## 2021-06-13 RX ADMIN — LACTULOSE 20 GRAM(S): 10 SOLUTION ORAL at 06:14

## 2021-06-13 NOTE — DISCHARGE NOTE NURSING/CASE MANAGEMENT/SOCIAL WORK - PATIENT PORTAL LINK FT
You can access the FollowMyHealth Patient Portal offered by [facility] by registering at the following website: [email or web address]. By joining [facility]’s FollowMyHealth portal, you will also be able to view your health information using other applications (apps) compatible with our system.

## 2021-06-14 ENCOUNTER — TRANSCRIPTION ENCOUNTER (OUTPATIENT)
Age: 46
End: 2021-06-14

## 2021-06-14 VITALS — WEIGHT: 179.24 LBS | HEIGHT: 67 IN

## 2021-06-14 LAB
% ALBUMIN: 37.2 % — SIGNIFICANT CHANGE UP
% ALPHA 1: 2.8 % — SIGNIFICANT CHANGE UP
% ALPHA 2: 7 % — SIGNIFICANT CHANGE UP
% BETA: 16.5 % — SIGNIFICANT CHANGE UP
% GAMMA: 36.5 % — SIGNIFICANT CHANGE UP
ALBUMIN SERPL ELPH-MCNC: 2.8 G/DL — LOW (ref 3.6–5.5)
ALBUMIN SERPL ELPH-MCNC: 3.2 G/DL — LOW (ref 3.5–5.2)
ALBUMIN/GLOB SERPL ELPH: 0.6 RATIO — SIGNIFICANT CHANGE UP
ALP SERPL-CCNC: 185 U/L — HIGH (ref 30–115)
ALPHA1 GLOB SERPL ELPH-MCNC: 0.2 G/DL — SIGNIFICANT CHANGE UP (ref 0.1–0.4)
ALPHA2 GLOB SERPL ELPH-MCNC: 0.5 G/DL — SIGNIFICANT CHANGE UP (ref 0.5–1)
ALT FLD-CCNC: 30 U/L — SIGNIFICANT CHANGE UP (ref 0–41)
ANION GAP SERPL CALC-SCNC: 11 MMOL/L — SIGNIFICANT CHANGE UP (ref 7–14)
AST SERPL-CCNC: 51 U/L — HIGH (ref 0–41)
B-GLOBULIN SERPL ELPH-MCNC: 1.2 G/DL — HIGH (ref 0.5–1)
BASOPHILS # BLD AUTO: 0.04 K/UL — SIGNIFICANT CHANGE UP (ref 0–0.2)
BASOPHILS NFR BLD AUTO: 0.6 % — SIGNIFICANT CHANGE UP (ref 0–1)
BILIRUB SERPL-MCNC: 3.6 MG/DL — HIGH (ref 0.2–1.2)
BUN SERPL-MCNC: 16 MG/DL — SIGNIFICANT CHANGE UP (ref 10–20)
CALCIUM SERPL-MCNC: 8.9 MG/DL — SIGNIFICANT CHANGE UP (ref 8.5–10.1)
CHLORIDE SERPL-SCNC: 102 MMOL/L — SIGNIFICANT CHANGE UP (ref 98–110)
CO2 SERPL-SCNC: 18 MMOL/L — SIGNIFICANT CHANGE UP (ref 17–32)
CREAT SERPL-MCNC: 0.7 MG/DL — SIGNIFICANT CHANGE UP (ref 0.7–1.5)
EOSINOPHIL # BLD AUTO: 0.24 K/UL — SIGNIFICANT CHANGE UP (ref 0–0.7)
EOSINOPHIL NFR BLD AUTO: 3.8 % — SIGNIFICANT CHANGE UP (ref 0–8)
GAMMA GLOBULIN: 2.7 G/DL — HIGH (ref 0.6–1.6)
GLUCOSE SERPL-MCNC: 103 MG/DL — HIGH (ref 70–99)
HCT VFR BLD CALC: 34.4 % — LOW (ref 42–52)
HGB BLD-MCNC: 11.9 G/DL — LOW (ref 14–18)
IMM GRANULOCYTES NFR BLD AUTO: 0.2 % — SIGNIFICANT CHANGE UP (ref 0.1–0.3)
LYMPHOCYTES # BLD AUTO: 1.59 K/UL — SIGNIFICANT CHANGE UP (ref 1.2–3.4)
LYMPHOCYTES # BLD AUTO: 25.3 % — SIGNIFICANT CHANGE UP (ref 20.5–51.1)
MAGNESIUM SERPL-MCNC: 2 MG/DL — SIGNIFICANT CHANGE UP (ref 1.8–2.4)
MCHC RBC-ENTMCNC: 30.9 PG — SIGNIFICANT CHANGE UP (ref 27–31)
MCHC RBC-ENTMCNC: 34.6 G/DL — SIGNIFICANT CHANGE UP (ref 32–37)
MCV RBC AUTO: 89.4 FL — SIGNIFICANT CHANGE UP (ref 80–94)
MONOCYTES # BLD AUTO: 0.93 K/UL — HIGH (ref 0.1–0.6)
MONOCYTES NFR BLD AUTO: 14.8 % — HIGH (ref 1.7–9.3)
NEUTROPHILS # BLD AUTO: 3.47 K/UL — SIGNIFICANT CHANGE UP (ref 1.4–6.5)
NEUTROPHILS NFR BLD AUTO: 55.3 % — SIGNIFICANT CHANGE UP (ref 42.2–75.2)
NRBC # BLD: 0 /100 WBCS — SIGNIFICANT CHANGE UP (ref 0–0)
PLATELET # BLD AUTO: 96 K/UL — LOW (ref 130–400)
POTASSIUM SERPL-MCNC: 4.2 MMOL/L — SIGNIFICANT CHANGE UP (ref 3.5–5)
POTASSIUM SERPL-SCNC: 4.2 MMOL/L — SIGNIFICANT CHANGE UP (ref 3.5–5)
PROT PATTERN SERPL ELPH-IMP: SIGNIFICANT CHANGE UP
PROT SERPL-MCNC: 8.3 G/DL — HIGH (ref 6–8)
RBC # BLD: 3.85 M/UL — LOW (ref 4.7–6.1)
RBC # FLD: 15.5 % — HIGH (ref 11.5–14.5)
SODIUM SERPL-SCNC: 131 MMOL/L — LOW (ref 135–146)
WBC # BLD: 6.28 K/UL — SIGNIFICANT CHANGE UP (ref 4.8–10.8)
WBC # FLD AUTO: 6.28 K/UL — SIGNIFICANT CHANGE UP (ref 4.8–10.8)

## 2021-06-14 PROCEDURE — 99239 HOSP IP/OBS DSCHRG MGMT >30: CPT

## 2021-06-14 RX ORDER — GABAPENTIN 400 MG/1
1 CAPSULE ORAL
Qty: 90 | Refills: 2
Start: 2021-06-14 | End: 2021-09-11

## 2021-06-14 RX ORDER — FUROSEMIDE 40 MG
2 TABLET ORAL
Qty: 60 | Refills: 0
Start: 2021-06-14 | End: 2021-07-13

## 2021-06-14 RX ORDER — THIAMINE MONONITRATE (VIT B1) 100 MG
1 TABLET ORAL
Qty: 30 | Refills: 0
Start: 2021-06-14 | End: 2021-07-13

## 2021-06-14 RX ORDER — MOXIFLOXACIN HYDROCHLORIDE TABLETS, 400 MG 400 MG/1
1 TABLET, FILM COATED ORAL
Qty: 30 | Refills: 0
Start: 2021-06-14 | End: 2021-07-13

## 2021-06-14 RX ORDER — THIAMINE MONONITRATE (VIT B1) 100 MG
1 TABLET ORAL
Qty: 0 | Refills: 0 | DISCHARGE
Start: 2021-06-14

## 2021-06-14 RX ORDER — BACLOFEN 100 %
1 POWDER (GRAM) MISCELLANEOUS
Qty: 15 | Refills: 0
Start: 2021-06-14

## 2021-06-14 RX ORDER — FOLIC ACID 0.8 MG
1 TABLET ORAL
Qty: 0 | Refills: 0 | DISCHARGE
Start: 2021-06-14

## 2021-06-14 RX ORDER — PREGABALIN 225 MG/1
1 CAPSULE ORAL
Qty: 0 | Refills: 0 | DISCHARGE
Start: 2021-06-14

## 2021-06-14 RX ADMIN — Medication 1 MILLIGRAM(S): at 11:27

## 2021-06-14 RX ADMIN — GABAPENTIN 400 MILLIGRAM(S): 400 CAPSULE ORAL at 05:22

## 2021-06-14 RX ADMIN — LACTULOSE 20 GRAM(S): 10 SOLUTION ORAL at 13:04

## 2021-06-14 RX ADMIN — PREGABALIN 1000 MICROGRAM(S): 225 CAPSULE ORAL at 11:27

## 2021-06-14 RX ADMIN — Medication 100 MILLIGRAM(S): at 11:26

## 2021-06-14 RX ADMIN — Medication 5 MILLIGRAM(S): at 05:22

## 2021-06-14 RX ADMIN — PANTOPRAZOLE SODIUM 40 MILLIGRAM(S): 20 TABLET, DELAYED RELEASE ORAL at 05:22

## 2021-06-14 RX ADMIN — Medication 40 MILLIGRAM(S): at 05:21

## 2021-06-14 RX ADMIN — Medication 1 TABLET(S): at 11:26

## 2021-06-14 RX ADMIN — LACTULOSE 20 GRAM(S): 10 SOLUTION ORAL at 05:23

## 2021-06-14 RX ADMIN — SPIRONOLACTONE 150 MILLIGRAM(S): 25 TABLET, FILM COATED ORAL at 05:22

## 2021-06-14 RX ADMIN — Medication 5 MILLIGRAM(S): at 13:04

## 2021-06-14 RX ADMIN — Medication 500 MILLIGRAM(S): at 11:27

## 2021-06-14 RX ADMIN — GABAPENTIN 400 MILLIGRAM(S): 400 CAPSULE ORAL at 13:04

## 2021-06-14 NOTE — DISCHARGE NOTE PROVIDER - PROVIDER TOKENS
PROVIDER:[TOKEN:[[id number]]] PROVIDER:[TOKEN:[[id number]],FOLLOWUP:[2 weeks]],PROVIDER:[TOKEN:[[id number]],FOLLOWUP:[1 week]]

## 2021-06-14 NOTE — DISCHARGE NOTE PROVIDER - NSDCMRMEDTOKEN_GEN_ALL_CORE_FT
Cipro 500 mg oral tablet: 1 tab(s) orally once a day  cyanocobalamin 1000 mcg oral tablet: 1 tab(s) orally once a day  folic acid 1 mg oral tablet: 1 tab(s) orally once a day  furosemide 20 mg oral tablet: 2 tab(s) orally once a day   gabapentin 400 mg oral capsule: 1 cap(s) orally every 8 hours  lactulose 10 g/15 mL oral syrup: 30 milliliter(s) orally 3 times a day MDD:Maintain 3 BM&#x27;s per day  Multiple Vitamins oral tablet: 1 tab(s) orally once a day  pantoprazole 40 mg oral delayed release tablet: 40 tab(s) orally once a day   polyethylene glycol 3350 oral powder for reconstitution: 17 gram(s) orally every 12 hours, As Needed   spironolactone 100 mg oral tablet: 1.5 tab(s) orally once a day   thiamine 100 mg oral tablet: 1 tab(s) orally once a day   baclofen 5 mg oral tablet: 1 tab(s) orally every 8 hours, As Needed -for muscle spasm   Cipro 500 mg oral tablet: 1 tab(s) orally once a day  furosemide 20 mg oral tablet: 2 tab(s) orally once a day   gabapentin 400 mg oral capsule: 1 cap(s) orally every 8 hours  lactulose 10 g/15 mL oral syrup: 30 milliliter(s) orally 3 times a day MDD:Maintain 3 BM&#x27;s per day  Multiple Vitamins oral tablet: 1 tab(s) orally once a day  pantoprazole 40 mg oral delayed release tablet: 40 tab(s) orally once a day   polyethylene glycol 3350 oral powder for reconstitution: 17 gram(s) orally every 12 hours, As Needed   spironolactone 100 mg oral tablet: 1.5 tab(s) orally once a day   thiamine 100 mg oral tablet: 1 tab(s) orally once a day

## 2021-06-14 NOTE — DISCHARGE NOTE PROVIDER - CARE PROVIDERS DIRECT ADDRESSES
[email or web address]

## 2021-06-14 NOTE — PROGRESS NOTE ADULT - SUBJECTIVE AND OBJECTIVE BOX
Progress Note:  Provider Speciality                            Hospitalist      DESIRE SWIFT MRN-[id number] 45y Male     CHIEF PRESENTING COMPLAINT:  Patient is a 45y old  Male who presents with a chief complaint of gait ataxia (2021 06:14)        SUBJECTIVE:  Patient was seen and examined at bedside.   AAO/ reports LE cramps /tingling and dizziness improving  No significant overnight events reported.     HISTORY OF PRESENTING ILLNESS:  HPI:  Chief Complaint: pain and tingling in bilateral legs    Past Medical History: Liver cirrhosis due to chronic alcohol use on liver transplant list    Past Surgical History: None Relevant     Mr. Hardy is a 45M with a past medical history as described above who presented to the hospital for evaluation of bilateral lower extremity neuropathy. Three days ago, he was driving and was unable to feel his feet on the pedals and was afraid to drive so he pulled over and called his brother to drive him home. He endorsed one previous episode like this before that resolved. he describes typical neuropathic pain - pins and needles affecting both lower extremities and occasional bursts of pain. He is on the liver transplant list, evaluated at [facility], since he has been sober for about 3 years.     In the ED, his vitals were stable. Labs consistent with advanced liver disease - thrombocytopenia, coagulopathy, and evidence of hepatocellular dysfunction. Admitted to medicine for further evaluation.     ROS: Denies headache, changes in vision, chest pain, palpitations, shortness of breath, cough, fever, chills, nausea, vomiting, diarrhea, and constipation.  (2021 00:01)        REVIEW OF SYSTEMS:    At least 10 systems were reviewed in ROS. All systems reviewed  are within normal limits except for the complaints as described in Subjective.    PAST MEDICAL & SURGICAL HISTORY:  PAST MEDICAL & SURGICAL HISTORY:  ETOH abuse    Thrombocytopenia    Anemia    Liver cirrhosis, alcoholic    History of incision and drainage  Gluteal abscess            VITAL SIGNS:  Vital Signs Last 24 Hrs  T(C): 36.8 (2021 13:16), Max: 37.1 (2021 21:30)  T(F): 98.3 (2021 13:16), Max: 98.8 (2021 21:30)  HR: 84 (2021 13:16) (79 - 84)  BP: 97/55 (2021 13:16) (97/55 - 114/63)  BP(mean): 83 (2021 06:09) (83 - 83)  RR: 18 (2021 06:09) (18 - 18)  SpO2: 98% (2021 21:30) (98% - 98%)          PHYSICAL EXAMINATION:    General: AAO, Not in acute distress  HEENT:   EOMI, atraumatic  Heart: S1+S2 audible, no murmur  Lungs: bilateral  fair air entry, no wheezing, no crepitations.  Abdomen: Soft, non-tender, non-distended , +ve BS.  CNS: AAO  , no focal deficits.  Extremities:  No edema            CONSULTS:  Consultant(s) Notes Reviewed by me.   Care Discussed with Consultants/Other Providers where required.        MEDICATIONS:  MEDICATIONS  (STANDING):  baclofen 5 milliGRAM(s) Oral every 8 hours  chlorhexidine 4% Liquid 1 Application(s) Topical <User Schedule>  ciprofloxacin     Tablet 500 milliGRAM(s) Oral daily  cyanocobalamin 1000 MICROGram(s) Oral daily  folic acid 1 milliGRAM(s) Oral daily  furosemide    Tablet 60 milliGRAM(s) Oral daily  gabapentin 400 milliGRAM(s) Oral every 8 hours  lactulose Syrup 20 Gram(s) Oral three times a day  magnesium sulfate  IVPB 2 Gram(s) IV Intermittent once  multivitamin 1 Tablet(s) Oral daily  pantoprazole    Tablet 40 milliGRAM(s) Oral before breakfast  spironolactone 150 milliGRAM(s) Oral daily  thiamine 100 milliGRAM(s) Oral daily    MEDICATIONS  (PRN):  polyethylene glycol 3350 17 Gram(s) Oral every 12 hours PRN Constipation      LABOROTORY DATA/MICROBIOLOGY/I & O's:                        9.8    4.48  )-----------( 66       ( 2021 05:17 )             28.9     06-13    129<L>  |  97<L>  |  14  ----------------------------<  116<H>  3.8   |  19  |  0.8    Ca    8.6      2021 11:29  Phos  3.9     06-11  Mg     1.7     06-13    TPro  7.3  /  Alb  2.8<L>  /  TBili  2.6<H>  /  DBili  x   /  AST  43<H>  /  ALT  25  /  AlkPhos  163<H>      PT/INR - ( 2021 09:35 )   PT: 19.40 sec;   INR: 1.69 ratio         PTT - ( 2021 14:45 )  PTT:39.4 sec  Urinalysis Basic - ( 2021 00:00 )    Color: Yellow / Appearance: Clear / S.025 / pH: x  Gluc: x / Ketone: Negative  / Bili: Negative / Urobili: 6 mg/dL   Blood: x / Protein: Trace / Nitrite: Negative   Leuk Esterase: Negative / RBC: x / WBC x   Sq Epi: x / Non Sq Epi: x / Bacteria: x      CAPILLARY BLOOD GLUCOSE            Urinalysis Basic - ( 2021 00:00 )    Color: Yellow / Appearance: Clear / S.025 / pH: x  Gluc: x / Ketone: Negative  / Bili: Negative / Urobili: 6 mg/dL   Blood: x / Protein: Trace / Nitrite: Negative   Leuk Esterase: Negative / RBC: x / WBC x   Sq Epi: x / Non Sq Epi: x / Bacteria: x            21 @ 07:01  -  21 @ 07:00  --------------------------------------------------------  IN: 0 mL / OUT: 1850 mL / NET: -1850 mL    21 @ 07:01  21 @ 14:32  --------------------------------------------------------  IN: 0 mL / OUT: 1 mL / NET: -1 mL              ASSESSMENT/Plan:    DESIRE SWIFT is a 45y Male with a past medical history as described above who presented for evaluation of bilateral lower extremity pain     # Gait disturbance likely due to BLE neuropathy with chr. alcoholism :  magnesium deficiency:  c/w gabapentin/baclofen - helping symptoms   neuro eval appreciated.   MRI L spine: mild DJD from L3-S1/no nerve impingement of stenosis of canal noted.   -c/w Supplement vitamins  mag repleted / will likely need oral supplements for discharge.  - PT evaluated safe for DC home/ no need for home PT     # Hepatic Cirrhosis with evidence of decompensation /thrombocytopenia:   - Platelets dropping to 66 today, INR 1.72, MELD-Na 22  - Transplant hepatology follow-up as outpatient   - Continue Aldactone 150mg daily and Lasix decreased to 40mg daily   - c/w home dose of cipro oral.   - c/w Lactulose titrate to 2- 3 BMs a day   - PPI daily     hyponatremia with alcoholic liver cirrhosis h/o:   Na trended down to 129 today after IVF hydration per neuro recs yesterday for leg cramps.   would hold off further IVF's.   would c/w aldactone current dose and dec lasix to 40mg to avoid dehydration and monitor response on Na levels in am     # Normocytic Anemia   Hb stable.     # Hypoalbuminemia   Related to advanced liver disease  dietary evaluation       pending:  stabilization of electrolytes/ Na levels/ platelet count.            
Pt seen and examined independently. No new complaints. Feeling much better. Denies CP, SOB, AP. Remainder ROS unremarkable. Reports resolution of edema in b/l LE and no more pain in LE.    Gen: NAD, AAOx3  CV: S1 S2  Resp: Decreased BS b/l  GI: NT/ND/S +BS  MS: neg c/c/e, +pulses  Neuro: nonfocal, +reflexes    Discharge instructions discussed and patient knows when to seek immediate medical attention.  Patient has proper follow up.  All results discussed and patient aware they require further follow up/work up.  Stressed importance of proper follow up.  Medications prescribed and changes discussed.  All questions and concerns from patient addressed. Understanding of instructions verbalized.    Time spent in completing discharge process and coordinating care 45 minutes.    Discussed with housestaff, nursing, case mgmt

## 2021-06-14 NOTE — DISCHARGE NOTE PROVIDER - NSDCCPCAREPLAN_GEN_ALL_CORE_FT
PRINCIPAL DISCHARGE DIAGNOSIS  Diagnosis: Weakness of lower extremity  Assessment and Plan of Treatment: You had cramps and pain in you lower extremity most probably secondary to the alcoholic history , your nerves might be damaged , which caused peripheral neuropathy. continue with the medication prescribed , and follow up  with your PCP in 1 week. make sure you repeat lab work in 1 months to check your vitamin b12, folate and magnesium levels.       PRINCIPAL DISCHARGE DIAGNOSIS  Diagnosis: Weakness of lower extremity  Assessment and Plan of Treatment: You had cramps and pain in you lower extremity most probably secondary to the alcoholic history , your nerves might be damaged , which caused peripheral neuropathy. continue with the medication prescribed , and follow up  with your PCP in 1 week. make sure you repeat lab work in 1 month  to check your magnesium levels.       PRINCIPAL DISCHARGE DIAGNOSIS  Diagnosis: Peripheral neuropathy  Assessment and Plan of Treatment: You had cramps, swelling and pain in you lower extremity most probably secondary to the alcoholic histor , your nerves might be damaged , which caused peripheral neuropathy. continue with the medication prescribed , and follow up  with your PCP in 1 week. make sure you repeat lab work in 1 month  to check your electrolyte . Continue with salt and fluid restriction.

## 2021-06-14 NOTE — DISCHARGE NOTE PROVIDER - CARE PROVIDER_API CALL
Thomas Marshall)  Gastroenterology; Internal Medicine  [address] [place] [address]  Phone: [phone]  Fax: [phone]  Follow Up Time:    Thomas Marshall)  Gastroenterology; Internal Medicine  [address] [place] [address]  Phone: [phone]  Fax: [phone]  Follow Up Time: 2 weeks    Frederick Anderson (DO)  Medicine  Physicians  [address] [place] [address], [address] [place] [address]  Phone: [phone]  Fax: [phone]  Follow Up Time: 1 week

## 2021-06-18 LAB
PYRIDOXAL PHOS SERPL-MCNC: 15.2 UG/L — SIGNIFICANT CHANGE UP (ref 5.3–46.7)
VIT B1 SERPL-MCNC: 234.3 NMOL/L — HIGH (ref 66.5–200)

## 2021-06-22 DIAGNOSIS — K70.30 ALCOHOLIC CIRRHOSIS OF LIVER WITHOUT ASCITES: ICD-10-CM

## 2021-06-22 DIAGNOSIS — R26.9 UNSPECIFIED ABNORMALITIES OF GAIT AND MOBILITY: ICD-10-CM

## 2021-06-22 DIAGNOSIS — G62.9 POLYNEUROPATHY, UNSPECIFIED: ICD-10-CM

## 2021-06-22 DIAGNOSIS — E88.09 OTHER DISORDERS OF PLASMA-PROTEIN METABOLISM, NOT ELSEWHERE CLASSIFIED: ICD-10-CM

## 2021-06-22 DIAGNOSIS — F10.20 ALCOHOL DEPENDENCE, UNCOMPLICATED: ICD-10-CM

## 2021-06-22 DIAGNOSIS — D69.6 THROMBOCYTOPENIA, UNSPECIFIED: ICD-10-CM

## 2021-06-22 DIAGNOSIS — E87.1 HYPO-OSMOLALITY AND HYPONATREMIA: ICD-10-CM

## 2021-06-22 DIAGNOSIS — R29.898 OTHER SYMPTOMS AND SIGNS INVOLVING THE MUSCULOSKELETAL SYSTEM: ICD-10-CM

## 2021-06-22 DIAGNOSIS — R20.2 PARESTHESIA OF SKIN: ICD-10-CM

## 2021-06-22 DIAGNOSIS — D68.9 COAGULATION DEFECT, UNSPECIFIED: ICD-10-CM

## 2021-06-22 DIAGNOSIS — D64.9 ANEMIA, UNSPECIFIED: ICD-10-CM

## 2021-06-22 DIAGNOSIS — E51.9 THIAMINE DEFICIENCY, UNSPECIFIED: ICD-10-CM

## 2021-06-22 DIAGNOSIS — E83.42 HYPOMAGNESEMIA: ICD-10-CM

## 2021-06-22 DIAGNOSIS — E61.2 MAGNESIUM DEFICIENCY: ICD-10-CM

## 2021-06-22 DIAGNOSIS — R27.0 ATAXIA, UNSPECIFIED: ICD-10-CM

## 2021-07-19 ENCOUNTER — INPATIENT (INPATIENT)
Facility: HOSPITAL | Age: 46
LOS: 1 days | Discharge: HOME | End: 2021-07-21
Attending: INTERNAL MEDICINE | Admitting: INTERNAL MEDICINE
Payer: MEDICAID

## 2021-07-19 VITALS
TEMPERATURE: 99 F | SYSTOLIC BLOOD PRESSURE: 104 MMHG | WEIGHT: 222.45 LBS | DIASTOLIC BLOOD PRESSURE: 55 MMHG | RESPIRATION RATE: 18 BRPM | HEIGHT: 67 IN | HEART RATE: 95 BPM | OXYGEN SATURATION: 99 %

## 2021-07-19 DIAGNOSIS — Z98.890 OTHER SPECIFIED POSTPROCEDURAL STATES: Chronic | ICD-10-CM

## 2021-07-19 LAB
ALBUMIN SERPL ELPH-MCNC: 2.7 G/DL — LOW (ref 3.5–5.2)
ALP SERPL-CCNC: 207 U/L — HIGH (ref 30–115)
ALT FLD-CCNC: 25 U/L — SIGNIFICANT CHANGE UP (ref 0–41)
ANION GAP SERPL CALC-SCNC: 6 MMOL/L — LOW (ref 7–14)
ANISOCYTOSIS BLD QL: SLIGHT — SIGNIFICANT CHANGE UP
APTT BLD: 40 SEC — HIGH (ref 27–39.2)
AST SERPL-CCNC: 41 U/L — SIGNIFICANT CHANGE UP (ref 0–41)
BASOPHILS # BLD AUTO: 0 K/UL — SIGNIFICANT CHANGE UP (ref 0–0.2)
BASOPHILS # BLD AUTO: 0.02 K/UL — SIGNIFICANT CHANGE UP (ref 0–0.2)
BASOPHILS NFR BLD AUTO: 0 % — SIGNIFICANT CHANGE UP (ref 0–1)
BASOPHILS NFR BLD AUTO: 0.5 % — SIGNIFICANT CHANGE UP (ref 0–1)
BILIRUB DIRECT SERPL-MCNC: 0.7 MG/DL — HIGH (ref 0–0.2)
BILIRUB INDIRECT FLD-MCNC: 1 MG/DL — SIGNIFICANT CHANGE UP (ref 0.2–1.2)
BILIRUB SERPL-MCNC: 1.7 MG/DL — HIGH (ref 0.2–1.2)
BUN SERPL-MCNC: 25 MG/DL — HIGH (ref 10–20)
CALCIUM SERPL-MCNC: 8.7 MG/DL — SIGNIFICANT CHANGE UP (ref 8.5–10.1)
CHLORIDE SERPL-SCNC: 106 MMOL/L — SIGNIFICANT CHANGE UP (ref 98–110)
CO2 SERPL-SCNC: 19 MMOL/L — SIGNIFICANT CHANGE UP (ref 17–32)
CREAT SERPL-MCNC: 0.6 MG/DL — LOW (ref 0.7–1.5)
EOSINOPHIL # BLD AUTO: 0.11 K/UL — SIGNIFICANT CHANGE UP (ref 0–0.7)
EOSINOPHIL # BLD AUTO: 0.13 K/UL — SIGNIFICANT CHANGE UP (ref 0–0.7)
EOSINOPHIL NFR BLD AUTO: 2.8 % — SIGNIFICANT CHANGE UP (ref 0–8)
EOSINOPHIL NFR BLD AUTO: 3 % — SIGNIFICANT CHANGE UP (ref 0–8)
GLUCOSE SERPL-MCNC: 112 MG/DL — HIGH (ref 70–99)
HCT VFR BLD CALC: 26.1 % — LOW (ref 42–52)
HCT VFR BLD CALC: 27.1 % — LOW (ref 42–52)
HGB BLD-MCNC: 8.8 G/DL — LOW (ref 14–18)
HGB BLD-MCNC: 9 G/DL — LOW (ref 14–18)
IMM GRANULOCYTES NFR BLD AUTO: 0.5 % — HIGH (ref 0.1–0.3)
INR BLD: 1.47 RATIO — HIGH (ref 0.65–1.3)
LYMPHOCYTES # BLD AUTO: 0.85 K/UL — LOW (ref 1.2–3.4)
LYMPHOCYTES # BLD AUTO: 0.93 K/UL — LOW (ref 1.2–3.4)
LYMPHOCYTES # BLD AUTO: 21 % — SIGNIFICANT CHANGE UP (ref 20.5–51.1)
LYMPHOCYTES # BLD AUTO: 21.7 % — SIGNIFICANT CHANGE UP (ref 20.5–51.1)
MANUAL SMEAR VERIFICATION: SIGNIFICANT CHANGE UP
MCHC RBC-ENTMCNC: 30 PG — SIGNIFICANT CHANGE UP (ref 27–31)
MCHC RBC-ENTMCNC: 30.7 PG — SIGNIFICANT CHANGE UP (ref 27–31)
MCHC RBC-ENTMCNC: 33.2 G/DL — SIGNIFICANT CHANGE UP (ref 32–37)
MCHC RBC-ENTMCNC: 33.7 G/DL — SIGNIFICANT CHANGE UP (ref 32–37)
MCV RBC AUTO: 90.3 FL — SIGNIFICANT CHANGE UP (ref 80–94)
MCV RBC AUTO: 90.9 FL — SIGNIFICANT CHANGE UP (ref 80–94)
MONOCYTES # BLD AUTO: 0.63 K/UL — HIGH (ref 0.1–0.6)
MONOCYTES # BLD AUTO: 0.75 K/UL — HIGH (ref 0.1–0.6)
MONOCYTES NFR BLD AUTO: 16.1 % — HIGH (ref 1.7–9.3)
MONOCYTES NFR BLD AUTO: 17 % — HIGH (ref 1.7–9.3)
NEUTROPHILS # BLD AUTO: 2.28 K/UL — SIGNIFICANT CHANGE UP (ref 1.4–6.5)
NEUTROPHILS # BLD AUTO: 2.61 K/UL — SIGNIFICANT CHANGE UP (ref 1.4–6.5)
NEUTROPHILS NFR BLD AUTO: 58.4 % — SIGNIFICANT CHANGE UP (ref 42.2–75.2)
NEUTROPHILS NFR BLD AUTO: 59 % — SIGNIFICANT CHANGE UP (ref 42.2–75.2)
NRBC # BLD: 0 /100 WBCS — SIGNIFICANT CHANGE UP (ref 0–0)
NRBC # BLD: 0 /100 — SIGNIFICANT CHANGE UP (ref 0–0)
NRBC # BLD: SIGNIFICANT CHANGE UP /100 WBCS (ref 0–0)
PLAT MORPH BLD: ABNORMAL
PLATELET # BLD AUTO: 72 K/UL — LOW (ref 130–400)
PLATELET # BLD AUTO: 73 K/UL — LOW (ref 130–400)
PLATELET COUNT - ESTIMATE: ABNORMAL
POIKILOCYTOSIS BLD QL AUTO: SIGNIFICANT CHANGE UP
POTASSIUM SERPL-MCNC: 4 MMOL/L — SIGNIFICANT CHANGE UP (ref 3.5–5)
POTASSIUM SERPL-SCNC: 4 MMOL/L — SIGNIFICANT CHANGE UP (ref 3.5–5)
PROT SERPL-MCNC: 6.6 G/DL — SIGNIFICANT CHANGE UP (ref 6–8)
PROTHROM AB SERPL-ACNC: 16.9 SEC — HIGH (ref 9.95–12.87)
RBC # BLD: 2.87 M/UL — LOW (ref 4.7–6.1)
RBC # BLD: 3 M/UL — LOW (ref 4.7–6.1)
RBC # FLD: 14.2 % — SIGNIFICANT CHANGE UP (ref 11.5–14.5)
RBC # FLD: 14.4 % — SIGNIFICANT CHANGE UP (ref 11.5–14.5)
RBC BLD AUTO: ABNORMAL
SARS-COV-2 RNA SPEC QL NAA+PROBE: SIGNIFICANT CHANGE UP
SODIUM SERPL-SCNC: 131 MMOL/L — LOW (ref 135–146)
WBC # BLD: 3.91 K/UL — LOW (ref 4.8–10.8)
WBC # BLD: 4.42 K/UL — LOW (ref 4.8–10.8)
WBC # FLD AUTO: 3.91 K/UL — LOW (ref 4.8–10.8)
WBC # FLD AUTO: 4.42 K/UL — LOW (ref 4.8–10.8)

## 2021-07-19 PROCEDURE — 99285 EMERGENCY DEPT VISIT HI MDM: CPT

## 2021-07-19 PROCEDURE — 74018 RADEX ABDOMEN 1 VIEW: CPT | Mod: 26

## 2021-07-19 PROCEDURE — 99223 1ST HOSP IP/OBS HIGH 75: CPT

## 2021-07-19 RX ORDER — GABAPENTIN 400 MG/1
400 CAPSULE ORAL EVERY 8 HOURS
Refills: 0 | Status: DISCONTINUED | OUTPATIENT
Start: 2021-07-19 | End: 2021-07-21

## 2021-07-19 RX ORDER — CIPROFLOXACIN LACTATE 400MG/40ML
500 VIAL (ML) INTRAVENOUS DAILY
Refills: 0 | Status: DISCONTINUED | OUTPATIENT
Start: 2021-07-19 | End: 2021-07-21

## 2021-07-19 RX ORDER — HEPARIN SODIUM 5000 [USP'U]/ML
5000 INJECTION INTRAVENOUS; SUBCUTANEOUS EVERY 12 HOURS
Refills: 0 | Status: DISCONTINUED | OUTPATIENT
Start: 2021-07-19 | End: 2021-07-21

## 2021-07-19 RX ORDER — PANTOPRAZOLE SODIUM 20 MG/1
40 TABLET, DELAYED RELEASE ORAL
Refills: 0 | Status: DISCONTINUED | OUTPATIENT
Start: 2021-07-19 | End: 2021-07-21

## 2021-07-19 RX ORDER — POLYETHYLENE GLYCOL 3350 17 G/17G
17 POWDER, FOR SOLUTION ORAL EVERY 12 HOURS
Refills: 0 | Status: DISCONTINUED | OUTPATIENT
Start: 2021-07-19 | End: 2021-07-21

## 2021-07-19 RX ORDER — SPIRONOLACTONE 25 MG/1
150 TABLET, FILM COATED ORAL DAILY
Refills: 0 | Status: DISCONTINUED | OUTPATIENT
Start: 2021-07-19 | End: 2021-07-21

## 2021-07-19 RX ORDER — FUROSEMIDE 40 MG
40 TABLET ORAL DAILY
Refills: 0 | Status: DISCONTINUED | OUTPATIENT
Start: 2021-07-19 | End: 2021-07-21

## 2021-07-19 RX ORDER — GABAPENTIN 400 MG/1
400 CAPSULE ORAL ONCE
Refills: 0 | Status: COMPLETED | OUTPATIENT
Start: 2021-07-19 | End: 2021-07-19

## 2021-07-19 RX ORDER — LACTULOSE 10 G/15ML
20 SOLUTION ORAL THREE TIMES A DAY
Refills: 0 | Status: DISCONTINUED | OUTPATIENT
Start: 2021-07-19 | End: 2021-07-21

## 2021-07-19 RX ORDER — MOXIFLOXACIN HYDROCHLORIDE TABLETS, 400 MG 400 MG/1
1 TABLET, FILM COATED ORAL
Qty: 0 | Refills: 0 | DISCHARGE

## 2021-07-19 RX ORDER — THIAMINE MONONITRATE (VIT B1) 100 MG
100 TABLET ORAL DAILY
Refills: 0 | Status: DISCONTINUED | OUTPATIENT
Start: 2021-07-19 | End: 2021-07-21

## 2021-07-19 RX ADMIN — Medication 500 MILLIGRAM(S): at 11:46

## 2021-07-19 RX ADMIN — Medication 1 TABLET(S): at 11:45

## 2021-07-19 RX ADMIN — HEPARIN SODIUM 5000 UNIT(S): 5000 INJECTION INTRAVENOUS; SUBCUTANEOUS at 18:46

## 2021-07-19 RX ADMIN — GABAPENTIN 400 MILLIGRAM(S): 400 CAPSULE ORAL at 03:37

## 2021-07-19 RX ADMIN — Medication 40 MILLIGRAM(S): at 09:32

## 2021-07-19 RX ADMIN — LACTULOSE 20 GRAM(S): 10 SOLUTION ORAL at 21:43

## 2021-07-19 RX ADMIN — LACTULOSE 20 GRAM(S): 10 SOLUTION ORAL at 16:05

## 2021-07-19 RX ADMIN — GABAPENTIN 400 MILLIGRAM(S): 400 CAPSULE ORAL at 16:04

## 2021-07-19 RX ADMIN — GABAPENTIN 400 MILLIGRAM(S): 400 CAPSULE ORAL at 21:43

## 2021-07-19 RX ADMIN — Medication 100 MILLIGRAM(S): at 11:46

## 2021-07-19 NOTE — H&P ADULT - ASSESSMENT
46M w/ PMHx of ETOH abuse and hepatic cirrhosis presents with worsening b/l leg and abd swelling for past 2 weeks.    # B/L LE edema w/ neuropathy   - MRI L spine 6/21: mild Degenerative changes from L3-S1 with mild foraminal narrowing. No nerve root impigement.   - c/w gabapentin, c/w baclofen prn    # Hepatic Cirrhosis   #Thrombocytopenia/Anemia/Hyponatremia/Hypoalbuminemia likely due to hepatic cirrhosis    - Outpatient hepatologist: Dr. Thomas Marshall: as per pt, working on getting enlisted on transplant list  - Platelets 72, INR 1.47, Current MELD-Na 19  - Continue Aldactone 150mg daily and Change lasix to IV 40mg qd  - c/w Lactulose titrate to 2- 3 BMs a day   - PPI daily     # DVT ppx - Heparin SQ   # GI ppx - PPI   # Diet - regular   Full code.        46M w/ PMHx of ETOH abuse and hepatic cirrhosis presents with worsening b/l leg and abd swelling for past 2 weeks.    # B/L LE edema w/ neuropathy   - MRI L spine 6/21: mild Degenerative changes from L3-S1 with mild foraminal narrowing. No nerve root impigement.   - c/w gabapentin, c/w baclofen prn    # Hepatic Cirrhosis   #Thrombocytopenia/Anemia/Hyponatremia/Hypoalbuminemia likely due to hepatic cirrhosis    - Outpatient hepatologist: Dr. [facility] Marshall: as per pt, working on getting enlisted on transplant list  - Platelets 72, INR 1.47, Current MELD-Na 19  - Continue Aldactone 150mg daily and Change lasix to IV 40mg qd  - c/w Lactulose titrate to 2- 3 BMs a day   - PPI daily     # DVT ppx -   # GI ppx - PPI   # Diet - regular   Full code.     # This is INCOMPLETE       46M w/ PMHx of ETOH abuse and hepatic cirrhosis presents with worsening b/l leg and abd swelling for past 2 weeks.    # B/L LE edema w/ neuropathy   - MRI L spine 6/21: mild Degenerative changes from L3-S1 with mild foraminal narrowing. No nerve root impigement.   - c/w gabapentin, c/w baclofen prn  - IV lasix, leg elevation    # Hepatic Cirrhosis   #Thrombocytopenia/Anemia/Hyponatremia/Hypoalbuminemia likely due to hepatic cirrhosis    - Outpatient hepatologist: Dr. [facility] Marshall: as per pt, working on getting enlisted on transplant list  - Platelets 72, INR 1.47, Current MELD-Na 19  - Continue Aldactone 150mg daily and Change lasix to IV 40mg qd  - c/w Lactulose titrate to 2- 3 BMs a day   - PPI daily     # DVT ppx -   # GI ppx - PPI   # Diet - regular   Full code.     # This is INCOMPLETE       46M w/ PMHx of ETOH abuse and hepatic cirrhosis presents with worsening b/l leg and abd swelling for past 2 weeks.    # B/L LE edema w/ neuropathy   - MRI L spine 6/21: mild Degenerative changes from L3-S1 with mild foraminal narrowing. No nerve root impigement.   - c/w gabapentin, c/w baclofen prn  - IV lasix, leg elevation    # Hepatic Cirrhosis   #Thrombocytopenia/Anemia/Hyponatremia/Hypoalbuminemia likely due to hepatic cirrhosis    - Outpatient hepatologist: Dr. [place] Marshall: as per pt, working on getting enlisted on transplant list  - Platelets 72, INR 1.47, Current MELD-Na 19  - Continue Aldactone 150mg daily and Change lasix to IV 40mg qd  - c/w Cipro 500mg PO qd for SBP ppx   - c/w Lactulose titrate to 2- 3 BMs a day   - PPI daily     # DVT ppx -   # GI ppx - PPI   # Diet - regular   Full code.     # This is INCOMPLETE       46M w/ PMHx of ETOH abuse and hepatic cirrhosis presents with worsening b/l leg and abd swelling for past 2 weeks.    # B/L LE edema w/ neuropathy   - MRI L spine 6/21: mild Degenerative changes from L3-S1 with mild foraminal narrowing. No nerve root impigement.   - c/w gabapentin, c/w baclofen prn  - IV lasix, leg elevation    # Hepatic Cirrhosis   #Thrombocytopenia/Anemia/Hyponatremia/Hypoalbuminemia likely due to hepatic cirrhosis    - Outpatient hepatologist: Dr. [place] Marshall: as per pt, working on getting enlisted on transplant list  - Platelets 72, INR 1.47, Current MELD-Na 19  - Continue Aldactone 150mg daily and Change lasix to IV 40mg qd  - c/w Cipro 500mg PO qd for SBP ppx   - c/w Lactulose titrate to 2- 3 BMs a day   - PPI daily     # DVT ppx - Heparin sq  # GI ppx - PPI   # Diet - regular   Full code.     # This is INCOMPLETE       46M w/ PMHx of ETOH abuse and hepatic cirrhosis presents with worsening b/l leg and abd swelling for past 2 weeks.    # B/L LE edema w/ neuropathy   - MRI L spine 6/21: mild Degenerative changes from L3-S1 with mild foraminal narrowing. No nerve root impigement.   - c/w gabapentin, c/w baclofen prn  - IV lasix, leg elevation    # Hepatic Cirrhosis   #Thrombocytopenia/Anemia/Hyponatremia/Hypoalbuminemia likely due to hepatic cirrhosis    - Outpatient hepatologist: Dr. [place] Marshall: as per pt, working on getting enlisted on transplant list  - Bedside US: no significant ascites  - Platelets 72, INR 1.47, Current MELD-Na 19  - Continue Aldactone 150mg daily and Change lasix to IV 40mg qd  - c/w Cipro 500mg PO qd for SBP ppx   - c/w Lactulose titrate to 2- 3 BMs a day   - PPI daily     # DVT ppx - Heparin sq  # GI ppx - PPI   # Diet - regular   Full code.     # This is INCOMPLETE       46M w/ PMHx of ETOH abuse and hepatic cirrhosis presents with worsening b/l leg and abd swelling for past 2 weeks.    # B/L LE edema w/ neuropathy   - MRI L spine 6/21: mild Degenerative changes from L3-S1 with mild foraminal narrowing. No nerve root impigement.   - c/w gabapentin, c/w baclofen prn  - IV lasix, leg elevation    # Hepatic Cirrhosis   #Thrombocytopenia/Anemia/Hyponatremia/Hypoalbuminemia likely due to hepatic cirrhosis    - Outpatient hepatologist: Dr. [place] Marshall: as per pt, working on getting enlisted on transplant list  - Bedside US: no significant ascites  - Platelets 72, INR 1.47, Current MELD-Na 19  - Continue Aldactone 150mg daily and Change lasix to IV 40mg qd  - c/w Cipro 500mg PO qd for SBP ppx   - c/w Lactulose titrate to 2- 3 BMs a day   - PPI daily     # DVT ppx - Heparin sq  # GI ppx - PPI   # Diet - regular   Full code.

## 2021-07-19 NOTE — ED ADULT NURSE NOTE - NSIMPLEMENTINTERV_GEN_ALL_ED
Implemented All Universal Safety Interventions:  [place] to call system. Call bell, personal items and telephone within reach. Instruct patient to call for assistance. Room bathroom lighting operational. Non-slip footwear when patient is off stretcher. Physically safe environment: no spills, clutter or unnecessary equipment. Stretcher in lowest position, wheels locked, appropriate side rails in place.

## 2021-07-19 NOTE — ED PROVIDER NOTE - NS ED ROS FT
Constitutional:  No fevers or chills.  Eyes:  No visual changes, eye pain, or discharge.  ENT:  No hearing changes. No sore throat.  Neck:  No neck pain or stiffness.  Cardiac:  No CP  (+) leg and abd edema.  Resp:  No cough or SOB. No hemoptysis.   GI:  No nausea, vomiting, diarrhea, or abdominal pain.  :  No dysuria, frequency, or hematuria.  MSK:  No myalgias or joint pain/swelling.  Neuro:  No headache, dizziness, or weakness.  Skin:  No skin rash.

## 2021-07-19 NOTE — H&P ADULT - ATTENDING COMMENTS
Patient seen and examined independently. Agree with resident note/ history / physical exam and plan of care with following exceptions/additions/updates. Case discussed with patient/pt decision maker, house-staff and nursing. My notes supersedes the resident's notes in case of discrepancies.    pt has leg swelling which is causing discomfort. cont diuretics.       #Progress Note Handoff  Pending (specify):  clinical improvement   Family discussion: dw pt , his primary language is Turkmen but he declined  , he is able to communicate in english   Disposition: Home

## 2021-07-19 NOTE — H&P ADULT - NSHPLABSRESULTS_GEN_ALL_CORE
8.8    4.42  )-----------( 72       ( 19 Jul 2021 02:28 )             26.1       07-19    131<L>  |  106  |  25<H>  ----------------------------<  112<H>  4.0   |  19  |  0.6<L>    Ca    8.7      19 Jul 2021 02:28  Mg     1.9     07-19    TPro  6.6  /  Alb  2.7<L>  /  TBili  1.7<H>  /  DBili  0.7<H>  /  AST  41  /  ALT  25  /  AlkPhos  207<H>  07-19        PT/INR - ( 19 Jul 2021 02:52 )   PT: 16.90 sec;   INR: 1.47 ratio         PTT - ( 19 Jul 2021 02:52 )  PTT:40.0 sec

## 2021-07-19 NOTE — ED PROVIDER NOTE - OBJECTIVE STATEMENT
46 y male with Hx of ETOH abuse recovering, liver cirrhosis on transplant list sees Dr. Marshall at [facility] presents with worsening b/l leg and abd swelling.  states he has leg cramps and swelling to the point where it is difficult to ambulate to bathroom due to pain. Denies HA, dizziness, weakness, fever, cough, CP, SOB, palpitations, Abd pain, N/V, dysuria, hematuria, dark or bloody stool.

## 2021-07-19 NOTE — ED ADULT TRIAGE NOTE - HEIGHT IN INCHES
4/10/2019         RE: Bjorn Sykes  [address] [place] [address]        Dear Colleague,    Thank you for referring your patient, Bjorn Sykes, to the [facility]. Please see a copy of my visit note below.     Current Eye Medications:  patanol both eyes daily, as needed.       Subjective:  Comprehensive Eye Exam.  She has bifocals but doesn't wear them much (she has worn them about 4 times in the past year, for driving in the dark).   She prefers over-the-counter readers ([employer])       Objective:  See Ophthalmology Exam.       Assessment:  Stable eye exam.  No change in corneal guttata.      ICD-10-CM    1. 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 Allergic conjunctivitis, bilateral H10.13 olopatadine (PATADAY) 0.2 % ophthalmic solution   3. Presbyopia H52.4 REFRACTIVE STATUS        Plan:  Glasses Rx given - optional.  Ok to continue using over the counter readers (+1.50 to +2.00) as needed for close up vision.  Continue using Pataday both eyes twice daily as needed.  Use artificial tears up to 4 times daily both eyes.  (Refresh Tears, Systane Ultra/Balance, or Theratears)  Call in December 2019 for an appointment in April 2020 for Complete Exam.    Dr. Bang [phone]           Again, thank you for allowing me to participate in the care of your patient.        Sincerely,        Jamar Bang MD    
7

## 2021-07-19 NOTE — H&P ADULT - NSHPPHYSICALEXAM_GEN_ALL_CORE
GENERAL: NAD, Resting in bed  HEENT: NCAT  CHEST/LUNG: Clear to auscultation bilaterally; No wheezing or rubs.   HEART: Regular rate and rhythm; No murmurs, rubs, or gallops  ABDOMEN: Abd distension, Bowel sounds present; Soft, Nontender  EXTREMITIES:  2+ pitting edema of b/l LE  NERVOUS SYSTEM:  Alert & Oriented X3  MSK: FROM all 4 extremities, full and equal strength  SKIN: No rashes or lesions GENERAL: NAD, Resting in bed  HEENT: NCAT, No JVD  CHEST/LUNG: Clear to auscultation bilaterally; No wheezing or rubs.   HEART: Regular rate and rhythm; No murmurs, rubs, or gallops  ABDOMEN: Abd distension, Bowel sounds present; Soft, Nontender  EXTREMITIES:  2+ pitting edema of b/l LE  NERVOUS SYSTEM:  Alert & Oriented X3  MSK: FROM all 4 extremities, full and equal strength  SKIN: No rashes or lesions

## 2021-07-19 NOTE — ED PROVIDER NOTE - CARE PLAN
Principal Discharge DX:	Leg swelling  Secondary Diagnosis:	Liver cirrhosis, alcoholic  Secondary Diagnosis:	Anemia  
[phone]

## 2021-07-19 NOTE — ED ADULT TRIAGE NOTE - CHIEF COMPLAINT QUOTE
My feet are swollen, my belly is big too - patient  Patient states his liver is bad and is on the transplant list

## 2021-07-19 NOTE — ED ADULT NURSE NOTE - OBJECTIVE STATEMENT
pt is a 45 yo male pw  worsening b/l leg and abd swelling.  states he has leg cramps and swelling to the point where it is difficult to ambulate to bathroom due to pain. Denies HA, dizziness, weakness, fever, cough, CP, SOB, palpitations, Abd pain, N/V, dysuria, hematuria, dark or bloody stool.

## 2021-07-19 NOTE — H&P ADULT - NSICDXFAMILYHX_GEN_ALL_CORE_FT
FAMILY HISTORY:  Mother  Still living? Unknown  FHx: brain aneurysm, Age at diagnosis: Age Unknown

## 2021-07-19 NOTE — ED PROVIDER NOTE - PHYSICAL EXAMINATION
PHYSICAL EXAM: I have reviewed current vital signs.  GENERAL: NAD, well-nourished; well-developed.  HEAD:  Normocephalic, atraumatic.  EYES: EOMI, PERRL, conjunctiva and sclera clear.  ENT: MMM, no erythema/exudates.  NECK: Supple, no JVD.  CHEST/LUNG: gynecomastia b/l  Clear to auscultation bilaterally; no wheezes, rales, or rhonchi.  HEART: Regular rate and rhythm, normal S1 and S2; no murmurs, rubs, or gallops.  ABDOMEN:  Soft, nontender, distended with fluid wave.  EXTREMITIES: 2+ pitting edema 2+ peripheral pulses; no clubbing, cyanosis.  PSYCH: Cooperative, appropriate, normal mood and affect.  NEUROLOGY: A&O x 3. Motor 5/5. Sensory intact. No focal neurological deficits. CN II - XII intact. (-) dysmetria, facial droop, pronator drift.  SKIN: Warm and dry.

## 2021-07-19 NOTE — H&P ADULT - HISTORY OF PRESENT ILLNESS
46M w/ PMHx of ETOH abuse and hepatic cirrhosis presents with worsening b/l leg and abd swelling for past 2 weeks.  He was recently admitted for similar episode. He states he has leg cramps and swelling to the point where it is difficult to ambulate to bathroom due to pain and he wanted to come in for evaluation before it worses. He has no other complaints. Denies HA, dizziness, weakness, fever/Chills, cough, CP, SOB, palpitations, Abd pain, N/V, Constipation or diarrhea, dysuria, hematuria, dark or bloody stool.

## 2021-07-19 NOTE — ED PROVIDER NOTE - ATTENDING CONTRIBUTION TO CARE
46 yr old m w/ a pmh significant for hepatic cirrhosis, anemia, thrombocytopenia, history of etoh abuse who presents with leg pain. Pt states that for the past couple of days he has been having cramping like sensation in his lower extremities. Pt states that this has been making ambulation difficult. Pt denies any fevers, chills, nausea, vomiting, abd pain, chest pain, sob or any other complaints.     VITAL SIGNS: I have reviewed nursing notes and confirm.  CONSTITUTIONAL: non-toxic, well appearing  SKIN: no rash, no petechiae.  EYES: EOMI, pink conjunctiva, anicteric  ENT: tongue midline, no exudates, MMM  NECK: Supple; no meningismus, no JVD  CARD: RRR, no murmurs, equal radial pulses bilaterally 2+  RESP: CTAB, no respiratory distress  ABD: Soft, non-tender, distended, no peritoneal signs, no HSM, no CVA tenderness  EXT: Normal ROM x4. lower extremity edema. No calves tenderness  NEURO: Alert, oriented. CN2-12 intact, equal strength bilaterally.  PSYCH: Cooperative, appropriate.    a/p  46 yr old m that presents with lower leg cramping  -labs  -pain management  -dispo pending

## 2021-07-20 LAB
ALBUMIN SERPL ELPH-MCNC: 3 G/DL — LOW (ref 3.5–5.2)
ALP SERPL-CCNC: 148 U/L — HIGH (ref 30–115)
ALT FLD-CCNC: 29 U/L — SIGNIFICANT CHANGE UP (ref 0–41)
ANION GAP SERPL CALC-SCNC: 9 MMOL/L — SIGNIFICANT CHANGE UP (ref 7–14)
ANION GAP SERPL CALC-SCNC: 9 MMOL/L — SIGNIFICANT CHANGE UP (ref 7–14)
AST SERPL-CCNC: 46 U/L — HIGH (ref 0–41)
BASOPHILS # BLD AUTO: 0.03 K/UL — SIGNIFICANT CHANGE UP (ref 0–0.2)
BASOPHILS NFR BLD AUTO: 0.8 % — SIGNIFICANT CHANGE UP (ref 0–1)
BILIRUB SERPL-MCNC: 3 MG/DL — HIGH (ref 0.2–1.2)
BUN SERPL-MCNC: 18 MG/DL — SIGNIFICANT CHANGE UP (ref 10–20)
BUN SERPL-MCNC: 18 MG/DL — SIGNIFICANT CHANGE UP (ref 10–20)
CALCIUM SERPL-MCNC: 8.8 MG/DL — SIGNIFICANT CHANGE UP (ref 8.5–10.1)
CALCIUM SERPL-MCNC: 8.8 MG/DL — SIGNIFICANT CHANGE UP (ref 8.5–10.1)
CHLORIDE SERPL-SCNC: 102 MMOL/L — SIGNIFICANT CHANGE UP (ref 98–110)
CHLORIDE SERPL-SCNC: 102 MMOL/L — SIGNIFICANT CHANGE UP (ref 98–110)
CO2 SERPL-SCNC: 22 MMOL/L — SIGNIFICANT CHANGE UP (ref 17–32)
CO2 SERPL-SCNC: 22 MMOL/L — SIGNIFICANT CHANGE UP (ref 17–32)
CREAT SERPL-MCNC: 0.7 MG/DL — SIGNIFICANT CHANGE UP (ref 0.7–1.5)
CREAT SERPL-MCNC: 0.7 MG/DL — SIGNIFICANT CHANGE UP (ref 0.7–1.5)
EOSINOPHIL # BLD AUTO: 0.13 K/UL — SIGNIFICANT CHANGE UP (ref 0–0.7)
EOSINOPHIL NFR BLD AUTO: 3.6 % — SIGNIFICANT CHANGE UP (ref 0–8)
GLUCOSE SERPL-MCNC: 122 MG/DL — HIGH (ref 70–99)
GLUCOSE SERPL-MCNC: 123 MG/DL — HIGH (ref 70–99)
HCT VFR BLD CALC: 28.5 % — LOW (ref 42–52)
HGB BLD-MCNC: 9.4 G/DL — LOW (ref 14–18)
IMM GRANULOCYTES NFR BLD AUTO: 0.3 % — SIGNIFICANT CHANGE UP (ref 0.1–0.3)
LYMPHOCYTES # BLD AUTO: 0.92 K/UL — LOW (ref 1.2–3.4)
LYMPHOCYTES # BLD AUTO: 25.2 % — SIGNIFICANT CHANGE UP (ref 20.5–51.1)
MAGNESIUM SERPL-MCNC: 1.6 MG/DL — LOW (ref 1.8–2.4)
MCHC RBC-ENTMCNC: 29.7 PG — SIGNIFICANT CHANGE UP (ref 27–31)
MCHC RBC-ENTMCNC: 33 G/DL — SIGNIFICANT CHANGE UP (ref 32–37)
MCV RBC AUTO: 90.2 FL — SIGNIFICANT CHANGE UP (ref 80–94)
MONOCYTES # BLD AUTO: 0.55 K/UL — SIGNIFICANT CHANGE UP (ref 0.1–0.6)
MONOCYTES NFR BLD AUTO: 15.1 % — HIGH (ref 1.7–9.3)
NEUTROPHILS # BLD AUTO: 2.01 K/UL — SIGNIFICANT CHANGE UP (ref 1.4–6.5)
NEUTROPHILS NFR BLD AUTO: 55 % — SIGNIFICANT CHANGE UP (ref 42.2–75.2)
NRBC # BLD: 0 /100 WBCS — SIGNIFICANT CHANGE UP (ref 0–0)
PLATELET # BLD AUTO: 79 K/UL — LOW (ref 130–400)
POTASSIUM SERPL-MCNC: 3.8 MMOL/L — SIGNIFICANT CHANGE UP (ref 3.5–5)
POTASSIUM SERPL-MCNC: 3.8 MMOL/L — SIGNIFICANT CHANGE UP (ref 3.5–5)
POTASSIUM SERPL-SCNC: 3.8 MMOL/L — SIGNIFICANT CHANGE UP (ref 3.5–5)
POTASSIUM SERPL-SCNC: 3.8 MMOL/L — SIGNIFICANT CHANGE UP (ref 3.5–5)
PROT SERPL-MCNC: 7.1 G/DL — SIGNIFICANT CHANGE UP (ref 6–8)
RBC # BLD: 3.16 M/UL — LOW (ref 4.7–6.1)
RBC # FLD: 14.3 % — SIGNIFICANT CHANGE UP (ref 11.5–14.5)
SODIUM SERPL-SCNC: 133 MMOL/L — LOW (ref 135–146)
SODIUM SERPL-SCNC: 133 MMOL/L — LOW (ref 135–146)
WBC # BLD: 3.65 K/UL — LOW (ref 4.8–10.8)
WBC # FLD AUTO: 3.65 K/UL — LOW (ref 4.8–10.8)

## 2021-07-20 PROCEDURE — 99233 SBSQ HOSP IP/OBS HIGH 50: CPT

## 2021-07-20 RX ADMIN — LACTULOSE 20 GRAM(S): 10 SOLUTION ORAL at 05:54

## 2021-07-20 RX ADMIN — Medication 100 MILLIGRAM(S): at 12:32

## 2021-07-20 RX ADMIN — Medication 40 MILLIGRAM(S): at 05:55

## 2021-07-20 RX ADMIN — PANTOPRAZOLE SODIUM 40 MILLIGRAM(S): 20 TABLET, DELAYED RELEASE ORAL at 05:54

## 2021-07-20 RX ADMIN — Medication 1 TABLET(S): at 12:33

## 2021-07-20 RX ADMIN — SPIRONOLACTONE 150 MILLIGRAM(S): 25 TABLET, FILM COATED ORAL at 05:54

## 2021-07-20 RX ADMIN — GABAPENTIN 400 MILLIGRAM(S): 400 CAPSULE ORAL at 21:56

## 2021-07-20 RX ADMIN — Medication 500 MILLIGRAM(S): at 12:32

## 2021-07-20 RX ADMIN — LACTULOSE 20 GRAM(S): 10 SOLUTION ORAL at 21:56

## 2021-07-20 RX ADMIN — HEPARIN SODIUM 5000 UNIT(S): 5000 INJECTION INTRAVENOUS; SUBCUTANEOUS at 18:33

## 2021-07-20 RX ADMIN — GABAPENTIN 400 MILLIGRAM(S): 400 CAPSULE ORAL at 05:54

## 2021-07-20 RX ADMIN — GABAPENTIN 400 MILLIGRAM(S): 400 CAPSULE ORAL at 13:10

## 2021-07-20 RX ADMIN — HEPARIN SODIUM 5000 UNIT(S): 5000 INJECTION INTRAVENOUS; SUBCUTANEOUS at 05:54

## 2021-07-20 RX ADMIN — LACTULOSE 20 GRAM(S): 10 SOLUTION ORAL at 13:12

## 2021-07-20 NOTE — PROGRESS NOTE ADULT - ATTENDING COMMENTS
Patient seen and examined independently. Agree with resident note/ history / physical exam and plan of care with following exceptions/additions/updates. Case discussed with patient/pt decision maker, house-staff and nursing. My notes supersedes the resident's notes in case of discrepancies.    leg swelling improved.     #Progress Note Handoff  Pending (specify):  clinical improvement   Family discussion: dw pt fully aaox3  Disposition: Home_ anticipate dc in am

## 2021-07-20 NOTE — PROGRESS NOTE ADULT - ASSESSMENT
46M w/ PMHx of ETOH abuse and hepatic cirrhosis presents with worsening b/l leg and abd swelling for past 2 weeks.    # B/L LE Edema w/ Neuropathy   - MRI L spine 6/21: mild Degenerative changes from L3-S1 with mild foraminal narrowing. No nerve root impigement.   - c/w Gabapentin, c/w Baclofen PRN  - IV Lasix, Leg Elevation  - LE edema much improved    # Hepatic Cirrhosis   # Thrombocytopenia/Anemia/Hyponatremia/Hypoalbuminemia likely due to hepatic cirrhosis    - Outpatient hepatologist: Dr. Thomas Marshall: as per pt, working on getting enlisted on transplant list  - Bedside US: no significant ascites  - Platelets 72, INR 1.47, Current MELD-Na 19  - Continue Aldactone 150mg daily and Change lasix to IV 40mg qd  - c/w Cipro 500mg PO qd for SBP ppx   - c/w Lactulose titrate to 2- 3 BMs a day   - PPI daily     # DVT ppx - Heparin sq  # GI ppx - PPI   # Diet - regular   Full code.

## 2021-07-21 ENCOUNTER — TRANSCRIPTION ENCOUNTER (OUTPATIENT)
Age: 46
End: 2021-07-21

## 2021-07-21 VITALS — WEIGHT: 222.45 LBS | HEIGHT: 67 IN

## 2021-07-21 LAB
ALBUMIN SERPL ELPH-MCNC: 2.9 G/DL — LOW (ref 3.5–5.2)
ALP SERPL-CCNC: 155 U/L — HIGH (ref 30–115)
ALT FLD-CCNC: 25 U/L — SIGNIFICANT CHANGE UP (ref 0–41)
ANION GAP SERPL CALC-SCNC: 9 MMOL/L — SIGNIFICANT CHANGE UP (ref 7–14)
AST SERPL-CCNC: 40 U/L — SIGNIFICANT CHANGE UP (ref 0–41)
BASOPHILS # BLD AUTO: 0.02 K/UL — SIGNIFICANT CHANGE UP (ref 0–0.2)
BASOPHILS NFR BLD AUTO: 0.5 % — SIGNIFICANT CHANGE UP (ref 0–1)
BILIRUB SERPL-MCNC: 2.1 MG/DL — HIGH (ref 0.2–1.2)
BUN SERPL-MCNC: 18 MG/DL — SIGNIFICANT CHANGE UP (ref 10–20)
CALCIUM SERPL-MCNC: 8.7 MG/DL — SIGNIFICANT CHANGE UP (ref 8.5–10.1)
CHLORIDE SERPL-SCNC: 106 MMOL/L — SIGNIFICANT CHANGE UP (ref 98–110)
CO2 SERPL-SCNC: 20 MMOL/L — SIGNIFICANT CHANGE UP (ref 17–32)
COVID-19 SPIKE DOMAIN AB INTERP: POSITIVE
COVID-19 SPIKE DOMAIN ANTIBODY RESULT: >250 U/ML — HIGH
CREAT SERPL-MCNC: 0.7 MG/DL — SIGNIFICANT CHANGE UP (ref 0.7–1.5)
EOSINOPHIL # BLD AUTO: 0.13 K/UL — SIGNIFICANT CHANGE UP (ref 0–0.7)
EOSINOPHIL NFR BLD AUTO: 3.3 % — SIGNIFICANT CHANGE UP (ref 0–8)
GLUCOSE BLDC GLUCOMTR-MCNC: 108 MG/DL — HIGH (ref 70–99)
GLUCOSE SERPL-MCNC: 104 MG/DL — HIGH (ref 70–99)
HCT VFR BLD CALC: 27.9 % — LOW (ref 42–52)
HGB BLD-MCNC: 9.2 G/DL — LOW (ref 14–18)
HIV 1+2 AB+HIV1 P24 AG SERPL QL IA: SIGNIFICANT CHANGE UP
IMM GRANULOCYTES NFR BLD AUTO: 0.3 % — SIGNIFICANT CHANGE UP (ref 0.1–0.3)
LYMPHOCYTES # BLD AUTO: 1.08 K/UL — LOW (ref 1.2–3.4)
LYMPHOCYTES # BLD AUTO: 27.5 % — SIGNIFICANT CHANGE UP (ref 20.5–51.1)
MAGNESIUM SERPL-MCNC: 1.8 MG/DL — SIGNIFICANT CHANGE UP (ref 1.8–2.4)
MCHC RBC-ENTMCNC: 29.8 PG — SIGNIFICANT CHANGE UP (ref 27–31)
MCHC RBC-ENTMCNC: 33 G/DL — SIGNIFICANT CHANGE UP (ref 32–37)
MCV RBC AUTO: 90.3 FL — SIGNIFICANT CHANGE UP (ref 80–94)
MONOCYTES # BLD AUTO: 0.64 K/UL — HIGH (ref 0.1–0.6)
MONOCYTES NFR BLD AUTO: 16.3 % — HIGH (ref 1.7–9.3)
NEUTROPHILS # BLD AUTO: 2.05 K/UL — SIGNIFICANT CHANGE UP (ref 1.4–6.5)
NEUTROPHILS NFR BLD AUTO: 52.1 % — SIGNIFICANT CHANGE UP (ref 42.2–75.2)
NRBC # BLD: 0 /100 WBCS — SIGNIFICANT CHANGE UP (ref 0–0)
PLATELET # BLD AUTO: 65 K/UL — LOW (ref 130–400)
POTASSIUM SERPL-MCNC: 4.3 MMOL/L — SIGNIFICANT CHANGE UP (ref 3.5–5)
POTASSIUM SERPL-SCNC: 4.3 MMOL/L — SIGNIFICANT CHANGE UP (ref 3.5–5)
PROT SERPL-MCNC: 6.8 G/DL — SIGNIFICANT CHANGE UP (ref 6–8)
RBC # BLD: 3.09 M/UL — LOW (ref 4.7–6.1)
RBC # FLD: 14.2 % — SIGNIFICANT CHANGE UP (ref 11.5–14.5)
SARS-COV-2 IGG+IGM SERPL QL IA: >250 U/ML — HIGH
SARS-COV-2 IGG+IGM SERPL QL IA: POSITIVE
SODIUM SERPL-SCNC: 135 MMOL/L — SIGNIFICANT CHANGE UP (ref 135–146)
WBC # BLD: 3.93 K/UL — LOW (ref 4.8–10.8)
WBC # FLD AUTO: 3.93 K/UL — LOW (ref 4.8–10.8)

## 2021-07-21 PROCEDURE — 99239 HOSP IP/OBS DSCHRG MGMT >30: CPT

## 2021-07-21 RX ORDER — MOXIFLOXACIN HYDROCHLORIDE TABLETS, 400 MG 400 MG/1
1 TABLET, FILM COATED ORAL
Qty: 0 | Refills: 0 | DISCHARGE

## 2021-07-21 RX ORDER — PANTOPRAZOLE SODIUM 20 MG/1
1 TABLET, DELAYED RELEASE ORAL
Qty: 30 | Refills: 0
Start: 2021-07-21 | End: 2021-08-19

## 2021-07-21 RX ORDER — GABAPENTIN 400 MG/1
1 CAPSULE ORAL
Qty: 90 | Refills: 0
Start: 2021-07-21 | End: 2021-08-19

## 2021-07-21 RX ORDER — LACTULOSE 10 G/15ML
30 SOLUTION ORAL
Qty: 2700 | Refills: 0
Start: 2021-07-21 | End: 2021-08-19

## 2021-07-21 RX ORDER — SPIRONOLACTONE 25 MG/1
3 TABLET, FILM COATED ORAL
Qty: 0 | Refills: 0 | DISCHARGE
Start: 2021-07-21

## 2021-07-21 RX ORDER — LACTULOSE 10 G/15ML
30 SOLUTION ORAL
Qty: 0 | Refills: 0 | DISCHARGE
Start: 2021-07-21

## 2021-07-21 RX ORDER — PANTOPRAZOLE SODIUM 20 MG/1
1 TABLET, DELAYED RELEASE ORAL
Qty: 0 | Refills: 0 | DISCHARGE
Start: 2021-07-21

## 2021-07-21 RX ORDER — FUROSEMIDE 40 MG
2 TABLET ORAL
Qty: 60 | Refills: 0
Start: 2021-07-21 | End: 2021-08-19

## 2021-07-21 RX ORDER — SPIRONOLACTONE 25 MG/1
1.5 TABLET, FILM COATED ORAL
Qty: 45 | Refills: 0
Start: 2021-07-21 | End: 2021-08-19

## 2021-07-21 RX ORDER — GABAPENTIN 400 MG/1
1 CAPSULE ORAL
Qty: 0 | Refills: 0 | DISCHARGE
Start: 2021-07-21

## 2021-07-21 RX ORDER — CIPROFLOXACIN LACTATE 400MG/40ML
1 VIAL (ML) INTRAVENOUS
Qty: 0 | Refills: 0 | DISCHARGE
Start: 2021-07-21

## 2021-07-21 RX ADMIN — LACTULOSE 20 GRAM(S): 10 SOLUTION ORAL at 15:58

## 2021-07-21 RX ADMIN — LACTULOSE 20 GRAM(S): 10 SOLUTION ORAL at 06:51

## 2021-07-21 RX ADMIN — Medication 1 TABLET(S): at 11:06

## 2021-07-21 RX ADMIN — Medication 100 MILLIGRAM(S): at 11:06

## 2021-07-21 RX ADMIN — GABAPENTIN 400 MILLIGRAM(S): 400 CAPSULE ORAL at 13:56

## 2021-07-21 RX ADMIN — PANTOPRAZOLE SODIUM 40 MILLIGRAM(S): 20 TABLET, DELAYED RELEASE ORAL at 06:54

## 2021-07-21 RX ADMIN — SPIRONOLACTONE 150 MILLIGRAM(S): 25 TABLET, FILM COATED ORAL at 06:54

## 2021-07-21 RX ADMIN — HEPARIN SODIUM 5000 UNIT(S): 5000 INJECTION INTRAVENOUS; SUBCUTANEOUS at 06:51

## 2021-07-21 RX ADMIN — Medication 500 MILLIGRAM(S): at 11:06

## 2021-07-21 RX ADMIN — Medication 40 MILLIGRAM(S): at 06:52

## 2021-07-21 RX ADMIN — GABAPENTIN 400 MILLIGRAM(S): 400 CAPSULE ORAL at 06:54

## 2021-07-21 NOTE — DISCHARGE NOTE PROVIDER - CARE PROVIDERS DIRECT ADDRESSES
[email or web address]

## 2021-07-21 NOTE — PROGRESS NOTE ADULT - SUBJECTIVE AND OBJECTIVE BOX
SUBJECTIVE:  HPI:  46M w/ PMHx of ETOH abuse and hepatic cirrhosis presents with worsening b/l leg and abd swelling for past 2 weeks.  He was recently admitted for similar episode. He states he has leg cramps and swelling to the point where it is difficult to ambulate to bathroom due to pain and he wanted to come in for evaluation before it worses. He has no other complaints. Denies HA, dizziness, weakness, fever/Chills, cough, CP, SOB, palpitations, Abd pain, N/V, Constipation or diarrhea, dysuria, hematuria, dark or bloody stool. (19 Jul 2021 07:54)      PAST MEDICAL & SURGICAL HISTORY  PAST MEDICAL & SURGICAL HISTORY:  ETOH abuse    Thrombocytopenia    Anemia    Liver cirrhosis, alcoholic    History of incision and drainage  Gluteal abscess      SOCIAL HISTORY:    ALLERGIES:  No Known Allergies    MEDICATIONS:  STANDING MEDICATIONS  ciprofloxacin     Tablet 500 milliGRAM(s) Oral daily  furosemide   Injectable 40 milliGRAM(s) IV Push daily  gabapentin 400 milliGRAM(s) Oral every 8 hours  heparin   Injectable 5000 Unit(s) SubCutaneous every 12 hours  lactulose Syrup 20 Gram(s) Oral three times a day  multivitamin 1 Tablet(s) Oral daily  pantoprazole    Tablet 40 milliGRAM(s) Oral before breakfast  spironolactone Oral Tab/Cap - Peds 150 milliGRAM(s) Oral daily  thiamine 100 milliGRAM(s) Oral daily    PRN MEDICATIONS  polyethylene glycol 3350 17 Gram(s) Oral every 12 hours PRN    VITALS:   T(F): 98.4  HR: 75  BP: 114/58  RR: 18  SpO2: 100%    LABS:                        9.4    3.65  )-----------( 79       ( 20 Jul 2021 07:19 )             28.5     07-20    133<L>  |  102  |  18  ----------------------------<  123<H>  3.8   |  22  |  0.7    Ca    8.8      20 Jul 2021 07:19  Mg     1.6     07-20    TPro  7.1  /  Alb  3.0<L>  /  TBili  3.0<H>  /  DBili  x   /  AST  46<H>  /  ALT  29  /  AlkPhos  148<H>  07-20    PT/INR - ( 19 Jul 2021 02:52 )   PT: 16.90 sec;   INR: 1.47 ratio         PTT - ( 19 Jul 2021 02:52 )  PTT:40.0 sec              RADIOLOGY:    PHYSICAL EXAM:  GEN: No acute distress  HEENT: AT/NC PEERLA, EOMI  LUNGS: Clear to auscultation bilaterally  HEART: S1/S2 present  ABD: Soft, non-tender, mod-distended. Bowel sounds present in all 4 quadrants  EXT: 1+pitting edema, no rashes, no cyanosis  NEURO: AAOX3
Pt seen and examined independently. No new complaints. Feeling much better. Denies CP, SOB, AP. Remainder ROS unremarkable. Edema resolved as per pt. Educated pt re: low salt diet and fluid restriction. Stable for d/c.    Gen: NAD, AAOx3  CV: S1 S2  Resp: Decreased BS b/l  GI: NT/ND/S +BS  MS: neg c/c/e, +pulses  Neuro: nonfocal, +reflexes    Discharge instructions discussed and patient knows when to seek immediate medical attention.  Patient has proper follow up.  All results discussed and patient aware they require further follow up/work up.  Stressed importance of proper follow up.  Medications prescribed and changes discussed.  All questions and concerns from patient addressed. Understanding of instructions verbalized.    Time spent in completing discharge process and coordinating care 40 minutes.    Discussed with housestaff, nursing, case mgmt

## 2021-07-21 NOTE — DISCHARGE NOTE PROVIDER - CARE PROVIDER_API CALL
Thomas Marshall)  Gastroenterology; Internal Medicine  [address] [place] [address]  Phone: [phone]  Fax: [phone]  Follow Up Time: 2 weeks   Thomas Marshall)  Gastroenterology; Internal Medicine  [address] [place] [address]  Phone: [phone]  Fax: [phone]  Follow Up Time: 1 week    Frederick Anderson (DO)  Medicine  Physicians  [address] [place] [address], [address] [place] [address]  Phone: [phone]  Fax: [phone]  Scheduled Appointment: 07/30/2021

## 2021-07-21 NOTE — DISCHARGE NOTE NURSING/CASE MANAGEMENT/SOCIAL WORK - NSDCVIVACCINE_GEN_ALL_CORE_FT
influenza, injectable, quadrivalent, preservative free; 11-Oct-2018 11:20; Griselda Ag (RN); [employer]; [id number] (Exp. Date: 30-Jun-2019); IntraMuscular; Deltoid Right.; 0.5 milliLiter(s); VIS (VIS Published: 07-Aug-2015, VIS Presented: 11-Oct-2018);   influenza, injectable, quadrivalent, preservative free; 13-Dec-2019 17:31; Mel Maya); [employer]; [id number] (Exp. Date: 30-Jun-2020); IntraMuscular; Deltoid Left.; 0.5 milliLiter(s); VIS (VIS Published: 15-Aug-2019, VIS Presented: 13-Dec-2019);

## 2021-07-21 NOTE — DISCHARGE NOTE NURSING/CASE MANAGEMENT/SOCIAL WORK - PATIENT PORTAL LINK FT
You can access the FollowMyHealth Patient Portal offered by [facility] by registering at the following website: [email or web address]. By joining [employer]’s FollowMyHealth portal, you will also be able to view your health information using other applications (apps) compatible with our system.

## 2021-07-21 NOTE — DISCHARGE NOTE PROVIDER - PROVIDER TOKENS
PROVIDER:[TOKEN:[[id number]],FOLLOWUP:[2 weeks]] PROVIDER:[TOKEN:[[id number]],FOLLOWUP:[1 week]],PROVIDER:[TOKEN:[[id number]],SCHEDULEDAPPT:[07/30/2021]]

## 2021-07-21 NOTE — DISCHARGE NOTE PROVIDER - NSDCMRMEDTOKEN_GEN_ALL_CORE_FT
Cipro 500 mg oral tablet: 1 tab(s) orally once a day  furosemide 20 mg oral tablet: 2 tab(s) orally once a day   gabapentin 400 mg oral capsule: 1 cap(s) orally every 8 hours  lactulose 10 g/15 mL oral syrup: 30 milliliter(s) orally 3 times a day MDD:Maintain 3 BM&#x27;s per day  Multiple Vitamins oral tablet: 1 tab(s) orally once a day  pantoprazole 40 mg oral delayed release tablet: 40 tab(s) orally once a day   polyethylene glycol 3350 oral powder for reconstitution: 17 gram(s) orally every 12 hours, As Needed   spironolactone 100 mg oral tablet: 1.5 tab(s) orally once a day   thiamine 100 mg oral tablet: 1 tab(s) orally once a day   ciprofloxacin 500 mg oral tablet: 1 tab(s) orally once a day  furosemide 20 mg oral tablet: 2 tab(s) orally once a day   gabapentin 400 mg oral capsule: 1 cap(s) orally every 8 hours  lactulose 10 g/15 mL oral syrup: 30 milliliter(s) orally 3 times a day  Multiple Vitamins oral tablet: 1 tab(s) orally once a day  pantoprazole 40 mg oral delayed release tablet: 1 tab(s) orally once a day (before a meal)  spironolactone 50 mg oral tablet: 3 tab(s) orally once a day

## 2021-07-21 NOTE — DISCHARGE NOTE NURSING/CASE MANAGEMENT/SOCIAL WORK - NSPROEXTENSIONSOFSELF_GEN_A_NUR
[facility]  [phone]               Thank you for choosing us for your health care visit with Karina Boyce MD.  We are glad to serve you and happy to provide you with this albrecht none

## 2021-07-21 NOTE — DISCHARGE NOTE PROVIDER - NSDCCPCAREPLAN_GEN_ALL_CORE_FT
PRINCIPAL DISCHARGE DIAGNOSIS  Diagnosis: Leg swelling  Assessment and Plan of Treatment: You were admitted to the hospital for swellin in both legs ,and difficulty ambulating.  You had excess fluid in your belly and legs ,from your liver problem .  You were giver diuretics and the swelling has improved.  Please adhere to the recommended low sodium diet ,and restricted fluid intake.  Please take all medication as perscribed.  [facility]  Please seek medical attention if you experience severe belly pain ,confusion ,or return of swelling.      SECONDARY DISCHARGE DIAGNOSES  Diagnosis: Liver cirrhosis, alcoholic  Assessment and Plan of Treatment:     Diagnosis: Anemia  Assessment and Plan of Treatment:      PRINCIPAL DISCHARGE DIAGNOSIS  Diagnosis: Leg swelling  Assessment and Plan of Treatment: You were admitted to the hospital for swellin in both legs ,and difficulty ambulating. You had excess fluid in your belly and legs ,from your liver problem .  You were giver diuretics and the swelling has improved.  Please adhere to the recommended low sodium diet ,and restricted fluid intake.   Please take all medication as perscribed. You may use an extra dose of Lasix if you notice increased abdominal or leg swelling.  [facility]  Please seek medical attention if you experience severe belly pain ,confusion ,or return of swelling.  Follow up with your gastroenterologist

## 2021-07-22 ENCOUNTER — NON-APPOINTMENT (OUTPATIENT)
Age: 46
End: 2021-07-22

## 2021-07-27 DIAGNOSIS — M79.89 OTHER SPECIFIED SOFT TISSUE DISORDERS: ICD-10-CM

## 2021-07-27 DIAGNOSIS — R26.2 DIFFICULTY IN WALKING, NOT ELSEWHERE CLASSIFIED: ICD-10-CM

## 2021-07-27 DIAGNOSIS — Z87.891 PERSONAL HISTORY OF NICOTINE DEPENDENCE: ICD-10-CM

## 2021-07-27 DIAGNOSIS — D69.6 THROMBOCYTOPENIA, UNSPECIFIED: ICD-10-CM

## 2021-07-27 DIAGNOSIS — R25.2 CRAMP AND SPASM: ICD-10-CM

## 2021-07-27 DIAGNOSIS — D64.9 ANEMIA, UNSPECIFIED: ICD-10-CM

## 2021-07-27 DIAGNOSIS — R60.0 LOCALIZED EDEMA: ICD-10-CM

## 2021-07-27 DIAGNOSIS — R19.00 INTRA-ABDOMINAL AND PELVIC SWELLING, MASS AND LUMP, UNSPECIFIED SITE: ICD-10-CM

## 2021-07-27 DIAGNOSIS — Z76.82 AWAITING ORGAN TRANSPLANT STATUS: ICD-10-CM

## 2021-07-27 DIAGNOSIS — G62.9 POLYNEUROPATHY, UNSPECIFIED: ICD-10-CM

## 2021-07-27 DIAGNOSIS — F10.11 ALCOHOL ABUSE, IN REMISSION: ICD-10-CM

## 2021-07-27 DIAGNOSIS — E88.09 OTHER DISORDERS OF PLASMA-PROTEIN METABOLISM, NOT ELSEWHERE CLASSIFIED: ICD-10-CM

## 2021-07-27 DIAGNOSIS — E87.1 HYPO-OSMOLALITY AND HYPONATREMIA: ICD-10-CM

## 2021-07-27 DIAGNOSIS — K70.30 ALCOHOLIC CIRRHOSIS OF LIVER WITHOUT ASCITES: ICD-10-CM

## 2021-07-28 ENCOUNTER — APPOINTMENT (OUTPATIENT)
Dept: HEPATOLOGY | Facility: CLINIC | Age: 46
End: 2021-07-28
Payer: COMMERCIAL

## 2021-07-28 VITALS
HEART RATE: 88 BPM | DIASTOLIC BLOOD PRESSURE: 67 MMHG | HEIGHT: 67 IN | TEMPERATURE: 97.9 F | BODY MASS INDEX: 33.74 KG/M2 | SYSTOLIC BLOOD PRESSURE: 102 MMHG | WEIGHT: 215 LBS | RESPIRATION RATE: 14 BRPM

## 2021-07-28 PROCEDURE — 99214 OFFICE O/P EST MOD 30 MIN: CPT

## 2021-07-28 RX ORDER — LIDOCAINE HYDROCHLORIDE 20 MG/ML
2 SOLUTION ORAL; TOPICAL
Qty: 140 | Refills: 0 | Status: DISCONTINUED | COMMUNITY
Start: 2021-06-08 | End: 2021-07-28

## 2021-07-28 NOTE — REVIEW OF SYSTEMS
[Fever] : no fever [Chills] : no chills [Feeling Poorly] : not feeling poorly [Feeling Tired] : not feeling tired [Recent Weight Gain (___ Lbs)] : recent [unfilled] ~Ulb weight gain [Recent Weight Loss (___ Lbs)] : no recent weight loss [Negative] : Heme/Lymph

## 2021-07-28 NOTE — PHYSICAL EXAM
[Scleral Icterus] : No Scleral Icterus [Spider Angioma] : No spider angioma(s) were observed [Abdominal  Ascites] : no ascites [Ascites Fluid Wave] : no ascites fluid wave [Asterixis] : no asterixis observed [Jaundice] : No jaundice [Hallucinations] : ~T no ~M hallucinations [Delusions] : no ~T delusions [General Appearance - Alert] : alert [Respiration, Rhythm And Depth] : normal respiratory rhythm and effort [Heart Rate And Rhythm] : heart rate was normal and rhythm regular [Nonpitting Edema] : nonpitting edema present [___ +] : [unfilled]+ pitting edema to L ankle [FreeTextEntry1] : bilateral gynecomastia  [Bowel Sounds] : normal bowel sounds [Abnormal Walk] : normal gait [Skin Color & Pigmentation] : normal skin color and pigmentation [Oriented To Time, Place, And Person] : oriented to person, place, and time

## 2021-07-28 NOTE — HISTORY OF PRESENT ILLNESS
[FreeTextEntry1] : Mr. Hardy is a 46 year old male with history of AUD, with decompensated alcohol-related cirrhosis complicated by ascites, hepatic encephalopathy, and hyponatremia who presents to the hepatology clinic for follow up after recent hospitalization. He has no prior history of SBP, variceal hemorrhage, PVT, or HCC. His cirrhosis was first diagnosed in 2018. He previously completed an alcohol RPP in 1/2019, but then relapsed after 3-4 months, leading to progression of his cirrhosis. His last reported alcoholic drink was on 8/12/19.\par \par He was hospitalized at [facility]: 6/11/21-6/14/21 for lower extremity neuropathy and muscle cramps and from 7/19/21-7/21/21 due to BLE edema requiring IV diuresis. He had an EGD on 06/01/21 that showed non-bleeding erosive gastropathy, duodenal erosions without bleeding, and duodenal mucosal atrophy that was biopsied (duodenal mucosa with focal acute erosive duodenitis). His colonoscopy from 06/01/21 showed diverticulosis in the entire examined colon.\par                      \par Since his last visit on 05/20/21, he reports continued sobriety from alcohol without any slips, and he continues to participate in AA, with in person meetings on Fridays and Mondays. He has complaint on furosemide 40mg and spironolactone 150mg daily. He has been following a low salt diet. 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 He sits for prolonged period of time and mostly uses his right leg to drive. He notices his left leg is more swollen than his right and this is reportedly normal for him. He lives with his wife and two adult children. They live in [place]. \par

## 2021-07-28 NOTE — ASSESSMENT
[FreeTextEntry1] : Assessment and Plan: Mr. Hardy is a 46-year-old male with history of AUD, with decompensated alcohol-related cirrhosis complicated by ascites, hepatic encephalopathy, and hyponatremia who presents to the hepatology clinic for follow up after recent hospitalization.\par \par 1. Decompensated alcohol-related cirrhosis \par No ascites or significant lower extremity edema on exam. He has complaint on furosemide 40 mg and spironolactone 150 mg daily. Will continue current dose of his diuretics. He has been following a low salt diet. Encouraged patient to use compression stockings. Will repeat labs now. \par \par Also discussed he has been gaining weight since Jan 2020 and has gained 37 labs. Hepatic steatosis also noted on his recent US. Encouraged patient to diet and exercise to lose weight. Encouraged following a diet with daily vegetables, fruits, whole grains and healthy fats, such as a Mediterranean diet.\par \par HE: No recent episode of HE. He takes lactulose TID and has 2-4 bowel movements a day. \par \par Esophageal Variceal Screen: He had an EGD on 06/01/21 that showed non-bleeding erosive gastropathy, duodenal erosions without bleeding, and duodenal mucosal atrophy that was biopsied (duodenal mucosa with focal acute erosive duodenitis).\par \par HCC Screen: US from 05/23/21 showed hepatic steatosis with cirrhosis and no liver lesions. Continue imaging every 6 months. Last AFP 5.6 on 04/12/21. Repeat imaging 10/2021.\par \par Patient was advised to abstain from alcohol and all illicit drugs, avoid herbal and dietary supplements, limit use of acetaminophen to <2 grams per day, avoid use of nonsteroidal antiinflammatory drugs (NSAIDs) as these can precipitate renal dysfunction in patients with advanced liver disease, avoid eating any unpasteurized dairy products, and avoid eating raw/steamed oysters or other shellfish to avoid risk of Vibrio infection.\par \par 2. Health Maintenance \par Immunizations: Immune or Susceptible to HAV, s/p HBV vaccination\par Colonoscopy: 06/01/21- showed diverticulosis in the entire examined colon, to repeat in 10 years\par \par Follow Up: 1 month\par \par Valdemar Feliz\par Nurse Practitioner \par Hepatology\par [facility]

## 2021-07-29 LAB
25(OH)D3 SERPL-MCNC: 17.3 NG/ML
ALBUMIN SERPL ELPH-MCNC: 3.6 G/DL
ALP BLD-CCNC: 193 U/L
ALT SERPL-CCNC: 30 U/L
ANION GAP SERPL CALC-SCNC: 13 MMOL/L
AST SERPL-CCNC: 42 U/L
BASOPHILS # BLD AUTO: 0.02 K/UL
BASOPHILS NFR BLD AUTO: 0.5 %
BILIRUB SERPL-MCNC: 2.8 MG/DL
BUN SERPL-MCNC: 22 MG/DL
CALCIUM SERPL-MCNC: 9.4 MG/DL
CHLORIDE SERPL-SCNC: 99 MMOL/L
CO2 SERPL-SCNC: 18 MMOL/L
CREAT SERPL-MCNC: 0.8 MG/DL
EOSINOPHIL # BLD AUTO: 0.09 K/UL
EOSINOPHIL NFR BLD AUTO: 2.1 %
GLUCOSE SERPL-MCNC: 108 MG/DL
HBV SURFACE AB SERPL IA-ACNC: 20.5 MIU/ML
HCT VFR BLD CALC: 31.8 %
HGB BLD-MCNC: 10.5 G/DL
IMM GRANULOCYTES NFR BLD AUTO: 0.2 %
INR PPP: 1.34 RATIO
LYMPHOCYTES # BLD AUTO: 0.74 K/UL
LYMPHOCYTES NFR BLD AUTO: 16.9 %
MAN DIFF?: NORMAL
MCHC RBC-ENTMCNC: 29.7 PG
MCHC RBC-ENTMCNC: 33 GM/DL
MCV RBC AUTO: 90.1 FL
MONOCYTES # BLD AUTO: 0.54 K/UL
MONOCYTES NFR BLD AUTO: 12.3 %
NEUTROPHILS # BLD AUTO: 2.98 K/UL
NEUTROPHILS NFR BLD AUTO: 68 %
PLATELET # BLD AUTO: 75 K/UL
POTASSIUM SERPL-SCNC: 4.5 MMOL/L
PROT SERPL-MCNC: 8.2 G/DL
PT BLD: 15.7 SEC
RBC # BLD: 3.53 M/UL
RBC # FLD: 14.1 %
SODIUM SERPL-SCNC: 129 MMOL/L
WBC # FLD AUTO: 4.38 K/UL

## 2021-07-30 ENCOUNTER — OUTPATIENT (OUTPATIENT)
Dept: OUTPATIENT SERVICES | Facility: HOSPITAL | Age: 46
LOS: 1 days | Discharge: HOME | End: 2021-07-30

## 2021-07-30 ENCOUNTER — APPOINTMENT (OUTPATIENT)
Dept: INTERNAL MEDICINE | Facility: CLINIC | Age: 46
End: 2021-07-30
Payer: SUBSIDIZED

## 2021-07-30 VITALS
HEIGHT: 67 IN | DIASTOLIC BLOOD PRESSURE: 70 MMHG | SYSTOLIC BLOOD PRESSURE: 107 MMHG | WEIGHT: 209 LBS | OXYGEN SATURATION: 99 % | BODY MASS INDEX: 32.8 KG/M2 | TEMPERATURE: 97.8 F | HEART RATE: 83 BPM

## 2021-07-30 DIAGNOSIS — Z98.890 OTHER SPECIFIED POSTPROCEDURAL STATES: Chronic | ICD-10-CM

## 2021-07-30 DIAGNOSIS — K31.9 DISEASE OF STOMACH AND DUODENUM, UNSPECIFIED: ICD-10-CM

## 2021-07-30 PROCEDURE — 99214 OFFICE O/P EST MOD 30 MIN: CPT | Mod: GC

## 2021-07-30 NOTE — REVIEW OF SYSTEMS
[Lower Ext Edema] : lower extremity edema [Itching] : itching [Fever] : no fever [Fatigue] : no fatigue [Sore Throat] : no sore throat [Chest Pain] : no chest pain [Shortness Of Breath] : no shortness of breath [Joint Pain] : no joint pain [Headache] : no headache [FreeTextEntry3] : jaundice of sclera

## 2021-07-30 NOTE — PHYSICAL EXAM
[No Acute Distress] : no acute distress [Well Developed] : well developed [Normal Outer Ear/Nose] : the outer ears and nose were normal in appearance [No Respiratory Distress] : no respiratory distress  [Clear to Auscultation] : lungs were clear to auscultation bilaterally [Normal Rate] : normal rate  [Regular Rhythm] : with a regular rhythm [Normal S1, S2] : normal S1 and S2 [Soft] : abdomen soft [Non Tender] : non-tender [No Joint Swelling] : no joint swelling [Speech Grossly Normal] : speech grossly normal [de-identified] : jaundice of sclera [de-identified] : distention is less as he states

## 2021-07-30 NOTE — HISTORY OF PRESENT ILLNESS
[FreeTextEntry1] : follow up visit [de-identified] : Mr. Hardy is a 46 year old male with history of AUD, with decompensated alcohol-related cirrhosis complicated by ascites, hepatic encephalopathy, and hyponatremia who presents to the hepatology clinic for follow up after recent hospitalization. He has no prior history of SBP, variceal hemorrhage, PVT, or HCC. His cirrhosis was first diagnosed in 2018. He previously completed an alcohol RPP in 1/2019, but then relapsed after 3-4 months, leading to progression of his cirrhosis. His last reported alcoholic drink was on 8/12/19.\par \par Patient has had two recent hospitalizations at [facility]: 6/11/21-6/14/21 for lower extremity neuropathy and muscle cramps and from 7/19/21-7/21/21 due to BLE edema requiring IV diuresis. Will arrange for closer Hepatology follow-up within 1-2 weeks. \par Today the patient was seen and examined ,has slight right lower ext edema ,jaundice of the sclera ,\par Reports less itching and abdominal distention has improved.\par He has quit alcohol 3 years ago ,and attends meetings on monday /friday.\par no further complaints .\par took moderna vaccine 4/15/21 , 5/13/21

## 2021-08-02 DIAGNOSIS — K70.31 ALCOHOLIC CIRRHOSIS OF LIVER WITH ASCITES: ICD-10-CM

## 2021-08-02 DIAGNOSIS — R17 UNSPECIFIED JAUNDICE: ICD-10-CM

## 2021-08-02 DIAGNOSIS — R60.9 EDEMA, UNSPECIFIED: ICD-10-CM

## 2021-08-02 DIAGNOSIS — K31.9 DISEASE OF STOMACH AND DUODENUM, UNSPECIFIED: ICD-10-CM

## 2021-08-02 DIAGNOSIS — K72.90 HEPATIC FAILURE, UNSPECIFIED WITHOUT COMA: ICD-10-CM

## 2021-08-02 LAB — PHOSPHATIDYETHANOL (PETH), WHOLE BLOOD: NEGATIVE NG/ML

## 2021-08-25 ENCOUNTER — APPOINTMENT (OUTPATIENT)
Dept: HEPATOLOGY | Facility: CLINIC | Age: 46
End: 2021-08-25

## 2021-08-30 ENCOUNTER — RX RENEWAL (OUTPATIENT)
Age: 46
End: 2021-08-30

## 2021-08-31 ENCOUNTER — RX RENEWAL (OUTPATIENT)
Age: 46
End: 2021-08-31

## 2021-09-01 ENCOUNTER — APPOINTMENT (OUTPATIENT)
Dept: HEPATOLOGY | Facility: CLINIC | Age: 46
End: 2021-09-01
Payer: SELF-PAY

## 2021-09-01 VITALS
TEMPERATURE: 97.6 F | DIASTOLIC BLOOD PRESSURE: 64 MMHG | OXYGEN SATURATION: 100 % | WEIGHT: 208 LBS | RESPIRATION RATE: 12 BRPM | BODY MASS INDEX: 32.65 KG/M2 | SYSTOLIC BLOOD PRESSURE: 134 MMHG | HEART RATE: 89 BPM | HEIGHT: 67 IN

## 2021-09-01 PROCEDURE — 99214 OFFICE O/P EST MOD 30 MIN: CPT

## 2021-09-01 RX ORDER — FUROSEMIDE 20 MG/1
20 TABLET ORAL
Qty: 270 | Refills: 2 | Status: DISCONTINUED | COMMUNITY
Start: 1900-01-01 | End: 2021-09-01

## 2021-09-01 NOTE — HISTORY OF PRESENT ILLNESS
[FreeTextEntry1] : Mr. Hardy is a 46 year old male with history of AUD, with decompensated alcohol-related cirrhosis complicated by ascites, hepatic encephalopathy, and hyponatremia who presents to the hepatology clinic for follow up. He has no prior history of SBP, variceal hemorrhage, PVT, or HCC. His cirrhosis was first diagnosed in 2018. He previously completed an alcohol RPP in 1/2019, but then relapsed after 3-4 months, leading to progression of his cirrhosis. His last reported alcoholic drink was on 8/12/19.\par \par He was hospitalized at [facility]: 6/11/21-6/14/21 for lower extremity neuropathy and muscle cramps and from 7/19/21-7/21/21 due to BLE edema requiring IV diuresis. He had an EGD on 06/01/21 that showed non-bleeding erosive gastropathy, duodenal erosions without bleeding, and duodenal mucosal atrophy that was biopsied (duodenal mucosa with focal acute erosive duodenitis). His colonoscopy from 06/01/21 showed diverticulosis in the entire examined colon.\par                      \par Since his last visit on 07/28/21, he reports continued sobriety from alcohol without any slips and continues to participate in AA, with in person meetings on Fridays and Mondays. He has complaint on furosemide 40mg and spironolactone 150mg daily. He denies any ascites or lower extremity edema. He has been following a low salt diet. 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 He lives with his wife and two adult children. They live in [place].

## 2021-09-01 NOTE — REVIEW OF SYSTEMS
[Negative] : Heme/Lymph [Fever] : no fever [Chills] : no chills [Feeling Poorly] : not feeling poorly [Feeling Tired] : not feeling tired [Recent Weight Gain (___ Lbs)] : recent [unfilled] ~Ulb weight gain [Recent Weight Loss (___ Lbs)] : no recent weight loss

## 2021-09-01 NOTE — ASSESSMENT
[FreeTextEntry1] : Assessment and Plan: Mr. Hardy is a 46-year-old male with history of AUD, with decompensated alcohol-related cirrhosis complicated by ascites, hepatic encephalopathy, and hyponatremia who presents to the hepatology clinic for follow up.\par \par 1. Decompensated alcohol-related cirrhosis \par MELD Na 21 based on labs from 07/28/21. No ascites or significant lower extremity edema on exam. He has complaint on furosemide 40 mg and spironolactone 150 mg daily. Will continue current dose of his diuretics. He has been following a low salt diet.  Will repeat labs now. \par \par Also discussed he has been gaining weight since Jan 2020 and has gained 37 labs. Hepatic steatosis also noted on his recent US. Encouraged patient to diet and exercise to lose weight. Encouraged following a diet with daily vegetables, fruits, whole grains and healthy fats, such as a Mediterranean diet.\par \par HE: No recent episode of HE. He takes lactulose TID and has 2-4 bowel movements a day. \par \par Esophageal Variceal Screen: He had an EGD on 06/01/21 that showed non-bleeding erosive gastropathy, duodenal erosions without bleeding, and duodenal mucosal atrophy that was biopsied (duodenal mucosa with focal acute erosive duodenitis).\par \par HCC Screen: US from 05/23/21 showed hepatic steatosis with cirrhosis and no liver lesions. Continue imaging every 6 months. Last AFP 5.6 on 04/12/21. Repeat imaging 11/2021 (ultrasound ordered).\par \par Patient was advised to abstain from alcohol and all illicit drugs, avoid herbal and dietary supplements, limit use of acetaminophen to <2 grams per day, avoid use of nonsteroidal antiinflammatory drugs (NSAIDs) as these can precipitate renal dysfunction in patients with advanced liver disease, avoid eating any unpasteurized dairy products, and avoid eating raw/steamed oysters or other shellfish to avoid risk of Vibrio infection.\par \par 2. Health Maintenance \par Immunizations: will check HAV serology with next set of labs, Immune to HBV. He is vaccinated for COVID-19. \par Colonoscopy: 06/01/21- showed diverticulosis in the entire examined colon, to repeat in 10 years\par \par Follow Up: 2 months with \par \par Valdemar Feliz\par Nurse Practitioner \par Hepatology\par [facility]

## 2021-09-02 ENCOUNTER — NON-APPOINTMENT (OUTPATIENT)
Age: 46
End: 2021-09-02

## 2021-09-02 LAB
ALBUMIN SERPL ELPH-MCNC: 3.7 G/DL
ALP BLD-CCNC: 164 U/L
ALT SERPL-CCNC: 35 U/L
ANION GAP SERPL CALC-SCNC: 12 MMOL/L
AST SERPL-CCNC: 44 U/L
BASOPHILS # BLD AUTO: 0.03 K/UL
BASOPHILS NFR BLD AUTO: 0.5 %
BILIRUB SERPL-MCNC: 3.2 MG/DL
BUN SERPL-MCNC: 17 MG/DL
CALCIUM SERPL-MCNC: 9.2 MG/DL
CHLORIDE SERPL-SCNC: 97 MMOL/L
CO2 SERPL-SCNC: 18 MMOL/L
CREAT SERPL-MCNC: 0.89 MG/DL
EOSINOPHIL # BLD AUTO: 0.1 K/UL
EOSINOPHIL NFR BLD AUTO: 1.8 %
GLUCOSE SERPL-MCNC: 103 MG/DL
HCT VFR BLD CALC: 33 %
HEPATITIS A IGG ANTIBODY: REACTIVE
HGB BLD-MCNC: 11.3 G/DL
IMM GRANULOCYTES NFR BLD AUTO: 0.4 %
INR PPP: 1.34 RATIO
LYMPHOCYTES # BLD AUTO: 1.1 K/UL
LYMPHOCYTES NFR BLD AUTO: 20 %
MAN DIFF?: NORMAL
MCHC RBC-ENTMCNC: 29.7 PG
MCHC RBC-ENTMCNC: 34.2 GM/DL
MCV RBC AUTO: 86.6 FL
MONOCYTES # BLD AUTO: 0.59 K/UL
MONOCYTES NFR BLD AUTO: 10.7 %
NEUTROPHILS # BLD AUTO: 3.66 K/UL
NEUTROPHILS NFR BLD AUTO: 66.6 %
PLATELET # BLD AUTO: 92 K/UL
POTASSIUM SERPL-SCNC: 4.9 MMOL/L
PROT SERPL-MCNC: 8.4 G/DL
PT BLD: 15.7 SEC
RBC # BLD: 3.81 M/UL
RBC # FLD: 15.3 %
SODIUM SERPL-SCNC: 127 MMOL/L
WBC # FLD AUTO: 5.5 K/UL

## 2021-09-09 LAB — PHOSPHATIDYETHANOL (PETH), WHOLE BLOOD: NEGATIVE NG/ML

## 2021-09-15 RX ORDER — SPIRONOLACTONE 100 MG/1
100 TABLET ORAL DAILY
Qty: 135 | Refills: 2 | Status: DISCONTINUED | COMMUNITY
End: 2021-09-15

## 2021-10-11 ENCOUNTER — RX RENEWAL (OUTPATIENT)
Age: 46
End: 2021-10-11

## 2021-10-25 LAB
ANION GAP SERPL CALC-SCNC: 14 MMOL/L
BUN SERPL-MCNC: 19 MG/DL
CALCIUM SERPL-MCNC: 9 MG/DL
CHLORIDE SERPL-SCNC: 105 MMOL/L
CO2 SERPL-SCNC: 19 MMOL/L
CREAT SERPL-MCNC: 0.76 MG/DL
GLUCOSE SERPL-MCNC: 75 MG/DL
POTASSIUM SERPL-SCNC: 3.9 MMOL/L
SODIUM SERPL-SCNC: 137 MMOL/L

## 2021-10-27 ENCOUNTER — APPOINTMENT (OUTPATIENT)
Dept: INTERNAL MEDICINE | Facility: CLINIC | Age: 46
End: 2021-10-27

## 2021-11-07 ENCOUNTER — APPOINTMENT (OUTPATIENT)
Dept: ULTRASOUND IMAGING | Facility: CLINIC | Age: 46
End: 2021-11-07
Payer: MEDICAID

## 2021-11-07 ENCOUNTER — OUTPATIENT (OUTPATIENT)
Dept: OUTPATIENT SERVICES | Facility: HOSPITAL | Age: 46
LOS: 1 days | End: 2021-11-07

## 2021-11-07 DIAGNOSIS — Z98.890 OTHER SPECIFIED POSTPROCEDURAL STATES: Chronic | ICD-10-CM

## 2021-11-07 PROCEDURE — 76700 US EXAM ABDOM COMPLETE: CPT | Mod: 26

## 2021-11-08 ENCOUNTER — NON-APPOINTMENT (OUTPATIENT)
Age: 46
End: 2021-11-08

## 2021-11-17 ENCOUNTER — APPOINTMENT (OUTPATIENT)
Dept: HEPATOLOGY | Facility: CLINIC | Age: 46
End: 2021-11-17
Payer: MEDICAID

## 2021-11-17 VITALS
WEIGHT: 223 LBS | TEMPERATURE: 98.2 F | RESPIRATION RATE: 12 BRPM | HEIGHT: 67 IN | OXYGEN SATURATION: 98 % | HEART RATE: 88 BPM | DIASTOLIC BLOOD PRESSURE: 71 MMHG | BODY MASS INDEX: 35 KG/M2 | SYSTOLIC BLOOD PRESSURE: 118 MMHG

## 2021-11-17 PROCEDURE — 99214 OFFICE O/P EST MOD 30 MIN: CPT

## 2021-11-17 NOTE — HISTORY OF PRESENT ILLNESS
[FreeTextEntry1] : Mr. Hardy is a 46 year old male with history of AUD, with decompensated alcohol-related cirrhosis complicated by ascites, hepatic encephalopathy, and hyponatremia who presents to the hepatology clinic for follow up. He has no prior history of SBP, variceal hemorrhage, PVT, or HCC. His cirrhosis was first diagnosed in 2018. He previously completed an alcohol RPP in 1/2019, but then relapsed after 3-4 months, leading to progression of his cirrhosis. His last reported alcoholic drink was on 8/12/19.\par \par He was hospitalized at [facility]: 6/11/21-6/14/21 for lower extremity neuropathy and muscle cramps and from 7/19/21-7/21/21 due to BLE edema requiring IV diuresis. He had an EGD on 06/01/21 that showed non-bleeding erosive gastropathy, duodenal erosions without bleeding, and duodenal mucosal atrophy that was biopsied (duodenal mucosa with focal acute erosive duodenitis). His colonoscopy from 06/01/21 showed diverticulosis in the entire examined colon.\par                      \par Since his last visit on 09/01/21, he reports continued sobriety from alcohol without any slips and continues to participate in AA, with in person meetings on Fridays and Mondays. He has been complaint on furosemide 40mg and spironolactone 100mg daily. He denies any ascites or lower extremity edema. He has gained 15 lbs since 09/01/21. He had an abdominal US on 11/07/21 that showed cirrhosis with no lesions. He has been following a low salt diet. 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 He lives with his wife and two adult children. They live in [place]. \par \par

## 2021-11-17 NOTE — ASSESSMENT
[FreeTextEntry1] : Assessment and Plan: Mr. Hardy is a 46-year-old male with history of AUD, with decompensated alcohol-related cirrhosis complicated by ascites, hepatic encephalopathy, and hyponatremia who presents to the hepatology clinic for follow up.\par \par 1. Decompensated alcohol-related cirrhosis \par MELD Na 22 based on labs from 09/01/21. No ascites or significant lower extremity edema on exam. He has complaint on furosemide 40 mg and spironolactone 100 mg daily. Will continue current dose of his diuretics. He has been following a low salt diet. Will repeat labs now. \par \par Also discussed he has been gaining weight since Jan 2020 and has gained 45 labs so far. Hepatic steatosis also noted on his US from 05/23/21. Encouraged patient to diet and exercise to lose weight. Encouraged following a diet with daily vegetables, fruits, whole grains and healthy fats, such as a Mediterranean diet.\par \par HE: No recent episode of HE. He takes lactulose TID and has 2-4 bowel movements a day. \par \par Esophageal Variceal Screen: He had an EGD on 06/01/21 that showed non-bleeding erosive gastropathy, duodenal erosions without bleeding, and duodenal mucosal atrophy that was biopsied (duodenal mucosa with focal acute erosive duodenitis).\par \par HCC Screen: He had an abdominal US on 11/07/21 that showed cirrhosis with no lesions. Continue imaging every 6 months. Last AFP 5.6 on 04/12/21. Repeat imaging 11/2021 (ultrasound ordered).\par \par Patient was advised to abstain from alcohol and all illicit drugs, avoid herbal and dietary supplements, limit use of acetaminophen to <2 grams per day, avoid use of nonsteroidal antiinflammatory drugs (NSAIDs) as these can precipitate renal dysfunction in patients with advanced liver disease, avoid eating any unpasteurized dairy products, and avoid eating raw/steamed oysters or other shellfish to avoid risk of Vibrio infection.\par \par 2. Health Maintenance \par Immunizations: Immune to HAV, Immune to HBV. He is vaccinated for COVID-19. \par Colonoscopy: 06/01/21- showed diverticulosis in the entire examined colon, to repeat in 10 years\par \par Follow Up: 3 months \par \par Valdemar Feliz\par Nurse Practitioner \par Hepatology\par [facility]

## 2021-11-18 ENCOUNTER — RX RENEWAL (OUTPATIENT)
Age: 46
End: 2021-11-18

## 2021-11-18 LAB
25(OH)D3 SERPL-MCNC: 22.3 NG/ML
AFP-TM SERPL-MCNC: 3.5 NG/ML
ALBUMIN SERPL ELPH-MCNC: 3.7 G/DL
ALP BLD-CCNC: 182 U/L
ALT SERPL-CCNC: 20 U/L
ANION GAP SERPL CALC-SCNC: 14 MMOL/L
AST SERPL-CCNC: 30 U/L
BASOPHILS # BLD AUTO: 0.02 K/UL
BASOPHILS NFR BLD AUTO: 0.4 %
BILIRUB SERPL-MCNC: 2 MG/DL
BUN SERPL-MCNC: 18 MG/DL
CALCIUM SERPL-MCNC: 9.4 MG/DL
CHLORIDE SERPL-SCNC: 103 MMOL/L
CO2 SERPL-SCNC: 20 MMOL/L
CREAT SERPL-MCNC: 0.8 MG/DL
EOSINOPHIL # BLD AUTO: 0.14 K/UL
EOSINOPHIL NFR BLD AUTO: 2.8 %
GLUCOSE SERPL-MCNC: 131 MG/DL
HCT VFR BLD CALC: 34.2 %
HGB BLD-MCNC: 10.8 G/DL
IMM GRANULOCYTES NFR BLD AUTO: 0.4 %
INR PPP: 1.31 RATIO
LYMPHOCYTES # BLD AUTO: 0.79 K/UL
LYMPHOCYTES NFR BLD AUTO: 15.7 %
MAN DIFF?: NORMAL
MCHC RBC-ENTMCNC: 27.7 PG
MCHC RBC-ENTMCNC: 31.6 GM/DL
MCV RBC AUTO: 87.7 FL
MONOCYTES # BLD AUTO: 0.68 K/UL
MONOCYTES NFR BLD AUTO: 13.5 %
NEUTROPHILS # BLD AUTO: 3.37 K/UL
NEUTROPHILS NFR BLD AUTO: 67.2 %
PLATELET # BLD AUTO: 85 K/UL
POTASSIUM SERPL-SCNC: 4.1 MMOL/L
PROT SERPL-MCNC: 8.1 G/DL
PT BLD: 15.3 SEC
RBC # BLD: 3.9 M/UL
RBC # FLD: 15 %
SODIUM SERPL-SCNC: 137 MMOL/L
WBC # FLD AUTO: 5.02 K/UL

## 2021-11-22 LAB — PHOSPHATIDYETHANOL (PETH), WHOLE BLOOD: NEGATIVE NG/ML

## 2021-12-01 ENCOUNTER — OUTPATIENT (OUTPATIENT)
Dept: OUTPATIENT SERVICES | Facility: HOSPITAL | Age: 46
LOS: 1 days | Discharge: HOME | End: 2021-12-01

## 2021-12-01 ENCOUNTER — APPOINTMENT (OUTPATIENT)
Dept: INTERNAL MEDICINE | Facility: CLINIC | Age: 46
End: 2021-12-01
Payer: MEDICAID

## 2021-12-01 ENCOUNTER — NON-APPOINTMENT (OUTPATIENT)
Age: 46
End: 2021-12-01

## 2021-12-01 VITALS
HEIGHT: 67 IN | WEIGHT: 220 LBS | DIASTOLIC BLOOD PRESSURE: 68 MMHG | BODY MASS INDEX: 34.53 KG/M2 | SYSTOLIC BLOOD PRESSURE: 112 MMHG | HEART RATE: 84 BPM | OXYGEN SATURATION: 99 % | TEMPERATURE: 98.6 F

## 2021-12-01 DIAGNOSIS — Z00.00 ENCOUNTER FOR GENERAL ADULT MEDICAL EXAMINATION WITHOUT ABNORMAL FINDINGS: ICD-10-CM

## 2021-12-01 DIAGNOSIS — Z98.890 OTHER SPECIFIED POSTPROCEDURAL STATES: Chronic | ICD-10-CM

## 2021-12-01 DIAGNOSIS — Z23 ENCOUNTER FOR IMMUNIZATION: ICD-10-CM

## 2021-12-01 DIAGNOSIS — K70.31 ALCOHOLIC CIRRHOSIS OF LIVER WITH ASCITES: ICD-10-CM

## 2021-12-01 DIAGNOSIS — Z00.00 ENCOUNTER FOR GENERAL ADULT MEDICAL EXAMINATION W/OUT ABNORMAL FINDINGS: ICD-10-CM

## 2021-12-01 PROCEDURE — 99213 OFFICE O/P EST LOW 20 MIN: CPT | Mod: GC

## 2021-12-01 NOTE — HISTORY OF PRESENT ILLNESS
[FreeTextEntry1] : follow up visit [de-identified] :  46 year old male with history of AUD, with decompensated alcohol-related cirrhosis complicated by ascites, hepatic encephalopathy, and hyponatremia who presents for follow up. Patient feeling well, no signs of decompensation, complaint with diet and medications and following with hepatology clinic \par \par

## 2021-12-01 NOTE — PHYSICAL EXAM
[No Acute Distress] : no acute distress [Well Developed] : well developed [Normal Outer Ear/Nose] : the outer ears and nose were normal in appearance [No Respiratory Distress] : no respiratory distress  [Clear to Auscultation] : lungs were clear to auscultation bilaterally [Normal Rate] : normal rate  [Regular Rhythm] : with a regular rhythm [Normal S1, S2] : normal S1 and S2 [Soft] : abdomen soft [Non Tender] : non-tender [No Joint Swelling] : no joint swelling [Speech Grossly Normal] : speech grossly normal [de-identified] : jaundice of sclera [de-identified] : + 1 lower ext edema  [de-identified] : distention is less as he states

## 2021-12-01 NOTE — REVIEW OF SYSTEMS
[Lower Ext Edema] : lower extremity edema [Itching] : itching [Negative] : Musculoskeletal [Fever] : no fever [Fatigue] : no fatigue [Sore Throat] : no sore throat [Chest Pain] : no chest pain [Shortness Of Breath] : no shortness of breath [Joint Pain] : no joint pain [Headache] : no headache [FreeTextEntry3] : jaundice of sclera

## 2022-02-23 ENCOUNTER — APPOINTMENT (OUTPATIENT)
Dept: HEPATOLOGY | Facility: CLINIC | Age: 47
End: 2022-02-23

## 2022-03-14 ENCOUNTER — APPOINTMENT (OUTPATIENT)
Dept: HEPATOLOGY | Facility: CLINIC | Age: 47
End: 2022-03-14
Payer: MEDICAID

## 2022-03-14 VITALS
RESPIRATION RATE: 15 BRPM | TEMPERATURE: 97.7 F | BODY MASS INDEX: 35 KG/M2 | WEIGHT: 223 LBS | HEIGHT: 67 IN | DIASTOLIC BLOOD PRESSURE: 78 MMHG | SYSTOLIC BLOOD PRESSURE: 130 MMHG | OXYGEN SATURATION: 97 % | HEART RATE: 92 BPM

## 2022-03-14 DIAGNOSIS — N62 HYPERTROPHY OF BREAST: ICD-10-CM

## 2022-03-14 DIAGNOSIS — Z87.898 PERSONAL HISTORY OF OTHER SPECIFIED CONDITIONS: ICD-10-CM

## 2022-03-14 DIAGNOSIS — R93.2 ABNORMAL FINDINGS ON DIAGNOSTIC IMAGING OF LIVER AND BILIARY TRACT: ICD-10-CM

## 2022-03-14 PROCEDURE — 99214 OFFICE O/P EST MOD 30 MIN: CPT

## 2022-03-14 RX ORDER — POLYETHYLENE GLYCOL 3350 17 G/17G
17 POWDER, FOR SOLUTION ORAL DAILY
Qty: 1 | Refills: 5 | Status: DISCONTINUED | COMMUNITY
Start: 2021-05-20 | End: 2022-03-14

## 2022-03-14 RX ORDER — SPIRONOLACTONE 50 MG/1
50 TABLET ORAL
Qty: 60 | Refills: 1 | Status: DISCONTINUED | COMMUNITY
Start: 2021-07-28 | End: 2022-03-14

## 2022-03-14 RX ORDER — ERGOCALCIFEROL 1.25 MG/1
1.25 MG CAPSULE ORAL
Qty: 12 | Refills: 0 | Status: DISCONTINUED | COMMUNITY
Start: 2021-04-12 | End: 2022-03-14

## 2022-03-14 RX ORDER — CIPROFLOXACIN HYDROCHLORIDE 500 MG/1
500 TABLET, FILM COATED ORAL
Qty: 30 | Refills: 1 | Status: DISCONTINUED | COMMUNITY
Start: 2021-07-28 | End: 2022-03-14

## 2022-03-15 NOTE — ASSESSMENT
[FreeTextEntry1] : 47 yo M with AUD (now reportedly sober since 8/12/19 and participating in AA), with decompensated alcohol-associated cirrhosis complicated by ascites, peripheral edema, hepatic encephalopathy, and hyponatremia. He has no prior history of SBP, variceal hemorrhage, PVT, or HCC.\par \par # Ascites and peripheral edema:\par - No ascites on exam or last US (11/7/21). No further edema.\par - Given tender gynecomastia, will discontinue spironolactone today.\par - Start eplerenone 50 mg po daily.\par - Decrease furosemide to 40 mg po daily.\par - Advised to monitor daily weights and to call if increasing or if recurrent abdominal distension or edema.\par - Re-check BMP in 1 week (ordered).\par - Mr. SWIFT was counseled to adhere to a low sodium (<2 grams of sodium per day) diet by: avoiding adding any salt to meals (removing the salt shaker from the table); eliminating salty foods from his diet; eating more home-cooked meals; choosing fresh or frozen, not canned, vegetables and fruits; and reading ingredient and food labels to choose low sodium foods.\par \par # Hypervolemic hyponatremia: Resolved.\par \par # Hepatic encephalopathy:\par - Well-controlled.\par - Continue lactulose as needed to achieve/maintain 2-4 BMs/day.\par \par # Chronic anemia:\par - Prior labs (4/2021) with borderline iron deficiency. Will re-check iron studies, as currently he is off supplementation.\par - No recent overt GI bleeding.\par - Last EGD (6/1/21) with gastric erosions (biopsies: mild chronic inactive gastritis and focal hemorrhage without H pylori) and questionable duodenal villous blunting with erosions (biopsies: focal acute erosive duodenitis), without upper GI varices.\par - Last colonoscopy (6/1/21) with diverticulosis, portal hypertensive colopathy, and a small ascending colon AVM.\par - Repeat EGD ordered today for variceal surveillance.\par \par # Decompensated alcohol-related cirrhosis:\par - No suspicious hepatic lesion on last US (11/7/21). Last AFP 3.5 (11/17/21). Continue q6 month surveillance, with next imaging due in 5/2022, with MRI ordered today for more sensitive evaluation given hepatomegaly with coarsened echotexture noted on last US, though may be related to ongoing steatohepatitis.\par - ABO A with MELD-Na 12 based on last labs (11/17/21). Re-check with labs next week (ordered).\par - We discussed that given his low MELD-Na score and well controlled complications of portal hypertension, he does not have any indication for liver transplantation evaluation at this time. We discussed the potential risks of development of recurrent ascites, SBP, varices / variceal hemorrhage, recurrent hepatic encephalopathy, PVT, and HCC and alert signs for him to seek medical attention.\par - He reports sobriety from alcohol since 8/12/19 and has been participating in AA regularly. Periodic alcohol biomarker surveillance with PEt has been negative, last on 11/17/21.\par - He is a potential liver transplant candidate if his cirrhosis or complications worsen.\par \par # Health maintenance:\par - HAV immune. HBsAg negative and HBsAb reactive, but will check HBcAb to clarify whether past exposure.\par - He received COVID-19 vaccinations.\par - Mr. SIWFT was counseled to: abstain from alcohol and all illicit drugs; avoid use of herbal and dietary supplements due to potential hepatotoxicity; limit use of acetaminophen to <2 grams per day; avoid use of nonsteroidal antiinflammatory drugs (NSAIDs) as these can lead to diuretic resistance and precipitate renal dysfunction in patients with advanced liver disease; avoid eating any unpasteurized dairy products; and avoid eating raw/steamed oysters or other shellfish to avoid risk of Vibrio infection.\par \par He will return for follow-up in 3 months.

## 2022-03-15 NOTE — HISTORY OF PRESENT ILLNESS
[de-identified] : Mr. Hardy is a 45 yo M with obesity, AUD (with last reported alcoholic drink on 8/12/19), with decompensated alcohol-related cirrhosis (first diagnosed in 2018) complicated by ascites, peripheral edema, hepatic encephalopathy, chronic anemia, and hyponatremia. He has no prior history of SBP, variceal hemorrhage, PVT, or HCC.\par \par He was last seen in this office on 11/17/21 and is here today for routine follow-up. He has not had any ER visits or hospitalizations since his last visit. He reports that he has still been taking furosemide 60 mg/day and spironolactone 150 mg/day, he says unchanged for a long time. He takes lactulose once daily and has 2-3 BMs/day. No recent episodes of confusion. No overt GI bleeding. He reports feeling well. He has noticed worsened bilateral breast enlargement with some tenderness recently.\par \par He reports continued sobriety from alcohol without any slips, and he continues to participate in AA twice weekly, mainly virtually but in person about once per month. He is back to living with his wife and their two children. 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 They live in [place].

## 2022-03-25 NOTE — ED PROVIDER NOTE - NS ED MD DISPO SPECIAL CONSIDERATION1
Patient is scheduled to see VR on 04/07/2022. Patient stated this will be her first time seeing a cardiologist. CT Chest was completed on 03/03/2022 @ [facility] and results located within chart. Echo is scheduled for 03/30/2022 @ [facility]. Patient was seen at the ER yesterday where EKG was completed. Records have been retrieved and located within patients chart.     Confirmed appointment date, time, & location. Advised to please wear a mask and to call main office if their were any questions or concerns.    
None

## 2022-04-14 NOTE — ED PROVIDER NOTE - CADM POA PRESS ULCER
[facility]     Patient currently receiving [facility] services of PT for gait training, transfers, functional mobiltiy with RW and conserve unit, LE strengthening   Certification Period is 03-01-22 to 04-29-22   Home care goals have not been met.     Please place orders to resume services upon discharge if appropriate.   Liaison will continue to follow until discharge.    Alicia Jaffe MSN-Ed, RN  [facility] Liaison  Phone: [phone]     No

## 2022-06-20 NOTE — PATIENT PROFILE ADULT - FALLEN IN THE PAST
Detail Level: Simple Price (Do Not Change): 0.00 Instructions: This plan will send the code FBSD to the PM system.  DO NOT or CHANGE the price. no

## 2022-06-27 ENCOUNTER — APPOINTMENT (OUTPATIENT)
Dept: HEPATOLOGY | Facility: CLINIC | Age: 47
End: 2022-06-27
Payer: SELF-PAY

## 2022-06-27 VITALS
DIASTOLIC BLOOD PRESSURE: 77 MMHG | SYSTOLIC BLOOD PRESSURE: 131 MMHG | HEART RATE: 78 BPM | OXYGEN SATURATION: 98 % | BODY MASS INDEX: 34.4 KG/M2 | RESPIRATION RATE: 16 BRPM | HEIGHT: 67.98 IN | WEIGHT: 227 LBS | TEMPERATURE: 98.1 F

## 2022-06-27 DIAGNOSIS — Z87.898 PERSONAL HISTORY OF OTHER SPECIFIED CONDITIONS: ICD-10-CM

## 2022-06-27 DIAGNOSIS — K72.90 HEPATIC FAILURE, UNSPECIFIED W/OUT COMA: ICD-10-CM

## 2022-06-27 DIAGNOSIS — D50.9 IRON DEFICIENCY ANEMIA, UNSPECIFIED: ICD-10-CM

## 2022-06-27 PROCEDURE — 99214 OFFICE O/P EST MOD 30 MIN: CPT

## 2022-06-27 RX ORDER — FUROSEMIDE 20 MG/1
20 TABLET ORAL DAILY
Qty: 60 | Refills: 11 | Status: ACTIVE | COMMUNITY
Start: 2021-07-28 | End: 1900-01-01

## 2022-06-27 RX ORDER — ERGOCALCIFEROL 1.25 MG/1
1.25 MG CAPSULE, LIQUID FILLED ORAL
Qty: 12 | Refills: 0 | Status: COMPLETED | COMMUNITY
Start: 2021-11-18 | End: 2022-06-27

## 2022-06-27 RX ORDER — EPLERENONE 50 MG/1
50 TABLET, COATED ORAL
Qty: 30 | Refills: 11 | Status: ACTIVE | COMMUNITY
Start: 2022-03-14 | End: 1900-01-01

## 2022-06-27 RX ORDER — LACTULOSE 10 G/15ML
10 SOLUTION ORAL TWICE DAILY
Qty: 900 | Refills: 11 | Status: ACTIVE | COMMUNITY
Start: 2021-07-28 | End: 1900-01-01

## 2022-06-27 NOTE — ASSESSMENT
[FreeTextEntry1] : 47 yo M with AUD (now reportedly sober since 8/12/19 and participating in AA), with decompensated alcohol-associated cirrhosis complicated by ascites (controlled), peripheral edema (resolved), hepatic encephalopathy (controlled), and hyponatremia (resolved). He has no prior history of SBP, variceal hemorrhage, PVT, or HCC.\par \par # Ascites and peripheral edema:\par - No ascites on exam or last US (11/7/21). No further edema.\par - Given tender gynecomastia, he was switched from spironolactone to eplerenone in 3/2022.\par - Continue current diuretic regimen: furosemide 40 mg po daily and eplerenone 50 mg po daily.\par - Re-check BMP with labs today.\par - Advised to monitor daily weights and to call if increasing or if recurrent abdominal distension or edema.\par - Mr. SWIFT was counseled to adhere to a low sodium (<2 grams of sodium per day) diet by: avoiding adding any salt to meals (removing the salt shaker from the table); eliminating salty foods from his diet; eating more home-cooked meals; choosing fresh or frozen, not canned, vegetables and fruits; and reading ingredient and food labels to choose low sodium foods.\par \par # Hypervolemic hyponatremia: Resolved based on last labs. Re-check with labs today. \par \par # Hepatic encephalopathy:\par - Well-controlled.\par - Continue lactulose as needed to achieve/maintain 2-4 BMs/day.\par \par # Chronic anemia:\par - Prior labs (4/2021) with borderline iron deficiency. Will re-check iron studies with labs today, as currently he is off supplementation.\par - No recent overt GI bleeding.\par - Last EGD (6/1/21) with gastric erosions (biopsies: mild chronic inactive gastritis and focal hemorrhage without H pylori) and questionable duodenal villous blunting with erosions (biopsies: focal acute erosive duodenitis), without upper GI varices.\par - Last colonoscopy (6/1/21) with diverticulosis, portal hypertensive colopathy, and a small ascending colon AVM.\par - Repeat EGD ordered at last visit for variceal surveillance but not yet scheduled. Will schedule ASAP.\par \par # Decompensated alcohol-related cirrhosis:\par - No suspicious hepatic lesion on last US (11/7/21). Last AFP 3.5 (11/17/21). Currently overdue for q6 month surveillance as MRI was ordered at last visit but not yet done. He is unable to get MRI due to financial concerns. Abdominal US ordered today and he will do it ASAP.\par - ABO A with MELD-Na 12 based on last labs (11/17/21). Re-check with labs today (ordered).\par - We discussed that given his low MELD-Na score and well controlled complications of portal hypertension, he does not have any indication for liver transplantation evaluation at this time. We discussed the potential risks of development of recurrent ascites, SBP, varices / variceal hemorrhage, recurrent hepatic encephalopathy, PVT, and HCC and alert signs for him to seek medical attention.\par - He reports sobriety from alcohol since 8/12/19 and has been participating in AA regularly. Periodic alcohol biomarker surveillance with PEt has been negative, last on 11/17/21. Re-check with labs today.\par - He is a potential liver transplant candidate if his cirrhosis or complications worsen.\par \par # Health maintenance:\par - HAV immune. HBsAg negative and HBsAb reactive, but will check HBcAb with labs today to clarify whether past exposure.\par - He received COVID-19 vaccinations.\par - Mr. SWIFT was counseled to: abstain from alcohol and all illicit drugs; avoid use of herbal and dietary supplements due to potential hepatotoxicity; limit use of acetaminophen to <2 grams per day; avoid use of nonsteroidal antiinflammatory drugs (NSAIDs) as these can lead to diuretic resistance and precipitate renal dysfunction in patients with advanced liver disease; avoid eating any unpasteurized dairy products; and avoid eating raw/steamed oysters or other shellfish to avoid risk of Vibrio infection.\par \par He will return for follow-up in 6 months.

## 2022-06-27 NOTE — HISTORY OF PRESENT ILLNESS
[de-identified] : Mr. Hardy is a 45 yo M with obesity, AUD (with last reported alcoholic drink on 8/12/19), with decompensated alcohol-related cirrhosis (first diagnosed in 2018) complicated by ascites, peripheral edema, hepatic encephalopathy, chronic anemia, and hyponatremia. He has no prior history of SBP, variceal hemorrhage, PVT, or HCC.\par \par He was last seen in this office on 3/14/22 and is here today for routine follow-up. He has not had any ER visits or hospitalizations since his last visit. He reports that he has been taking furosemide 40 mg/day (decreased from 60 mg/day at last visit) and eplerenone 50 mg po daily (changed from spironolactone at his last visit) and has not noticed any abdominal distension or lower extremity swelling, but also has not noticed improvement in his gynecomastia yet. He is continuing to take lactulose once daily and has 2-3 BMs/day. No recent episodes of confusion. No overt GI bleeding. He reports feeling well with no new complaints.\par \par He reports continued sobriety from alcohol without any slips, and he continues to participate in AA twice weekly, mainly virtually but in person about once per month. He is back to living with his wife and their two children. 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 They live in [place].\par \par He did not do any labs, imaging, or EGD since our last visit - re-discussed with him today.

## 2022-06-28 ENCOUNTER — NON-APPOINTMENT (OUTPATIENT)
Age: 47
End: 2022-06-28

## 2022-06-28 DIAGNOSIS — K70.31 ALCOHOLIC CIRRHOSIS OF LIVER WITH ASCITES: ICD-10-CM

## 2022-06-28 LAB
25(OH)D3 SERPL-MCNC: 23.3 NG/ML
AFP-TM SERPL-MCNC: 2.7 NG/ML
ALBUMIN SERPL ELPH-MCNC: 3.8 G/DL
ALP BLD-CCNC: 204 U/L
ALT SERPL-CCNC: 14 U/L
ANION GAP SERPL CALC-SCNC: 10 MMOL/L
AST SERPL-CCNC: 24 U/L
BASOPHILS # BLD AUTO: 0.04 K/UL
BASOPHILS NFR BLD AUTO: 0.8 %
BILIRUB DIRECT SERPL-MCNC: 0.5 MG/DL
BILIRUB SERPL-MCNC: 1.5 MG/DL
BUN SERPL-MCNC: 11 MG/DL
CALCIUM SERPL-MCNC: 8.8 MG/DL
CHLORIDE SERPL-SCNC: 108 MMOL/L
CO2 SERPL-SCNC: 21 MMOL/L
CREAT SERPL-MCNC: 0.52 MG/DL
EGFR: 125 ML/MIN/1.73M2
EOSINOPHIL # BLD AUTO: 0.14 K/UL
EOSINOPHIL NFR BLD AUTO: 2.7 %
FERRITIN SERPL-MCNC: 13 NG/ML
GLUCOSE SERPL-MCNC: 95 MG/DL
HBV CORE IGG+IGM SER QL: NONREACTIVE
HCT VFR BLD CALC: 36.4 %
HGB BLD-MCNC: 11.3 G/DL
IMM GRANULOCYTES NFR BLD AUTO: 0.2 %
INR PPP: 1.36 RATIO
IRON SATN MFR SERPL: 9 %
IRON SERPL-MCNC: 36 UG/DL
LYMPHOCYTES # BLD AUTO: 1.15 K/UL
LYMPHOCYTES NFR BLD AUTO: 22.1 %
MAGNESIUM SERPL-MCNC: 1.9 MG/DL
MAN DIFF?: NORMAL
MCHC RBC-ENTMCNC: 24.6 PG
MCHC RBC-ENTMCNC: 31 GM/DL
MCV RBC AUTO: 79.3 FL
MONOCYTES # BLD AUTO: 0.58 K/UL
MONOCYTES NFR BLD AUTO: 11.2 %
NEUTROPHILS # BLD AUTO: 3.28 K/UL
NEUTROPHILS NFR BLD AUTO: 63 %
PLATELET # BLD AUTO: 90 K/UL
POTASSIUM SERPL-SCNC: 4.2 MMOL/L
PROT SERPL-MCNC: 7.8 G/DL
PT BLD: 16 SEC
RBC # BLD: 4.59 M/UL
RBC # FLD: 17.1 %
SODIUM SERPL-SCNC: 139 MMOL/L
TIBC SERPL-MCNC: 414 UG/DL
UIBC SERPL-MCNC: 378 UG/DL
WBC # FLD AUTO: 5.2 K/UL

## 2022-06-28 RX ORDER — CHLORHEXIDINE GLUCONATE 4 %
325 (65 FE) LIQUID (ML) TOPICAL DAILY
Qty: 30 | Refills: 2 | Status: ACTIVE | COMMUNITY
Start: 2022-06-28 | End: 1900-01-01

## 2022-06-28 RX ORDER — MULTIVIT-MIN/IRON/FOLIC ACID/K 18-600-40
50 MCG CAPSULE ORAL
Qty: 30 | Refills: 2 | Status: ACTIVE | COMMUNITY
Start: 2022-06-28 | End: 1900-01-01

## 2022-06-29 LAB — ZINC SERPL-MCNC: 69 UG/DL

## 2022-07-01 LAB — PHOSPHATIDYETHANOL (PETH), WHOLE BLOOD: NEGATIVE NG/ML

## 2022-10-21 NOTE — H&P ADULT - NSHPLABSRESULTS_GEN_ALL_CORE
Labs from am of 12/20 at [facility] personally reviewed  no leukocytosis, chronic stable anemia hgb 8 b/l 8-10, chronic stable thrombocytopenia plt 93 bl/ 100s, chronic stable hypoNa 122 improved from 119, prev b/l 120-130. Hypocal 7 Hypoalbuminemia 1.4 corrected Calcium 9.1  Known elevated LFTs, all appearing either stable or mildly downtrended from prior admission.  MELD 12/19/19: 32.     Imaging personally reviewed.  Trauma imaging (CTH, CT c/a/p, XR knee) were noted nondiagnostic of acute actionable injury.  CTAP on that admission showed known cirrhosis and reaccumulated moderate ascites. EGD 7/2018 negative for varices, +gastritis/esophagitis but negative for H pylori.  [place] 7/2018 +hemorrhoids/anal fistula/diverticulosis.  MRI Abd 12/6: cirrhosis with portal HTN c/b perisplenic varices, moderate ascites, no visualized liver mass. AFP 2.2. Duplex 12/1 LE was neg for DVt.   EKG 12/19/19 personally reviewed nsr 89 bpm, LVH, normal axis, qtc 467. Arm band on/IV intact

## 2022-11-01 NOTE — PATIENT PROFILE ADULT - HAS THE PATIENT RECEIVED THE INFLUENZA VACCINE THIS SEASON?
Airway  Date/Time: 11/1/2022 8:01 AM  Urgency: elective    Airway not difficult    General Information and Staff    Patient location during procedure: OR  Resident/CRNA: JUSTINO Bills - CRNA    Indications and Patient Condition  Indications for airway management: anesthesia and airway protection  Spontaneous ventilation: present  Sedation level: deep  Preoxygenated: yes  Patient position: sniffing  MILS not maintained throughout  Mask difficulty assessment: vent by bag mask    Final Airway Details  Final airway type: endotracheal airway      Successful airway: ETT  Cuffed: yes   Successful intubation technique: direct laryngoscopy  Endotracheal tube insertion site: oral  Blade: Mikal  Blade size: #3  ETT size (mm): 7.0  Cormack-Lehane Classification: grade I - full view of glottis  Placement verified by: chest auscultation and capnometry   Inital cuff pressure (cm H2O): 7  Measured from: lips  ETT to lips (cm): 20  Number of attempts at approach: 1  Ventilation between attempts: bag mask  Number of other approaches attempted: 0    Additional Comments  Atraumatic, mouth as pre-op.   no
no...

## 2022-11-28 NOTE — DISCHARGE NOTE NURSING/CASE MANAGEMENT/SOCIAL WORK - NSDCPETBCESMAN_GEN_ALL_CORE
If you are a smoker, it is important for your health to stop smoking. Please be aware that second hand smoke is also harmful. Arm band on/IV intact

## 2022-12-20 ENCOUNTER — EMERGENCY (EMERGENCY)
Facility: HOSPITAL | Age: 47
LOS: 0 days | Discharge: HOME | End: 2022-12-20
Attending: EMERGENCY MEDICINE | Admitting: EMERGENCY MEDICINE
Payer: MEDICAID

## 2022-12-20 VITALS
OXYGEN SATURATION: 99 % | DIASTOLIC BLOOD PRESSURE: 67 MMHG | HEART RATE: 85 BPM | RESPIRATION RATE: 96 BRPM | TEMPERATURE: 103 F | WEIGHT: 235.01 LBS | HEIGHT: 67 IN | SYSTOLIC BLOOD PRESSURE: 141 MMHG

## 2022-12-20 DIAGNOSIS — F17.200 NICOTINE DEPENDENCE, UNSPECIFIED, UNCOMPLICATED: ICD-10-CM

## 2022-12-20 DIAGNOSIS — Z86.59 PERSONAL HISTORY OF OTHER MENTAL AND BEHAVIORAL DISORDERS: ICD-10-CM

## 2022-12-20 DIAGNOSIS — Z98.890 OTHER SPECIFIED POSTPROCEDURAL STATES: Chronic | ICD-10-CM

## 2022-12-20 DIAGNOSIS — Z86.2 PERSONAL HISTORY OF DISEASES OF THE BLOOD AND BLOOD-FORMING ORGANS AND CERTAIN DISORDERS INVOLVING THE IMMUNE MECHANISM: ICD-10-CM

## 2022-12-20 DIAGNOSIS — K74.60 UNSPECIFIED CIRRHOSIS OF LIVER: ICD-10-CM

## 2022-12-20 DIAGNOSIS — R50.9 FEVER, UNSPECIFIED: ICD-10-CM

## 2022-12-20 DIAGNOSIS — R07.89 OTHER CHEST PAIN: ICD-10-CM

## 2022-12-20 DIAGNOSIS — U07.1 COVID-19: ICD-10-CM

## 2022-12-20 LAB
FLUAV AG NPH QL: SIGNIFICANT CHANGE UP
FLUBV AG NPH QL: SIGNIFICANT CHANGE UP
RSV RNA NPH QL NAA+NON-PROBE: SIGNIFICANT CHANGE UP
SARS-COV-2 RNA SPEC QL NAA+PROBE: DETECTED

## 2022-12-20 PROCEDURE — 93010 ELECTROCARDIOGRAM REPORT: CPT

## 2022-12-20 PROCEDURE — 99284 EMERGENCY DEPT VISIT MOD MDM: CPT

## 2022-12-20 PROCEDURE — 71046 X-RAY EXAM CHEST 2 VIEWS: CPT | Mod: 26

## 2022-12-20 RX ORDER — IBUPROFEN 200 MG
600 TABLET ORAL ONCE
Refills: 0 | Status: COMPLETED | OUTPATIENT
Start: 2022-12-20 | End: 2022-12-20

## 2022-12-20 RX ORDER — AZITHROMYCIN 500 MG/1
1 TABLET, FILM COATED ORAL
Qty: 6 | Refills: 0
Start: 2022-12-20 | End: 2022-12-24

## 2022-12-20 RX ADMIN — Medication 600 MILLIGRAM(S): at 13:47

## 2022-12-20 NOTE — ED PROVIDER NOTE - ADMIT DISPOSITION PRESENT ON ADMISSION SEPSIS
Lashawn Enriquez for scheduled appointment.
No

## 2022-12-20 NOTE — ED PROVIDER NOTE - NSFOLLOWUPINSTRUCTIONS_ED_ALL_ED_FT
Fever    A fever is an increase in the body's temperature above 100.4°F (38°C) or higher. In adults and children older than three months, a brief mild or moderate fever generally has no long-term effect, and it usually does not require treatment. Many times, fevers are the result of viral infections, which are self-resolving.  However, certain symptoms or diagnostic tests may suggest a bacterial infection that may respond to antibiotics. Take medications as directed by your health care provider.    SEEK IMMEDIATE MEDICAL CARE IF YOU OR YOUR CHILD HAVE ANY OF THE FOLLOWING SYMPTOMS : shortness of breath, seizure, rash/stiff neck/headache, severe abdominal pain, persistent vomiting, any signs of dehydration, or if your child has a fever for over five (5) days.  Chest Pain    Chest pain can be caused by many different conditions which may or may not be dangerous. Causes include heartburn, lung infections, heart attack, blood clot in lungs, skin infections, strain or damage to muscle, cartilage, or bones, etc. In addition to a history and physical examination, an electrocardiogram (ECG) or other lab tests may have been performed to determine the cause of your chest pain. Follow up with your primary care provider or with a cardiologist as instructed.     SEEK IMMEDIATE MEDICAL CARE IF YOU HAVE ANY OF THE FOLLOWING SYMPTOMS: worsening chest pain, coughing up blood, unexplained back/neck/jaw pain, severe abdominal pain, dizziness or lightheadedness, fainting, shortness of breath, sweaty or clammy skin, vomiting, or racing heart beat. These symptoms may represent a serious problem that is an emergency. Do not wait to see if the symptoms will go away. Get medical help right away. Call 911 and do not drive yourself to the hospital.

## 2022-12-20 NOTE — ED PROVIDER NOTE - NS ED ROS FT
CONST: (+)fever, no chills or bodyaches  EYES: No pain, redness, drainage or visual changes.  ENT: No ear pain or discharge, nasal discharge or congestion. No sore throat  CARD: (+)chest pain, no palpitations  RESP: No SOB, cough, hemoptysis. No hx of asthma or COPD  GI: No abdominal pain, N/V/D  MS: No joint pain, back pain or extremity pain/injury  SKIN: No rashes  NEURO: No headache, dizziness, paresthesias or LOC  : no dysuria

## 2022-12-20 NOTE — ED PROVIDER NOTE - PHYSICAL EXAMINATION
CONST: Well appearing in NAD  EYES: PERRL, EOMI, Sclera and conjunctiva clear.   NECK: Non-tender, no meningeal signs  CARD: Normal S1 S2; Normal rate and rhythm  RESP: Equal BS B/L, No wheezes, rhonchi or rales. No distress  GI: Soft, non-tender, non-distended.  MS: Normal ROM in all extremities. No midline spinal tenderness. tender right chest wall and pain in chest wall on movement of right shoulder. No calf tenderness  SKIN: Warm, dry, no acute rashes. Good turgor  NEURO: A&Ox3, No focal deficits. Strength 5/5 with no sensory deficits. Steady gait

## 2022-12-20 NOTE — ED PROVIDER NOTE - OBJECTIVE STATEMENT
Pt with hx of ETOH abuse, cirrhosis, thrombocytopenia presents with 2 days of right sided CP that is constant and worse with movement and inspiration. Denies cough, SOB, nasal congestion, sore throat, leg pain, recent travel. Pt did not realize he had fever until he arrived to ED

## 2022-12-20 NOTE — ED PROVIDER NOTE - PATIENT PORTAL LINK FT
You can access the FollowMyHealth Patient Portal offered by [facility] by registering at the following website: [email or web address]. By joining [facility]’s FollowMyHealth portal, you will also be able to view your health information using other applications (apps) compatible with our system.

## 2022-12-20 NOTE — ED PROVIDER NOTE - CLINICAL SUMMARY MEDICAL DECISION MAKING FREE TEXT BOX
No distress.  Febrile in ED.  EKG non ischemic.  Labs noted.  Viral panel pending.  Suspicious for viral syndrome.  Supportive care.  DC home.  Strict return instructions discussed.

## 2023-01-04 ENCOUNTER — APPOINTMENT (OUTPATIENT)
Dept: HEPATOLOGY | Facility: CLINIC | Age: 48
End: 2023-01-04

## 2023-01-26 NOTE — DIETITIAN INITIAL EVALUATION ADULT. - NS FNS CHANGE IN WEIGHT
History was provided by the patient/parent/guardian.    Brooks Siu is a 14 year oldfemale who was brought in for   Chief Complaint   Patient presents with   • Medication Refill       HPI  Patient's medications, allergies, past medical, surgical, social and family histories were reviewed and updated as appropriate.     Mental Health Visit: Follow-Up    Current mental health diagnoses: social anxiety, OCD  Prev management: Prozac  Current medication(s): Zoloft 50mg QD- started 10/14/21. hx poor compliance at last visit. Since then, takes medication about 4 times a week. Patient doesn't feel like she needs the medication. OCD symptoms worse when not taking medication.   Side effects: none  School performance: good, IEP/504 plan: No  Sports/Activities: volleyball  Home: stable  Behavior: normal  Counseling: yes, every other week.  Appetite: good  Sleep: good    Depression:  Denies any current symptoms of depression  Anxiety:  Symptoms include excessive anxiety and worry or restlessness or feeling keyed up  Pt denies symptoms consistent with PTSD, OCD or panic disorder.       History of suicidal or homicidal ideations: No  History of self deliberate harm/cutting: No  History of etoh/illicit drug use/vaping/marijuana: No      Review of Systems   All other systems reviewed and are negative.        Objective:      Vitals:    01/26/23 0924   BP: 90/60   Pulse: 85   Resp: 18   Temp: 98.7 °F (37.1 °C)     Blood pressure reading is in the normal blood pressure range based on the 2017 AAP Clinical Practice Guideline.    Mental Status Exam  General appearance:  well-nourished  Grooming: appropriate  Attitude: cooperative intermittent eye contact  Language: no difficulty naming common objects, repeating a phrase, and writing a sentence  Speech   Rate: normal   Clarity: clear  Makes Self Understood: Understood - can express ideas and wants  Ability to Understand Others: Understands  Volume:  normal  Latency:  normal  Mood/affect: anxious and happy  Psychomotor: normal  Associations: intact  Thought process: intact  Thought content:   Denies SI/HI and plan or intent  unremarkable  Level of consciousness:  alert  Orientation: oriented to person, place, time, and general circumstances  Attention: ability to maintain attention  Fund of knowledge: above average  Memory: no apparent impairments in short term memory and no apparent impairments in long term memory   Insight: good, as evidenced by engagement in treatment  Judgement:  good, as evidenced by engagement in treatment    Physical Exam  Vitals and nursing note reviewed. Exam conducted with a chaperone present.           Assessment:      1. Mixed obsessional thoughts and acts    2. Encounter for therapeutic drug monitoring    3. Encounter for long-term (current) use of other medications    4. Noncompliance with medication regimen        Plan:    No orders of the defined types were placed in this encounter.      Risk assessment: Patient denies suicidal or homicidal ideation, plan, intent, desire for self-injury, or preoccupation with violence.   Patient currently does not meet criteria for involuntary inpatient admission, or requesting voluntary inpatient admission, [facility] or [facility] at this time.    Stop Zoloft. Patient is non compliant with taking medication because she feels she doesn't need it. Advised to monitor symptoms for the next four weeks, med check- video ok. If symptoms worsen, recheck sooner.   Patient to tell mom/teacher/close adult should he/she have homicidal/suicidal ideation.   Continue therapy.     Caregiver verbalizes understanding of instructions and follow-up care.    1/26/2023  Majo Nair MD  1:16 PM   Loss

## 2023-02-13 NOTE — ED PROVIDER NOTE - DATE/TIME 6
25-Jul-2019 07:24 Bactrim Counseling:  I discussed with the patient the risks of sulfa antibiotics including but not limited to GI upset, allergic reaction, drug rash, diarrhea, dizziness, photosensitivity, and yeast infections.  Rarely, more serious reactions can occur including but not limited to aplastic anemia, agranulocytosis, methemoglobinemia, blood dyscrasias, liver or kidney failure, lung infiltrates or desquamative/blistering drug rashes.

## 2023-03-26 ENCOUNTER — RX RENEWAL (OUTPATIENT)
Age: 48
End: 2023-03-26

## 2023-03-26 RX ORDER — PANTOPRAZOLE 40 MG/1
40 TABLET, DELAYED RELEASE ORAL DAILY
Qty: 30 | Refills: 5 | Status: ACTIVE | COMMUNITY
Start: 2021-06-01 | End: 1900-01-01

## 2023-04-24 NOTE — H&P ADULT - PROBLEM SELECTOR PLAN 2
Agree with advice given. Rx Paxlovid sent to pharmacy. Paxlovid reduces risk of hospitalization. Please instruct to take 3 tablets in the morning 3 tablets at night for 5 days. Must complete all medication or can have rebound COVID-19 infection. Common side effects are dysgeusia, loose stools or diarrhea. Patient must start medication within 5 days of symptoms to be effective. If patient is taking a statin medication such as rosuvastatin, atorvastatin, pravastatin and they should hold it for 1 week while taking Paxlovid. Nurse reviewed CDC guidelines for isolation and discontinuing isolation if not already discussed with patient. improving. most recent K 3.0 today  - ordered for KCl 40mEq PO x 1 now  - f/u repeat BMP 1 hour after and replete as needed

## 2023-05-16 NOTE — ED ADULT TRIAGE NOTE - ACCOMPANIED BY
----- Message from Valerie Polk sent at 5/16/2023  8:50 AM CDT -----  [facility]  Type:  Needs Medical Advice    Who Called: Galindo from [facility]  Would the patient rather a call back or a response via [facility]? call  Best Call Back Number:   Additional Information: they sts they send a request and needs most recent visits with medication list for pt. Please advise and thank you.          
Needed information sent to [facility] water  
Self

## 2023-07-13 NOTE — H&P ADULT - HISTORY OF PRESENT ILLNESS
Pt released from medical floor today to continue detox     Patient is s 43y old male with history of Alcohol dependence (drinks 7-8 (40oz)beer/day x 45yrs. Last use yesterday), Liver cirrhosis with thrombocytopenia and Anemia admitted from central intake with epistaxis. He had presented there requesting Detox but was noticed to have epistaxis thus was sent to the ED for further evaluation. In the ED no intervention was necessary as it resolved spontaneously but he was noted to be in Alcohol withdrawal thus admitted to medication for stabilization prior to transfer the Detox. Patient mentions having recurrent nosebleeds with no reprecipitating factors.     Currently has no medical complaints
Patient/Caregiver provided printed discharge information.

## 2023-08-15 NOTE — DISCHARGE NOTE PROVIDER - HOSPITAL COURSE
Check Lyme titer now this also could be related to Remicade or Crohn's itself. Patient will be followed up by his gastroenterologist.  Crohn's has been under stable control recently with the Remicade.   Additionally check all lab work including C-reactive protein DESIRE SWIFT is a 45y Male with a past medical history as described above who presented for evaluation of bilateral lower extremity pain     # Gait disturbance likely due to BLE neuropathy with chr. alcoholism :  magnesium deficiency:  - patient was seen by neuro, recommnded to c/w gabapentin and baclofen for cramps -symptoms improve.  - MRI L spine: mild DJD from L3-S1/no nerve impingement of stenosis of canal noted.   - c/w Supplement vitamins  - mag repleted   - PT evaluated safe for DC home/ no need for home PT     # Hepatic Cirrhosis with evidence of decompensation /thrombocytopenia:   - Platelets 96 today, INR 1.69, MELD-Na 22  - Transplant hepatology follow-up as outpatient   - Continue Aldactone 150mg daily and Lasix decreased to 40mg daily   - c/w home dose of cipro oral.   - c/w Lactulose titrate to 2- 3 BMs a day   - PPI daily     # chronic hyponatremia with alcoholic liver cirrhosis h/o:   # Na 131  # would c/w aldactone current dose and dec lasix to 40mg     # Normocytic Anemia   Hb stable.    DESIRE SWIFT is a 45y Male with a past medical history as described above who presented for evaluation of bilateral lower extremity pain     # Gait disturbance likely due to BLE neuropathy with chr. alcoholism :  magnesium deficiency:  - patient was seen by neuro, recommnded to c/w gabapentin and baclofen for cramps -symptoms improve.  - MRI L spine: mild DJD from L3-S1/no nerve impingement of stenosis of canal noted.   - c/w Supplement vitamins  - mag repleted   - PT evaluated safe for DC home/ no need for home PT     # Hepatic Cirrhosis with evidence of decompensation /thrombocytopenia:   - Platelets 96 today, INR 1.69, MELD-Na 22  - Transplant hepatology follow-up as outpatient   - Continue Aldactone 150mg daily and Lasix decreased to 40mg daily   - c/w home dose of cipro oral.   - c/w Lactulose titrate to 2- 3 BMs a day   - PPI daily     # chronic hyponatremia with alcoholic liver cirrhosis h/o:   # Na 131  # would c/w aldactone current dose and dec lasix to 40mg     # Normocytic Anemia   Hb stable.     # Suspected thiamine deficiency :  - c/w thiamine supplementation

## 2023-09-21 NOTE — DIETITIAN INITIAL EVALUATION ADULT. - DIET TYPE
Plan: Pt has been getting IM steroid shots every 6 weeks with Dr. Mei. Unsure how long or has had rash. She says steroid shots have been happening for “a while.” Dr. Gregorio saw her for a hive rash. Patch testing was unremarkable. Biopsy shoes LP. Rash today clinically looks like LP; however, she has one hive like plaque. Discussed plan with Dr. Gregorio today. Will start plaquenil. She will hold on Xolair while we see if Plaquenil is helpful. \\nReferring to Dr. Hi for a positive NAE. Appt booked with Dr. Hi Oct 5th at 2:45. Signed medical release to get records from Dr. Mei. Regular + 1000 ml fluid restriction Initiate Treatment: Plaquenil 200 mg BID Detail Level: Zone

## 2023-09-28 NOTE — ED PROVIDER NOTE - CLINICAL SUMMARY MEDICAL DECISION MAKING FREE TEXT BOX
Assessment & Plan     RUQ abdominal pain  Continued pain will obtain labs  - CBC with platelets; Future  - Hepatic panel (Albumin, ALT, AST, Bili, Alk Phos, TP); Future  - Lipase; Future  - Basic metabolic panel  (Ca, Cl, CO2, Creat, Gluc, K, Na, BUN); Future  - CBC with platelets  - Hepatic panel (Albumin, ALT, AST, Bili, Alk Phos, TP)  - Lipase  - Basic metabolic panel  (Ca, Cl, CO2, Creat, Gluc, K, Na, BUN)    Pelvic pain in female  Continued pelvic pain wet prep was positive for bacterial vaginosis we will treat and continue to monitor if not improving patient should follow-up  - UA Macroscopic with reflex to Microscopic and Culture - Lab Collect; Future  - CBC with platelets; Future  - Basic metabolic panel  (Ca, Cl, CO2, Creat, Gluc, K, Na, BUN); Future  - Wet prep - Clinic Collect  - UA Macroscopic with reflex to Microscopic and Culture - Lab Collect  - CBC with platelets  - Basic metabolic panel  (Ca, Cl, CO2, Creat, Gluc, K, Na, BUN)  - UA Microscopic with Reflex to Culture  - Urine Culture Aerobic Bacterial - lab collect; Future  - Urine Culture Aerobic Bacterial - lab collect    Nicotine dependence, uncomplicated, unspecified nicotine product type  Reviewed smoking cessation    Bacterial vaginosis    - clindamycin (CLEOCIN) 300 MG capsule; Take 1 capsule (300 mg) by mouth 2 times daily for 7 days    Yeast infection of the vagina  - fluconazole (DIFLUCAN) 150 MG tablet; Take 1 tablet (150 mg) by mouth every 3 days for 3 doses           MED REC REQUIRED  Post Medication Reconciliation Status:  Discharge medications reconciled, continue medications without change  See Patient Instructions    FRANKLYN Christianson CNP  [facility]    Justin Rodríguez is a 42 year old, presenting for the following health issues:  ER F/U (9/22/2023)        9/28/2023     1:17 PM   Additional Questions   Roomed by Ashley GRIGGS     ED/UC Followup:    Facility:  [facility]  "  Date of visit: 9/22/2023  Reason for visit: BV (bacterial vaginosis), Right-sided abdominal pain of unknown cause  Current Status: pain in right side still, abdomen pressure and pain, burns and itches.  Has one dose left of Metronidazole, and just finished Amoxicillin 2 days ago for a tooth infection.         Review of Systems   Constitutional, HEENT, cardiovascular, pulmonary, gi and gu systems are negative, except as otherwise noted.      Objective    LMP 03/02/2022 (LMP Unknown)   There is no height or weight on file to calculate BMI. Vital signs:  Temp: 98.7  F (37.1  C) Temp src: Tympanic BP: 110/60 Pulse: 75   Resp: 22 SpO2: 98 %     Height: 162.6 cm (5' 4\") Weight: 91.9 kg (202 lb 9.6 oz)  Estimated body mass index is 34.78 kg/m  as calculated from the following:    Height as of this encounter: 1.626 m (5' 4\").    Weight as of this encounter: 91.9 kg (202 lb 9.6 oz).        Physical Exam   GENERAL: healthy, alert and no distress  EYES: Eyes grossly normal to inspection, PERRL and conjunctivae and sclerae normal  NECK: no adenopathy, no asymmetry, masses, or scars and thyroid normal to palpation  RESP: lungs clear to auscultation - no rales, rhonchi or wheezes  CV: regular rate and rhythm, normal S1 S2, no S3 or S4, no murmur, click or rub, no peripheral edema and peripheral pulses strong  ABDOMEN: tenderness to upper right and lower right quadrant   MS: no gross musculoskeletal defects noted, no edema  SKIN: no suspicious lesions or rashes  NEURO: Normal strength and tone, mentation intact and speech normal  PSYCH: mentation appears normal, affect normal/bright    Results for orders placed or performed in visit on 09/28/23   CBC with platelets     Status: Normal   Result Value Ref Range    WBC Count 10.5 4.0 - 11.0 10e3/uL    RBC Count 4.57 3.80 - 5.20 10e6/uL    Hemoglobin 13.4 11.7 - 15.7 g/dL    Hematocrit 40.5 35.0 - 47.0 %    MCV 89 78 - 100 fL    MCH 29.3 26.5 - 33.0 pg    MCHC 33.1 31.5 - 36.5 g/dL "    RDW 13.3 10.0 - 15.0 %    Platelet Count 332 150 - 450 10e3/uL   Wet prep - Clinic Collect     Status: Abnormal    Specimen: Vagina; Swab   Result Value Ref Range    Trichomonas Absent Absent    Yeast Present (A) Absent    Clue Cells Present (A) Absent    WBCs/high power field 1+ (A) None                   Answers submitted by the patient for this visit:  Patient Health Questionnaire (Submitted on 9/28/2023)  If you checked off any problems, how difficult have these problems made it for you to do your work, take care of things at home, or get along with other people?: Very difficult  PHQ9 TOTAL SCORE: 13     43 yo male with alcoholic liver cirrhosis now with ascitis causing SOB., clinically no SBP.  Admit to medicine for further management.

## 2024-01-22 ENCOUNTER — EMERGENCY (EMERGENCY)
Facility: HOSPITAL | Age: 49
LOS: 0 days | Discharge: HOME | End: 2024-01-22
Attending: EMERGENCY MEDICINE
Payer: SELF-PAY

## 2024-01-22 VITALS
RESPIRATION RATE: 18 BRPM | HEART RATE: 92 BPM | OXYGEN SATURATION: 98 % | WEIGHT: 242.95 LBS | SYSTOLIC BLOOD PRESSURE: 136 MMHG | DIASTOLIC BLOOD PRESSURE: 73 MMHG | TEMPERATURE: 98 F

## 2024-01-22 DIAGNOSIS — M79.89 OTHER SPECIFIED SOFT TISSUE DISORDERS: ICD-10-CM

## 2024-01-22 DIAGNOSIS — M25.561 PAIN IN RIGHT KNEE: ICD-10-CM

## 2024-01-22 DIAGNOSIS — Z98.890 OTHER SPECIFIED POSTPROCEDURAL STATES: Chronic | ICD-10-CM

## 2024-01-22 DIAGNOSIS — L02.415 CUTANEOUS ABSCESS OF RIGHT LOWER LIMB: ICD-10-CM

## 2024-01-22 PROCEDURE — 99284 EMERGENCY DEPT VISIT MOD MDM: CPT | Mod: 25

## 2024-01-22 PROCEDURE — 10140 I&D HMTMA SEROMA/FLUID COLLJ: CPT

## 2024-01-22 PROCEDURE — 76882 US LMTD JT/FCL EVL NVASC XTR: CPT | Mod: 26,RT

## 2024-01-22 PROCEDURE — 10060 I&D ABSCESS SIMPLE/SINGLE: CPT

## 2024-01-22 PROCEDURE — 76882 US LMTD JT/FCL EVL NVASC XTR: CPT | Mod: RT

## 2024-01-22 NOTE — ED PROVIDER NOTE - CLINICAL SUMMARY MEDICAL DECISION MAKING FREE TEXT BOX
No distress.  Bedside sono confirms abscess vs. hematoma.  I&D at bedside by sono team.  DC home.  Rx Abx.  [facility] with [facility].  Strict return instructions disucssed.

## 2024-01-22 NOTE — ED PROVIDER NOTE - PATIENT PORTAL LINK FT
You can access the FollowMyHealth Patient Portal offered by [facility] by registering at the following website: [email or web address]. By joining [facility] FollowMyHealth portal, you will also be able to view your health information using other applications (apps) compatible with our system.

## 2024-01-22 NOTE — ED PROVIDER NOTE - OBJECTIVE STATEMENT
48-year-old male with hx Alcohol abuse presents to the ED complaining of swelling and pain onto her right knee for 4 to 5 months after cleaning car on knees.  Patient complaining of swelling and increased pain the last 3 to 4 days.  No fever, chills or weakness.

## 2024-01-22 NOTE — ED PROVIDER NOTE - NSFOLLOWUPINSTRUCTIONS_ED_ALL_ED_FT
Our Emergency Department Referral Coordinators will be reaching out to you in the next 24-48 hours from 9:00am to 5:00pm with a follow up appointment. Please expect a phone call from the hospital in that time frame. If you do not receive a call or if you have any questions or concerns, you can reach them at   [phone]    Abscess    An abscess is an infected area that contains a collection of pus and debris. It can occur in almost any part of the body and occurs when the tissue gets infection. Symptoms include a painful mass that is red, warm, tender that might break open and HAVE drainage. If your health care provider gave you antibiotics make sure to take the full course and do not stop even if feeling better.     SEEK IMMEDIATE MEDICAL CARE IF YOU HAVE ANY OF THE FOLLOWING SYMPTOMS: chills, fever, muscle aches, or red streaking from the area.

## 2024-01-22 NOTE — ED ADULT NURSE NOTE - NSFALLUNIVINTERV_ED_ALL_ED
Bed/Stretcher in lowest position, wheels locked, appropriate side rails in place/Call bell, personal items and telephone in reach/Instruct patient to call for assistance before getting out of bed/chair/stretcher/Non-slip footwear applied when patient is off stretcher/[place] to call system/Physically safe environment - no spills, clutter or unnecessary equipment/Purposeful proactive rounding/Room/bathroom lighting operational, light cord in reach

## 2024-01-30 DIAGNOSIS — M25.561 PAIN IN RIGHT KNEE: ICD-10-CM

## 2024-01-31 ENCOUNTER — RESULT REVIEW (OUTPATIENT)
Age: 49
End: 2024-01-31

## 2024-01-31 ENCOUNTER — APPOINTMENT (OUTPATIENT)
Dept: ORTHOPEDIC SURGERY | Facility: CLINIC | Age: 49
End: 2024-01-31
Payer: COMMERCIAL

## 2024-01-31 ENCOUNTER — OUTPATIENT (OUTPATIENT)
Dept: OUTPATIENT SERVICES | Facility: HOSPITAL | Age: 49
LOS: 1 days | End: 2024-01-31
Payer: COMMERCIAL

## 2024-01-31 DIAGNOSIS — Z98.890 OTHER SPECIFIED POSTPROCEDURAL STATES: Chronic | ICD-10-CM

## 2024-01-31 DIAGNOSIS — Z00.00 ENCOUNTER FOR GENERAL ADULT MEDICAL EXAMINATION WITHOUT ABNORMAL FINDINGS: ICD-10-CM

## 2024-01-31 PROCEDURE — 99203 OFFICE O/P NEW LOW 30 MIN: CPT

## 2024-01-31 PROCEDURE — 73564 X-RAY EXAM KNEE 4 OR MORE: CPT | Mod: RT

## 2024-01-31 PROCEDURE — 73564 X-RAY EXAM KNEE 4 OR MORE: CPT | Mod: 26,RT
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
## 2024-02-02 DIAGNOSIS — M70.50 OTHER BURSITIS OF KNEE, UNSPECIFIED KNEE: ICD-10-CM
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
## 2024-02-28 ENCOUNTER — APPOINTMENT (OUTPATIENT)
Dept: ORTHOPEDIC SURGERY | Facility: CLINIC | Age: 49
End: 2024-02-28

## 2024-02-29 NOTE — DISCHARGE NOTE ADULT - NS MD DC FALL RISK RISK
Is This A New Presentation, Or A Follow-Up?: Rash Additional History: Patient was prescribed Triamcinolone from the [facility] clinic which she had been using daily since October For information on Fall & Injury Prevention, visit [email or web address]

## 2024-05-22 NOTE — PATIENT PROFILE ADULT - NSASFALLATTEMPTOOB_GEN_A_NUR
Continue Regimen: Apply Triamcinolone topical cream as needed for two weeks on, two weeks off.
Detail Level: Zone
no

## 2024-07-01 NOTE — PATIENT PROFILE ADULT - NSTOBACCONEVERSMOKERY/N_GEN_A
Judi had repeat labs today prior to OB/GYN appointment this afternoon.  According to her chart, she is currently at this appointment.    She was evaluated in the ED on 6/7/2024 following a syncopal episode and very heavy menstrual bleeding which was not typical since the onset of menses in May 2023.  - At that time, hemoglobin was 8.8, hematocrit 27.8.  - PTT was normal but PTT was slightly elevated.  - Fibrinogen was at the low end of normal    She was started on an oral iron supplement and 5-day course of progesterone which did stop her flow however she developed menstrual bleeding 5 to 7 days later so a continuous OCP was initiated.    Labs this morning continue to show anemia with a hemoglobin of 7.6, hematocrit of 26.4.  Fibrinogen level has improved   -Iron, ferritin and von Willebrand factor is still pending    I did get in touch with the OB/GYN CNM who will be seeing Judi today.  -If her vitals are stable and she is asymptomatic in terms of dizziness and headaches, I can set up an outpatient referral to hematology within the next 2 weeks.  -If vitals are not stable or she is symptomatic, would recommend that she be evaluated in [facility] ED today as she will likely require a blood transfusion.      
Mom is informed of message from Dr. Gibson.   Advised Mom if patient experience any dizziness, severe headaches, or tachycardia prior to her visit with Dr. Musa, she should be seen in the ED.  Also recommend Mom check with insurance to be sure [facility] is covered. Mom verbalizes understanding and is comfortable with plan.   Mom is transferred to  to schedule.      
OB/GYn evalaution showed stable vitals and symptoms of fatigue but no dizziness.    Will place outpatient referral to Peds Heme-Onc  Dr. Musa has appt in [place] next week.  
spontaneous rupture
No

## 2024-07-11 NOTE — HISTORY OF PRESENT ILLNESS
Counseling and Referral of Other Preventative  (Italic type indicates deductible and co-insurance are waived)    Patient Name: Kajal Duran  Today's Date: 7/11/2024    Health Maintenance       Date Due Completion Date    COVID-19 Vaccine (9 - 2023-24 season) 07/20/2024 5/25/2024    Mammogram 09/13/2024 9/13/2023    Influenza Vaccine (1) 09/01/2024 10/17/2023    Hemoglobin A1c (Prediabetes) 04/10/2025 4/10/2024    Lipid Panel 04/10/2025 4/10/2024    Colorectal Cancer Screening 05/06/2025 5/6/2024    DEXA Scan 06/09/2025 6/9/2023    TETANUS VACCINE 10/06/2025 10/6/2015        No orders of the defined types were placed in this encounter.    The following information is provided to all patients.  This information is to help you find resources for any of the problems found today that may be affecting your health:                  Living healthy guide: [email or web address]      Understanding Diabetes: www.diabetes.org      Eating healthy: www.cdc.gov/healthyweight      CDC home safety checklist: www.cdc.gov/steadi/patient.html      Agency on Aging: www.goea.[place].gov      Alcoholics anonymous (AA): www.aa.org      Physical Activity: www.saul.nih.gov/kd0ruuj      Tobacco use: www.quitwithusla.org         
[de-identified] : Mr. Jules is a 43 yo M with AUD, with decompensated alcohol-related cirrhosis complicated by ascites, hepatic encephalopathy, and hyponatremia. He has no prior history of SBP, variceal hemorrhage, PVT, or HCC. His cirrhosis was first diagnosed in 2018. He previously completed an alcohol RPP in 1/2019, but relapsed after 3-4 months, leading to progression of his cirrhosis. His last reported alcoholic drink was on 8/12/19.\par \par He was last seen on 1/7/20. He was unable to get outpatient paracentesis after that visit due to an insurance problem and was subsequently hospitalized at [facility] from 1/13/20-1/15/20 due to anasarca and ascites. However, he left the hospital prior to undergoing paracentesis. He was discharged on furosemide 60 mg po daily and spironolactone 150 mg po daily.\par \par He is here today for post-hospital discharge follow-up. He continues to attend AA -- now 3-4 times/week instead of daily. He does not have a sponsor yet but has someone in mind. He denies any recent alcohol cravings or slips. He feels that his abdominal distension and lower extremity swelling are better today and he no longer feels that he needs paracentesis currently. No episodes of confusion. \par \par EGD in 7/2018 had no varices but showed esophagitis and gastritis (H pylori negative).\par Colonoscopy in 7/2018 showed hemorrhoids, diverticulosis, and ?anal fistula.\par \par He is  and his 17- and 20-year-old children live with his wife. He lives with his aunt. He works as a .

## 2025-06-11 NOTE — ED PROVIDER NOTE - PRINCIPAL DIAGNOSIS
on the discharge service for the patient. I have reviewed and made amendments to the documentation where necessary. Alcoholic cirrhosis, unspecified whether ascites present